# Patient Record
Sex: FEMALE | Race: WHITE | NOT HISPANIC OR LATINO | Employment: OTHER | URBAN - METROPOLITAN AREA
[De-identification: names, ages, dates, MRNs, and addresses within clinical notes are randomized per-mention and may not be internally consistent; named-entity substitution may affect disease eponyms.]

---

## 2017-04-20 ENCOUNTER — TRANSCRIBE ORDERS (OUTPATIENT)
Dept: ADMINISTRATIVE | Facility: HOSPITAL | Age: 77
End: 2017-04-20

## 2017-04-20 DIAGNOSIS — M51.36 DDD (DEGENERATIVE DISC DISEASE), LUMBAR: Primary | ICD-10-CM

## 2017-04-20 DIAGNOSIS — M54.30 SCIATICA, UNSPECIFIED LATERALITY: ICD-10-CM

## 2017-04-27 ENCOUNTER — HOSPITAL ENCOUNTER (OUTPATIENT)
Dept: GENERAL RADIOLOGY | Facility: HOSPITAL | Age: 77
Discharge: HOME | End: 2017-04-27

## 2017-04-27 ENCOUNTER — HOSPITAL ENCOUNTER (OUTPATIENT)
Dept: MRI IMAGING | Facility: HOSPITAL | Age: 77
Discharge: HOME | End: 2017-04-27
Attending: ORTHOPAEDIC SURGERY | Admitting: ORTHOPAEDIC SURGERY

## 2017-04-27 DIAGNOSIS — M51.36 DDD (DEGENERATIVE DISC DISEASE), LUMBAR: ICD-10-CM

## 2017-04-27 DIAGNOSIS — M54.30 SCIATICA, UNSPECIFIED LATERALITY: ICD-10-CM

## 2017-04-27 DIAGNOSIS — N64.4 BREAST PAIN, LEFT: ICD-10-CM

## 2017-04-27 PROCEDURE — 72148 MRI LUMBAR SPINE W/O DYE: CPT

## 2017-04-27 PROCEDURE — 72040 X-RAY EXAM NECK SPINE 2-3 VW: CPT

## 2017-08-11 ENCOUNTER — TRANSCRIBE ORDERS (OUTPATIENT)
Dept: ADMINISTRATIVE | Facility: HOSPITAL | Age: 77
End: 2017-08-11

## 2017-08-11 DIAGNOSIS — M54.2 NECK PAIN: Primary | ICD-10-CM

## 2017-08-15 ENCOUNTER — HOSPITAL ENCOUNTER (OUTPATIENT)
Dept: MRI IMAGING | Facility: HOSPITAL | Age: 77
Discharge: HOME | End: 2017-08-15
Attending: ORTHOPAEDIC SURGERY | Admitting: ORTHOPAEDIC SURGERY

## 2017-08-15 DIAGNOSIS — M54.2 NECK PAIN: ICD-10-CM

## 2017-08-15 PROCEDURE — 72141 MRI NECK SPINE W/O DYE: CPT

## 2017-11-28 ENCOUNTER — OFFICE VISIT (OUTPATIENT)
Dept: NEUROSURGERY | Facility: CLINIC | Age: 77
End: 2017-11-28

## 2017-11-28 VITALS
WEIGHT: 195 LBS | BODY MASS INDEX: 31.34 KG/M2 | DIASTOLIC BLOOD PRESSURE: 77 MMHG | HEIGHT: 66 IN | SYSTOLIC BLOOD PRESSURE: 144 MMHG | HEART RATE: 96 BPM

## 2017-11-28 DIAGNOSIS — M54.2 NECK PAIN: Primary | ICD-10-CM

## 2017-11-28 DIAGNOSIS — M48.061 SPINAL STENOSIS OF LUMBAR REGION WITHOUT NEUROGENIC CLAUDICATION: ICD-10-CM

## 2017-11-28 PROCEDURE — 99203 OFFICE O/P NEW LOW 30 MIN: CPT | Performed by: NEUROLOGICAL SURGERY

## 2017-11-28 RX ORDER — AMITRIPTYLINE HYDROCHLORIDE 25 MG/1
TABLET, FILM COATED ORAL
Refills: 1 | COMMUNITY
Start: 2017-10-17 | End: 2020-11-04 | Stop reason: ALTCHOICE

## 2017-11-28 NOTE — PROGRESS NOTES
Subjective   Patient ID: Latanya Olsen is a 77 y.o. female is here today as a self referral for back and neck pain that radiates into right arm greater than left. She has had Chiropractic care.     History of Present Illness     This patient has been having pain in her back and her neck for the better part of the year.  She says she had been taking care of her sick  passed away last year.  Because of that she was doing a lot of pushing and pulling.  She says that her back began to hurt her more and more today she began to work in the yard which made it even worse.  The pain is located all the way across her lower back.  At one point it was radiating down her legs but that has since gotten better.  Currently it is located primarily across her lower back.  She has no difficulty with bowel or bladder control or other associated symptoms.    Recently she has begun having pain in her neck as well.  It is located all the way across her neck.  There is no radiation into her arms.    The following portions of the patient's history were reviewed and updated as appropriate: allergies, current medications, past family history, past medical history, past social history, past surgical history and problem list.    Review of Systems   HENT: Positive for congestion.    Respiratory: Positive for shortness of breath. Negative for chest tightness.    Cardiovascular: Negative for chest pain.   Gastrointestinal: Positive for constipation.   Musculoskeletal: Positive for back pain, neck pain and neck stiffness.        Right arm greater than left   Allergic/Immunologic: Positive for environmental allergies.   Neurological: Negative for numbness.   Psychiatric/Behavioral: Positive for sleep disturbance. The patient is nervous/anxious.    All other systems reviewed and are negative.      Objective   Physical Exam   Constitutional: She is oriented to person, place, and time. She appears well-developed and well-nourished.   HENT:    Head: Normocephalic and atraumatic.   Eyes: Conjunctivae and EOM are normal. Pupils are equal, round, and reactive to light.   Fundoscopic exam:       The right eye shows no papilledema. The right eye shows venous pulsations.        The left eye shows no papilledema. The left eye shows venous pulsations.   Neck: Carotid bruit is not present.   Neurological: She is oriented to person, place, and time. She has a normal Finger-Nose-Finger Test and a normal Heel to Shin Test. Gait normal.   Reflex Scores:       Tricep reflexes are 2+ on the right side and 2+ on the left side.       Bicep reflexes are 2+ on the right side and 2+ on the left side.       Brachioradialis reflexes are 2+ on the right side and 2+ on the left side.       Patellar reflexes are 2+ on the right side and 2+ on the left side.       Achilles reflexes are 2+ on the right side and 2+ on the left side.  Psychiatric: Her speech is normal.     Neurologic Exam     Mental Status   Oriented to person, place, and time.   Registration of memory: Good recent and remote memory.   Attention: normal. Concentration: normal.   Speech: speech is normal   Level of consciousness: alert  Knowledge: consistent with education.     Cranial Nerves     CN II   Visual fields full to confrontation.   Visual acuity: normal    CN III, IV, VI   Pupils are equal, round, and reactive to light.  Extraocular motions are normal.     CN V   Facial sensation intact.   Right corneal reflex: normal  Left corneal reflex: normal    CN VII   Facial expression full, symmetric.   Right facial weakness: none  Left facial weakness: none    CN VIII   Hearing: intact    CN IX, X   Palate: symmetric    CN XI   Right sternocleidomastoid strength: normal  Left sternocleidomastoid strength: normal    CN XII   Tongue: not atrophic  Tongue deviation: none    Motor Exam   Muscle bulk: normal  Right arm tone: normal  Left arm tone: normal  Right leg tone: normal  Left leg tone: normal    Strength    Strength 5/5 except as noted.     Sensory Exam   Light touch normal.     Gait, Coordination, and Reflexes     Gait  Gait: normal    Coordination   Finger to nose coordination: normal  Heel to shin coordination: normal    Reflexes   Right brachioradialis: 2+  Left brachioradialis: 2+  Right biceps: 2+  Left biceps: 2+  Right triceps: 2+  Left triceps: 2+  Right patellar: 2+  Left patellar: 2+  Right achilles: 2+  Left achilles: 2+  Right : 2+  Left : 2+      Assessment/Plan   Independent Review of Radiographic Studies:      Reviewed an MRI of her cervical spine done on August 15 myself.  This shows fairly severe stenosis at C3 4 and C5 6.  I also reviewed an MRI of her lumbar spine done in April of this year.  This shows some stenosis at L3 4 and L4 5 but nothing severe.  L2 3 is widely patent.    Medical Decision Making:      Told the patient about the imaging.  I told her that from my point of view I don't see anything here that is dangerous.  She has been treated with chiropractic but no other treatment so far.  I really think we should try some epidural blocks and some physical therapy as a next step just to see if that will help calm this down.  I told her to call me after the blocks and if she is no better we will get her back in the office to discuss a myelogram.    Latanya was seen today for back pain and neck pain.    Diagnoses and all orders for this visit:    Neck pain  -     Epidural Block  -     Ambulatory Referral to Physical Therapy    Spinal stenosis of lumbar region without neurogenic claudication  -     Ambulatory Referral to Physical Therapy    Return for Recheck and call after treatment or consultation.

## 2017-12-06 ENCOUNTER — HOSPITAL ENCOUNTER (OUTPATIENT)
Dept: PHYSICAL THERAPY | Facility: HOSPITAL | Age: 77
Setting detail: THERAPIES SERIES
Discharge: HOME | End: 2017-12-06

## 2017-12-06 DIAGNOSIS — M54.2 NECK PAIN: Primary | ICD-10-CM

## 2017-12-06 DIAGNOSIS — M48.061 LUMBAR STENOSIS WITHOUT NEUROGENIC CLAUDICATION: ICD-10-CM

## 2017-12-06 DIAGNOSIS — M48.02 CERVICAL STENOSIS OF SPINE: ICD-10-CM

## 2017-12-06 PROCEDURE — 97162 PT EVAL MOD COMPLEX 30 MIN: CPT

## 2017-12-06 PROCEDURE — 97110 THERAPEUTIC EXERCISES: CPT

## 2017-12-06 PROCEDURE — G8984 CARRY CURRENT STATUS: HCPCS

## 2017-12-06 PROCEDURE — G8985 CARRY GOAL STATUS: HCPCS

## 2017-12-06 NOTE — THERAPY EVALUATION
Outpatient Physical Therapy Ortho Initial Evaluation  ANNAMARIE Grimes     Patient Name: Latanya Olsen  : 1940  MRN: [id number]  Today's Date: 2017      Visit Date: 2017    Patient Active Problem List   Diagnosis   • Carcinoid tumor of lung   • Diverticulosis of intestine   • Obstructive sleep apnea syndrome   • Weight loss   • Vitamin D deficiency   • Overweight (BMI 25.0-29.9)   • Spinal stenosis of lumbar region without neurogenic claudication   • Osteoarthritis of knee   • Obesity (BMI 30-39.9)   • Neutropenia   • Chronic lower back pain   • Knee pain   • Insomnia   • Hypothyroidism   • Hypokalemia   • Hypertension   • Hyperlipidemia   • Generalized osteoarthritis   • Gastroparesis   • Foot pain   • Chronic obstructive pulmonary disease   • Chronic constipation   • Anemia   • Weight gain   • Pain of left breast   • Elevated serum alkaline phosphatase level   • Neck pain        Past Medical History:   Diagnosis Date   • Arthritis    • COPD (chronic obstructive pulmonary disease)    • Diverticulosis    • Hyperlipidemia    • Hypertension    • Hypothyroidism    • Knee swelling    • Lung cancer     history   • Restless leg syndrome    • Sleep apnea with use of continuous positive airway pressure (CPAP)         Past Surgical History:   Procedure Laterality Date   • BUNIONECTOMY Bilateral    • CATARACT EXTRACTION     • CHOLECYSTECTOMY     • COLONOSCOPY     • ENDOSCOPY N/A 2016    Procedure: ESOPHAGOGASTRODUODENOSCOPY  with biopsies;  Surgeon: Von Hernández MD;  Location: [facility];  Service:    • LUNG LOBECTOMY Left     lower lobe   • REPLACEMENT TOTAL KNEE Left    • THYROID BIOPSY         Visit Dx:     ICD-10-CM ICD-9-CM   1. Neck pain M54.2 723.1   2. Lumbar stenosis without neurogenic claudication M48.061 724.02   3. Cervical stenosis of spine M48.02 723.0             Patient History       17 1100          History    Chief Complaint Difficulty with daily activities;Joint  stiffness;Muscle tenderness;Pain  -CC      Type of Pain Back pain;Neck pain  -      Date Current Problem(s) Began --   spring 2017  -      Brief Description of Current Complaint Flared sx of back pain and onset of neck pain due to yard work in the sping 2017. Pt went to see Dr Campbell initially and pt states he recommended surgery. Pt wanted another opinion and saw Dr Hopkins who referred pt to PT and referrring pt for series of epidurals  -CC      Previous treatment for THIS PROBLEM Chiropractor;Medication  -      Onset Date- PT 12-16-17  -CC      Patient/Caregiver Goals Relieve pain;Improve mobility;Return to prior level of function  -      Hand Dominance right-handed  -      Occupation/sports/leisure activities Pt is gqguxmb-[employer]bng[employer]-mc's son lives with her.-likes to read -watch movies-garden  -      Patient seeing anyone else for problem(s)? --   Dr Hopkins  -      How has patient tried to help current problem? Heat pad/cold pack-pt's son helps with laundry and grocery shopping-pt wears back support prescribed by Dr Campbell when cleaning home-trial of cervical pillows  -      What clinical tests have you had for this problem? MRI  -      Results of Clinical Tests Multi level degenerative changes cervical and lumbar-Cervical stenosis severe C3-C4 & C5-C6---stenosis mild -moderate in lumbar spine L3-L4 & L4/L5  -CC      Pain     Pain Location --   L cervical and across L-S area  -CC      Pain at Present --   cervical 4/10 /lumbar 3/10  -CC      Pain at Best --   cervical1/10-lumbar 0/10  -CC      Pain at Worst --   cervical and lumbar 5/10  -CC      Pain Frequency --   cervical constant- lumbar can be intermittent  -CC      Pain Description Aching;Sore;Tender;Tightness  -      What Performance Factors Make the Current Problem(s) WORSE? cervical lifting gallon ode-xnolbsfjx-gymzohw -vaccuming -walking up steps for back sx  -CC      What Performance Factors Make the Current Problem(s) BETTER? Avoiding  factors previously mentioned  -CC      Tolerance Time- Standing 30 min  -CC      Tolerance Time- Sitting 30 min  -CC      Tolerance Time- Walking 45 min  -CC      Is your sleep disturbed? --   Pt has trouble falling asleep ~ 2hrs to get comfortable  -CC      What position do you sleep in? Supine;Left sidelying  -CC      Fall Risk Assessment    Any falls in the past year: No  -CC      Daily Activities    Primary Language English  -CC      Are you able to read Yes  -CC      Are you able to write Yes  -CC      How does patient learn best? Listening  -CC      Teaching needs identified Home Exercise Program;Management of Condition  -CC      Patient is concerned about/has problems with Climbing Stairs;Grasping objects lifting;Performing home management (household chores, shopping, care of dependents);Performing job responsibilities/community activities (work, school,;Sitting;Standing;Walking  -CC      Does patient have problems with the following? Anxiety  -CC      Barriers to learning None  -CC      Pt Participated in POC and Goals Yes  -CC      Safety    Are you being hurt, hit, or frightened by anyone at home or in your life? No  -CC      Are you being neglected by a caregiver No  -CC        User Key  (r) = Recorded By, (t) = Taken By, (c) = Cosigned By    Initials Name Provider Type    CC Davina Aviles, PT Physical Therapist                PT Ortho       17 1100    Subjective Comments    Subjective Comments Pt relates that she is a -spouse  last year. 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 Pt has had a 20 year hx of LBP but flared up and onset of radicular sx into LE spring 2017 when doing yardwork along with onset of neck pain sx. Pt relates the radicular sx have subsided mostly. Pt having more issues with neck pain sx localized on the L. Pt had tried chiropractic care 3 mos ago but did not help.  -CC    Precautions and Contraindications    Precautions/Limitations --   limited by pain   -CC    Posture/Observations    Forward Head Moderate;Increased  -CC    Cervical Lordosis Moderate;Increased  -CC    Thoracic Kyphosis Moderate;Increased  -CC    Rounded Shoulders Moderate;Increased  -CC    Scapular Elevation Right:;Moderate  -CC    Scoliosis Mild;Moderate  -CC    Lumbar lordosis Mild;Moderate;Decreased  -CC    Iliac crests Right:;Moderate;Elevated  -CC    Posture/Observations Comments Pt ambulates into dept with FF trunk and uneven wt shifting   -CC    Cervical/Shoulder ROM Screen    Cervical flexion --   22 degrees-c/o sx  -CC    Cervical extension --   30 degrees  -CC    Cervical lateral flexion --   SBR 15 degrees/ SBL 20 degrees  -CC    Cervical rotation --   RR 50 degrees/ RL 20 degrees with c/o sx  -CC    Lumbar ROM Screen- Lower Quarter Clearing    Lumbar Flexion --   50 % c/o sx  -CC    Lumbar Extension --   50% -c/o sx  -CC    Lumbar Lateral Flexion --   SBR/SBL c/o sx each  -CC    Lumbar Rotation --   RR/RL 25%  -CC    Cervical/Thoracic Special Tests    Cervical Compression (Forarminal Compression vs. Facet Pain) Left:;Positive  -CC    Cervical Distraction (Foraminal Compression vs. Facet Pain) Left:;Positive  -CC    Lumbosacral Palpation    SI Left:;Tender  -CC    Lumbosacral Segment Left:;Tender  -CC    Spinous Process Tender  -CC    Piriformis Left:;Tender  -CC    Erector Spinae (Paraspinals) Left:;Tender  -CC    Lumbosacral Palpation? Yes  -CC    Lumbar/SI Special Tests    Standing Flexion Test (SI Dysfunction) Left:;Positive  -CC    Slump Test (Neural Tension) Bilateral:;Negative  -CC    SLR (Neural Tension) Right:;Positive  -CC    IWONA (hip vs. SI Dysfunction) Bilateral:;Positive   Bilateral c//o hip sx  -CC    MMT (Manual Muscle Testing)    General MMT Assessment no strength deficits identified  -CC    Upper Extremity Flexibility    Suboccipitals Left:;Mildly limited;Moderately limited  -CC    Scalenes Left:;Moderately limited  -CC    SCM Left:;Moderately limited  -CC    Upper  Trapezius Left:;Moderately limited  -CC    Levator Scapula Left:;Moderately limited  -CC    Pect Minor Bilateral:;Mildly limited;Moderately limited  -CC    Lower Extremity Flexibility    Hamstrings Bilateral:;WNL  -CC    ITB Bilateral:;Moderately limited  -CC    Stairs Assessment/Treatment    Number of Stairs Pt lives 1 story home with basement for laundry- 3 steps into home with handrail  -CC      User Key  (r) = Recorded By, (t) = Taken By, (c) = Cosigned By    Initials Name Provider Type    JESSICA Aviles, PT Physical Therapist                            Therapy Education       12/06/17 1632          Therapy Education    Education Details Issued handout for HEP   -CC      Given HEP  -CC      Program New  -CC      How Provided Verbal;Demonstration;Written  -CC      Provided to Patient  -CC      Level of Understanding Demonstrated  -CC        User Key  (r) = Recorded By, (t) = Taken By, (c) = Cosigned By    Initials Name Provider Type    JESSICA Aviles, PT Physical Therapist                PT OP Goals       12/06/17 1100       PT Short Term Goals    STG Date to Achieve 01/05/18  -CC     STG 1 Pt compliant performing HEP for cervical and lumbar spine  -CC     STG 2 Pt reports improved comfort going to sleep by 50%  -CC     STG 3 Pt will improve cervical ROM FF and SBR by 5-10 degrees  -CC     STG 4 On palpation pt will show decrease soft tissue restriction in L cervical area and report decrease tenderness by 25-50%.  -CC     STG 5 Pt reports she is able to lift a gallon jug into refrigerator with decrease sx L neck and shoulder 25-50%  -CC       User Key  (r) = Recorded By, (t) = Taken By, (c) = Cosigned By    Initials Name Provider Type    JESSICA Aviles PT Physical Therapist                PT Assessment/Plan       12/06/17 1632       PT Assessment    Functional Limitations Impaired locomotion;Limitation in home management;Limitations in functional capacity and  performance;Performance in leisure activities  -CC     Impairments Impaired muscle length;Posture;Joint integrity;Joint mobility;Range of motion;Impaired flexibility;Pain;Gait  -CC     Assessment Comments Pt referred to PT with c/c neck pain as well as flared sx in chronic lumbar pain over past 6 mos due to yard work in the spring 2017. Pt c/o greater pain and concern of sx in L cervical area which did not bother her much before. Crom is reduced with increased sx SBR and FF. No strength deficits identified in UE or LE's. Pt presents increase soft tissue dysfunction in L cervical muscles and sx are also more involved  in L lumbar region. Pt has been  Dx with degenerative changes in cervical and lumbar spine but stenosis greater in cervical spine so will initially focus care in cervical area to reduce sx and improve pt's tolerance to ADL                                                                                                                                                                                                                                                                                                       -CC     Please refer to paper survey for additional self-reported information Yes   Neck Index -score 38  /Back Index score 26  -CC     Rehab Potential Fair  -CC     Patient/caregiver participated in establishment of treatment plan and goals Yes  -CC     Patient would benefit from skilled therapy intervention Yes  -CC     PT Plan    PT Frequency 2x/week;3x/week  -CC     Predicted Duration of Therapy Intervention (days/wks) 4 weeks  -CC     Planned CPT's? PT EVAL MOD COMPLELITY: 86055;PT ULTRASOUND EA 15 MIN: 95253;PT TRACTION CERVICAL: 49767;PT HOT OR COLD PACK TREAT MCARE;PT ELECTRICAL STIM UNATTEND: ;PT THER PROC EA 15 MIN: 10547  -CC     Physical Therapy Interventions (Optional Details) lumbar stabilization;manual therapy techniques;modalities;stretching;taping;home exercise  program;patient/family education  -CC     PT Plan Comments Follow POC options  -CC       User Key  (r) = Recorded By, (t) = Taken By, (c) = Cosigned By    Initials Name Provider Type    JESSICA Aviles PT Physical Therapist                  Exercises       17 1100          Subjective Comments    Subjective Comments Pt relates that she is a -spouse  last year. 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 Pt has had a 20 year hx of LBP but flared up and onset of radicular sx into LE spring 2017 when doing yardwork along with onset of neck pain sx. Pt relates the radicular sx have subsided mostly. Pt having more issues with neck pain sx localized on the L. Pt had tried chiropractic care 3 mos ago but did not help.  -CC      Exercise 1    Exercise Name 1 Shoulder rolls/scapular squeezes  -CC      Cueing 1 Demo  -CC      Reps 1 5  -CC      Time (Seconds) 1 5 sec for scap squeezes  -CC      Exercise 2    Exercise Name 2 L UT and Levator stretch   -CC      Cueing 2 Demo  -CC      Reps 2 5  -CC      Time (Seconds) 2  5 sec each  -CC      Exercise 3    Exercise Name 3 B SKC with sheet  -CC      Cueing 3 Verbal  -CC      Reps 3 5  -CC      Time (Seconds) 3 10 sec  -CC      Exercise 4    Exercise Name 4 Hamstring stretch with sheet  -CC      Cueing 4 Verbal;Tactile  -CC      Reps 4 4  -CC      Time (Seconds) 4 15 sec each  -CC        User Key  (r) = Recorded By, (t) = Taken By, (c) = Cosigned By    Initials Name Provider Type    JESSICA Aviles PT Physical Therapist                              Time Calculation:   Start Time: 1100  Stop Time: 1210  Time Calculation (min): 70 min     Therapy Charges for Today     Code Description Service Date Service Provider Modifiers Qty    34990760613 HC PT EVAL MOD COMPLEXITY 3 2017 Davina Aviles, PT GP 1    45883279538 HC PT THER PROC EA 15 MIN 2017 Davina Aviles PT GP 1    59929778853 HC PT CARRY MOV HAND OBJ CURRENT  12/6/2017 Davina Aviles, PT GP, CJ 1    10531896401 HC PT CARRY MOV HAND OBJ PROJECTED 12/6/2017 Davina Aviles, PT GP, CI 1          PT G-Codes  Functional Limitation: Carrying, moving and handling objects  Carrying, Moving and Handling Objects Current Status (): At least 20 percent but less than 40 percent impaired, limited or restricted  Carrying, Moving and Handling Objects Goal Status (): At least 1 percent but less than 20 percent impaired, limited or restricted         Davina Aviles, PT  12/6/2017

## 2017-12-11 ENCOUNTER — HOSPITAL ENCOUNTER (OUTPATIENT)
Dept: PHYSICAL THERAPY | Facility: HOSPITAL | Age: 77
Setting detail: THERAPIES SERIES
Discharge: HOME | End: 2017-12-11

## 2017-12-11 PROCEDURE — 97110 THERAPEUTIC EXERCISES: CPT

## 2017-12-11 PROCEDURE — G0283 ELEC STIM OTHER THAN WOUND: HCPCS

## 2017-12-11 PROCEDURE — 97035 APP MDLTY 1+ULTRASOUND EA 15: CPT

## 2017-12-11 NOTE — THERAPY TREATMENT NOTE
Outpatient Physical Therapy Ortho Treatment Note  ANNAMARIE Grimes     Patient Name: Latanya Olsen  : 1940  MRN: [id number]  Today's Date: 2017      Visit Date: 2017    Visit Dx:  No diagnosis found.    Patient Active Problem List   Diagnosis   • Carcinoid tumor of lung   • Diverticulosis of intestine   • Obstructive sleep apnea syndrome   • Weight loss   • Vitamin D deficiency   • Overweight (BMI 25.0-29.9)   • Spinal stenosis of lumbar region without neurogenic claudication   • Osteoarthritis of knee   • Obesity (BMI 30-39.9)   • Neutropenia   • Chronic lower back pain   • Knee pain   • Insomnia   • Hypothyroidism   • Hypokalemia   • Hypertension   • Hyperlipidemia   • Generalized osteoarthritis   • Gastroparesis   • Foot pain   • Chronic obstructive pulmonary disease   • Chronic constipation   • Anemia   • Weight gain   • Pain of left breast   • Elevated serum alkaline phosphatase level   • Neck pain        Past Medical History:   Diagnosis Date   • Arthritis    • COPD (chronic obstructive pulmonary disease)    • Diverticulosis    • Hyperlipidemia    • Hypertension    • Hypothyroidism    • Knee swelling    • Lung cancer     history   • Restless leg syndrome    • Sleep apnea with use of continuous positive airway pressure (CPAP)         Past Surgical History:   Procedure Laterality Date   • BUNIONECTOMY Bilateral    • CATARACT EXTRACTION     • CHOLECYSTECTOMY     • COLONOSCOPY     • ENDOSCOPY N/A 2016    Procedure: ESOPHAGOGASTRODUODENOSCOPY  with biopsies;  Surgeon: Von Hernández MD;  Location: [facility];  Service:    • LUNG LOBECTOMY Left     lower lobe   • REPLACEMENT TOTAL KNEE Left    • THYROID BIOPSY                               PT Assessment/Plan       17 1543       PT Assessment    Assessment Comments Pt tolerated Rx well and liked IFC to L cervical area and reported decrease sx compared to beginning of Rx. Palpated decrease soft tissue restriction following  STM in L cervical area.   -CC     PT Plan    PT Plan Comments Continue per plan and advance TE as pt tolerates  -CC       User Key  (r) = Recorded By, (t) = Taken By, (c) = Cosigned By    Initials Name Provider Type    JESSICA Aviles PT Physical Therapist                Modalities       12/11/17 1500          Subjective Comments    Subjective Comments Pt relates that she has been doing HEP some and feeling about the same in neck and back  -CC      Subjective Pain    Able to rate subjective pain? yes  -CC      Pre-Treatment Pain Level 4   cervical  -CC      Moist Heat    Location cervical and lumbar supine with knee roll and HOB elevated  -CC      Rx Minutes 15 mins  -CC      MH S/P Rx Yes  -CC      Ultrasound [id number]    Location L paracervicals and UT  -CC      Rx Minutes 8 min  -CC      Frequency 1.0 MHz  -CC      Intensity - Wts/cm 1.5  -CC      ELECTRICAL STIMULATION    Attended/Unattended Unattended  -CC      Stimulation Type IFC  -CC      Location/Electrode Placement/Other To L paracervicals and UT with MH supine with knee roll and HOB elevated  -CC      Rx Minutes 15 mins  -CC        User Key  (r) = Recorded By, (t) = Taken By, (c) = Cosigned By    Initials Name Provider Type    JESSICA Aviles, PT Physical Therapist                Exercises       12/11/17 1500          Subjective Comments    Subjective Comments Pt relates that she has been doing HEP some and feeling about the same in neck and back  -CC      Subjective Pain    Able to rate subjective pain? yes  -CC      Pre-Treatment Pain Level 4   cervical  -CC      Post-Treatment Pain Level 3  -CC      Exercise 1    Exercise Name 1 Shoulder rolls/scapular squeezes  -CC      Cueing 1 Demo  -CC      Reps 1 10  -CC      Time (Seconds) 1 5 sec for scap squeezes  -CC      Exercise 2    Exercise Name 2 L UT and Levator stretch   -CC      Cueing 2 Demo  -CC      Reps 2 5  -CC      Time (Seconds) 2  5 sec each  -CC      Exercise 3    Exercise  Name 3 B SKC with sheet  -CC      Cueing 3 Verbal  -CC      Reps 3 5  -CC      Time (Seconds) 3 10 sec  -CC      Exercise 4    Exercise Name 4 Hamstring stretch with sheet  -CC      Cueing 4 Verbal;Tactile  -CC      Reps 4 4  -CC      Time (Seconds) 4 15 sec each  -CC      Exercise 5    Exercise Name 5 Bilateral LTR  -CC      Cueing 5 Tactile  -CC      Reps 5 5  -CC      Time (Seconds) 5 10 sec ea  -CC        User Key  (r) = Recorded By, (t) = Taken By, (c) = Cosigned By    Initials Name Provider Type    JESSICA Aviles PT Physical Therapist                        Manual Rx (last 36 hours)      Manual Treatments       12/11/17 1500          Manual Rx 1    Manual Rx 1 Location L paracervicals and UT  -CC      Manual Rx 1 Type STM  -CC      Manual Rx 1 Grade as tolerated mild-mod  -CC      Manual Rx 1 Duration 7 min  -CC        User Key  (r) = Recorded By, (t) = Taken By, (c) = Cosigned By    Initials Name Provider Type    JESSICA Aviles PT Physical Therapist                    Therapy Education       12/11/17 1542          Therapy Education    Education Details Continue HEP - add LTR to back stretches per session  -CC      Given HEP  -CC        User Key  (r) = Recorded By, (t) = Taken By, (c) = Cosigned By    Initials Name Provider Type    JESSICA Aviles PT Physical Therapist                Time Calculation:   Start Time: 1430  Stop Time: 1545  Time Calculation (min): 75 min    Therapy Charges for Today     Code Description Service Date Service Provider Modifiers Qty    52386273654 HC PT ELECTRICAL STIM UNATTENDED 12/11/2017 Davina Aviles PT  1    46459385520 HC PT HOT OR COLD PACK TREAT MCARE 12/11/2017 Davina Aviles, PT GP 2    67089392011 HC PT THER PROC EA 15 MIN 12/11/2017 Davina Aviles, PT GP 1    53310837943 HC PT ULTRASOUND EA 15 MIN 12/11/2017 Davina Aviles, PT GP 1                    Davina Aviles,  PT  12/11/2017

## 2017-12-13 ENCOUNTER — HOSPITAL ENCOUNTER (OUTPATIENT)
Dept: PHYSICAL THERAPY | Facility: HOSPITAL | Age: 77
Setting detail: THERAPIES SERIES
Discharge: HOME | End: 2017-12-13

## 2017-12-13 PROCEDURE — 97110 THERAPEUTIC EXERCISES: CPT

## 2017-12-13 PROCEDURE — 97035 APP MDLTY 1+ULTRASOUND EA 15: CPT

## 2017-12-13 PROCEDURE — G0283 ELEC STIM OTHER THAN WOUND: HCPCS

## 2017-12-13 NOTE — THERAPY TREATMENT NOTE
Outpatient Physical Therapy Ortho Treatment Note  ANNAMARIE Grimes     Patient Name: Latanya Olsen  : 1940  MRN: [id number]  Today's Date: 2017      Visit Date: 2017    Visit Dx:  No diagnosis found.    Patient Active Problem List   Diagnosis   • Carcinoid tumor of lung   • Diverticulosis of intestine   • Obstructive sleep apnea syndrome   • Weight loss   • Vitamin D deficiency   • Overweight (BMI 25.0-29.9)   • Spinal stenosis of lumbar region without neurogenic claudication   • Osteoarthritis of knee   • Obesity (BMI 30-39.9)   • Neutropenia   • Chronic lower back pain   • Knee pain   • Insomnia   • Hypothyroidism   • Hypokalemia   • Hypertension   • Hyperlipidemia   • Generalized osteoarthritis   • Gastroparesis   • Foot pain   • Chronic obstructive pulmonary disease   • Chronic constipation   • Anemia   • Weight gain   • Pain of left breast   • Elevated serum alkaline phosphatase level   • Neck pain        Past Medical History:   Diagnosis Date   • Arthritis    • COPD (chronic obstructive pulmonary disease)    • Diverticulosis    • Hyperlipidemia    • Hypertension    • Hypothyroidism    • Knee swelling    • Lung cancer     history   • Restless leg syndrome    • Sleep apnea with use of continuous positive airway pressure (CPAP)         Past Surgical History:   Procedure Laterality Date   • BUNIONECTOMY Bilateral    • CATARACT EXTRACTION     • CHOLECYSTECTOMY     • COLONOSCOPY     • ENDOSCOPY N/A 2016    Procedure: ESOPHAGOGASTRODUODENOSCOPY  with biopsies;  Surgeon: Von Hernández MD;  Location: [facility];  Service:    • LUNG LOBECTOMY Left     lower lobe   • REPLACEMENT TOTAL KNEE Left    • THYROID BIOPSY                               PT Assessment/Plan       17 1530       PT Assessment    Assessment Comments Palpated decrease soft tissue restriction to L cervical area during STM. Pt tolerating stretches better and ease of mobility  noted with transitional motions   -CC     PT Plan    PT Plan Comments Continue PT -progress TE as tolerates  -CC       User Key  (r) = Recorded By, (t) = Taken By, (c) = Cosigned By    Initials Name Provider Type    JESSICA Aviles, PT Physical Therapist                Modalities       12/13/17 1300          Subjective Comments    Subjective Comments Pt reported had some soreness in neck after last session that lasted until went to bed but feeling  a little better now -not as tight   -CC      Subjective Pain    Able to rate subjective pain? yes  -CC      Pre-Treatment Pain Level 4  -CC      Subjective Pain Comment Pt also relates stretches for bakalso helping  -CC      Moist Heat    Location cervical and lumbar supine with knee roll and HOB elevated  -CC      Rx Minutes 15 mins  -CC      MH S/P Rx Yes  -CC      Ultrasound [id number]    Location L paracervicals and UT  -CC      Rx Minutes 8 min  -CC      Frequency 1.0 MHz  -CC      Intensity - Wts/cm 1.5  -CC      ELECTRICAL STIMULATION    Attended/Unattended Unattended  -CC      Stimulation Type IFC  -CC      Location/Electrode Placement/Other To L paracervicals and UT with MH supine with knee roll and HOB elevated  -CC      Rx Minutes 15 mins  -CC        User Key  (r) = Recorded By, (t) = Taken By, (c) = Cosigned By    Initials Name Provider Type    JESSICA Aviles, PT Physical Therapist                Exercises       12/13/17 1300          Subjective Comments    Subjective Comments Pt reported had some soreness in neck after last session that lasted until went to bed but feeling  a little better now -not as tight   -CC      Subjective Pain    Able to rate subjective pain? yes  -CC      Pre-Treatment Pain Level 4  -CC      Subjective Pain Comment Pt also relates stretches for bakalso helping  -CC      Exercise 1    Exercise Name 1 Shoulder rolls/scapular squeezes  -CC      Cueing 1 Demo  -CC      Reps 1 10  -CC      Time (Seconds) 1 5 sec for scap squeezes  -CC      Exercise 2     Exercise Name 2 L UT and Levator stretch   -CC      Cueing 2 Demo  -CC      Reps 2 5  -CC      Time (Seconds) 2  5 sec each  -CC      Exercise 3    Exercise Name 3 B SKC with sheet  -CC      Cueing 3 Verbal  -CC      Reps 3 5  -CC      Time (Seconds) 3 10 sec  -CC      Exercise 4    Exercise Name 4 Hamstring stretch with sheet  -CC      Cueing 4 Verbal;Tactile  -CC      Reps 4 4  -CC      Time (Seconds) 4 15 sec each  -CC      Exercise 5    Exercise Name 5 Bilateral LTR  -CC      Cueing 5 Tactile  -CC      Reps 5 5  -CC      Time (Seconds) 5 10 sec ea  -CC      Exercise 6    Exercise Name 6 Corner stretch  -CC      Cueing 6 Demo  -CC      Reps 6 5  -CC      Time (Seconds) 6 10 sec  -CC        User Key  (r) = Recorded By, (t) = Taken By, (c) = Cosigned By    Initials Name Provider Type    CC Davina Aviles, PT Physical Therapist                             Therapy Education  Education Details: Continue HEP - advised pt to keep reps of stretches close to number as instructed to avoid over doing and flaring sx              Time Calculation:   Start Time: 1305  Stop Time: 1415  Time Calculation (min): 70 min    Therapy Charges for Today     Code Description Service Date Service Provider Modifiers Qty    05715787945 HC PT ELECTRICAL STIM UNATTENDED 12/13/2017 Davina Aviles, PT  1    12658961655 HC PT HOT OR COLD PACK TREAT MCARE 12/13/2017 Davina Aviles, PT GP 1    99324449636 HC PT ULTRASOUND EA 15 MIN 12/13/2017 Davina Aviles, PT GP 1    46034811201 HC PT THER PROC EA 15 MIN 12/13/2017 Davina Aviles, PT GP 2                    Davina Aviles, PT  12/13/2017

## 2017-12-21 ENCOUNTER — HOSPITAL ENCOUNTER (OUTPATIENT)
Dept: PAIN MEDICINE | Facility: HOSPITAL | Age: 77
End: 2017-12-21
Attending: NEUROLOGICAL SURGERY

## 2018-01-03 ENCOUNTER — HOSPITAL ENCOUNTER (OUTPATIENT)
Dept: GENERAL RADIOLOGY | Facility: HOSPITAL | Age: 78
Discharge: HOME | End: 2018-01-03

## 2018-01-03 ENCOUNTER — ANESTHESIA EVENT (OUTPATIENT)
Dept: PAIN MEDICINE | Facility: HOSPITAL | Age: 78
End: 2018-01-03

## 2018-01-03 ENCOUNTER — HOSPITAL ENCOUNTER (OUTPATIENT)
Dept: PAIN MEDICINE | Facility: HOSPITAL | Age: 78
Discharge: HOME | End: 2018-01-03
Attending: NEUROLOGICAL SURGERY | Admitting: NEUROLOGICAL SURGERY

## 2018-01-03 ENCOUNTER — ANESTHESIA (OUTPATIENT)
Dept: PAIN MEDICINE | Facility: HOSPITAL | Age: 78
End: 2018-01-03

## 2018-01-03 VITALS
HEIGHT: 66 IN | SYSTOLIC BLOOD PRESSURE: 124 MMHG | OXYGEN SATURATION: 94 % | DIASTOLIC BLOOD PRESSURE: 60 MMHG | HEART RATE: 63 BPM | TEMPERATURE: 97.9 F | RESPIRATION RATE: 16 BRPM

## 2018-01-03 DIAGNOSIS — R52 PAIN: ICD-10-CM

## 2018-01-03 PROCEDURE — 0 IOPAMIDOL 41 % SOLUTION: Performed by: ANESTHESIOLOGY

## 2018-01-03 PROCEDURE — 25010000002 MIDAZOLAM PER 1 MG: Performed by: ANESTHESIOLOGY

## 2018-01-03 PROCEDURE — C1755 CATHETER, INTRASPINAL: HCPCS

## 2018-01-03 PROCEDURE — 77003 FLUOROGUIDE FOR SPINE INJECT: CPT

## 2018-01-03 PROCEDURE — 25010000002 METHYLPREDNISOLONE PER 80 MG: Performed by: ANESTHESIOLOGY

## 2018-01-03 RX ORDER — CLONIDINE HYDROCHLORIDE 0.1 MG/1
0.1 TABLET ORAL 2 TIMES DAILY
COMMUNITY
End: 2019-03-21 | Stop reason: HOSPADM

## 2018-01-03 RX ORDER — LIDOCAINE HYDROCHLORIDE 10 MG/ML
1 INJECTION, SOLUTION INFILTRATION; PERINEURAL ONCE AS NEEDED
Status: DISCONTINUED | OUTPATIENT
Start: 2018-01-03 | End: 2018-01-04 | Stop reason: HOSPADM

## 2018-01-03 RX ORDER — LIDOCAINE HYDROCHLORIDE 10 MG/ML
0.5 INJECTION, SOLUTION INFILTRATION; PERINEURAL ONCE AS NEEDED
Status: CANCELLED | OUTPATIENT
Start: 2018-01-28

## 2018-01-03 RX ORDER — SODIUM CHLORIDE 0.9 % (FLUSH) 0.9 %
1-10 SYRINGE (ML) INJECTION AS NEEDED
Status: DISCONTINUED | OUTPATIENT
Start: 2018-01-03 | End: 2018-01-04 | Stop reason: HOSPADM

## 2018-01-03 RX ORDER — METHYLPREDNISOLONE ACETATE 80 MG/ML
80 INJECTION, SUSPENSION INTRA-ARTICULAR; INTRALESIONAL; INTRAMUSCULAR; SOFT TISSUE ONCE
Status: COMPLETED | OUTPATIENT
Start: 2018-01-03 | End: 2018-01-03

## 2018-01-03 RX ORDER — CITALOPRAM 10 MG/1
10 TABLET ORAL DAILY
COMMUNITY
End: 2020-11-04 | Stop reason: ALTCHOICE

## 2018-01-03 RX ORDER — FENTANYL CITRATE 50 UG/ML
50 INJECTION, SOLUTION INTRAMUSCULAR; INTRAVENOUS AS NEEDED
Status: DISCONTINUED | OUTPATIENT
Start: 2018-01-03 | End: 2018-01-04 | Stop reason: HOSPADM

## 2018-01-03 RX ORDER — MIDAZOLAM HYDROCHLORIDE 1 MG/ML
1 INJECTION INTRAMUSCULAR; INTRAVENOUS AS NEEDED
Status: DISCONTINUED | OUTPATIENT
Start: 2018-01-03 | End: 2018-01-04 | Stop reason: HOSPADM

## 2018-01-03 RX ADMIN — IOPAMIDOL 10 ML: 408 INJECTION, SOLUTION INTRATHECAL at 10:22

## 2018-01-03 RX ADMIN — Medication 1 MG: at 10:18

## 2018-01-03 RX ADMIN — METHYLPREDNISOLONE ACETATE 80 MG: 80 INJECTION, SUSPENSION INTRA-ARTICULAR; INTRALESIONAL; INTRAMUSCULAR; SOFT TISSUE at 10:23

## 2018-01-03 NOTE — H&P
[facility]    History and Physical    Patient Name: Latanya Olsen  :  1940  MRN:  [id number]  Date of Admission: 1/3/2018    Subjective     Patient is a 77 y.o. female presents with chief complaint of chronic, moderate, severe low back, buttock and leg: bilateral pain.  Onset of symptoms was gradual starting about 1 year ago.  She thinks that the problems in her back and her neck started when she was caring for her  who passed away about a year ago. Symptoms are associated/aggravated by activity, exercise or walking for more than a few minutes. Symptoms improve with relaxation and lying down     She notably has lumbar and cervical changes on her MRI of both notable for stenosis.  She has chosen to treat the back today since that seems to be the one that causes radiating pain down her legs.  For now the neck pain does not seem to radiate    The following portions of the patients history were reviewed and updated as appropriate: current medications, allergies, past medical history, past surgical history, past family history, past social history and problem list                Objective     Past Medical History:   Past Medical History:   Diagnosis Date   • Arthritis    • COPD (chronic obstructive pulmonary disease)    • Diverticulosis    • GERD (gastroesophageal reflux disease)    • Hyperlipidemia    • Hypertension    • Hypothyroidism    • Knee swelling    • Lung cancer     history   • Restless leg syndrome    • Sleep apnea with use of continuous positive airway pressure (CPAP)      Past Surgical History:   Past Surgical History:   Procedure Laterality Date   • BUNIONECTOMY Bilateral    • CATARACT EXTRACTION     • CHOLECYSTECTOMY     • COLONOSCOPY     • ENDOSCOPY N/A 2016    Procedure: ESOPHAGOGASTRODUODENOSCOPY  with biopsies;  Surgeon: Von Hernández MD;  Location: [facility];  Service:    • LUNG LOBECTOMY Left     lower lobe   • REPLACEMENT TOTAL KNEE Left    • THYROID BIOPSY    "    Family History:   Family History   Problem Relation Age of Onset   • Heart attack Mother    • Coronary artery disease Mother    • Hypertension Mother    • Heart attack Sister      Social History:   Social History   Substance Use Topics   • Smoking status: Never Smoker   • Smokeless tobacco: Never Used   • Alcohol use No       Vital Signs Range for the last 24 hours  Temperature: Temp:  [36.6 °C (97.9 °F)] 36.6 °C (97.9 °F)   Temp Source: Temp src: Oral   BP: BP: (133)/(72) 133/72   Pulse: Heart Rate:  [65] 65   Respirations: Resp:  [16] 16   SPO2: SpO2:  [97 %] 97 %   O2 Amount (l/min):     O2 Devices O2 Device: room air   Weight:       Flowsheet Rows         First Filed Value    Admission Height  167.6 cm (66\") Documented at 01/03/2018 1003    Admission Weight            --------------------------------------------------------------------------------    Current Outpatient Prescriptions   Medication Sig Dispense Refill   • amitriptyline (ELAVIL) 25 MG tablet TK 1 T PO D UTD  1   • budesonide (PULMICORT) 0.5 MG/2ML nebulizer solution USE 1 VIAL IN NEBULIZER 2 TIMES DAILY. Generic: PULMICORT 60 each 10   • CARTIA  MG 24 hr capsule TK 1 C PO QD FOR BP  5   • citalopram (CeleXA) 10 MG tablet Take 10 mg by mouth Daily.     • CloNIDine (CATAPRES) 0.1 MG tablet Take 0.1 mg by mouth 2 (Two) Times a Day.     • dicyclomine (BENTYL) 10 MG capsule Take 10 mg by mouth daily.     • esomeprazole (NexIUM) 40 MG capsule Take 40 mg by mouth every morning before breakfast.     • gabapentin (NEURONTIN) 100 MG capsule TAKE 1 CAPSULE BY MOUTH EVERY 8 HOURS 270 capsule 3   • ipratropium-albuterol (DUO-NEB) 0.5-2.5 mg/mL nebulizer USE 1 VIAL IN NEBULIZER 2 TIMES DAILY. Generic: DUONEB 60 mL 10   • levocetirizine (XYZAL) 5 MG tablet TAKE 1 TABLET BY MOUTH EVERY DAY 90 tablet 0   • levothyroxine (SYNTHROID, LEVOTHROID) 88 MCG tablet Take by mouth.     • meclizine (ANTIVERT) 25 MG tablet TK 1 T PO TID PRN  5   • potassium chloride " (K-DUR,KLOR-CON) 20 MEQ CR tablet TAKE 1 TABLET BY MOUTH TWICE DAILY 180 tablet 1   • traMADol (ULTRAM) 50 MG tablet Take 50 mg by mouth Every 6 (Six) Hours As Needed for Moderate Pain .     • valsartan-hydrochlorothiazide (DIOVAN-HCT) 320-25 MG per tablet Take 1 tablet by mouth daily. 90 tablet 1   • zolpidem (AMBIEN) 10 MG tablet TAKE 1 TABLET BY MOUTH EVERY NIGHT AT BEDTIME AS NEEDED 90 tablet 0   • lubiprostone (AMITIZA) 8 MCG capsule Take 8 mcg by mouth daily with breakfast.     • magnesium hydroxide (MILK OF MAGNESIA) 400 MG/5ML suspension Take  by mouth daily.       Current Facility-Administered Medications   Medication Dose Route Frequency Provider Last Rate Last Dose   • fentaNYL citrate (PF) (SUBLIMAZE) injection 50 mcg  50 mcg Intravenous PRN Anil York MD       • iopamidol (ISOVUE-M 200) injection 41%  12 mL Epidural Once PRN Anil York MD       • lidocaine (XYLOCAINE) 1 % injection 1 mL  1 mL Intradermal Once PRN Anil York MD       • methylPREDNISolone acetate (DEPO-medrol) injection 80 mg  80 mg Intra-articular Once Anil York MD       • midazolam (VERSED) injection 1 mg  1 mg Intravenous PRN Anil York MD       • sodium chloride 0.9 % flush 1-10 mL  1-10 mL Intravenous PRN Anil York MD       • sodium chloride 0.9 % flush 1-10 mL  1-10 mL Intravenous PRN Anil York MD           --------------------------------------------------------------------------------  Assessment/Plan      Anesthesia Evaluation     Patient summary reviewed and Nursing notes reviewed   no history of anesthetic complications:         Airway   Mallampati: II  TM distance: >3 FB  Neck ROM: limited  possible difficult intubation  Dental - normal exam     Pulmonary - normal exam   (+) a smoker Former, COPD, shortness of breath, sleep apnea, decreased breath sounds,   Cardiovascular - normal exam  Exercise tolerance: good (4-7 METS)    Rhythm: regular  Rate:  normal    (+) hypertension, hyperlipidemia  (-) past MI, angina, CHF, orthopnea, PND, MCKEON, PVD      Neuro/Psych  (+) dizziness/light headedness, numbness, psychiatric history Depression,  abnormal gait,   normal reflexes and symmetric  (-) seizures, neuromuscular disease, TIA, CVA, weakness, normal sensory deficit, normal coordination, strength not normal in all 4 extremities (global weaknes)  GI/Hepatic/Renal/Endo    (+) obesity,  GERD, hypothyroidism,   (-) liver disease, diabetes    Musculoskeletal (-) normal exam    (+) back pain, neck pain, radiculopathy Left lower extremity and Right lower extremity      PE comment: Diminished ROM  Abdominal  - normal exam   Substance History - negative use  (-) alcohol use, drug use     OB/GYN negative ob/gyn ROS         Other   (+) arthritis   history of cancer (Carcinoid tumor of lung) active                                       Diagnosis and Plan    Treatment Plan  ASA 3      Procedures: Lumbar Epidural Steroid Injection(LESI), With fluoroscopy,       Anesthetic plan and risks discussed with patient.          Diagnosis     * Lumbar spinal stenosis [M48.061]     * Lumbar neuritis [M54.16]     PLAN:  1.  Lumbar epidural steroid injections, up to 3, spaced 1-2 weeks apart.  If pain control is acceptable after 1 or 2 injections, it was discussed with the patient that they may return for the subsequent injections if and when their pain returns.  The risks were discussed with the patient including failure of relief, worsening pain, Headache (post dural puncture headache), bleeding (epidural hematoma) and infection (epidural abscess or skin infection).  2.  Physical therapy exercises at home as prescribed by physical therapy or from the pain clinic handout (given to the patient).  Continuation of these exercises every day, or multiple times per week, even when the patient has good pain relief, was stressed to the patient as a preventative measure to decrease the frequency and  severity of future pain episodes.  3.  Continue pain medicines as already prescribed.  If patient not currently taking any, it is recommended to begin Acetaminophen 1000 mg po q 8 hours.  If other medicines containing Acetaminophen are currently prescribed, maintain daily dose at 3000 mg.    4.  If they can tolerate NSAIDS, it is recommended to take Ibuprofen 600 mg po q 6 hours for 7 days during pain exacerbations.  Alternatively, they may substitute an NSAID of their choice (e.g. Aleve).  This may be taken at the same time as Acetaminophen.  5.  Heat and ice to the affected area as tolerated for pain control.  It was discussed that heating pads can cause burns.  6.  Daily low impact exercise such as walking or water exercise was recommended to maintain overall health and aid in weight control.   7.  Follow up as needed for subsequent injections.  8.  Patient was counseled to abstain from tobacco products.

## 2018-01-03 NOTE — ANESTHESIA PROCEDURE NOTES
PAIN Epidural block    Patient location during procedure: pain clinic  Start Time: 1/3/2018 10:19 AM  Stop Time: 1/3/2018 10:24 AM  Indication:procedure for pain  Performed By  Anesthesiologist: HARSH CARRASCO  Preanesthetic Checklist  Completed: patient identified, surgical consent, pre-op evaluation, timeout performed, IV checked, risks and benefits discussed and monitors and equipment checked  Additional Notes  Diagnosis:  Post-Op Diagnosis Codes:     * Lumbar spinal stenosis (M48.061)     * Lumbar neuritis (M54.16)      Prep:  Pt Position:prone  Sterile Tech:gloves, mask and sterile barrier  Prep:chlorhexidine gluconate and isopropyl alcohol  Monitoring:blood pressure monitoring, continuous pulse oximetry and EKG  Procedure:  Sedation: yes   Approach:left paramedian  Guidance: fluoroscopy  Location:lumbar  Level:3-4  Needle Type:Tuohy  Needle Gauge:20  Aspiration:negative  Test Dose:negative  Medications:  Depomedrol:80 mg  Preservative Free Saline:3mL  Isovue:3mL    Post Assessment:  Dressing:occlusive dressing applied  Pt Tolerance:patient tolerated the procedure well with no apparent complications  Complications:no

## 2018-01-17 ENCOUNTER — TRANSCRIBE ORDERS (OUTPATIENT)
Dept: PAIN MEDICINE | Facility: HOSPITAL | Age: 78
End: 2018-01-17

## 2018-01-17 DIAGNOSIS — M54.2 NECK PAIN: Primary | ICD-10-CM

## 2018-01-31 ENCOUNTER — HOSPITAL ENCOUNTER (OUTPATIENT)
Dept: GENERAL RADIOLOGY | Facility: HOSPITAL | Age: 78
Discharge: HOME | End: 2018-01-31

## 2018-01-31 ENCOUNTER — ANESTHESIA (OUTPATIENT)
Dept: PAIN MEDICINE | Facility: HOSPITAL | Age: 78
End: 2018-01-31

## 2018-01-31 ENCOUNTER — HOSPITAL ENCOUNTER (OUTPATIENT)
Dept: PAIN MEDICINE | Facility: HOSPITAL | Age: 78
Discharge: HOME | End: 2018-01-31
Admitting: NEUROLOGICAL SURGERY

## 2018-01-31 ENCOUNTER — ANESTHESIA EVENT (OUTPATIENT)
Dept: PAIN MEDICINE | Facility: HOSPITAL | Age: 78
End: 2018-01-31

## 2018-01-31 VITALS
SYSTOLIC BLOOD PRESSURE: 149 MMHG | OXYGEN SATURATION: 98 % | DIASTOLIC BLOOD PRESSURE: 62 MMHG | RESPIRATION RATE: 16 BRPM | TEMPERATURE: 97.9 F | HEART RATE: 59 BPM

## 2018-01-31 DIAGNOSIS — M54.2 NECK PAIN: ICD-10-CM

## 2018-01-31 DIAGNOSIS — R52 PAIN: ICD-10-CM

## 2018-01-31 PROCEDURE — 25010000002 MIDAZOLAM PER 1 MG: Performed by: ANESTHESIOLOGY

## 2018-01-31 PROCEDURE — 25010000002 METHYLPREDNISOLONE PER 80 MG: Performed by: ANESTHESIOLOGY

## 2018-01-31 PROCEDURE — C1755 CATHETER, INTRASPINAL: HCPCS

## 2018-01-31 PROCEDURE — 0 IOPAMIDOL 41 % SOLUTION: Performed by: ANESTHESIOLOGY

## 2018-01-31 PROCEDURE — 77003 FLUOROGUIDE FOR SPINE INJECT: CPT

## 2018-01-31 RX ORDER — MIDAZOLAM HYDROCHLORIDE 1 MG/ML
1 INJECTION INTRAMUSCULAR; INTRAVENOUS
Status: DISCONTINUED | OUTPATIENT
Start: 2018-01-31 | End: 2018-02-01 | Stop reason: HOSPADM

## 2018-01-31 RX ORDER — SODIUM CHLORIDE 0.9 % (FLUSH) 0.9 %
1-10 SYRINGE (ML) INJECTION AS NEEDED
Status: DISCONTINUED | OUTPATIENT
Start: 2018-01-31 | End: 2018-02-01 | Stop reason: HOSPADM

## 2018-01-31 RX ORDER — METHYLPREDNISOLONE ACETATE 80 MG/ML
80 INJECTION, SUSPENSION INTRA-ARTICULAR; INTRALESIONAL; INTRAMUSCULAR; SOFT TISSUE ONCE
Status: COMPLETED | OUTPATIENT
Start: 2018-01-31 | End: 2018-01-31

## 2018-01-31 RX ORDER — LIDOCAINE HYDROCHLORIDE 10 MG/ML
1 INJECTION, SOLUTION INFILTRATION; PERINEURAL ONCE
Status: DISCONTINUED | OUTPATIENT
Start: 2018-01-31 | End: 2018-02-01 | Stop reason: HOSPADM

## 2018-01-31 RX ORDER — FENTANYL CITRATE 50 UG/ML
50 INJECTION, SOLUTION INTRAMUSCULAR; INTRAVENOUS
Status: DISCONTINUED | OUTPATIENT
Start: 2018-01-31 | End: 2018-02-01 | Stop reason: HOSPADM

## 2018-01-31 RX ADMIN — METHYLPREDNISOLONE ACETATE 80 MG: 80 INJECTION, SUSPENSION INTRA-ARTICULAR; INTRALESIONAL; INTRAMUSCULAR; SOFT TISSUE at 10:59

## 2018-01-31 RX ADMIN — IOPAMIDOL 10 ML: 408 INJECTION, SOLUTION INTRATHECAL at 10:59

## 2018-01-31 RX ADMIN — MIDAZOLAM 1 MG: 1 INJECTION INTRAMUSCULAR; INTRAVENOUS at 10:56

## 2018-01-31 NOTE — ANESTHESIA PROCEDURE NOTES
PAIN Epidural block    Patient location during procedure: pain clinic  Start Time: 1/31/2018 11:51 AM  Stop Time: 1/31/2018 11:06 AM  Indication:procedure for pain  Performed By  Anesthesiologist: DEONDRE MARINO  Preanesthetic Checklist  Completed: patient identified, site marked, surgical consent, pre-op evaluation, timeout performed, risks and benefits discussed and monitors and equipment checked  Additional Notes  Depomedrol - 80mg    Needle position confirmed by fluoroscopy and epidurogram using 2cc of [id number].    Diagnosis  Post-Op Diagnosis Codes:     * Lumbar spinal stenosis (M48.061)     * Lumbar radiculopathy (M54.16)    Prep:  Pt Position:prone  Sterile Tech:cap, gloves, mask and sterile barrier  Prep:chlorhexidine gluconate and isopropyl alcohol  Monitoring:blood pressure monitoring, continuous pulse oximetry and EKG  Procedure:  Sedation: no   Approach:left paramedian  Guidance: fluoroscopy  Location:lumbar  Level:3-4  Needle Type:Tuohy  Needle Gauge:20  Aspiration:negative  Medications:  Depomedrol:80 mg  Preservative Free Saline:2mL  Isovue:2mL    Post Assessment:  Post-procedure: bandaide.  Pt Tolerance:patient tolerated the procedure well with no apparent complications  Complications:no

## 2018-04-17 ENCOUNTER — DOCUMENTATION (OUTPATIENT)
Dept: PHYSICAL THERAPY | Facility: HOSPITAL | Age: 78
End: 2018-04-17

## 2018-04-17 DIAGNOSIS — M54.2 NECK PAIN: Primary | ICD-10-CM

## 2018-04-17 NOTE — THERAPY DISCHARGE NOTE
Outpatient Physical Therapy Ortho Initial Evaluation/Discharge       Patient Name: Latanya Olsen  : 1940  MRN: [id number]  Today's Date: 2018      Visit Date: 2018    Patient Active Problem List   Diagnosis   • Carcinoid tumor of lung   • Diverticulosis of intestine   • Obstructive sleep apnea syndrome   • Weight loss   • Vitamin D deficiency   • Overweight (BMI 25.0-29.9)   • Spinal stenosis of lumbar region without neurogenic claudication   • Osteoarthritis of knee   • Obesity (BMI 30-39.9)   • Neutropenia   • Chronic lower back pain   • Knee pain   • Insomnia   • Hypothyroidism   • Hypokalemia   • Hypertension   • Hyperlipidemia   • Generalized osteoarthritis   • Gastroparesis   • Foot pain   • Chronic obstructive pulmonary disease   • Chronic constipation   • Anemia   • Weight gain   • Pain of left breast   • Elevated serum alkaline phosphatase level   • Neck pain        Past Medical History:   Diagnosis Date   • Arthritis    • COPD (chronic obstructive pulmonary disease)    • Diverticulosis    • GERD (gastroesophageal reflux disease)    • Hyperlipidemia    • Hypertension    • Hypothyroidism    • Knee swelling    • Lung cancer     history   • Restless leg syndrome    • Sleep apnea with use of continuous positive airway pressure (CPAP)         Past Surgical History:   Procedure Laterality Date   • BUNIONECTOMY Bilateral    • CATARACT EXTRACTION     • CHOLECYSTECTOMY     • COLONOSCOPY     • ENDOSCOPY N/A 2016    Procedure: ESOPHAGOGASTRODUODENOSCOPY  with biopsies;  Surgeon: Von Hernández MD;  Location: [facility];  Service:    • LUNG LOBECTOMY Left     lower lobe   • REPLACEMENT TOTAL KNEE Left    • THYROID BIOPSY           Visit Dx:     ICD-10-CM ICD-9-CM   1. Neck pain M54.2 723.1                                                                    Time Calculation:                  OP PT Discharge Summary  Date of Discharge: 18  Reason for Discharge:  Patient/Caregiver request (Pt attended 3 PT sessions and did not keep additonal scheduled appointments)  Outcomes Achieved: Did not achieve goals- Other (Pt did not continue with PT)  Discharge Destination: Home with home program  Discharge Instructions/Additional Comments: Pt instructed each session for ongoing HEP. Pt reported only 1 level decrease in pain sx in cervical area 4/10 but did report less tightness in muscles        Davina Aviles, PT  4/17/2018

## 2019-03-08 ENCOUNTER — APPOINTMENT (OUTPATIENT)
Dept: GENERAL RADIOLOGY | Facility: HOSPITAL | Age: 79
End: 2019-03-08

## 2019-03-08 ENCOUNTER — HOSPITAL ENCOUNTER (EMERGENCY)
Facility: HOSPITAL | Age: 79
Discharge: HOME | End: 2019-03-08
Attending: EMERGENCY MEDICINE | Admitting: EMERGENCY MEDICINE

## 2019-03-08 VITALS
DIASTOLIC BLOOD PRESSURE: 65 MMHG | SYSTOLIC BLOOD PRESSURE: 159 MMHG | TEMPERATURE: 99.1 F | WEIGHT: 232 LBS | RESPIRATION RATE: 18 BRPM | HEIGHT: 66 IN | BODY MASS INDEX: 37.28 KG/M2 | HEART RATE: 83 BPM | OXYGEN SATURATION: 98 %

## 2019-03-08 DIAGNOSIS — R60.0 PEDAL EDEMA: Primary | ICD-10-CM

## 2019-03-08 LAB
ALBUMIN SERPL-MCNC: 4 G/DL (ref 3.5–5.2)
ALBUMIN/GLOB SERPL: 1.5 G/DL
ALP SERPL-CCNC: 95 U/L (ref 39–117)
ALT SERPL W P-5'-P-CCNC: 16 U/L (ref 1–33)
ANION GAP SERPL CALCULATED.3IONS-SCNC: 12.1 MMOL/L
AST SERPL-CCNC: 17 U/L (ref 1–32)
BASOPHILS # BLD AUTO: 0.02 10*3/MM3 (ref 0–0.2)
BASOPHILS NFR BLD AUTO: 0.3 % (ref 0–1.5)
BILIRUB SERPL-MCNC: 0.2 MG/DL (ref 0.1–1.2)
BUN BLD-MCNC: 18 MG/DL (ref 8–23)
BUN/CREAT SERPL: 17.3 (ref 7–25)
CALCIUM SPEC-SCNC: 9.5 MG/DL (ref 8.6–10.5)
CHLORIDE SERPL-SCNC: 92 MMOL/L (ref 98–107)
CO2 SERPL-SCNC: 26.9 MMOL/L (ref 22–29)
CREAT BLD-MCNC: 1.04 MG/DL (ref 0.57–1)
DEPRECATED RDW RBC AUTO: 46.5 FL (ref 37–54)
EOSINOPHIL # BLD AUTO: 0.21 10*3/MM3 (ref 0–0.4)
EOSINOPHIL NFR BLD AUTO: 3.4 % (ref 0.3–6.2)
ERYTHROCYTE [DISTWIDTH] IN BLOOD BY AUTOMATED COUNT: 15.5 % (ref 12.3–15.4)
GFR SERPL CREATININE-BSD FRML MDRD: 51 ML/MIN/1.73
GLOBULIN UR ELPH-MCNC: 2.7 GM/DL
GLUCOSE BLD-MCNC: 100 MG/DL (ref 65–99)
HCT VFR BLD AUTO: 32.7 % (ref 34–46.6)
HGB BLD-MCNC: 10.2 G/DL (ref 12–15.9)
IMM GRANULOCYTES # BLD AUTO: 0.04 10*3/MM3 (ref 0–0.05)
IMM GRANULOCYTES NFR BLD AUTO: 0.6 % (ref 0–0.5)
LYMPHOCYTES # BLD AUTO: 1.67 10*3/MM3 (ref 0.7–3.1)
LYMPHOCYTES NFR BLD AUTO: 26.7 % (ref 19.6–45.3)
MCH RBC QN AUTO: 26 PG (ref 26.6–33)
MCHC RBC AUTO-ENTMCNC: 31.2 G/DL (ref 31.5–35.7)
MCV RBC AUTO: 83.4 FL (ref 79–97)
MONOCYTES # BLD AUTO: 0.71 10*3/MM3 (ref 0.1–0.9)
MONOCYTES NFR BLD AUTO: 11.4 % (ref 5–12)
NEUTROPHILS # BLD AUTO: 3.6 10*3/MM3 (ref 1.4–7)
NEUTROPHILS NFR BLD AUTO: 57.6 % (ref 42.7–76)
NRBC BLD AUTO-RTO: 0 /100 WBC (ref 0–0)
NT-PROBNP SERPL-MCNC: 257.1 PG/ML (ref 0–1800)
PLATELET # BLD AUTO: 231 10*3/MM3 (ref 140–450)
PMV BLD AUTO: 9.6 FL (ref 6–12)
POTASSIUM BLD-SCNC: 3.9 MMOL/L (ref 3.5–5.2)
PROT SERPL-MCNC: 6.7 G/DL (ref 6–8.5)
RBC # BLD AUTO: 3.92 10*6/MM3 (ref 3.77–5.28)
SODIUM BLD-SCNC: 131 MMOL/L (ref 136–145)
TROPONIN T SERPL-MCNC: <0.01 NG/ML (ref 0–0.03)
WBC NRBC COR # BLD: 6.25 10*3/MM3 (ref 3.4–10.8)

## 2019-03-08 PROCEDURE — 80053 COMPREHEN METABOLIC PANEL: CPT | Performed by: PHYSICIAN ASSISTANT

## 2019-03-08 PROCEDURE — 84484 ASSAY OF TROPONIN QUANT: CPT | Performed by: PHYSICIAN ASSISTANT

## 2019-03-08 PROCEDURE — 93010 ELECTROCARDIOGRAM REPORT: CPT | Performed by: INTERNAL MEDICINE

## 2019-03-08 PROCEDURE — 71045 X-RAY EXAM CHEST 1 VIEW: CPT

## 2019-03-08 PROCEDURE — 99283 EMERGENCY DEPT VISIT LOW MDM: CPT

## 2019-03-08 PROCEDURE — 85025 COMPLETE CBC W/AUTO DIFF WBC: CPT | Performed by: PHYSICIAN ASSISTANT

## 2019-03-08 PROCEDURE — 83880 ASSAY OF NATRIURETIC PEPTIDE: CPT | Performed by: PHYSICIAN ASSISTANT

## 2019-03-08 PROCEDURE — 93005 ELECTROCARDIOGRAM TRACING: CPT | Performed by: PHYSICIAN ASSISTANT

## 2019-03-08 RX ORDER — SODIUM CHLORIDE 0.9 % (FLUSH) 0.9 %
10 SYRINGE (ML) INJECTION AS NEEDED
Status: DISCONTINUED | OUTPATIENT
Start: 2019-03-08 | End: 2019-03-08 | Stop reason: HOSPADM

## 2019-03-08 NOTE — ED NOTES
Patient states when PA in room, she has been seen by Cardiology, Vascular Surgery, and has seen her APRN several times for this issue. Currently taking lasix unable to give name of medication.      Kirsten Jacobo, RN  03/08/19 0476

## 2019-03-08 NOTE — ED PROVIDER NOTES
EMERGENCY DEPARTMENT ENCOUNTER    Room Number:  28/28  Date seen:  3/8/2019  Time seen: 6:41 PM  PCP: Roxann Winkler APRN  Historian: Sam      HPI:  Chief complaint: Leg swelling  Context: Latanya Olsen is a 78 y.o. female who presents to the ED c/o BLE swelling for the last 2 months that is worse today. Pt also c/o SOA for the last month that is worse with exertion, weight gain, and foul smelling urine. She denies CP. Pt reports she has been seen by numerous providers for her sx including vascular surgery for US BLE and cardiology who performed an echo and by her report states that they do not believe her sx are of a cardiac etiology. 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 She states she has been trialed on compression stockings, but does not tolerate wearing them. She denies a hx of renal problems and states she is on lasix BID which she states is not improving her swelling.     MEDICAL RECORD REVIEW     Pt had a PET scan on 2/16/19 that was negative.     ALLERGIES  Codeine and Morphine    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   • Diverticulosis of intestine 02/19/2016   • Obstructive sleep apnea syndrome 02/19/2016   • Weight loss 02/19/2016   • Vitamin D deficiency 02/19/2016   • Overweight (BMI 25.0-29.9) 02/19/2016   • Spinal stenosis of lumbar region without neurogenic claudication 02/19/2016   • Osteoarthritis of knee 02/19/2016   • Obesity (BMI 30-39.9) 02/19/2016   • Neutropenia (CMS/HCC) 02/19/2016   • Chronic lower back pain 02/19/2016   • Knee pain 02/19/2016   • Insomnia 02/19/2016   • Hypothyroidism 02/19/2016   • Hypokalemia 02/19/2016   • Hypertension 02/19/2016   • Hyperlipidemia 02/19/2016   • Generalized osteoarthritis 02/19/2016   • Gastroparesis 02/19/2016   • Foot pain 02/19/2016   • Chronic obstructive pulmonary disease (CMS/HCC) 02/19/2016   • Chronic constipation 02/19/2016   • Anemia 02/19/2016    • Weight gain 02/19/2016   • Pain of left breast 02/22/2016   • Elevated serum alkaline phosphatase level 02/22/2016   • Neck pain 11/28/2017     Resolved Ambulatory Problems     Diagnosis Date Noted   • Finger injury 02/19/2016   • Upper respiratory tract infection 02/19/2016   • Contusion of upper arm 02/19/2016   • Tingling of skin 02/19/2016   • Rash 02/19/2016   • Candidiasis of mouth 02/19/2016   • Spasm 02/19/2016   • Low back pain 02/19/2016   • Heartburn 02/19/2016   • Diarrhea 02/19/2016   • Bloating symptom 02/19/2016   • Gastroesophageal reflux disease with esophagitis 02/19/2016   • Drug-induced photosensitivity 02/19/2016   • Cramp of limb 02/19/2016   • Contact dermatitis 02/19/2016   • Chronic kidney disease, stage II (mild) 02/19/2016   • Ankle joint pain 02/19/2016   • Atopic rhinitis 02/19/2016   • Allergic reaction to drug 02/19/2016   • Acute sinusitis 02/19/2016   • Generalized abdominal pain 02/19/2016     Past Medical History:   Diagnosis Date   • Arthritis    • COPD (chronic obstructive pulmonary disease) (CMS/HCC)    • Diverticulosis    • GERD (gastroesophageal reflux disease)    • Hyperlipidemia    • Hypertension    • Hypothyroidism    • Knee swelling    • Lung cancer (CMS/HCC)    • Restless leg syndrome    • Sleep apnea with use of continuous positive airway pressure (CPAP)        PAST SURGICAL HISTORY  Past Surgical History:   Procedure Laterality Date   • BUNIONECTOMY Bilateral    • CATARACT EXTRACTION     • CHOLECYSTECTOMY     • COLONOSCOPY     • ENDOSCOPY N/A 6/24/2016    Procedure: ESOPHAGOGASTRODUODENOSCOPY  with biopsies;  Surgeon: Von Hernández MD;  Location: [facility];  Service:    • LUNG LOBECTOMY Left     lower lobe   • REPLACEMENT TOTAL KNEE Left    • THYROID BIOPSY         FAMILY HISTORY  Family History   Problem Relation Age of Onset   • Heart attack Mother    • Coronary artery disease Mother    • Hypertension Mother    • Heart attack Sister        SOCIAL  HISTORY  Social History     Socioeconomic History   • Marital status:      Spouse name: Not on file   • Number of children: Not on file   • Years of education: Not on file   • Highest education level: Not on file   Social Needs   • Financial resource strain: Not on file   • Food insecurity - worry: Not on file   • Food insecurity - inability: Not on file   • Transportation needs - medical: Not on file   • Transportation needs - non-medical: Not on file   Occupational History   • Not on file   Tobacco Use   • Smoking status: Never Smoker   • Smokeless tobacco: Never Used   Substance and Sexual Activity   • Alcohol use: No   • Drug use: No   • Sexual activity: Defer   Other Topics Concern   • Not on file   Social History Narrative   • Not on file           REVIEW OF SYSTEMS  Review of Systems   Constitutional: Positive for unexpected weight change. Negative for fever.   HENT: Negative for sore throat.    Eyes: Negative.    Respiratory: Positive for shortness of breath. Negative for cough.    Cardiovascular: Positive for leg swelling. Negative for chest pain.   Gastrointestinal: Negative for abdominal pain, diarrhea and vomiting.   Genitourinary: Negative for dysuria.   Musculoskeletal: Negative for neck pain.   Skin: Negative for rash.   Neurological: Negative for weakness, numbness and headaches.   Psychiatric/Behavioral: Negative.    All other systems reviewed and are negative.          PHYSICAL EXAM  ED Triage Vitals   Temp Heart Rate Resp BP SpO2   03/08/19 1741 03/08/19 1741 03/08/19 1741 03/08/19 1835 03/08/19 1741   99.1 °F (37.3 °C) 92 18 (!) 184/92 98 %      Temp src Heart Rate Source Patient Position BP Location FiO2 (%)   03/08/19 1741 03/08/19 1835 03/08/19 1835 03/08/19 1835 --   Tympanic Monitor Lying Right arm      Physical Exam   Constitutional: She is oriented to person, place, and time. No distress.   HENT:   Head: Normocephalic and atraumatic.   Eyes: EOM are normal. Pupils are equal, round,  and reactive to light.   Neck: Normal range of motion. Neck supple.   Cardiovascular: Normal rate, regular rhythm and normal heart sounds.   Pulmonary/Chest: Effort normal and breath sounds normal. No respiratory distress.   Abdominal: Soft. There is no tenderness. There is no rebound and no guarding.   Musculoskeletal: Normal range of motion. She exhibits edema (3+ pedal, BLE).   Neurological: She is alert and oriented to person, place, and time. She has normal sensation and normal strength.   Skin: Skin is warm and dry. No rash noted.   Psychiatric: Mood and affect normal.   Nursing note and vitals reviewed.        LAB RESULTS  Recent Results (from the past 24 hour(s))   Comprehensive Metabolic Panel    Collection Time: 03/08/19  6:58 PM   Result Value Ref Range    Glucose 100 (H) 65 - 99 mg/dL    BUN 18 8 - 23 mg/dL    Creatinine 1.04 (H) 0.57 - 1.00 mg/dL    Sodium 131 (L) 136 - 145 mmol/L    Potassium 3.9 3.5 - 5.2 mmol/L    Chloride 92 (L) 98 - 107 mmol/L    CO2 26.9 22.0 - 29.0 mmol/L    Calcium 9.5 8.6 - 10.5 mg/dL    Total Protein 6.7 6.0 - 8.5 g/dL    Albumin 4.00 3.50 - 5.20 g/dL    ALT (SGPT) 16 1 - 33 U/L    AST (SGOT) 17 1 - 32 U/L    Alkaline Phosphatase 95 39 - 117 U/L    Total Bilirubin 0.2 0.1 - 1.2 mg/dL    eGFR Non African Amer 51 (L) >60 mL/min/1.73    Globulin 2.7 gm/dL    A/G Ratio 1.5 g/dL    BUN/Creatinine Ratio 17.3 7.0 - 25.0    Anion Gap 12.1 mmol/L   BNP    Collection Time: 03/08/19  6:58 PM   Result Value Ref Range    proBNP 257.1 0.0-1,800.0 pg/mL   Troponin    Collection Time: 03/08/19  6:58 PM   Result Value Ref Range    Troponin T <0.010 0.000 - 0.030 ng/mL   CBC Auto Differential    Collection Time: 03/08/19  6:58 PM   Result Value Ref Range    WBC 6.25 3.40 - 10.80 10*3/mm3    RBC 3.92 3.77 - 5.28 10*6/mm3    Hemoglobin 10.2 (L) 12.0 - 15.9 g/dL    Hematocrit 32.7 (L) 34.0 - 46.6 %    MCV 83.4 79.0 - 97.0 fL    MCH 26.0 (L) 26.6 - 33.0 pg    MCHC 31.2 (L) 31.5 - 35.7 g/dL    RDW  15.5 (H) 12.3 - 15.4 %    RDW-SD 46.5 37.0 - 54.0 fl    MPV 9.6 6.0 - 12.0 fL    Platelets 231 140 - 450 10*3/mm3    Neutrophil % 57.6 42.7 - 76.0 %    Lymphocyte % 26.7 19.6 - 45.3 %    Monocyte % 11.4 5.0 - 12.0 %    Eosinophil % 3.4 0.3 - 6.2 %    Basophil % 0.3 0.0 - 1.5 %    Immature Grans % 0.6 (H) 0.0 - 0.5 %    Neutrophils, Absolute 3.60 1.40 - 7.00 10*3/mm3    Lymphocytes, Absolute 1.67 0.70 - 3.10 10*3/mm3    Monocytes, Absolute 0.71 0.10 - 0.90 10*3/mm3    Eosinophils, Absolute 0.21 0.00 - 0.40 10*3/mm3    Basophils, Absolute 0.02 0.00 - 0.20 10*3/mm3    Immature Grans, Absolute 0.04 0.00 - 0.05 10*3/mm3    nRBC 0.0 0.0 - 0.0 /100 WBC       I ordered the above labs and reviewed the results        RADIOLOGY  XR Chest 1 View   Preliminary Result   1. Borderline cardiomegaly.   2. Minimal atelectasis, scar or developing pneumonia in the right lung   base.              I ordered the above noted radiological studies and reviewed the images on the PACS system.     MEDICATIONS GIVEN IN ER  Medications   sodium chloride 0.9 % flush 10 mL (not administered)       PROCEDURES  Procedures    EKG          EKG time: 1900  Rhythm/Rate: nsr, 78  P waves and NE: normal  QRS, axis: normal   ST and T waves: no ST abnormalities     Interpreted Contemporaneously by me, independently viewed  unchanged compared to prior 2/24/15      COURSE & MEDICAL DECISION MAKING  Pertinent Labs and Imaging studies that were ordered and reviewed are noted above.  Results were reviewed/discussed with the patient and they were also made aware of online access.  Pt also made aware that some labs, such as cultures, will not be resulted during ER visit and follow up with PMD is necessary.     No evidence of CHF, suspect venous insufficiency     PROGRESS AND CONSULTS    Progress Notes:         1934 - Reviewed pt's history and workup with Dr. Muller (ER physician).  After a bedside evaluation, Dr. Muller agrees with the plan of care.    2040 -  "Rechecked pt. Pt is resting comfortably in NAD. Informed pt of the result of her lab work which is unremarkable. Provided pt's with strategies to help alleviate her swelling including elevation of her legs, compression stockings, and continuing her lasix. Instructed pt to keep her appointment with nephrology as previously instructed. Pt understands and agrees with plan. All questions answered.     Disposition vitals:  /68   Pulse 84   Temp 99.1 °F (37.3 °C) (Tympanic)   Resp 18   Ht 167.6 cm (66\")   Wt 105 kg (232 lb)   SpO2 98%   BMI 37.45 kg/m²         DIAGNOSIS  Final diagnoses:   Pedal edema         DISPOSITION  DISCHARGE    Patient discharged in stable condition.    Reviewed implications of results, diagnosis, meds, responsibility to follow up, warning signs and symptoms of possible worsening, potential complications and reasons to return to ER.    Patient/Family voiced understanding of above instructions.    Discussed plan for discharge, as there is no emergent indication for admission. Patient referred to primary care provider for BP management due to today's BP. Pt/family is agreeable and understands need for follow up and repeat testing.  Pt is aware that discharge does not mean that nothing is wrong but it indicates no emergency is present that requires admission and they must continue care with follow-up as given below or physician of their choice.     FOLLOW-UP  PetersonRoxann sharpe, APRN  [address] [facility] [address] [phone]      as directed         Medication List      No changes were made to your prescriptions during this visit.           Documentation assistance provided by soren Billings for Neal Martinez PA-C.  Information recorded by the soren was done at my direction and has been verified and validated by me.       Nicholas Billings  03/08/19 2101       Neal Martinez PA  03/08/19 2232    "

## 2019-03-09 NOTE — ED PROVIDER NOTES
MD ATTESTATION NOTE    The MARK ANTHONY and I have discussed this patient's history, physical exam, and treatment plan.  I have reviewed the documentation and personally had a face to face interaction with the patient. I affirm the documentation and agree with the treatment and plan.  The attached note describes my personal findings.    Pt with increased leg swelling over the past 2 months. Pt also reports some increased SOA. She has been evaluated several times for the swelling by vascular, cardiology and PCP. Pt was seen at PCP office today and dx with [facility].    On exam, there is moderate bilateral leg and ankle swelling. No signs of cellulitis.    Will check labs, EKG, and CXR.    Documentation assistance provided by soren Fernández for Dr. Muller.  Information recorded by the scribe was done at my direction and has been verified and validated by me.       Truong Fernández  03/08/19 1945       Neal Muller MD  03/08/19 4749

## 2019-03-18 ENCOUNTER — HOSPITAL ENCOUNTER (INPATIENT)
Facility: HOSPITAL | Age: 79
LOS: 3 days | Discharge: HOME | End: 2019-03-21
Attending: HOSPITALIST | Admitting: HOSPITALIST

## 2019-03-18 ENCOUNTER — APPOINTMENT (OUTPATIENT)
Dept: GENERAL RADIOLOGY | Facility: HOSPITAL | Age: 79
End: 2019-03-18

## 2019-03-18 DIAGNOSIS — Z74.09 IMPAIRED FUNCTIONAL MOBILITY AND ACTIVITY TOLERANCE: Primary | ICD-10-CM

## 2019-03-18 PROCEDURE — 94640 AIRWAY INHALATION TREATMENT: CPT

## 2019-03-18 PROCEDURE — 81001 URINALYSIS AUTO W/SCOPE: CPT | Performed by: HOSPITALIST

## 2019-03-18 PROCEDURE — 71045 X-RAY EXAM CHEST 1 VIEW: CPT

## 2019-03-18 PROCEDURE — 93010 ELECTROCARDIOGRAM REPORT: CPT | Performed by: INTERNAL MEDICINE

## 2019-03-18 PROCEDURE — 25010000002 FUROSEMIDE PER 20 MG: Performed by: HOSPITALIST

## 2019-03-18 PROCEDURE — 93005 ELECTROCARDIOGRAM TRACING: CPT | Performed by: HOSPITALIST

## 2019-03-18 RX ORDER — PRAMIPEXOLE DIHYDROCHLORIDE 1.5 MG/1
1.5 TABLET ORAL NIGHTLY
Status: DISCONTINUED | OUTPATIENT
Start: 2019-03-18 | End: 2019-03-18

## 2019-03-18 RX ORDER — BACLOFEN 10 MG/1
10 TABLET ORAL 3 TIMES DAILY
COMMUNITY
End: 2019-06-07 | Stop reason: HOSPADM

## 2019-03-18 RX ORDER — BACLOFEN 10 MG/1
10 TABLET ORAL EVERY 8 HOURS PRN
Status: DISCONTINUED | OUTPATIENT
Start: 2019-03-18 | End: 2019-03-21

## 2019-03-18 RX ORDER — IPRATROPIUM BROMIDE AND ALBUTEROL SULFATE 2.5; .5 MG/3ML; MG/3ML
1.5 SOLUTION RESPIRATORY (INHALATION)
Status: DISCONTINUED | OUTPATIENT
Start: 2019-03-18 | End: 2019-03-19

## 2019-03-18 RX ORDER — LUBIPROSTONE 8 UG/1
8 CAPSULE ORAL
Status: DISCONTINUED | OUTPATIENT
Start: 2019-03-19 | End: 2019-03-19

## 2019-03-18 RX ORDER — CETIRIZINE HYDROCHLORIDE 10 MG/1
5 TABLET ORAL DAILY
Status: DISCONTINUED | OUTPATIENT
Start: 2019-03-18 | End: 2019-03-18

## 2019-03-18 RX ORDER — LANOLIN ALCOHOL/MO/W.PET/CERES
800 CREAM (GRAM) TOPICAL DAILY
COMMUNITY
End: 2019-06-07 | Stop reason: HOSPADM

## 2019-03-18 RX ORDER — ZOLPIDEM TARTRATE 5 MG/1
5 TABLET ORAL NIGHTLY PRN
Status: DISCONTINUED | OUTPATIENT
Start: 2019-03-18 | End: 2019-03-21

## 2019-03-18 RX ORDER — CITALOPRAM 10 MG/1
10 TABLET ORAL DAILY
Status: DISCONTINUED | OUTPATIENT
Start: 2019-03-18 | End: 2019-03-18

## 2019-03-18 RX ORDER — MULTIVITAMIN/IRON/FOLIC ACID 18MG-0.4MG
2 TABLET ORAL 3 TIMES DAILY PRN
COMMUNITY
End: 2019-03-21 | Stop reason: HOSPADM

## 2019-03-18 RX ORDER — BUDESONIDE 0.5 MG/2ML
0.5 INHALANT ORAL
Status: DISCONTINUED | OUTPATIENT
Start: 2019-03-18 | End: 2019-03-21 | Stop reason: HOSPADM

## 2019-03-18 RX ORDER — PRAMIPEXOLE DIHYDROCHLORIDE 1.5 MG/1
1.5 TABLET ORAL
COMMUNITY
End: 2023-01-05 | Stop reason: SDUPTHER

## 2019-03-18 RX ORDER — CITALOPRAM 10 MG/1
10 TABLET ORAL NIGHTLY
Status: DISCONTINUED | OUTPATIENT
Start: 2019-03-18 | End: 2019-03-21 | Stop reason: HOSPADM

## 2019-03-18 RX ORDER — POTASSIUM CHLORIDE 750 MG/1
20 CAPSULE, EXTENDED RELEASE ORAL 2 TIMES DAILY WITH MEALS
Status: DISCONTINUED | OUTPATIENT
Start: 2019-03-18 | End: 2019-03-20

## 2019-03-18 RX ORDER — AMITRIPTYLINE HYDROCHLORIDE 25 MG/1
25 TABLET, FILM COATED ORAL NIGHTLY
Status: DISCONTINUED | OUTPATIENT
Start: 2019-03-18 | End: 2019-03-21 | Stop reason: HOSPADM

## 2019-03-18 RX ORDER — PRAMIPEXOLE DIHYDROCHLORIDE 1.5 MG/1
3 TABLET ORAL NIGHTLY
Status: DISCONTINUED | OUTPATIENT
Start: 2019-03-19 | End: 2019-03-18

## 2019-03-18 RX ORDER — LANOLIN ALCOHOL/MO/W.PET/CERES
800 CREAM (GRAM) TOPICAL DAILY
Status: DISCONTINUED | OUTPATIENT
Start: 2019-03-18 | End: 2019-03-21 | Stop reason: HOSPADM

## 2019-03-18 RX ORDER — UBIDECARENONE 100 MG
100 CAPSULE ORAL DAILY
COMMUNITY
End: 2019-03-21 | Stop reason: HOSPADM

## 2019-03-18 RX ORDER — DILTIAZEM HYDROCHLORIDE 120 MG/1
120 CAPSULE, COATED, EXTENDED RELEASE ORAL
Status: DISCONTINUED | OUTPATIENT
Start: 2019-03-18 | End: 2019-03-21 | Stop reason: HOSPADM

## 2019-03-18 RX ORDER — LEVOTHYROXINE SODIUM 88 UG/1
88 TABLET ORAL DAILY
Status: DISCONTINUED | OUTPATIENT
Start: 2019-03-18 | End: 2019-03-18

## 2019-03-18 RX ORDER — LEVOTHYROXINE SODIUM 88 UG/1
88 TABLET ORAL
Status: DISCONTINUED | OUTPATIENT
Start: 2019-03-19 | End: 2019-03-21 | Stop reason: HOSPADM

## 2019-03-18 RX ORDER — PANTOPRAZOLE SODIUM 40 MG/1
40 TABLET, DELAYED RELEASE ORAL EVERY MORNING
Status: DISCONTINUED | OUTPATIENT
Start: 2019-03-19 | End: 2019-03-21 | Stop reason: HOSPADM

## 2019-03-18 RX ORDER — PRAMIPEXOLE DIHYDROCHLORIDE 1.5 MG/1
1.5 TABLET ORAL NIGHTLY
Status: DISCONTINUED | OUTPATIENT
Start: 2019-03-19 | End: 2019-03-21 | Stop reason: HOSPADM

## 2019-03-18 RX ORDER — FUROSEMIDE 10 MG/ML
40 INJECTION INTRAMUSCULAR; INTRAVENOUS EVERY 12 HOURS
Status: COMPLETED | OUTPATIENT
Start: 2019-03-18 | End: 2019-03-20

## 2019-03-18 RX ORDER — MELATONIN
1000 DAILY
Status: DISCONTINUED | OUTPATIENT
Start: 2019-03-18 | End: 2019-03-21 | Stop reason: HOSPADM

## 2019-03-18 RX ORDER — GABAPENTIN 100 MG/1
100 CAPSULE ORAL EVERY 8 HOURS SCHEDULED
Status: DISCONTINUED | OUTPATIENT
Start: 2019-03-18 | End: 2019-03-21 | Stop reason: HOSPADM

## 2019-03-18 RX ORDER — DICYCLOMINE HYDROCHLORIDE 10 MG/1
10 CAPSULE ORAL DAILY
Status: DISCONTINUED | OUTPATIENT
Start: 2019-03-18 | End: 2019-03-18

## 2019-03-18 RX ADMIN — IPRATROPIUM BROMIDE AND ALBUTEROL SULFATE 1.5 ML: 2.5; .5 SOLUTION RESPIRATORY (INHALATION) at 22:39

## 2019-03-18 RX ADMIN — POTASSIUM CHLORIDE 20 MEQ: 750 CAPSULE, EXTENDED RELEASE ORAL at 21:17

## 2019-03-18 RX ADMIN — GABAPENTIN 100 MG: 100 CAPSULE ORAL at 21:16

## 2019-03-18 RX ADMIN — FUROSEMIDE 40 MG: 10 INJECTION, SOLUTION INTRAMUSCULAR; INTRAVENOUS at 21:26

## 2019-03-18 RX ADMIN — CITALOPRAM 10 MG: 10 TABLET, FILM COATED ORAL at 21:17

## 2019-03-18 RX ADMIN — AMITRIPTYLINE HYDROCHLORIDE 25 MG: 25 TABLET, FILM COATED ORAL at 21:17

## 2019-03-18 RX ADMIN — BUDESONIDE 0.5 MG: 0.5 INHALANT RESPIRATORY (INHALATION) at 22:40

## 2019-03-18 RX ADMIN — PRAMIPEXOLE DIHYDROCHLORIDE 1.5 MG: 1.5 TABLET ORAL at 21:17

## 2019-03-18 NOTE — H&P
"History and physical    Primary CARE physician  Dr. Velazquez    Chief complaint  Weight gain  Leg swelling  Shortness of breath    History of present illness  78-year-old white female with history of hypertension hyperlipidemia hypothyroidism COPD morbidly obese directly admitted to our service from nephrology office where she presented with several month history of leg swelling weight gain and shortness of breath.  Patient found to be fluid overloaded admit for further management.  Patient denies any chest pain fever chills abdominal pain nausea vomiting diarrhea.    Past medical history  Hypertension  Hyperlipidemia  Hypothyroidism  Restless leg syndrome  Neuropathy  Depression  Gastroesophageal for disease    Past surgical history  Not known    Social history  No tobacco alcohol drug abuse    Family history  Not contributory    Medications  Allergic to doxycycline codeine and morphine  Home medications reviewed    Review of system  As above    Physical examination  Blood pressure 146/71, pulse 79, temperature 97.1 °F (36.2 °C), temperature source Oral, resp. rate 18, height 167.6 cm (66\"), weight 99.8 kg (220 lb), SpO2 99 %.    HEENT unremarkable  Neck supple  Lungs decreased breath sounds with crackles at the bases  Heart S1-S2 no murmur gallop rub  Abdomen soft nontender bowel sounds positive  Extremities 4+ edema  Neuro moves all 4 extremities with normal strength  Psych normal mood and behavior    Labs  Lab Results (last 24 hours)     ** No results found for the last 24 hours. **        Lab Results (last 24 hours)     ** No results found for the last 24 hours. **          Current Facility-Administered Medications:   •  amitriptyline (ELAVIL) tablet 25 mg, 25 mg, Oral, Nightly, Arjun Sainz MD  •  budesonide (PULMICORT) nebulizer solution 0.5 mg, 0.5 mg, Nebulization, Daily - RT, Arjun Sainz MD  •  cholecalciferol (VITAMIN D3) tablet 1,000 Units, 1,000 Units, Oral, Daily, Arjun Sainz MD  •  citalopram " (CeleXA) tablet 10 mg, 10 mg, Oral, Daily, Shelly Sainz MD  •  diltiaZEM CD (CARDIZEM CD) 24 hr capsule 120 mg, 120 mg, Oral, Q24H, Shelly Sainz MD  •  folic acid (FOLVITE) tablet 800 mcg, 800 mcg, Oral, Daily, Shelly Sainz MD  •  gabapentin (NEURONTIN) capsule 100 mg, 100 mg, Oral, Q8H, Shelly Sainz MD  •  ipratropium-albuterol (DUO-NEB) nebulizer solution 1.5 mL, 1.5 mL, Nebulization, Q6H While Awake - RT, Shelly Sainz MD  •  levothyroxine (SYNTHROID, LEVOTHROID) tablet 88 mcg, 88 mcg, Oral, Daily, Shelly Sainz MD  •  [START ON 3/19/2019] lubiprostone (AMITIZA) capsule 8 mcg, 8 mcg, Oral, Daily With Breakfast, Shelly Sainz MD  •  [START ON 3/19/2019] pantoprazole (PROTONIX) EC tablet 40 mg, 40 mg, Oral, QAM, Shelly Sainz MD  •  potassium chloride (MICRO-K) CR capsule 20 mEq, 20 mEq, Oral, BID With Meals, Shelly Sainz MD  •  pramipexole (MIRAPEX) tablet 1.5 mg, 1.5 mg, Oral, Nightly, Shelly Sainz MD  •  zolpidem (AMBIEN) tablet 5 mg, 5 mg, Oral, Nightly PRN, Shelly Sainz MD     ASSESSMENT  Fluid overload  Hypertension  Hyperlipidemia  Hypothyroidism  COPD  Neuropathy  Restless leg syndrome  Gastroesophageal reflux disease    PLAN  Admit  IV diuresis with strict I's and O's and daily weight  Check 2D echo and bilateral lower extremity Doppler study  Continue home medications  Nephrology to follow patient  Stress ulcer DVT prophylaxis  Supportive care  Patient is full code  Follow closely further recommendation according to hospital course    SHELLY SAINZ MD

## 2019-03-18 NOTE — PLAN OF CARE
Problem: Fall Risk (Adult)  Goal: Identify Related Risk Factors and Signs and Symptoms  Outcome: Outcome(s) achieved Date Met: 03/18/19    Goal: Absence of Fall  Outcome: Ongoing (interventions implemented as appropriate)      Problem: Skin Injury Risk (Adult)  Goal: Identify Related Risk Factors and Signs and Symptoms  Outcome: Outcome(s) achieved Date Met: 03/18/19    Goal: Skin Health and Integrity  Outcome: Ongoing (interventions implemented as appropriate)      Problem: Patient Care Overview  Goal: Plan of Care Review  Outcome: Ongoing (interventions implemented as appropriate)   03/18/19 1710   Coping/Psychosocial   Plan of Care Reviewed With patient   Plan of Care Review   Progress no change   OTHER   Outcome Summary Pt is direct admit. Goes by Alfred. Hx of Left lower lung removal due to tumor. Admitted for swelling legs and needs treatment to remove fluid overload. Up with assist. Awaiting admission orders. Will continue to monitor.      Goal: Individualization and Mutuality  Outcome: Ongoing (interventions implemented as appropriate)    Goal: Discharge Needs Assessment  Outcome: Ongoing (interventions implemented as appropriate)    Goal: Interprofessional Rounds/Family Conf  Outcome: Ongoing (interventions implemented as appropriate)

## 2019-03-19 ENCOUNTER — APPOINTMENT (OUTPATIENT)
Dept: CARDIOLOGY | Facility: HOSPITAL | Age: 79
End: 2019-03-19

## 2019-03-19 PROBLEM — E87.70 VOLUME OVERLOAD: Status: ACTIVE | Noted: 2019-03-19

## 2019-03-19 LAB
ALBUMIN SERPL-MCNC: 4 G/DL (ref 3.5–5.2)
ALBUMIN/GLOB SERPL: 1.7 G/DL
ALP SERPL-CCNC: 86 U/L (ref 39–117)
ALT SERPL W P-5'-P-CCNC: 14 U/L (ref 1–33)
ANION GAP SERPL CALCULATED.3IONS-SCNC: 12.4 MMOL/L
AORTIC DIMENSIONLESS INDEX: 0.7 (DI)
AST SERPL-CCNC: 17 U/L (ref 1–32)
BACTERIA UR QL AUTO: ABNORMAL /HPF
BACTERIA UR QL AUTO: NORMAL /HPF
BASOPHILS # BLD AUTO: 0.02 10*3/MM3 (ref 0–0.2)
BASOPHILS NFR BLD AUTO: 0.4 % (ref 0–1.5)
BH CV ECHO MEAS - ACS: 2 CM
BH CV ECHO MEAS - AO MAX PG: 10 MMHG
BH CV ECHO MEAS - AO MEAN PG (FULL): 2 MMHG
BH CV ECHO MEAS - AO MEAN PG: 5 MMHG
BH CV ECHO MEAS - AO ROOT AREA (BSA CORRECTED): 1.5
BH CV ECHO MEAS - AO ROOT AREA: 8 CM^2
BH CV ECHO MEAS - AO ROOT DIAM: 3.2 CM
BH CV ECHO MEAS - AO V2 MAX: 161 CM/SEC
BH CV ECHO MEAS - AO V2 MEAN: 96.9 CM/SEC
BH CV ECHO MEAS - AO V2 VTI: 36.7 CM
BH CV ECHO MEAS - AVA(I,A): 1.6 CM^2
BH CV ECHO MEAS - AVA(I,D): 1.6 CM^2
BH CV ECHO MEAS - BSA(HAYCOCK): 2.2 M^2
BH CV ECHO MEAS - BSA: 2.1 M^2
BH CV ECHO MEAS - BZI_BMI: 35.5 KILOGRAMS/M^2
BH CV ECHO MEAS - BZI_METRIC_HEIGHT: 167.6 CM
BH CV ECHO MEAS - BZI_METRIC_WEIGHT: 99.8 KG
BH CV ECHO MEAS - EDV(CUBED): 97.3 ML
BH CV ECHO MEAS - EDV(MOD-SP2): 100 ML
BH CV ECHO MEAS - EDV(MOD-SP4): 107 ML
BH CV ECHO MEAS - EDV(TEICH): 97.3 ML
BH CV ECHO MEAS - EF(CUBED): 63.1 %
BH CV ECHO MEAS - EF(MOD-BP): 63 %
BH CV ECHO MEAS - EF(MOD-SP2): 62 %
BH CV ECHO MEAS - EF(MOD-SP4): 62.6 %
BH CV ECHO MEAS - EF(TEICH): 54.7 %
BH CV ECHO MEAS - ESV(CUBED): 35.9 ML
BH CV ECHO MEAS - ESV(MOD-SP2): 38 ML
BH CV ECHO MEAS - ESV(MOD-SP4): 40 ML
BH CV ECHO MEAS - ESV(TEICH): 44.1 ML
BH CV ECHO MEAS - FS: 28.3 %
BH CV ECHO MEAS - IVS/LVPW: 1
BH CV ECHO MEAS - IVSD: 1 CM
BH CV ECHO MEAS - LA DIMENSION: 3.4 CM
BH CV ECHO MEAS - LA/AO: 1.1
BH CV ECHO MEAS - LAT PEAK E' VEL: 13 CM/SEC
BH CV ECHO MEAS - LV DIASTOLIC VOL/BSA (35-75): 51.4 ML/M^2
BH CV ECHO MEAS - LV MASS(C)D: 158.8 GRAMS
BH CV ECHO MEAS - LV MASS(C)DI: 76.2 GRAMS/M^2
BH CV ECHO MEAS - LV MEAN PG: 3 MMHG
BH CV ECHO MEAS - LV SYSTOLIC VOL/BSA (12-30): 19.2 ML/M^2
BH CV ECHO MEAS - LV V1 MAX: 120 CM/SEC
BH CV ECHO MEAS - LV V1 MEAN: 77.4 CM/SEC
BH CV ECHO MEAS - LV V1 VTI: 26.3 CM
BH CV ECHO MEAS - LVIDD: 4.6 CM
BH CV ECHO MEAS - LVIDS: 3.3 CM
BH CV ECHO MEAS - LVLD AP2: 8.2 CM
BH CV ECHO MEAS - LVLD AP4: 8.6 CM
BH CV ECHO MEAS - LVLS AP2: 6.7 CM
BH CV ECHO MEAS - LVLS AP4: 6.7 CM
BH CV ECHO MEAS - LVOT AREA (M): 2.3 CM^2
BH CV ECHO MEAS - LVOT AREA: 2.3 CM^2
BH CV ECHO MEAS - LVOT DIAM: 1.7 CM
BH CV ECHO MEAS - LVPWD: 1 CM
BH CV ECHO MEAS - MED PEAK E' VEL: 10 CM/SEC
BH CV ECHO MEAS - MR MAX PG: 93.7 MMHG
BH CV ECHO MEAS - MR MAX VEL: 484 CM/SEC
BH CV ECHO MEAS - MV A DUR: 0.16 SEC
BH CV ECHO MEAS - MV A MAX VEL: 120 CM/SEC
BH CV ECHO MEAS - MV DEC SLOPE: 621 CM/SEC^2
BH CV ECHO MEAS - MV DEC TIME: 0.19 SEC
BH CV ECHO MEAS - MV E MAX VEL: 92.3 CM/SEC
BH CV ECHO MEAS - MV E/A: 0.77
BH CV ECHO MEAS - MV MEAN PG: 3 MMHG
BH CV ECHO MEAS - MV P1/2T MAX VEL: 97.9 CM/SEC
BH CV ECHO MEAS - MV P1/2T: 46.2 MSEC
BH CV ECHO MEAS - MV V2 MEAN: 75.2 CM/SEC
BH CV ECHO MEAS - MV V2 VTI: 26.9 CM
BH CV ECHO MEAS - MVA P1/2T LCG: 2.2 CM^2
BH CV ECHO MEAS - MVA(P1/2T): 4.8 CM^2
BH CV ECHO MEAS - MVA(VTI): 2.2 CM^2
BH CV ECHO MEAS - PA ACC SLOPE: 1480 CM/SEC^2
BH CV ECHO MEAS - PA ACC TIME: 0.09 SEC
BH CV ECHO MEAS - PA MAX PG (FULL): 4.7 MMHG
BH CV ECHO MEAS - PA MAX PG: 6.8 MMHG
BH CV ECHO MEAS - PA PR(ACCEL): 40.8 MMHG
BH CV ECHO MEAS - PA V2 MAX: 130 CM/SEC
BH CV ECHO MEAS - PULM A REVS DUR: 0.19 SEC
BH CV ECHO MEAS - PULM A REVS VEL: 40.5 CM/SEC
BH CV ECHO MEAS - PULM DIAS VEL: 50.3 CM/SEC
BH CV ECHO MEAS - PULM S/D: 1.7
BH CV ECHO MEAS - PULM SYS VEL: 85.4 CM/SEC
BH CV ECHO MEAS - PVA(V,A): 2.1 CM^2
BH CV ECHO MEAS - PVA(V,D): 2.1 CM^2
BH CV ECHO MEAS - QP/QS: 0.94
BH CV ECHO MEAS - RAP SYSTOLE: 3 MMHG
BH CV ECHO MEAS - RV MAX PG: 2 MMHG
BH CV ECHO MEAS - RV MEAN PG: 1 MMHG
BH CV ECHO MEAS - RV V1 MAX: 71.4 CM/SEC
BH CV ECHO MEAS - RV V1 MEAN: 45.8 CM/SEC
BH CV ECHO MEAS - RV V1 VTI: 14.7 CM
BH CV ECHO MEAS - RVOT AREA: 3.8 CM^2
BH CV ECHO MEAS - RVOT DIAM: 2.2 CM
BH CV ECHO MEAS - RVSP: 37.1 MMHG
BH CV ECHO MEAS - SI(AO): 141.7 ML/M^2
BH CV ECHO MEAS - SI(CUBED): 29.5 ML/M^2
BH CV ECHO MEAS - SI(LVOT): 28.7 ML/M^2
BH CV ECHO MEAS - SI(MOD-SP2): 29.8 ML/M^2
BH CV ECHO MEAS - SI(MOD-SP4): 32.2 ML/M^2
BH CV ECHO MEAS - SI(TEICH): 25.5 ML/M^2
BH CV ECHO MEAS - SV(AO): 295.2 ML
BH CV ECHO MEAS - SV(CUBED): 61.4 ML
BH CV ECHO MEAS - SV(LVOT): 59.7 ML
BH CV ECHO MEAS - SV(MOD-SP2): 62 ML
BH CV ECHO MEAS - SV(MOD-SP4): 67 ML
BH CV ECHO MEAS - SV(RVOT): 55.9 ML
BH CV ECHO MEAS - SV(TEICH): 53.2 ML
BH CV ECHO MEAS - TAPSE (>1.6): 2.8 CM2
BH CV ECHO MEAS - TR MAX VEL: 292 CM/SEC
BH CV ECHO MEASUREMENTS AVERAGE E/E' RATIO: 8.03
BH CV LOW VAS LEFT LESSER SAPH VESSEL: 1
BH CV LOW VAS LEFT SAPHENOFEMORAL JUNCTION SPONT: 1
BH CV LOW VAS RIGHT LESSER SAPH VESSEL: 1
BH CV LOWER VASCULAR LEFT COMMON FEMORAL AUGMENT: NORMAL
BH CV LOWER VASCULAR LEFT COMMON FEMORAL COMPETENT: NORMAL
BH CV LOWER VASCULAR LEFT COMMON FEMORAL COMPRESS: NORMAL
BH CV LOWER VASCULAR LEFT COMMON FEMORAL PHASIC: NORMAL
BH CV LOWER VASCULAR LEFT COMMON FEMORAL SPONT: NORMAL
BH CV LOWER VASCULAR LEFT DISTAL FEMORAL COMPRESS: NORMAL
BH CV LOWER VASCULAR LEFT GASTRONEMIUS COMPRESS: NORMAL
BH CV LOWER VASCULAR LEFT GREATER SAPH AK COMPRESS: NORMAL
BH CV LOWER VASCULAR LEFT GREATER SAPH BK COMPRESS: NORMAL
BH CV LOWER VASCULAR LEFT LESSER SAPH COMPRESS: NORMAL
BH CV LOWER VASCULAR LEFT LESSER SAPH THROMBUS: NORMAL
BH CV LOWER VASCULAR LEFT MID FEMORAL AUGMENT: NORMAL
BH CV LOWER VASCULAR LEFT MID FEMORAL COMPETENT: NORMAL
BH CV LOWER VASCULAR LEFT MID FEMORAL COMPRESS: NORMAL
BH CV LOWER VASCULAR LEFT MID FEMORAL PHASIC: NORMAL
BH CV LOWER VASCULAR LEFT MID FEMORAL SPONT: NORMAL
BH CV LOWER VASCULAR LEFT PERONEAL COMPRESS: NORMAL
BH CV LOWER VASCULAR LEFT POPLITEAL AUGMENT: NORMAL
BH CV LOWER VASCULAR LEFT POPLITEAL COMPETENT: NORMAL
BH CV LOWER VASCULAR LEFT POPLITEAL COMPRESS: NORMAL
BH CV LOWER VASCULAR LEFT POPLITEAL PHASIC: NORMAL
BH CV LOWER VASCULAR LEFT POPLITEAL SPONT: NORMAL
BH CV LOWER VASCULAR LEFT POSTERIOR TIBIAL COMPRESS: NORMAL
BH CV LOWER VASCULAR LEFT PROXIMAL FEMORAL COMPRESS: NORMAL
BH CV LOWER VASCULAR LEFT SAPHENOFEMORAL JUNCTION AUGMENT: NORMAL
BH CV LOWER VASCULAR LEFT SAPHENOFEMORAL JUNCTION COMPETENT: NORMAL
BH CV LOWER VASCULAR LEFT SAPHENOFEMORAL JUNCTION COMPRESS: NORMAL
BH CV LOWER VASCULAR LEFT SAPHENOFEMORAL JUNCTION PHASIC: NORMAL
BH CV LOWER VASCULAR LEFT SAPHENOFEMORAL JUNCTION SPONT: NORMAL
BH CV LOWER VASCULAR RIGHT COMMON FEMORAL AUGMENT: NORMAL
BH CV LOWER VASCULAR RIGHT COMMON FEMORAL COMPETENT: NORMAL
BH CV LOWER VASCULAR RIGHT COMMON FEMORAL COMPRESS: NORMAL
BH CV LOWER VASCULAR RIGHT COMMON FEMORAL PHASIC: NORMAL
BH CV LOWER VASCULAR RIGHT COMMON FEMORAL SPONT: NORMAL
BH CV LOWER VASCULAR RIGHT DISTAL FEMORAL COMPRESS: NORMAL
BH CV LOWER VASCULAR RIGHT GASTRONEMIUS COMPRESS: NORMAL
BH CV LOWER VASCULAR RIGHT GREATER SAPH AK COMPRESS: NORMAL
BH CV LOWER VASCULAR RIGHT GREATER SAPH BK COMPRESS: NORMAL
BH CV LOWER VASCULAR RIGHT LESSER SAPH COMPRESS: NORMAL
BH CV LOWER VASCULAR RIGHT LESSER SAPH THROMBUS: NORMAL
BH CV LOWER VASCULAR RIGHT MID FEMORAL AUGMENT: NORMAL
BH CV LOWER VASCULAR RIGHT MID FEMORAL COMPETENT: NORMAL
BH CV LOWER VASCULAR RIGHT MID FEMORAL COMPRESS: NORMAL
BH CV LOWER VASCULAR RIGHT MID FEMORAL PHASIC: NORMAL
BH CV LOWER VASCULAR RIGHT MID FEMORAL SPONT: NORMAL
BH CV LOWER VASCULAR RIGHT PERONEAL COMPRESS: NORMAL
BH CV LOWER VASCULAR RIGHT POPLITEAL AUGMENT: NORMAL
BH CV LOWER VASCULAR RIGHT POPLITEAL COMPETENT: NORMAL
BH CV LOWER VASCULAR RIGHT POPLITEAL COMPRESS: NORMAL
BH CV LOWER VASCULAR RIGHT POPLITEAL PHASIC: NORMAL
BH CV LOWER VASCULAR RIGHT POPLITEAL SPONT: NORMAL
BH CV LOWER VASCULAR RIGHT POSTERIOR TIBIAL COMPRESS: NORMAL
BH CV LOWER VASCULAR RIGHT PROXIMAL FEMORAL COMPRESS: NORMAL
BH CV LOWER VASCULAR RIGHT SAPHENOFEMORAL JUNCTION AUGMENT: NORMAL
BH CV LOWER VASCULAR RIGHT SAPHENOFEMORAL JUNCTION COMPETENT: NORMAL
BH CV LOWER VASCULAR RIGHT SAPHENOFEMORAL JUNCTION COMPRESS: NORMAL
BH CV LOWER VASCULAR RIGHT SAPHENOFEMORAL JUNCTION PHASIC: NORMAL
BH CV LOWER VASCULAR RIGHT SAPHENOFEMORAL JUNCTION SPONT: NORMAL
BH CV VAS BP RIGHT ARM: NORMAL MMHG
BH CV XLRA - RV BASE: 3 CM
BH CV XLRA - TDI S': 14 CM/SEC
BILIRUB SERPL-MCNC: 0.4 MG/DL (ref 0.2–1.2)
BILIRUB UR QL STRIP: NEGATIVE
BILIRUB UR QL STRIP: NEGATIVE
BUN BLD-MCNC: 24 MG/DL (ref 8–23)
BUN/CREAT SERPL: 19.4 (ref 7–25)
CALCIUM SPEC-SCNC: 9.8 MG/DL (ref 8.6–10.5)
CHLORIDE SERPL-SCNC: 98 MMOL/L (ref 98–107)
CHLORIDE UR-SCNC: 104 MMOL/L
CHOLEST SERPL-MCNC: 167 MG/DL (ref 0–200)
CK SERPL-CCNC: 113 U/L (ref 20–180)
CLARITY UR: CLEAR
CLARITY UR: CLEAR
CO2 SERPL-SCNC: 28.6 MMOL/L (ref 22–29)
COLOR UR: YELLOW
COLOR UR: YELLOW
CREAT BLD-MCNC: 1.24 MG/DL (ref 0.57–1)
CREAT UR-MCNC: 63.3 MG/DL
DEPRECATED RDW RBC AUTO: 48.6 FL (ref 37–54)
EOSINOPHIL # BLD AUTO: 0.17 10*3/MM3 (ref 0–0.4)
EOSINOPHIL NFR BLD AUTO: 3.1 % (ref 0.3–6.2)
EOSINOPHIL SPEC QL MICRO: 11 % EOS/100 CELLS (ref 0–0)
ERYTHROCYTE [DISTWIDTH] IN BLOOD BY AUTOMATED COUNT: 15.9 % (ref 12.3–15.4)
GFR SERPL CREATININE-BSD FRML MDRD: 42 ML/MIN/1.73
GLOBULIN UR ELPH-MCNC: 2.4 GM/DL
GLUCOSE BLD-MCNC: 100 MG/DL (ref 65–99)
GLUCOSE UR STRIP-MCNC: NEGATIVE MG/DL
GLUCOSE UR STRIP-MCNC: NEGATIVE MG/DL
HBA1C MFR BLD: 5.9 % (ref 4.8–5.6)
HCT VFR BLD AUTO: 32.3 % (ref 34–46.6)
HDLC SERPL-MCNC: 81 MG/DL (ref 40–60)
HGB BLD-MCNC: 9.9 G/DL (ref 12–15.9)
HGB UR QL STRIP.AUTO: NEGATIVE
HGB UR QL STRIP.AUTO: NEGATIVE
HYALINE CASTS UR QL AUTO: ABNORMAL /LPF
HYALINE CASTS UR QL AUTO: NORMAL /LPF
IMM GRANULOCYTES # BLD AUTO: 0.02 10*3/MM3 (ref 0–0.05)
IMM GRANULOCYTES NFR BLD AUTO: 0.4 % (ref 0–0.5)
KETONES UR QL STRIP: NEGATIVE
KETONES UR QL STRIP: NEGATIVE
LDLC SERPL CALC-MCNC: 74 MG/DL (ref 0–100)
LDLC/HDLC SERPL: 0.92 {RATIO}
LEFT ATRIUM VOLUME INDEX: 33 ML/M2
LEFT ATRIUM VOLUME: 61 CM3
LEUKOCYTE ESTERASE UR QL STRIP.AUTO: ABNORMAL
LEUKOCYTE ESTERASE UR QL STRIP.AUTO: ABNORMAL
LYMPHOCYTES # BLD AUTO: 1.65 10*3/MM3 (ref 0.7–3.1)
LYMPHOCYTES NFR BLD AUTO: 29.9 % (ref 19.6–45.3)
MAXIMAL PREDICTED HEART RATE: 141 BPM
MCH RBC QN AUTO: 25.8 PG (ref 26.6–33)
MCHC RBC AUTO-ENTMCNC: 30.7 G/DL (ref 31.5–35.7)
MCV RBC AUTO: 84.3 FL (ref 79–97)
MONOCYTES # BLD AUTO: 0.6 10*3/MM3 (ref 0.1–0.9)
MONOCYTES NFR BLD AUTO: 10.9 % (ref 5–12)
NEUTROPHILS # BLD AUTO: 3.06 10*3/MM3 (ref 1.4–7)
NEUTROPHILS NFR BLD AUTO: 55.3 % (ref 42.7–76)
NITRITE UR QL STRIP: NEGATIVE
NITRITE UR QL STRIP: NEGATIVE
NRBC BLD AUTO-RTO: 0 /100 WBC (ref 0–0)
NT-PROBNP SERPL-MCNC: 154.2 PG/ML (ref 5–1800)
PH UR STRIP.AUTO: 7.5 [PH] (ref 5–8)
PH UR STRIP.AUTO: 7.5 [PH] (ref 5–8)
PLATELET # BLD AUTO: 233 10*3/MM3 (ref 140–450)
PMV BLD AUTO: 9.8 FL (ref 6–12)
POTASSIUM BLD-SCNC: 4.3 MMOL/L (ref 3.5–5.2)
PROT SERPL-MCNC: 6.4 G/DL (ref 6–8.5)
PROT UR QL STRIP: NEGATIVE
PROT UR QL STRIP: NEGATIVE
PROT UR-MCNC: 5 MG/DL
RBC # BLD AUTO: 3.83 10*6/MM3 (ref 3.77–5.28)
RBC # UR: ABNORMAL /HPF
RBC # UR: NORMAL /HPF
REF LAB TEST METHOD: ABNORMAL
REF LAB TEST METHOD: NORMAL
SODIUM BLD-SCNC: 139 MMOL/L (ref 136–145)
SODIUM UR-SCNC: 103 MMOL/L
SP GR UR STRIP: 1.01 (ref 1–1.03)
SP GR UR STRIP: 1.01 (ref 1–1.03)
SQUAMOUS #/AREA URNS HPF: ABNORMAL /HPF
SQUAMOUS #/AREA URNS HPF: NORMAL /HPF
STRESS TARGET HR: 120 BPM
TRIGL SERPL-MCNC: 59 MG/DL (ref 0–150)
TSH SERPL DL<=0.05 MIU/L-ACNC: 1.67 MIU/ML (ref 0.27–4.2)
UROBILINOGEN UR QL STRIP: ABNORMAL
UROBILINOGEN UR QL STRIP: ABNORMAL
UUN 24H UR-MCNC: 335 MG/DL
VLDLC SERPL-MCNC: 11.8 MG/DL (ref 5–40)
WBC NRBC COR # BLD: 5.52 10*3/MM3 (ref 3.4–10.8)
WBC UR QL AUTO: ABNORMAL /HPF
WBC UR QL AUTO: NORMAL /HPF

## 2019-03-19 PROCEDURE — 85025 COMPLETE CBC W/AUTO DIFF WBC: CPT | Performed by: HOSPITALIST

## 2019-03-19 PROCEDURE — 80053 COMPREHEN METABOLIC PANEL: CPT | Performed by: HOSPITALIST

## 2019-03-19 PROCEDURE — 84300 ASSAY OF URINE SODIUM: CPT | Performed by: INTERNAL MEDICINE

## 2019-03-19 PROCEDURE — 87205 SMEAR GRAM STAIN: CPT | Performed by: INTERNAL MEDICINE

## 2019-03-19 PROCEDURE — 82570 ASSAY OF URINE CREATININE: CPT | Performed by: INTERNAL MEDICINE

## 2019-03-19 PROCEDURE — 84540 ASSAY OF URINE/UREA-N: CPT | Performed by: INTERNAL MEDICINE

## 2019-03-19 PROCEDURE — 84443 ASSAY THYROID STIM HORMONE: CPT | Performed by: HOSPITALIST

## 2019-03-19 PROCEDURE — 84156 ASSAY OF PROTEIN URINE: CPT | Performed by: INTERNAL MEDICINE

## 2019-03-19 PROCEDURE — 93306 TTE W/DOPPLER COMPLETE: CPT

## 2019-03-19 PROCEDURE — 81001 URINALYSIS AUTO W/SCOPE: CPT | Performed by: INTERNAL MEDICINE

## 2019-03-19 PROCEDURE — 83036 HEMOGLOBIN GLYCOSYLATED A1C: CPT | Performed by: HOSPITALIST

## 2019-03-19 PROCEDURE — 82436 ASSAY OF URINE CHLORIDE: CPT | Performed by: INTERNAL MEDICINE

## 2019-03-19 PROCEDURE — 80061 LIPID PANEL: CPT | Performed by: HOSPITALIST

## 2019-03-19 PROCEDURE — 93306 TTE W/DOPPLER COMPLETE: CPT | Performed by: INTERNAL MEDICINE

## 2019-03-19 PROCEDURE — 83880 ASSAY OF NATRIURETIC PEPTIDE: CPT | Performed by: HOSPITALIST

## 2019-03-19 PROCEDURE — 94799 UNLISTED PULMONARY SVC/PX: CPT

## 2019-03-19 PROCEDURE — 82550 ASSAY OF CK (CPK): CPT | Performed by: INTERNAL MEDICINE

## 2019-03-19 PROCEDURE — 25010000002 FUROSEMIDE PER 20 MG: Performed by: HOSPITALIST

## 2019-03-19 PROCEDURE — 93970 EXTREMITY STUDY: CPT

## 2019-03-19 RX ORDER — BUMETANIDE 0.25 MG/ML
2 INJECTION INTRAMUSCULAR; INTRAVENOUS EVERY 12 HOURS
Status: DISCONTINUED | OUTPATIENT
Start: 2019-03-19 | End: 2019-03-19

## 2019-03-19 RX ORDER — METOLAZONE 5 MG/1
5 TABLET ORAL ONCE
Status: COMPLETED | OUTPATIENT
Start: 2019-03-19 | End: 2019-03-19

## 2019-03-19 RX ORDER — IPRATROPIUM BROMIDE AND ALBUTEROL SULFATE 2.5; .5 MG/3ML; MG/3ML
1.5 SOLUTION RESPIRATORY (INHALATION) EVERY 4 HOURS PRN
Status: DISCONTINUED | OUTPATIENT
Start: 2019-03-19 | End: 2019-03-21

## 2019-03-19 RX ADMIN — FUROSEMIDE 40 MG: 10 INJECTION, SOLUTION INTRAMUSCULAR; INTRAVENOUS at 08:21

## 2019-03-19 RX ADMIN — IPRATROPIUM BROMIDE AND ALBUTEROL SULFATE 1.5 ML: 2.5; .5 SOLUTION RESPIRATORY (INHALATION) at 13:43

## 2019-03-19 RX ADMIN — POTASSIUM CHLORIDE 20 MEQ: 750 CAPSULE, EXTENDED RELEASE ORAL at 17:03

## 2019-03-19 RX ADMIN — LUBIPROSTONE 8 MCG: 8 CAPSULE, GELATIN COATED ORAL at 08:21

## 2019-03-19 RX ADMIN — DILTIAZEM HYDROCHLORIDE 120 MG: 120 CAPSULE, COATED, EXTENDED RELEASE ORAL at 08:21

## 2019-03-19 RX ADMIN — METOLAZONE 5 MG: 5 TABLET ORAL at 17:03

## 2019-03-19 RX ADMIN — VITAMIN D, TAB 1000IU (100/BT) 1000 UNITS: 25 TAB at 08:21

## 2019-03-19 RX ADMIN — MAGNESIUM HYDROXIDE 10 ML: 2400 SUSPENSION ORAL at 21:53

## 2019-03-19 RX ADMIN — AMITRIPTYLINE HYDROCHLORIDE 25 MG: 25 TABLET, FILM COATED ORAL at 20:13

## 2019-03-19 RX ADMIN — POTASSIUM CHLORIDE 20 MEQ: 750 CAPSULE, EXTENDED RELEASE ORAL at 08:21

## 2019-03-19 RX ADMIN — IPRATROPIUM BROMIDE AND ALBUTEROL SULFATE 1.5 ML: 2.5; .5 SOLUTION RESPIRATORY (INHALATION) at 07:20

## 2019-03-19 RX ADMIN — GABAPENTIN 100 MG: 100 CAPSULE ORAL at 16:12

## 2019-03-19 RX ADMIN — FUROSEMIDE 40 MG: 10 INJECTION, SOLUTION INTRAMUSCULAR; INTRAVENOUS at 20:13

## 2019-03-19 RX ADMIN — GABAPENTIN 100 MG: 100 CAPSULE ORAL at 06:21

## 2019-03-19 RX ADMIN — LEVOTHYROXINE SODIUM 88 MCG: 88 TABLET ORAL at 06:21

## 2019-03-19 RX ADMIN — BUDESONIDE 0.5 MG: 0.5 INHALANT RESPIRATORY (INHALATION) at 07:20

## 2019-03-19 RX ADMIN — PANTOPRAZOLE SODIUM 40 MG: 40 TABLET, DELAYED RELEASE ORAL at 06:21

## 2019-03-19 RX ADMIN — PRAMIPEXOLE DIHYDROCHLORIDE 1.5 MG: 1.5 TABLET ORAL at 20:13

## 2019-03-19 RX ADMIN — GABAPENTIN 100 MG: 100 CAPSULE ORAL at 21:49

## 2019-03-19 RX ADMIN — BACLOFEN 10 MG: 10 TABLET ORAL at 04:00

## 2019-03-19 RX ADMIN — Medication 800 MCG: at 08:21

## 2019-03-19 RX ADMIN — CITALOPRAM 10 MG: 10 TABLET, FILM COATED ORAL at 20:13

## 2019-03-19 NOTE — PLAN OF CARE
Problem: Fall Risk (Adult)  Goal: Absence of Fall  Outcome: Ongoing (interventions implemented as appropriate)      Problem: Skin Injury Risk (Adult)  Goal: Skin Health and Integrity  Outcome: Ongoing (interventions implemented as appropriate)      Problem: Patient Care Overview  Goal: Plan of Care Review  Outcome: Ongoing (interventions implemented as appropriate)   03/19/19 1706   Coping/Psychosocial   Plan of Care Reviewed With patient   Plan of Care Review   Progress improving   OTHER   Outcome Summary VSS. BLE edema seems to be improving. Knees down to feet. Pt continues to cough. No c/o pain or nausea. LE duplex done with no new findings and echo today. Will continue to monitor.      Goal: Individualization and Mutuality  Outcome: Ongoing (interventions implemented as appropriate)    Goal: Discharge Needs Assessment  Outcome: Ongoing (interventions implemented as appropriate)    Goal: Interprofessional Rounds/Family Conf  Outcome: Ongoing (interventions implemented as appropriate)

## 2019-03-19 NOTE — PROGRESS NOTES
"Daily progress note    Chief complaint  Doing little better  No new complaints    History of present illness  78-year-old white female with history of hypertension hyperlipidemia hypothyroidism COPD morbidly obese directly admitted to our service from nephrology office where she presented with several month history of leg swelling weight gain and shortness of breath.  Patient found to be fluid overloaded admit for further management.  Patient denies any chest pain fever chills abdominal pain nausea vomiting diarrhea.    Review of system  As above    Physical examination  Blood pressure 135/61, pulse 78, temperature 97.2 °F (36.2 °C), temperature source Oral, resp. rate 16, height 167.6 cm (66\"), weight 99.8 kg (220 lb), SpO2 93 %.    HEENT unremarkable  Neck supple  Lungs decreased breath sounds with crackles at the bases  Heart S1-S2 no murmur gallop rub  Abdomen soft nontender bowel sounds positive  Extremities 4+ edema  Neuro moves all 4 extremities with normal strength  Psych normal mood and behavior    Labs  Lab Results (last 24 hours)     Procedure Component Value Units Date/Time    Eosinophil Smear - Urine, Urine, Clean Catch [[id number]]  (Abnormal) Collected:  03/19/19 0941    Specimen:  Urine, Clean Catch Updated:  03/19/19 1100     Eosinophil Smear 11 % EOS/100 Cells     Chloride, Urine, Random - [[id number]] Collected:  03/19/19 0941    Specimen:  Urine Updated:  03/19/19 1030     Chloride, Urine 104 mmol/L     Narrative:       Reference intervals for random urine have not been established.  Clinical usage is dependent upon physician's interpretation in combination with other laboratory tests.     Creatinine, Urine, Random - [[id number]] Collected:  03/19/19 0941    Specimen:  Urine Updated:  03/19/19 1030     Creatinine, Urine 63.3 mg/dL     Narrative:       Reference intervals for random urine have not been established.  Clinical usage is dependent upon physician's interpretation in combination with other " laboratory tests.     Urea Nitrogen, Urine - [[id number]] Collected:  03/19/19 0941    Specimen:  Urine Updated:  03/19/19 1030     Urea Nitrogen, Urine 335 mg/dL     Narrative:       Reference intervals for random urine have not been established.  Clinical usage is dependent upon physician's interpretation in combination with other laboratory tests.     Sodium, Urine, Random - [[id number]] Collected:  03/19/19 0941    Specimen:  Urine Updated:  03/19/19 1030     Sodium, Urine 103 mmol/L     Narrative:       Reference intervals for random urine have not been established.  Clinical usage is dependent upon physician's interpretation in combination with other laboratory tests.     Protein, Urine, Random - [[id number]] Collected:  03/19/19 0941    Specimen:  Urine Updated:  03/19/19 1030     Total Protein, Urine 5.0 mg/dL     Narrative:       Reference intervals for random urine have not been established.  Clinical usage is dependent upon physician's interpretation in combination with other laboratory tests.     Urinalysis, Microscopic Only - Urine, Clean Catch [[id number]]  (Abnormal) Collected:  03/19/19 0941    Specimen:  Urine, Clean Catch Updated:  03/19/19 1017     RBC, UA 0-2 /HPF      WBC, UA 3-5 /HPF      Bacteria, UA None Seen /HPF      Squamous Epithelial Cells, UA 0-2 /HPF      Hyaline Casts, UA 0-2 /LPF      Methodology Automated Microscopy    Urinalysis With Microscopic If Indicated (No Culture) - [[id number]]  (Abnormal) Collected:  03/19/19 0941    Specimen:  Urine Updated:  03/19/19 1017     Color, UA Yellow     Appearance, UA Clear     pH, UA 7.5     Specific Gravity, UA 1.013     Glucose, UA Negative     Ketones, UA Negative     Bilirubin, UA Negative     Blood, UA Negative     Protein, UA Negative     Leuk Esterase, UA Small (1+)     Nitrite, UA Negative     Urobilinogen, UA 0.2 E.U./dL    CK [[id number]]  (Normal) Collected:  03/19/19 0701    Specimen:  Blood Updated:  03/19/19 0853     Creatine Kinase  113 U/L     BNP [[id number]]  (Normal) Collected:  03/19/19 0701    Specimen:  Blood Updated:  03/19/19 0802     proBNP 154.2 pg/mL     Narrative:       Among patients with dyspnea, NT-proBNP is highly sensitive for the detection of acute congestive heart failure. In addition NT-proBNP of <300 pg/ml effectively rules out acute congestive heart failure with 99% negative predictive value.    TSH [[id number]]  (Normal) Collected:  03/19/19 0701    Specimen:  Blood Updated:  03/19/19 0802     TSH 1.670 mIU/mL     Hemoglobin A1c [[id number]]  (Abnormal) Collected:  03/19/19 0701    Specimen:  Blood Updated:  03/19/19 0759     Hemoglobin A1C 5.90 %     Narrative:       Hemoglobin A1C Ranges:    Increased Risk for Diabetes  5.7% to 6.4%  Diabetes                     >= 6.5%  Diabetic Goal                < 7.0%    Comprehensive Metabolic Panel [[id number]]  (Abnormal) Collected:  03/19/19 0701    Specimen:  Blood Updated:  03/19/19 0751     Glucose 100 mg/dL      BUN 24 mg/dL      Creatinine 1.24 mg/dL      Sodium 139 mmol/L      Potassium 4.3 mmol/L      Chloride 98 mmol/L      CO2 28.6 mmol/L      Calcium 9.8 mg/dL      Total Protein 6.4 g/dL      Albumin 4.00 g/dL      ALT (SGPT) 14 U/L      AST (SGOT) 17 U/L      Alkaline Phosphatase 86 U/L      Total Bilirubin 0.4 mg/dL      eGFR Non African Amer 42 mL/min/1.73      Globulin 2.4 gm/dL      A/G Ratio 1.7 g/dL      BUN/Creatinine Ratio 19.4     Anion Gap 12.4 mmol/L     Narrative:       GFR Normal >60  Chronic Kidney Disease <60  Kidney Failure <15    Lipid Panel [[id number]]  (Abnormal) Collected:  03/19/19 0701    Specimen:  Blood Updated:  03/19/19 0751     Total Cholesterol 167 mg/dL      Triglycerides 59 mg/dL      HDL Cholesterol 81 mg/dL      LDL Cholesterol  74 mg/dL      VLDL Cholesterol 11.8 mg/dL      LDL/HDL Ratio 0.92    Narrative:       Cholesterol Reference Ranges  (U.S. Department of Health and Human Services ATP III Classifications)    Desirable          <200  mg/dL  Borderline High    200-239 mg/dL  High Risk          >240 mg/dL      Triglyceride Reference Ranges  (U.S. Department of Health and Human Services ATP III Classifications)    Normal           <150 mg/dL  Borderline High  150-199 mg/dL  High             200-499 mg/dL  Very High        >500 mg/dL    HDL Reference Ranges  (U.S. Department of Health and Human Services ATP III Classifcations)    Low     <40 mg/dl (major risk factor for CHD)  High    >60 mg/dl ('negative' risk factor for CHD)        LDL Reference Ranges  (U.S. Department of Health and Human Services ATP III Classifcations)    Optimal          <100 mg/dL  Near Optimal     100-129 mg/dL  Borderline High  130-159 mg/dL  High             160-189 mg/dL  Very High        >189 mg/dL    CBC & Differential [[id number]] Collected:  03/19/19 0701    Specimen:  Blood Updated:  03/19/19 0736    Narrative:       The following orders were created for panel order CBC & Differential.  Procedure                               Abnormality         Status                     ---------                               -----------         ------                     CBC Auto Differential[[id number]]        Abnormal            Final result                 Please view results for these tests on the individual orders.    CBC Auto Differential [[id number]]  (Abnormal) Collected:  03/19/19 0701    Specimen:  Blood Updated:  03/19/19 0736     WBC 5.52 10*3/mm3      RBC 3.83 10*6/mm3      Hemoglobin 9.9 g/dL      Hematocrit 32.3 %      MCV 84.3 fL      MCH 25.8 pg      MCHC 30.7 g/dL      RDW 15.9 %      RDW-SD 48.6 fl      MPV 9.8 fL      Platelets 233 10*3/mm3      Neutrophil % 55.3 %      Lymphocyte % 29.9 %      Monocyte % 10.9 %      Eosinophil % 3.1 %      Basophil % 0.4 %      Immature Grans % 0.4 %      Neutrophils, Absolute 3.06 10*3/mm3      Lymphocytes, Absolute 1.65 10*3/mm3      Monocytes, Absolute 0.60 10*3/mm3      Eosinophils, Absolute 0.17 10*3/mm3      Basophils,  Absolute 0.02 10*3/mm3      Immature Grans, Absolute 0.02 10*3/mm3      nRBC 0.0 /100 WBC     Urinalysis With Culture If Indicated - Urine, Clean Catch [[id number]]  (Abnormal) Collected:  03/18/19 2347    Specimen:  Urine, Clean Catch Updated:  03/19/19 0033     Color, UA Yellow     Appearance, UA Clear     pH, UA 7.5     Specific Gravity, UA 1.011     Glucose, UA Negative     Ketones, UA Negative     Bilirubin, UA Negative     Blood, UA Negative     Protein, UA Negative     Leuk Esterase, UA Trace     Nitrite, UA Negative     Urobilinogen, UA 0.2 E.U./dL    Urinalysis, Microscopic Only - Urine, Clean Catch [[id number]] Collected:  03/18/19 2347    Specimen:  Urine, Clean Catch Updated:  03/19/19 0033     RBC, UA 0-2 /HPF      WBC, UA 0-2 /HPF      Bacteria, UA None Seen /HPF      Squamous Epithelial Cells, UA 0-2 /HPF      Hyaline Casts, UA 0-2 /LPF      Methodology Automated Microscopy        Lab Results (last 24 hours)     Procedure Component Value Units Date/Time    Eosinophil Smear - Urine, Urine, Clean Catch [[id number]]  (Abnormal) Collected:  03/19/19 0941    Specimen:  Urine, Clean Catch Updated:  03/19/19 1100     Eosinophil Smear 11 % EOS/100 Cells     Chloride, Urine, Random - [[id number]] Collected:  03/19/19 0941    Specimen:  Urine Updated:  03/19/19 1030     Chloride, Urine 104 mmol/L     Narrative:       Reference intervals for random urine have not been established.  Clinical usage is dependent upon physician's interpretation in combination with other laboratory tests.     Creatinine, Urine, Random - [[id number]] Collected:  03/19/19 0941    Specimen:  Urine Updated:  03/19/19 1030     Creatinine, Urine 63.3 mg/dL     Narrative:       Reference intervals for random urine have not been established.  Clinical usage is dependent upon physician's interpretation in combination with other laboratory tests.     Urea Nitrogen, Urine - [[id number]] Collected:  03/19/19 0941    Specimen:  Urine Updated:  03/19/19  1030     Urea Nitrogen, Urine 335 mg/dL     Narrative:       Reference intervals for random urine have not been established.  Clinical usage is dependent upon physician's interpretation in combination with other laboratory tests.     Sodium, Urine, Random - [[id number]] Collected:  03/19/19 0941    Specimen:  Urine Updated:  03/19/19 1030     Sodium, Urine 103 mmol/L     Narrative:       Reference intervals for random urine have not been established.  Clinical usage is dependent upon physician's interpretation in combination with other laboratory tests.     Protein, Urine, Random - [[id number]] Collected:  03/19/19 0941    Specimen:  Urine Updated:  03/19/19 1030     Total Protein, Urine 5.0 mg/dL     Narrative:       Reference intervals for random urine have not been established.  Clinical usage is dependent upon physician's interpretation in combination with other laboratory tests.     Urinalysis, Microscopic Only - Urine, Clean Catch [[id number]]  (Abnormal) Collected:  03/19/19 0941    Specimen:  Urine, Clean Catch Updated:  03/19/19 1017     RBC, UA 0-2 /HPF      WBC, UA 3-5 /HPF      Bacteria, UA None Seen /HPF      Squamous Epithelial Cells, UA 0-2 /HPF      Hyaline Casts, UA 0-2 /LPF      Methodology Automated Microscopy    Urinalysis With Microscopic If Indicated (No Culture) - [[id number]]  (Abnormal) Collected:  03/19/19 0941    Specimen:  Urine Updated:  03/19/19 1017     Color, UA Yellow     Appearance, UA Clear     pH, UA 7.5     Specific Gravity, UA 1.013     Glucose, UA Negative     Ketones, UA Negative     Bilirubin, UA Negative     Blood, UA Negative     Protein, UA Negative     Leuk Esterase, UA Small (1+)     Nitrite, UA Negative     Urobilinogen, UA 0.2 E.U./dL    CK [[id number]]  (Normal) Collected:  03/19/19 0701    Specimen:  Blood Updated:  03/19/19 0853     Creatine Kinase 113 U/L     BNP [[id number]]  (Normal) Collected:  03/19/19 0701    Specimen:  Blood Updated:  03/19/19 0802     proBNP 154.2  pg/mL     Narrative:       Among patients with dyspnea, NT-proBNP is highly sensitive for the detection of acute congestive heart failure. In addition NT-proBNP of <300 pg/ml effectively rules out acute congestive heart failure with 99% negative predictive value.    TSH [[id number]]  (Normal) Collected:  03/19/19 0701    Specimen:  Blood Updated:  03/19/19 0802     TSH 1.670 mIU/mL     Hemoglobin A1c [[id number]]  (Abnormal) Collected:  03/19/19 0701    Specimen:  Blood Updated:  03/19/19 0759     Hemoglobin A1C 5.90 %     Narrative:       Hemoglobin A1C Ranges:    Increased Risk for Diabetes  5.7% to 6.4%  Diabetes                     >= 6.5%  Diabetic Goal                < 7.0%    Comprehensive Metabolic Panel [[id number]]  (Abnormal) Collected:  03/19/19 0701    Specimen:  Blood Updated:  03/19/19 0751     Glucose 100 mg/dL      BUN 24 mg/dL      Creatinine 1.24 mg/dL      Sodium 139 mmol/L      Potassium 4.3 mmol/L      Chloride 98 mmol/L      CO2 28.6 mmol/L      Calcium 9.8 mg/dL      Total Protein 6.4 g/dL      Albumin 4.00 g/dL      ALT (SGPT) 14 U/L      AST (SGOT) 17 U/L      Alkaline Phosphatase 86 U/L      Total Bilirubin 0.4 mg/dL      eGFR Non African Amer 42 mL/min/1.73      Globulin 2.4 gm/dL      A/G Ratio 1.7 g/dL      BUN/Creatinine Ratio 19.4     Anion Gap 12.4 mmol/L     Narrative:       GFR Normal >60  Chronic Kidney Disease <60  Kidney Failure <15    Lipid Panel [[id number]]  (Abnormal) Collected:  03/19/19 0701    Specimen:  Blood Updated:  03/19/19 0751     Total Cholesterol 167 mg/dL      Triglycerides 59 mg/dL      HDL Cholesterol 81 mg/dL      LDL Cholesterol  74 mg/dL      VLDL Cholesterol 11.8 mg/dL      LDL/HDL Ratio 0.92    Narrative:       Cholesterol Reference Ranges  (U.S. Department of Health and Human Services ATP III Classifications)    Desirable          <200 mg/dL  Borderline High    200-239 mg/dL  High Risk          >240 mg/dL      Triglyceride Reference Ranges  (U.S. Department  of Health and Human Services ATP III Classifications)    Normal           <150 mg/dL  Borderline High  150-199 mg/dL  High             200-499 mg/dL  Very High        >500 mg/dL    HDL Reference Ranges  (U.S. Department of Health and Human Services ATP III Classifcations)    Low     <40 mg/dl (major risk factor for CHD)  High    >60 mg/dl ('negative' risk factor for CHD)        LDL Reference Ranges  (U.S. Department of Health and Human Services ATP III Classifcations)    Optimal          <100 mg/dL  Near Optimal     100-129 mg/dL  Borderline High  130-159 mg/dL  High             160-189 mg/dL  Very High        >189 mg/dL    CBC & Differential [[id number]] Collected:  03/19/19 0701    Specimen:  Blood Updated:  03/19/19 0736    Narrative:       The following orders were created for panel order CBC & Differential.  Procedure                               Abnormality         Status                     ---------                               -----------         ------                     CBC Auto Differential[[id number]]        Abnormal            Final result                 Please view results for these tests on the individual orders.    CBC Auto Differential [[id number]]  (Abnormal) Collected:  03/19/19 0701    Specimen:  Blood Updated:  03/19/19 0736     WBC 5.52 10*3/mm3      RBC 3.83 10*6/mm3      Hemoglobin 9.9 g/dL      Hematocrit 32.3 %      MCV 84.3 fL      MCH 25.8 pg      MCHC 30.7 g/dL      RDW 15.9 %      RDW-SD 48.6 fl      MPV 9.8 fL      Platelets 233 10*3/mm3      Neutrophil % 55.3 %      Lymphocyte % 29.9 %      Monocyte % 10.9 %      Eosinophil % 3.1 %      Basophil % 0.4 %      Immature Grans % 0.4 %      Neutrophils, Absolute 3.06 10*3/mm3      Lymphocytes, Absolute 1.65 10*3/mm3      Monocytes, Absolute 0.60 10*3/mm3      Eosinophils, Absolute 0.17 10*3/mm3      Basophils, Absolute 0.02 10*3/mm3      Immature Grans, Absolute 0.02 10*3/mm3      nRBC 0.0 /100 WBC     Urinalysis With Culture If  Indicated - Urine, Clean Catch [[id number]]  (Abnormal) Collected:  03/18/19 2347    Specimen:  Urine, Clean Catch Updated:  03/19/19 0033     Color, UA Yellow     Appearance, UA Clear     pH, UA 7.5     Specific Gravity, UA 1.011     Glucose, UA Negative     Ketones, UA Negative     Bilirubin, UA Negative     Blood, UA Negative     Protein, UA Negative     Leuk Esterase, UA Trace     Nitrite, UA Negative     Urobilinogen, UA 0.2 E.U./dL    Urinalysis, Microscopic Only - Urine, Clean Catch [[id number]] Collected:  03/18/19 2347    Specimen:  Urine, Clean Catch Updated:  03/19/19 0033     RBC, UA 0-2 /HPF      WBC, UA 0-2 /HPF      Bacteria, UA None Seen /HPF      Squamous Epithelial Cells, UA 0-2 /HPF      Hyaline Casts, UA 0-2 /LPF      Methodology Automated Microscopy          Current Facility-Administered Medications:   •  amitriptyline (ELAVIL) tablet 25 mg, 25 mg, Oral, Nightly, Arjun Sainz MD, 25 mg at 03/18/19 2117  •  baclofen (LIORESAL) tablet 10 mg, 10 mg, Oral, Q8H PRN, Arjun Sainz MD, 10 mg at 03/19/19 0400  •  budesonide (PULMICORT) nebulizer solution 0.5 mg, 0.5 mg, Nebulization, Daily - RT, Arjun Sainz MD, 0.5 mg at 03/19/19 0720  •  cholecalciferol (VITAMIN D3) tablet 1,000 Units, 1,000 Units, Oral, Daily, Arjun Sainz MD, 1,000 Units at 03/19/19 0821  •  citalopram (CeleXA) tablet 10 mg, 10 mg, Oral, Nightly, Arjun Sainz MD, 10 mg at 03/18/19 2117  •  diltiaZEM CD (CARDIZEM CD) 24 hr capsule 120 mg, 120 mg, Oral, Q24H, Arjun Sainz MD, 120 mg at 03/19/19 0821  •  folic acid (FOLVITE) tablet 800 mcg, 800 mcg, Oral, Daily, Arjun Sainz MD, 800 mcg at 03/19/19 0821  •  furosemide (LASIX) injection 40 mg, 40 mg, Intravenous, Q12H, Arjun Sainz MD, 40 mg at 03/19/19 0821  •  gabapentin (NEURONTIN) capsule 100 mg, 100 mg, Oral, Q8H, Arjun Sainz MD, 100 mg at 03/19/19 0621  •  ipratropium-albuterol (DUO-NEB) nebulizer solution 1.5 mL, 1.5 mL, Nebulization, Q6H While Awake - RT, Arjun Sainz,  MD, 1.5 mL at 03/19/19 1343  •  levothyroxine (SYNTHROID, LEVOTHROID) tablet 88 mcg, 88 mcg, Oral, Once per day on Mon Tue Wed Thu Fri Sat, Shelly Sainz MD, 88 mcg at 03/19/19 0621  •  linaclotide (LINZESS) capsule 290 mcg, 290 mcg, Oral, QAM AC, Shelly Sainz MD, 290 mcg at 03/19/19 1324  •  metOLazone (ZAROXOLYN) tablet 5 mg, 5 mg, Oral, Once, Truong Hernandez MD  •  pantoprazole (PROTONIX) EC tablet 40 mg, 40 mg, Oral, QAM, Shelly Sainz MD, 40 mg at 03/19/19 0621  •  potassium chloride (MICRO-K) CR capsule 20 mEq, 20 mEq, Oral, BID With Meals, Shelly Sainz MD, 20 mEq at 03/19/19 0821  •  pramipexole (MIRAPEX) tablet 1.5 mg, 1.5 mg, Oral, Nightly, Shelly Sainz MD  •  zolpidem (AMBIEN) tablet 5 mg, 5 mg, Oral, Nightly PRN, Shelly Sainz MD     ASSESSMENT  Fluid overload  Chronic kidney disease stage II  Hypertension  Hyperlipidemia  Hypothyroidism  COPD  Neuropathy  Restless leg syndrome  Gastroesophageal reflux disease    PLAN  CPM  IV diuresis   2D echo and bilateral lower extremity Doppler study unremarkable  Continue home medications  Nephrology to follow patient  Stress ulcer DVT prophylaxis  Supportive care  PT/OT  Follow closely further recommendation according to hospital course    SHELLY SAINZ MD

## 2019-03-19 NOTE — PLAN OF CARE
Problem: Fall Risk (Adult)  Goal: Absence of Fall  Outcome: Ongoing (interventions implemented as appropriate)      Problem: Skin Injury Risk (Adult)  Goal: Skin Health and Integrity  Outcome: Ongoing (interventions implemented as appropriate)      Problem: Patient Care Overview  Goal: Plan of Care Review  Outcome: Ongoing (interventions implemented as appropriate)   03/19/19 8305   Coping/Psychosocial   Plan of Care Reviewed With patient   Plan of Care Review   Progress improving   OTHER   Outcome Summary VSS, BLE edema and redness from knees down, IV lasix started, productive cough, with thin clear sputum, complaining of muscle cramps, takes mirapex and baclofen also giving, to have duplex of LE's and ECHO today, will continue to monitor.

## 2019-03-19 NOTE — CONSULTS
[facility]                                                                                             Nephrology Initial Consult Note    Patient Identification:  Name: Latanya Olsen MRN: [id number]  Age: 78 y.o. : 1940  Sex: female  Date:3/19/2019    Requesting Physician: As per consult order.  Verbal consult request per Dr. Sainz, patient was also seen by my partner Dr. Blackman in the office yesterday    Reason for Consultation: Edema/chronic kidney disease  Information from:patient/ family/ chart      History of Present Illness: This is a 78 y.o. year old female with no known prior history of chronic kidney disease, previous baseline creatinine has been 0.98 in 2016, she was referred to see my partner Dr. Blackman in the office this past week, was noted to have refractory severe lower extremity edema associated with some shortness of breath.  Recent vascular and cardiology workup has been unremarkable to determine the source of her lower extremity edema and it was felt that her chronic kidney disease may be contributing.  Her creatinine was 1.04 on  and it was 1.24 this morning.  Repeat echocardiogram is pending, duplex bilateral lower extremities showed chronic superficial thrombophlebitis but no DVT was noted.  Initial urine studies were negative for any significant proteinuria, protein creatinine ratio confirms minimal proteinuria, urinalysis otherwise fairly bland.  She feels better today, decreasing shortness of breath.  She denies any fevers chills cough or congestion.  Edema is improved with diuretics, worse with exertion, quality symptoms described as cramping in her lower extremities with no radiation, moderate severity, gradual onset.      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]   • Diverticulosis of intestine [K57.90]   • Obstructive sleep apnea syndrome [G47.33]   • Weight loss [R63.4]   • Vitamin D deficiency [E55.9]   • Overweight (BMI 25.0-29.9) [E66.3]   • Spinal stenosis of lumbar region without neurogenic claudication [M48.061]   • Osteoarthritis of knee [M17.10]   • Obesity (BMI 30-39.9) [E66.9]   • Neutropenia (CMS/HCC) [D70.9]   • Chronic lower back pain [M54.5, G89.29]   • Knee pain [M25.569]   • Insomnia [G47.00]   • Hypothyroidism [E03.9]   • Hypokalemia [E87.6]   • Hypertension [I10]   • Hyperlipidemia [E78.5]   • Generalized osteoarthritis [M15.9]   • Gastroparesis [K31.84]   • Foot pain [M79.673]   • Chronic obstructive pulmonary disease (CMS/HCC) [J44.9]   • Chronic constipation [K59.09]   • Anemia [D64.9]   • Weight gain [R63.5]       Past Medical History:  Past Medical History:   Diagnosis Date   • Arthritis    • COPD (chronic obstructive pulmonary disease) (CMS/HCC)    • Diverticulosis    • GERD (gastroesophageal reflux disease)    • Hyperlipidemia    • Hypertension    • Hypothyroidism    • Knee swelling    • Lung cancer (CMS/HCC)     history   • Restless leg syndrome    • Sleep apnea with use of continuous positive airway pressure (CPAP)        Past Surgical History:  Past Surgical History:   Procedure Laterality Date   • BUNIONECTOMY Bilateral    • CATARACT EXTRACTION     • CHOLECYSTECTOMY     • COLONOSCOPY     • ENDOSCOPY N/A 6/24/2016    Procedure: ESOPHAGOGASTRODUODENOSCOPY  with biopsies;  Surgeon: Von Hernández MD;  Location: [facility];  Service:    • LUNG LOBECTOMY Left     lower lobe   • REPLACEMENT TOTAL KNEE Left    • THYROID BIOPSY          Home Meds:   Medications Prior to Admission   Medication Sig Dispense Refill Last Dose   • amitriptyline (ELAVIL) 25 MG tablet TK 1 T PO D UTD  1  Past Week at Unknown time   • baclofen (LIORESAL) 10 MG tablet Take 10 mg by mouth 3 (Three) Times a Day. Take 1 -2 tab by mouth at night as needed for muscle cramping   3/17/2019 at 2100   • CARTIA  MG 24 hr capsule TK 1 C PO QD FOR BP  5 3/18/2019 at 0900   • Cholecalciferol (VITAMIN D3) 5000 units capsule capsule Take 5,000 Units by mouth Daily.   3/18/2019 at 0900   • CloNIDine (CATAPRES) 0.1 MG tablet Take 0.1 mg by mouth 2 (Two) Times a Day.   3/18/2019 at 0900   • coenzyme Q10 100 MG capsule Take 100 mg by mouth Daily.   3/18/2019 at 0900   • dicyclomine (BENTYL) 10 MG capsule Take 10 mg by mouth daily.   3/17/2019 at 0900   • esomeprazole (NexIUM) 40 MG capsule Take 40 mg by mouth every morning before breakfast.   3/18/2019 at 0900   • fluticasone-salmeterol (ADVAIR) 250-50 MCG/DOSE DISKUS Inhale 1 puff 2 (Two) Times a Day.   Patient Taking Differently at Unknown time   • folic acid (FOLVITE) 400 MCG tablet Take 800 mcg by mouth Daily.   3/18/2019 at 0900   • Glucosamine-Chondroitin 3305-7809 MG/30ML liquid Take 2 tablets by mouth 3 (Three) Times a Day As Needed (takes as needed for arthritis pain).   3/18/2019 at 0900   • ipratropium-albuterol (DUO-NEB) 0.5-2.5 mg/mL nebulizer USE 1 VIAL IN NEBULIZER 2 TIMES DAILY. Generic: DUONEB 60 mL 10 3/17/2019 at Unknown time   • levothyroxine (SYNTHROID, LEVOTHROID) 88 MCG tablet Take 88 mcg by mouth Daily. Takes daily except Sundays (takes 6 days a week)   3/18/2019 at 0900   • linaclotide (LINZESS) 290 MCG capsule capsule Take 290 mcg by mouth Every Morning Before Breakfast.   3/18/2019 at 0900   • meclizine (ANTIVERT) 25 MG tablet TK 1 T PO TID PRN  5 3/18/2019 at Unknown time   • potassium chloride (K-DUR,KLOR-CON) 20 MEQ CR tablet TAKE 1 TABLET BY MOUTH TWICE DAILY 180 tablet 1 3/18/2019 at 0900   • pramipexole (MIRAPEX) 1.5 MG tablet Take 1.5 mg by mouth every night at bedtime.   3/17/2019 at 2100   • traMADol (ULTRAM) 50 MG tablet Take 50 mg by mouth  Every 6 (Six) Hours As Needed for Moderate Pain .   Taking   • valsartan-hydrochlorothiazide (DIOVAN-HCT) 320-25 MG per tablet Take 1 tablet by mouth daily. 90 tablet 1 3/18/2019 at 0900   • budesonide (PULMICORT) 0.5 MG/2ML nebulizer solution USE 1 VIAL IN NEBULIZER 2 TIMES DAILY. Generic: PULMICORT 60 each 10 Taking   • citalopram (CeleXA) 10 MG tablet Take 10 mg by mouth Daily.   Taking   • gabapentin (NEURONTIN) 100 MG capsule TAKE 1 CAPSULE BY MOUTH EVERY 8 HOURS 270 capsule 3 Taking   • levocetirizine (XYZAL) 5 MG tablet TAKE 1 TABLET BY MOUTH EVERY DAY 90 tablet 0 Taking   • lubiprostone (AMITIZA) 8 MCG capsule Take 8 mcg by mouth daily with breakfast.   Taking   • magnesium hydroxide (MILK OF MAGNESIA) 400 MG/5ML suspension Take  by mouth daily.   Taking   • zolpidem (AMBIEN) 10 MG tablet TAKE 1 TABLET BY MOUTH EVERY NIGHT AT BEDTIME AS NEEDED 90 tablet 0 Taking       Current Meds:   Current Facility-Administered Medications   Medication Dose Route Frequency Provider Last Rate Last Dose   • amitriptyline (ELAVIL) tablet 25 mg  25 mg Oral Nightly Arjun Sainz MD   25 mg at 03/18/19 2117   • baclofen (LIORESAL) tablet 10 mg  10 mg Oral Q8H PRN Arjun Sainz MD   10 mg at 03/19/19 0400   • budesonide (PULMICORT) nebulizer solution 0.5 mg  0.5 mg Nebulization Daily - RT Arjun Sainz MD   0.5 mg at 03/19/19 0720   • cholecalciferol (VITAMIN D3) tablet 1,000 Units  1,000 Units Oral Daily Arjun Sainz MD   1,000 Units at 03/19/19 0821   • citalopram (CeleXA) tablet 10 mg  10 mg Oral Nightly Arjun Sainz MD   10 mg at 03/18/19 2117   • diltiaZEM CD (CARDIZEM CD) 24 hr capsule 120 mg  120 mg Oral Q24H Arjun Sainz MD   120 mg at 03/19/19 0821   • folic acid (FOLVITE) tablet 800 mcg  800 mcg Oral Daily Arjun Sainz MD   800 mcg at 03/19/19 0821   • furosemide (LASIX) injection 40 mg  40 mg Intravenous Q12H Arjun Sainz MD   40 mg at 03/19/19 0821   • gabapentin (NEURONTIN) capsule 100 mg  100 mg Oral Q8H Emeli  MD Arjun   100 mg at 03/19/19 0621   • ipratropium-albuterol (DUO-NEB) nebulizer solution 1.5 mL  1.5 mL Nebulization Q6H While Awake - RT Arjun Sainz MD   1.5 mL at 03/19/19 1343   • levothyroxine (SYNTHROID, LEVOTHROID) tablet 88 mcg  88 mcg Oral Once per day on Mon Tue Wed Thu Fri Sat Arjun Sainz MD   88 mcg at 03/19/19 0621   • linaclotide (LINZESS) capsule 290 mcg  290 mcg Oral QAM AC Arjun Sainz MD   290 mcg at 03/19/19 1324   • pantoprazole (PROTONIX) EC tablet 40 mg  40 mg Oral QAM Arjun Sainz MD   40 mg at 03/19/19 0621   • potassium chloride (MICRO-K) CR capsule 20 mEq  20 mEq Oral BID With Meals Arjun Sainz MD   20 mEq at 03/19/19 0821   • pramipexole (MIRAPEX) tablet 1.5 mg  1.5 mg Oral Nightly Arjun Sainz MD       • zolpidem (AMBIEN) tablet 5 mg  5 mg Oral Nightly PRN Arjun Sainz MD           Allergies:  Allergies   Allergen Reactions   • Doxycycline Anaphylaxis     Facial swelling, shortness of air, dizzines   • Codeine Other (See Comments)     Pt does not remember reaction   • Morphine Other (See Comments)     shaky       Social History:   Social History     Socioeconomic History   • Marital status:      Spouse name: Not on file   • Number of children: Not on file   • Years of education: Not on file   • Highest education level: Not on file   Tobacco Use   • Smoking status: Never Smoker   • Smokeless tobacco: Never Used   Substance and Sexual Activity   • Alcohol use: No   • Drug use: No   • Sexual activity: Defer        Family History:  Family History   Problem Relation Age of Onset   • Heart attack Mother    • Coronary artery disease Mother    • Hypertension Mother    • Heart attack Sister         Review of Systems: as per HPI, in addition:    General:      Complains of mild weakness / fatigue,                       No fevers / chills                       no weight loss  HEENT:       no dysphagia / odynophagia  Neck:           normal range of motion, no swelling  Respiratory:  "no cough / congestion                      No shortness of air                       No wheezing  CV:              No chest pain                       No palpitations  Abdomen/GI: no nausea / vomiting                      No diarrhea / constipation                      No abdominal pain  :             no dysuria / urinary frequency                       No urgency, normal output  Endocrine:   no polyuria / polydipsia,                      No heat or cold intolerance  Skin:           no rashes or skin breakdown   Vascular: Complains of edema                     No claudication  Psych:        no depression/ anxiety  Neuro:        no focal weakness, no seizures  Musculoskeletal: no joint pain or deformities      Physical Exam:  Vitals:   Temp (24hrs), Av.3 °F (36.3 °C), Min:97.2 °F (36.2 °C), Max:97.4 °F (36.3 °C)    /61 (BP Location: Right arm, Patient Position: Sitting)   Pulse 78   Temp 97.2 °F (36.2 °C) (Oral)   Resp 16   Ht 167.6 cm (66\")   Wt 99.8 kg (220 lb)   SpO2 93%   BMI 35.51 kg/m²   Intake/Output:     Intake/Output Summary (Last 24 hours) at 3/19/2019 1510  Last data filed at 3/19/2019 1450  Gross per 24 hour   Intake 600 ml   Output 2550 ml   Net -1950 ml        Wt Readings from Last 1 Encounters:   19 1138 99.8 kg (220 lb)   19 1436 99.8 kg (220 lb)       Exam:    General Appearance:  Awake, alert, oriented x3, no acute distress  Well-appearing   Head and Face:  Normocephalic, atraumatic, mucus membranes moist, oropharynx clear   Eyes:  No icterus, pupils equal round and reactive to light, extraocular movements intact    ENMT: Moist mucosa, tongue symmetric    Neck: Supple  no jugular venous distention  no thyromegaly   Pulmonary:  Respiratory effort: Normal  Auscultation of lungs: Clear bilaterally  No wheezes  No rhonchi  Good air movement, good expansion   Chest wall:  No tenderness or deformity   Cardiovascular:  Auscultation of the heart: Normal rhythm, no murmurs  2+ " bilateral edema of extremities    Abdomen:  Abdomen: soft, non-tender, normal bowel sounds all four quadrants, no masses   Liver and spleen: no hepatosplenomegaly   Musculoskeletal: Digits and nails: normal  Normal range of motion  No joint swelling or gross deformities    Skin: Skin inspection: color normal, no visible rashes or lesions  Skin palpation: texture, turgor normal, no palpable lesions   Lymphatic:  no cervical lymphadenopathy    Psychiatric: Judgement and insight: normal  Orientation to person place and time: normal  Mood and affect: normal       DATA:  Radiology and Labs:  The following labs independently reviewed by me  Old records independently reviewed showing previously normal kidney function  The following radiologic studies independently viewed by me, findings chest x-ray shows no acute disease, lower extremity ultrasound as described above  Interval notes, chart personally reviewed by me.   New problems include edema  Discussed with patient, Dr. Sainz, Dr. Blackman  Platelet count normal studies    Risk/ complexity of medical care/ medical decision making  Complexity          Labs:   Recent Results (from the past 24 hour(s))   Urinalysis With Culture If Indicated - Urine, Clean Catch    Collection Time: 03/18/19 11:47 PM   Result Value Ref Range    Color, UA Yellow Yellow, Straw    Appearance, UA Clear Clear    pH, UA 7.5 5.0 - 8.0    Specific Gravity, UA 1.011 1.005 - 1.030    Glucose, UA Negative Negative    Ketones, UA Negative Negative    Bilirubin, UA Negative Negative    Blood, UA Negative Negative    Protein, UA Negative Negative    Leuk Esterase, UA Trace (A) Negative    Nitrite, UA Negative Negative    Urobilinogen, UA 0.2 E.U./dL 0.2 - 1.0 E.U./dL   Urinalysis, Microscopic Only - Urine, Clean Catch    Collection Time: 03/18/19 11:47 PM   Result Value Ref Range    RBC, UA 0-2 None Seen, 0-2 /HPF    WBC, UA 0-2 None Seen, 0-2 /HPF    Bacteria, UA None Seen None Seen /HPF    Squamous  Epithelial Cells, UA 0-2 None Seen, 0-2 /HPF    Hyaline Casts, UA 0-2 None Seen /LPF    Methodology Automated Microscopy    Comprehensive Metabolic Panel    Collection Time: 03/19/19  7:01 AM   Result Value Ref Range    Glucose 100 (H) 65 - 99 mg/dL    BUN 24 (H) 8 - 23 mg/dL    Creatinine 1.24 (H) 0.57 - 1.00 mg/dL    Sodium 139 136 - 145 mmol/L    Potassium 4.3 3.5 - 5.2 mmol/L    Chloride 98 98 - 107 mmol/L    CO2 28.6 22.0 - 29.0 mmol/L    Calcium 9.8 8.6 - 10.5 mg/dL    Total Protein 6.4 6.0 - 8.5 g/dL    Albumin 4.00 3.50 - 5.20 g/dL    ALT (SGPT) 14 1 - 33 U/L    AST (SGOT) 17 1 - 32 U/L    Alkaline Phosphatase 86 39 - 117 U/L    Total Bilirubin 0.4 0.2 - 1.2 mg/dL    eGFR Non African Amer 42 (L) >60 mL/min/1.73    Globulin 2.4 gm/dL    A/G Ratio 1.7 g/dL    BUN/Creatinine Ratio 19.4 7.0 - 25.0    Anion Gap 12.4 mmol/L   BNP    Collection Time: 03/19/19  7:01 AM   Result Value Ref Range    proBNP 154.2 5.0-1,800.0 pg/mL   TSH    Collection Time: 03/19/19  7:01 AM   Result Value Ref Range    TSH 1.670 0.270 - 4.200 mIU/mL   Lipid Panel    Collection Time: 03/19/19  7:01 AM   Result Value Ref Range    Total Cholesterol 167 0 - 200 mg/dL    Triglycerides 59 0 - 150 mg/dL    HDL Cholesterol 81 (H) 40 - 60 mg/dL    LDL Cholesterol  74 0 - 100 mg/dL    VLDL Cholesterol 11.8 5 - 40 mg/dL    LDL/HDL Ratio 0.92    Hemoglobin A1c    Collection Time: 03/19/19  7:01 AM   Result Value Ref Range    Hemoglobin A1C 5.90 (H) 4.80 - 5.60 %   CBC Auto Differential    Collection Time: 03/19/19  7:01 AM   Result Value Ref Range    WBC 5.52 3.40 - 10.80 10*3/mm3    RBC 3.83 3.77 - 5.28 10*6/mm3    Hemoglobin 9.9 (L) 12.0 - 15.9 g/dL    Hematocrit 32.3 (L) 34.0 - 46.6 %    MCV 84.3 79.0 - 97.0 fL    MCH 25.8 (L) 26.6 - 33.0 pg    MCHC 30.7 (L) 31.5 - 35.7 g/dL    RDW 15.9 (H) 12.3 - 15.4 %    RDW-SD 48.6 37.0 - 54.0 fl    MPV 9.8 6.0 - 12.0 fL    Platelets 233 140 - 450 10*3/mm3    Neutrophil % 55.3 42.7 - 76.0 %    Lymphocyte %  29.9 19.6 - 45.3 %    Monocyte % 10.9 5.0 - 12.0 %    Eosinophil % 3.1 0.3 - 6.2 %    Basophil % 0.4 0.0 - 1.5 %    Immature Grans % 0.4 0.0 - 0.5 %    Neutrophils, Absolute 3.06 1.40 - 7.00 10*3/mm3    Lymphocytes, Absolute 1.65 0.70 - 3.10 10*3/mm3    Monocytes, Absolute 0.60 0.10 - 0.90 10*3/mm3    Eosinophils, Absolute 0.17 0.00 - 0.40 10*3/mm3    Basophils, Absolute 0.02 0.00 - 0.20 10*3/mm3    Immature Grans, Absolute 0.02 0.00 - 0.05 10*3/mm3    nRBC 0.0 0.0 - 0.0 /100 WBC   CK    Collection Time: 03/19/19  7:01 AM   Result Value Ref Range    Creatine Kinase 113 20 - 180 U/L   Duplex Venous Lower Extremity - Bilateral CAR    Collection Time: 03/19/19  9:36 AM   Result Value Ref Range    Right Lesser Saph Vessel 1     Left Saphenofemoral Junction Spont 1     Left Lesser Saph Vessel 1     Right Common Femoral Spont Y     Right Common Femoral Phasic Y     Right Common Femoral Augment Y     Right Common Femoral Competent Y     Right Common Femoral Compress C     Right Saphenofemoral Junction Spont Y     Right Saphenofemoral Junction Phasic Y     Right Saphenofemoral Junction Augment Y     Right Saphenofemoral Junction Competent Y     Right Saphenofemoral Junction Compress C     Right Proximal Femoral Compress C     Right Mid Femoral Spont Y     Right Mid Femoral Phasic Y     Right Mid Femoral Augment Y     Right Mid Femoral Competent Y     Right Mid Femoral Compress C     Right Distal Femoral Compress C     Right Popliteal Spont Y     Right Popliteal Phasic Y     Right Popliteal Augment Y     Right Popliteal Competent Y     Right Popliteal Compress C     Right Posterior Tibial Compress C     Right Peroneal Compress C     Right GastronemiusSoleal Compress C     Right Greater Saph AK Compress C     Right Greater Saph BK Compress C     Right Lesser Saph Compress P     Right Lesser Saph Thrombus C     Left Common Femoral Spont Y     Left Common Femoral Phasic Y     Left Common Femoral Augment Y     Left Common  Femoral Competent Y     Left Common Femoral Compress C     Left Saphenofemoral Junction Spont Y     Left Saphenofemoral Junction Phasic Y     Left Saphenofemoral Junction Augment Y     Left Saphenofemoral Junction Competent N     Left Saphenofemoral Junction Compress C     Left Proximal Femoral Compress C     Left Mid Femoral Spont Y     Left Mid Femoral Phasic Y     Left Mid Femoral Augment Y     Left Mid Femoral Competent Y     Left Mid Femoral Compress C     Left Distal Femoral Compress C     Left Popliteal Spont Y     Left Popliteal Phasic Y     Left Popliteal Augment Y     Left Popliteal Competent Y     Left Popliteal Compress C     Left Posterior Tibial Compress C     Left Peroneal Compress C     Left GastronemiusSoleal Compress C     Left Greater Saph AK Compress C     Left Greater Saph BK Compress C     Left Lesser Saph Compress P     Left Lesser Saph Thrombus C    Chloride, Urine, Random -    Collection Time: 03/19/19  9:41 AM   Result Value Ref Range    Chloride, Urine 104 mmol/L   Creatinine, Urine, Random -    Collection Time: 03/19/19  9:41 AM   Result Value Ref Range    Creatinine, Urine 63.3 mg/dL   Eosinophil Smear - Urine, Urine, Clean Catch    Collection Time: 03/19/19  9:41 AM   Result Value Ref Range    Eosinophil Smear 11 (H) 0 - 0 % EOS/100 Cells   Urinalysis With Microscopic If Indicated (No Culture) -    Collection Time: 03/19/19  9:41 AM   Result Value Ref Range    Color, UA Yellow Yellow, Straw    Appearance, UA Clear Clear    pH, UA 7.5 5.0 - 8.0    Specific Gravity, UA 1.013 1.005 - 1.030    Glucose, UA Negative Negative    Ketones, UA Negative Negative    Bilirubin, UA Negative Negative    Blood, UA Negative Negative    Protein, UA Negative Negative    Leuk Esterase, UA Small (1+) (A) Negative    Nitrite, UA Negative Negative    Urobilinogen, UA 0.2 E.U./dL 0.2 - 1.0 E.U./dL   Urea Nitrogen, Urine -    Collection Time: 03/19/19  9:41 AM   Result Value Ref Range    Urea Nitrogen, Urine  335 mg/dL   Sodium, Urine, Random -    Collection Time: 03/19/19  9:41 AM   Result Value Ref Range    Sodium, Urine 103 mmol/L   Protein, Urine, Random -    Collection Time: 03/19/19  9:41 AM   Result Value Ref Range    Total Protein, Urine 5.0 mg/dL   Urinalysis, Microscopic Only - Urine, Clean Catch    Collection Time: 03/19/19  9:41 AM   Result Value Ref Range    RBC, UA 0-2 None Seen, 0-2 /HPF    WBC, UA 3-5 (A) None Seen, 0-2 /HPF    Bacteria, UA None Seen None Seen /HPF    Squamous Epithelial Cells, UA 0-2 None Seen, 0-2 /HPF    Hyaline Casts, UA 0-2 None Seen /LPF    Methodology Automated Microscopy    Adult Transthoracic Echo Complete W/ Cont if Necessary Per Protocol    Collection Time: 03/19/19 12:29 PM   Result Value Ref Range    BSA 2.1 m^2    IVSd 1.0 cm    LVIDd 4.6 cm    LVIDs 3.3 cm    LVPWd 1.0 cm    IVS/LVPW 1.0     FS 28.3 %    EDV(Teich) 97.3 ml    ESV(Teich) 44.1 ml    EF(Teich) 54.7 %    EDV(cubed) 97.3 ml    ESV(cubed) 35.9 ml    EF(cubed) 63.1 %    LV mass(C)d 158.8 grams    LV mass(C)dI 76.2 grams/m^2    SV(Teich) 53.2 ml    SI(Teich) 25.5 ml/m^2    SV(cubed) 61.4 ml    SI(cubed) 29.5 ml/m^2    Ao root diam 3.2 cm    Ao root area 8.0 cm^2    ACS 2.0 cm    LA dimension 3.4 cm    LA/Ao 1.1     LVOT diam 1.7 cm    LVOT area 2.3 cm^2    LVOT area(traced) 2.3 cm^2    RVOT diam 2.2 cm    RVOT area 3.8 cm^2    LVLd ap4 8.6 cm    EDV(MOD-sp4) 107.0 ml    LVLs ap4 6.7 cm    ESV(MOD-sp4) 40.0 ml    EF(MOD-sp4) 62.6 %    LVLd ap2 8.2 cm    EDV(MOD-sp2) 100.0 ml    LVLs ap2 6.7 cm    ESV(MOD-sp2) 38.0 ml    EF(MOD-sp2) 62.0 %    SV(MOD-sp4) 67.0 ml    SI(MOD-sp4) 32.2 ml/m^2    SV(MOD-sp2) 62.0 ml    SI(MOD-sp2) 29.8 ml/m^2    Ao root area (BSA corrected) 1.5     LV Crow Vol (BSA corrected) 51.4 ml/m^2    LV Sys Vol (BSA corrected) 19.2 ml/m^2    MV A dur 0.16 sec    MV E max kurt 92.3 cm/sec    MV A max kurt 120.0 cm/sec    MV E/A 0.77     MV V2 mean 75.2 cm/sec    MV mean PG 3.0 mmHg    MV V2 VTI 26.9  cm    MVA(VTI) 2.2 cm^2    MV P1/2t max dale 97.9 cm/sec    MV P1/2t 46.2 msec    MVA(P1/2t) 4.8 cm^2    MV dec slope 621.0 cm/sec^2    MV dec time 0.19 sec    Ao V2 mean 96.9 cm/sec    Ao mean PG 5.0 mmHg    Ao mean PG (full) 2.0 mmHg    Ao V2 VTI 36.7 cm    DIOR(I,A) 1.6 cm^2    DIOR(I,D) 1.6 cm^2    LV V1 mean PG 3.0 mmHg    LV V1 mean 77.4 cm/sec    LV V1 VTI 26.3 cm    MR max dale 484.0 cm/sec    MR max PG 93.7 mmHg    SV(Ao) 295.2 ml    SI(Ao) 141.7 ml/m^2    SV(LVOT) 59.7 ml    SV(RVOT) 55.9 ml    SI(LVOT) 28.7 ml/m^2    PA V2 max 130.0 cm/sec    PA max PG 6.8 mmHg    PA max PG (full) 4.7 mmHg     CV ECHO SHELLIE - PVA(V,A) 2.1 cm^2     CV ECHO SHELLIE - PVA(V,D) 2.1 cm^2    PA acc slope 1,480 cm/sec^2    PA acc time 0.09 sec    RV V1 max PG 2.0 mmHg    RV V1 mean PG 1.0 mmHg    RV V1 max 71.4 cm/sec    RV V1 mean 45.8 cm/sec    RV V1 VTI 14.7 cm    TR max dale 292.0 cm/sec    RVSP(TR) 37.1 mmHg    RAP systole 3.0 mmHg    PA pr(Accel) 40.8 mmHg    Pulm Sys Dale 85.4 cm/sec    Pulm Crow [place] 50.3 cm/sec    Pulm S/D 1.7     Qp/Qs 0.94     Pulm A Revs Dur 0.19 sec    Pulm A Revs Dale 40.5 cm/sec    MVA P1/2T LCG 2.2 cm^2     CV ECHO SHELLIE - BZI_BMI 35.5 kilograms/m^2     CV ECHO SHELLIE - BSA([facility]) 2.2 m^2     CV ECHO SHELLIE - BZI_METRIC_WEIGHT 99.8 kg     CV ECHO SHELLIE - BZI_METRIC_HEIGHT 167.6 cm    Target HR (85%) 120 bpm    Max. Pred. HR (100%) 141 bpm    BH CV VAS BP RIGHT /66 mmHg    TDI S' 14.00 cm/sec    RV Base 3.00 cm    LA volume 61.0 cm3    Dimensionless Index 0.7 (DI)    LA Volume Index 33.0 mL/m2    Avg E/e' ratio 8.03     Ao pk dale 161.0 cm/sec    EF(MOD-bp) 63.0 %    Lat Peak E' Dale 13.0 cm/sec    LV V1 max 120.0 cm/sec    Med Peak E' Dale 10.00 cm/sec    Ao max PG 10.0 mmHg    TAPSE (>1.6) 2.80 cm2       Radiology:  Imaging Results (last 24 hours)     Procedure Component Value Units Date/Time    XR Chest 1 View [[id number]] Collected:  03/18/19 2003     Updated:  03/18/19 2008    Narrative:        PORTAL CHEST 3/18/2019 AT 7:16 PM     CLINICAL HISTORY: Dyspnea.     Compared to the previous chest x-ray dated 3/8/2019.     The lungs are somewhat poorly inflated but appear free of infiltrates.  There are no pleural effusions. The heart is normal in size. Multiple  surgical clips are noted in the mediastinum.     IMPRESSIONS: No evidence of acute disease within the chest.     This report was finalized on 3/18/2019 8:04 PM by Dr. Tima Lowery M.D.                  ASSESSMENT:   Chronic kidney disease, stage II, creatinine fairly stable, near recent baseline  Worsening edema, no significant proteinuria, TSH normal.  Await echocardiogram, Doppler studies negative for DVT  Hypertension  Hypothyroidism, TSH at goal  Hyperlipidemia  COPD  Restless leg syndrome with neuropathy      PLAN:   Continue IV diuretics, monitor daily weights and strict I's and O's  TSH within normal limits  Lower extremity Doppler no DVT, await echocardiogram  Resume home antihypertensive regimen  Continue to monitor renal function closely with diuretics  Will also give an additional dose of metolazone x1 dose  No significant proteinuria that is contributing to her edema, albumin is normal    Continue to monitor electrolytes and volume closely, avoid IV contrast and nephrotoxic medications     I appreciate the opportunity to participate in this patient's care.  Please call with any questions or concerns.       Truong Hernandez M.D  [facility]  Office phone number: [phone]  Answering service phone number: [phone]        3/19/2019        Dictation via Dragon dictation software

## 2019-03-19 NOTE — PROGRESS NOTES
Bilateral lower venous doppler complete and preliminary is negative for dvt and positive for old superficial thrombo phlebitis anderson Chaudhari

## 2019-03-20 LAB
ALBUMIN SERPL-MCNC: 4.1 G/DL (ref 3.5–5.2)
ANION GAP SERPL CALCULATED.3IONS-SCNC: 13.9 MMOL/L
BUN BLD-MCNC: 25 MG/DL (ref 8–23)
BUN/CREAT SERPL: 18.7 (ref 7–25)
CALCIUM SPEC-SCNC: 9.4 MG/DL (ref 8.6–10.5)
CHLORIDE SERPL-SCNC: 96 MMOL/L (ref 98–107)
CO2 SERPL-SCNC: 30.1 MMOL/L (ref 22–29)
CREAT BLD-MCNC: 1.34 MG/DL (ref 0.57–1)
GFR SERPL CREATININE-BSD FRML MDRD: 38 ML/MIN/1.73
GLUCOSE BLD-MCNC: 125 MG/DL (ref 65–99)
PHOSPHATE SERPL-MCNC: 3.8 MG/DL (ref 2.5–4.5)
POTASSIUM BLD-SCNC: 3.5 MMOL/L (ref 3.5–5.2)
SODIUM BLD-SCNC: 140 MMOL/L (ref 136–145)
URATE SERPL-MCNC: 8.9 MG/DL (ref 2.4–5.7)

## 2019-03-20 PROCEDURE — 97535 SELF CARE MNGMENT TRAINING: CPT

## 2019-03-20 PROCEDURE — 94799 UNLISTED PULMONARY SVC/PX: CPT

## 2019-03-20 PROCEDURE — 97110 THERAPEUTIC EXERCISES: CPT

## 2019-03-20 PROCEDURE — 84550 ASSAY OF BLOOD/URIC ACID: CPT | Performed by: INTERNAL MEDICINE

## 2019-03-20 PROCEDURE — 97165 OT EVAL LOW COMPLEX 30 MIN: CPT

## 2019-03-20 PROCEDURE — 25010000002 FUROSEMIDE PER 20 MG: Performed by: HOSPITALIST

## 2019-03-20 PROCEDURE — 80069 RENAL FUNCTION PANEL: CPT | Performed by: INTERNAL MEDICINE

## 2019-03-20 PROCEDURE — 25010000002 FUROSEMIDE PER 20 MG: Performed by: INTERNAL MEDICINE

## 2019-03-20 PROCEDURE — 97162 PT EVAL MOD COMPLEX 30 MIN: CPT

## 2019-03-20 RX ORDER — POTASSIUM CHLORIDE 750 MG/1
40 CAPSULE, EXTENDED RELEASE ORAL 2 TIMES DAILY WITH MEALS
Status: DISCONTINUED | OUTPATIENT
Start: 2019-03-20 | End: 2019-03-20

## 2019-03-20 RX ORDER — POTASSIUM CHLORIDE 750 MG/1
40 CAPSULE, EXTENDED RELEASE ORAL
Status: DISCONTINUED | OUTPATIENT
Start: 2019-03-20 | End: 2019-03-21

## 2019-03-20 RX ORDER — METOLAZONE 5 MG/1
5 TABLET ORAL ONCE
Status: COMPLETED | OUTPATIENT
Start: 2019-03-20 | End: 2019-03-20

## 2019-03-20 RX ORDER — FUROSEMIDE 40 MG/1
40 TABLET ORAL
Status: DISCONTINUED | OUTPATIENT
Start: 2019-03-21 | End: 2019-03-21 | Stop reason: HOSPADM

## 2019-03-20 RX ADMIN — MAGNESIUM HYDROXIDE 10 ML: 2400 SUSPENSION ORAL at 20:13

## 2019-03-20 RX ADMIN — DILTIAZEM HYDROCHLORIDE 120 MG: 120 CAPSULE, COATED, EXTENDED RELEASE ORAL at 08:56

## 2019-03-20 RX ADMIN — CITALOPRAM 10 MG: 10 TABLET, FILM COATED ORAL at 20:07

## 2019-03-20 RX ADMIN — POTASSIUM CHLORIDE 40 MEQ: 750 CAPSULE, EXTENDED RELEASE ORAL at 09:00

## 2019-03-20 RX ADMIN — PANTOPRAZOLE SODIUM 40 MG: 40 TABLET, DELAYED RELEASE ORAL at 06:49

## 2019-03-20 RX ADMIN — IPRATROPIUM BROMIDE AND ALBUTEROL SULFATE 1.5 ML: 2.5; .5 SOLUTION RESPIRATORY (INHALATION) at 09:36

## 2019-03-20 RX ADMIN — LEVOTHYROXINE SODIUM 88 MCG: 88 TABLET ORAL at 05:46

## 2019-03-20 RX ADMIN — FUROSEMIDE 40 MG: 10 INJECTION, SOLUTION INTRAMUSCULAR; INTRAVENOUS at 08:56

## 2019-03-20 RX ADMIN — Medication 800 MCG: at 08:56

## 2019-03-20 RX ADMIN — PRAMIPEXOLE DIHYDROCHLORIDE 1.5 MG: 1.5 TABLET ORAL at 20:07

## 2019-03-20 RX ADMIN — GABAPENTIN 100 MG: 100 CAPSULE ORAL at 14:30

## 2019-03-20 RX ADMIN — AMITRIPTYLINE HYDROCHLORIDE 25 MG: 25 TABLET, FILM COATED ORAL at 20:07

## 2019-03-20 RX ADMIN — GABAPENTIN 100 MG: 100 CAPSULE ORAL at 06:49

## 2019-03-20 RX ADMIN — GABAPENTIN 100 MG: 100 CAPSULE ORAL at 20:07

## 2019-03-20 RX ADMIN — BACLOFEN 10 MG: 10 TABLET ORAL at 20:13

## 2019-03-20 RX ADMIN — POTASSIUM CHLORIDE 40 MEQ: 750 CAPSULE, EXTENDED RELEASE ORAL at 18:21

## 2019-03-20 RX ADMIN — FUROSEMIDE 40 MG: 10 INJECTION, SOLUTION INTRAMUSCULAR; INTRAVENOUS at 20:06

## 2019-03-20 RX ADMIN — METOLAZONE 5 MG: 5 TABLET ORAL at 18:21

## 2019-03-20 RX ADMIN — BUDESONIDE 0.5 MG: 0.5 INHALANT RESPIRATORY (INHALATION) at 09:36

## 2019-03-20 RX ADMIN — VITAMIN D, TAB 1000IU (100/BT) 1000 UNITS: 25 TAB at 08:56

## 2019-03-20 NOTE — PROGRESS NOTES
"Daily progress note    Chief complaint  Doing better  No new complaints    History of present illness  78-year-old white female with history of hypertension hyperlipidemia hypothyroidism COPD morbidly obese directly admitted to our service from nephrology office where she presented with several month history of leg swelling weight gain and shortness of breath.  Patient found to be fluid overloaded admit for further management.  Patient denies any chest pain fever chills abdominal pain nausea vomiting diarrhea.    Review of system  As above    Physical examination  Blood pressure 131/69, pulse 87, temperature 97 °F (36.1 °C), temperature source Oral, resp. rate 16, height 167.6 cm (66\"), weight 101 kg (221 lb 12.5 oz), SpO2 92 %.    HEENT unremarkable  Neck supple  Lungs decreased breath sounds with crackles at the bases  Heart S1-S2 no murmur gallop rub  Abdomen soft nontender bowel sounds positive  Extremities 4+ edema  Neuro moves all 4 extremities with normal strength  Psych normal mood and behavior    Labs  Lab Results (last 24 hours)     Procedure Component Value Units Date/Time    Renal Function Panel [[id number]]  (Abnormal) Collected:  03/20/19 0439    Specimen:  Blood Updated:  03/20/19 0556     Glucose 125 mg/dL      BUN 25 mg/dL      Creatinine 1.34 mg/dL      Sodium 140 mmol/L      Potassium 3.5 mmol/L      Chloride 96 mmol/L      CO2 30.1 mmol/L      Calcium 9.4 mg/dL      Albumin 4.10 g/dL      Phosphorus 3.8 mg/dL      Anion Gap 13.9 mmol/L      BUN/Creatinine Ratio 18.7     eGFR Non African Amer 38 mL/min/1.73     Narrative:       GFR Normal >60  Chronic Kidney Disease <60  Kidney Failure <15    Uric Acid [[id number]]  (Abnormal) Collected:  03/20/19 0439    Specimen:  Blood Updated:  03/20/19 0556     Uric Acid 8.9 mg/dL         Lab Results (last 24 hours)     Procedure Component Value Units Date/Time    Renal Function Panel [[id number]]  (Abnormal) Collected:  03/20/19 0439    Specimen:  Blood " Updated:  03/20/19 0556     Glucose 125 mg/dL      BUN 25 mg/dL      Creatinine 1.34 mg/dL      Sodium 140 mmol/L      Potassium 3.5 mmol/L      Chloride 96 mmol/L      CO2 30.1 mmol/L      Calcium 9.4 mg/dL      Albumin 4.10 g/dL      Phosphorus 3.8 mg/dL      Anion Gap 13.9 mmol/L      BUN/Creatinine Ratio 18.7     eGFR Non African Amer 38 mL/min/1.73     Narrative:       GFR Normal >60  Chronic Kidney Disease <60  Kidney Failure <15    Uric Acid [[id number]]  (Abnormal) Collected:  03/20/19 0439    Specimen:  Blood Updated:  03/20/19 0556     Uric Acid 8.9 mg/dL           Current Facility-Administered Medications:   •  amitriptyline (ELAVIL) tablet 25 mg, 25 mg, Oral, Nightly, Arjun Sainz MD, 25 mg at 03/19/19 2013  •  baclofen (LIORESAL) tablet 10 mg, 10 mg, Oral, Q8H PRN, Arjun Sainz MD, 10 mg at 03/19/19 0400  •  budesonide (PULMICORT) nebulizer solution 0.5 mg, 0.5 mg, Nebulization, Daily - RT, Arjun Sainz MD, 0.5 mg at 03/20/19 0936  •  cholecalciferol (VITAMIN D3) tablet 1,000 Units, 1,000 Units, Oral, Daily, Arjun Sainz MD, 1,000 Units at 03/20/19 0856  •  citalopram (CeleXA) tablet 10 mg, 10 mg, Oral, Nightly, Arjun Sainz MD, 10 mg at 03/19/19 2013  •  diltiaZEM CD (CARDIZEM CD) 24 hr capsule 120 mg, 120 mg, Oral, Q24H, Arjun Sainz MD, 120 mg at 03/20/19 0856  •  folic acid (FOLVITE) tablet 800 mcg, 800 mcg, Oral, Daily, Arjun Sainz MD, 800 mcg at 03/20/19 0856  •  furosemide (LASIX) injection 40 mg, 40 mg, Intravenous, Q12H, Arjun Sainz MD, 40 mg at 03/20/19 0856  •  gabapentin (NEURONTIN) capsule 100 mg, 100 mg, Oral, Q8H, Arjun Sainz MD, 100 mg at 03/20/19 1430  •  ipratropium-albuterol (DUO-NEB) nebulizer solution 1.5 mL, 1.5 mL, Nebulization, Q4H PRN, Arjun Sainz MD, 1.5 mL at 03/20/19 0936  •  levothyroxine (SYNTHROID, LEVOTHROID) tablet 88 mcg, 88 mcg, Oral, Once per day on Mon Tue Wed Thu Fri Sat, Arjun Sainz MD, 88 mcg at 03/20/19 0546  •  linaclotide (LINZESS) capsule 290  mcg, 290 mcg, Oral, QAM AC, Shelly Sainz MD, 290 mcg at 03/20/19 0900  •  magnesium hydroxide (MILK OF MAGNESIA) suspension 2400 mg/10mL 10 mL, 10 mL, Oral, Nightly PRN, Shelly Sainz MD, 10 mL at 03/19/19 2153  •  pantoprazole (PROTONIX) EC tablet 40 mg, 40 mg, Oral, RAMSEY, Shelly Sainz MD, 40 mg at 03/20/19 0649  •  potassium chloride (MICRO-K) CR capsule 40 mEq, 40 mEq, Oral, BID With Meals, Truong Hernandez MD, 40 mEq at 03/20/19 0900  •  pramipexole (MIRAPEX) tablet 1.5 mg, 1.5 mg, Oral, Nightly, Shelly Sainz MD, 1.5 mg at 03/19/19 2013  •  zolpidem (AMBIEN) tablet 5 mg, 5 mg, Oral, Nightly PRN, Shelly Sainz MD     ASSESSMENT  Fluid overload  Chronic kidney disease stage II  Hypertension  Hyperlipidemia  Hypothyroidism  COPD  Neuropathy  Restless leg syndrome  Gastroesophageal reflux disease    PLAN  CPM  IV diuresis   Continue home medications  Nephrology to follow patient  Stress ulcer DVT prophylaxis  Supportive care  PT/OT  Follow closely further recommendation according to hospital course    SHELLY SAINZ MD

## 2019-03-20 NOTE — THERAPY EVALUATION
Acute Care - Physical Therapy Initial Evaluation  [facility]     Patient Name: Latanya Olsen  : 1940  MRN: [id number]  Today's Date: 3/20/2019   Onset of Illness/Injury or Date of Surgery: (P) 19  Date of Referral to PT: (P) 19  Referring Physician: (VIKTORIA) Dr. Sainz      Admit Date: 3/18/2019    Visit Dx:     ICD-10-CM ICD-9-CM   1. Impaired functional mobility and activity tolerance Z74.09 V49.89     Patient Active Problem List   Diagnosis   • Carcinoid tumor of lung   • Diverticulosis of intestine   • Obstructive sleep apnea syndrome   • Weight loss   • Vitamin D deficiency   • Overweight (BMI 25.0-29.9)   • Spinal stenosis of lumbar region without neurogenic claudication   • Osteoarthritis of knee   • Obesity (BMI 30-39.9)   • Neutropenia (CMS/HCC)   • Chronic lower back pain   • Knee pain   • Insomnia   • Hypothyroidism   • Hypokalemia   • Hypertension   • Hyperlipidemia   • Generalized osteoarthritis   • Gastroparesis   • Foot pain   • Chronic obstructive pulmonary disease (CMS/HCC)   • Chronic constipation   • Anemia   • Weight gain   • Pain of left breast   • Elevated serum alkaline phosphatase level   • Neck pain   • Volume overload     Past Medical History:   Diagnosis Date   • Arthritis    • COPD (chronic obstructive pulmonary disease) (CMS/HCC)    • Diverticulosis    • GERD (gastroesophageal reflux disease)    • Hyperlipidemia    • Hypertension    • Hypothyroidism    • Knee swelling    • Lung cancer (CMS/HCC)     history   • Restless leg syndrome    • Sleep apnea with use of continuous positive airway pressure (CPAP)      Past Surgical History:   Procedure Laterality Date   • BUNIONECTOMY Bilateral    • CATARACT EXTRACTION     • CHOLECYSTECTOMY     • COLONOSCOPY     • ENDOSCOPY N/A 2016    Procedure: ESOPHAGOGASTRODUODENOSCOPY  with biopsies;  Surgeon: Von Hernández MD;  Location: [facility];  Service:    • LUNG LOBECTOMY Left     lower lobe   • REPLACEMENT TOTAL  KNEE Left    • THYROID BIOPSY          PT ASSESSMENT (last 12 hours)      Physical Therapy Evaluation     Row Name 03/20/19 1346          PT Evaluation Time/Intention    Subjective Information  complains of;pain  (Pended)   -EM     Document Type  evaluation  (Pended)   -EM     Mode of Treatment  individual therapy;physical therapy  (Pended)   -EM     Patient Effort  good  (Pended)   -EM     Row Name 03/20/19 1346          General Information    Patient Profile Reviewed?  yes  (Pended)   -EM     Onset of Illness/Injury or Date of Surgery  03/18/19  (Pended)   -EM     Referring Physician  Dr. Sainz  (Pended)   -EM     Patient Observations  alert;cooperative;agree to therapy  (Pended)   -EM     Patient/Family Observations  Pt was sitting in chair with no acute distress  (Pended)   -EM     Prior Level of Function  independent:  (Pended)  with a front wheeled walker or cane  -EM     Equipment Currently Used at Home  cane, straight;walker, standard  (Pended)   -EM     Pertinent History of Current Functional Problem  Pt admitted due to volume overload in BLE's. Pt with a history of left lower lung removal due to a tumor, hypertension, hyperlipidemia, hypothyroidism, and COPD  (Pended)   -EM     Existing Precautions/Restrictions  fall  (Pended)   -EM     Row Name 03/20/19 1346          Relationship/Environment    Lives With  child(jose roberto), adult  (Pended)   -EM     Name(s) of Who Lives With Patient  son  (Pended)   -EM     Row Name 03/20/19 1346          Cognitive Assessment/Intervention- PT/OT    Orientation Status (Cognition)  oriented x 4  (Pended)   -EM     Follows Commands (Cognition)  WNL;over 90% accuracy  (Pended)   -EM     Personal Safety Interventions  gait belt;fall prevention program maintained  (Pended)   -EM     Row Name 03/20/19 1346          Bed Mobility Assessment/Treatment    Comment (Bed Mobility)  Pt in chair  (Pended)   -EM     Row Name 03/20/19 1346          Transfer Assessment/Treatment    Transfer  Assessment/Treatment  sit-stand transfer;stand-sit transfer;toilet transfer  (Pended)   -EM     Sit-Stand [place] (Transfers)  contact guard  (Pended)   -EM     Stand-Sit [place] (Transfers)  contact guard  (Pended)   -EM     [place] Level (Toilet Transfer)  contact guard  (Pended)   -EM     Assistive Device (Toilet Transfer)  walker, front-wheeled  (Pended)   -EM     Row Name 03/20/19 1346          Sit-Stand Transfer    Assistive Device (Sit-Stand Transfers)  walker, front-wheeled  (Pended)   -EM     Row Name 03/20/19 1346          Stand-Sit Transfer    Assistive Device (Stand-Sit Transfers)  walker, front-wheeled  (Pended)   -EM     Row Name 03/20/19 1346          Toilet Transfer    Type (Toilet Transfer)  sit-stand;stand-sit  (Pended)   -     Row Name 03/20/19 1346          Gait/Stairs Assessment/Training    Gait/Stairs Assessment/Training  gait/ambulation independence;gait/ambulation assistive device  (Pended)   -EM     [place] Level (Gait)  stand by assist  (Pended)   -EM     Assistive Device (Gait)  walker, front-wheeled  (Pended)   -EM     Distance in Feet (Gait)  80ft  (Pended)   -EM     Pattern (Gait)  step-to  (Pended)   -EM     Deviations/Abnormal Patterns (Gait)  base of support, wide;gait speed decreased;stride length decreased;robbie decreased  (Pended)   -EM     Bilateral Gait Deviations  forward flexed posture;heel strike decreased  (Pended)   -EM     Row Name 03/20/19 1346          General ROM    GENERAL ROM COMMENTS  BLE's WFL  (Pended)   -EM     Row Name 03/20/19 1346          MMT (Manual Muscle Testing)    General MMT Comments  BLE's grossly 3+/5  (Pended)   -EM     Row Name 03/20/19 1346          Pain Assessment    Additional Documentation  Pain Scale: Numbers Pre/Post-Treatment (Group)  (Pended)   -     Row Name 03/20/19 1346          Pain Scale: Numbers Pre/Post-Treatment    Pain Scale: Numbers, Pretreatment  2/10  (Pended)  left-sided headache  -EM     Pain Scale:  Numbers, Post-Treatment  2/10  (Pended)   -EM     Pain Intervention(s)  Ambulation/increased activity;Repositioned  (Pended)   -EM     Row Name 03/20/19 1346          Plan of Care Review    Plan of Care Reviewed With  patient  (Pended)   -EM     Row Name 03/20/19 1346          Physical Therapy Clinical Impression    Date of Referral to PT  03/19/19  (Pended)   -EM     Patient/Family Goals Statement (PT Clinical Impression)  To return home  (Pended)   -EM     Criteria for Skilled Interventions Met (PT Clinical Impression)  yes;treatment indicated  (Pended)   -EM     Rehab Potential (PT Clinical Summary)  good, to achieve stated therapy goals  (Pended)   -EM     Row Name 03/20/19 1346          Physical Therapy Goals    Bed Mobility Goal Selection (PT)  bed mobility, PT goal 1  (Pended)   -EM     Transfer Goal Selection (PT)  transfer, PT goal 1  (Pended)   -EM     Gait Training Goal Selection (PT)  gait training, PT goal 1  (Pended)   -EM     Row Name 03/20/19 1345          Bed Mobility Goal 1 (PT)    Activity/Assistive Device (Bed Mobility Goal 1, PT)  rolling to left;rolling to right;sit to supine;supine to sit  (Pended)   -EM     [place] Level/Cues Needed (Bed Mobility Goal 1, PT)  independent  (Pended)   -EM     Time Frame (Bed Mobility Goal 1, PT)  1 week  (Pended)   -EM     Row Name 03/20/19 1346          Transfer Goal 1 (PT)    Activity/Assistive Device (Transfer Goal 1, PT)  sit-to-stand/stand-to-sit  (Pended)   -EM     [place] Level/Cues Needed (Transfer Goal 1, PT)  conditional independence  (Pended)   -EM     Time Frame (Transfer Goal 1, PT)  1 week  (Pended)   -EM     Row Name 03/20/19 1346          Gait Training Goal 1 (PT)    Activity/Assistive Device (Gait Training Goal 1, PT)  gait (walking locomotion);assistive device use  (Pended)   -EM     [place] Level (Gait Training Goal 1, PT)  conditional independence  (Pended)   -EM     Distance (Gait Goal 1, PT)  120ft  (Pended)   -EM     Time  Frame (Gait Training Goal 1, PT)  1 week  (Pended)   -EM     Row Name 03/20/19 1346          Positioning and Restraints    Pre-Treatment Position  sitting in chair/recliner  (Pended)  Pt not on a chair alarm  -EM     Post Treatment Position  chair  (Pended)   -EM     In Chair  sitting;call light within reach;encouraged to call for assist  (Pended)   -EM       User Key  (r) = Recorded By, (t) = Taken By, (c) = Cosigned By    Initials Name Provider Type    EM Mirna Shaver, PT Student PT Student        Physical Therapy Education     Title: PT OT SLP Therapies (In Progress)     Topic: Physical Therapy (In Progress)     Point: Mobility training (Done)     Learning Progress Summary           Patient KHAI Chavarria DU by EM at 3/20/2019  1:55 PM                   Point: Home exercise program (Done)     Learning Progress Summary           Patient KHAI Chavarria, JOSE by EM at 3/20/2019  1:55 PM                   Point: Precautions (Done)     Learning Progress Summary           Patient KHAI Chavarria, DU by EM at 3/20/2019  1:55 PM                               User Key     Initials Effective Dates Name Provider Type Discipline    EM 03/04/19 -  Mirna Shaver, PT Student PT Student PT              PT Recommendation and Plan  Anticipated Discharge Disposition (PT): (P) home with assist  Planned Therapy Interventions (PT Eval): (P) balance training, gait training, bed mobility training, home exercise program, patient/family education, ROM (range of motion), strengthening  Therapy Frequency (PT Clinical Impression): (P) daily  Outcome Summary/Treatment Plan (PT)  Anticipated Discharge Disposition (PT): (P) home with assist  Plan of Care Reviewed With: (P) patient  Outcome Summary: (P) Pt admitted due to volume overload in bilateral lower extremities. Pt demonstrated decreased functional mobility and endurance. Pt ambulated with stand by assist and a front-wheeled walker. She demonstrated decreased gait speed and decreased heel strike. She  reported that she did not ambulate further due to fatigue and lack of endurance. It is anticipated that she will be discharged home with assist. This Pt will likely benefit from PT to maximize her independence with functional mobility and activity tolerance.   Outcome Measures     Row Name 03/20/19 4491             How much help from another person do you currently need...    Turning from your back to your side while in flat bed without using bedrails?  3  (Pended)   -EM      Moving from lying on back to sitting on the side of a flat bed without bedrails?  3  (Pended)   -EM      Moving to and from a bed to a chair (including a wheelchair)?  3  (Pended)   -EM      Standing up from a chair using your arms (e.g., wheelchair, bedside chair)?  3  (Pended)   -EM      Climbing 3-5 steps with a railing?  2  (Pended)   -EM      To walk in hospital room?  3  (Pended)   -EM      AM-PAC 6 Clicks Score  17  (Pended)   -EM         Functional Assessment    Outcome Measure Options  AM-PAC 6 Clicks Basic Mobility (PT)  (Pended)   -EM        User Key  (r) = Recorded By, (t) = Taken By, (c) = Cosigned By    Initials Name Provider Type    EM Mirna Shaver, PT Student PT Student         Time Calculation:   PT Charges     Row Name 03/20/19 9765             Time Calculation    Start Time  1321  (Pended)   -EM      Stop Time  1340  (Pended)   -EM      Time Calculation (min)  19 min  (Pended)   -EM      PT Received On  03/20/19  (Pended)   -EM      PT - Next Appointment  03/21/19  (Pended)   -EM      PT Goal Re-Cert Due Date  03/27/19  (Pended)   -EM        User Key  (r) = Recorded By, (t) = Taken By, (c) = Cosigned By    Initials Name Provider Type    EM Mirna Shaver, PT Student PT Student        Therapy Charges for Today     Code Description Service Date Service Provider Modifiers Qty    14076929607 HC PT EVAL MOD COMPLEXITY 2 3/20/2019 Mirna Shaver, PT Student GP 1    51034286002 HC PT THER PROC EA 15 MIN 3/20/2019 Mirna Shaver  PT Student GP 1          PT G-Codes  Outcome Measure Options: (P) AM-PAC 6 Clicks Basic Mobility (PT)  AM-PAC 6 Clicks Score: (P) 17      Mirna Shaver, PT Student  3/20/2019

## 2019-03-20 NOTE — PROGRESS NOTES
Discharge Planning Assessment  [facility]     Patient Name: Latanya Olsen  MRN: [id number]  Today's Date: 3/20/2019    Admit Date: 3/18/2019    Discharge Needs Assessment     Row Name 03/20/19 1418       Living Environment    Lives With  child(jose roberto), adult    Name(s) of Who Lives With Patient  disabled adult son     Current Living Arrangements  home/apartment/condo    Primary Care Provided by  self    Provides Primary Care For  no one, unable/limited ability to care for self    Family Caregiver if Needed  child(jose roberto), adult    Family Caregiver Names  disabled adult son     Quality of Family Relationships  helpful    Able to Return to Prior Arrangements  yes       Resource/Environmental Concerns    Resource/Environmental Concerns  none    Transportation Concerns  car, none       Transition Planning    Patient/Family Anticipates Transition to  home with family    Patient/Family Anticipated Services at Transition  home health care    Transportation Anticipated  family or friend will provide       Discharge Needs Assessment    Concerns to be Addressed  discharge planning    Equipment Currently Used at Home  bipap/cpap;walker, rolling;rollator;shower chair;nebulizer    Anticipated Changes Related to Illness  none    Equipment Needed After Discharge  none    Outpatient/Agency/Support Group Needs  homecare agency    Discharge Facility/Level of Care Needs  home with home health    Offered/Gave Vendor List  yes    Current Discharge Risk  physical impairment        Discharge Plan     Row Name 03/20/19 1416       Plan    Plan  Follow for nephrology recommendations and P.T. and O.T. recommendations and will assist as needed.    Plan Comments  Met with the patient at bedside; explained role of CCP, verified facesheet, checked IMM and discussed discharge planning needs.  The patient resides at home with her 60 year old disabled son who can assist her at home.  He does not drive but he assists her with the laundry and things of  that nature.  The patient has a cpap, walker, cane, shower chair, raided toilet seats, rollator, nebulizer and grab bars by her toilet and by her shower.  The patient has a ramp to enter the home and has a basement but she does not have to use it as her son does the laundry for her.  The patient's PCP is Roxann Winkler, pharmacy is [facility] in [place] and the patient denies any trouble remembering to take her medication or with affording her medication.  The patient thinks that she has used [facility] in the past but is uncertain and states that she has been to [facility] for skilled care.  The patient was provided with a HH/SNF list and encouraged to bring a copy of her living will.  The patient states that although she still drives herself to her appointments she has friends who will drive her home upon d/c.  CCP will follow for nephrology recommendations and P.T. and O.T. recommendations and will assist as needed.  GRACIE Martinez        Destination      No service coordination in this encounter.      Durable Medical Equipment      No service coordination in this encounter.      Dialysis/Infusion      No service coordination in this encounter.      Home Medical Care      No service coordination in this encounter.      Community Resources      No service coordination in this encounter.          Demographic Summary     Row Name 03/20/19 1417       General Information    Admission Type  inpatient    Arrived From  home    Referral Source  physician;nursing    Reason for Consult  discharge planning    Preferred Language  English     Used During This Interaction  no        Functional Status     Row Name 03/20/19 1418       Functional Status    Usual Activity Tolerance  moderate    Current Activity Tolerance  fair       Functional Status, IADL    Medications  assistive equipment    Meal Preparation  assistive equipment    Housekeeping  assistive equipment    Laundry  assistive equipment    Shopping   assistive equipment       Mental Status    General Appearance WDL  WDL       Mental Status Summary    Recent Changes in Mental Status/Cognitive Functioning  no changes        Psychosocial    No documentation.       Abuse/Neglect    No documentation.       Legal    No documentation.       Substance Abuse    No documentation.       Patient Forms     Row Name 03/20/19 1417       Patient Forms    Provider Choice List  Delivered    Delivered to  Patient    Method of delivery  In person            GRACIE Bang

## 2019-03-20 NOTE — PLAN OF CARE
Problem: Patient Care Overview  Goal: Plan of Care Review   03/20/19 8876   Coping/Psychosocial   Plan of Care Reviewed With patient   OTHER   Outcome Summary Pt admitted due to volume overload in bilateral lower extremities. Pt demonstrated decreased functional mobility and endurance. Pt ambulated with stand by assist and a front-wheeled walker. She demonstrated decreased gait speed and decreased heel strike. She reported that she did not ambulate further due to fatigue and lack of endurance. She complained of left hand cramping and reported that this was new. It is anticipated that she will be discharged home with assist. This Pt will likely benefit from PT to maximize her independence with functional mobility and activity tolerance.

## 2019-03-20 NOTE — THERAPY EVALUATION
Acute Care - Occupational Therapy Initial Evaluation  [facility]     Patient Name: Latanya Olsen  : 1940  MRN: [id number]  Today's Date: 3/20/2019  Onset of Illness/Injury or Date of Surgery: 19     Referring Physician: Dr. Sainz    Admit Date: 3/18/2019       ICD-10-CM ICD-9-CM   1. Impaired functional mobility and activity tolerance Z74.09 V49.89     Patient Active Problem List   Diagnosis   • Carcinoid tumor of lung   • Diverticulosis of intestine   • Obstructive sleep apnea syndrome   • Weight loss   • Vitamin D deficiency   • Overweight (BMI 25.0-29.9)   • Spinal stenosis of lumbar region without neurogenic claudication   • Osteoarthritis of knee   • Obesity (BMI 30-39.9)   • Neutropenia (CMS/HCC)   • Chronic lower back pain   • Knee pain   • Insomnia   • Hypothyroidism   • Hypokalemia   • Hypertension   • Hyperlipidemia   • Generalized osteoarthritis   • Gastroparesis   • Foot pain   • Chronic obstructive pulmonary disease (CMS/HCC)   • Chronic constipation   • Anemia   • Weight gain   • Pain of left breast   • Elevated serum alkaline phosphatase level   • Neck pain   • Volume overload     Past Medical History:   Diagnosis Date   • Arthritis    • COPD (chronic obstructive pulmonary disease) (CMS/HCC)    • Diverticulosis    • GERD (gastroesophageal reflux disease)    • Hyperlipidemia    • Hypertension    • Hypothyroidism    • Knee swelling    • Lung cancer (CMS/HCC)     history   • Restless leg syndrome    • Sleep apnea with use of continuous positive airway pressure (CPAP)      Past Surgical History:   Procedure Laterality Date   • BUNIONECTOMY Bilateral    • CATARACT EXTRACTION     • CHOLECYSTECTOMY     • COLONOSCOPY     • ENDOSCOPY N/A 2016    Procedure: ESOPHAGOGASTRODUODENOSCOPY  with biopsies;  Surgeon: Von Hernández MD;  Location: [facility];  Service:    • LUNG LOBECTOMY Left     lower lobe   • REPLACEMENT TOTAL KNEE Left    • THYROID BIOPSY            OT ASSESSMENT  FLOWSHEET (last 72 hours)      Occupational Therapy Evaluation     Row Name 03/20/19 1448                   OT Evaluation Time/Intention    Subjective Information  complains of;fatigue  -SG        Document Type  evaluation  -SG        Mode of Treatment  individual therapy;occupational therapy  -SG        Patient Effort  good  -SG           General Information    Patient Profile Reviewed?  yes  -SG        Patient Observations  alert;cooperative;agree to therapy  -SG        General Observations of Patient  sitting in chair  -SG        Prior Level of Function  independent:;ADL's  -SG        Existing Precautions/Restrictions  fall  -SG           Cognitive Assessment/Intervention- PT/OT    Orientation Status (Cognition)  oriented x 4  -SG        Follows Commands (Cognition)  WFL  -SG           Sit-Stand Transfer    Sit-Stand [place] (Transfers)  contact guard  -SG           Stand-Sit Transfer    Stand-Sit [place] (Transfers)  contact guard  -SG           ADL Assessment/Intervention    BADL Assessment/Intervention  upper body dressing;grooming;lower body dressing  -SG           Upper Body Dressing Assessment/Training    Upper Body Dressing [place] Level  upper body dressing skills;doff;don;supervision  -SG        Upper Body Dressing Position  supported sitting  -SG           Lower Body Dressing Assessment/Training    Lower Body Dressing [place] Level  lower body dressing skills;maximum assist (25% patient effort)  -SG        Lower Body Dressing Position  supported sitting  -SG        Comment (Lower Body Dressing)  edema LE limits and UE tremors  -SG           Grooming Assessment/Training    [place] Level (Grooming)  grooming skills;supervision  -SG        Grooming Position  supported sitting  -SG           BADL Safety/Performance    Impairments, BADL Safety/Performance  motor control  -        Skilled BADL Treatment/Intervention  BADL process/adaptation training  -           General ROM     GENERAL ROM COMMENTS  BUE's WFL AROM, tremors BUE's  -SG           Motor Assessment/Interventions    Additional Documentation  Gross Motor Coordination (Group);Fine Motor Testing & Training (Group)  -SG           Gross Motor Coordination    Gross Motor Impairments  -- tremors BUE's  -SG           Sensory Assessment/Intervention    Sensory General Assessment  no sensation deficits identified BUE's  -SG           Positioning and Restraints    Pre-Treatment Position  sitting in chair/recliner  -SG        Post Treatment Position  chair  -SG        In Chair  sitting;call light within reach;encouraged to call for assist  -SG           Pain Scale: Numbers Pre/Post-Treatment    Pain Scale: Numbers, Pretreatment  0/10 - no pain  -SG        Pain Scale: Numbers, Post-Treatment  0/10 - no pain  -SG           Clinical Impression (OT)    OT Diagnosis  need for assist with personal care  -SG        Criteria for Skilled Therapeutic Interventions Met (OT Eval)  yes;treatment indicated  -SG        Therapy Frequency (OT Eval)  5 times/wk  -SG        Care Plan Review (OT)  evaluation/treatment results reviewed  -SG           Planned OT Interventions    Planned Therapy Interventions (OT Eval)  activity tolerance training;BADL retraining;transfer/mobility retraining;neuromuscular control/coordination retraining  -SG           OT Goals    Transfer Goal Selection (OT)  transfer, OT goal 1  -SG        Dressing Goal Selection (OT)  dressing, OT goal 1  -SG        Toileting Goal Selection (OT)  toileting, OT goal 1  -SG           Transfer Goal 1 (OT)    Activity/Assistive Device (Transfer Goal 1, OT)  toilet  -SG        [place] Level/Cues Needed (Transfer Goal 1, OT)  supervision required  -SG        Time Frame (Transfer Goal 1, OT)  1 week  -SG        Progress/Outcome (Transfer Goal 1, OT)  goal ongoing  -SG           Dressing Goal 1 (OT)    Activity/Assistive Device (Dressing Goal 1, OT)  dressing skills, all  -SG         [place]/Cues Needed (Dressing Goal 1, OT)  supervision required  -SG        Time Frame (Dressing Goal 1, OT)  1 week  -SG        Progress/Outcome (Dressing Goal 1, OT)  goal ongoing  -SG           Toileting Goal 1 (OT)    Activity/Device (Toileting Goal 1, OT)  toileting skills, all  -SG        [place] Level/Cues Needed (Toileting Goal 1, OT)  supervision required  -SG        Time Frame (Toileting Goal 1, OT)  1 week  -SG        Progress/Outcome (Toileting Goal 1, OT)  goal ongoing  -SG          User Key  (r) = Recorded By, (t) = Taken By, (c) = Cosigned By    Initials Name Effective Dates     Tiffanie Peck OTR 06/08/18 -          Occupational Therapy Education     Title: PT OT SLP Therapies (In Progress)     Topic: Occupational Therapy (In Progress)     Point: ADL training (Done)     Description: Instruct learner(s) on proper safety adaptation and remediation techniques during self care or transfers.   Instruct in proper use of assistive devices.    Learning Progress Summary           Patient Acceptance, E,TB,D, VU,NR by  at 3/20/2019  2:56 PM    Comment:  role of OT and POC. safety for adls                               User Key     Initials Effective Dates Name Provider Type Discipline     06/08/18 -  Tiffanie Peck OTHANS Occupational Therapist OT                  OT Recommendation and Plan  Planned Therapy Interventions (OT Eval): activity tolerance training, BADL retraining, transfer/mobility retraining, neuromuscular control/coordination retraining  Therapy Frequency (OT Eval): 5 times/wk  Plan of Care Review  Plan of Care Reviewed With: patient  Plan of Care Reviewed With: patient  Outcome Summary: Pt presents with decreased coordination UE's  and decreased independence for LE adls and functional transfers. Will continue to follow for OT for increasing safety and independence     Outcome Measures     Row Name 03/20/19 0646 03/20/19 2294          How much help from another person do you  currently need...    Turning from your back to your side while in flat bed without using bedrails?  --  3  -PC (r) EM (t) PC (c)     Moving from lying on back to sitting on the side of a flat bed without bedrails?  --  3  -PC (r) EM (t) PC (c)     Moving to and from a bed to a chair (including a wheelchair)?  --  3  -PC (r) EM (t) PC (c)     Standing up from a chair using your arms (e.g., wheelchair, bedside chair)?  --  3  -PC (r) EM (t) PC (c)     Climbing 3-5 steps with a railing?  --  2  -PC (r) EM (t) PC (c)     To walk in hospital room?  --  3  -PC (r) EM (t) PC (c)     AM-PAC 6 Clicks Score  --  17  -MT (r) EM (t)        How much help from another is currently needed...    Putting on and taking off regular lower body clothing?  2  -SG  --     Bathing (including washing, rinsing, and drying)  2  -SG  --     Toileting (which includes using toilet bed pan or urinal)  3  -SG  --     Putting on and taking off regular upper body clothing  3  -SG  --     Taking care of personal grooming (such as brushing teeth)  3  -SG  --     Eating meals  3  -SG  --     Score  16  -SG  --        Functional Assessment    Outcome Measure Options  AM-PAC 6 Clicks Daily Activity (OT)  -  AM-[facility] 6 Clicks Basic Mobility (PT)  -PC (r) EM (t) PC (c)       User Key  (r) = Recorded By, (t) = Taken By, (c) = Cosigned By    Initials Name Provider Type    SG Tiffanie Peck, OTR Occupational Therapist    PC Deann Bourne, PT Physical Therapist    MT Vera Alvarado, RN Registered Nurse    EM Mirna Shaver, PT Student PT Student          Time Calculation:   Time Calculation- OT     Row Name 03/20/19 1458             Time Calculation- OT    OT Start Time  1012  -      OT Stop Time  1025  -      OT Time Calculation (min)  13 min  -      Total Timed Code Minutes- OT  8 minute(s)  -      OT Received On  03/20/19  -      OT Goal Re-Cert Due Date  03/27/19  -        User Key  (r) = Recorded By, (t) = Taken By, (c) = Cosigned By     Initials Name Provider Type     Tiffanie Peck OTR Occupational Therapist        Therapy Charges for Today     Code Description Service Date Service Provider Modifiers Qty    95815547660 HC OT EVAL LOW COMPLEXITY 2 3/20/2019 Tiffanie Peck OTR GO 1    [id number]  OT SELF CARE/MGMT/TRAIN EA 15 MIN 3/20/2019 Tiffanie Peck OTR GO 1               RENATO Aguila  3/20/2019

## 2019-03-20 NOTE — PROGRESS NOTES
LOS: 2 days   Patient Care Team:  Roxann Winkler APRN as PCP - General  Roxann Winkler APRN as PCP - Family Medicine  Roxann Winkler APRN as PCP - Claims Attributed    Chief Complaint/ Reason for encounter: Anasarca  No chief complaint on file.        Subjective     Medical history reviewed:  History of Present Illness    Subjective  She feels better today, no shortness of breath  Blood pressure well controlled  Decreased edema, no dysuria  No chest pain, good appetite    History taken from: Patient and chart    Objective     Vital Signs  Temp:  [96.9 °F (36.1 °C)-97.7 °F (36.5 °C)] 97 °F (36.1 °C)  Heart Rate:  [78-87] 87  Resp:  [16-20] 16  BP: (121-139)/(62-69) 131/69       Wt Readings from Last 1 Encounters:   03/20/19 0635 101 kg (221 lb 12.5 oz)   03/19/19 1138 99.8 kg (220 lb)   03/18/19 1436 99.8 kg (220 lb)       Objective    Objective:  General Appearance:  Comfortable, well-appearing, in no acute distress and not in pain.  Output: Producing urine.    HEENT: Normal HEENT exam.    Lungs:  Normal effort and normal respiratory rate.  Breath sounds clear to auscultation.  She is not in respiratory distress.  No rales, decreased breath sounds or rhonchi.    Heart: Normal rate.  Regular rhythm.  S1 normal.  No murmur.   Abdomen: Abdomen is soft.  Bowel sounds are normal.   There is no abdominal tenderness.  There is no epigastric area or suprapubic area tenderness.  There is no rebound tenderness.     Extremities: Normal range of motion.  1+ bilateral lower extremity edema  Pulses: Distal pulses are intact.    Neurological: Patient is alert and oriented to person, place and time.    Pupils:  Pupils are equal, round, and reactive to light.    Skin:  Warm and dry.  No rash or cyanosis.           Results Review:    Intake/Output:     Intake/Output Summary (Last 24 hours) at 3/20/2019 1656  Last data filed at 3/20/2019 1534  Gross per 24 hour   Intake 1200 ml   Output 3500 ml   Net -2300 ml          DATA:  Radiology and Labs:  The following labs independently reviewed by me. Additional labs ordered for tomorrow a.m.  Interval notes, chart personally reviewed by me.   Old records independently reviewed showing previously normal kidney function, baseline creatinine now around 1.2  The following radiologic studies independently viewed by me, findings 2D echocardiogram only some mild diastolic dysfunction, thyroid studies and vascular studies lower extremity negative  New problems include hypokalemia from diuretics  Discussed with patient      Risk/ complexity of medical care/ medical decision making moderate complexity      Labs:   Recent Results (from the past 24 hour(s))   Uric Acid    Collection Time: 03/20/19  4:39 AM   Result Value Ref Range    Uric Acid 8.9 (H) 2.4 - 5.7 mg/dL   Renal Function Panel    Collection Time: 03/20/19  4:39 AM   Result Value Ref Range    Glucose 125 (H) 65 - 99 mg/dL    BUN 25 (H) 8 - 23 mg/dL    Creatinine 1.34 (H) 0.57 - 1.00 mg/dL    Sodium 140 136 - 145 mmol/L    Potassium 3.5 3.5 - 5.2 mmol/L    Chloride 96 (L) 98 - 107 mmol/L    CO2 30.1 (H) 22.0 - 29.0 mmol/L    Calcium 9.4 8.6 - 10.5 mg/dL    Albumin 4.10 3.50 - 5.20 g/dL    Phosphorus 3.8 2.5 - 4.5 mg/dL    Anion Gap 13.9 mmol/L    BUN/Creatinine Ratio 18.7 7.0 - 25.0    eGFR Non African Amer 38 (L) >60 mL/min/1.73       Radiology:  Imaging Results (last 24 hours)     ** No results found for the last 24 hours. **             Medications have been reviewed:  Current Facility-Administered Medications   Medication Dose Route Frequency Provider Last Rate Last Dose   • amitriptyline (ELAVIL) tablet 25 mg  25 mg Oral Nightly Arjun Sainz MD   25 mg at 03/19/19 2013   • baclofen (LIORESAL) tablet 10 mg  10 mg Oral Q8H PRN Arjun Sainz MD   10 mg at 03/19/19 0400   • budesonide (PULMICORT) nebulizer solution 0.5 mg  0.5 mg Nebulization Daily - RT Arjun Sainz MD   0.5 mg at 03/20/19 0936   • cholecalciferol (VITAMIN D3)  tablet 1,000 Units  1,000 Units Oral Daily Arjun Sainz MD   1,000 Units at 03/20/19 0856   • citalopram (CeleXA) tablet 10 mg  10 mg Oral Nightly Arjun Sainz MD   10 mg at 03/19/19 2013   • diltiaZEM CD (CARDIZEM CD) 24 hr capsule 120 mg  120 mg Oral Q24H Arjun Sainz MD   120 mg at 03/20/19 0856   • folic acid (FOLVITE) tablet 800 mcg  800 mcg Oral Daily Arjun Sainz MD   800 mcg at 03/20/19 0856   • furosemide (LASIX) injection 40 mg  40 mg Intravenous Q12H Truong Hernandez MD   40 mg at 03/20/19 0856   • [START ON 3/21/2019] furosemide (LASIX) tablet 40 mg  40 mg Oral BID Truong Hernandez MD       • gabapentin (NEURONTIN) capsule 100 mg  100 mg Oral Q8H Arjun Sainz MD   100 mg at 03/20/19 1430   • ipratropium-albuterol (DUO-NEB) nebulizer solution 1.5 mL  1.5 mL Nebulization Q4H PRN Arjun Sainz MD   1.5 mL at 03/20/19 0936   • levothyroxine (SYNTHROID, LEVOTHROID) tablet 88 mcg  88 mcg Oral Once per day on Mon Tue Wed Thu Fri Sat Arjun Sainz MD   88 mcg at 03/20/19 0546   • linaclotide (LINZESS) capsule 290 mcg  290 mcg Oral QAM AC Arjun Sainz MD   290 mcg at 03/20/19 0900   • magnesium hydroxide (MILK OF MAGNESIA) suspension 2400 mg/10mL 10 mL  10 mL Oral Nightly PRN Arjun Sainz MD   10 mL at 03/19/19 2153   • metOLazone (ZAROXOLYN) tablet 5 mg  5 mg Oral Once Truong Hernandez MD       • pantoprazole (PROTONIX) EC tablet 40 mg  40 mg Oral QAM Arjun Sainz MD   40 mg at 03/20/19 0649   • potassium chloride (MICRO-K) CR capsule 40 mEq  40 mEq Oral TID With Meals Arjun Sainz MD       • pramipexole (MIRAPEX) tablet 1.5 mg  1.5 mg Oral Nightly Arjun Sainz MD   1.5 mg at 03/19/19 2013   • zolpidem (AMBIEN) tablet 5 mg  5 mg Oral Nightly PRN Arjun Sainz MD           ASSESSMENT:  Chronic kidney disease, stage II, creatinine fairly stable, near recent baseline  Worsening edema, no significant proteinuria, TSH normal.  Await echocardiogram, Doppler studies negative for  DVT  Hypertension  New hypokalemia, replace orally  New, mild CHF, diastolic dysfunction with acute exacerbation  Hypothyroidism, TSH at goal  Hyperlipidemia  COPD  Restless leg syndrome with neuropathy        PLAN:   Her renal function is stable today  Volume status improved, change to oral diuretics  monitor daily weights and strict I's and O's  Echocardiogram results noted  Resume home antihypertensive regimen  Will also give an additional dose of metolazone x1 dose  Replace potassium orally  No significant proteinuria that is contributing to her edema, albumin is normal     Transition to oral Lasix tomorrow  Should be stable for discharge tomorrow assuming no new issues    Truong Hernandez MD   [facility]   Office phone number: [phone]  Answering service phone number: [phone]    03/20/19  4:56 PM      Dictation performed using Dragon dictation software

## 2019-03-20 NOTE — PLAN OF CARE
Problem: Patient Care Overview  Goal: Plan of Care Review  Outcome: Ongoing (interventions implemented as appropriate)   03/20/19 5310   Coping/Psychosocial   Plan of Care Reviewed With patient   OTHER   Outcome Summary Pt presents with decreased coordination UE's and decreased independence for LE adls and functional transfers. Will continue to follow for OT for increasing safety and independence

## 2019-03-20 NOTE — PLAN OF CARE
Problem: Fall Risk (Adult)  Goal: Absence of Fall  Outcome: Ongoing (interventions implemented as appropriate)      Problem: Skin Injury Risk (Adult)  Goal: Skin Health and Integrity  Outcome: Ongoing (interventions implemented as appropriate)      Problem: Patient Care Overview  Goal: Plan of Care Review  Outcome: Ongoing (interventions implemented as appropriate)   03/20/19 1405   Coping/Psychosocial   Plan of Care Reviewed With patient   Plan of Care Review   Progress improving   OTHER   Outcome Summary VSS, pt denies pain, up in chair, worked c/ PT, responding well to IV Lasix, reports improvement in edema already, remains on Lasix 40mg IV Q12, CTM       Problem: Fluid Volume Excess (Adult)  Goal: Identify Related Risk Factors and Signs and Symptoms  Outcome: Ongoing (interventions implemented as appropriate)

## 2019-03-21 ENCOUNTER — NURSE TRIAGE (OUTPATIENT)
Dept: CALL CENTER | Facility: HOSPITAL | Age: 79
End: 2019-03-21

## 2019-03-21 VITALS
SYSTOLIC BLOOD PRESSURE: 110 MMHG | DIASTOLIC BLOOD PRESSURE: 61 MMHG | TEMPERATURE: 97.1 F | HEART RATE: 76 BPM | OXYGEN SATURATION: 95 % | WEIGHT: 217.81 LBS | HEIGHT: 66 IN | BODY MASS INDEX: 35.01 KG/M2 | RESPIRATION RATE: 16 BRPM

## 2019-03-21 LAB
ANION GAP SERPL CALCULATED.3IONS-SCNC: 12.7 MMOL/L
BUN BLD-MCNC: 33 MG/DL (ref 8–23)
BUN/CREAT SERPL: 27.5 (ref 7–25)
CALCIUM SPEC-SCNC: 9.3 MG/DL (ref 8.6–10.5)
CHLORIDE SERPL-SCNC: 96 MMOL/L (ref 98–107)
CO2 SERPL-SCNC: 29.3 MMOL/L (ref 22–29)
CREAT BLD-MCNC: 1.2 MG/DL (ref 0.57–1)
GFR SERPL CREATININE-BSD FRML MDRD: 43 ML/MIN/1.73
GLUCOSE BLD-MCNC: 107 MG/DL (ref 65–99)
POTASSIUM BLD-SCNC: 3.6 MMOL/L (ref 3.5–5.2)
SODIUM BLD-SCNC: 138 MMOL/L (ref 136–145)

## 2019-03-21 PROCEDURE — 94799 UNLISTED PULMONARY SVC/PX: CPT

## 2019-03-21 PROCEDURE — 80048 BASIC METABOLIC PNL TOTAL CA: CPT | Performed by: HOSPITALIST

## 2019-03-21 RX ORDER — POTASSIUM CHLORIDE 750 MG/1
40 CAPSULE, EXTENDED RELEASE ORAL
Qty: 360 CAPSULE | Refills: 0 | Status: SHIPPED | OUTPATIENT
Start: 2019-03-21 | End: 2019-04-20

## 2019-03-21 RX ORDER — FUROSEMIDE 40 MG/1
40 TABLET ORAL 2 TIMES DAILY
Qty: 60 TABLET | Refills: 0 | Status: SHIPPED | OUTPATIENT
Start: 2019-03-21 | End: 2019-04-20

## 2019-03-21 RX ORDER — POTASSIUM CHLORIDE 750 MG/1
40 CAPSULE, EXTENDED RELEASE ORAL 2 TIMES DAILY WITH MEALS
Status: DISCONTINUED | OUTPATIENT
Start: 2019-03-22 | End: 2019-03-21 | Stop reason: HOSPADM

## 2019-03-21 RX ADMIN — IPRATROPIUM BROMIDE AND ALBUTEROL SULFATE 1.5 ML: 2.5; .5 SOLUTION RESPIRATORY (INHALATION) at 08:08

## 2019-03-21 RX ADMIN — Medication 800 MCG: at 09:20

## 2019-03-21 RX ADMIN — PANTOPRAZOLE SODIUM 40 MG: 40 TABLET, DELAYED RELEASE ORAL at 06:25

## 2019-03-21 RX ADMIN — POTASSIUM CHLORIDE 40 MEQ: 750 CAPSULE, EXTENDED RELEASE ORAL at 09:21

## 2019-03-21 RX ADMIN — VITAMIN D, TAB 1000IU (100/BT) 1000 UNITS: 25 TAB at 09:21

## 2019-03-21 RX ADMIN — BUDESONIDE 0.5 MG: 0.5 INHALANT RESPIRATORY (INHALATION) at 08:08

## 2019-03-21 RX ADMIN — POTASSIUM CHLORIDE 40 MEQ: 750 CAPSULE, EXTENDED RELEASE ORAL at 13:17

## 2019-03-21 RX ADMIN — LEVOTHYROXINE SODIUM 88 MCG: 88 TABLET ORAL at 06:25

## 2019-03-21 RX ADMIN — FUROSEMIDE 40 MG: 40 TABLET ORAL at 09:20

## 2019-03-21 RX ADMIN — GABAPENTIN 100 MG: 100 CAPSULE ORAL at 06:25

## 2019-03-21 RX ADMIN — DILTIAZEM HYDROCHLORIDE 120 MG: 120 CAPSULE, COATED, EXTENDED RELEASE ORAL at 09:21

## 2019-03-21 NOTE — TELEPHONE ENCOUNTER
"Patient has confusion concerning her medication,  Went over her AVS with her in detail    Reason for Disposition  • Caller has medication question only, adult not sick, and triager answers question    Additional Information  • Negative: Drug overdose and nurse unable to answer question  • Negative: Caller requesting information not related to medicine  • Negative: Caller requesting a prescription for Strep throat and has a positive culture result  • Negative: Rash while taking a medication or within 3 days of stopping it  • Negative: Immunization reaction suspected  • Negative: [1] Asthma and [2] having symptoms of asthma (cough, wheezing, etc)  • Negative: MORE THAN A DOUBLE DOSE of a prescription or over-the-counter (OTC) drug  • Negative: [1] DOUBLE DOSE (an extra dose or lesser amount) of over-the-counter (OTC) drug AND [2] any symptoms (e.g., dizziness, nausea, pain, sleepiness)  • Negative: [1] DOUBLE DOSE (an extra dose or lesser amount) of prescription drug AND [2] any symptoms (e.g., dizziness, nausea, pain, sleepiness)  • Negative: Took another person's prescription drug  • Negative: [1] DOUBLE DOSE (an extra dose or lesser amount) of prescription drug AND [2] NO symptoms (Exception: a double dose of antibiotics)  • Negative: Diabetes drug error or overdose (e.g., insulin or extra dose)  • Negative: [1] Request for URGENT new prescription or refill of \"essential\" medication (i.e., likelihood of harm to patient if not taken) AND [2] triager unable to fill per unit policy  • Negative: [1] Prescription not at pharmacy AND [2] was prescribed today by PCP  • Negative: Pharmacy calling with prescription questions and triager unable to answer question  • Negative: Caller has URGENT medication question about med that PCP prescribed and triager unable to answer question  • Negative: Caller has NON-URGENT medication question about med that PCP prescribed and triager unable to answer question  • Negative: Caller " "requesting a NON-URGENT new prescription or refill and triager unable to refill per unit policy  • Negative: Caller has medication question about med not prescribed by PCP and triager unable to answer question (e.g., compatibility with other med, storage)  • Negative: [1] DOUBLE DOSE (an extra dose or lesser amount) of over-the-counter (OTC) drug AND [2] NO symptoms  • Negative: [1] DOUBLE DOSE (an extra dose or lesser amount) of antibiotic drug AND [2] NO symptoms    Answer Assessment - Initial Assessment Questions  1. SYMPTOMS: \"Do you have any symptoms?\"      Patient has been dc from the hospital, she is confused about her medication  2. SEVERITY: If symptoms are present, ask \"Are they mild, moderate or severe?\"      severe    Protocols used: MEDICATION QUESTION CALL-ADULT-      "

## 2019-03-21 NOTE — PROGRESS NOTES
"Daily progress note    Chief complaint  Doing better  No new complaints  Wants to go home    History of present illness  78-year-old white female with history of hypertension hyperlipidemia hypothyroidism COPD morbidly obese directly admitted to our service from nephrology office where she presented with several month history of leg swelling weight gain and shortness of breath.  Patient found to be fluid overloaded admit for further management.  Patient denies any chest pain fever chills abdominal pain nausea vomiting diarrhea.    Review of system  As above    Physical examination  Blood pressure 110/61, pulse 76, temperature 97.1 °F (36.2 °C), temperature source Oral, resp. rate 16, height 167.6 cm (66\"), weight 98.8 kg (217 lb 13 oz), SpO2 95 %.    HEENT unremarkable  Neck supple  Lungs decreased breath sounds with crackles at the bases  Heart S1-S2 no murmur gallop rub  Abdomen soft nontender bowel sounds positive  Extremities 4+ edema  Neuro moves all 4 extremities with normal strength  Psych normal mood and behavior    Labs  Lab Results (last 24 hours)     Procedure Component Value Units Date/Time    Basic Metabolic Panel [[id number]]  (Abnormal) Collected:  03/21/19 0552    Specimen:  Blood Updated:  03/21/19 0643     Glucose 107 mg/dL      BUN 33 mg/dL      Creatinine 1.20 mg/dL      Sodium 138 mmol/L      Potassium 3.6 mmol/L      Chloride 96 mmol/L      CO2 29.3 mmol/L      Calcium 9.3 mg/dL      eGFR Non African Amer 43 mL/min/1.73      BUN/Creatinine Ratio 27.5     Anion Gap 12.7 mmol/L     Narrative:       GFR Normal >60  Chronic Kidney Disease <60  Kidney Failure <15        Lab Results (last 24 hours)     Procedure Component Value Units Date/Time    Basic Metabolic Panel [[id number]]  (Abnormal) Collected:  03/21/19 0552    Specimen:  Blood Updated:  03/21/19 0643     Glucose 107 mg/dL      BUN 33 mg/dL      Creatinine 1.20 mg/dL      Sodium 138 mmol/L      Potassium 3.6 mmol/L      Chloride 96 mmol/L     "  CO2 29.3 mmol/L      Calcium 9.3 mg/dL      eGFR Non African Amer 43 mL/min/1.73      BUN/Creatinine Ratio 27.5     Anion Gap 12.7 mmol/L     Narrative:       GFR Normal >60  Chronic Kidney Disease <60  Kidney Failure <15          Current Facility-Administered Medications:   •  amitriptyline (ELAVIL) tablet 25 mg, 25 mg, Oral, Nightly, Arjun Sainz MD, 25 mg at 03/20/19 2007  •  baclofen (LIORESAL) tablet 10 mg, 10 mg, Oral, Q8H PRN, Arjun Sainz MD, 10 mg at 03/20/19 2013  •  budesonide (PULMICORT) nebulizer solution 0.5 mg, 0.5 mg, Nebulization, Daily - RT, Arjun Sainz MD, 0.5 mg at 03/21/19 0808  •  cholecalciferol (VITAMIN D3) tablet 1,000 Units, 1,000 Units, Oral, Daily, Arjun Sainz MD, 1,000 Units at 03/21/19 0921  •  citalopram (CeleXA) tablet 10 mg, 10 mg, Oral, Nightly, Arjun Sainz MD, 10 mg at 03/20/19 2007  •  diltiaZEM CD (CARDIZEM CD) 24 hr capsule 120 mg, 120 mg, Oral, Q24H, Arjun Sainz MD, 120 mg at 03/21/19 0921  •  folic acid (FOLVITE) tablet 800 mcg, 800 mcg, Oral, Daily, Arjun Sainz MD, 800 mcg at 03/21/19 0920  •  furosemide (LASIX) tablet 40 mg, 40 mg, Oral, BID, Truong Hernandez MD, 40 mg at 03/21/19 0920  •  gabapentin (NEURONTIN) capsule 100 mg, 100 mg, Oral, Q8H, Arjun Sainz MD, 100 mg at 03/21/19 0625  •  ipratropium-albuterol (DUO-NEB) nebulizer solution 1.5 mL, 1.5 mL, Nebulization, Q4H PRN, Arjun Sainz MD, 1.5 mL at 03/21/19 0808  •  levothyroxine (SYNTHROID, LEVOTHROID) tablet 88 mcg, 88 mcg, Oral, Once per day on Mon Tue Wed Thu Fri Sat, Arjun Sainz MD, 88 mcg at 03/21/19 0625  •  linaclotide (LINZESS) capsule 290 mcg, 290 mcg, Oral, RAMSEY OSORIO, Arjun Sainz MD, 290 mcg at 03/21/19 0626  •  magnesium hydroxide (MILK OF MAGNESIA) suspension 2400 mg/10mL 10 mL, 10 mL, Oral, Nightly PRN, Arjun Sainz MD, 10 mL at 03/20/19 2013  •  pantoprazole (PROTONIX) EC tablet 40 mg, 40 mg, Oral, Emeli MUSTAFA Aftab, MD, 40 mg at 03/21/19 0625  •  potassium chloride (MICRO-K) CR  capsule 40 mEq, 40 mEq, Oral, TID With Meals, Shelly Sainz MD, 40 mEq at 03/21/19 0921  •  pramipexole (MIRAPEX) tablet 1.5 mg, 1.5 mg, Oral, Nightly, Shelly Sainz MD, 1.5 mg at 03/20/19 2007  •  zolpidem (AMBIEN) tablet 5 mg, 5 mg, Oral, Nightly PRN, Shelly Sainz MD     ASSESSMENT  Fluid overload  Chronic kidney disease stage II  Hypertension  Hyperlipidemia  Hypothyroidism  COPD  Neuropathy  Restless leg syndrome  Gastroesophageal reflux disease    PLAN  Discharge home  Discharge summary dictated    SHELLY SAINZ MD

## 2019-03-21 NOTE — PROGRESS NOTES
LOS: 3 days   Patient Care Team:  Roxann Winkler APRN as PCP - General  Roxann Winkler APRN as PCP - Family Medicine  Roxann Winkler APRN as PCP - Claims Attributed    Chief Complaint/ Reason for encounter: Anasarca  No chief complaint on file.        Subjective     Medical history reviewed:  History of Present Illness    Subjective  She feels better today, no shortness of breath  Blood pressure well controlled  Decreased edema, no dysuria  No chest pain, good appetite    History taken from: Patient and chart    Objective     Vital Signs  Temp:  [97 °F (36.1 °C)-97.4 °F (36.3 °C)] 97.1 °F (36.2 °C)  Heart Rate:  [74-87] 76  Resp:  [16-18] 16  BP: (110-131)/(57-69) 110/61       Wt Readings from Last 1 Encounters:   03/21/19 0324 98.8 kg (217 lb 13 oz)   03/20/19 0635 101 kg (221 lb 12.5 oz)   03/19/19 1138 99.8 kg (220 lb)   03/18/19 1436 99.8 kg (220 lb)       Objective    Objective:  General Appearance:  Comfortable, well-appearing, in no acute distress and not in pain.  Output: Producing urine.    HEENT: Normal HEENT exam.    Lungs:  Normal effort and normal respiratory rate.  Breath sounds clear to auscultation.  She is not in respiratory distress.  No rales, decreased breath sounds or rhonchi.    Heart: Normal rate.  Regular rhythm.  S1 normal.  No murmur.   Abdomen: Abdomen is soft.  Bowel sounds are normal.   There is no abdominal tenderness.  There is no epigastric area or suprapubic area tenderness.  There is no rebound tenderness.     Extremities: Normal range of motion.  Trace bilateral lower extremity edema  Pulses: Distal pulses are intact.    Neurological: Patient is alert and oriented to person, place and time.    Pupils:  Pupils are equal, round, and reactive to light.    Skin:  Warm and dry.  No rash or cyanosis.           Results Review:    Intake/Output:     Intake/Output Summary (Last 24 hours) at 3/21/2019 1452  Last data filed at 3/21/2019 0925  Gross per 24 hour   Intake 1050 ml    Output 2100 ml   Net -1050 ml         DATA:  Radiology and Labs:  The following labs independently reviewed by me. Additional labs ordered for tomorrow a.m.  Interval notes, chart personally reviewed by me.   Old records independently reviewed showing previously normal kidney function, baseline creatinine now around 1.2      Labs:   Recent Results (from the past 24 hour(s))   Basic Metabolic Panel    Collection Time: 03/21/19  5:52 AM   Result Value Ref Range    Glucose 107 (H) 65 - 99 mg/dL    BUN 33 (H) 8 - 23 mg/dL    Creatinine 1.20 (H) 0.57 - 1.00 mg/dL    Sodium 138 136 - 145 mmol/L    Potassium 3.6 3.5 - 5.2 mmol/L    Chloride 96 (L) 98 - 107 mmol/L    CO2 29.3 (H) 22.0 - 29.0 mmol/L    Calcium 9.3 8.6 - 10.5 mg/dL    eGFR Non African Amer 43 (L) >60 mL/min/1.73    BUN/Creatinine Ratio 27.5 (H) 7.0 - 25.0    Anion Gap 12.7 mmol/L       Radiology:  Imaging Results (last 24 hours)     ** No results found for the last 24 hours. **             Medications have been reviewed:  Current Facility-Administered Medications   Medication Dose Route Frequency Provider Last Rate Last Dose   • amitriptyline (ELAVIL) tablet 25 mg  25 mg Oral Nightly Arjun Sainz MD   25 mg at 03/20/19 2007   • budesonide (PULMICORT) nebulizer solution 0.5 mg  0.5 mg Nebulization Daily - RT Arjun Sainz MD   0.5 mg at 03/21/19 0808   • cholecalciferol (VITAMIN D3) tablet 1,000 Units  1,000 Units Oral Daily Arjun Sainz MD   1,000 Units at 03/21/19 0921   • citalopram (CeleXA) tablet 10 mg  10 mg Oral Nightly Arjun Sainz MD   10 mg at 03/20/19 2007   • diltiaZEM CD (CARDIZEM CD) 24 hr capsule 120 mg  120 mg Oral Q24H Arjun Sainz MD   120 mg at 03/21/19 0921   • folic acid (FOLVITE) tablet 800 mcg  800 mcg Oral Daily Arjun Sainz MD   800 mcg at 03/21/19 0920   • furosemide (LASIX) tablet 40 mg  40 mg Oral BID Truong Hernandez MD   40 mg at 03/21/19 0920   • gabapentin (NEURONTIN) capsule 100 mg  100 mg Oral Q8H Arjun Sainz MD    100 mg at 03/21/19 0625   • levothyroxine (SYNTHROID, LEVOTHROID) tablet 88 mcg  88 mcg Oral Once per day on Mon Tue Wed Thu Fri Sat Arjun Sainz MD   88 mcg at 03/21/19 0625   • linaclotide (LINZESS) capsule 290 mcg  290 mcg Oral QAM AC Arjun Sainz MD   290 mcg at 03/21/19 0626   • pantoprazole (PROTONIX) EC tablet 40 mg  40 mg Oral QAM Arjun Sainz MD   40 mg at 03/21/19 0625   • [START ON 3/22/2019] potassium chloride (MICRO-K) CR capsule 40 mEq  40 mEq Oral BID With Meals Truong Hernandez MD       • pramipexole (MIRAPEX) tablet 1.5 mg  1.5 mg Oral Nightly Arjun Sainz MD   1.5 mg at 03/20/19 2007     Current Outpatient Medications   Medication Sig Dispense Refill   • amitriptyline (ELAVIL) 25 MG tablet TK 1 T PO D UTD  1   • baclofen (LIORESAL) 10 MG tablet Take 10 mg by mouth 3 (Three) Times a Day. Take 1 -2 tab by mouth at night as needed for muscle cramping     • CARTIA  MG 24 hr capsule TK 1 C PO QD FOR BP  5   • Cholecalciferol (VITAMIN D3) 5000 units capsule capsule Take 5,000 Units by mouth Daily.     • esomeprazole (NexIUM) 40 MG capsule Take 40 mg by mouth every morning before breakfast.     • folic acid (FOLVITE) 400 MCG tablet Take 800 mcg by mouth Daily.     • ipratropium-albuterol (DUO-NEB) 0.5-2.5 mg/mL nebulizer USE 1 VIAL IN NEBULIZER 2 TIMES DAILY. Generic: DUONEB 60 mL 10   • levothyroxine (SYNTHROID, LEVOTHROID) 88 MCG tablet Take 88 mcg by mouth Daily. Takes daily except Sundays (takes 6 days a week)     • linaclotide (LINZESS) 290 MCG capsule capsule Take 290 mcg by mouth Every Morning Before Breakfast.     • pramipexole (MIRAPEX) 1.5 MG tablet Take 1.5 mg by mouth every night at bedtime.     • budesonide (PULMICORT) 0.5 MG/2ML nebulizer solution USE 1 VIAL IN NEBULIZER 2 TIMES DAILY. Generic: PULMICORT 60 each 10   • citalopram (CeleXA) 10 MG tablet Take 10 mg by mouth Daily.     • furosemide (LASIX) 40 MG tablet Take 1 tablet by mouth 2 (Two) Times a Day for 30 days. 60  tablet 0   • gabapentin (NEURONTIN) 100 MG capsule TAKE 1 CAPSULE BY MOUTH EVERY 8 HOURS 270 capsule 3   • potassium chloride (MICRO-K) 10 MEQ CR capsule Take 4 capsules by mouth 3 (Three) Times a Day With Meals for 30 days. 360 capsule 0   • zolpidem (AMBIEN) 10 MG tablet TAKE 1 TABLET BY MOUTH EVERY NIGHT AT BEDTIME AS NEEDED 90 tablet 0       ASSESSMENT:  Chronic kidney disease, stage II, creatinine fairly stable, near recent baseline, 1.2 today  Improved edema, negative work-up  Hypertension, well controlled  Hypokalemia, better with oral replacement  Mild diastolic CHF, euvolemic on Lasix  Hypothyroidism, TSH at goal  Hyperlipidemia  COPD  Restless leg syndrome with neuropathy        PLAN:   Her renal function is stable today, near baseline  Volume status improved, continue current oral diuretics, decrease potassium to twice daily  monitor daily weights and strict I's and O's once at home  Echocardiogram results noted  Resumed home antihypertensive regimen  No significant proteinuria that is contributing to her edema, albumin is normal  Stable for discharge today from a renal standpoint, I discussed the plan with her usual nephrologist, Dr. Blackman, recommend follow-up with her in 1 month with repeat labs  Stable for discharge today from a renal standpoint        Truong Hernandez MD   [facility]   Office phone number: [phone]  Answering service phone number: [phone]    03/21/19  2:52 PM      Dictation performed using Dragon dictation software

## 2019-03-21 NOTE — DISCHARGE SUMMARY
Discharge summary    Date of admission 3/18/2019  Date of discharge 3/21/2019    Final diagnosis  Fluid overload  Chronic kidney disease stage II  Hypertension  Hyperlipidemia  Hypothyroidism  COPD  Neuropathy  Restless leg syndrome  Gastroesophageal reflux disease    Discharge medications    Current Facility-Administered Medications:   •  amitriptyline (ELAVIL) tablet 25 mg, 25 mg, Oral, Nightly, Arjun Sainz MD, 25 mg at 03/20/19 2007  •  budesonide (PULMICORT) nebulizer solution 0.5 mg, 0.5 mg, Nebulization, Daily - RT, Arjun Sainz MD, 0.5 mg at 03/21/19 0808  •  cholecalciferol (VITAMIN D3) tablet 1,000 Units, 1,000 Units, Oral, Daily, Arjun Sainz MD, 1,000 Units at 03/21/19 0921  •  citalopram (CeleXA) tablet 10 mg, 10 mg, Oral, Nightly, Arjun Sainz MD, 10 mg at 03/20/19 2007  •  diltiaZEM CD (CARDIZEM CD) 24 hr capsule 120 mg, 120 mg, Oral, Q24H, Arjun Sainz MD, 120 mg at 03/21/19 0921  •  folic acid (FOLVITE) tablet 800 mcg, 800 mcg, Oral, Daily, Arjun Sainz MD, 800 mcg at 03/21/19 0920  •  furosemide (LASIX) tablet 40 mg, 40 mg, Oral, BID, Truong Hernandez MD, 40 mg at 03/21/19 0920  •  gabapentin (NEURONTIN) capsule 100 mg, 100 mg, Oral, Q8H, Arjun Sainz MD, 100 mg at 03/21/19 0625  •  levothyroxine (SYNTHROID, LEVOTHROID) tablet 88 mcg, 88 mcg, Oral, Once per day on Mon Tue Wed Thu Fri Sat, Arjun Sainz MD, 88 mcg at 03/21/19 0625  •  linaclotide (LINZESS) capsule 290 mcg, 290 mcg, Oral, QAM AC, Arjun Sainz MD, 290 mcg at 03/21/19 0626  •  pantoprazole (PROTONIX) EC tablet 40 mg, 40 mg, Oral, QAM, Arjun Sainz MD, 40 mg at 03/21/19 0625  •  potassium chloride (MICRO-K) CR capsule 40 mEq, 40 mEq, Oral, TID With Meals, Arjun Sainz MD, 40 mEq at 03/21/19 0921  •  pramipexole (MIRAPEX) tablet 1.5 mg, 1.5 mg, Oral, Nightly, Arjun Sainz MD, 1.5 mg at 03/20/19 2007     Consult obtain  Nephrology    Procedures  None    Hospital course  78-year-old white female with history of hypertension  hyperlipidemia hypothyroidism COPD and chronic kidney disease admitted to nephrology office with shortness of breath leg swelling and weight gain.  Patient evaluated by nephrology in the office found to be fluid overloaded admit for management.  Patient admitted treated with IV diuretics and followed by nephrology.  Patient has 2D echo and bilateral lower extremity Doppler study which showed no evidence of DVT or heart failure.  Patient kidney function remained at baseline but she responded to IV diuretics were changed to by mouth today.  Patient has also low potassium which is replaced.  Patient much better and will be discharged home on oral Lasix and potassium supplements with close follow-up with nephrology.    Discharge diet regular    Activity as tolerated    Medication as above    Follow-up with primary doctor in 1 week and follow with nephrology per the instruction and take medication as directed    SHELLY DAVILA MD

## 2019-03-21 NOTE — PLAN OF CARE
Problem: Fall Risk (Adult)  Goal: Absence of Fall  Outcome: Ongoing (interventions implemented as appropriate)      Problem: Patient Care Overview  Goal: Plan of Care Review  Outcome: Ongoing (interventions implemented as appropriate)   03/21/19 0409   Coping/Psychosocial   Plan of Care Reviewed With patient   Plan of Care Review   Progress improving   OTHER   Outcome Summary pt is alert and oriented, room air, up with assist with walker, no falls, voiding well, possible D/C today, continue to monitor     Goal: Individualization and Mutuality  Outcome: Ongoing (interventions implemented as appropriate)    Goal: Discharge Needs Assessment  Outcome: Ongoing (interventions implemented as appropriate)      Problem: Fluid Volume Excess (Adult)  Goal: Identify Related Risk Factors and Signs and Symptoms  Outcome: Outcome(s) achieved Date Met: 03/21/19    Goal: Optimal Fluid Balance  Outcome: Ongoing (interventions implemented as appropriate)

## 2019-03-22 ENCOUNTER — READMISSION MANAGEMENT (OUTPATIENT)
Dept: CALL CENTER | Facility: HOSPITAL | Age: 79
End: 2019-03-22

## 2019-03-22 NOTE — OUTREACH NOTE
Prep Survey      Responses   Facility patient discharged from?  [facility]   Is patient eligible?  Yes   Discharge diagnosis  Fluid overload   Does the patient have one of the following disease processes/diagnoses(primary or secondary)?  COPD/Pneumonia   Does the patient have Home health ordered?  No   What is the Home health agency?   ?   Medication alerts for this patient  Lasix    Prep survey completed?  Yes          Katie Golden RN

## 2019-03-23 ENCOUNTER — READMISSION MANAGEMENT (OUTPATIENT)
Dept: CALL CENTER | Facility: HOSPITAL | Age: 79
End: 2019-03-23

## 2019-03-23 ENCOUNTER — NURSE TRIAGE (OUTPATIENT)
Dept: CALL CENTER | Facility: HOSPITAL | Age: 79
End: 2019-03-23

## 2019-03-23 VITALS — BODY MASS INDEX: 34.54 KG/M2 | WEIGHT: 214 LBS

## 2019-03-23 NOTE — OUTREACH NOTE
COPD/PN Week 1 Survey      Responses   Facility patient discharged from?  [facility]   Does the patient have one of the following disease processes/diagnoses(primary or secondary)?  COPD/Pneumonia   Is there a successful TCM telephone encounter documented?  No   Was the primary reason for admission:  COPD exacerbation   Week 1 attempt successful?  Yes   Call start time  1348   Call end time  1350   Discharge diagnosis  Fluid overload   Is patient permission given to speak with other caregiver?  No   Meds reviewed with patient/caregiver?  Yes   Is the patient having any side effects they believe may be caused by any medication additions or changes?  No   Does the patient have all medications ordered at discharge?  Yes   Is the patient taking all medications as directed (includes completed medication regime)?  Yes   Does the patient have a primary care provider?   Yes   Does the patient have an appointment with their PCP or pulmonologist within 7 days of discharge?  Yes   Has the patient kept scheduled appointments due by today?  N/A   Psychosocial issues?  No   Did the patient receive a copy of their discharge instructions?  Yes   Nursing interventions  Reviewed instructions with patient   What is the patient's perception of their health status since discharge?  Same   Nursing Interventions  Nurse provided patient education   Are the patient's immunizations up to date?   Yes   Nursing interventions  Educated on importance of maintaining up to date immunizations as advised by provider   Is the patient/caregiver able to teach back the hierarchy of who to call/visit for symptoms/problems? PCP, Specialist, Home health nurse, Urgent Care, ED, 911  Yes   Is the patient able to teach back COPD zones?  Yes   Nursing interventions  Education provided on various zones   Patient reports what zone on this call?  Green Zone   Green Zone  Reports doing well, Breathing without shortness of breath, Usual activity and exercise level,  Usual amount of phlegm/mucus without difficulty coughing up, Sleeping well, Appetite is good   Green Zone interventions:  Take daily medications, Continue regular exercise/diet plan, Avoid indoor/outdoor triggers   Week 1 call completed?  Yes          Racheal Pitt RN

## 2019-03-23 NOTE — TELEPHONE ENCOUNTER
"Caller  was discharged from [facility] on Thursday.  has been slightly lightheaded since waking this morning.  is able to walk with rollator without difficulty. Denies any other s/s.  has lost 3# since yesterday- taking Lasix. Instructed to have family member take her to urgent care immediately for evaluation. Voiced understanding.    Reason for Disposition  • [1] MODERATE dizziness (e.g., interferes with normal activities) AND [2] has NOT been evaluated by physician for this  (Exception: dizziness caused by heat exposure, sudden standing, or poor fluid intake)    Additional Information  • Negative: Severe difficulty breathing (e.g., struggling for each breath, speaks in single words)  • Negative: [1] Difficulty breathing or swallowing AND [2] started suddenly after medicine, an allergic food or bee sting  • Negative: Shock suspected (e.g., cold/pale/clammy skin, too weak to stand, low BP, rapid pulse)  • Negative: Difficult to awaken or acting confused (e.g., disoriented, slurred speech)  • Negative: [1] Weakness (i.e., paralysis, loss of muscle strength) of the face, arm or leg on one side of the body AND [2] sudden onset AND [3] present now  • Negative: [1] Numbness (i.e., loss of sensation) of the face, arm or leg on one side of the body AND [2] sudden onset AND [3] present now  • Negative: [1] Loss of speech or garbled speech AND [2] sudden onset AND [3] present now  • Negative: Overdose (accidental or intentional) of medications  • Negative: [1] Fainted > 15 minutes ago AND [2] still feels too weak or dizzy to stand  • Negative: Heart beating < 50 beats per minute OR > 140 beats per minute  • Negative: Sounds like a life-threatening emergency to the triager  • Negative: Chest pain  • Negative: Rectal bleeding, bloody stool, or tarry-black stool  • Negative: [1] Vomiting AND [2] contains red blood or black (\"coffee ground\") material  • Negative: Vomiting is main symptom  • Negative: " "Diarrhea is main symptom  • Negative: Headache is main symptom  • Negative: Patient states that he/she is having an anxiety/panic attack  • Negative: Dizziness from low blood sugar (i.e., < 60 mg/dl or 3.5 mmol/l)  • Negative: Dizziness is described as a spinning sensation (i.e., vertigo)  • Negative: Heat exhaustion suspected  • Negative: Difficulty breathing  • Negative: SEVERE dizziness (e.g., unable to stand, requires support to walk, feels like passing out now)  • Negative: Extra heart beats OR irregular heart beating  (i.e., \"palpitations\")  • Negative: [1] Drinking very little AND [2] dehydration suspected (e.g., no urine > 12 hours, very dry mouth, very lightheaded)  • Negative: Patient sounds very sick or weak to the triager  • Negative: [1] Dizziness caused by heat exposure, sudden standing, or poor fluid intake AND [2] no improvement after 2 hours of rest and fluids  • Negative: [1] Fever > 103 F (39.4 C) AND [2] not able to get the fever down using Fever Care Advice  • Negative: [1] Fever > 101 F (38.3 C) AND [2] age > 60  • Negative: [1] Fever > 100.0 F (37.8 C) AND [2] bedridden (e.g., nursing home patient, CVA, chronic illness, recovering from surgery)  • Negative: [1] Fever > 100.0 F (37.8 C) AND [2] diabetes mellitus or weak immune system (e.g., HIV positive, cancer chemo, splenectomy, chronic steroids)    Answer Assessment - Initial Assessment Questions  1. DESCRIPTION: \"Describe your dizziness.\"      States feels lightheaded-unbalanced.  2. LIGHTHEADED: \"Do you feel lightheaded?\" (e.g., somewhat faint, woozy, weak upon standing)      Yes-weak when stands up.  3. VERTIGO: \"Do you feel like either you or the room is spinning or tilting?\" (i.e. vertigo)      no  4. SEVERITY: \"How bad is it?\"  \"Do you feel like you are going to faint?\" \"Can you stand and walk?\"    - MILD - walking normally    - MODERATE - interferes with normal activities (e.g., work, school)     - SEVERE - unable to stand, requires " "support to walk, feels like passing out now.       Using walker.  5. ONSET:  \"When did the dizziness begin?\"      This morning when woke up.  6. AGGRAVATING FACTORS: \"Does anything make it worse?\" (e.g., standing, change in head position)      Standing makes it worse.  7. HEART RATE: \"Can you tell me your heart rate?\" \"How many beats in 15 seconds?\"  (Note: not all patients can do this)        Unable to tell-states does not feel palpitations.  8. CAUSE: \"What do you think is causing the dizziness?\"       Thinks maybe potassium is low-has lost 3 pounds of fluid since yesterday.  9. RECURRENT SYMPTOM: \"Have you had dizziness before?\" If so, ask: \"When was the last time?\" \"What happened that time?\"      no  10. OTHER SYMPTOMS: \"Do you have any other symptoms?\" (e.g., fever, chest pain, vomiting, diarrhea, bleeding)        No other s/s.  11. PREGNANCY: \"Is there any chance you are pregnant?\" \"When was your last menstrual period?\"        no    Protocols used: DIZZINESS - LIGHTHEADEDNESS-ADULT-AH      "

## 2019-03-30 ENCOUNTER — READMISSION MANAGEMENT (OUTPATIENT)
Dept: CALL CENTER | Facility: HOSPITAL | Age: 79
End: 2019-03-30

## 2019-03-30 NOTE — OUTREACH NOTE
COPD/PN Week 2 Survey      Responses   Facility patient discharged from?  [place]   Does the patient have one of the following disease processes/diagnoses(primary or secondary)?  COPD/Pneumonia   Was the primary reason for admission:  COPD exacerbation   Week 2 attempt successful?  No   Unsuccessful attempts  Attempt 1          Mary Elias RN

## 2019-04-02 ENCOUNTER — READMISSION MANAGEMENT (OUTPATIENT)
Dept: CALL CENTER | Facility: HOSPITAL | Age: 79
End: 2019-04-02

## 2019-04-02 NOTE — OUTREACH NOTE
COPD/PN Week 2 Survey      Responses   Facility patient discharged from?  [place]   Does the patient have one of the following disease processes/diagnoses(primary or secondary)?  COPD/Pneumonia   Was the primary reason for admission:  COPD exacerbation   Week 2 attempt successful?  No   Unsuccessful attempts  Attempt 2          Tima Cooper RN

## 2019-04-04 ENCOUNTER — READMISSION MANAGEMENT (OUTPATIENT)
Dept: CALL CENTER | Facility: HOSPITAL | Age: 79
End: 2019-04-04

## 2019-04-04 NOTE — OUTREACH NOTE
COPD/PN Week 3 Survey      Responses   Facility patient discharged from?  [place]   Does the patient have one of the following disease processes/diagnoses(primary or secondary)?  COPD/Pneumonia   Was the primary reason for admission:  COPD exacerbation   Week 3 attempt successful?  No   Unsuccessful attempts  Attempt 1          Shireen Lowry, RN

## 2019-04-05 ENCOUNTER — READMISSION MANAGEMENT (OUTPATIENT)
Dept: CALL CENTER | Facility: HOSPITAL | Age: 79
End: 2019-04-05

## 2019-04-05 NOTE — OUTREACH NOTE
COPD/PN Week 3 Survey      Responses   Facility patient discharged from?  [place]   Does the patient have one of the following disease processes/diagnoses(primary or secondary)?  COPD/Pneumonia   Week 3 attempt successful?  Yes   Call start time  1019   Call end time  1027   Meds reviewed with patient/caregiver?  Yes   Is the patient taking all medications as directed (includes completed medication regime)?  Yes   Medication comments  still having swelling taking lasix   Has the patient kept scheduled appointments due by today?  Yes   Comments  talks to  if has weight gain over 3 lbs   What is the patient's perception of their health status since discharge?  Improving   Is the patient able to teach back COPD zones?  Yes   Patient reports what zone on this call?  Yellow Zone   Yellow Zone  Increased shortness of air, Unable to complete daily activities, Increased or thicker phlegm/mucus, Using quick relief inhaler/nebulizer more often, Medication is not helping relieve symptoms, Increased swelling of ankles, Fever or feeling like have a chest cold, Poor sleep and symptoms work patient up, Poor Appetite   Yellow interventions  Continue to use daily medications, Use quick relief inhaler as ordered, Use other meds such as steroids or antibiotics as ordered, Get plenty of rest, Call provider immediatly if symptoms do not improve   Week 3 call completed?  Yes   Wrap up additional comments  feeling sluggish, not really sick, but is having swelling in legs and feet, weighs daily if has over 3 lbs calls her  takes lasix. is in yellow zone.           Christina Sarmiento, RN

## 2019-04-07 ENCOUNTER — NURSE TRIAGE (OUTPATIENT)
Dept: CALL CENTER | Facility: HOSPITAL | Age: 79
End: 2019-04-07

## 2019-04-07 NOTE — TELEPHONE ENCOUNTER
"fluid on legs  and feet, stays sleepy all the time, symptoms x1 week, dc from the hospital for 2 weeks. Has not gained over 2#, no redness noted except at night.    Reason for Disposition  • [1] MODERATE leg swelling (e.g., swelling extends up to knees) AND [2] new onset or worsening    Additional Information  • Negative: Severe difficulty breathing (e.g., struggling for each breath, speaks in single words)  • Negative: Looks like a broken bone or dislocated joint (e.g., crooked or deformed)  • Negative: Sounds like a life-threatening emergency to the triager  • Negative: Chest pain  • Negative: Followed a leg injury  • Negative: [1] Small area of swelling AND [2] followed an insect bite to the area  • Negative: Swelling of one ankle joint  • Negative: Swelling of knee is main symptom  • Negative: Pregnant  • Negative: Postpartum (< 1 month since delivery)  • Negative: Difficulty breathing at rest  • Negative: Entire foot is cool or blue in comparison to other side  • Negative: [1] Can't walk or can barely walk AND [2] new onset  • Negative: [1] Difficulty breathing with exertion (e.g., walking) AND [2] new onset or worsening  • Negative: [1] Red area or streak AND [2] fever  • Negative: [1] Swelling is painful to touch AND [2] fever  • Negative: [1] Cast on leg or ankle AND [2] now increased pain  • Negative: Patient sounds very sick or weak to the triager  • Negative: SEVERE leg swelling (e.g., swelling extends above knee, entire leg is swollen, weeping fluid)  • Negative: [1] Red area or streak [2] large (> 2 in. or 5 cm)  • Negative: [1] Thigh or calf pain AND [2] only 1 side AND [3] present > 1 hour  • Negative: [1] Thigh, calf, or ankle swelling AND [2] only 1 side  • Negative: [1] Thigh, calf, or ankle swelling AND [2] bilateral AND [3] 1 side is more swollen    Answer Assessment - Initial Assessment Questions  1. ONSET: \"When did the swelling start?\" (e.g., minutes, hours, days)      One week  2. LOCATION: " "\"What part of the leg is swollen?\"  \"Are both legs swollen or just one leg?\"      legs  3. SEVERITY: \"How bad is the swelling?\" (e.g., localized; mild, moderate, severe)   - Localized - small area of swelling localized to one leg   - MILD pedal edema - swelling limited to foot and ankle, pitting edema < 1/4 inch (6 mm) deep, rest and elevation eliminate most or all swelling   - MODERATE edema - swelling of lower leg to knee, pitting edema > 1/4 inch (6 mm) deep, rest and elevation only partially reduce swelling   - SEVERE edema - swelling extends above knee, facial or hand swelling present       moderate  4. REDNESS: \"Does the swelling look red or infected?\"   red at night  5. PAIN: \"Is the swelling painful to touch?\" If so, ask: \"How painful is it?\"   (Scale 1-10; mild, moderate or severe)        6. FEVER: \"Do you have a fever?\" If so, ask: \"What is it, how was it measured, and when did it start?\"       no  7. CAUSE: \"What do you think is causing the leg swelling?\"      unsure  8. MEDICAL HISTORY: \"Do you have a history of heart failure, kidney disease, liver failure, or cancer?\"      Heart failure  9. RECURRENT SYMPTOM: \"Have you had leg swelling before?\" If so, ask: \"When was the last time?\" \"What happened that time?\"      yes  10. OTHER SYMPTOMS: \"Do you have any other symptoms?\" (e.g., chest pain, difficulty breathing)        Shortness of air  11. PREGNANCY: \"Is there any chance you are pregnant?\" \"When was your last menstrual period?\"        no    Protocols used: LEG SWELLING AND EDEMA-ADULT-AH      "

## 2019-04-09 ENCOUNTER — NURSE TRIAGE (OUTPATIENT)
Dept: CALL CENTER | Facility: HOSPITAL | Age: 79
End: 2019-04-09

## 2019-04-09 NOTE — TELEPHONE ENCOUNTER
Caller was discharged for [facility] 3/21 with volume overload.  She has been weighing daily and is up 2 lbs from her discharge weight.  Her feet and legs are still edematous.  She was up on them most of the day yesterday and she has to take her sister to the doctor today.  Her sister is also wanting to go to [employer] today.  Instructed caller to keep her feet and legs elevated until her has to leave to get her sister and to only take her to her appointment.  She needs to keep her feet and legs elevated when she returns home.  If her edema does not improve with elevation contact her PCP tomorrow.    Reason for Disposition  • [1] MILD swelling of both ankles (i.e., pedal edema) AND [2] is a chronic symptom (recurrent or ongoing AND present > 4 weeks)    Additional Information  • Negative: Severe difficulty breathing (e.g., struggling for each breath, speaks in single words)  • Negative: Looks like a broken bone or dislocated joint (e.g., crooked or deformed)  • Negative: Sounds like a life-threatening emergency to the triager  • Negative: Chest pain  • Negative: Followed a leg injury  • Negative: [1] Small area of swelling AND [2] followed an insect bite to the area  • Negative: Swelling of one ankle joint  • Negative: Swelling of knee is main symptom  • Negative: Pregnant  • Negative: Postpartum (< 1 month since delivery)  • Negative: Difficulty breathing at rest  • Negative: Entire foot is cool or blue in comparison to other side  • Negative: [1] Can't walk or can barely walk AND [2] new onset  • Negative: [1] Difficulty breathing with exertion (e.g., walking) AND [2] new onset or worsening  • Negative: [1] Red area or streak AND [2] fever  • Negative: [1] Swelling is painful to touch AND [2] fever  • Negative: [1] Cast on leg or ankle AND [2] now increased pain  • Negative: Patient sounds very sick or weak to the triager  • Negative: SEVERE leg swelling (e.g., swelling extends above knee, entire leg is swollen,  "weeping fluid)  • Negative: [1] Red area or streak [2] large (> 2 in. or 5 cm)  • Negative: [1] Thigh or calf pain AND [2] only 1 side AND [3] present > 1 hour  • Negative: [1] Thigh, calf, or ankle swelling AND [2] only 1 side  • Negative: [1] Thigh, calf, or ankle swelling AND [2] bilateral AND [3] 1 side is more swollen  • Negative: [1] MODERATE leg swelling (e.g., swelling extends up to knees) AND [2] new onset or worsening  • Negative: Swelling of face, arm or hands  (Exception: slight puffiness of fingers occurring during hot weather)  • Negative: Looks like a boil, infected sore, deep ulcer or other infected rash (spreading redness, pus)  • Negative: [1] MILD swelling of both ankles (i.e., pedal edema) AND [2] new onset or worsening  • Negative: [1] MILD swelling of both ankles (i.e., pedal edema) AND [2] varicose veins  • Negative: [1] MILD swelling of both ankles (i.e., pedal edema) AND [2] worsened by hot weather  • Negative: [1] Small area of LOCALIZED swelling AND [2] itchy    Answer Assessment - Initial Assessment Questions  1. ONSET: \"When did the swelling start?\" (e.g., minutes, hours, days)      Several weeks  2. LOCATION: \"What part of the leg is swollen?\"  \"Are both legs swollen or just one leg?\"      both  3. SEVERITY: \"How bad is the swelling?\" (e.g., localized; mild, moderate, severe)   - Localized - small area of swelling localized to one leg   - MILD pedal edema - swelling limited to foot and ankle, pitting edema < 1/4 inch (6 mm) deep, rest and elevation eliminate most or all swelling   - MODERATE edema - swelling of lower leg to knee, pitting edema > 1/4 inch (6 mm) deep, rest and elevation only partially reduce swelling   - SEVERE edema - swelling extends above knee, facial or hand swelling present       moderate  4. REDNESS: \"Does the swelling look red or infected?\"      no  5. PAIN: \"Is the swelling painful to touch?\" If so, ask: \"How painful is it?\"   (Scale 1-10; mild, moderate or " "severe)      no  6. FEVER: \"Do you have a fever?\" If so, ask: \"What is it, how was it measured, and when did it start?\"       no  7. CAUSE: \"What do you think is causing the leg swelling?\"      Not sure  8. MEDICAL HISTORY: \"Do you have a history of heart failure, kidney disease, liver failure, or cancer?\"      yes  9. RECURRENT SYMPTOM: \"Have you had leg swelling before?\" If so, ask: \"When was the last time?\" \"What happened that time?\"      Yes just got out of the hospital with it  10. OTHER SYMPTOMS: \"Do you have any other symptoms?\" (e.g., chest pain, difficulty breathing)        no  11. PREGNANCY: \"Is there any chance you are pregnant?\" \"When was your last menstrual period?\"        no    Protocols used: LEG SWELLING AND EDEMA-ADULT-AH      "

## 2019-04-13 ENCOUNTER — READMISSION MANAGEMENT (OUTPATIENT)
Dept: CALL CENTER | Facility: HOSPITAL | Age: 79
End: 2019-04-13

## 2019-04-13 NOTE — OUTREACH NOTE
COPD/PN Week 4 Survey      Responses   Facility patient discharged from?  [place]   Does the patient have one of the following disease processes/diagnoses(primary or secondary)?  COPD/Pneumonia   Was the primary reason for admission:  COPD exacerbation   Week 4 attempt successful?  No          Tiffanie Reyes RN

## 2019-05-31 ENCOUNTER — APPOINTMENT (OUTPATIENT)
Dept: GENERAL RADIOLOGY | Facility: HOSPITAL | Age: 79
End: 2019-05-31

## 2019-05-31 ENCOUNTER — HOSPITAL ENCOUNTER (INPATIENT)
Facility: HOSPITAL | Age: 79
LOS: 7 days | Discharge: NURSING, REHAB OR LONG-TERM CARE | End: 2019-06-07
Attending: EMERGENCY MEDICINE | Admitting: HOSPITALIST

## 2019-05-31 DIAGNOSIS — Z74.09 IMPAIRED FUNCTIONAL MOBILITY, BALANCE, GAIT, AND ENDURANCE: ICD-10-CM

## 2019-05-31 DIAGNOSIS — S72.001A CLOSED FRACTURE OF NECK OF RIGHT FEMUR, INITIAL ENCOUNTER (HCC): Primary | ICD-10-CM

## 2019-05-31 LAB
ALBUMIN SERPL-MCNC: 3.8 G/DL (ref 3.5–5.2)
ALBUMIN/GLOB SERPL: 1.5 G/DL
ALP SERPL-CCNC: 98 U/L (ref 39–117)
ALT SERPL W P-5'-P-CCNC: 18 U/L (ref 1–33)
ANION GAP SERPL CALCULATED.3IONS-SCNC: 10.3 MMOL/L
APTT PPP: 26.2 SECONDS (ref 22.7–35.4)
AST SERPL-CCNC: 10 U/L (ref 1–32)
BASOPHILS # BLD AUTO: 0.03 10*3/MM3 (ref 0–0.2)
BASOPHILS NFR BLD AUTO: 0.2 % (ref 0–1.5)
BILIRUB SERPL-MCNC: 0.3 MG/DL (ref 0.2–1.2)
BUN BLD-MCNC: 29 MG/DL (ref 8–23)
BUN/CREAT SERPL: 27.1 (ref 7–25)
CALCIUM SPEC-SCNC: 9 MG/DL (ref 8.6–10.5)
CHLORIDE SERPL-SCNC: 91 MMOL/L (ref 98–107)
CO2 SERPL-SCNC: 32.7 MMOL/L (ref 22–29)
CREAT BLD-MCNC: 1.07 MG/DL (ref 0.57–1)
DEPRECATED RDW RBC AUTO: 48.2 FL (ref 37–54)
EOSINOPHIL # BLD AUTO: 0 10*3/MM3 (ref 0–0.4)
EOSINOPHIL NFR BLD AUTO: 0 % (ref 0.3–6.2)
ERYTHROCYTE [DISTWIDTH] IN BLOOD BY AUTOMATED COUNT: 16.4 % (ref 12.3–15.4)
GFR SERPL CREATININE-BSD FRML MDRD: 49 ML/MIN/1.73
GLOBULIN UR ELPH-MCNC: 2.6 GM/DL
GLUCOSE BLD-MCNC: 118 MG/DL (ref 65–99)
HCT VFR BLD AUTO: 37 % (ref 34–46.6)
HGB BLD-MCNC: 11.6 G/DL (ref 12–15.9)
IMM GRANULOCYTES # BLD AUTO: 0.31 10*3/MM3 (ref 0–0.05)
IMM GRANULOCYTES NFR BLD AUTO: 2.2 % (ref 0–0.5)
INR PPP: 0.94 (ref 0.9–1.1)
LYMPHOCYTES # BLD AUTO: 1.55 10*3/MM3 (ref 0.7–3.1)
LYMPHOCYTES NFR BLD AUTO: 10.9 % (ref 19.6–45.3)
MCH RBC QN AUTO: 25.5 PG (ref 26.6–33)
MCHC RBC AUTO-ENTMCNC: 31.4 G/DL (ref 31.5–35.7)
MCV RBC AUTO: 81.3 FL (ref 79–97)
MONOCYTES # BLD AUTO: 1.05 10*3/MM3 (ref 0.1–0.9)
MONOCYTES NFR BLD AUTO: 7.4 % (ref 5–12)
NEUTROPHILS # BLD AUTO: 11.23 10*3/MM3 (ref 1.7–7)
NEUTROPHILS NFR BLD AUTO: 79.3 % (ref 42.7–76)
NRBC BLD AUTO-RTO: 0 /100 WBC (ref 0–0.2)
PLATELET # BLD AUTO: 245 10*3/MM3 (ref 140–450)
PMV BLD AUTO: 10.5 FL (ref 6–12)
POTASSIUM BLD-SCNC: 3.3 MMOL/L (ref 3.5–5.2)
PROT SERPL-MCNC: 6.4 G/DL (ref 6–8.5)
PROTHROMBIN TIME: 12.3 SECONDS (ref 11.7–14.2)
RBC # BLD AUTO: 4.55 10*6/MM3 (ref 3.77–5.28)
SODIUM BLD-SCNC: 134 MMOL/L (ref 136–145)
WBC NRBC COR # BLD: 14.17 10*3/MM3 (ref 3.4–10.8)

## 2019-05-31 PROCEDURE — 94640 AIRWAY INHALATION TREATMENT: CPT

## 2019-05-31 PROCEDURE — 99285 EMERGENCY DEPT VISIT HI MDM: CPT

## 2019-05-31 PROCEDURE — 85025 COMPLETE CBC W/AUTO DIFF WBC: CPT | Performed by: EMERGENCY MEDICINE

## 2019-05-31 PROCEDURE — 73502 X-RAY EXAM HIP UNI 2-3 VIEWS: CPT

## 2019-05-31 PROCEDURE — 85730 THROMBOPLASTIN TIME PARTIAL: CPT | Performed by: EMERGENCY MEDICINE

## 2019-05-31 PROCEDURE — 80053 COMPREHEN METABOLIC PANEL: CPT | Performed by: EMERGENCY MEDICINE

## 2019-05-31 PROCEDURE — 25010000002 HYDROMORPHONE PER 4 MG: Performed by: HOSPITALIST

## 2019-05-31 PROCEDURE — 71045 X-RAY EXAM CHEST 1 VIEW: CPT

## 2019-05-31 PROCEDURE — 93010 ELECTROCARDIOGRAM REPORT: CPT | Performed by: INTERNAL MEDICINE

## 2019-05-31 PROCEDURE — 85610 PROTHROMBIN TIME: CPT | Performed by: EMERGENCY MEDICINE

## 2019-05-31 PROCEDURE — 93005 ELECTROCARDIOGRAM TRACING: CPT | Performed by: HOSPITALIST

## 2019-05-31 PROCEDURE — 94799 UNLISTED PULMONARY SVC/PX: CPT

## 2019-05-31 PROCEDURE — 25010000002 HYDROMORPHONE 1 MG/ML SOLUTION: Performed by: EMERGENCY MEDICINE

## 2019-05-31 RX ORDER — FUROSEMIDE 20 MG/1
20 TABLET ORAL DAILY
Status: DISCONTINUED | OUTPATIENT
Start: 2019-05-31 | End: 2019-06-01

## 2019-05-31 RX ORDER — HYDROCODONE BITARTRATE AND ACETAMINOPHEN 5; 325 MG/1; MG/1
1 TABLET ORAL EVERY 6 HOURS PRN
COMMUNITY
End: 2019-06-07 | Stop reason: HOSPADM

## 2019-05-31 RX ORDER — PRAMIPEXOLE DIHYDROCHLORIDE 1.5 MG/1
1.5 TABLET ORAL NIGHTLY
Status: DISCONTINUED | OUTPATIENT
Start: 2019-05-31 | End: 2019-06-07 | Stop reason: HOSPADM

## 2019-05-31 RX ORDER — MELATONIN
1000 DAILY
Status: DISCONTINUED | OUTPATIENT
Start: 2019-05-31 | End: 2019-06-07 | Stop reason: HOSPADM

## 2019-05-31 RX ORDER — ZOLPIDEM TARTRATE 5 MG/1
5 TABLET ORAL NIGHTLY PRN
Status: DISCONTINUED | OUTPATIENT
Start: 2019-05-31 | End: 2019-06-02

## 2019-05-31 RX ORDER — METOLAZONE 5 MG/1
5 TABLET ORAL 3 TIMES WEEKLY
Status: DISCONTINUED | OUTPATIENT
Start: 2019-06-03 | End: 2019-06-01

## 2019-05-31 RX ORDER — LACTULOSE 10 G/15ML
20 SOLUTION ORAL 2 TIMES DAILY
COMMUNITY
End: 2019-06-07 | Stop reason: HOSPADM

## 2019-05-31 RX ORDER — LEVOTHYROXINE SODIUM 88 UG/1
88 TABLET ORAL
Status: DISCONTINUED | OUTPATIENT
Start: 2019-06-01 | End: 2019-06-07 | Stop reason: HOSPADM

## 2019-05-31 RX ORDER — ASPIRIN 81 MG/1
81 TABLET, CHEWABLE ORAL DAILY
Status: DISCONTINUED | OUTPATIENT
Start: 2019-05-31 | End: 2019-06-07 | Stop reason: HOSPADM

## 2019-05-31 RX ORDER — DILTIAZEM HYDROCHLORIDE 120 MG/1
120 TABLET, FILM COATED ORAL 4 TIMES DAILY
COMMUNITY
End: 2020-11-04 | Stop reason: ALTCHOICE

## 2019-05-31 RX ORDER — IPRATROPIUM BROMIDE AND ALBUTEROL SULFATE 2.5; .5 MG/3ML; MG/3ML
1.5 SOLUTION RESPIRATORY (INHALATION)
Status: DISCONTINUED | OUTPATIENT
Start: 2019-05-31 | End: 2019-06-07 | Stop reason: HOSPADM

## 2019-05-31 RX ORDER — HYDROMORPHONE HYDROCHLORIDE 1 MG/ML
1 INJECTION, SOLUTION INTRAMUSCULAR; INTRAVENOUS; SUBCUTANEOUS EVERY 4 HOURS PRN
Status: DISCONTINUED | OUTPATIENT
Start: 2019-05-31 | End: 2019-05-31

## 2019-05-31 RX ORDER — METOLAZONE 5 MG/1
5 TABLET ORAL
COMMUNITY
End: 2019-06-07 | Stop reason: HOSPADM

## 2019-05-31 RX ORDER — ONDANSETRON 4 MG/1
4 TABLET, ORALLY DISINTEGRATING ORAL EVERY 8 HOURS PRN
Status: DISCONTINUED | OUTPATIENT
Start: 2019-05-31 | End: 2019-06-07 | Stop reason: HOSPADM

## 2019-05-31 RX ORDER — PREDNISONE 10 MG/1
10 TABLET ORAL DAILY
COMMUNITY
End: 2019-06-07 | Stop reason: HOSPADM

## 2019-05-31 RX ORDER — BUDESONIDE 0.5 MG/2ML
0.5 INHALANT ORAL
Status: DISCONTINUED | OUTPATIENT
Start: 2019-05-31 | End: 2019-06-07 | Stop reason: HOSPADM

## 2019-05-31 RX ORDER — HYDROMORPHONE HYDROCHLORIDE 1 MG/ML
1 INJECTION, SOLUTION INTRAMUSCULAR; INTRAVENOUS; SUBCUTANEOUS EVERY 4 HOURS PRN
Status: DISCONTINUED | OUTPATIENT
Start: 2019-05-31 | End: 2019-06-01

## 2019-05-31 RX ORDER — ONDANSETRON 4 MG/1
4 TABLET, ORALLY DISINTEGRATING ORAL ONCE
Status: COMPLETED | OUTPATIENT
Start: 2019-05-31 | End: 2019-05-31

## 2019-05-31 RX ORDER — CITALOPRAM 10 MG/1
10 TABLET ORAL DAILY
Status: DISCONTINUED | OUTPATIENT
Start: 2019-05-31 | End: 2019-06-07 | Stop reason: HOSPADM

## 2019-05-31 RX ORDER — FUROSEMIDE 20 MG/1
20 TABLET ORAL 2 TIMES DAILY
COMMUNITY
End: 2019-06-07 | Stop reason: HOSPADM

## 2019-05-31 RX ORDER — CALCIUM CARBONATE/VITAMIN D3 600 MG-10
15 TABLET ORAL DAILY
COMMUNITY
End: 2019-06-07 | Stop reason: HOSPADM

## 2019-05-31 RX ORDER — GABAPENTIN 100 MG/1
100 CAPSULE ORAL EVERY 8 HOURS
Status: DISCONTINUED | OUTPATIENT
Start: 2019-05-31 | End: 2019-06-07 | Stop reason: HOSPADM

## 2019-05-31 RX ORDER — PANTOPRAZOLE SODIUM 40 MG/1
40 TABLET, DELAYED RELEASE ORAL EVERY MORNING
Status: DISCONTINUED | OUTPATIENT
Start: 2019-06-01 | End: 2019-06-07 | Stop reason: HOSPADM

## 2019-05-31 RX ORDER — DILTIAZEM HYDROCHLORIDE 120 MG/1
120 CAPSULE, COATED, EXTENDED RELEASE ORAL
Status: DISCONTINUED | OUTPATIENT
Start: 2019-05-31 | End: 2019-06-07 | Stop reason: HOSPADM

## 2019-05-31 RX ORDER — AMITRIPTYLINE HYDROCHLORIDE 25 MG/1
25 TABLET, FILM COATED ORAL NIGHTLY
Status: DISCONTINUED | OUTPATIENT
Start: 2019-05-31 | End: 2019-06-07 | Stop reason: HOSPADM

## 2019-05-31 RX ORDER — POTASSIUM CHLORIDE 750 MG/1
20 TABLET, FILM COATED, EXTENDED RELEASE ORAL DAILY
COMMUNITY
End: 2019-06-07 | Stop reason: HOSPADM

## 2019-05-31 RX ORDER — CYCLOBENZAPRINE HCL 10 MG
10 TABLET ORAL 3 TIMES DAILY PRN
COMMUNITY
End: 2019-06-07 | Stop reason: HOSPADM

## 2019-05-31 RX ORDER — TRAMADOL HYDROCHLORIDE 50 MG/1
50 TABLET ORAL 3 TIMES DAILY
COMMUNITY
End: 2019-06-07 | Stop reason: HOSPADM

## 2019-05-31 RX ADMIN — HYDROMORPHONE HYDROCHLORIDE 1 MG: 1 INJECTION, SOLUTION INTRAMUSCULAR; INTRAVENOUS; SUBCUTANEOUS at 19:08

## 2019-05-31 RX ADMIN — PRAMIPEXOLE DIHYDROCHLORIDE 1.5 MG: 1.5 TABLET ORAL at 20:40

## 2019-05-31 RX ADMIN — HYDROMORPHONE HYDROCHLORIDE 1 MG: 1 INJECTION, SOLUTION INTRAMUSCULAR; INTRAVENOUS; SUBCUTANEOUS at 23:01

## 2019-05-31 RX ADMIN — DILTIAZEM HYDROCHLORIDE 120 MG: 120 CAPSULE, COATED, EXTENDED RELEASE ORAL at 20:40

## 2019-05-31 RX ADMIN — BUDESONIDE 0.5 MG: 0.5 INHALANT RESPIRATORY (INHALATION) at 22:33

## 2019-05-31 RX ADMIN — AMITRIPTYLINE HYDROCHLORIDE 25 MG: 25 TABLET, FILM COATED ORAL at 20:40

## 2019-05-31 RX ADMIN — CITALOPRAM 10 MG: 10 TABLET, FILM COATED ORAL at 20:40

## 2019-05-31 RX ADMIN — IPRATROPIUM BROMIDE AND ALBUTEROL SULFATE 1.5 ML: 2.5; .5 SOLUTION RESPIRATORY (INHALATION) at 22:33

## 2019-05-31 RX ADMIN — ONDANSETRON 4 MG: 4 TABLET, ORALLY DISINTEGRATING ORAL at 13:52

## 2019-05-31 RX ADMIN — HYDROMORPHONE HYDROCHLORIDE 1 MG: 1 INJECTION, SOLUTION INTRAMUSCULAR; INTRAVENOUS; SUBCUTANEOUS at 13:52

## 2019-05-31 RX ADMIN — FUROSEMIDE 20 MG: 20 TABLET ORAL at 20:40

## 2019-05-31 RX ADMIN — VITAMIN D, TAB 1000IU (100/BT) 1000 UNITS: 25 TAB at 20:40

## 2019-05-31 RX ADMIN — GABAPENTIN 100 MG: 100 CAPSULE ORAL at 20:40

## 2019-05-31 RX ADMIN — ASPIRIN 81 MG: 81 TABLET, CHEWABLE ORAL at 20:40

## 2019-06-01 ENCOUNTER — ANESTHESIA (OUTPATIENT)
Dept: PERIOP | Facility: HOSPITAL | Age: 79
End: 2019-06-01

## 2019-06-01 ENCOUNTER — ANESTHESIA EVENT (OUTPATIENT)
Dept: PERIOP | Facility: HOSPITAL | Age: 79
End: 2019-06-01

## 2019-06-01 ENCOUNTER — APPOINTMENT (OUTPATIENT)
Dept: GENERAL RADIOLOGY | Facility: HOSPITAL | Age: 79
End: 2019-06-01

## 2019-06-01 PROBLEM — S72.001A CLOSED FRACTURE OF NECK OF RIGHT FEMUR (HCC): Status: RESOLVED | Noted: 2019-05-31 | Resolved: 2019-06-01

## 2019-06-01 PROBLEM — Z96.641 STATUS POST TOTAL HIP REPLACEMENT, RIGHT: Status: ACTIVE | Noted: 2019-06-01

## 2019-06-01 LAB
ALBUMIN SERPL-MCNC: 3.8 G/DL (ref 3.5–5.2)
ALBUMIN/GLOB SERPL: 1.7 G/DL
ALP SERPL-CCNC: 94 U/L (ref 39–117)
ALT SERPL W P-5'-P-CCNC: 14 U/L (ref 1–33)
ANION GAP SERPL CALCULATED.3IONS-SCNC: 14.5 MMOL/L
ANION GAP SERPL CALCULATED.3IONS-SCNC: 9.6 MMOL/L
AST SERPL-CCNC: 10 U/L (ref 1–32)
BASOPHILS # BLD AUTO: 0.02 10*3/MM3 (ref 0–0.2)
BASOPHILS NFR BLD AUTO: 0.1 % (ref 0–1.5)
BILIRUB SERPL-MCNC: 0.3 MG/DL (ref 0.2–1.2)
BUN BLD-MCNC: 28 MG/DL (ref 8–23)
BUN BLD-MCNC: 31 MG/DL (ref 8–23)
BUN/CREAT SERPL: 30.1 (ref 7–25)
BUN/CREAT SERPL: 33.3 (ref 7–25)
CALCIUM SPEC-SCNC: 8.4 MG/DL (ref 8.6–10.5)
CALCIUM SPEC-SCNC: 9 MG/DL (ref 8.6–10.5)
CHLORIDE SERPL-SCNC: 89 MMOL/L (ref 98–107)
CHLORIDE SERPL-SCNC: 90 MMOL/L (ref 98–107)
CHOLEST SERPL-MCNC: 198 MG/DL (ref 0–200)
CO2 SERPL-SCNC: 29.5 MMOL/L (ref 22–29)
CO2 SERPL-SCNC: 33.4 MMOL/L (ref 22–29)
CREAT BLD-MCNC: 0.93 MG/DL (ref 0.57–1)
CREAT BLD-MCNC: 0.93 MG/DL (ref 0.57–1)
DEPRECATED RDW RBC AUTO: 50.4 FL (ref 37–54)
EOSINOPHIL # BLD AUTO: 0.07 10*3/MM3 (ref 0–0.4)
EOSINOPHIL NFR BLD AUTO: 0.5 % (ref 0.3–6.2)
ERYTHROCYTE [DISTWIDTH] IN BLOOD BY AUTOMATED COUNT: 16.6 % (ref 12.3–15.4)
GFR SERPL CREATININE-BSD FRML MDRD: 58 ML/MIN/1.73
GFR SERPL CREATININE-BSD FRML MDRD: 58 ML/MIN/1.73
GLOBULIN UR ELPH-MCNC: 2.3 GM/DL
GLUCOSE BLD-MCNC: 100 MG/DL (ref 65–99)
GLUCOSE BLD-MCNC: 110 MG/DL (ref 65–99)
HBA1C MFR BLD: 5.9 % (ref 4.8–5.6)
HCT VFR BLD AUTO: 35.4 % (ref 34–46.6)
HDLC SERPL-MCNC: 119 MG/DL (ref 40–60)
HGB BLD-MCNC: 10.9 G/DL (ref 12–15.9)
IMM GRANULOCYTES # BLD AUTO: 0.3 10*3/MM3 (ref 0–0.05)
IMM GRANULOCYTES NFR BLD AUTO: 2.2 % (ref 0–0.5)
LDLC SERPL CALC-MCNC: 68 MG/DL (ref 0–100)
LDLC/HDLC SERPL: 0.57 {RATIO}
LYMPHOCYTES # BLD AUTO: 1.74 10*3/MM3 (ref 0.7–3.1)
LYMPHOCYTES NFR BLD AUTO: 13 % (ref 19.6–45.3)
MCH RBC QN AUTO: 25.8 PG (ref 26.6–33)
MCHC RBC AUTO-ENTMCNC: 30.8 G/DL (ref 31.5–35.7)
MCV RBC AUTO: 83.9 FL (ref 79–97)
MONOCYTES # BLD AUTO: 1.49 10*3/MM3 (ref 0.1–0.9)
MONOCYTES NFR BLD AUTO: 11.1 % (ref 5–12)
NEUTROPHILS # BLD AUTO: 9.75 10*3/MM3 (ref 1.7–7)
NEUTROPHILS NFR BLD AUTO: 73.1 % (ref 42.7–76)
NRBC BLD AUTO-RTO: 0 /100 WBC (ref 0–0.2)
NT-PROBNP SERPL-MCNC: 97.1 PG/ML (ref 5–1800)
PLATELET # BLD AUTO: 221 10*3/MM3 (ref 140–450)
PMV BLD AUTO: 10.5 FL (ref 6–12)
POTASSIUM BLD-SCNC: 2.9 MMOL/L (ref 3.5–5.2)
POTASSIUM BLD-SCNC: 3.1 MMOL/L (ref 3.5–5.2)
PROT SERPL-MCNC: 6.1 G/DL (ref 6–8.5)
RBC # BLD AUTO: 4.22 10*6/MM3 (ref 3.77–5.28)
SODIUM BLD-SCNC: 132 MMOL/L (ref 136–145)
SODIUM BLD-SCNC: 134 MMOL/L (ref 136–145)
TRIGL SERPL-MCNC: 55 MG/DL (ref 0–150)
TSH SERPL DL<=0.05 MIU/L-ACNC: 2.03 MIU/ML (ref 0.27–4.2)
VLDLC SERPL-MCNC: 11 MG/DL (ref 5–40)
WBC NRBC COR # BLD: 13.37 10*3/MM3 (ref 3.4–10.8)

## 2019-06-01 PROCEDURE — 84443 ASSAY THYROID STIM HORMONE: CPT | Performed by: HOSPITALIST

## 2019-06-01 PROCEDURE — C1776 JOINT DEVICE (IMPLANTABLE): HCPCS | Performed by: ORTHOPAEDIC SURGERY

## 2019-06-01 PROCEDURE — C1713 ANCHOR/SCREW BN/BN,TIS/BN: HCPCS | Performed by: ORTHOPAEDIC SURGERY

## 2019-06-01 PROCEDURE — 25010000003 CEFAZOLIN IN DEXTROSE 2-4 GM/100ML-% SOLUTION: Performed by: ORTHOPAEDIC SURGERY

## 2019-06-01 PROCEDURE — 73502 X-RAY EXAM HIP UNI 2-3 VIEWS: CPT

## 2019-06-01 PROCEDURE — 25010000002 PROPOFOL 10 MG/ML EMULSION: Performed by: NURSE ANESTHETIST, CERTIFIED REGISTERED

## 2019-06-01 PROCEDURE — 88311 DECALCIFY TISSUE: CPT | Performed by: ORTHOPAEDIC SURGERY

## 2019-06-01 PROCEDURE — 80061 LIPID PANEL: CPT | Performed by: HOSPITALIST

## 2019-06-01 PROCEDURE — 25810000003 SODIUM CHLORIDE 0.9 % WITH KCL 20 MEQ 20-0.9 MEQ/L-% SOLUTION: Performed by: HOSPITALIST

## 2019-06-01 PROCEDURE — 25010000002 ROPIVACAINE PER 1 MG: Performed by: ORTHOPAEDIC SURGERY

## 2019-06-01 PROCEDURE — 25010000003 POTASSIUM CHLORIDE 10 MEQ/100ML SOLUTION: Performed by: ANESTHESIOLOGY

## 2019-06-01 PROCEDURE — 25010000002 HYDROMORPHONE PER 4 MG: Performed by: HOSPITALIST

## 2019-06-01 PROCEDURE — 94799 UNLISTED PULMONARY SVC/PX: CPT

## 2019-06-01 PROCEDURE — 80053 COMPREHEN METABOLIC PANEL: CPT | Performed by: HOSPITALIST

## 2019-06-01 PROCEDURE — 83880 ASSAY OF NATRIURETIC PEPTIDE: CPT | Performed by: HOSPITALIST

## 2019-06-01 PROCEDURE — 25010000002 CLONIDINE PER 1 MG: Performed by: ORTHOPAEDIC SURGERY

## 2019-06-01 PROCEDURE — 99221 1ST HOSP IP/OBS SF/LOW 40: CPT | Performed by: INTERNAL MEDICINE

## 2019-06-01 PROCEDURE — 25010000002 FENTANYL CITRATE (PF) 100 MCG/2ML SOLUTION: Performed by: ANESTHESIOLOGY

## 2019-06-01 PROCEDURE — 25010000002 FENTANYL CITRATE (PF) 100 MCG/2ML SOLUTION: Performed by: NURSE ANESTHETIST, CERTIFIED REGISTERED

## 2019-06-01 PROCEDURE — 25010000002 ONDANSETRON PER 1 MG: Performed by: NURSE ANESTHETIST, CERTIFIED REGISTERED

## 2019-06-01 PROCEDURE — 25010000002 NEOSTIGMINE PER 0.5 MG: Performed by: NURSE ANESTHETIST, CERTIFIED REGISTERED

## 2019-06-01 PROCEDURE — 85025 COMPLETE CBC W/AUTO DIFF WBC: CPT | Performed by: HOSPITALIST

## 2019-06-01 PROCEDURE — 83036 HEMOGLOBIN GLYCOSYLATED A1C: CPT | Performed by: HOSPITALIST

## 2019-06-01 PROCEDURE — 25010000002 DEXAMETHASONE PER 1 MG: Performed by: NURSE ANESTHETIST, CERTIFIED REGISTERED

## 2019-06-01 PROCEDURE — 0SR90J9 REPLACEMENT OF RIGHT HIP JOINT WITH SYNTHETIC SUBSTITUTE, CEMENTED, OPEN APPROACH: ICD-10-PCS | Performed by: ORTHOPAEDIC SURGERY

## 2019-06-01 PROCEDURE — 88304 TISSUE EXAM BY PATHOLOGIST: CPT | Performed by: ORTHOPAEDIC SURGERY

## 2019-06-01 PROCEDURE — 25010000002 KETOROLAC TROMETHAMINE PER 15 MG: Performed by: ORTHOPAEDIC SURGERY

## 2019-06-01 DEVICE — IMPLANTABLE DEVICE: Type: IMPLANTABLE DEVICE | Site: HIP | Status: FUNCTIONAL

## 2019-06-01 DEVICE — SCRW HEX LP TRIDENT2 6.5X30MM: Type: IMPLANTABLE DEVICE | Site: ACETABULUM | Status: FUNCTIONAL

## 2019-06-01 DEVICE — SHLL TRIDENT2 TRITANIUM C/HL 52MM: Type: IMPLANTABLE DEVICE | Site: ACETABULUM | Status: FUNCTIONAL

## 2019-06-01 DEVICE — INSRT HIP RESTOR ADM X3 28/48MM SZ42E: Type: IMPLANTABLE DEVICE | Site: HIP | Status: FUNCTIONAL

## 2019-06-01 DEVICE — SUT FW #2 W/TPR NDL 1/2 CIR 38IN 97CM 26.5MM BLU: Type: IMPLANTABLE DEVICE | Site: HIP | Status: FUNCTIONAL

## 2019-06-01 DEVICE — HD FEM/HIP BIOLOX/DELTA V40 CERAM 28MM: Type: IMPLANTABLE DEVICE | Site: HIP | Status: FUNCTIONAL

## 2019-06-01 DEVICE — SCRW HEX LP TRIDENT2 6.5X25MM: Type: IMPLANTABLE DEVICE | Site: ACETABULUM | Status: FUNCTIONAL

## 2019-06-01 DEVICE — FULL DOSE BONE CEMENT, 10 PACK CATALOG NUMBER IS 6191-1-010
Type: IMPLANTABLE DEVICE | Site: HIP | Status: FUNCTIONAL
Brand: SIMPLEX

## 2019-06-01 DEVICE — LINER MDM CMTLS COCR 42MM: Type: IMPLANTABLE DEVICE | Site: ACETABULUM | Status: FUNCTIONAL

## 2019-06-01 DEVICE — TOTL HIP HI DEMAND STRYKER: Type: IMPLANTABLE DEVICE | Site: ACETABULUM | Status: FUNCTIONAL

## 2019-06-01 RX ORDER — ACETAMINOPHEN 500 MG
1000 TABLET ORAL ONCE
Status: COMPLETED | OUTPATIENT
Start: 2019-06-01 | End: 2019-06-01

## 2019-06-01 RX ORDER — BISACODYL 10 MG
10 SUPPOSITORY, RECTAL RECTAL DAILY PRN
Status: DISCONTINUED | OUTPATIENT
Start: 2019-06-01 | End: 2019-06-07 | Stop reason: HOSPADM

## 2019-06-01 RX ORDER — LIDOCAINE HYDROCHLORIDE 10 MG/ML
0.5 INJECTION, SOLUTION EPIDURAL; INFILTRATION; INTRACAUDAL; PERINEURAL ONCE AS NEEDED
Status: DISCONTINUED | OUTPATIENT
Start: 2019-06-01 | End: 2019-06-01 | Stop reason: HOSPADM

## 2019-06-01 RX ORDER — EPHEDRINE SULFATE 50 MG/ML
INJECTION, SOLUTION INTRAVENOUS AS NEEDED
Status: DISCONTINUED | OUTPATIENT
Start: 2019-06-01 | End: 2019-06-01 | Stop reason: SURG

## 2019-06-01 RX ORDER — ONDANSETRON 2 MG/ML
INJECTION INTRAMUSCULAR; INTRAVENOUS AS NEEDED
Status: DISCONTINUED | OUTPATIENT
Start: 2019-06-01 | End: 2019-06-01 | Stop reason: SURG

## 2019-06-01 RX ORDER — ACETAMINOPHEN 325 MG/1
325 TABLET ORAL EVERY 4 HOURS PRN
Status: DISCONTINUED | OUTPATIENT
Start: 2019-06-01 | End: 2019-06-07 | Stop reason: HOSPADM

## 2019-06-01 RX ORDER — FENTANYL CITRATE 50 UG/ML
50 INJECTION, SOLUTION INTRAMUSCULAR; INTRAVENOUS
Status: DISCONTINUED | OUTPATIENT
Start: 2019-06-01 | End: 2019-06-01 | Stop reason: HOSPADM

## 2019-06-01 RX ORDER — HYDROMORPHONE HYDROCHLORIDE 1 MG/ML
0.5 INJECTION, SOLUTION INTRAMUSCULAR; INTRAVENOUS; SUBCUTANEOUS EVERY 4 HOURS PRN
Status: DISCONTINUED | OUTPATIENT
Start: 2019-06-01 | End: 2019-06-02

## 2019-06-01 RX ORDER — PROMETHAZINE HYDROCHLORIDE 25 MG/1
25 SUPPOSITORY RECTAL ONCE AS NEEDED
Status: DISCONTINUED | OUTPATIENT
Start: 2019-06-01 | End: 2019-06-01 | Stop reason: HOSPADM

## 2019-06-01 RX ORDER — TRANEXAMIC ACID 100 MG/ML
INJECTION, SOLUTION INTRAVENOUS AS NEEDED
Status: DISCONTINUED | OUTPATIENT
Start: 2019-06-01 | End: 2019-06-01 | Stop reason: SURG

## 2019-06-01 RX ORDER — TRAMADOL HYDROCHLORIDE 50 MG/1
50 TABLET ORAL EVERY 6 HOURS PRN
Status: DISCONTINUED | OUTPATIENT
Start: 2019-06-01 | End: 2019-06-02

## 2019-06-01 RX ORDER — GABAPENTIN 300 MG/1
300 CAPSULE ORAL ONCE
Status: COMPLETED | OUTPATIENT
Start: 2019-06-01 | End: 2019-06-01

## 2019-06-01 RX ORDER — DIPHENHYDRAMINE HCL 25 MG
25 CAPSULE ORAL
Status: DISCONTINUED | OUTPATIENT
Start: 2019-06-01 | End: 2019-06-01 | Stop reason: HOSPADM

## 2019-06-01 RX ORDER — FLUMAZENIL 0.1 MG/ML
0.2 INJECTION INTRAVENOUS AS NEEDED
Status: DISCONTINUED | OUTPATIENT
Start: 2019-06-01 | End: 2019-06-01 | Stop reason: HOSPADM

## 2019-06-01 RX ORDER — HYDROCODONE BITARTRATE AND ACETAMINOPHEN 5; 325 MG/1; MG/1
1 TABLET ORAL EVERY 8 HOURS PRN
Status: DISCONTINUED | OUTPATIENT
Start: 2019-06-01 | End: 2019-06-07 | Stop reason: HOSPADM

## 2019-06-01 RX ORDER — HYDROMORPHONE HYDROCHLORIDE 1 MG/ML
0.5 INJECTION, SOLUTION INTRAMUSCULAR; INTRAVENOUS; SUBCUTANEOUS
Status: DISCONTINUED | OUTPATIENT
Start: 2019-06-01 | End: 2019-06-01 | Stop reason: HOSPADM

## 2019-06-01 RX ORDER — POTASSIUM CHLORIDE 7.45 MG/ML
10 INJECTION INTRAVENOUS ONCE
Status: COMPLETED | OUTPATIENT
Start: 2019-06-01 | End: 2019-06-01

## 2019-06-01 RX ORDER — LIDOCAINE HYDROCHLORIDE 40 MG/ML
SOLUTION TOPICAL
Status: COMPLETED
Start: 2019-06-01 | End: 2019-06-01

## 2019-06-01 RX ORDER — DOCUSATE SODIUM 100 MG/1
100 CAPSULE, LIQUID FILLED ORAL 2 TIMES DAILY PRN
Status: DISCONTINUED | OUTPATIENT
Start: 2019-06-01 | End: 2019-06-07 | Stop reason: HOSPADM

## 2019-06-01 RX ORDER — FENTANYL CITRATE 50 UG/ML
INJECTION, SOLUTION INTRAMUSCULAR; INTRAVENOUS AS NEEDED
Status: DISCONTINUED | OUTPATIENT
Start: 2019-06-01 | End: 2019-06-01 | Stop reason: SURG

## 2019-06-01 RX ORDER — SODIUM CHLORIDE 0.9 % (FLUSH) 0.9 %
1-10 SYRINGE (ML) INJECTION AS NEEDED
Status: DISCONTINUED | OUTPATIENT
Start: 2019-06-01 | End: 2019-06-01 | Stop reason: HOSPADM

## 2019-06-01 RX ORDER — PROMETHAZINE HYDROCHLORIDE 25 MG/ML
12.5 INJECTION, SOLUTION INTRAMUSCULAR; INTRAVENOUS ONCE AS NEEDED
Status: DISCONTINUED | OUTPATIENT
Start: 2019-06-01 | End: 2019-06-01 | Stop reason: HOSPADM

## 2019-06-01 RX ORDER — SODIUM CHLORIDE, SODIUM LACTATE, POTASSIUM CHLORIDE, CALCIUM CHLORIDE 600; 310; 30; 20 MG/100ML; MG/100ML; MG/100ML; MG/100ML
9 INJECTION, SOLUTION INTRAVENOUS CONTINUOUS
Status: DISCONTINUED | OUTPATIENT
Start: 2019-06-01 | End: 2019-06-01

## 2019-06-01 RX ORDER — DIPHENHYDRAMINE HCL 12.5MG/5ML
12.5 LIQUID (ML) ORAL EVERY 6 HOURS PRN
Status: DISCONTINUED | OUTPATIENT
Start: 2019-06-01 | End: 2019-06-01 | Stop reason: HOSPADM

## 2019-06-01 RX ORDER — HYDRALAZINE HYDROCHLORIDE 20 MG/ML
5 INJECTION INTRAMUSCULAR; INTRAVENOUS
Status: DISCONTINUED | OUTPATIENT
Start: 2019-06-01 | End: 2019-06-01 | Stop reason: HOSPADM

## 2019-06-01 RX ORDER — ROCURONIUM BROMIDE 10 MG/ML
INJECTION, SOLUTION INTRAVENOUS AS NEEDED
Status: DISCONTINUED | OUTPATIENT
Start: 2019-06-01 | End: 2019-06-01 | Stop reason: SURG

## 2019-06-01 RX ORDER — ACETAMINOPHEN 500 MG
1000 TABLET ORAL EVERY 8 HOURS
Status: DISCONTINUED | OUTPATIENT
Start: 2019-06-01 | End: 2019-06-01

## 2019-06-01 RX ORDER — PROMETHAZINE HYDROCHLORIDE 25 MG/1
25 TABLET ORAL ONCE AS NEEDED
Status: DISCONTINUED | OUTPATIENT
Start: 2019-06-01 | End: 2019-06-01 | Stop reason: HOSPADM

## 2019-06-01 RX ORDER — SODIUM CHLORIDE, SODIUM LACTATE, POTASSIUM CHLORIDE, CALCIUM CHLORIDE 600; 310; 30; 20 MG/100ML; MG/100ML; MG/100ML; MG/100ML
INJECTION, SOLUTION INTRAVENOUS CONTINUOUS PRN
Status: DISCONTINUED | OUTPATIENT
Start: 2019-06-01 | End: 2019-06-01 | Stop reason: SURG

## 2019-06-01 RX ORDER — CEFAZOLIN SODIUM 2 G/100ML
2 INJECTION, SOLUTION INTRAVENOUS EVERY 8 HOURS
Status: COMPLETED | OUTPATIENT
Start: 2019-06-01 | End: 2019-06-02

## 2019-06-01 RX ORDER — EPHEDRINE SULFATE 50 MG/ML
5 INJECTION, SOLUTION INTRAVENOUS ONCE AS NEEDED
Status: DISCONTINUED | OUTPATIENT
Start: 2019-06-01 | End: 2019-06-01 | Stop reason: HOSPADM

## 2019-06-01 RX ORDER — GLYCOPYRROLATE 0.2 MG/ML
INJECTION INTRAMUSCULAR; INTRAVENOUS AS NEEDED
Status: DISCONTINUED | OUTPATIENT
Start: 2019-06-01 | End: 2019-06-01 | Stop reason: SURG

## 2019-06-01 RX ORDER — PROMETHAZINE HYDROCHLORIDE 25 MG/ML
6.25 INJECTION, SOLUTION INTRAMUSCULAR; INTRAVENOUS
Status: DISCONTINUED | OUTPATIENT
Start: 2019-06-01 | End: 2019-06-01 | Stop reason: HOSPADM

## 2019-06-01 RX ORDER — CEFAZOLIN SODIUM 2 G/100ML
2 INJECTION, SOLUTION INTRAVENOUS ONCE
Status: COMPLETED | OUTPATIENT
Start: 2019-06-01 | End: 2019-06-01

## 2019-06-01 RX ORDER — HYDROMORPHONE HYDROCHLORIDE 1 MG/ML
INJECTION, SOLUTION INTRAMUSCULAR; INTRAVENOUS; SUBCUTANEOUS
Status: DISCONTINUED
Start: 2019-06-01 | End: 2019-06-01 | Stop reason: WASHOUT

## 2019-06-01 RX ORDER — LABETALOL HYDROCHLORIDE 5 MG/ML
5 INJECTION, SOLUTION INTRAVENOUS
Status: DISCONTINUED | OUTPATIENT
Start: 2019-06-01 | End: 2019-06-01 | Stop reason: HOSPADM

## 2019-06-01 RX ORDER — ACETAMINOPHEN 325 MG/1
650 TABLET ORAL ONCE AS NEEDED
Status: DISCONTINUED | OUTPATIENT
Start: 2019-06-01 | End: 2019-06-01 | Stop reason: HOSPADM

## 2019-06-01 RX ORDER — SODIUM CHLORIDE AND POTASSIUM CHLORIDE 150; 900 MG/100ML; MG/100ML
50 INJECTION, SOLUTION INTRAVENOUS CONTINUOUS
Status: DISCONTINUED | OUTPATIENT
Start: 2019-06-01 | End: 2019-06-06

## 2019-06-01 RX ORDER — BISACODYL 5 MG/1
10 TABLET, DELAYED RELEASE ORAL DAILY PRN
Status: DISCONTINUED | OUTPATIENT
Start: 2019-06-01 | End: 2019-06-02

## 2019-06-01 RX ORDER — UREA 10 %
1 LOTION (ML) TOPICAL NIGHTLY PRN
Status: DISCONTINUED | OUTPATIENT
Start: 2019-06-01 | End: 2019-06-07 | Stop reason: HOSPADM

## 2019-06-01 RX ORDER — SODIUM CHLORIDE 0.9 % (FLUSH) 0.9 %
1-10 SYRINGE (ML) INJECTION AS NEEDED
Status: DISCONTINUED | OUTPATIENT
Start: 2019-06-01 | End: 2019-06-07 | Stop reason: HOSPADM

## 2019-06-01 RX ORDER — ONDANSETRON 2 MG/ML
4 INJECTION INTRAMUSCULAR; INTRAVENOUS ONCE AS NEEDED
Status: DISCONTINUED | OUTPATIENT
Start: 2019-06-01 | End: 2019-06-01 | Stop reason: HOSPADM

## 2019-06-01 RX ORDER — DEXAMETHASONE SODIUM PHOSPHATE 10 MG/ML
INJECTION INTRAMUSCULAR; INTRAVENOUS AS NEEDED
Status: DISCONTINUED | OUTPATIENT
Start: 2019-06-01 | End: 2019-06-01 | Stop reason: SURG

## 2019-06-01 RX ORDER — LIDOCAINE HYDROCHLORIDE 40 MG/ML
SOLUTION TOPICAL AS NEEDED
Status: DISCONTINUED | OUTPATIENT
Start: 2019-06-01 | End: 2019-06-01 | Stop reason: SURG

## 2019-06-01 RX ORDER — PROPOFOL 10 MG/ML
VIAL (ML) INTRAVENOUS AS NEEDED
Status: DISCONTINUED | OUTPATIENT
Start: 2019-06-01 | End: 2019-06-01 | Stop reason: SURG

## 2019-06-01 RX ORDER — SODIUM CHLORIDE 0.9 % (FLUSH) 0.9 %
3 SYRINGE (ML) INJECTION EVERY 12 HOURS SCHEDULED
Status: DISCONTINUED | OUTPATIENT
Start: 2019-06-01 | End: 2019-06-07 | Stop reason: HOSPADM

## 2019-06-01 RX ORDER — SODIUM CHLORIDE 9 MG/ML
100 INJECTION, SOLUTION INTRAVENOUS CONTINUOUS
Status: DISCONTINUED | OUTPATIENT
Start: 2019-06-01 | End: 2019-06-01

## 2019-06-01 RX ORDER — FENTANYL CITRATE 50 UG/ML
INJECTION, SOLUTION INTRAMUSCULAR; INTRAVENOUS
Status: COMPLETED
Start: 2019-06-01 | End: 2019-06-01

## 2019-06-01 RX ORDER — FAMOTIDINE 10 MG/ML
20 INJECTION, SOLUTION INTRAVENOUS ONCE
Status: COMPLETED | OUTPATIENT
Start: 2019-06-01 | End: 2019-06-01

## 2019-06-01 RX ORDER — FENTANYL CITRATE 50 UG/ML
25 INJECTION, SOLUTION INTRAMUSCULAR; INTRAVENOUS ONCE
Status: COMPLETED | OUTPATIENT
Start: 2019-06-01 | End: 2019-06-01

## 2019-06-01 RX ORDER — LIDOCAINE HYDROCHLORIDE 20 MG/ML
INJECTION, SOLUTION INFILTRATION; PERINEURAL AS NEEDED
Status: DISCONTINUED | OUTPATIENT
Start: 2019-06-01 | End: 2019-06-01 | Stop reason: SURG

## 2019-06-01 RX ORDER — NALOXONE HCL 0.4 MG/ML
0.2 VIAL (ML) INJECTION AS NEEDED
Status: DISCONTINUED | OUTPATIENT
Start: 2019-06-01 | End: 2019-06-01 | Stop reason: HOSPADM

## 2019-06-01 RX ADMIN — LIDOCAINE HYDROCHLORIDE 1 EACH: 40 SOLUTION TOPICAL at 09:15

## 2019-06-01 RX ADMIN — ROCURONIUM BROMIDE 20 MG: 10 INJECTION INTRAVENOUS at 09:57

## 2019-06-01 RX ADMIN — POTASSIUM CHLORIDE AND SODIUM CHLORIDE 75 ML/HR: 900; 150 INJECTION, SOLUTION INTRAVENOUS at 17:43

## 2019-06-01 RX ADMIN — PROPOFOL 150 MG: 10 INJECTION, EMULSION INTRAVENOUS at 09:15

## 2019-06-01 RX ADMIN — AMITRIPTYLINE HYDROCHLORIDE 25 MG: 25 TABLET, FILM COATED ORAL at 21:24

## 2019-06-01 RX ADMIN — IPRATROPIUM BROMIDE AND ALBUTEROL SULFATE 1.5 ML: 2.5; .5 SOLUTION RESPIRATORY (INHALATION) at 21:55

## 2019-06-01 RX ADMIN — SODIUM CHLORIDE, POTASSIUM CHLORIDE, SODIUM LACTATE AND CALCIUM CHLORIDE: 600; 310; 30; 20 INJECTION, SOLUTION INTRAVENOUS at 06:52

## 2019-06-01 RX ADMIN — SODIUM CHLORIDE, PRESERVATIVE FREE 3 ML: 5 INJECTION INTRAVENOUS at 21:25

## 2019-06-01 RX ADMIN — PRAMIPEXOLE DIHYDROCHLORIDE 1.5 MG: 1.5 TABLET ORAL at 21:24

## 2019-06-01 RX ADMIN — ROCURONIUM BROMIDE 30 MG: 10 INJECTION INTRAVENOUS at 09:15

## 2019-06-01 RX ADMIN — ONDANSETRON 4 MG: 2 INJECTION INTRAMUSCULAR; INTRAVENOUS at 10:46

## 2019-06-01 RX ADMIN — TRANEXAMIC ACID 1000 MG: 1 INJECTION, SOLUTION INTRAVENOUS at 09:33

## 2019-06-01 RX ADMIN — HYDROMORPHONE HYDROCHLORIDE 1 MG: 1 INJECTION, SOLUTION INTRAMUSCULAR; INTRAVENOUS; SUBCUTANEOUS at 03:19

## 2019-06-01 RX ADMIN — BUDESONIDE 0.5 MG: 0.5 INHALANT RESPIRATORY (INHALATION) at 21:55

## 2019-06-01 RX ADMIN — LIDOCAINE HYDROCHLORIDE 100 MG: 20 INJECTION, SOLUTION INFILTRATION; PERINEURAL at 09:15

## 2019-06-01 RX ADMIN — SODIUM CHLORIDE, POTASSIUM CHLORIDE, SODIUM LACTATE AND CALCIUM CHLORIDE 9 ML/HR: 600; 310; 30; 20 INJECTION, SOLUTION INTRAVENOUS at 07:30

## 2019-06-01 RX ADMIN — TRAMADOL HYDROCHLORIDE 50 MG: 50 TABLET, FILM COATED ORAL at 23:44

## 2019-06-01 RX ADMIN — GABAPENTIN 100 MG: 100 CAPSULE ORAL at 21:24

## 2019-06-01 RX ADMIN — GABAPENTIN 100 MG: 100 CAPSULE ORAL at 03:19

## 2019-06-01 RX ADMIN — DEXAMETHASONE SODIUM PHOSPHATE 6 MG: 10 INJECTION INTRAMUSCULAR; INTRAVENOUS at 09:52

## 2019-06-01 RX ADMIN — GABAPENTIN 300 MG: 300 CAPSULE ORAL at 07:36

## 2019-06-01 RX ADMIN — FAMOTIDINE 20 MG: 10 INJECTION INTRAVENOUS at 07:54

## 2019-06-01 RX ADMIN — EPHEDRINE SULFATE 10 MG: 50 INJECTION INTRAMUSCULAR; INTRAVENOUS; SUBCUTANEOUS at 11:23

## 2019-06-01 RX ADMIN — FENTANYL CITRATE 100 MCG: 50 INJECTION INTRAMUSCULAR; INTRAVENOUS at 09:15

## 2019-06-01 RX ADMIN — SODIUM CHLORIDE, POTASSIUM CHLORIDE, SODIUM LACTATE AND CALCIUM CHLORIDE: 600; 310; 30; 20 INJECTION, SOLUTION INTRAVENOUS at 10:00

## 2019-06-01 RX ADMIN — GLYCOPYRROLATE 0.4 MG: 0.2 INJECTION INTRAMUSCULAR; INTRAVENOUS at 10:47

## 2019-06-01 RX ADMIN — ACETAMINOPHEN 1000 MG: 500 TABLET, FILM COATED ORAL at 07:36

## 2019-06-01 RX ADMIN — MUPIROCIN 1 APPLICATION: 20 OINTMENT TOPICAL at 08:10

## 2019-06-01 RX ADMIN — FENTANYL CITRATE 25 MCG: 50 INJECTION INTRAMUSCULAR; INTRAVENOUS at 08:47

## 2019-06-01 RX ADMIN — TRAMADOL HYDROCHLORIDE 50 MG: 50 TABLET, FILM COATED ORAL at 17:42

## 2019-06-01 RX ADMIN — CEFAZOLIN SODIUM 2 G: 2 INJECTION, SOLUTION INTRAVENOUS at 09:24

## 2019-06-01 RX ADMIN — CEFAZOLIN SODIUM 2 G: 2 INJECTION, SOLUTION INTRAVENOUS at 16:18

## 2019-06-01 RX ADMIN — NEOSTIGMINE METHYLSULFATE 2 MG: 1 INJECTION INTRAMUSCULAR; INTRAVENOUS; SUBCUTANEOUS at 10:47

## 2019-06-01 RX ADMIN — POTASSIUM CHLORIDE 10 MEQ: 7.46 INJECTION, SOLUTION INTRAVENOUS at 07:30

## 2019-06-01 NOTE — PROGRESS NOTES
Orthopedic Progress Note      Patient: Latanya Olsen  YOB: 1940     Date of Admission: 5/31/2019 12:52 PM Medical Record Number: [id number]     Attending Physician: Arjun Sainz MD    Planned Procedure:  Procedure(s):  BIPOLAR HIP CEMENTED POSTERIOR (Awaiting Surgery)    Subjective : No new orthopaedic complaints     Pain Relief: some relief with present medication.     Systemic Complaints: No Complaints  Vitals:    05/31/19 2318 06/01/19 0312 06/01/19 0629 06/01/19 0715   BP:  120/62 123/68 146/65   BP Location:  Left arm Left arm Right arm   Patient Position:  Lying Lying Lying   Pulse:  75 71 71   Resp:  16 16 20   Temp: 97.6 °F (36.4 °C) 97.1 °F (36.2 °C) 97 °F (36.1 °C) 98.1 °F (36.7 °C)   TempSrc: Oral Oral Oral Oral   SpO2:  95% 96% 96%   Weight:       Height:           Physical Exam: 78 y.o. female    General Appearance:       Alert, cooperative, in no acute distress                  Extremities:             No clinical sign of DVT        Able to do good movements of digits    Pulses:   Pulses palpable and equal bilaterally           Diagnostic Tests:    Results from last 7 days   Lab Units 06/01/19  0558 05/31/19  1537   WBC 10*3/mm3 13.37* 14.17*   HEMOGLOBIN g/dL 10.9* 11.6*   HEMATOCRIT % 35.4 37.0   PLATELETS 10*3/mm3 221 245     Results from last 7 days   Lab Units 06/01/19  0558 05/31/19  1537   SODIUM mmol/L 132* 134*   POTASSIUM mmol/L 2.9* 3.3*   CHLORIDE mmol/L 89* 91*   CO2 mmol/L 33.4* 32.7*   BUN mg/dL 31* 29*   CREATININE mg/dL 0.93 1.07*   GLUCOSE mg/dL 100* 118*   CALCIUM mg/dL 9.0 9.0     Results from last 7 days   Lab Units 05/31/19  1537   INR  0.94   APTT seconds 26.2     Lab Results   Component Value Date    URICACID 8.9 (H) 03/20/2019     No results found for: CRYSTAL  Microbiology Results (last 10 days)     ** No results found for the last 240 hours. **        No radiology results for the last 7 days          Current Medications:  Scheduled Meds:  [MAR Hold]  amitriptyline 25 mg Oral Nightly   [MAR Hold] aspirin 81 mg Oral Daily   [MAR Hold] budesonide 0.5 mg Nebulization BID - RT   ceFAZolin 2 g Intravenous Once   [MAR Hold] cholecalciferol 1,000 Units Oral Daily   [MAR Hold] citalopram 10 mg Oral Daily   diltiaZEM  mg Oral Q24H   fentaNYL citrate (PF)      [MAR Hold] furosemide 20 mg Oral Daily   gabapentin 100 mg Oral Q8H   HYDROmorphone      [MAR Hold] ipratropium-albuterol 1.5 mL Nebulization Q6H While Awake - RT   [MAR Hold] levothyroxine 88 mcg Oral Once per day on Mon Tue Wed Thu Fri Sat   lidocaine      [MAR Hold] metOLazone 5 mg Oral Once per day on Mon Wed Fri   [MAR Hold] pantoprazole 40 mg Oral QAM   [MAR Hold] pramipexole 1.5 mg Oral Nightly     Continuous Infusions:  lactated ringers 9 mL/hr Last Rate: 9 mL/hr (06/01/19 0730)     PRN Meds:.[MAR Hold] HYDROmorphone  •  lidocaine PF 1%  •  [MAR Hold] ondansetron ODT  •  sodium chloride  •  [MAR Hold] zolpidem    Assessment:   Procedure(s):  BIPOLAR HIP CEMENTED POSTERIOR (Awaiting Surgery)    Patient Active Problem List   Diagnosis   • Carcinoid tumor of lung   • Diverticulosis of intestine   • Obstructive sleep apnea syndrome   • Weight loss   • Vitamin D deficiency   • Overweight (BMI 25.0-29.9)   • Spinal stenosis of lumbar region without neurogenic claudication   • Osteoarthritis of knee   • Obesity (BMI 30-39.9)   • Neutropenia (CMS/HCC)   • Chronic lower back pain   • Knee pain   • Insomnia   • Hypothyroidism   • Hypokalemia   • Hypertension   • Hyperlipidemia   • Generalized osteoarthritis   • Gastroparesis   • Foot pain   • Chronic obstructive pulmonary disease (CMS/HCC)   • Chronic constipation   • Anemia   • Weight gain   • Pain of left breast   • Elevated serum alkaline phosphatase level   • Neck pain   • Volume overload   • Closed fracture of neck of right femur (CMS/HCC)       PLAN:   Cardiology has seen the patient today. She is cleared for surgery.  Plan for OR today.  Pt NPO  Obtain  informed consent  Dr. Crenshaw will bedside to answer any questions or concerns and to luke the surgical site prior to the procedure.  To OR on call    DEVORA Albrecht    Date: 6/1/2019    Time: 8:41 AM

## 2019-06-01 NOTE — ANESTHESIA POSTPROCEDURE EVALUATION
"Patient: Latanya Olsen    Procedure Summary     Date:  06/01/19 Room / Location:  [facility] OR [address] [place] [address] MAIN OR    Anesthesia Start:  0911 Anesthesia Stop:  1149    Procedure:  BIPOLAR HIP CEMENTED POSTERIOR (Right Hip) Diagnosis:       Closed fracture of neck of right femur, initial encounter (CMS/[facility])      (Closed fracture of neck of right femur, initial encounter (CMS/[facility]) [S72.001A])    Surgeon:  Ashley Crenshaw MD Provider:  Delfin Coello MD    Anesthesia Type:  general ASA Status:  3          Anesthesia Type: general  Last vitals  BP   100/69 (06/01/19 1240)   Temp   36.8 °C (98.3 °F) (06/01/19 1145)   Pulse   64 (06/01/19 1240)   Resp   16 (06/01/19 1240)     SpO2   96 % (06/01/19 1240)     Post Anesthesia Care and Evaluation    Level of consciousness: awake  Pain management: adequate  Airway patency: patent  Anesthetic complications: No anesthetic complications    Cardiovascular status: acceptable  Respiratory status: acceptable  Hydration status: acceptable    Comments: /69   Pulse 64   Temp 36.8 °C (98.3 °F) (Oral)   Resp 16   Ht 167.6 cm (66\")   Wt 93 kg (205 lb)   SpO2 96%   BMI 33.09 kg/m²         "

## 2019-06-01 NOTE — PERIOPERATIVE NURSING NOTE
Dr Posey and Dr Crenshaw at pt's bedside and discussed that pt has cardiac clearance and ok to surgery.

## 2019-06-01 NOTE — PLAN OF CARE
Problem: Skin Injury Risk (Adult)  Goal: Skin Health and Integrity  Outcome: Ongoing (interventions implemented as appropriate)      Problem: Fall Risk (Adult)  Goal: Absence of Fall  Outcome: Ongoing (interventions implemented as appropriate)      Problem: Fractured Hip (Adult)  Goal: Signs and Symptoms of Listed Potential Problems Will be Absent, Minimized or Managed (Fractured Hip)  Outcome: Ongoing (interventions implemented as appropriate)      Problem: Patient Care Overview  Goal: Plan of Care Review  Outcome: Ongoing (interventions implemented as appropriate)      Problem: Patient Care Overview  Goal: Plan of Care Review  Outcome: Ongoing (interventions implemented as appropriate)   06/01/19 1611   Coping/Psychosocial   Plan of Care Reviewed With patient   Plan of Care Review   Progress improving   OTHER   Outcome Summary S/P R bipolar hip. VSS. Pt denies pain. Sleeping between care since arrival to unit at 1315. Still needs to get up and ambulate and sit in chair once more awake. DTV 1945. Dressing c/d/i. Discussed bp med and monitoring r/t HTN, needs reinforcement r/t drowsiness.

## 2019-06-01 NOTE — ANESTHESIA PROCEDURE NOTES
Airway  Urgency: elective    Date/Time: 6/1/2019 9:20 AM  Airway not difficult    General Information and Staff    Patient location during procedure: OR  Anesthesiologist: eDlfin Coello MD  CRNA: Truong Campbell CRNA    Indications and Patient Condition  Indications for airway management: airway protection    Preoxygenated: yes  Mask difficulty assessment: 1 - vent by mask    Final Airway Details  Final airway type: endotracheal airway      Successful airway: ETT  Cuffed: yes   Successful intubation technique: direct laryngoscopy  Endotracheal tube insertion site: oral  Blade: Clemente  Blade size: 2  ETT size (mm): 7.0  Cormack-Lehane Classification: grade I - full view of glottis  Placement verified by: chest auscultation and capnometry   Measured from: lips  ETT to lips (cm): 22  Number of attempts at approach: 1    Additional Comments  Pre 02 100%, SIVI, DL x1, atraumatic intubation, BLBS, Positive ETC02.

## 2019-06-01 NOTE — ANESTHESIA PREPROCEDURE EVALUATION
Anesthesia Evaluation     NPO Solid Status: > 8 hours             Airway   Mallampati: II  Dental      Pulmonary - normal exam   (+) lung cancer, COPD, sleep apnea on CPAP,   (-) not a smoker    ROS comment: Positive SIERRA screen/Positive SIERRA diagnosis and 2 or more mitigating factors used (recovery in non-supine position, pre and post CPAP and multimodal analgesia)    Cardiovascular - normal exam    (+) hypertension, dysrhythmias Paroxysmal Atrial Fib,       Neuro/Psych  (+) psychiatric history,     GI/Hepatic/Renal/Endo    (+) obesity,   renal disease CRI, hypothyroidism,     Musculoskeletal     (+) back pain,   Abdominal    Substance History      OB/GYN          Other                        Anesthesia Plan    ASA 3     general     Anesthetic plan, all risks, benefits, and alternatives have been provided, discussed and informed consent has been obtained with: patient.

## 2019-06-01 NOTE — PLAN OF CARE
Problem: Skin Injury Risk (Adult)  Goal: Skin Health and Integrity  Outcome: Ongoing (interventions implemented as appropriate)      Problem: Fall Risk (Adult)  Goal: Absence of Fall  Outcome: Ongoing (interventions implemented as appropriate)      Problem: Fractured Hip (Adult)  Goal: Signs and Symptoms of Listed Potential Problems Will be Absent, Minimized or Managed (Fractured Hip)  Outcome: Ongoing (interventions implemented as appropriate)      Problem: Patient Care Overview  Goal: Plan of Care Review  Outcome: Ongoing (interventions implemented as appropriate)   06/01/19 0226   Coping/Psychosocial   Plan of Care Reviewed With patient   Plan of Care Review   Progress no change   OTHER   Outcome Summary client admit 5/31 with Rt femur fx. VSS, pain managed with prn dilaudid. bedrest maintained at this time, pure wick device in place. surgery scheduled for 6/1 at 0700. discussed BP monitoring r/t hx HTN.      Goal: Individualization and Mutuality  Outcome: Ongoing (interventions implemented as appropriate)    Goal: Discharge Needs Assessment  Outcome: Ongoing (interventions implemented as appropriate)

## 2019-06-01 NOTE — PROGRESS NOTES
"Daily progress note    Chief complaint  Doing same  No new complaints  Status post right total hip arthroplasty    History of present illness  78-year-old white female who is well-known to our service with history of hypertension hyperlipidemia hypothyroidism COPD neuropathy restless leg syndrome and gastroesophageal reflux disease who was recently discharged from the hospital after treatment of fluid overload chronic kidney disease continues to have right hip pain which radiates down into her anterior leg up to knee.  Patient denies any fall trauma injury.  Patient evaluated in the ER found to have right femur neck fracture admitted for management.  At the time of interview she is hurting wants to eat but denies any chest pain shortness of breath abdominal pain nausea vomiting diarrhea.     REVIEW OF SYSTEMS  Constitutional: Negative for fever.   Respiratory: Negative for shortness of breath.    Cardiovascular: Negative for chest pain.   Neurological: Negative for weakness and numbness      PHYSICAL EXAM  Blood pressure 111/58, pulse 66, temperature 98.5 °F (36.9 °C), temperature source Oral, resp. rate 18, height 167.6 cm (66\"), weight 93 kg (205 lb), SpO2 97 %.    Constitutional: She is oriented to person, place, and time. No distress.   Head: Normocephalic and atraumatic.   Eyes: EOM are normal. Pupils are equal, round, and reactive to light.   Neck: Normal range of motion. Neck supple.   Cardiovascular: Normal rate, regular rhythm and normal heart sounds. Exam reveals no gallop and no friction rub.   Pulmonary/Chest: Effort normal and breath sounds normal. No respiratory distress. She has no wheezes. She has no rales.   Abdominal: Soft. There is no tenderness. There is no rebound and no guarding.   Musculoskeletal: Normal range of motion. She exhibits no edema. Pt's back is non-tender. There is posterior R hip tenderness and pain with ROM of the R leg.    Neurological: She is alert and oriented to person, " place, and time. Pt has normal strength and sensation   Skin: Skin is warm and dry. No rash noted.   Psychiatric: Mood and affect normal.     LABS  Lab Results (last 24 hours)     Procedure Component Value Units Date/Time    Basic Metabolic Panel [[id number]] Collected:  06/01/19 1407    Specimen:  Blood Updated:  06/01/19 1451    TSH [[id number]]  (Normal) Collected:  06/01/19 0558    Specimen:  Blood Updated:  06/01/19 0653     TSH 2.030 mIU/mL     BNP [[id number]]  (Normal) Collected:  06/01/19 0558    Specimen:  Blood Updated:  06/01/19 0653     proBNP 97.1 pg/mL     Narrative:       Among patients with dyspnea, NT-proBNP is highly sensitive for the detection of acute congestive heart failure. In addition NT-proBNP of <300 pg/ml effectively rules out acute congestive heart failure with 99% negative predictive value.    Comprehensive Metabolic Panel [[id number]]  (Abnormal) Collected:  06/01/19 0558    Specimen:  Blood Updated:  06/01/19 0646     Glucose 100 mg/dL      BUN 31 mg/dL      Creatinine 0.93 mg/dL      Sodium 132 mmol/L      Potassium 2.9 mmol/L      Chloride 89 mmol/L      CO2 33.4 mmol/L      Calcium 9.0 mg/dL      Total Protein 6.1 g/dL      Albumin 3.80 g/dL      ALT (SGPT) 14 U/L      AST (SGOT) 10 U/L      Alkaline Phosphatase 94 U/L      Total Bilirubin 0.3 mg/dL      eGFR Non African Amer 58 mL/min/1.73      Globulin 2.3 gm/dL      A/G Ratio 1.7 g/dL      BUN/Creatinine Ratio 33.3     Anion Gap 9.6 mmol/L     Narrative:       GFR Normal >60  Chronic Kidney Disease <60  Kidney Failure <15    Lipid Panel [[id number]]  (Abnormal) Collected:  06/01/19 0558    Specimen:  Blood Updated:  06/01/19 0646     Total Cholesterol 198 mg/dL      Triglycerides 55 mg/dL      HDL Cholesterol 119 mg/dL      LDL Cholesterol  68 mg/dL      VLDL Cholesterol 11 mg/dL      LDL/HDL Ratio 0.57    Narrative:       Cholesterol Reference Ranges  (U.S. Department of Health and Human Services ATP III  Classifications)    Desirable          <200 mg/dL  Borderline High    200-239 mg/dL  High Risk          >240 mg/dL      Triglyceride Reference Ranges  (U.S. Department of Health and Human Services ATP III Classifications)    Normal           <150 mg/dL  Borderline High  150-199 mg/dL  High             200-499 mg/dL  Very High        >500 mg/dL    HDL Reference Ranges  (U.S. Department of Health and Human Services ATP III Classifcations)    Low     <40 mg/dl (major risk factor for CHD)  High    >60 mg/dl ('negative' risk factor for CHD)        LDL Reference Ranges  (U.S. Department of Health and Human Services ATP III Classifcations)    Optimal          <100 mg/dL  Near Optimal     100-129 mg/dL  Borderline High  130-159 mg/dL  High             160-189 mg/dL  Very High        >189 mg/dL    Hemoglobin A1c [[id number]]  (Abnormal) Collected:  06/01/19 0558    Specimen:  Blood Updated:  06/01/19 0635     Hemoglobin A1C 5.90 %     Narrative:       Hemoglobin A1C Ranges:    Increased Risk for Diabetes  5.7% to 6.4%  Diabetes                     >= 6.5%  Diabetic Goal                < 7.0%    CBC & Differential [[id number]] Collected:  06/01/19 0558    Specimen:  Blood Updated:  06/01/19 0635    Narrative:       The following orders were created for panel order CBC & Differential.  Procedure                               Abnormality         Status                     ---------                               -----------         ------                     CBC Auto Differential[[id number]]        Abnormal            Final result                 Please view results for these tests on the individual orders.    CBC Auto Differential [[id number]]  (Abnormal) Collected:  06/01/19 0558    Specimen:  Blood Updated:  06/01/19 0635     WBC 13.37 10*3/mm3      RBC 4.22 10*6/mm3      Hemoglobin 10.9 g/dL      Hematocrit 35.4 %      MCV 83.9 fL      MCH 25.8 pg      MCHC 30.8 g/dL      RDW 16.6 %      RDW-SD 50.4 fl      MPV 10.5 fL       Platelets 221 10*3/mm3      Neutrophil % 73.1 %      Lymphocyte % 13.0 %      Monocyte % 11.1 %      Eosinophil % 0.5 %      Basophil % 0.1 %      Immature Grans % 2.2 %      Neutrophils, Absolute 9.75 10*3/mm3      Lymphocytes, Absolute 1.74 10*3/mm3      Monocytes, Absolute 1.49 10*3/mm3      Eosinophils, Absolute 0.07 10*3/mm3      Basophils, Absolute 0.02 10*3/mm3      Immature Grans, Absolute 0.30 10*3/mm3      nRBC 0.0 /100 WBC     aPTT [[id number]]  (Normal) Collected:  05/31/19 1537    Specimen:  Blood Updated:  05/31/19 1609     PTT 26.2 seconds     Protime-INR [[id number]]  (Normal) Collected:  05/31/19 1537    Specimen:  Blood Updated:  05/31/19 1609     Protime 12.3 Seconds      INR 0.94    Comprehensive Metabolic Panel [[id number]]  (Abnormal) Collected:  05/31/19 1537    Specimen:  Blood Updated:  05/31/19 1607     Glucose 118 mg/dL      BUN 29 mg/dL      Creatinine 1.07 mg/dL      Sodium 134 mmol/L      Potassium 3.3 mmol/L      Chloride 91 mmol/L      CO2 32.7 mmol/L      Calcium 9.0 mg/dL      Total Protein 6.4 g/dL      Albumin 3.80 g/dL      ALT (SGPT) 18 U/L      AST (SGOT) 10 U/L      Alkaline Phosphatase 98 U/L      Total Bilirubin 0.3 mg/dL      eGFR Non African Amer 49 mL/min/1.73      Globulin 2.6 gm/dL      A/G Ratio 1.5 g/dL      BUN/Creatinine Ratio 27.1     Anion Gap 10.3 mmol/L     Narrative:       GFR Normal >60  Chronic Kidney Disease <60  Kidney Failure <15    CBC & Differential [[id number]] Collected:  05/31/19 1537    Specimen:  Blood Updated:  05/31/19 1551    Narrative:       The following orders were created for panel order CBC & Differential.  Procedure                               Abnormality         Status                     ---------                               -----------         ------                     CBC Auto Differential[[id number]]        Abnormal            Final result                 Please view results for these tests on the individual orders.    CBC Auto  Differential [[id number]]  (Abnormal) Collected:  05/31/19 1537    Specimen:  Blood Updated:  05/31/19 1551     WBC 14.17 10*3/mm3      RBC 4.55 10*6/mm3      Hemoglobin 11.6 g/dL      Hematocrit 37.0 %      MCV 81.3 fL      MCH 25.5 pg      MCHC 31.4 g/dL      RDW 16.4 %      RDW-SD 48.2 fl      MPV 10.5 fL      Platelets 245 10*3/mm3      Neutrophil % 79.3 %      Lymphocyte % 10.9 %      Monocyte % 7.4 %      Eosinophil % 0.0 %      Basophil % 0.2 %      Immature Grans % 2.2 %      Neutrophils, Absolute 11.23 10*3/mm3      Lymphocytes, Absolute 1.55 10*3/mm3      Monocytes, Absolute 1.05 10*3/mm3      Eosinophils, Absolute 0.00 10*3/mm3      Basophils, Absolute 0.03 10*3/mm3      Immature Grans, Absolute 0.31 10*3/mm3      nRBC 0.0 /100 WBC         Imaging Results (last 24 hours)     Procedure Component Value Units Date/Time    XR Hip With or Without Pelvis 2 - 3 View Right [[id number]] Collected:  06/01/19 1324     Updated:  06/01/19 1438    Narrative:       ONE VIEW PORTABLE RIGHT HIP AND AP PELVIS     HISTORY: Right hip replacement for a fracture.      FINDINGS: The patient has had recent total hip replacement and the  alignment appears satisfactory.     This report was finalized on 6/1/2019 2:35 PM by Dr. Lance Piper M.D.       XR Chest 1 View [[id number]] Collected:  05/31/19 1947     Updated:  05/31/19 1947    Narrative:       PORTABLE CHEST     HISTORY: Preop chest x-ray. Lung cancer.     FINDINGS: A single portable view of the chest demonstrates the heart to  be within normal limits in size. Surgical clips are noted in the AP  window. There is no evidence of focal infiltrate, effusion or of  congestive failure.       XR Hip With or Without Pelvis 2 - 3 View Right [[id number]] Collected:  05/31/19 1428     Updated:  05/31/19 1526    Narrative:       TWO-VIEW RIGHT HIP AND ONE VIEW AP PELVIS     HISTORY: Fell. Pain.     FINDINGS: There is an acute fracture at the base of the femoral neck  without significant  displacement at the present time.     This report was finalized on 5/31/2019 3:23 PM by Dr. Lance Piper M.D.           Current Facility-Administered Medications:   •  acetaminophen (TYLENOL) tablet 1,000 mg, 1,000 mg, Oral, Q8H, Ashley Crenshaw MD  •  acetaminophen (TYLENOL) tablet 325 mg, 325 mg, Oral, Q4H PRN, Ashley Crenshaw MD  •  amitriptyline (ELAVIL) tablet 25 mg, 25 mg, Oral, Nightly, Arjun Sainz MD, 25 mg at 05/31/19 2040  •  aspirin chewable tablet 81 mg, 81 mg, Oral, Daily, Arjun Sainz MD, 81 mg at 05/31/19 2040  •  bisacodyl (DULCOLAX) EC tablet 10 mg, 10 mg, Oral, Daily PRN, Ashley Crenshaw MD  •  bisacodyl (DULCOLAX) suppository 10 mg, 10 mg, Rectal, Daily PRN, Ashley Crenshaw MD  •  budesonide (PULMICORT) nebulizer solution 0.5 mg, 0.5 mg, Nebulization, BID - RT, Arjun Sainz MD, 0.5 mg at 05/31/19 2233  •  ceFAZolin in dextrose (ANCEF) IVPB solution 2 g, 2 g, Intravenous, Q8H, Ashley Crenshaw MD  •  cholecalciferol (VITAMIN D3) tablet 1,000 Units, 1,000 Units, Oral, Daily, Arjun Sainz MD, 1,000 Units at 05/31/19 2040  •  citalopram (CeleXA) tablet 10 mg, 10 mg, Oral, Daily, Arjun Sainz MD, 10 mg at 05/31/19 2040  •  diltiaZEM CD (CARDIZEM CD) 24 hr capsule 120 mg, 120 mg, Oral, Q24H, Arjun Sainz MD, 120 mg at 05/31/19 2040  •  docusate sodium (COLACE) capsule 100 mg, 100 mg, Oral, BID PRN, Ashley Crenshaw MD  •  [START ON 6/2/2019] enoxaparin (LOVENOX) syringe 40 mg, 40 mg, Subcutaneous, Daily, Ashley Crenshaw MD  •  furosemide (LASIX) tablet 20 mg, 20 mg, Oral, Daily, Arjun Sainz MD, 20 mg at 05/31/19 2040  •  gabapentin (NEURONTIN) capsule 100 mg, 100 mg, Oral, Q8H, Arjun Sainz MD, 100 mg at 06/01/19 0319  •  HYDROcodone-acetaminophen (NORCO) 5-325 MG per tablet 1 tablet, 1 tablet, Oral, Q8H PRN, Ashley Crenshaw MD  •  HYDROmorphone (DILAUDID) injection 0.5 mg, 0.5 mg, Intravenous, Q4H PRN, Flower  Ashley MAXWELL MD  •  ipratropium-albuterol (DUO-NEB) nebulizer solution 1.5 mL, 1.5 mL, Nebulization, Q6H While Awake - RT, Shelly Sainz MD, 1.5 mL at 05/31/19 2233  •  levothyroxine (SYNTHROID, LEVOTHROID) tablet 88 mcg, 88 mcg, Oral, Once per day on Mon Tue Wed Thu Fri Sat, Shelly Sainz MD  •  melatonin tablet 1 mg, 1 mg, Oral, Nightly PRN, Ashley Crenshaw MD  •  [START ON 6/3/2019] metOLazone (ZAROXOLYN) tablet 5 mg, 5 mg, Oral, Once per day on Mon Wed Fri, Shelly Sainz MD  •  ondansetron ODT (ZOFRAN-ODT) disintegrating tablet 4 mg, 4 mg, Oral, Q8H PRN, Shelly Sainz MD  •  pantoprazole (PROTONIX) EC tablet 40 mg, 40 mg, Oral, QAM, Shelly Sainz MD  •  pramipexole (MIRAPEX) tablet 1.5 mg, 1.5 mg, Oral, Nightly, Shelly Sainz MD, 1.5 mg at 05/31/19 2040  •  sodium chloride 0.9 % flush 1-10 mL, 1-10 mL, Intravenous, PRN, Ashley Crenshaw MD  •  sodium chloride 0.9 % flush 3 mL, 3 mL, Intravenous, Q12H, Ashley Crenshaw MD  •  sodium chloride 0.9 % infusion, 100 mL/hr, Intravenous, Continuous, Ashley Crenshaw MD  •  traMADol (ULTRAM) tablet 50 mg, 50 mg, Oral, Q6H PRN, Ashley Crenshaw MD  •  zolpidem (AMBIEN) tablet 5 mg, 5 mg, Oral, Nightly PRN, Shelly Sainz MD    ASSESSMENT  Acute right femur neck fracture status post total hip arthroplasty  Hypertension  Hyperlipidemia  Hypothyroidism  COPD  Neuropathy  Restless leg syndrome  Chronic kidney disease stage II  Gastroesophageal reflux disease    PLAN  CPM  Postop care  Pain management  Continue home medications  Orthopedic surgery consult appreciated  Stress ulcer DVT prophylaxis  Supportive care  PT/OT  Follow closely further recommendation according to hospital course    SHELLY SAINZ MD

## 2019-06-01 NOTE — OP NOTE
OPERATIVE NOTE    Patient: Latanya Olsen  YOB: 1940  Medical Record Number: [id number]  Attending Physician: Arjun Sainz MD    Date of Service: 6/1/2019    PREOPERATIVE DIAGNOSES:  1. Closed fracture of neck of right femur, initial encounter Garden type 3.     2. Osteopenia.      POSTOPERATIVE DIAGNOSES:  1.  Closed fracture of neck of right femur, initial encounter (CMS/[facility]) [S72.001A] Garden type 3.    2. Osteopenia.      PROCEDURES PERFORMED: RIGHT TOTAL  HIP CEMENTED  utilizing a modified Faustin anterolateral  approach     with CBA PHARMAs Accolade C 127 degree neck angle femoral stem size # 4, and   a MDM  head component outer diameter 42 mm, Ceramic Femoral Head 28 mm and + 0 mm neck   Trident 2 tritanium cluster hole acetabular shell size 52 mm outer diameter  MDM liner 42 mm internal diameter (Duane.)    Implant Name Type Inv. Item Serial No.  Lot No. LRB No. Used   SUT FW #2 W/TPR NDL 1/2 CIR 38IN 97CM 26.5MM PORTIA - [id number] Implant SUT FW #2 W/TPR NDL 1/2 CIR 38IN 97CM 26.5MM PORTIA  ARTHREX [id number] Right 3   CMT BONE SIMPLEX/P FULL DOSE 10/PK - [id number] Implant CMT BONE SIMPLEX/P FULL DOSE 10/PK  DUANE SEN [id number] Right 2   SHLL TRIDENT2 TRITANIUM C/HL 52MM - [id number] Implant SHLL TRIDENT2 TRITANIUM C/HL 52MM  DUANE SEN [id number] Right 1   SCRW HEX LP TRIDENT2 6.5X25MM - [id number] Implant SCRW HEX LP TRIDENT2 6.5X25MM  DUANE SEN [id number] Right 1   LINER MDM CMTLS COCR 42MM - [id number] Implant LINER MDM CMTLS COCR 42MM  DUANE SEN [id number] Right 1   SCRW HEX LP TRIDENT2 6.5X30MM - [id number] Implant SCRW HEX LP TRIDENT2 6.5X30MM  DUANE SEN [id number] Right 1   SPACR DIST ACCOLADE C 14MM MD - [id number] Implant SPACR DIST ACCOLADE C 14MM MD  DUANE SEN [id number] Right 1   STEM FEM ACCOLADE 127D SZ4 137MM - [id number] Implant STEM FEM ACCOLADE 127D SZ4 137MM  DUANE SEN [id number] Right 1   INSRT HIP RESTOR ADM/MDM X3 28/48MM SZ42E - [id number] Implant INSRT HIP  RESTOR ADM/MDM X3 28/48MM SZ42E  [employer] [id number] Right 1   HD FEM BIOLOXDELTA V40 CERAM 28MM - [id number] Implant HD FEM BIOLOXDELTA V40 CERAM 28MM  JUSTINA yeppt [id number] Right 1   TOTAL HIP PREP KIT Implant   OLSEN AND NEPHEW [id number] Right 1          SURGEON: Ashley Crenshaw MD    ASSISTANT: PARAM Fortune  The services of a first assist were necessary for performing the procedure safely and expeditiously.  The first assist was present for the entire duration of the case and helped with positioning, retraction and closure of the incision.     ANESTHESIA: General anesthesia. General  Anesthesiologist: Delfin Coello MD  CRNA: Truong Campbell CRNA     Estimated Blood Loss: 150 mL    Specimens: * No orders in the log *    COMPLICATIONS: Nil.      DRAINS:       INDICATIONS: 78 y.o. female was brought to the [facility] Emergency Room following a complaint of  pain in the hip. The patient was evaluated in the emergency room and x-rays confirmed the presence of a displaced intracapsular femoral neck fracture. As I was on call for the emergency room, I was consulted to evaluate and manage this fracture. The patient has been admitted to the hospital internist to optimize secondary to  multiple medical comorbidities. The patient's history was reviewed and preoperative medications were reviewed specifically for anticoagulants. A risk assessment was made for any recent DVT or PE and infection risk.     The patient has a history of fall about 1 month and has been having some right hip pain.  She was evaluated with a CT scan about 2 weeks back but was unable to ambulate.  She was subsequently sent to a subacute rehabilitation center.    The patient's options and alternatives were discussed in detail with the patient. The patient was indicated for a partial/total hip replacement. The patient elected to proceed with a hip replacement. Likely risks and benefits of the procedure including,  but not limited to infection, DVT, pulmonary embolism, leg length discrepancy, recurrent dislocation, periprosthetic fractures, possibility of injury to nerves or vessels, risk for cardiac events, MI, stroke, post operative delirium,  mortality, morbidity, and immobility syndrome have been discussed in detail. Despite the risks involved, the patient and family elected to proceed. An informed consent has been obtained, and patient  has been scheduled for surgery as a semi-emergency. The patient has been seen and evaluated by the admitting service and Cardiology Service for this hip surgery. The patient has been cleared with the risk to the operating room.  Informed consent was obtained and the operative site was marked.      DESCRIPTION OF PROCEDURE: The patient has been transferred to [facility] Operating Room. Preoperative antibiotics were given in the form of Kefzol IV according to SCIP protocol prior to the incision.  After achieving adequate general anesthesia, the patient was placed in the lateral position utilizing a pegboard. All bony prominences were padded well. The operative hip was prepped and draped in the usual sterile fashion. Surgical time-out was done. Correct patient, surgical side and site were identified. Tranexamic acid was given intravenously just prior to the incision.     A skin incision was made centering over the greater trochanter for a modified Faustin anterolateral  approach. Skin and subcutaneous tissue were incised and the deep fascia was incised in line with the skin incision. The gluteus eddie muscle fibers were split in their direction. Anterior aspect of the hip joint was identified. The gluteus medius tendon was split in its anterior one third and posterior two thirds taking care to limit the dissection to less than 5 cms from the tip of the greater trochanter. The fibers were split in their direction and the anterior one third of the gluteus medius tendon was  elevated off the greater trochanter along with a sleeve of tissue from vastus lateralis as one continuous later.  An  arthrotomy was made along the anterior  aspect of the greater trochanter. The capsule was released. There was a hemarthrosis. This was evacuated. Femoral neck fracture was identified.     The femoral neck fracture was low almost like a basicervical fracture and there was no necessity to trim the neck. The femoral head was extracted with a corkscrew. The femoral head measured 45 mm in its outer diameter. The acetabular cavity was inspected and bone debris was cleared from the cavity.     The patient did state that she lives independently and was able to ambulate prior to this fall without any assistive device.  In view of this a decision was made for a total hip replacement.     The acetabular cavity was appropriately exposed.  Labrum was excised followed by excision of the pulmonary.  Acetabular reamers were utilized and adequate fit was found to be obtained with a size 52 mm outer diameter.  The acetabular shell 52 mm was seated into position maintaining the correct inclination and version.  2 cancellous screws were seated into the supra-acetabular area for additional stability.  MDM liner was seated into position.  Followed by this attention was directed to the proximal femur.     The femoral neck was elevated with the help of a neck elevating Baldwin retractor and proximal femur was entered with a box chisel, followed by canal finder, followed by serial broaching. Adequate fit was found to be obtained with a size 4 4 broach.Trial reduction was performed. Reduction was found to be satisfactory with good restoration of leg lengths.  Range of motion was checked and there was excellent range of motion with good stability throughout the range for flexion, adduction , internal rotation and external rotation. There was no sign of impingement.  The trial was dislocated and the broach was replaced with a  size 4 Accolade C femoral stem. The stem was cemented in with an intramedullary canal plug, two batches of Simplex cement , with pressurization and vacuum mixing with a cement gun. I used about 20 degrees of anteversion to minimize the risk of posterior dislocation.  Excess cement was removed and cement was allowed to set.  The MDM X 3 insert 42 mm outer diameter with a ceramic head 28 mm outer diameter with 0 mm neck length was assembled on the back table and was seated into position, checked again for stability and the hip was reduced. Reduction was found to be satisfactory. The hip joint was thoroughly irrigated with saline and soft tissue hemostasis was secured. The sponge and needle count was found to correct.    The gluteus medius tendon was repaired  with the help of FiberWire sutures .  The incision was closed in layers with Ethibond, vicryl and staples. Sterile dressings were placed and the patient was transferred to the recovery room in a stable condition.     The patient prior to closure received periarticular injection of  Naropin, clonidine, mixture. The patient tolerated the procedure well and had adequate distal pulses and good capillary refill. The patient was then transferred to the recovery room and then later to the floor without any complications.     The patient will receive postoperative antibiotics in the form of Kefzol IV q.8 h.within the first 24 hours for 2 more doses according to SCIP protocol.  DVT prophylaxis will begin in the morning.     I discussed the satisfactory performance of the procedure with the patient  and discussed with her The postoperative management.     Ashley Crenshaw M.D.*    6/1/2019    cc:  Eugenia Winkler APRN; MD Emeli Cerna Aftab, MD

## 2019-06-01 NOTE — CONSULTS
Date of Hospital Visit: [unfilled]ENC@  Encounter Provider: Jeremiah Bustos MD  Place of Service: [facility] CARDIOLOGY  Patient Name: Latanya Olsen  :1940  Referral Provider: Arjun Sainz MD    Chief complaint  I need to have surgery for my hip.    History of Present Illness  Patient was recently hospitalized with an exacerbation of her chronic kidney disease.  She was walking through her house when she stumbled simply fell the other day.  She presented to the emergency room last night with pain in her right hip and was found to have a fracture of the right femur neck.  He denies any chest pain.  She says she has some shortness of breath but it is been stable and attributes it to her COPD.  She is on her home oxygen.  She says she is active walking around her house and even climbing stairs without issues.    Past Medical History:   Diagnosis Date   • Anemia    • Arthritis    • Atrial fibrillation (CMS/HCC)    • Breast cancer (CMS/HCC)    • Constipation    • COPD (chronic obstructive pulmonary disease) (CMS/HCC)    • Diverticulosis    • GERD (gastroesophageal reflux disease)    • Hx of degenerative disc disease    • Hyperlipidemia    • Hypertension    • Hypokalemia    • Hypothyroidism    • Knee swelling    • Lung cancer (CMS/HCC)     history   • Renal disorder    • Restless leg syndrome    • Sleep apnea with use of continuous positive airway pressure (CPAP)        Past Surgical History:   Procedure Laterality Date   • BUNIONECTOMY Bilateral    • CATARACT EXTRACTION     • CHOLECYSTECTOMY     • COLONOSCOPY     • ENDOSCOPY N/A 2016    Procedure: ESOPHAGOGASTRODUODENOSCOPY  with biopsies;  Surgeon: Von Hernández MD;  Location: [facility];  Service:    • LUNG LOBECTOMY Left     lower lobe   • REPLACEMENT TOTAL KNEE Left    • THYROID BIOPSY         Medications Prior to Admission   Medication Sig Dispense Refill Last Dose   • ALBUTEROL IN Inhale.      • aluminum  hydroxide-mag carbonate (GAVISCON EXTRA RELIEF) 160-105 MG chewable tablet chewable tablet Chew 1 tablet 4 (Four) Times a Day With Meals & at Bedtime.      • baclofen (LIORESAL) 10 MG tablet Take 10 mg by mouth 3 (Three) Times a Day. Take 1 -2 tab by mouth at night as needed for muscle cramping   3/17/2019 at 2100   • BISACODYL RE Insert  into the rectum Daily As Needed (CONSTIPATION).      • Cholecalciferol (VITAMIN D3) 5000 units capsule capsule Take 5,000 Units by mouth Daily.   3/18/2019 at 0900   • cyclobenzaprine (FLEXERIL) 10 MG tablet Take 10 mg by mouth 3 (Three) Times a Day As Needed for Muscle Spasms.      • diltiaZEM (CARDIZEM) 120 MG tablet Take 120 mg by mouth 4 (Four) Times a Day.      • esomeprazole (NexIUM) 40 MG capsule Take 40 mg by mouth every morning before breakfast.   3/18/2019 at 0900   • folic acid (FOLVITE) 400 MCG tablet Take 800 mcg by mouth Daily.   3/18/2019 at 0900   • furosemide (LASIX) 20 MG tablet Take 20 mg by mouth 2 (Two) Times a Day.      • HYDROcodone-acetaminophen (NORCO) 5-325 MG per tablet Take 1 tablet by mouth Every 6 (Six) Hours As Needed.      • lactulose (CHRONULAC) 10 GM/15ML solution Take 20 g by mouth 2 (Two) Times a Day.      • levothyroxine (SYNTHROID, LEVOTHROID) 88 MCG tablet Take 88 mcg by mouth Daily. Takes daily except Sundays (takes 6 days a week)   3/18/2019 at 0900   • linaclotide (LINZESS) 290 MCG capsule capsule Take 290 mcg by mouth Every Morning Before Breakfast.   3/18/2019 at 0900   • magnesium hydroxide (MILK OF MAGNESIA) 400 MG/5ML suspension Take  by mouth Daily As Needed for Constipation.      • magnesium oxide (MAG-OX) 400 tablet tablet Take 400 mg by mouth Daily.      • metOLazone (ZAROXOLYN) 5 MG tablet Take 5 mg by mouth. THREE TIMES A WEEK      • potassium chloride (K-DUR) 10 MEQ CR tablet Take 20 mEq by mouth Daily.      • pramipexole (MIRAPEX) 1.5 MG tablet Take 1.5 mg by mouth every night at bedtime.   3/17/2019 at 2100   • predniSONE  (DELTASONE) 10 MG tablet Take 10 mg by mouth Daily.      • traMADol (ULTRAM) 50 MG tablet Take 50 mg by mouth 3 (Three) Times a Day.      • Zinc Gluconate 30 MG tablet Take 15 mg by mouth Daily.      • amitriptyline (ELAVIL) 25 MG tablet TK 1 T PO D UTD  1 Past Week at Unknown time   • budesonide (PULMICORT) 0.5 MG/2ML nebulizer solution USE 1 VIAL IN NEBULIZER 2 TIMES DAILY. Generic: PULMICORT 60 each 10 Taking   • CARTIA  MG 24 hr capsule TK 1 C PO QD FOR BP  5 3/18/2019 at 0900   • citalopram (CeleXA) 10 MG tablet Take 10 mg by mouth Daily.   Taking   • gabapentin (NEURONTIN) 100 MG capsule TAKE 1 CAPSULE BY MOUTH EVERY 8 HOURS 270 capsule 3 Taking   • ipratropium-albuterol (DUO-NEB) 0.5-2.5 mg/mL nebulizer USE 1 VIAL IN NEBULIZER 2 TIMES DAILY. Generic: DUONEB 60 mL 10 3/17/2019 at Unknown time   • zolpidem (AMBIEN) 10 MG tablet TAKE 1 TABLET BY MOUTH EVERY NIGHT AT BEDTIME AS NEEDED 90 tablet 0 Taking       Current Meds  Scheduled Meds:  [MAR Hold] amitriptyline 25 mg Oral Nightly   [MAR Hold] aspirin 81 mg Oral Daily   [MAR Hold] budesonide 0.5 mg Nebulization BID - RT   [MAR Hold] cholecalciferol 1,000 Units Oral Daily   [MAR Hold] citalopram 10 mg Oral Daily   diltiaZEM  mg Oral Q24H   fentaNYL citrate (PF)      [MAR Hold] furosemide 20 mg Oral Daily   gabapentin 100 mg Oral Q8H   HYDROmorphone      [MAR Hold] ipratropium-albuterol 1.5 mL Nebulization Q6H While Awake - RT   [MAR Hold] levothyroxine 88 mcg Oral Once per day on Mon Tue Wed Thu Fri Sat   lidocaine      [MAR Hold] metOLazone 5 mg Oral Once per day on Mon Wed Fri   [MAR Hold] pantoprazole 40 mg Oral QAM   potassium chloride 10 mEq Intravenous Once   [MAR Hold] pramipexole 1.5 mg Oral Nightly     Continuous Infusions:  lactated ringers 9 mL/hr Last Rate: 9 mL/hr (06/01/19 0730)     PRN Meds:.[MAR Hold] HYDROmorphone  •  lidocaine PF 1%  •  [MAR Hold] ondansetron ODT  •  sodium chloride  •  [MAR Hold] zolpidem    Allergies as of  05/31/2019 - Reviewed 05/31/2019   Allergen Reaction Noted   • Doxycycline Anaphylaxis 03/18/2019   • Codeine Other (See Comments) 02/22/2016   • Morphine Other (See Comments) 02/22/2016       Social History     Socioeconomic History   • Marital status:      Spouse name: Not on file   • Number of children: Not on file   • Years of education: Not on file   • Highest education level: Not on file   Tobacco Use   • Smoking status: Never Smoker   • Smokeless tobacco: Never Used   Substance and Sexual Activity   • Alcohol use: No   • Drug use: Defer   • Sexual activity: Defer       Family History   Problem Relation Age of Onset   • Heart attack Mother    • Coronary artery disease Mother    • Hypertension Mother    • Heart attack Sister          REVIEW OF SYSTEMS:   CONSTITUTIONAL: No weight loss, fever, chills, weakness or fatigue.   HEENT: Eyes: No visual loss, blurred vision, double vision or yellow sclerae. Ears, Nose, Throat: No hearing loss, sneezing, congestion, runny nose or sore throat.   SKIN: No rash or itching.     RESPIRATORY: No shortness of breath, hemoptysis, cough or sputum.   GASTROINTESTINAL: No anorexia, nausea, vomiting or diarrhea. No abdominal pain, bright red blood per rectum or melena.  GENITOURINARY: No burning on urination, hematuria or increased frequency.  NEUROLOGICAL: No headache, dizziness, syncope, paralysis, ataxia, numbness or tingling in the extremities. No change in bowel or bladder control.   MUSCULOSKELETAL: No muscle, back pain, positive for joint pain or stiffness.   HEMATOLOGIC: No anemia, bleeding or bruising.   LYMPHATICS: No enlarged nodes. No history of splenectomy.   PSYCHIATRIC: No history of depression, anxiety, hallucinations.   ENDOCRINOLOGIC: No reports of sweating, cold or heat intolerance. No polyuria or polydipsia.        Objective:   Temp:  [97 °F (36.1 °C)-98.9 °F (37.2 °C)] 98.1 °F (36.7 °C)  Heart Rate:  [66-79] 71  Resp:  [16-20] 20  BP: (120-148)/(62-88)  "146/65  Body mass index is 33.09 kg/m².  Flowsheet Rows      First Filed Value   Admission Height  167.6 cm (66\") Documented at 05/31/2019 1309   Admission Weight  93 kg (205 lb) Documented at 05/31/2019 1309        Vitals:    06/01/19 0715   BP: 146/65   Pulse: 71   Resp: 20   Temp: 98.1 °F (36.7 °C)   SpO2: 96%       General Appearance:    Alert, cooperative, in no acute distress   Head:    Normocephalic,  atraumatic   Eyes:            Lids and lashes normal, conjunctivae and sclerae normal, no icterus,   Ears:    Ears appear intact with no abnormalities noted   Throat:   No oral lesions, no thrush, oral mucosa moist   Neck:    supple, trachea midline, no thyromegaly,no JVD       Lungs:     Clear to auscultation,respirations regular, even and unlabored    Heart:    Regular rhythm and normal rate, normal S1 and S2, no murmur   Chest Wall:    No abnormalities observed   Abdomen:     no masses, no organomegaly, soft, non-tender, non-distended, no guarding   Extremities:   Moves all extremities well, mild edema, no cyanosis, no redness   Pulses:   Pulses palpable and equal bilaterally. Normal radial, carotid, femoral, dorsalis pedis and posterior tibial pulses bilaterally. Normal abdominal aorta   Skin:  Psychiatric:   No bleeding, bruising or rash    Alert and oriented x 3, normal mood and affect                 Lab Review:    Results from last 7 days   Lab Units 06/01/19  0558   SODIUM mmol/L 132*   POTASSIUM mmol/L 2.9*   CHLORIDE mmol/L 89*   CO2 mmol/L 33.4*   BUN mg/dL 31*   CREATININE mg/dL 0.93   CALCIUM mg/dL 9.0   BILIRUBIN mg/dL 0.3   ALK PHOS U/L 94   ALT (SGPT) U/L 14   AST (SGOT) U/L 10   GLUCOSE mg/dL 100*         @LABRCNTbnp@  Results from last 7 days   Lab Units 06/01/19  0558 05/31/19  1537   WBC 10*3/mm3 13.37* 14.17*   HEMOGLOBIN g/dL 10.9* 11.6*   HEMATOCRIT % 35.4 37.0   PLATELETS 10*3/mm3 221 245     Results from last 7 days   Lab Units 05/31/19  1537   INR  0.94   APTT seconds 26.2       "   @LABRCNTIP(chol,trig,hdl,ldl)    I personally viewed and interpreted the patient's EKG/Telemetry tracing  )  Patient Active Problem List   Diagnosis   • Carcinoid tumor of lung   • Diverticulosis of intestine   • Obstructive sleep apnea syndrome   • Weight loss   • Vitamin D deficiency   • Overweight (BMI 25.0-29.9)   • Spinal stenosis of lumbar region without neurogenic claudication   • Osteoarthritis of knee   • Obesity (BMI 30-39.9)   • Neutropenia (CMS/HCC)   • Chronic lower back pain   • Knee pain   • Insomnia   • Hypothyroidism   • Hypokalemia   • Hypertension   • Hyperlipidemia   • Generalized osteoarthritis   • Gastroparesis   • Foot pain   • Chronic obstructive pulmonary disease (CMS/HCC)   • Chronic constipation   • Anemia   • Weight gain   • Pain of left breast   • Elevated serum alkaline phosphatase level   • Neck pain   • Volume overload   • Closed fracture of neck of right femur (CMS/HCC)     I reviewed her echo results from March.  There were consistent with some mild diastolic dysfunction    Assessment and Plan:  1.  Right femur neck fracture  2.   Paroxysmal atrial fibrillation    The patient has an acceptable surgical risk.  She will be at risk of having paroxysms of atrial fibrillation following the surgery, this can be managed medically.  There is nothing further that can be done to modify her risk.  And no further testing is needed prior to surgery.      Jeremiah Bustos MD  06/01/19  7:49 AM.  Time spent in reviewing chart, discussion and examination:

## 2019-06-02 LAB
ANION GAP SERPL CALCULATED.3IONS-SCNC: 12.7 MMOL/L
BASOPHILS # BLD AUTO: 0.03 10*3/MM3 (ref 0–0.2)
BASOPHILS NFR BLD AUTO: 0.2 % (ref 0–1.5)
BUN BLD-MCNC: 31 MG/DL (ref 8–23)
BUN/CREAT SERPL: 28.2 (ref 7–25)
CALCIUM SPEC-SCNC: 8.3 MG/DL (ref 8.6–10.5)
CHLORIDE SERPL-SCNC: 92 MMOL/L (ref 98–107)
CO2 SERPL-SCNC: 29.3 MMOL/L (ref 22–29)
CREAT BLD-MCNC: 1.1 MG/DL (ref 0.57–1)
DEPRECATED RDW RBC AUTO: 50.2 FL (ref 37–54)
EOSINOPHIL # BLD AUTO: 0.01 10*3/MM3 (ref 0–0.4)
EOSINOPHIL NFR BLD AUTO: 0.1 % (ref 0.3–6.2)
ERYTHROCYTE [DISTWIDTH] IN BLOOD BY AUTOMATED COUNT: 16.7 % (ref 12.3–15.4)
GFR SERPL CREATININE-BSD FRML MDRD: 48 ML/MIN/1.73
GLUCOSE BLD-MCNC: 113 MG/DL (ref 65–99)
HCT VFR BLD AUTO: 29.1 % (ref 34–46.6)
HGB BLD-MCNC: 8.8 G/DL (ref 12–15.9)
IMM GRANULOCYTES # BLD AUTO: 0.2 10*3/MM3 (ref 0–0.05)
IMM GRANULOCYTES NFR BLD AUTO: 1 % (ref 0–0.5)
LYMPHOCYTES # BLD AUTO: 1.1 10*3/MM3 (ref 0.7–3.1)
LYMPHOCYTES NFR BLD AUTO: 5.5 % (ref 19.6–45.3)
MCH RBC QN AUTO: 25.5 PG (ref 26.6–33)
MCHC RBC AUTO-ENTMCNC: 30.2 G/DL (ref 31.5–35.7)
MCV RBC AUTO: 84.3 FL (ref 79–97)
MONOCYTES # BLD AUTO: 1.11 10*3/MM3 (ref 0.1–0.9)
MONOCYTES NFR BLD AUTO: 5.6 % (ref 5–12)
NEUTROPHILS # BLD AUTO: 17.51 10*3/MM3 (ref 1.7–7)
NEUTROPHILS NFR BLD AUTO: 87.6 % (ref 42.7–76)
NRBC BLD AUTO-RTO: 0 /100 WBC (ref 0–0.2)
PLATELET # BLD AUTO: 178 10*3/MM3 (ref 140–450)
PMV BLD AUTO: 10.7 FL (ref 6–12)
POTASSIUM BLD-SCNC: 3.8 MMOL/L (ref 3.5–5.2)
RBC # BLD AUTO: 3.45 10*6/MM3 (ref 3.77–5.28)
SODIUM BLD-SCNC: 134 MMOL/L (ref 136–145)
WBC NRBC COR # BLD: 19.96 10*3/MM3 (ref 3.4–10.8)

## 2019-06-02 PROCEDURE — 94799 UNLISTED PULMONARY SVC/PX: CPT

## 2019-06-02 PROCEDURE — 85025 COMPLETE CBC W/AUTO DIFF WBC: CPT | Performed by: ORTHOPAEDIC SURGERY

## 2019-06-02 PROCEDURE — 25010000002 ENOXAPARIN PER 10 MG: Performed by: ORTHOPAEDIC SURGERY

## 2019-06-02 PROCEDURE — 97110 THERAPEUTIC EXERCISES: CPT

## 2019-06-02 PROCEDURE — 25010000003 CEFAZOLIN IN DEXTROSE 2-4 GM/100ML-% SOLUTION: Performed by: ORTHOPAEDIC SURGERY

## 2019-06-02 PROCEDURE — 80048 BASIC METABOLIC PNL TOTAL CA: CPT | Performed by: ORTHOPAEDIC SURGERY

## 2019-06-02 PROCEDURE — 97161 PT EVAL LOW COMPLEX 20 MIN: CPT

## 2019-06-02 RX ORDER — ACETAMINOPHEN 500 MG
1000 TABLET ORAL EVERY 8 HOURS PRN
Status: DISCONTINUED | OUTPATIENT
Start: 2019-06-02 | End: 2019-06-02

## 2019-06-02 RX ORDER — ACETAMINOPHEN 500 MG
500 TABLET ORAL EVERY 4 HOURS PRN
Status: DISCONTINUED | OUTPATIENT
Start: 2019-06-02 | End: 2019-06-02

## 2019-06-02 RX ORDER — ACETAMINOPHEN 500 MG
1000 TABLET ORAL EVERY 8 HOURS
Status: DISCONTINUED | OUTPATIENT
Start: 2019-06-02 | End: 2019-06-02

## 2019-06-02 RX ADMIN — GABAPENTIN 100 MG: 100 CAPSULE ORAL at 06:08

## 2019-06-02 RX ADMIN — ENOXAPARIN SODIUM 40 MG: 40 INJECTION SUBCUTANEOUS at 08:28

## 2019-06-02 RX ADMIN — IPRATROPIUM BROMIDE AND ALBUTEROL SULFATE 1.5 ML: 2.5; .5 SOLUTION RESPIRATORY (INHALATION) at 12:33

## 2019-06-02 RX ADMIN — IPRATROPIUM BROMIDE AND ALBUTEROL SULFATE 1.5 ML: 2.5; .5 SOLUTION RESPIRATORY (INHALATION) at 06:55

## 2019-06-02 RX ADMIN — HYDROCODONE BITARTATE AND ACETAMINOPHEN 1 TABLET: 5; 325 TABLET ORAL at 18:21

## 2019-06-02 RX ADMIN — HYDROCODONE BITARTATE AND ACETAMINOPHEN 1 TABLET: 5; 325 TABLET ORAL at 08:30

## 2019-06-02 RX ADMIN — CEFAZOLIN SODIUM 2 G: 2 INJECTION, SOLUTION INTRAVENOUS at 00:26

## 2019-06-02 RX ADMIN — TRAMADOL HYDROCHLORIDE 50 MG: 50 TABLET, FILM COATED ORAL at 12:24

## 2019-06-02 RX ADMIN — BUDESONIDE 0.5 MG: 0.5 INHALANT RESPIRATORY (INHALATION) at 23:15

## 2019-06-02 RX ADMIN — DILTIAZEM HYDROCHLORIDE 120 MG: 120 CAPSULE, COATED, EXTENDED RELEASE ORAL at 08:28

## 2019-06-02 RX ADMIN — GABAPENTIN 100 MG: 100 CAPSULE ORAL at 21:01

## 2019-06-02 RX ADMIN — GABAPENTIN 100 MG: 100 CAPSULE ORAL at 15:22

## 2019-06-02 RX ADMIN — ASPIRIN 81 MG: 81 TABLET, CHEWABLE ORAL at 08:28

## 2019-06-02 RX ADMIN — TRAMADOL HYDROCHLORIDE 50 MG: 50 TABLET, FILM COATED ORAL at 06:08

## 2019-06-02 RX ADMIN — PRAMIPEXOLE DIHYDROCHLORIDE 1.5 MG: 1.5 TABLET ORAL at 21:01

## 2019-06-02 RX ADMIN — AMITRIPTYLINE HYDROCHLORIDE 25 MG: 25 TABLET, FILM COATED ORAL at 21:01

## 2019-06-02 RX ADMIN — PANTOPRAZOLE SODIUM 40 MG: 40 TABLET, DELAYED RELEASE ORAL at 06:08

## 2019-06-02 RX ADMIN — VITAMIN D, TAB 1000IU (100/BT) 1000 UNITS: 25 TAB at 08:28

## 2019-06-02 RX ADMIN — CITALOPRAM 10 MG: 10 TABLET, FILM COATED ORAL at 08:28

## 2019-06-02 RX ADMIN — IPRATROPIUM BROMIDE AND ALBUTEROL SULFATE 1.5 ML: 2.5; .5 SOLUTION RESPIRATORY (INHALATION) at 23:14

## 2019-06-02 RX ADMIN — BUDESONIDE 0.5 MG: 0.5 INHALANT RESPIRATORY (INHALATION) at 06:56

## 2019-06-02 NOTE — DISCHARGE INSTRUCTIONS
Discharge and Follow up Instructions:     Total Hip Replacement Discharge Instructions:    I. ACTIVITIES:  1. Exercises:  ? Complete exercise program as taught by the hospital physical therapist 2 times per day  ? Exercise program will be advanced by the physical therapist  ? During the day be up ambulating every 2 hours (while awake) for short distances  ? Complete the ankle pump exercises at least 10 times per hour (while awake)  ? Elevate legs when in bed and for at least 30 minutes during the day.Use cold packs 20-30 minutes approximately 5 times per day. This should be done before and after completing your exercises and at any time you are experiencing pain/ stiffness in your operative extremity.      2. Activities of Daily Living:  ? No tub baths, hot tubs, or swimming pools for 4 weeks  ? May shower and let water run over the incision on post-operative day #5 if no drainage. Do not scrub or rub the incision. Simply let the water run over the incision and pat dry.    II. Restrictions  ? Continue Posterior and anterior  hip precautions as taught at the hospital  ? Your surgeon will discuss with you when you will be able to drive again. Usual guidelines are you are to be off pain medications prior to driving.  ? Weight bearing is as tolerated  ? First week stay inside on even terrain. May go up and down stairs one stair at a time utilizing the hand rail once cleared by physical therapy to do so.  ? After one week, you may venture outside (if cleared to do so by physical therapist).    III. Precautions:  ? Everyone that comes near you should wash their hands  ? No elective dental, genital-urinary, or colon procedures or surgical procedures for 12 weeks after surgery unless absolutely necessary.  ?  If dental work or surgical procedure is deemed absolutely necessary, you will need to contact your surgeon as you will need to take antibiotics 1 hour prior to any dental work (including teeth cleanings).  ? Please  discuss with your surgeon prophylactic antibiotics as the length of time this intervention will be necessary for you varies with each patient’s health history and situation.  ? Avoid sick people. If you must be around someone who is ill, they should wear a mask.  ? Avoid visits to the Emergency Room or Urgent Care. If you feel you need to go to the Emergency Room, please notify your Surgeon's office.  ? Stockings are to be worn for one week after surgery and are to be placed on in the morning and removed at night. Observe your skin when stocking is removed for any problems. Monitor the stockings to ensure that any swelling is not causing the stockings to become too tight. In this case, remove stockings immediately.      IV. INCISION CARE:  ? Wash your hands prior to dressing changes  ? Change the dressing as needed to keep incision clean and dry. Utilize dry gauze and paper tape. Avoid touching the side of the gauze that goes against the incision with your hands.  ? No creams or ointments to the incision  ? May remove dressing once the incision is free of drainage  ? Do not touch or pick at the incision  ? Check incision every day and notify surgeon immediately if any of the following signs or symptoms are noted:  o Increase in redness  o Increase in swelling around the incision and of the entire extremity  o Increase in pain  o Drainage oozing from the incision  o Pulling apart of the edges of the incision  o Increase in overall body temperature (greater than 100.5 degrees)  ? You have absorbable sutures with steristrips, please do not remove the  steristrips for 14 days, you can shower on them 6 days after surgery.      V. Medications:   1. Anticoagulants: You will be discharged on an anticoagulant. This is a prophylactic medication that helps prevent blood clots during your post-operative period.  You will be on Lovenox   for 14 days.   ? While taking the anticoagulant, you should avoid taking any additional  aspirin, ibuprofen (Advil or Motrin), Aleve (Naprosyn) or other non-steroidal anti-inflammatory medications.   ? Notify surgeon immediately if any jayda bleeding is noted in the urine, stool, emesis, or from the nose or the incision. Blood in the stool will often appear as black rather than red. Blood in urine may appear as pink. Blood in emesis may appear as brown/black like coffee grounds.  ? You will need to apply pressure for longer periods of time to any cuts or abrasions to stop bleeding  ? Avoid alcohol while taking anticoagulants    2. Stool Softeners: You will be at greater risk of constipation after surgery due to being less mobile and the pain medications.   ? Take stool softeners as instructed by your surgeon while on pain medications. Over the counter Colace 100 mg 1-2 capsules twice daily.   ? If stools become too loose or too frequent, please decreases the dosage or stop the stool softener.  ? If constipation occurs despite use of stool softeners, you are to continue the stool softeners and add a laxative (Milk of Magnesia 1 ounce daily as needed).  ? Dulcolax oral tabs or suppository, or a fleets enema can also be utilized for constipation and can be obtained over the counter.   ? If above interventions are unsuccessful in inducing bowel movements, please contact your surgeon's office / family physician's office.  ? Drink plenty of fluids, and eat fruits and vegetables during your recovery time    3. Pain Medications utilized after surgery are narcotics and the law requires that the following information be given to all patients that are prescribed narcotics:  ? CLASSIFICATION: Pain medications are called Opioids and are narcotics  ? LEGALITIES: It is illegal to share narcotics with others and to drive within 24 hours of taking narcotics  ? POTENTIAL SIDE EFFECTS: Potential side effects of opioids include: nausea, vomiting, itching, dizziness, drowsiness, dry mouth, constipation, and difficulty  urinating.  ? POTENTIAL ADVERSE EFFECTS:   o Opioid tolerance can develop with use of pain medications and this simply means that it requires more and more of the medication to control pain; however, this is seen more in patients that use opioids for longer periods of time.  o Opioid dependence can develop with use of Opioids and this simply means that to stop the medication can cause withdrawal symptoms; however, this is seen with patients that use Opioids for longer periods of time.  o Opioid addiction can develop with use of Opioids and the incidence of this is very unlikely in patients who take the medications as ordered and stop the medications as instructed.  o Opioid overdose can be dangerous, but is unlikely when the medication is taken as ordered and stopped when ordered. It is important not to mix opioids with alcohol or with and type of sedative such as Benadryl as this can lead to over sedation and respiratory difficulty.  ? DOSAGE:   o Pain medications will need to be taken consistently for the first week to decrease pain and promote adequate pain relief and participation in physical therapy.  o After the initial surgical pain begins to resolve, you may begin to decrease the pain medication. By the end of 6 weeks, you should be off of pain medications.  o Refills will not be given by the office during evening hours, on weekends, or after 6 weeks post-op.  o To seek refills on pain medications during the initial 6 week post-operative period, you must call the office 48 hours in advance to request the refill. The office will then notify you when to  the prescription. DO NOT wait until you are out of the medication to request a refill.    V. FOLLOW-UP VISITS:  ? You will need to follow up in the office with your surgeon on June 24 ,2019. Please call this number [phone]  to schedule this appointment.  If you have any concerns or suspected complications prior to your follow up visit, please call  your surgeons office. Do not wait until your appointment time if you suspect complications. These will need to be addressed in the office promptly.

## 2019-06-02 NOTE — PLAN OF CARE
Problem: Fall Risk (Adult)  Goal: Absence of Fall  Outcome: Ongoing (interventions implemented as appropriate)   06/02/19 0230   Fall Risk (Adult)   Absence of Fall making progress toward outcome       Problem: Patient Care Overview  Goal: Plan of Care Review  Outcome: Ongoing (interventions implemented as appropriate)   06/02/19 0230   Coping/Psychosocial   Plan of Care Reviewed With patient   Plan of Care Review   Progress improving   OTHER   Outcome Summary HTN well controlled. pain well controlled.

## 2019-06-02 NOTE — PLAN OF CARE
Problem: Patient Care Overview  Goal: Plan of Care Review  Outcome: Ongoing (interventions implemented as appropriate)   06/02/19 9640   Coping/Psychosocial   Plan of Care Reviewed With patient   Plan of Care Review   Progress improving   OTHER   Outcome Summary Inc ambulation distance to 40' outside of room. Required 2 standing rest breaks to catch her breath. Demonstrates step-to gait pattern. Will continue to benefit from skilled acute care PT to address functional mobility deficits and decrease her risk for falls.

## 2019-06-02 NOTE — PROGRESS NOTES
"Daily progress note    Chief complaint  Doing same  No new complaints    History of present illness  78-year-old white female who is well-known to our service with history of hypertension hyperlipidemia hypothyroidism COPD neuropathy restless leg syndrome and gastroesophageal reflux disease who was recently discharged from the hospital after treatment of fluid overload chronic kidney disease continues to have right hip pain which radiates down into her anterior leg up to knee.  Patient denies any fall trauma injury.  Patient evaluated in the ER found to have right femur neck fracture admitted for management.  At the time of interview she is hurting wants to eat but denies any chest pain shortness of breath abdominal pain nausea vomiting diarrhea.     REVIEW OF SYSTEMS  Remarkable for pain at the surgical site otherwise negative    PHYSICAL EXAM  Blood pressure 111/61, pulse 74, temperature 97.6 °F (36.4 °C), temperature source Oral, resp. rate 16, height 167.6 cm (66\"), weight 93 kg (205 lb), SpO2 90 %.    Constitutional: She is oriented to person, place, and time. No distress.   Head: Normocephalic and atraumatic.   Eyes: EOM are normal. Pupils are equal, round, and reactive to light.   Neck: Normal range of motion. Neck supple.   Cardiovascular: Normal rate, regular rhythm and normal heart sounds. Exam reveals no gallop and no friction rub.   Pulmonary/Chest: Effort normal and breath sounds normal. No respiratory distress. She has no wheezes. She has no rales.   Abdominal: Soft. There is no tenderness. There is no rebound and no guarding.   Musculoskeletal: Normal range of motion. She exhibits no edema. Pt's back is non-tender. There is posterior R hip tenderness and pain with ROM of the R leg.    Neurological: She is alert and oriented to person, place, and time. Pt has normal strength and sensation   Skin: Skin is warm and dry. No rash noted.   Psychiatric: Mood and affect normal.     LABS  Lab Results (last 24 " hours)     Procedure Component Value Units Date/Time    Basic Metabolic Panel [[id number]]  (Abnormal) Collected:  06/02/19 0340    Specimen:  Blood from Arm, Left Updated:  06/02/19 0428     Glucose 113 mg/dL      BUN 31 mg/dL      Creatinine 1.10 mg/dL      Sodium 134 mmol/L      Potassium 3.8 mmol/L      Chloride 92 mmol/L      CO2 29.3 mmol/L      Calcium 8.3 mg/dL      eGFR Non African Amer 48 mL/min/1.73      BUN/Creatinine Ratio 28.2     Anion Gap 12.7 mmol/L     Narrative:       GFR Normal >60  Chronic Kidney Disease <60  Kidney Failure <15    CBC & Differential [[id number]] Collected:  06/02/19 0340    Specimen:  Blood Updated:  06/02/19 0426    Narrative:       The following orders were created for panel order CBC & Differential.  Procedure                               Abnormality         Status                     ---------                               -----------         ------                     CBC Auto Differential[[id number]]        Abnormal            Final result                 Please view results for these tests on the individual orders.    CBC Auto Differential [[id number]]  (Abnormal) Collected:  06/02/19 0340    Specimen:  Blood from Arm, Left Updated:  06/02/19 0426     WBC 19.96 10*3/mm3      RBC 3.45 10*6/mm3      Hemoglobin 8.8 g/dL      Hematocrit 29.1 %      MCV 84.3 fL      MCH 25.5 pg      MCHC 30.2 g/dL      RDW 16.7 %      RDW-SD 50.2 fl      MPV 10.7 fL      Platelets 178 10*3/mm3      Neutrophil % 87.6 %      Lymphocyte % 5.5 %      Monocyte % 5.6 %      Eosinophil % 0.1 %      Basophil % 0.2 %      Immature Grans % 1.0 %      Neutrophils, Absolute 17.51 10*3/mm3      Lymphocytes, Absolute 1.10 10*3/mm3      Monocytes, Absolute 1.11 10*3/mm3      Eosinophils, Absolute 0.01 10*3/mm3      Basophils, Absolute 0.03 10*3/mm3      Immature Grans, Absolute 0.20 10*3/mm3      nRBC 0.0 /100 WBC     Basic Metabolic Panel [[id number]]  (Abnormal) Collected:  06/01/19 1407    Specimen:  Blood  Updated:  06/01/19 1518     Glucose 110 mg/dL      BUN 28 mg/dL      Creatinine 0.93 mg/dL      Sodium 134 mmol/L      Potassium 3.1 mmol/L      Chloride 90 mmol/L      CO2 29.5 mmol/L      Calcium 8.4 mg/dL      eGFR Non African Amer 58 mL/min/1.73      BUN/Creatinine Ratio 30.1     Anion Gap 14.5 mmol/L     Narrative:       GFR Normal >60  Chronic Kidney Disease <60  Kidney Failure <15        Imaging Results (last 24 hours)     Procedure Component Value Units Date/Time    XR Chest 1 View [[id number]] Collected:  05/31/19 1947     Updated:  06/01/19 1632    Narrative:       PORTABLE CHEST     HISTORY: Preop chest x-ray. Lung cancer.     FINDINGS: A single portable view of the chest demonstrates the heart to  be within normal limits in size. Surgical clips are noted in the AP  window. There is no evidence of focal infiltrate, effusion or of  congestive failure.     This report was finalized on 6/1/2019 4:29 PM by Dr. Von Francis M.D.       XR Hip With or Without Pelvis 2 - 3 View Right [[id number]] Collected:  06/01/19 1324     Updated:  06/01/19 1438    Narrative:       ONE VIEW PORTABLE RIGHT HIP AND AP PELVIS     HISTORY: Right hip replacement for a fracture.      FINDINGS: The patient has had recent total hip replacement and the  alignment appears satisfactory.     This report was finalized on 6/1/2019 2:35 PM by Dr. Lance Piper M.D.           Current Facility-Administered Medications:   •  acetaminophen (TYLENOL) tablet 1,000 mg, 1,000 mg, Oral, Q8H, Ashley Crenshaw MD  •  acetaminophen (TYLENOL) tablet 325 mg, 325 mg, Oral, Q4H PRN, Ashley Crenshaw MD  •  amitriptyline (ELAVIL) tablet 25 mg, 25 mg, Oral, Nightly, Arjun Sainz MD, 25 mg at 06/01/19 2124  •  aspirin chewable tablet 81 mg, 81 mg, Oral, Daily, Arjun Sainz MD, 81 mg at 06/02/19 0828  •  bisacodyl (DULCOLAX) EC tablet 10 mg, 10 mg, Oral, Daily PRN, YaAshley humphries MD  •  bisacodyl (DULCOLAX) suppository 10 mg, 10  mg, Rectal, Daily PRN, Ashley Crenshaw MD  •  budesonide (PULMICORT) nebulizer solution 0.5 mg, 0.5 mg, Nebulization, BID - RT, Arjun Sainz MD, 0.5 mg at 06/02/19 0656  •  cholecalciferol (VITAMIN D3) tablet 1,000 Units, 1,000 Units, Oral, Daily, Arjun Sainz MD, 1,000 Units at 06/02/19 0828  •  citalopram (CeleXA) tablet 10 mg, 10 mg, Oral, Daily, Arjun Sainz MD, 10 mg at 06/02/19 0828  •  diltiaZEM CD (CARDIZEM CD) 24 hr capsule 120 mg, 120 mg, Oral, Q24H, Arjun Sainz MD, 120 mg at 06/02/19 0828  •  docusate sodium (COLACE) capsule 100 mg, 100 mg, Oral, BID PRN, Ashley Crenshaw MD  •  enoxaparin (LOVENOX) syringe 40 mg, 40 mg, Subcutaneous, Daily, Ashley Crenshaw MD, 40 mg at 06/02/19 0828  •  gabapentin (NEURONTIN) capsule 100 mg, 100 mg, Oral, Q8H, Arjun Sainz MD, 100 mg at 06/02/19 0608  •  HYDROcodone-acetaminophen (NORCO) 5-325 MG per tablet 1 tablet, 1 tablet, Oral, Q8H PRN, Ashley Crenshaw MD, 1 tablet at 06/02/19 0830  •  HYDROmorphone (DILAUDID) injection 0.5 mg, 0.5 mg, Intravenous, Q4H PRN, Ashley Crenshaw MD  •  ipratropium-albuterol (DUO-NEB) nebulizer solution 1.5 mL, 1.5 mL, Nebulization, Q6H While Awake - RT, Arjun Sainz MD, 1.5 mL at 06/02/19 1233  •  levothyroxine (SYNTHROID, LEVOTHROID) tablet 88 mcg, 88 mcg, Oral, Once per day on Mon Tue Wed Thu Fri Sat, Arjun Sainz MD  •  melatonin tablet 1 mg, 1 mg, Oral, Nightly PRN, Ashley Crenshaw MD  •  ondansetron ODT (ZOFRAN-ODT) disintegrating tablet 4 mg, 4 mg, Oral, Q8H PRN, Arjun Sainz MD  •  pantoprazole (PROTONIX) EC tablet 40 mg, 40 mg, Oral, QAM, Arjun Sainz MD, 40 mg at 06/02/19 0608  •  pramipexole (MIRAPEX) tablet 1.5 mg, 1.5 mg, Oral, Nightly, Arjun Sainz MD, 1.5 mg at 06/01/19 2124  •  sodium chloride 0.9 % flush 1-10 mL, 1-10 mL, Intravenous, PRN, Ashley Crenshaw MD  •  sodium chloride 0.9 % flush 3 mL, 3 mL, Intravenous, Q12H, Ashley Crenshaw MD, 3  mL at 06/01/19 2125  •  sodium chloride 0.9 % with KCl 20 mEq/L infusion, 75 mL/hr, Intravenous, Continuous, Shelly Sainz MD, Last Rate: 75 mL/hr at 06/02/19 0700, 75 mL/hr at 06/02/19 0700  •  traMADol (ULTRAM) tablet 50 mg, 50 mg, Oral, Q6H PRN, Ashley Crenshaw MD, 50 mg at 06/02/19 1224  •  zolpidem (AMBIEN) tablet 5 mg, 5 mg, Oral, Nightly PRN, Shelly Sainz MD    ASSESSMENT  Acute right femur neck fracture status post total hip arthroplasty  Hypertension  Hyperlipidemia  Hypothyroidism  COPD  Neuropathy  Restless leg syndrome  Chronic kidney disease stage II  Gastroesophageal reflux disease    PLAN  CPM  Postop care  Pain management  Continue home medications  Orthopedic surgery consult appreciated  Stress ulcer DVT prophylaxis  Supportive care  PT/OT  Discharge planning    SHELLY SAINZ MD

## 2019-06-02 NOTE — PLAN OF CARE
Problem: Patient Care Overview  Goal: Plan of Care Review   06/02/19 1420 06/02/19 1748   Coping/Psychosocial   Plan of Care Reviewed With patient --    Plan of Care Review   Progress improving --    OTHER   Outcome Summary --  POD#1 of R bipolar hip. Dressing c/d/i. Good sensation and motion to ble. Up with assistance. Voiding on her own. Up to chair most of the day. IVF running at 50mls. Decreased from 75mls. C/O pain. Tolerating oral pain medication well. VSS.

## 2019-06-02 NOTE — PLAN OF CARE
Problem: Skin Injury Risk (Adult)  Goal: Skin Health and Integrity  Outcome: Ongoing (interventions implemented as appropriate)      Problem: Fall Risk (Adult)  Goal: Absence of Fall  Outcome: Ongoing (interventions implemented as appropriate)      Problem: Fractured Hip (Adult)  Goal: Signs and Symptoms of Listed Potential Problems Will be Absent, Minimized or Managed (Fractured Hip)  Outcome: Ongoing (interventions implemented as appropriate)      Problem: Patient Care Overview  Goal: Plan of Care Review  Outcome: Ongoing (interventions implemented as appropriate)    Goal: Individualization and Mutuality  Outcome: Ongoing (interventions implemented as appropriate)    Goal: Discharge Needs Assessment  Outcome: Ongoing (interventions implemented as appropriate)    Goal: Interprofessional Rounds/Family Conf  Outcome: Ongoing (interventions implemented as appropriate)

## 2019-06-02 NOTE — THERAPY TREATMENT NOTE
Acute Care - Physical Therapy Treatment Note  [facility]     Patient Name: Latanya Olsen  : 1940  MRN: [id number]  Today's Date: 2019  Onset of Illness/Injury or Date of Surgery: 19  Date of Referral to PT: 19  Referring Physician: Emeli    Admit Date: 2019    Visit Dx:    ICD-10-CM ICD-9-CM   1. Closed fracture of neck of right femur, initial encounter (CMS/[facility]) S72.001A 820.8   2. Impaired functional mobility, balance, gait, and endurance Z74.09 V49.89     Patient Active Problem List   Diagnosis   • Carcinoid tumor of lung   • Diverticulosis of intestine   • Obstructive sleep apnea syndrome   • Weight loss   • Vitamin D deficiency   • Overweight (BMI 25.0-29.9)   • Spinal stenosis of lumbar region without neurogenic claudication   • Osteoarthritis of knee   • Obesity (BMI 30-39.9)   • Neutropenia (CMS/[facility])   • Chronic lower back pain   • Knee pain   • Insomnia   • Hypothyroidism   • Hypokalemia   • Hypertension   • Hyperlipidemia   • Generalized osteoarthritis   • Gastroparesis   • Foot pain   • Chronic obstructive pulmonary disease (CMS/[facility])   • Chronic constipation   • Anemia   • Weight gain   • Pain of left breast   • Elevated serum alkaline phosphatase level   • Neck pain   • Volume overload   • Status post total hip replacement, right       Therapy Treatment    Rehabilitation Treatment Summary     Row Name 19 1417             Treatment Time/Intention    Discipline  physical therapist  -CH      Document Type  therapy note (daily note)  -CH      Subjective Information  complains of;pain  -CH      Mode of Treatment  physical therapy  -CH      Patient/Family Observations  female pt, sitting up in chair, family in room, no acute distress noted  -      Care Plan Review  evaluation/treatment results reviewed  -CH      Total Minutes, Physical Therapy Treatment  13  -CH      Therapy Frequency (PT Clinical Impression)  2 times/day  -CH      Patient Effort  good  -CH       Existing Precautions/Restrictions  hip, anterior  -CH      Recorded by [] Miguelito Laura, PT 06/02/19 1420      Row Name 06/02/19 1417             Cognitive Assessment/Intervention- PT/OT    Orientation Status (Cognition)  oriented x 3  -CH      Follows Commands (Cognition)  WFL  -CH      Safety Deficit (Cognitive)  mild deficit;safety precautions awareness  -CH      Personal Safety Interventions  fall prevention program maintained;gait belt;nonskid shoes/slippers when out of bed  -CH      Recorded by [] Miguelito Laura, PT 06/02/19 1420      Row Name 06/02/19 1417             Bed Mobility Assessment/Treatment    Comment (Bed Mobility)  not tested; up in chair  -CH      Recorded by [] Miguelito Laura, PT 06/02/19 1420      Row Name 06/02/19 1417             Sit-Stand Transfer    Sit-Stand Tripp (Transfers)  minimum assist (75% patient effort);verbal cues  -CH      Assistive Device (Sit-Stand Transfers)  walker, front-wheeled  -CH      Recorded by [] Miguelito Laura, PT 06/02/19 1420      Row Name 06/02/19 1417             Stand-Sit Transfer    Stand-Sit Tripp (Transfers)  minimum assist (75% patient effort)  -      Assistive Device (Stand-Sit Transfers)  walker, front-wheeled  -CH      Recorded by [] Miguelito Laura, PT 06/02/19 1420      Row Name 06/02/19 1417             Gait/Stairs Assessment/Training    [place] Level (Gait)  contact guard;minimum assist (75% patient effort)  -      Assistive Device (Gait)  walker, front-wheeled  -CH      Distance in Feet (Gait)  40  -CH      Pattern (Gait)  step-to  -CH      Deviations/Abnormal Patterns (Gait)  antalgic;gait speed decreased;stride length decreased  -CH      Bilateral Gait Deviations  forward flexed posture  -CH      Recorded by [] Miguelito Laura, PT 06/02/19 1420      Row Name 06/02/19 1417             Therapeutic Exercise    Comment (Therapeutic Exercise)  HEATHER protocol; 10x each  -CH      Recorded by [CH] Miguelito Laura, PT  06/02/19 1420      Row Name 06/02/19 1417             Positioning and Restraints    Pre-Treatment Position  sitting in chair/recliner  -CH      Post Treatment Position  chair  -CH      In Chair  reclined;call light within reach;encouraged to call for assist;with family/caregiver  -CH      Recorded by [CH] Miguelito Laura, PT 06/02/19 1420      Row Name 06/02/19 1417             Pain Scale: Numbers Pre/Post-Treatment    Pain Scale: Numbers, Pretreatment  5/10  -CH      Pain Scale: Numbers, Post-Treatment  5/10  -CH      Recorded by [CH] Miguelito Laura, PT 06/02/19 1420      Row Name                Wound 06/01/19 0859 Right hip incision    Wound - Properties Group Date first assessed: 06/01/19 [RG] Time first assessed: 0859 [RG] Side: Right [RG] Location: hip [RG] Type: incision [RG] Recorded by:  [RG] Adriana Martel RN 06/01/19 0859      User Key  (r) = Recorded By, (t) = Taken By, (c) = Cosigned By    Initials Name Effective Dates Discipline    RG Adriana Martel RN 06/16/16 -  Nurse    CH Miguelito Laura, PT 04/03/18 -  PT          Wound 06/01/19 0859 Right hip incision (Active)   Dressing Appearance dry;intact;no drainage 6/2/2019 12:24 PM   Closure VERA 6/2/2019 12:24 PM   Base dressing in place, unable to visualize 6/2/2019 12:24 PM   Drainage Amount none 6/2/2019 12:24 PM       Rehab Goal Summary     Row Name 06/02/19 0947             Physical Therapy Goals    Transfer Goal Selection (PT)  transfer, PT goal 1  -CH      Gait Training Goal Selection (PT)  gait training, PT goal 1  -CH         Transfer Goal 1 (PT)    Activity/Assistive Device (Transfer Goal 1, PT)  sit-to-stand/stand-to-sit;walker, rolling  -CH      [place] Level/Cues Needed (Transfer Goal 1, PT)  contact guard assist  -CH      Time Frame (Transfer Goal 1, PT)  1 week  -CH         Gait Training Goal 1 (PT)    [place] Level (Gait Training Goal 1, PT)  contact guard assist  -CH      Distance (Gait Goal 1, PT)  400  -CH      Time  Frame (Gait Training Goal 1, PT)  1 week  -        User Key  (r) = Recorded By, (t) = Taken By, (c) = Cosigned By    Initials Name Provider Type Discipline     Miguelito Laura PT Physical Therapist PT          Physical Therapy Education     Title: PT OT SLP Therapies (Done)     Topic: Physical Therapy (Done)     Point: Mobility training (Done)     Learning Progress Summary           Patient Acceptance, E, VU by  at 6/2/2019  2:20 PM    Acceptance, E, VU by  at 6/2/2019  9:55 AM                   Point: Home exercise program (Done)     Learning Progress Summary           Patient Acceptance, E, VU by  at 6/2/2019  2:20 PM    Acceptance, E, VU by  at 6/2/2019  9:55 AM                   Point: Body mechanics (Done)     Learning Progress Summary           Patient Acceptance, E, VU by  at 6/2/2019  2:20 PM    Acceptance, E, VU by  at 6/2/2019  9:55 AM                   Point: Precautions (Done)     Learning Progress Summary           Patient Acceptance, E, VU by  at 6/2/2019  2:20 PM    Acceptance, E, VU by  at 6/2/2019  9:55 AM                               User Key     Initials Effective Dates Name Provider Type Discipline     04/03/18 -  Miguelito Laura PT Physical Therapist PT                PT Recommendation and Plan  Anticipated Discharge Disposition (PT): skilled nursing facility  Planned Therapy Interventions (PT Eval): balance training, bed mobility training, gait training, strengthening, transfer training  Therapy Frequency (PT Clinical Impression): 2 times/day  Outcome Summary/Treatment Plan (PT)  Anticipated Discharge Disposition (PT): skilled nursing facility  Plan of Care Reviewed With: patient  Progress: improving  Outcome Summary: Inc ambulation distance to 40' outside of room. Required 2 standing rest breaks to catch her breath. Demonstrates step-to gait pattern. Will continue to benefit from skilled acute care PT to address functional mobility deficits and decrease her risk for  falls.  Outcome Measures     Row Name 06/02/19 1400 06/02/19 0900          How much help from another person do you currently need...    Turning from your back to your side while in flat bed without using bedrails?  3  -CH  3  -CH     Moving from lying on back to sitting on the side of a flat bed without bedrails?  3  -CH  3  -CH     Moving to and from a bed to a chair (including a wheelchair)?  3  -CH  3  -CH     Standing up from a chair using your arms (e.g., wheelchair, bedside chair)?  3  -CH  3  -CH     Climbing 3-5 steps with a railing?  1  -CH  1  -CH     To walk in hospital room?  3  -CH  3  -CH     AM-PAC 6 Clicks Score  16  -CH  16  -CH        Functional Assessment    Outcome Measure Options  AM-PAC 6 Clicks Basic Mobility (PT)  -CH  AM-PAC 6 Clicks Basic Mobility (PT)  -CH       User Key  (r) = Recorded By, (t) = Taken By, (c) = Cosigned By    Initials Name Provider Type    CH Miguelito Laura, PT Physical Therapist         Time Calculation:   PT Charges     Row Name 06/02/19 1422 06/02/19 0958          Time Calculation    Start Time  1403  -  0915  -     Stop Time  1416  -  0930  -     Time Calculation (min)  13 min  -  15 min  -     PT Received On  06/02/19  -  06/02/19  -     PT - Next Appointment  06/03/19  -  06/02/19  -     PT Goal Re-Cert Due Date  --  06/09/19  -       User Key  (r) = Recorded By, (t) = Taken By, (c) = Cosigned By    Initials Name Provider Type     Miguelito Laura, PT Physical Therapist        Therapy Charges for Today     Code Description Service Date Service Provider Modifiers Qty    44807056784 HC PT EVAL LOW COMPLEXITY 2 6/2/2019 Miguelito Laura, PT GP 1    86511353037 HC PT THER PROC EA 15 MIN 6/2/2019 Miguelito Laura, PT GP 1    92939581259 HC PT THER PROC EA 15 MIN 6/2/2019 Miguelito Laura, PT GP 1          PT G-Codes  Outcome Measure Options: AM-PAC 6 Clicks Basic Mobility (PT)  AM-PAC 6 Clicks Score: 16    Miguelito Laura, PT  6/2/2019

## 2019-06-02 NOTE — DISCHARGE PLACEMENT REQUEST
"Latanya Troncoso (78 y.o. Female)     Date of Birth Social Security Number Address Home Phone MRN    1940  [id number] [place] [id number] [phone] [id number]    Anglican Marital Status          Church        Admission Date Admission Type Admitting Provider Attending Provider Department, Room/Bed    5/31/19 Emergency Arjun Sainz MD Ahmed, Aftab, MD [address], P783/1    Discharge Date Discharge Disposition Discharge Destination                       Attending Provider:  Arjun Sainz MD    Allergies:  Doxycycline, Codeine, Morphine    Isolation:  None   Infection:  None   Code Status:  CPR    Ht:  167.6 cm (66\")   Wt:  93 kg (205 lb)    Admission Cmt:  None   Principal Problem:  Status post total hip replacement, right [Z96.641]                 Active Insurance as of 5/31/2019     Primary Coverage     Payor Plan Insurance Group Employer/Plan Group    MEDICARE MEDICARE A & B      Payor Plan Address Payor Plan Phone Number Payor Plan Fax Number Effective Dates    [address] [phone]  9/1/2004 - None Entered    [facility] [id number]       Subscriber Name Subscriber Birth Date Member ID       LATANYA TRONCOSO 1940 [id number]           Secondary Coverage     Payor Plan Insurance Group Employer/Plan Group    AARP MED SUPP A[employer] HEALTH CARE OPTIONS      Payor Plan Address Payor Plan Phone Number Payor Plan Fax Number Effective Dates    [facility] [phone]  1/1/2006 - None Entered    [address]       [facility] [address]       Subscriber Name Subscriber Birth Date Member ID       LATANYA TRONCOSO 1940 [id number]                 Emergency Contacts      (Rel.) Home Phone Work Phone Mobile Phone    Cassie David ([employer]) (Relative) [phone] -- --              "

## 2019-06-02 NOTE — PROGRESS NOTES
Discharge Planning Assessment  [facility]     Patient Name: Latanya Olsen  MRN: [id number]  Today's Date: 6/2/2019    Admit Date: 5/31/2019    Discharge Needs Assessment     Row Name 06/02/19 1446       Living Environment    Lives With  child(jose roberto), adult    Name(s) of Who Lives With Patient  disabled adult son that lives with her    Current Living Arrangements  home/apartment/condo single story house with ramp access    Primary Care Provided by  self    Provides Primary Care For  no one, unable/limited ability to care for self    Family Caregiver if Needed  none    Quality of Family Relationships  supportive    Able to Return to Prior Arrangements  yes       Resource/Environmental Concerns    Resource/Environmental Concerns  none    Transportation Concerns  car, none       Transition Planning    Patient/Family Anticipates Transition to  inpatient rehabilitation facility    Patient/Family Anticipated Services at Transition  rehabilitation services    Transportation Anticipated  family or friend will provide       Discharge Needs Assessment    Readmission Within the Last 30 Days  no previous admission in last 30 days    Concerns to be Addressed  basic needs    Equipment Currently Used at Home  walker, rolling;bipap/cpap;shower chair;cane, quad;ramp    Anticipated Changes Related to Illness  inability to care for self    Equipment Needed After Discharge  none    Outpatient/Agency/Support Group Needs  skilled nursing facility    Discharge Facility/Level of Care Needs  nursing facility, skilled    Offered/Gave Vendor List  no        Discharge Plan     Row Name 06/02/19 1449       Plan    Plan  [facility] level     Patient/Family in Agreement with Plan  yes    Plan Comments  Spoke with patient via phone and introduced self, verified facesheet and IMM checked.  Patient states her emergency contact is her sister-in-law, Cassie David([phone]).  Patient states she lives in a single story house  with ramp access with her adult handicapped son and she is independent with all ADLs and currently has a CPAP, shower chair, quad cane, rolling walker and a ramp to enter house and denies any DME needs at discharge.  Patient states her PCP is DEVORA Hassan and she uses [facility] pharmacy in [place], [place] and denies any issues wth affording or obtaining medications.  Patient states she is currently at [facility] and plans to return there at discharge.  Spoke with Natasha at [facility] at [phone] and she states patient is from a skilled bed and they will accept at discharge.  Discussed different types of transportation for her at discharge including that she would probably have to private pay for ambulance unless she is requiring oxygen at discharge, wheelchair van cost and family or friend providing it for her and she states she will work on getting someone to take her to rehab at discharge... CCP will follow and assist as needed.... Naye Palafox RN,CCP         Destination - Selection Complete      Service Provider Request Status Selected Services Address Phone Number Fax Number    [facility] Selected Skilled Nursing [address] [address] [phone]      Durable Medical Equipment      No service coordination in this encounter.      Dialysis/Infusion      No service coordination in this encounter.      Home Medical Care      No service coordination in this encounter.      Therapy      No service coordination in this encounter.      Community Resources      No service coordination in this encounter.          Demographic Summary     Row Name 06/02/19 2203       General Information    Admission Type  inpatient    Arrived From  subacute/long term acute care [facility]    Referral Source  nursing    Reason for Consult  discharge planning    Preferred Language  English     Used During This Interaction  no       Contact  Information    Permission Granted to Share Info With      Contact Information Obtained for      Row Name 06/02/19 1443       General Information    Admission Type  inpatient        Functional Status     Row Name 06/02/19 1446       Functional Status    Usual Activity Tolerance  moderate    Current Activity Tolerance  fair       Functional Status, IADL    Medications  independent    Meal Preparation  independent    Housekeeping  independent    Laundry  independent    Shopping  independent       Mental Status    General Appearance WDL  WDL       Mental Status Summary    Recent Changes in Mental Status/Cognitive Functioning  no changes        Psychosocial    No documentation.       Abuse/Neglect    No documentation.       Legal    No documentation.       Substance Abuse    No documentation.       Patient Forms    No documentation.           Naye Palafox RN

## 2019-06-02 NOTE — THERAPY EVALUATION
Acute Care - Physical Therapy Initial Evaluation  [facility]     Patient Name: Latanya Olsen  : 1940  MRN: [id number]  Today's Date: 2019   Onset of Illness/Injury or Date of Surgery: 19  Date of Referral to PT: 19  Referring Physician: Emeli      Admit Date: 2019    Visit Dx:     ICD-10-CM ICD-9-CM   1. Closed fracture of neck of right femur, initial encounter (CMS/formerly [facility]) S72.001A 820.8   2. Impaired functional mobility, balance, gait, and endurance Z74.09 V49.89     Patient Active Problem List   Diagnosis   • Carcinoid tumor of lung   • Diverticulosis of intestine   • Obstructive sleep apnea syndrome   • Weight loss   • Vitamin D deficiency   • Overweight (BMI 25.0-29.9)   • Spinal stenosis of lumbar region without neurogenic claudication   • Osteoarthritis of knee   • Obesity (BMI 30-39.9)   • Neutropenia (CMS/HCC)   • Chronic lower back pain   • Knee pain   • Insomnia   • Hypothyroidism   • Hypokalemia   • Hypertension   • Hyperlipidemia   • Generalized osteoarthritis   • Gastroparesis   • Foot pain   • Chronic obstructive pulmonary disease (CMS/HCC)   • Chronic constipation   • Anemia   • Weight gain   • Pain of left breast   • Elevated serum alkaline phosphatase level   • Neck pain   • Volume overload   • Status post total hip replacement, right     Past Medical History:   Diagnosis Date   • Anemia    • Arthritis    • Atrial fibrillation (CMS/HCC)    • Breast cancer (CMS/HCC)    • Constipation    • COPD (chronic obstructive pulmonary disease) (CMS/formerly [facility])    • Diverticulosis    • GERD (gastroesophageal reflux disease)    • Hx of degenerative disc disease    • Hyperlipidemia    • Hypertension    • Hypokalemia    • Hypothyroidism    • Knee swelling    • Lung cancer (CMS/formerly [facility])     history   • Renal disorder    • Restless leg syndrome    • Sleep apnea with use of continuous positive airway pressure (CPAP)      Past Surgical History:   Procedure Laterality Date   • BUNIONECTOMY Bilateral     • CATARACT EXTRACTION     • CHOLECYSTECTOMY     • COLONOSCOPY     • ENDOSCOPY N/A 6/24/2016    Procedure: ESOPHAGOGASTRODUODENOSCOPY  with biopsies;  Surgeon: Von Hernández MD;  Location: [facility] OR;  Service:    • LUNG LOBECTOMY Left     lower lobe   • REPLACEMENT TOTAL KNEE Left    • THYROID BIOPSY          PT ASSESSMENT (last 12 hours)      Physical Therapy Evaluation     Row Name 06/02/19 0903          PT Evaluation Time/Intention    Subjective Information  complains of;pain  -     Document Type  evaluation  -     Mode of Treatment  physical therapy  -     Total Evaluation Minutes, Physical Therapy  15  -CH     Patient Effort  good  -     Symptoms Noted During/After Treatment  none  -     Row Name 06/02/19 0986          General Information    Patient Profile Reviewed?  yes  -     Onset of Illness/Injury or Date of Surgery  06/01/19  -     Referring Physician  [facility]  -     Patient Observations  alert;cooperative;agree to therapy  -     Patient/Family Observations  female pt, sitting up in chair, no acute distress noted   -     Prior Level of Function  independent:;min assist:;all household mobility;community mobility  -     Equipment Currently Used at Home  cane, straight;walker, standard  -     Pertinent History of Current Functional Problem  s/p R HEATHER  -     Existing Precautions/Restrictions  hip, anterior  -CH     Risks Reviewed  patient:  -     Benefits Reviewed  patient:  -     Barriers to Rehab  medically complex  -     Row Name 06/02/19 0903          Relationship/Environment    Lives With  alone  -     Row Name 06/02/19 0918          Resource/Environmental Concerns    Current Living Arrangements  extended care facility  -     Row Name 06/02/19 0991          Cognitive Assessment/Interventions    Additional Documentation  Cognitive Assessment/Intervention (Group)  -     Row Name 06/02/19 0986          Cognitive Assessment/Intervention- PT/OT    Orientation  Status (Cognition)  oriented x 3  -     Follows Commands (Cognition)  WFL  -     Safety Deficit (Cognitive)  mild deficit;safety precautions awareness  -     Personal Safety Interventions  fall prevention program maintained;gait belt;nonskid shoes/slippers when out of bed  -     Row Name 06/02/19 0947          Safety Issues, Functional Mobility    Safety Issues Affecting Function (Mobility)  safety precaution awareness  -     Impairments Affecting Function (Mobility)  balance;endurance/activity tolerance;pain;strength;shortness of breath  -     Row Name 06/02/19 0947          Mobility Assessment/Treatment    Extremity Weight-bearing Status  right lower extremity  -     Right Lower Extremity (Weight-bearing Status)  weight-bearing as tolerated (WBAT)  -     Row Name 06/02/19 0947          Bed Mobility Assessment/Treatment    Comment (Bed Mobility)  not tested; up ion chair  -     Row Name 06/02/19 0947          Transfer Assessment/Treatment    Transfer Assessment/Treatment  sit-stand transfer;stand-sit transfer  -     Sit-Stand [place] (Transfers)  minimum assist (75% patient effort);verbal cues  -     Stand-Sit [place] (Transfers)  minimum assist (75% patient effort)  -     Row Name 06/02/19 0947          Sit-Stand Transfer    Assistive Device (Sit-Stand Transfers)  walker, front-wheeled  -     Row Name 06/02/19 0947          Stand-Sit Transfer    Assistive Device (Stand-Sit Transfers)  walker, front-wheeled  -     Row Name 06/02/19 0947          Gait/Stairs Assessment/Training    [place] Level (Gait)  contact guard;minimum assist (75% patient effort)  -     Assistive Device (Gait)  walker, front-wheeled  [facility]     Distance in Feet (Gait)  30  -     Pattern (Gait)  step-to  -     Deviations/Abnormal Patterns (Gait)  antalgic;gait speed decreased;stride length decreased  -     Bilateral Gait Deviations  forward flexed posture  -     Row Name 06/02/19 0947           General ROM    GENERAL ROM COMMENTS  no BLE AROM deficits identified  -     Row Name 06/02/19 0947          MMT (Manual Muscle Testing)    General MMT Comments  BLE MMTs grossly 3/5  -     Row Name 06/02/19 0947          Motor Assessment/Intervention    Additional Documentation  Therapeutic Exercise Interventions (Group)  -     Row Name 06/02/19 0947          Therapeutic Exercise    Comment (Therapeutic Exercise)  HEATHER protocol; 5x each  -     Row Name 06/02/19 0947          Pain Assessment    Additional Documentation  Pain Scale: Numbers Pre/Post-Treatment (Group)  -     Row Name 06/02/19 0947          Pain Scale: Numbers Pre/Post-Treatment    Pain Scale: Numbers, Pretreatment  5/10  -CH     Pain Scale: Numbers, Post-Treatment  6/10  -CH     Pain Location - Side  Right  -CH     Pain Location  hip  -CH     Pain Intervention(s)  Repositioned  -     Row Name             Wound 06/01/19 0859 Right hip incision    Wound - Properties Group Date first assessed: 06/01/19  -RG Time first assessed: 0859  -RG Side: Right  -RG Location: hip  -RG Type: incision  -RG    Row Name 06/02/19 0947          Plan of Care Review    Plan of Care Reviewed With  patient  -     Row Name 06/02/19 0947          Physical Therapy Clinical Impression    Date of Referral to PT  06/02/19  -     PT Diagnosis (PT Clinical Impression)  impaired functional mobility  -     Functional Level at Time of Evaluation (PT Clinical Impression)  min Ax1  -     Patient/Family Goals Statement (PT Clinical Impression)  D/C back to [facility] beds at the [facility]  -     Criteria for Skilled Interventions Met (PT Clinical Impression)  yes;treatment indicated  -     Pathology/Pathophysiology Noted (Describe Specifically for Each System)  musculoskeletal  -     Impairments Found (describe specific impairments)  aerobic capacity/endurance;gait, locomotion, and balance;ventilation and respiration/gas exchange  -     Functional Limitations in Following Categories (Describe  Specific Limitations)  self-care;home management;community/leisure  -     Disability: Inability to Perform Actions/Activities of Required Roles (describe specific disability)  community/leisure  -     Rehab Potential (PT Clinical Summary)  good, to achieve stated therapy goals  -     Care Plan Review (PT)  evaluation/treatment results reviewed  -     Row Name 06/02/19 0947          Physical Therapy Goals    Transfer Goal Selection (PT)  transfer, PT goal 1  -     Gait Training Goal Selection (PT)  gait training, PT goal 1  -     Row Name 06/02/19 0947          Transfer Goal 1 (PT)    Activity/Assistive Device (Transfer Goal 1, PT)  sit-to-stand/stand-to-sit;walker, rolling  -     [place] Level/Cues Needed (Transfer Goal 1, PT)  contact guard assist  -CH     Time Frame (Transfer Goal 1, PT)  1 week  -     Row Name 06/02/19 0947          Gait Training Goal 1 (PT)    [place] Level (Gait Training Goal 1, PT)  contact guard assist  -CH     Distance (Gait Goal 1, PT)  400  -CH     Time Frame (Gait Training Goal 1, PT)  1 week  -     Row Name 06/02/19 0947          Positioning and Restraints    Pre-Treatment Position  sitting in chair/recliner  -CH     Post Treatment Position  chair  -CH     In Chair  reclined;call light within reach;encouraged to call for assist  -CH       User Key  (r) = Recorded By, (t) = Taken By, (c) = Cosigned By    Initials Name Provider Type    Adriana Suresh RN Registered Nurse    Miguelito Arias, PT Physical Therapist        Physical Therapy Education     Title: PT OT SLP Therapies (Done)     Topic: Physical Therapy (Done)     Point: Mobility training (Done)     Learning Progress Summary           Patient Acceptance, E, VU by  at 6/2/2019  9:55 AM                   Point: Home exercise program (Done)     Learning Progress Summary           Patient Acceptance, E, VU by  at 6/2/2019  9:55 AM                   Point: Body mechanics (Done)     Learning  Progress Summary           Patient Acceptance, E, VU by  at 6/2/2019  9:55 AM                   Point: Precautions (Done)     Learning Progress Summary           Patient Acceptance, E, VU by  at 6/2/2019  9:55 AM                               User Key     Initials Effective Dates Name Provider Type Discipline     04/03/18 -  Miguelito Laura, PT Physical Therapist PT              PT Recommendation and Plan  Anticipated Discharge Disposition (PT): skilled nursing facility  Planned Therapy Interventions (PT Eval): balance training, bed mobility training, gait training, strengthening, transfer training  Therapy Frequency (PT Clinical Impression): 2 times/day  Outcome Summary/Treatment Plan (PT)  Anticipated Discharge Disposition (PT): skilled nursing facility  Plan of Care Reviewed With: patient  Progress: improving  Outcome Summary: Pt presents to PT with pain and mobility impairments s/p R HEATHER. Today, she was able to ambulate to door and back to chair with rwx and min Ax1. Anticipate D/C to SNF due to chronic health issues and lack of home support initially. Pt will benefit from skilled acute care PT to address functional mobility deficits and reach functional goals.  Outcome Measures     Row Name 06/02/19 0900             How much help from another person do you currently need...    Turning from your back to your side while in flat bed without using bedrails?  3  -CH      Moving from lying on back to sitting on the side of a flat bed without bedrails?  3  -CH      Moving to and from a bed to a chair (including a wheelchair)?  3  -CH      Standing up from a chair using your arms (e.g., wheelchair, bedside chair)?  3  -CH      Climbing 3-5 steps with a railing?  1  -CH      To walk in hospital room?  3  -      AM-PAC 6 Clicks Score  16  -         Functional Assessment    Outcome Measure Options  AM-PAC 6 Clicks Basic Mobility (PT)  -        User Key  (r) = Recorded By, (t) = Taken By, (c) = Cosigned By     Initials Name Provider Type    CH Miguelito Laura, PT Physical Therapist         Time Calculation:   PT Charges     Row Name 06/02/19 0958             Time Calculation    Start Time  0915  -      Stop Time  0930  -      Time Calculation (min)  15 min  -      PT Received On  06/02/19  -      PT - Next Appointment  06/02/19  -      PT Goal Re-Cert Due Date  06/09/19  -        User Key  (r) = Recorded By, (t) = Taken By, (c) = Cosigned By    Initials Name Provider Type     Miguelito Laura, PT Physical Therapist        Therapy Charges for Today     Code Description Service Date Service Provider Modifiers Qty    04436149495 HC PT EVAL LOW COMPLEXITY 2 6/2/2019 Miguelito Laura, PT GP 1    19020017025 HC PT THER PROC EA 15 MIN 6/2/2019 Miguelito Laura, PT GP 1          PT G-Codes  Outcome Measure Options: AM-PAC 6 Clicks Basic Mobility (PT)  AM-PAC 6 Clicks Score: 16      Miguelito Laura PT  6/2/2019

## 2019-06-02 NOTE — PLAN OF CARE
Problem: Patient Care Overview  Goal: Plan of Care Review  Outcome: Ongoing (interventions implemented as appropriate)   06/02/19 0956   Coping/Psychosocial   Plan of Care Reviewed With patient   Plan of Care Review   Progress improving   OTHER   Outcome Summary Pt presents to PT with pain and mobility impairments s/p R HEATHER. Today, she was able to ambulate to door and back to chair with rwx and min Ax1. Anticipate D/C to SNF due to chronic health issues and lack of home support initially. Pt will benefit from skilled acute care PT to address functional mobility deficits and reach functional goals.

## 2019-06-02 NOTE — PROGRESS NOTES
Patient: Latanya Olsen  YOB: 1940     Date of Admission: 5/31/2019 12:52 PM Medical Record Number: [id number]     Attending Physician: Arjun Sainz MD    Status Post:  Procedure(s): RIGHT Total HIP CEMENTED AnterolateralPost Operative Day Number: 1    Subjective : No new orthopaedic complaints     Pain Relief: some relief with present medication.     Systemic Complaints: No Complaints  Vitals:    06/01/19 2300 06/02/19 0322 06/02/19 0655 06/02/19 0700   BP: 115/59 122/60  117/53   BP Location: Right arm Right arm  Right arm   Patient Position: Sitting Sitting  Sitting   Pulse: 66 72 86 86   Resp: 16 16 16 16   Temp: 96.9 °F (36.1 °C) 97.2 °F (36.2 °C)  97.6 °F (36.4 °C)   TempSrc: Oral Oral  Oral   SpO2: 97% 96% 100% 96%   Weight:       Height:           Physical Exam: 78 y.o. female    General Appearance:       Alert, cooperative, in no acute distress                  Extremities:    Dressing Clean, Dry and Intact         Incision healthy without signs or symptoms of infections         No clinical sign of DVT        Able to do good movements of digits    Pulses:   Pulses palpable and equal bilaterally           Diagnostic Tests:     Results from last 7 days   Lab Units 06/02/19  0340 06/01/19  0558 05/31/19  1537   WBC 10*3/mm3 19.96* 13.37* 14.17*   HEMOGLOBIN g/dL 8.8* 10.9* 11.6*   HEMATOCRIT % 29.1* 35.4 37.0   PLATELETS 10*3/mm3 178 221 245     Results from last 7 days   Lab Units 06/02/19  0340 06/01/19  1407 06/01/19  0558   SODIUM mmol/L 134* 134* 132*   POTASSIUM mmol/L 3.8 3.1* 2.9*   CHLORIDE mmol/L 92* 90* 89*   CO2 mmol/L 29.3* 29.5* 33.4*   BUN mg/dL 31* 28* 31*   CREATININE mg/dL 1.10* 0.93 0.93   GLUCOSE mg/dL 113* 110* 100*   CALCIUM mg/dL 8.3* 8.4* 9.0     Results from last 7 days   Lab Units 05/31/19  1537   INR  0.94   APTT seconds 26.2     Lab Results   Component Value Date    CRP 0.16 07/21/2016     Lab Results   Component Value Date    SEDRATE 9 07/21/2016     Lab  Results   Component Value Date    URICACID 8.9 (H) 03/20/2019     No results found for: CRYSTAL  Microbiology Results (last 10 days)     ** No results found for the last 240 hours. **        No radiology results for the last 7 days          Current Medications:  Scheduled Meds:  amitriptyline 25 mg Oral Nightly   aspirin 81 mg Oral Daily   budesonide 0.5 mg Nebulization BID - RT   cholecalciferol 1,000 Units Oral Daily   citalopram 10 mg Oral Daily   diltiaZEM  mg Oral Q24H   enoxaparin 40 mg Subcutaneous Daily   gabapentin 100 mg Oral Q8H   ipratropium-albuterol 1.5 mL Nebulization Q6H While Awake - RT   levothyroxine 88 mcg Oral Once per day on Mon Tue Wed Thu Fri Sat   pantoprazole 40 mg Oral QAM   pramipexole 1.5 mg Oral Nightly   sodium chloride 3 mL Intravenous Q12H     Continuous Infusions:  sodium chloride 0.9 % with KCl 20 mEq 75 mL/hr Last Rate: 75 mL/hr (06/02/19 0700)     PRN Meds:.•  acetaminophen  •  bisacodyl  •  bisacodyl  •  docusate sodium  •  HYDROcodone-acetaminophen  •  HYDROmorphone  •  melatonin  •  ondansetron ODT  •  sodium chloride  •  traMADol  •  zolpidem    Assessment: Status post  Procedure(s): Right Total  HIP CEMENTED Anterolateral    Patient Active Problem List   Diagnosis   • Carcinoid tumor of lung   • Diverticulosis of intestine   • Obstructive sleep apnea syndrome   • Weight loss   • Vitamin D deficiency   • Overweight (BMI 25.0-29.9)   • Spinal stenosis of lumbar region without neurogenic claudication   • Osteoarthritis of knee   • Obesity (BMI 30-39.9)   • Neutropenia (CMS/HCC)   • Chronic lower back pain   • Knee pain   • Insomnia   • Hypothyroidism   • Hypokalemia   • Hypertension   • Hyperlipidemia   • Generalized osteoarthritis   • Gastroparesis   • Foot pain   • Chronic obstructive pulmonary disease (CMS/HCC)   • Chronic constipation   • Anemia   • Weight gain   • Pain of left breast   • Elevated serum alkaline phosphatase level   • Neck pain   • Volume overload   •  Status post total hip replacement, right       PLAN:   Continues current post-op course  Anticoagulation: Lovenox started  Dressing Change in am  Mobilize with PT as tolerated per protocol - posterior dislocation precautions  Hemoglobin mild drop, will observe  Mild increase in creatinine - continue IV fluids  Serum uric acid elevated     Weight Bearing: WBAT  Discharge Plan: OK to plan for discharge in  tomorrow to SNF  from orthopadic perspective.      Ashley Crenshaw MD    Date: 6/2/2019    Time: 9:49 AM

## 2019-06-03 ENCOUNTER — APPOINTMENT (OUTPATIENT)
Dept: GENERAL RADIOLOGY | Facility: HOSPITAL | Age: 79
End: 2019-06-03

## 2019-06-03 LAB
ABO GROUP BLD: NORMAL
ANION GAP SERPL CALCULATED.3IONS-SCNC: 18.8 MMOL/L
BASOPHILS # BLD AUTO: 0.04 10*3/MM3 (ref 0–0.2)
BASOPHILS NFR BLD AUTO: 0.1 % (ref 0–1.5)
BILIRUB UR QL STRIP: NEGATIVE
BLD GP AB SCN SERPL QL: NEGATIVE
BUN BLD-MCNC: 41 MG/DL (ref 8–23)
BUN/CREAT SERPL: 21.4 (ref 7–25)
CALCIUM SPEC-SCNC: 7.9 MG/DL (ref 8.6–10.5)
CHLORIDE SERPL-SCNC: 93 MMOL/L (ref 98–107)
CLARITY UR: CLEAR
CO2 SERPL-SCNC: 22.2 MMOL/L (ref 22–29)
COLOR UR: YELLOW
CREAT BLD-MCNC: 1.92 MG/DL (ref 0.57–1)
DEPRECATED RDW RBC AUTO: 53.5 FL (ref 37–54)
EOSINOPHIL # BLD AUTO: 0.01 10*3/MM3 (ref 0–0.4)
EOSINOPHIL NFR BLD AUTO: 0 % (ref 0.3–6.2)
ERYTHROCYTE [DISTWIDTH] IN BLOOD BY AUTOMATED COUNT: 17.1 % (ref 12.3–15.4)
GFR SERPL CREATININE-BSD FRML MDRD: 25 ML/MIN/1.73
GLUCOSE BLD-MCNC: 166 MG/DL (ref 65–99)
GLUCOSE BLDC GLUCOMTR-MCNC: 216 MG/DL (ref 70–130)
GLUCOSE UR STRIP-MCNC: NEGATIVE MG/DL
HCT VFR BLD AUTO: 24.8 % (ref 34–46.6)
HGB BLD-MCNC: 7.3 G/DL (ref 12–15.9)
HGB UR QL STRIP.AUTO: NEGATIVE
IMM GRANULOCYTES # BLD AUTO: 1.23 10*3/MM3 (ref 0–0.05)
IMM GRANULOCYTES NFR BLD AUTO: 4.6 % (ref 0–0.5)
KETONES UR QL STRIP: NEGATIVE
LEUKOCYTE ESTERASE UR QL STRIP.AUTO: NEGATIVE
LYMPHOCYTES # BLD AUTO: 5.05 10*3/MM3 (ref 0.7–3.1)
LYMPHOCYTES NFR BLD AUTO: 18.8 % (ref 19.6–45.3)
MCH RBC QN AUTO: 25.6 PG (ref 26.6–33)
MCHC RBC AUTO-ENTMCNC: 29.4 G/DL (ref 31.5–35.7)
MCV RBC AUTO: 87 FL (ref 79–97)
MONOCYTES # BLD AUTO: 2.09 10*3/MM3 (ref 0.1–0.9)
MONOCYTES NFR BLD AUTO: 7.8 % (ref 5–12)
NEUTROPHILS # BLD AUTO: 18.46 10*3/MM3 (ref 1.7–7)
NEUTROPHILS NFR BLD AUTO: 68.7 % (ref 42.7–76)
NITRITE UR QL STRIP: NEGATIVE
NRBC BLD AUTO-RTO: 0.1 /100 WBC (ref 0–0.2)
PH UR STRIP.AUTO: <=5 [PH] (ref 5–8)
PLATELET # BLD AUTO: 193 10*3/MM3 (ref 140–450)
PMV BLD AUTO: 11 FL (ref 6–12)
POTASSIUM BLD-SCNC: 4.4 MMOL/L (ref 3.5–5.2)
PROT UR QL STRIP: NEGATIVE
RBC # BLD AUTO: 2.85 10*6/MM3 (ref 3.77–5.28)
RH BLD: POSITIVE
SODIUM BLD-SCNC: 134 MMOL/L (ref 136–145)
SP GR UR STRIP: 1.02 (ref 1–1.03)
T&S EXPIRATION DATE: NORMAL
UROBILINOGEN UR QL STRIP: NORMAL
WBC NRBC COR # BLD: 26.88 10*3/MM3 (ref 3.4–10.8)

## 2019-06-03 PROCEDURE — 86901 BLOOD TYPING SEROLOGIC RH(D): CPT | Performed by: HOSPITALIST

## 2019-06-03 PROCEDURE — 94799 UNLISTED PULMONARY SVC/PX: CPT

## 2019-06-03 PROCEDURE — 80048 BASIC METABOLIC PNL TOTAL CA: CPT | Performed by: ORTHOPAEDIC SURGERY

## 2019-06-03 PROCEDURE — 25810000003 SODIUM CHLORIDE 0.9 % WITH KCL 20 MEQ 20-0.9 MEQ/L-% SOLUTION: Performed by: HOSPITALIST

## 2019-06-03 PROCEDURE — 36430 TRANSFUSION BLD/BLD COMPNT: CPT

## 2019-06-03 PROCEDURE — P9016 RBC LEUKOCYTES REDUCED: HCPCS

## 2019-06-03 PROCEDURE — 86850 RBC ANTIBODY SCREEN: CPT | Performed by: HOSPITALIST

## 2019-06-03 PROCEDURE — 86923 COMPATIBILITY TEST ELECTRIC: CPT

## 2019-06-03 PROCEDURE — 81003 URINALYSIS AUTO W/O SCOPE: CPT | Performed by: HOSPITALIST

## 2019-06-03 PROCEDURE — 86900 BLOOD TYPING SEROLOGIC ABO: CPT

## 2019-06-03 PROCEDURE — 87040 BLOOD CULTURE FOR BACTERIA: CPT | Performed by: HOSPITALIST

## 2019-06-03 PROCEDURE — 25010000002 PIPERACILLIN SOD-TAZOBACTAM PER 1 G: Performed by: INTERNAL MEDICINE

## 2019-06-03 PROCEDURE — 86901 BLOOD TYPING SEROLOGIC RH(D): CPT

## 2019-06-03 PROCEDURE — 82962 GLUCOSE BLOOD TEST: CPT

## 2019-06-03 PROCEDURE — 97110 THERAPEUTIC EXERCISES: CPT

## 2019-06-03 PROCEDURE — 85025 COMPLETE CBC W/AUTO DIFF WBC: CPT | Performed by: ORTHOPAEDIC SURGERY

## 2019-06-03 PROCEDURE — 25010000002 ENOXAPARIN PER 10 MG: Performed by: ORTHOPAEDIC SURGERY

## 2019-06-03 PROCEDURE — 86900 BLOOD TYPING SEROLOGIC ABO: CPT | Performed by: HOSPITALIST

## 2019-06-03 PROCEDURE — 71045 X-RAY EXAM CHEST 1 VIEW: CPT

## 2019-06-03 RX ADMIN — GABAPENTIN 100 MG: 100 CAPSULE ORAL at 05:38

## 2019-06-03 RX ADMIN — BUDESONIDE 0.5 MG: 0.5 INHALANT RESPIRATORY (INHALATION) at 08:42

## 2019-06-03 RX ADMIN — SODIUM CHLORIDE, PRESERVATIVE FREE 3 ML: 5 INJECTION INTRAVENOUS at 21:53

## 2019-06-03 RX ADMIN — ENOXAPARIN SODIUM 40 MG: 40 INJECTION SUBCUTANEOUS at 08:20

## 2019-06-03 RX ADMIN — TAZOBACTAM SODIUM AND PIPERACILLIN SODIUM 4.5 G: 500; 4 INJECTION, SOLUTION INTRAVENOUS at 20:40

## 2019-06-03 RX ADMIN — PRAMIPEXOLE DIHYDROCHLORIDE 1.5 MG: 1.5 TABLET ORAL at 21:52

## 2019-06-03 RX ADMIN — HYDROCODONE BITARTATE AND ACETAMINOPHEN 1 TABLET: 5; 325 TABLET ORAL at 16:23

## 2019-06-03 RX ADMIN — VITAMIN D, TAB 1000IU (100/BT) 1000 UNITS: 25 TAB at 08:12

## 2019-06-03 RX ADMIN — LEVOTHYROXINE SODIUM 88 MCG: 88 TABLET ORAL at 05:37

## 2019-06-03 RX ADMIN — DOCUSATE SODIUM 100 MG: 100 CAPSULE, LIQUID FILLED ORAL at 05:37

## 2019-06-03 RX ADMIN — IPRATROPIUM BROMIDE AND ALBUTEROL SULFATE 1.5 ML: 2.5; .5 SOLUTION RESPIRATORY (INHALATION) at 22:06

## 2019-06-03 RX ADMIN — ASPIRIN 81 MG: 81 TABLET, CHEWABLE ORAL at 08:12

## 2019-06-03 RX ADMIN — CITALOPRAM 10 MG: 10 TABLET, FILM COATED ORAL at 08:12

## 2019-06-03 RX ADMIN — POTASSIUM CHLORIDE AND SODIUM CHLORIDE 75 ML/HR: 900; 150 INJECTION, SOLUTION INTRAVENOUS at 21:29

## 2019-06-03 RX ADMIN — IPRATROPIUM BROMIDE AND ALBUTEROL SULFATE 1.5 ML: 2.5; .5 SOLUTION RESPIRATORY (INHALATION) at 11:36

## 2019-06-03 RX ADMIN — AMITRIPTYLINE HYDROCHLORIDE 25 MG: 25 TABLET, FILM COATED ORAL at 21:52

## 2019-06-03 RX ADMIN — PANTOPRAZOLE SODIUM 40 MG: 40 TABLET, DELAYED RELEASE ORAL at 05:38

## 2019-06-03 RX ADMIN — IPRATROPIUM BROMIDE AND ALBUTEROL SULFATE 1.5 ML: 2.5; .5 SOLUTION RESPIRATORY (INHALATION) at 08:42

## 2019-06-03 RX ADMIN — GABAPENTIN 100 MG: 100 CAPSULE ORAL at 14:48

## 2019-06-03 RX ADMIN — GABAPENTIN 100 MG: 100 CAPSULE ORAL at 21:52

## 2019-06-03 RX ADMIN — BUDESONIDE 0.5 MG: 0.5 INHALANT RESPIRATORY (INHALATION) at 22:06

## 2019-06-03 NOTE — NURSING NOTE
Attempted to get pt up to void per bedside commode. Once up pt lost all color and was very weak. Pt was unable to void. Upon returning to the bed, pt reported pain in her bladder region. Vitals were stable, heart sounds were WNL, and blood glucose was not low. Attempted bladder scan however the machine was not working. Straight cath was performed and 750 ml or urine removed.

## 2019-06-03 NOTE — PLAN OF CARE
Problem: Skin Injury Risk (Adult)  Goal: Skin Health and Integrity  Outcome: Ongoing (interventions implemented as appropriate)      Problem: Fall Risk (Adult)  Goal: Absence of Fall  Outcome: Ongoing (interventions implemented as appropriate)      Problem: Fractured Hip (Adult)  Goal: Signs and Symptoms of Listed Potential Problems Will be Absent, Minimized or Managed (Fractured Hip)  Outcome: Ongoing (interventions implemented as appropriate)      Problem: Patient Care Overview  Goal: Plan of Care Review  Outcome: Ongoing (interventions implemented as appropriate)   06/03/19 1139 06/03/19 1811   Coping/Psychosocial   Plan of Care Reviewed With patient --    Plan of Care Review   Progress --  improving   OTHER   Outcome Summary --  A&Ox4. POD#2 of Bipolar R hip. Dressing c/d/i. Good sensation and motion to ble. Up with assistance to chair and commode today. Bladder scanned for 300ml and then voided 100mls. Encouraged to drink more fluids. IVF running at 50ml most of the day. Changed to 75ml/hr. Tolerating oral pain medication well for pain management. Recieved 2 units of PRBCs and tolerated it well. Some congestion and cough at this time. Encouraged to use IS and to deep breath and cough. On 1l nc at this time. Drops when sleeping on RA. Patient was very lethragic today, but after 2 units of PRBCs states she feels better. Up in chair at this time. VSS. Tachy at times.

## 2019-06-03 NOTE — PROGRESS NOTES
"Pharmacy to Dose Zosyn    Patient: Latanya Olsen (P783/1, [id number]  LOS: 3 days )  Relevant clinical data and objective history reviewed:  78 y.o. female 167.6 cm (66\") 93 kg (205 lb)  Body mass index is 33.09 kg/m².  she has a past medical history of Anemia, Arthritis, Atrial fibrillation (CMS/HCC), Breast cancer (CMS/HCC), Constipation, COPD (chronic obstructive pulmonary disease) (CMS/HCC), Diverticulosis, GERD (gastroesophageal reflux disease), degenerative disc disease, Hyperlipidemia, Hypertension, Hypokalemia, Hypothyroidism, Knee swelling, Lung cancer (CMS/HCC), Renal disorder, Restless leg syndrome, and Sleep apnea with use of continuous positive airway pressure (CPAP).    Day #1  Duration: 5 days  Consult for Dr Sainz  Indication: sepsis    Cultures:  6/3 Blood x2 - sent    Other Abx: none     Results from last 7 days   Lab Units 19  0558 19  0340 19  0558   WBC 10*3/mm3 26.88* 19.96* 13.37*   HEMOGLOBIN g/dL 7.3* 8.8* 10.9*   HEMATOCRIT % 24.8* 29.1* 35.4   PLATELETS 10*3/mm3 193 178 221       Temp (24hrs), Av.7 °F (36.5 °C), Min:97.4 °F (36.3 °C), Max:98 °F (36.7 °C)    Serum creatinine: 1.92 mg/dL (H) 19 0558  Estimated creatinine clearance: 27.3 mL/min (A)    Assessment/Plan:   - Zosyn 4.5 gm IV q8h EI per [facility] dosing guidelines. (sepsis)    -  CBC/SCr in AM    Holger Stallings, PharmD  19 5:46 PM        "

## 2019-06-03 NOTE — PROGRESS NOTES
"Daily progress note    Chief complaint  Doing same  Unable to empty bladder  No new complaints    History of present illness  78-year-old white female who is well-known to our service with history of hypertension hyperlipidemia hypothyroidism COPD neuropathy restless leg syndrome and gastroesophageal reflux disease who was recently discharged from the hospital after treatment of fluid overload chronic kidney disease continues to have right hip pain which radiates down into her anterior leg up to knee.  Patient denies any fall trauma injury.  Patient evaluated in the ER found to have right femur neck fracture admitted for management.  At the time of interview she is hurting wants to eat but denies any chest pain shortness of breath abdominal pain nausea vomiting diarrhea.     REVIEW OF SYSTEMS  Remarkable for pain at the surgical site     PHYSICAL EXAM  Blood pressure 141/69, pulse 93, temperature 97.8 °F (36.6 °C), temperature source Oral, resp. rate 16, height 167.6 cm (66\"), weight 93 kg (205 lb), SpO2 96 %.    Constitutional: She is oriented to person, place, and time. No distress.   Head: Normocephalic and atraumatic.   Eyes: EOM are normal. Pupils are equal, round, and reactive to light.   Neck: Normal range of motion. Neck supple.   Cardiovascular: Normal rate, regular rhythm and normal heart sounds. Exam reveals no gallop and no friction rub.   Pulmonary/Chest: Effort normal and breath sounds normal. No respiratory distress. She has no wheezes. She has no rales.   Abdominal: Soft. There is no tenderness. There is no rebound and no guarding.   Musculoskeletal: Normal range of motion. She exhibits no edema. Pt's back is non-tender. There is posterior R hip tenderness and pain with ROM of the R leg.    Neurological: She is alert and oriented to person, place, and time. Pt has normal strength and sensation   Skin: Skin is warm and dry. No rash noted.   Psychiatric: Mood and affect normal.     LABS  Lab Results " (last 24 hours)     Procedure Component Value Units Date/Time    Basic Metabolic Panel [[id number]]  (Abnormal) Collected:  06/03/19 0558    Specimen:  Blood Updated:  06/03/19 0653     Glucose 166 mg/dL      BUN 41 mg/dL      Creatinine 1.92 mg/dL      Sodium 134 mmol/L      Potassium 4.4 mmol/L      Chloride 93 mmol/L      CO2 22.2 mmol/L      Calcium 7.9 mg/dL      eGFR Non African Amer 25 mL/min/1.73      BUN/Creatinine Ratio 21.4     Anion Gap 18.8 mmol/L     Narrative:       GFR Normal >60  Chronic Kidney Disease <60  Kidney Failure <15    CBC & Differential [[id number]] Collected:  06/03/19 0558    Specimen:  Blood Updated:  06/03/19 0628    Narrative:       The following orders were created for panel order CBC & Differential.  Procedure                               Abnormality         Status                     ---------                               -----------         ------                     CBC Auto Differential[[id number]]        Abnormal            Final result                 Please view results for these tests on the individual orders.    CBC Auto Differential [[id number]]  (Abnormal) Collected:  06/03/19 0558    Specimen:  Blood Updated:  06/03/19 0628     WBC 26.88 10*3/mm3      RBC 2.85 10*6/mm3      Hemoglobin 7.3 g/dL      Hematocrit 24.8 %      MCV 87.0 fL      MCH 25.6 pg      MCHC 29.4 g/dL      RDW 17.1 %      RDW-SD 53.5 fl      MPV 11.0 fL      Platelets 193 10*3/mm3      Neutrophil % 68.7 %      Lymphocyte % 18.8 %      Monocyte % 7.8 %      Eosinophil % 0.0 %      Basophil % 0.1 %      Immature Grans % 4.6 %      Neutrophils, Absolute 18.46 10*3/mm3      Lymphocytes, Absolute 5.05 10*3/mm3      Monocytes, Absolute 2.09 10*3/mm3      Eosinophils, Absolute 0.01 10*3/mm3      Basophils, Absolute 0.04 10*3/mm3      Immature Grans, Absolute 1.23 10*3/mm3      nRBC 0.1 /100 WBC     POC Glucose Once [[id number]]  (Abnormal) Collected:  06/03/19 0559    Specimen:  Blood Updated:  06/03/19 0619      Glucose 216 mg/dL     Tissue Pathology Exam [[id number]] Collected:  06/01/19 1124    Specimen:  Bone from Hip, Right Updated:  06/03/19 0542        Imaging Results (last 24 hours)     ** No results found for the last 24 hours. **        Current Facility-Administered Medications:   •  acetaminophen (TYLENOL) tablet 325 mg, 325 mg, Oral, Q4H PRN, Ashley Crenshaw MD  •  amitriptyline (ELAVIL) tablet 25 mg, 25 mg, Oral, Nightly, Arjun Sainz MD, 25 mg at 06/02/19 2101  •  aspirin chewable tablet 81 mg, 81 mg, Oral, Daily, Arjun Sainz MD, 81 mg at 06/03/19 0812  •  bisacodyl (DULCOLAX) suppository 10 mg, 10 mg, Rectal, Daily PRN, Ashley Crenshaw MD  •  budesonide (PULMICORT) nebulizer solution 0.5 mg, 0.5 mg, Nebulization, BID - RT, Arjun Sainz MD, 0.5 mg at 06/03/19 0842  •  cholecalciferol (VITAMIN D3) tablet 1,000 Units, 1,000 Units, Oral, Daily, Arjun Sainz MD, 1,000 Units at 06/03/19 0812  •  citalopram (CeleXA) tablet 10 mg, 10 mg, Oral, Daily, Arjun Sainz MD, 10 mg at 06/03/19 0812  •  diltiaZEM CD (CARDIZEM CD) 24 hr capsule 120 mg, 120 mg, Oral, Q24H, Arjun Sainz MD, 120 mg at 06/02/19 0828  •  docusate sodium (COLACE) capsule 100 mg, 100 mg, Oral, BID PRN, Ashley Crenshaw MD, 100 mg at 06/03/19 0537  •  [START ON 6/4/2019] enoxaparin (LOVENOX) syringe 30 mg, 30 mg, Subcutaneous, Daily, Ashley Crenshaw MD  •  gabapentin (NEURONTIN) capsule 100 mg, 100 mg, Oral, Q8H, Arjun Sainz MD, 100 mg at 06/03/19 1448  •  HYDROcodone-acetaminophen (NORCO) 5-325 MG per tablet 1 tablet, 1 tablet, Oral, Q8H PRN, Ashley Crenshaw MD, 1 tablet at 06/03/19 1623  •  ipratropium-albuterol (DUO-NEB) nebulizer solution 1.5 mL, 1.5 mL, Nebulization, Q6H While Awake - RT, Arjun Sainz MD, 1.5 mL at 06/03/19 1136  •  levothyroxine (SYNTHROID, LEVOTHROID) tablet 88 mcg, 88 mcg, Oral, Once per day on Mon Tue Wed Thu Fri Sat, Arjun Sainz MD, 88 mcg at 06/03/19 0537  •  melatonin  tablet 1 mg, 1 mg, Oral, Nightly PRN, Ashley Crenshaw MD  •  ondansetron ODT (ZOFRAN-ODT) disintegrating tablet 4 mg, 4 mg, Oral, Q8H PRN, Shelly Sainz MD  •  pantoprazole (PROTONIX) EC tablet 40 mg, 40 mg, Oral, QAM, Shelly Sainz MD, 40 mg at 06/03/19 0538  •  pramipexole (MIRAPEX) tablet 1.5 mg, 1.5 mg, Oral, Nightly, Shelly Sainz MD, 1.5 mg at 06/02/19 2101  •  sodium chloride 0.9 % flush 1-10 mL, 1-10 mL, Intravenous, PRN, Ashley Crenshaw MD  •  sodium chloride 0.9 % flush 3 mL, 3 mL, Intravenous, Q12H, Ashley Crenshaw MD, 3 mL at 06/01/19 2125  •  sodium chloride 0.9 % with KCl 20 mEq/L infusion, 50 mL/hr, Intravenous, Continuous, Shelly Sainz MD, Last Rate: 50 mL/hr at 06/02/19 1405, 50 mL/hr at 06/02/19 1405    ASSESSMENT  Acute right femur neck fracture status post total hip arthroplasty  Urinary retention  Leukocytosis questionable source  Chronic anemia with drop in H&H  Hypertension  Hyperlipidemia  Hypothyroidism  COPD  Neuropathy  Restless leg syndrome  Chronic kidney disease stage II  Gastroesophageal reflux disease    PLAN  CPM  Postop care  Transfuse 2 unit packed RBC  And culture and start empiric antibiotics  Stover catheter urology consult  Pain management  Continue home medications  Orthopedic surgery consult appreciated  Stress ulcer DVT prophylaxis  Supportive care  PT/OT  Follow closely    SHELLY SAINZ MD

## 2019-06-03 NOTE — PLAN OF CARE
Problem: Patient Care Overview  Goal: Plan of Care Review   06/03/19 8000   OTHER   Outcome Summary Pt has increased pain this date and increased fatigue. 1 assist to ambulate to chair, declines to walk farther. Continued education on post op exercises. Will continue to progress as tolerated.

## 2019-06-03 NOTE — THERAPY TREATMENT NOTE
Acute Care - Physical Therapy Treatment Note  [facility]     Patient Name: Latanya Olsen  : 1940  MRN: [id number]  Today's Date: 6/3/2019  Onset of Illness/Injury or Date of Surgery: 19  Date of Referral to PT: 19  Referring Physician: Emeli    Admit Date: 2019    Visit Dx:    ICD-10-CM ICD-9-CM   1. Closed fracture of neck of right femur, initial encounter (CMS/LTAC, located within [facility]) S72.001A 820.8   2. Impaired functional mobility, balance, gait, and endurance Z74.09 V49.89     Patient Active Problem List   Diagnosis   • Carcinoid tumor of lung   • Diverticulosis of intestine   • Obstructive sleep apnea syndrome   • Weight loss   • Vitamin D deficiency   • Overweight (BMI 25.0-29.9)   • Spinal stenosis of lumbar region without neurogenic claudication   • Osteoarthritis of knee   • Obesity (BMI 30-39.9)   • Neutropenia (CMS/LTAC, located within [facility])   • Chronic lower back pain   • Knee pain   • Insomnia   • Hypothyroidism   • Hypokalemia   • Hypertension   • Hyperlipidemia   • Generalized osteoarthritis   • Gastroparesis   • Foot pain   • Chronic obstructive pulmonary disease (CMS/LTAC, located within [facility])   • Chronic constipation   • Anemia   • Weight gain   • Pain of left breast   • Elevated serum alkaline phosphatase level   • Neck pain   • Volume overload   • Status post total hip replacement, right       Therapy Treatment    Rehabilitation Treatment Summary     Row Name 19 1648             Treatment Time/Intention    Discipline  physical therapist  -MA      Document Type  therapy note (daily note)  -MA      Subjective Information  complains of;weakness;fatigue;pain  -MA      Mode of Treatment  physical therapy;individual therapy  -MA      Patient/Family Observations  supine in bed, no acute distress  -MA      Patient Effort  good  -MA      Existing Precautions/Restrictions  right;hip;hip, anterior  -MA      Recorded by [MA] Selina Huynh, PT 19 1652      Row Name 19 1648             Vital Signs    O2 Delivery Pre Treatment   supplemental O2  -MA      O2 Delivery Intra Treatment  room air  -MA      Post SpO2 (%)  95  -MA      O2 Delivery Post Treatment  supplemental O2  -MA      Recorded by [MA] Selina Huynh, PT 06/03/19 1652      Row Name 06/03/19 1648             Cognitive Assessment/Intervention- PT/OT    Orientation Status (Cognition)  oriented x 3  -MA      Follows Commands (Cognition)  WNL  -MA      Safety Deficit (Cognitive)  mild deficit;awareness of need for assistance;insight into deficits/self awareness  -MA      Personal Safety Interventions  fall prevention program maintained;gait belt;muscle strengthening facilitated;nonskid shoes/slippers when out of bed;supervised activity  -MA      Recorded by [MA] Selina Huynh, PT 06/03/19 1652      Row Name 06/03/19 1648             Bed Mobility Assessment/Treatment    Bed Mobility Assessment/Treatment  supine-sit  -MA      Supine-Sit [place] (Bed Mobility)  minimum assist (75% patient effort)  -MA      Bed Mobility, Safety Issues  decreased use of legs for bridging/pushing  -MA      Assistive Device (Bed Mobility)  bed rails;head of bed elevated  -MA      Comment (Bed Mobility)  assist with R LE  -MA      Recorded by [MA] Selina Huynh, PT 06/03/19 1652      Row Name 06/03/19 1648             Transfer Assessment/Treatment    Transfer Assessment/Treatment  sit-stand transfer;stand-sit transfer  -MA      Recorded by [MA] Selina Huynh, PT 06/03/19 1652      Row Name 06/03/19 1648             Sit-Stand Transfer    Sit-Stand [place] (Transfers)  minimum assist (75% patient effort)  -MA      Assistive Device (Sit-Stand Transfers)  walker, front-wheeled  -MA      Recorded by [MA] Selina Huynh, PT 06/03/19 1652      Row Name 06/03/19 1648             Stand-Sit Transfer    Stand-Sit [place] (Transfers)  minimum assist (75% patient effort)  -MA      Assistive Device (Stand-Sit Transfers)  walker, front-wheeled  -MA      Recorded by [MA] Selina Huynh, PT 06/03/19 1652      Row  Name 06/03/19 1648             Gait/Stairs Assessment/Training    [place] Level (Gait)  contact guard  -MA      Assistive Device (Gait)  walker, front-wheeled  -MA      Distance in Feet (Gait)  10  -MA      Pattern (Gait)  step-to  -MA      Deviations/Abnormal Patterns (Gait)  antalgic;right sided deviations;base of support, narrow;robbie decreased;stride length decreased  -MA      Bilateral Gait Deviations  forward flexed posture  -MA      Comment (Gait/Stairs)  pt declines to ambulate farther due to pain   -MA      Recorded by [MA] Selina Huynh, PT 06/03/19 1652      Row Name 06/03/19 1648             Therapeutic Exercise    Comment (Therapeutic Exercise)  R HEATHER protocol x10  -MA      Recorded by [MA] Selina Huynh, PT 06/03/19 1653      Row Name 06/03/19 1648             Positioning and Restraints    Pre-Treatment Position  in bed  -MA      Post Treatment Position  chair  -MA      In Chair  reclined;call light within reach;encouraged to call for assist;exit alarm on  -MA      Recorded by [MA] Selina Huynh, PT 06/03/19 1652      Row Name 06/03/19 1648             Pain Scale: Numbers Pre/Post-Treatment    Pain Scale: Numbers, Pretreatment  7/10  -MA      Pain Scale: Numbers, Post-Treatment  7/10  -MA      Pain Location - Side  Right  -MA      Pain Location  hip  -MA      Pain Intervention(s)  Repositioned;Ambulation/increased activity  -MA      Recorded by [MA] Selina Huynh, PT 06/03/19 1652      Row Name                Wound 06/01/19 0859 Right hip incision    Wound - Properties Group Date first assessed: 06/01/19 [RG] Time first assessed: 0859 [RG] Side: Right [RG] Location: hip [RG] Type: incision [RG] Recorded by:  [RG] Adriana Martel RN 06/01/19 0859      User Key  (r) = Recorded By, (t) = Taken By, (c) = Cosigned By    Initials Name Effective Dates Discipline    RG Adriana Martel RN 06/16/16 -  Nurse    Selina Roldan, PT 10/19/18 -  PT          Wound 06/01/19 0859 Right hip incision (Active)    Dressing Appearance dry;intact;no drainage 6/3/2019  4:23 PM   Closure VERA 6/3/2019  4:23 PM   Base dressing in place, unable to visualize 6/3/2019  4:23 PM   Drainage Amount none 6/3/2019  4:23 PM           Physical Therapy Education     Title: PT OT SLP Therapies (Done)     Topic: Physical Therapy (Done)     Point: Mobility training (Done)     Learning Progress Summary           Patient Acceptance, E, VU,NR by MA at 6/3/2019  4:52 PM    Acceptance, E, VU by  at 6/2/2019  2:20 PM    Acceptance, E, VU by  at 6/2/2019  9:55 AM                   Point: Home exercise program (Done)     Learning Progress Summary           Patient Acceptance, E, VU,NR by MA at 6/3/2019  4:52 PM    Acceptance, E, VU by  at 6/2/2019  2:20 PM    Acceptance, E, VU by  at 6/2/2019  9:55 AM                   Point: Body mechanics (Done)     Learning Progress Summary           Patient Acceptance, E, VU,NR by MA at 6/3/2019  4:52 PM    Acceptance, E, VU by  at 6/2/2019  2:20 PM    Acceptance, E, VU by  at 6/2/2019  9:55 AM                   Point: Precautions (Done)     Learning Progress Summary           Patient Acceptance, E, VU,NR by MA at 6/3/2019  4:52 PM    Acceptance, E, VU by  at 6/2/2019  2:20 PM    Acceptance, E, VU by  at 6/2/2019  9:55 AM                               User Key     Initials Effective Dates Name Provider Type Discipline    MA 10/19/18 -  Selina Huynh, PT Physical Therapist PT     04/03/18 -  Miguelito Lauar, PT Physical Therapist PT                PT Recommendation and Plan     Outcome Summary: Pt has increased pain this date and increased fatigue. 1 assist to ambulate to chair, declines to walk farther. Continued education on post op exercises. Will continue to progress as tolerated.   Outcome Measures     Row Name 06/03/19 1600 06/02/19 1400 06/02/19 0900       How much help from another person do you currently need...    Turning from your back to your side while in flat bed without using  bedrails?  3  -MA  3  -CH  3  -CH    Moving from lying on back to sitting on the side of a flat bed without bedrails?  3  -MA  3  -CH  3  -CH    Moving to and from a bed to a chair (including a wheelchair)?  3  -MA  3  -CH  3  -CH    Standing up from a chair using your arms (e.g., wheelchair, bedside chair)?  3  -MA  3  -CH  3  -CH    Climbing 3-5 steps with a railing?  1  -MA  1  -CH  1  -CH    To walk in hospital room?  3  -MA  3  -CH  3  -CH    AM-PAC 6 Clicks Score  16  -MA  16  -CH  16  -CH       Functional Assessment    Outcome Measure Options  AM-PAC 6 Clicks Basic Mobility (PT)  -MA  AM-PAC 6 Clicks Basic Mobility (PT)  -CH  AM-PAC 6 Clicks Basic Mobility (PT)  -CH      User Key  (r) = Recorded By, (t) = Taken By, (c) = Cosigned By    Initials Name Provider Type    Selina Roldan, PT Physical Therapist    Miguelito Arias PT Physical Therapist         Time Calculation:   PT Charges     Row Name 06/03/19 1653             Time Calculation    Start Time  1646  -MA      Stop Time  1658  -MA      Time Calculation (min)  12 min  -MA      PT Received On  06/03/19  -MA      PT - Next Appointment  06/04/19  -MA         Time Calculation- PT    Total Timed Code Minutes- PT  12 minute(s)  -MA        User Key  (r) = Recorded By, (t) = Taken By, (c) = Cosigned By    Initials Name Provider Type    Selina Roldan, PT Physical Therapist        Therapy Charges for Today     Code Description Service Date Service Provider Modifiers Qty    [id number] HC PT THER PROC EA 15 MIN 6/3/2019 Selina Huynh, PT GP 1          PT G-Codes  Outcome Measure Options: AM-PAC 6 Clicks Basic Mobility (PT)  AM-PAC 6 Clicks Score: 16    Selina Huynh PT  6/3/2019

## 2019-06-03 NOTE — PLAN OF CARE
Problem: Skin Injury Risk (Adult)  Goal: Skin Health and Integrity  Outcome: Ongoing (interventions implemented as appropriate)      Problem: Fall Risk (Adult)  Goal: Absence of Fall  Outcome: Ongoing (interventions implemented as appropriate)      Problem: Fractured Hip (Adult)  Goal: Signs and Symptoms of Listed Potential Problems Will be Absent, Minimized or Managed (Fractured Hip)  Outcome: Ongoing (interventions implemented as appropriate)      Problem: Patient Care Overview  Goal: Plan of Care Review  Outcome: Ongoing (interventions implemented as appropriate)    Goal: Individualization and Mutuality  Outcome: Ongoing (interventions implemented as appropriate)    Goal: Discharge Needs Assessment  Outcome: Ongoing (interventions implemented as appropriate)    Goal: Interprofessional Rounds/Family Conf  Outcome: Ongoing (interventions implemented as appropriate)      Problem: Patient Care Overview  Goal: Plan of Care Review  Outcome: Ongoing (interventions implemented as appropriate)   06/03/19 0049   Coping/Psychosocial   Plan of Care Reviewed With patient   Plan of Care Review   Progress improving   OTHER   Outcome Summary Pt POD 2 from R bipolar hip. Dressing clean dry and intact. Pt up with assistance and voiding without difficulty at this time. IVF running at 50 ml/hr. Pain controlled with PO pain medication. VSS at this time. Plan to d/c to facility when stable. Will continue to monitor.      Goal: Individualization and Mutuality  Outcome: Ongoing (interventions implemented as appropriate)    Goal: Discharge Needs Assessment  Outcome: Ongoing (interventions implemented as appropriate)    Goal: Interprofessional Rounds/Family Conf  Outcome: Ongoing (interventions implemented as appropriate)

## 2019-06-03 NOTE — PROGRESS NOTES
Patient: Latanya Olsen  YOB: 1940     Date of Admission: 5/31/2019 12:52 PM Medical Record Number: [id number]     Attending Physician: Ajrun Sainz MD    Status Post:  Procedure(s):  BIPOLAR HIP CEMENTED POSTERIORPost Operative Day Number: 2    Subjective : No new orthopaedic complaints     Pain Relief: some relief with present medication.     Systemic Complaints:   Vitals:    06/02/19 2323 06/02/19 2342 06/02/19 2345 06/03/19 0303   BP: Comment: couldnt get a reading nurse notified (!) 89/54 122/61 92/54  Comment: unable to read   BP Location: Left arm Left arm Left arm Left arm   Patient Position: Lying Lying Lying Lying   Pulse: 106   104   Resp: 16   16   Temp: 97.4 °F (36.3 °C)   97.4 °F (36.3 °C)   TempSrc: Oral   Oral   SpO2:  97% 93% 96%   Weight:       Height:           Physical Exam: 78 y.o. female    General Appearance:       Alert, cooperative, in no acute distress                  Extremities:    Dressing Clean, Dry and Intact         Incision healthy without signs or symptoms of infections         No clinical sign of DVT        Able to do good movements of digits    Pulses:   Pulses palpable and equal bilaterally           Diagnostic Tests:     Results from last 7 days   Lab Units 06/03/19  0558 06/02/19  0340 06/01/19  0558   WBC 10*3/mm3 26.88* 19.96* 13.37*   HEMOGLOBIN g/dL 7.3* 8.8* 10.9*   HEMATOCRIT % 24.8* 29.1* 35.4   PLATELETS 10*3/mm3 193 178 221     Results from last 7 days   Lab Units 06/03/19  0558 06/02/19  0340 06/01/19  1407   SODIUM mmol/L 134* 134* 134*   POTASSIUM mmol/L 4.4 3.8 3.1*   CHLORIDE mmol/L 93* 92* 90*   CO2 mmol/L 22.2 29.3* 29.5*   BUN mg/dL 41* 31* 28*   CREATININE mg/dL 1.92* 1.10* 0.93   GLUCOSE mg/dL 166* 113* 110*   CALCIUM mg/dL 7.9* 8.3* 8.4*     Results from last 7 days   Lab Units 05/31/19  1537   INR  0.94   APTT seconds 26.2     Lab Results   Component Value Date    CRP 0.16 07/21/2016     Lab Results   Component Value Date    SEDRATE  9 07/21/2016     Lab Results   Component Value Date    URICACID 8.9 (H) 03/20/2019     No results found for: CRYSTAL  Microbiology Results (last 10 days)     ** No results found for the last 240 hours. **        No radiology results for the last 7 days          Current Medications:  Scheduled Meds:  amitriptyline 25 mg Oral Nightly   aspirin 81 mg Oral Daily   budesonide 0.5 mg Nebulization BID - RT   cholecalciferol 1,000 Units Oral Daily   citalopram 10 mg Oral Daily   diltiaZEM  mg Oral Q24H   enoxaparin 40 mg Subcutaneous Daily   gabapentin 100 mg Oral Q8H   ipratropium-albuterol 1.5 mL Nebulization Q6H While Awake - RT   levothyroxine 88 mcg Oral Once per day on Mon Tue Wed Thu Fri Sat   pantoprazole 40 mg Oral QAM   pramipexole 1.5 mg Oral Nightly   sodium chloride 3 mL Intravenous Q12H     Continuous Infusions:  sodium chloride 0.9 % with KCl 20 mEq 50 mL/hr Last Rate: 50 mL/hr (06/02/19 1405)     PRN Meds:.•  acetaminophen  •  bisacodyl  •  docusate sodium  •  HYDROcodone-acetaminophen  •  melatonin  •  ondansetron ODT  •  sodium chloride    Assessment: Status post  Procedure(s):  BIPOLAR HIP CEMENTED POSTERIOR    Patient Active Problem List   Diagnosis   • Carcinoid tumor of lung   • Diverticulosis of intestine   • Obstructive sleep apnea syndrome   • Weight loss   • Vitamin D deficiency   • Overweight (BMI 25.0-29.9)   • Spinal stenosis of lumbar region without neurogenic claudication   • Osteoarthritis of knee   • Obesity (BMI 30-39.9)   • Neutropenia (CMS/HCC)   • Chronic lower back pain   • Knee pain   • Insomnia   • Hypothyroidism   • Hypokalemia   • Hypertension   • Hyperlipidemia   • Generalized osteoarthritis   • Gastroparesis   • Foot pain   • Chronic obstructive pulmonary disease (CMS/HCC)   • Chronic constipation   • Anemia   • Weight gain   • Pain of left breast   • Elevated serum alkaline phosphatase level   • Neck pain   • Volume overload   • Status post total hip replacement, right        PLAN:   Continues current post-op course  Anticoagulation: Lovenox started  Dressing Change PRN  Mobilize with PT as tolerated per protocol, posterior precautions  Hgb 7.3. 2 units PRBC ordered for transfusion this AM.  Cr. 1.92, BUN 41. Urology consulted.    Weight Bearing: WBAT  Discharge Plan: OK to plan for discharge in  tomorrow to SNF  from orthopadic perspective.      Radha Ocampo, APRN    Date: 6/3/2019    Time: 7:27 AM

## 2019-06-04 LAB
ABO + RH BLD: NORMAL
ABO + RH BLD: NORMAL
BASOPHILS # BLD AUTO: 0.02 10*3/MM3 (ref 0–0.2)
BASOPHILS NFR BLD AUTO: 0.1 % (ref 0–1.5)
BH BB BLOOD EXPIRATION DATE: NORMAL
BH BB BLOOD EXPIRATION DATE: NORMAL
BH BB BLOOD TYPE BARCODE: 7300
BH BB BLOOD TYPE BARCODE: 7300
BH BB DISPENSE STATUS: NORMAL
BH BB DISPENSE STATUS: NORMAL
BH BB PRODUCT CODE: NORMAL
BH BB PRODUCT CODE: NORMAL
BH BB UNIT NUMBER: NORMAL
BH BB UNIT NUMBER: NORMAL
CREAT BLD-MCNC: 1.7 MG/DL (ref 0.57–1)
DEPRECATED RDW RBC AUTO: 48.7 FL (ref 37–54)
EOSINOPHIL # BLD AUTO: 0.08 10*3/MM3 (ref 0–0.4)
EOSINOPHIL NFR BLD AUTO: 0.5 % (ref 0.3–6.2)
ERYTHROCYTE [DISTWIDTH] IN BLOOD BY AUTOMATED COUNT: 16 % (ref 12.3–15.4)
GFR SERPL CREATININE-BSD FRML MDRD: 29 ML/MIN/1.73
HCT VFR BLD AUTO: 23.5 % (ref 34–46.6)
HGB BLD-MCNC: 7.6 G/DL (ref 12–15.9)
IMM GRANULOCYTES # BLD AUTO: 0.68 10*3/MM3 (ref 0–0.05)
IMM GRANULOCYTES NFR BLD AUTO: 4.5 % (ref 0–0.5)
LYMPHOCYTES # BLD AUTO: 1.54 10*3/MM3 (ref 0.7–3.1)
LYMPHOCYTES NFR BLD AUTO: 10.1 % (ref 19.6–45.3)
MCH RBC QN AUTO: 27.2 PG (ref 26.6–33)
MCHC RBC AUTO-ENTMCNC: 32.3 G/DL (ref 31.5–35.7)
MCV RBC AUTO: 84.2 FL (ref 79–97)
MONOCYTES # BLD AUTO: 1.16 10*3/MM3 (ref 0.1–0.9)
MONOCYTES NFR BLD AUTO: 7.6 % (ref 5–12)
NEUTROPHILS # BLD AUTO: 11.76 10*3/MM3 (ref 1.7–7)
NEUTROPHILS NFR BLD AUTO: 77.2 % (ref 42.7–76)
NRBC BLD AUTO-RTO: 0.1 /100 WBC (ref 0–0.2)
PLATELET # BLD AUTO: 134 10*3/MM3 (ref 140–450)
PMV BLD AUTO: 10.5 FL (ref 6–12)
RBC # BLD AUTO: 2.79 10*6/MM3 (ref 3.77–5.28)
UNIT  ABO: NORMAL
UNIT  ABO: NORMAL
UNIT  RH: NORMAL
UNIT  RH: NORMAL
WBC NRBC COR # BLD: 15.24 10*3/MM3 (ref 3.4–10.8)

## 2019-06-04 PROCEDURE — 25010000002 PIPERACILLIN SOD-TAZOBACTAM PER 1 G: Performed by: HOSPITALIST

## 2019-06-04 PROCEDURE — 94799 UNLISTED PULMONARY SVC/PX: CPT

## 2019-06-04 PROCEDURE — 82565 ASSAY OF CREATININE: CPT | Performed by: HOSPITALIST

## 2019-06-04 PROCEDURE — 25010000002 PIPERACILLIN SOD-TAZOBACTAM PER 1 G: Performed by: INTERNAL MEDICINE

## 2019-06-04 PROCEDURE — 86900 BLOOD TYPING SEROLOGIC ABO: CPT

## 2019-06-04 PROCEDURE — 25010000002 ENOXAPARIN PER 10 MG: Performed by: ORTHOPAEDIC SURGERY

## 2019-06-04 PROCEDURE — 36430 TRANSFUSION BLD/BLD COMPNT: CPT

## 2019-06-04 PROCEDURE — P9016 RBC LEUKOCYTES REDUCED: HCPCS

## 2019-06-04 PROCEDURE — 85025 COMPLETE CBC W/AUTO DIFF WBC: CPT | Performed by: ORTHOPAEDIC SURGERY

## 2019-06-04 PROCEDURE — 97110 THERAPEUTIC EXERCISES: CPT

## 2019-06-04 RX ADMIN — DOCUSATE SODIUM 100 MG: 100 CAPSULE, LIQUID FILLED ORAL at 22:24

## 2019-06-04 RX ADMIN — GABAPENTIN 100 MG: 100 CAPSULE ORAL at 15:50

## 2019-06-04 RX ADMIN — LEVOTHYROXINE SODIUM 88 MCG: 88 TABLET ORAL at 05:12

## 2019-06-04 RX ADMIN — BUDESONIDE 0.5 MG: 0.5 INHALANT RESPIRATORY (INHALATION) at 19:04

## 2019-06-04 RX ADMIN — BISACODYL 10 MG: 10 SUPPOSITORY RECTAL at 22:24

## 2019-06-04 RX ADMIN — IPRATROPIUM BROMIDE AND ALBUTEROL SULFATE: 2.5; .5 SOLUTION RESPIRATORY (INHALATION) at 19:04

## 2019-06-04 RX ADMIN — SODIUM CHLORIDE, PRESERVATIVE FREE 3 ML: 5 INJECTION INTRAVENOUS at 09:14

## 2019-06-04 RX ADMIN — CITALOPRAM 10 MG: 10 TABLET, FILM COATED ORAL at 09:13

## 2019-06-04 RX ADMIN — GABAPENTIN 100 MG: 100 CAPSULE ORAL at 05:12

## 2019-06-04 RX ADMIN — AMITRIPTYLINE HYDROCHLORIDE 25 MG: 25 TABLET, FILM COATED ORAL at 21:39

## 2019-06-04 RX ADMIN — DILTIAZEM HYDROCHLORIDE 120 MG: 120 CAPSULE, COATED, EXTENDED RELEASE ORAL at 09:13

## 2019-06-04 RX ADMIN — PRAMIPEXOLE DIHYDROCHLORIDE 1.5 MG: 1.5 TABLET ORAL at 21:39

## 2019-06-04 RX ADMIN — TAZOBACTAM SODIUM AND PIPERACILLIN SODIUM 4.5 G: 500; 4 INJECTION, SOLUTION INTRAVENOUS at 10:53

## 2019-06-04 RX ADMIN — ENOXAPARIN SODIUM 30 MG: 30 INJECTION SUBCUTANEOUS at 09:13

## 2019-06-04 RX ADMIN — TAZOBACTAM SODIUM AND PIPERACILLIN SODIUM 4.5 G: 500; 4 INJECTION, SOLUTION INTRAVENOUS at 02:31

## 2019-06-04 RX ADMIN — SODIUM CHLORIDE, PRESERVATIVE FREE 3 ML: 5 INJECTION INTRAVENOUS at 21:39

## 2019-06-04 RX ADMIN — MAGNESIUM HYDROXIDE 10 ML: 2400 SUSPENSION ORAL at 10:53

## 2019-06-04 RX ADMIN — BUDESONIDE 0.5 MG: 0.5 INHALANT RESPIRATORY (INHALATION) at 08:58

## 2019-06-04 RX ADMIN — TAZOBACTAM SODIUM AND PIPERACILLIN SODIUM 3.38 G: 375; 3 INJECTION, SOLUTION INTRAVENOUS at 20:34

## 2019-06-04 RX ADMIN — GABAPENTIN 100 MG: 100 CAPSULE ORAL at 21:39

## 2019-06-04 RX ADMIN — HYDROCODONE BITARTATE AND ACETAMINOPHEN 1 TABLET: 5; 325 TABLET ORAL at 05:12

## 2019-06-04 RX ADMIN — ASPIRIN 81 MG: 81 TABLET, CHEWABLE ORAL at 09:13

## 2019-06-04 RX ADMIN — PANTOPRAZOLE SODIUM 40 MG: 40 TABLET, DELAYED RELEASE ORAL at 05:12

## 2019-06-04 RX ADMIN — VITAMIN D, TAB 1000IU (100/BT) 1000 UNITS: 25 TAB at 09:13

## 2019-06-04 RX ADMIN — IPRATROPIUM BROMIDE AND ALBUTEROL SULFATE 1.5 ML: 2.5; .5 SOLUTION RESPIRATORY (INHALATION) at 08:56

## 2019-06-04 RX ADMIN — IPRATROPIUM BROMIDE AND ALBUTEROL SULFATE 1.5 ML: 2.5; .5 SOLUTION RESPIRATORY (INHALATION) at 12:30

## 2019-06-04 NOTE — THERAPY TREATMENT NOTE
Acute Care - Physical Therapy Treatment Note  [facility]     Patient Name: Latanya Olsen  : 1940  MRN: [id number]  Today's Date: 2019  Onset of Illness/Injury or Date of Surgery: 19  Date of Referral to PT: 19  Referring Physician: Emeli    Admit Date: 2019    Visit Dx:    ICD-10-CM ICD-9-CM   1. Closed fracture of neck of right femur, initial encounter (CMS/[facility]) S72.001A 820.8   2. Impaired functional mobility, balance, gait, and endurance Z74.09 V49.89     Patient Active Problem List   Diagnosis   • Carcinoid tumor of lung   • Diverticulosis of intestine   • Obstructive sleep apnea syndrome   • Weight loss   • Vitamin D deficiency   • Overweight (BMI 25.0-29.9)   • Spinal stenosis of lumbar region without neurogenic claudication   • Osteoarthritis of knee   • Obesity (BMI 30-39.9)   • Neutropenia (CMS/[facility])   • Chronic lower back pain   • Knee pain   • Insomnia   • Hypothyroidism   • Hypokalemia   • Hypertension   • Hyperlipidemia   • Generalized osteoarthritis   • Gastroparesis   • Foot pain   • Chronic obstructive pulmonary disease (CMS/[facility])   • Chronic constipation   • Anemia   • Weight gain   • Pain of left breast   • Elevated serum alkaline phosphatase level   • Neck pain   • Volume overload   • Status post total hip replacement, right       Therapy Treatment    Rehabilitation Treatment Summary     Row Name 19 1506 19 1114          Treatment Time/Intention    Discipline  physical therapy assistant  -SM  physical therapy assistant  -     Document Type  therapy note (daily note)  -SM  therapy note (daily note)  -SM     Subjective Information  complains of;pain  -SM  complains of;weakness;pain  -SM     Mode of Treatment  physical therapy  -SM  physical therapy  -SM     Patient Effort  good  -SM  good  -SM     Existing Precautions/Restrictions  fall;right;hip, anterior  -SM  fall;right;hip, anterior  -SM     Recorded by [SM] Radha Clemente, MONAE 19 0316  [SM] Kelly Clementeah Melani, PTA 06/04/19 1123     Row Name 06/04/19 1506 06/04/19 1114          Cognitive Assessment/Intervention- PT/OT    Orientation Status (Cognition)  oriented x 3  -SM  oriented x 3  -SM     Follows Commands (Cognition)  WNL  -SM  WNL  -SM     Personal Safety Interventions  fall prevention program maintained;gait belt;nonskid shoes/slippers when out of bed  -SM  fall prevention program maintained;gait belt;nonskid shoes/slippers when out of bed  -SM     Recorded by [SM] Radha Clemente, PTA 06/04/19 1536 [SM] Radha Clemente, PTA 06/04/19 1123     Row Name 06/04/19 1506 06/04/19 1114          Bed Mobility Assessment/Treatment    Bed Mobility Assessment/Treatment  sit-supine  -  supine-sit  -SM     Supine-Sit [place] (Bed Mobility)  not tested  -  minimum assist (75% patient effort)  -2     Sit-Supine [place] (Bed Mobility)  minimum assist (75% patient effort)  -  --     Bed Mobility, Safety Issues  decreased use of legs for bridging/pushing  -  decreased use of legs for bridging/pushing  -2     Assistive Device (Bed Mobility)  --  -  bed rails;head of bed elevated  -2     Recorded by [] Radha Clemente, PTA 06/04/19 1536 [SM] Radha Clemente, [facility] 06/04/19 1123  [SM2] Radha Clemente, PTA 06/04/19 1152     Row Name 06/04/19 1506 06/04/19 1114          Transfer Assessment/Treatment    Transfer Assessment/Treatment  sit-stand transfer;stand-sit transfer  -  sit-stand transfer;stand-sit transfer  -     Recorded by [SM] Radha Clemente, PTA 06/04/19 1536 [SM] Radha Clemente, PTA 06/04/19 1123     Row Name 06/04/19 1506 06/04/19 1114          Sit-Stand Transfer    Sit-Stand [place] (Transfers)  contact guard  -  minimum assist (75% patient effort)  -     Assistive Device (Sit-Stand Transfers)  walker, front-wheeled  -  walker, front-wheeled  -2     Recorded by [SM] Radha Clemente, PTA 06/04/19 1536 [SM] Radha Clemente,  [facility] 06/04/19 1152  [SM2] Kelly Clementeah Melani, [facility] 06/04/19 1123     Row Name 06/04/19 1506 06/04/19 1114          Stand-Sit Transfer    Stand-Sit [place] (Transfers)  contact guard  -SM  contact guard  -     Assistive Device (Stand-Sit Transfers)  walker, front-wheeled  -  walker, front-wheeled  -2     Recorded by [SM] Radha Clemente, [facility] 06/04/19 1536 [SM] Radha Clemente, [facility] 06/04/19 1152  [SM2] Kelly Clementeah Melani, [facility] 06/04/19 1123     Row Name 06/04/19 1506 06/04/19 1114          Gait/Stairs Assessment/Training    [place] Level (Gait)  contact guard  -SM  contact guard  -     Assistive Device (Gait)  walker, front-wheeled  -  walker, front-wheeled  -     Distance in Feet (Gait)  22  -SM  10  -SM     Pattern (Gait)  step-to  -SM  step-to  -SM     Deviations/Abnormal Patterns (Gait)  antalgic;robbie decreased;stride length decreased  -SM  antalgic;robbie decreased;stride length decreased  -SM     Bilateral Gait Deviations  forward flexed posture  -SM  forward flexed posture  -SM     Comment (Gait/Stairs)  --  declined further despite encouragement  -SM     Recorded by [SM] Radha Clemente, [facility] 06/04/19 1536 [SM] Radha Clemente, [facility] 06/04/19 1152     Row Name 06/04/19 1506 06/04/19 1114          Therapeutic Exercise    Comment (Therapeutic Exercise)  R THR protocol x 20 reps  -SM  R THR protocol x 15 reps  -SM     Recorded by [SM] Radha Clemente, [facility] 06/04/19 1536 [SM] Radha Clemente, [facility] 06/04/19 1123     Row Name 06/04/19 1506 06/04/19 1114          Positioning and Restraints    Pre-Treatment Position  sitting in chair/recliner  -SM  in bed  -SM     Post Treatment Position  bed  -SM  chair  -SM     In Bed  supine;call light within reach;encouraged to call for assist;exit alarm on  -SM  --     In Chair  --  reclined;call light within reach;encouraged to call for assist;exit alarm on  -SM     Recorded by [SM] Radha Clemente, PTA 06/04/19 1536 [ASHLEY] Bryn,  Radha Polo, PTA 06/04/19 1123     Row Name 06/04/19 1506 06/04/19 1114          Pain Scale: Numbers Pre/Post-Treatment    Pain Scale: Numbers, Pretreatment  2/10  -SM  0/10 - no pain  -SM     Pain Scale: Numbers, Post-Treatment  4/10  -SM  6/10  -SM2     Pain Location - Side  Right  -SM  Right  -SM     Pain Location  hip  -SM  hip  -SM     Pain Intervention(s)  Repositioned;Ambulation/increased activity;Rest  -SM  Repositioned;Ambulation/increased activity;Rest  -SM     Recorded by [SM] Radha Clemente Melani, PTA 06/04/19 1536 [SM] Radha Clemente Melani, PTA 06/04/19 1123  [SM2] Radha Clemente Melani, PTA 06/04/19 1152     Row Name                Wound 06/01/19 0859 Right hip incision    Wound - Properties Group Date first assessed: 06/01/19 [RG] Time first assessed: 0859 [RG] Side: Right [RG] Location: hip [RG] Type: incision [RG] Recorded by:  [RG] Adriana Martel RN 06/01/19 0859      User Key  (r) = Recorded By, (t) = Taken By, (c) = Cosigned By    Initials Name Effective Dates Discipline    RG Adriana Martel RN 06/16/16 -  Nurse    SM Radha Clemente, PTA 03/07/18 -  PT          Wound 06/01/19 0859 Right hip incision (Active)   Dressing Appearance dry;intact;no drainage 6/4/2019  4:05 AM   Closure VERA 6/3/2019  4:23 PM   Base dressing in place, unable to visualize 6/3/2019  4:23 PM   Drainage Amount none 6/3/2019  4:23 PM           Physical Therapy Education     Title: PT OT SLP Therapies (Done)     Topic: Physical Therapy (Done)     Point: Mobility training (Done)     Learning Progress Summary           Patient Acceptance, E,TB,D, VU by ASHLEY at 6/4/2019 11:23 AM    Acceptance, E, VU,NR by MA at 6/3/2019  4:52 PM    Acceptance, E, VU by  at 6/2/2019  2:20 PM    Acceptance, E, VU by  at 6/2/2019  9:55 AM                   Point: Home exercise program (Done)     Learning Progress Summary           Patient Acceptance, E,TB,NEHEMIAS, ALEX by ASHLEY at 6/4/2019 11:23 AM    KHAI Gallardo VU,NR by MA at 6/3/2019  4:52  PM    Acceptance, E, VU by  at 6/2/2019  2:20 PM    Acceptance, E, VU by  at 6/2/2019  9:55 AM                   Point: Body mechanics (Done)     Learning Progress Summary           Patient Acceptance, E,TB,D, VU by  at 6/4/2019 11:23 AM    Acceptance, E, VU,NR by MA at 6/3/2019  4:52 PM    Acceptance, E, VU by  at 6/2/2019  2:20 PM    Acceptance, E, VU by  at 6/2/2019  9:55 AM                   Point: Precautions (Done)     Learning Progress Summary           Patient Acceptance, E,TB,D, VU by  at 6/4/2019 11:23 AM    Acceptance, E, VU,NR by MA at 6/3/2019  4:52 PM    Acceptance, E, VU by  at 6/2/2019  2:20 PM    Acceptance, E, VU by  at 6/2/2019  9:55 AM                               User Key     Initials Effective Dates Name Provider Type Discipline     03/07/18 -  Radha Clemente, PTA Physical Therapy Assistant PT    MA 10/19/18 -  Selina Huynh, PT Physical Therapist PT     04/03/18 -  Miguelito Laura, PT Physical Therapist PT                PT Recommendation and Plan  Anticipated Discharge Disposition (PT): skilled nursing facility  Outcome Summary/Treatment Plan (PT)  Anticipated Discharge Disposition (PT): skilled nursing facility  Plan of Care Reviewed With: patient  Progress: improving  Outcome Summary: Pt tolerated treatment well this date. States she has no pain when laying in bed, though once ambulating, pain increases. Required min A for bed mobility, then CGA to ambulate 10ft w/ Rw. PT will continue to address functional mobility deficits as tolerated.  Outcome Measures     Row Name 06/04/19 1100 06/03/19 1600 06/02/19 1400       How much help from another person do you currently need...    Turning from your back to your side while in flat bed without using bedrails?  3  -SM  3  -MA  3  -CH    Moving from lying on back to sitting on the side of a flat bed without bedrails?  3  -SM  3  -MA  3  -CH    Moving to and from a bed to a chair (including a wheelchair)?  3  -SM  3  -MA  3   -CH    Standing up from a chair using your arms (e.g., wheelchair, bedside chair)?  3  -SM  3  -MA  3  -CH    Climbing 3-5 steps with a railing?  1  -SM  1  -MA  1  -CH    To walk in hospital room?  3  -SM  3  -MA  3  -CH    AM-PAC 6 Clicks Score  16  -SM  16  -MA  16  -CH       Functional Assessment    Outcome Measure Options  AM-PAC 6 Clicks Basic Mobility (PT)  -SM  AM-PAC 6 Clicks Basic Mobility (PT)  -MA  AM-PAC 6 Clicks Basic Mobility (PT)  -CH    Row Name 06/02/19 0900             How much help from another person do you currently need...    Turning from your back to your side while in flat bed without using bedrails?  3  -CH      Moving from lying on back to sitting on the side of a flat bed without bedrails?  3  -CH      Moving to and from a bed to a chair (including a wheelchair)?  3  -CH      Standing up from a chair using your arms (e.g., wheelchair, bedside chair)?  3  -CH      Climbing 3-5 steps with a railing?  1  -CH      To walk in hospital room?  3  -CH      AM-PAC 6 Clicks Score  16  -CH         Functional Assessment    Outcome Measure Options  AM-PAC 6 Clicks Basic Mobility (PT)  -CH        User Key  (r) = Recorded By, (t) = Taken By, (c) = Cosigned By    Initials Name Provider Type    Radha Mooney, MONAE Physical Therapy Assistant    Selina Roldan, PT Physical Therapist     Miguelito Laura, PT Physical Therapist         Time Calculation:   PT Charges     Row Name 06/04/19 1536 06/04/19 1155          Time Calculation    Start Time  1506  -  1114  -     Stop Time  1530  -  1138  -SM     Time Calculation (min)  24 min  -SM  24 min  -     PT Received On  06/04/19  -  06/04/19  -     PT - Next Appointment  06/05/19  -  06/04/19  -       User Key  (r) = Recorded By, (t) = Taken By, (c) = Cosigned By    Initials Name Provider Type    Radha Mooney, PTA Physical Therapy Assistant        Therapy Charges for Today     Code Description Service Date Service Provider  Modifiers Qty    74681073439 HC PT THER PROC EA 15 MIN 6/4/2019 Radha Clemente, PTA GP 2    99418001661 HC PT THER PROC EA 15 MIN 6/4/2019 Radha Clemente, PTA GP 2          PT G-Codes  Outcome Measure Options: AM-PAC 6 Clicks Basic Mobility (PT)  AM-PAC 6 Clicks Score: 16    Radha Clemente, MONAE  6/4/2019

## 2019-06-04 NOTE — PROGRESS NOTES
"Daily progress note    Chief complaint  Doing better  No new complaints    History of present illness  78-year-old white female who is well-known to our service with history of hypertension hyperlipidemia hypothyroidism COPD neuropathy restless leg syndrome and gastroesophageal reflux disease who was recently discharged from the hospital after treatment of fluid overload chronic kidney disease continues to have right hip pain which radiates down into her anterior leg up to knee.  Patient denies any fall trauma injury.  Patient evaluated in the ER found to have right femur neck fracture admitted for management.  At the time of interview she is hurting wants to eat but denies any chest pain shortness of breath abdominal pain nausea vomiting diarrhea.     REVIEW OF SYSTEMS  Remarkable for pain at the surgical site     PHYSICAL EXAM  Blood pressure 115/55, pulse 77, temperature 97.6 °F (36.4 °C), temperature source Oral, resp. rate 18, height 167.6 cm (66\"), weight 93 kg (205 lb), SpO2 97 %.    Constitutional: She is oriented to person, place, and time. No distress.   Head: Normocephalic and atraumatic.   Eyes: EOM are normal. Pupils are equal, round, and reactive to light.   Neck: Normal range of motion. Neck supple.   Cardiovascular: Normal rate, regular rhythm and normal heart sounds. Exam reveals no gallop and no friction rub.   Pulmonary/Chest: Effort normal and breath sounds normal. No respiratory distress. She has no wheezes. She has no rales.   Abdominal: Soft. There is no tenderness. There is no rebound and no guarding.   Musculoskeletal: Normal range of motion. She exhibits no edema. Pt's back is non-tender. There is posterior R hip tenderness and pain with ROM of the R leg.    Neurological: She is alert and oriented to person, place, and time. Pt has normal strength and sensation   Skin: Skin is warm and dry. No rash noted.   Psychiatric: Mood and affect normal.     LABS  Lab Results (last 24 hours)     " Procedure Component Value Units Date/Time    CBC & Differential [[id number]] Collected:  06/04/19 0426    Specimen:  Blood Updated:  06/04/19 0523    Narrative:       The following orders were created for panel order CBC & Differential.  Procedure                               Abnormality         Status                     ---------                               -----------         ------                     CBC Auto Differential[[id number]]        Abnormal            Final result                 Please view results for these tests on the individual orders.    CBC Auto Differential [[id number]]  (Abnormal) Collected:  06/04/19 0426    Specimen:  Blood Updated:  06/04/19 0523     WBC 15.24 10*3/mm3      RBC 2.79 10*6/mm3      Hemoglobin 7.6 g/dL      Hematocrit 23.5 %      MCV 84.2 fL      MCH 27.2 pg      MCHC 32.3 g/dL      RDW 16.0 %      RDW-SD 48.7 fl      MPV 10.5 fL      Platelets 134 10*3/mm3      Neutrophil % 77.2 %      Lymphocyte % 10.1 %      Monocyte % 7.6 %      Eosinophil % 0.5 %      Basophil % 0.1 %      Immature Grans % 4.5 %      Neutrophils, Absolute 11.76 10*3/mm3      Lymphocytes, Absolute 1.54 10*3/mm3      Monocytes, Absolute 1.16 10*3/mm3      Eosinophils, Absolute 0.08 10*3/mm3      Basophils, Absolute 0.02 10*3/mm3      Immature Grans, Absolute 0.68 10*3/mm3      nRBC 0.1 /100 WBC     Creatinine, Serum [[id number]]  (Abnormal) Collected:  06/04/19 0426    Specimen:  Blood Updated:  06/04/19 0521     Creatinine 1.70 mg/dL      eGFR Non African Amer 29 mL/min/1.73     Narrative:       GFR Normal >60  Chronic Kidney Disease <60  Kidney Failure <15    Urinalysis without microscopic (no culture) - Urine, Catheter [[id number]]  (Normal) Collected:  06/03/19 2039    Specimen:  Urine, Catheter Updated:  06/03/19 2053     Color, UA Yellow     Appearance, UA Clear     pH, UA <=5.0     Specific Gravity, UA 1.018     Glucose, UA Negative     Ketones, UA Negative     Bilirubin, UA Negative     Blood, UA  Negative     Protein, UA Negative     Leuk Esterase, UA Negative     Nitrite, UA Negative     Urobilinogen, UA 0.2 E.U./dL    Blood Culture - Blood, Arm, Left [[id number]] Collected:  06/03/19 1843    Specimen:  Blood from Arm, Left Updated:  06/03/19 1902    Blood Culture - Blood, Arm, Left [[id number]] Collected:  06/03/19 1814    Specimen:  Blood from Arm, Left Updated:  06/03/19 1837        Imaging Results (last 24 hours)     Procedure Component Value Units Date/Time    XR Chest 1 View [[id number]] Collected:  06/03/19 1851     Updated:  06/03/19 1855    Narrative:       PORTABLE CHEST 06/03/2019 AT 1817 HOURS     CLINICAL HISTORY: Dyspnea.     Compared to the previous chest x-ray dated 05/31/2019.     The lungs are poorly inflated but appear free of focal infiltrates.  There are no pleural effusions. The heart is within normal limits in  size. Multiple surgical clips are present in the left hilar region.     IMPRESSIONS: Poor lung expansion. No acute process is identified.     This report was finalized on 6/3/2019 6:52 PM by Dr. Tima Lowery M.D.           Current Facility-Administered Medications:   •  acetaminophen (TYLENOL) tablet 325 mg, 325 mg, Oral, Q4H PRN, Ashley rCenshaw MD  •  amitriptyline (ELAVIL) tablet 25 mg, 25 mg, Oral, Nightly, Arjun Sainz MD, 25 mg at 06/03/19 2152  •  aspirin chewable tablet 81 mg, 81 mg, Oral, Daily, Arjun Sainz MD, 81 mg at 06/04/19 0913  •  bisacodyl (DULCOLAX) suppository 10 mg, 10 mg, Rectal, Daily PRN, Ashley Crenshaw MD  •  budesonide (PULMICORT) nebulizer solution 0.5 mg, 0.5 mg, Nebulization, BID - RT, Arjun Sainz MD, 0.5 mg at 06/04/19 0858  •  cholecalciferol (VITAMIN D3) tablet 1,000 Units, 1,000 Units, Oral, Daily, Arjun Sainz MD, 1,000 Units at 06/04/19 0913  •  citalopram (CeleXA) tablet 10 mg, 10 mg, Oral, Daily, Arjun Sainz MD, 10 mg at 06/04/19 0913  •  diltiaZEM CD (CARDIZEM CD) 24 hr capsule 120 mg, 120 mg, Oral, Q24H, Emeli  MD Arjun, 120 mg at 06/04/19 0913  •  docusate sodium (COLACE) capsule 100 mg, 100 mg, Oral, BID PRN, Ashley Crenshaw MD, 100 mg at 06/03/19 0537  •  enoxaparin (LOVENOX) syringe 30 mg, 30 mg, Subcutaneous, Daily, Ashley Crenshaw MD, 30 mg at 06/04/19 0913  •  gabapentin (NEURONTIN) capsule 100 mg, 100 mg, Oral, Q8H, Arjun Sainz MD, 100 mg at 06/04/19 0512  •  HYDROcodone-acetaminophen (NORCO) 5-325 MG per tablet 1 tablet, 1 tablet, Oral, Q8H PRN, Ashley Crenshaw MD, 1 tablet at 06/04/19 0512  •  ipratropium-albuterol (DUO-NEB) nebulizer solution 1.5 mL, 1.5 mL, Nebulization, Q6H While Awake - RT, Arjun Sainz MD, 1.5 mL at 06/04/19 0856  •  levothyroxine (SYNTHROID, LEVOTHROID) tablet 88 mcg, 88 mcg, Oral, Once per day on Mon Tue Wed Thu Fri Sat, Arjun Sainz MD, 88 mcg at 06/04/19 0512  •  magnesium hydroxide (MILK OF MAGNESIA) suspension 2400 mg/10mL 10 mL, 10 mL, Oral, Daily PRN, Ashley Crenshaw MD, 10 mL at 06/04/19 1053  •  melatonin tablet 1 mg, 1 mg, Oral, Nightly PRN, Ashley Crenshaw MD  •  ondansetron ODT (ZOFRAN-ODT) disintegrating tablet 4 mg, 4 mg, Oral, Q8H PRN, Arjun Sainz MD  •  pantoprazole (PROTONIX) EC tablet 40 mg, 40 mg, Oral, QAM, Arjun Sainz MD, 40 mg at 06/04/19 0512  •  Pharmacy to Dose Zosyn, , Does not apply, Continuous PRN, Arjun Sainz MD  •  piperacillin-tazobactam (ZOSYN) 4.5 g in iso-osmotic dextrose 100 mL IVPB (premix), 4.5 g, Intravenous, Q8H, Rogerio Mckeon MD, Last Rate: 0 mL/hr at 06/04/19 0654, 4.5 g at 06/04/19 1053  •  pramipexole (MIRAPEX) tablet 1.5 mg, 1.5 mg, Oral, Nightly, Arjun Sainz MD, 1.5 mg at 06/03/19 2152  •  sodium chloride 0.9 % flush 1-10 mL, 1-10 mL, Intravenous, PRN, Ashley Crenshaw MD  •  sodium chloride 0.9 % flush 3 mL, 3 mL, Intravenous, Q12H, Ashley Crenshaw MD, 3 mL at 06/04/19 0914  •  sodium chloride 0.9 % with KCl 20 mEq/L infusion, 75 mL/hr, Intravenous, Continuous,  Shelly Sainz MD, Last Rate: 75 mL/hr at 06/04/19 0654, 75 mL/hr at 06/04/19 0654    ASSESSMENT  Acute right femur neck fracture status post total hip arthroplasty  Urinary retention  Leukocytosis questionable source  Chronic anemia with drop in H&H  Hypertension  Hyperlipidemia  Hypothyroidism  COPD  Neuropathy  Restless leg syndrome  Chronic kidney disease stage II  Gastroesophageal reflux disease    PLAN  CPM  Postop care  Transfuse 1 more unit packed RBC  Empiric antibiotics  Stover catheter   Urology consult appreciated  Pain management  Continue home medications  Stress ulcer DVT prophylaxis  Supportive care  PT/OT  Follow closely    SHELLY SAINZ MD

## 2019-06-04 NOTE — CONSULTS
FIRST UROLOGY CONSULT      Patient Identification:  NAME:  Latanya Olsen  Age:  78 y.o.   Sex:  female   :  1940   MRN:  [id number]       Chief complaint: retention    History of present illness: 78 year old female  Urinary retention  POD 2 from right hip surgery  Currently on IC      Past medical history:  Past Medical History:   Diagnosis Date   • Anemia    • Arthritis    • Atrial fibrillation (CMS/HCC)    • Breast cancer (CMS/HCC)    • Constipation    • COPD (chronic obstructive pulmonary disease) (CMS/HCC)    • Diverticulosis    • GERD (gastroesophageal reflux disease)    • Hx of degenerative disc disease    • Hyperlipidemia    • Hypertension    • Hypokalemia    • Hypothyroidism    • Knee swelling    • Lung cancer (CMS/HCC)     history   • Renal disorder    • Restless leg syndrome    • Sleep apnea with use of continuous positive airway pressure (CPAP)        Past surgical history:  Past Surgical History:   Procedure Laterality Date   • BUNIONECTOMY Bilateral    • CATARACT EXTRACTION     • CHOLECYSTECTOMY     • COLONOSCOPY     • ENDOSCOPY N/A 2016    Procedure: ESOPHAGOGASTRODUODENOSCOPY  with biopsies;  Surgeon: Von Hernández MD;  Location: [facility];  Service:    • LUNG LOBECTOMY Left     lower lobe   • REPLACEMENT TOTAL KNEE Left    • THYROID BIOPSY     • TOTAL HIP ARTHROPLASTY Right 2019    Procedure: BIPOLAR HIP CEMENTED POSTERIOR;  Surgeon: Ashley Crenshaw MD;  Location: [facility];  Service: Orthopedics       Allergies:  Doxycycline; Codeine; and Morphine    Home medications:  Medications Prior to Admission   Medication Sig Dispense Refill Last Dose   • ALBUTEROL IN Inhale.      • aluminum hydroxide-mag carbonate (GAVISCON EXTRA RELIEF) 160-105 MG chewable tablet chewable tablet Chew 1 tablet 4 (Four) Times a Day With Meals & at Bedtime.      • baclofen (LIORESAL) 10 MG tablet Take 10 mg by mouth 3 (Three) Times a Day. Take 1 -2 tab by mouth at night as  needed for muscle cramping   3/17/2019 at 2100   • BISACODYL RE Insert  into the rectum Daily As Needed (CONSTIPATION).      • Cholecalciferol (VITAMIN D3) 5000 units capsule capsule Take 5,000 Units by mouth Daily.   3/18/2019 at 0900   • cyclobenzaprine (FLEXERIL) 10 MG tablet Take 10 mg by mouth 3 (Three) Times a Day As Needed for Muscle Spasms.      • diltiaZEM (CARDIZEM) 120 MG tablet Take 120 mg by mouth 4 (Four) Times a Day.      • esomeprazole (NexIUM) 40 MG capsule Take 40 mg by mouth every morning before breakfast.   3/18/2019 at 0900   • folic acid (FOLVITE) 400 MCG tablet Take 800 mcg by mouth Daily.   3/18/2019 at 0900   • furosemide (LASIX) 20 MG tablet Take 20 mg by mouth 2 (Two) Times a Day.      • HYDROcodone-acetaminophen (NORCO) 5-325 MG per tablet Take 1 tablet by mouth Every 6 (Six) Hours As Needed.      • lactulose (CHRONULAC) 10 GM/15ML solution Take 20 g by mouth 2 (Two) Times a Day.      • levothyroxine (SYNTHROID, LEVOTHROID) 88 MCG tablet Take 88 mcg by mouth Daily. Takes daily except Sundays (takes 6 days a week)   3/18/2019 at 0900   • linaclotide (LINZESS) 290 MCG capsule capsule Take 290 mcg by mouth Every Morning Before Breakfast.   3/18/2019 at 0900   • magnesium hydroxide (MILK OF MAGNESIA) 400 MG/5ML suspension Take  by mouth Daily As Needed for Constipation.      • magnesium oxide (MAG-OX) 400 tablet tablet Take 400 mg by mouth Daily.      • metOLazone (ZAROXOLYN) 5 MG tablet Take 5 mg by mouth. THREE TIMES A WEEK      • potassium chloride (K-DUR) 10 MEQ CR tablet Take 20 mEq by mouth Daily.      • pramipexole (MIRAPEX) 1.5 MG tablet Take 1.5 mg by mouth every night at bedtime.   3/17/2019 at 2100   • predniSONE (DELTASONE) 10 MG tablet Take 10 mg by mouth Daily.      • traMADol (ULTRAM) 50 MG tablet Take 50 mg by mouth 3 (Three) Times a Day.      • Zinc Gluconate 30 MG tablet Take 15 mg by mouth Daily.      • amitriptyline (ELAVIL) 25 MG tablet TK 1 T PO D UTD  1 Past Week at  Unknown time   • budesonide (PULMICORT) 0.5 MG/2ML nebulizer solution USE 1 VIAL IN NEBULIZER 2 TIMES DAILY. Generic: PULMICORT 60 each 10 Taking   • CARTIA  MG 24 hr capsule TK 1 C PO QD FOR BP  5 3/18/2019 at 0900   • citalopram (CeleXA) 10 MG tablet Take 10 mg by mouth Daily.   Taking   • gabapentin (NEURONTIN) 100 MG capsule TAKE 1 CAPSULE BY MOUTH EVERY 8 HOURS 270 capsule 3 Taking   • ipratropium-albuterol (DUO-NEB) 0.5-2.5 mg/mL nebulizer USE 1 VIAL IN NEBULIZER 2 TIMES DAILY. Generic: DUONEB 60 mL 10 3/17/2019 at Unknown time   • zolpidem (AMBIEN) 10 MG tablet TAKE 1 TABLET BY MOUTH EVERY NIGHT AT BEDTIME AS NEEDED 90 tablet 0 Taking       Hospital medications:    amitriptyline 25 mg Oral Nightly   aspirin 81 mg Oral Daily   budesonide 0.5 mg Nebulization BID - RT   cholecalciferol 1,000 Units Oral Daily   citalopram 10 mg Oral Daily   diltiaZEM  mg Oral Q24H   [START ON 6/4/2019] enoxaparin 30 mg Subcutaneous Daily   gabapentin 100 mg Oral Q8H   ipratropium-albuterol 1.5 mL Nebulization Q6H While Awake - RT   levothyroxine 88 mcg Oral Once per day on Mon Tue Wed Thu Fri Sat   pantoprazole 40 mg Oral QAM   piperacillin-tazobactam 4.5 g Intravenous Once   [START ON 6/4/2019] piperacillin-tazobactam 4.5 g Intravenous Q8H   pramipexole 1.5 mg Oral Nightly   sodium chloride 3 mL Intravenous Q12H       Pharmacy to Dose Zosyn     sodium chloride 0.9 % with KCl 20 mEq 75 mL/hr Last Rate: 75 mL/hr (06/03/19 6007)     •  acetaminophen  •  bisacodyl  •  docusate sodium  •  HYDROcodone-acetaminophen  •  melatonin  •  ondansetron ODT  •  Pharmacy to Dose Zosyn  •  sodium chloride    Family history:  Family History   Problem Relation Age of Onset   • Heart attack Mother    • Coronary artery disease Mother    • Hypertension Mother    • Heart attack Sister        Social history:  Social History     Tobacco Use   • Smoking status: Never Smoker   • Smokeless tobacco: Never Used   Substance Use Topics   • Alcohol  use: No   • Drug use: Defer       Review of systems:    gen no fever  Skin neg  Musculoskeletal right hip fracture, now POD from repair  Endocrine hypothyroidism  Heart HTN  Pulm COPD   retention  GI GERD  Psych neg  Neuro neg        Objective:  TMax 24 hours:   Temp (24hrs), Av.6 °F (36.4 °C), Min:97.1 °F (36.2 °C), Max:98 °F (36.7 °C)      Vitals Ranges:   Temp:  [97.1 °F (36.2 °C)-98 °F (36.7 °C)] 97.1 °F (36.2 °C)  Heart Rate:  [] 91  Resp:  [16-18] 16  BP: ()/(54-69) 116/54    Intake/Output Last 3 shifts:  I/O last 3 completed shifts:  In: 2450 [P.O.:1140; I.V.:660; Blood:650]  Out: 850 [Urine:850]     Physical Exam:       General Appearance:    Alert, cooperative, in no acute distress   Head:    Normocephalic, without obvious abnormality, atraumatic   Eyes:          PERRL, conjunctivae and corneas clear   Ears:    Normal external inspection   Throat:   No oral lesions, oral mucosa moist   Neck:   Supple, no LAD, trachea midline   Back:     No CVA tenderness   Lungs:     Respirations unlabored, symmetric excursion    Heart:    RRR, intact peripheral pulses   Abdomen:     Soft, NDNT, no masses, no guarding   :    No pelvic examination performed   Extremities:   No edema, no deformity   Skin:   No bleeding, bruising or rashes   Neuro/Psych:   Orientation intact, mood/affect pleasant, no focal findings       Results review:   I reviewed the patient's new clinical results.    Data review:  Lab Results (last 24 hours)     Procedure Component Value Units Date/Time    Blood Culture - Blood, Arm, Left [[id number]] Collected:  19 1843    Specimen:  Blood from Arm, Left Updated:  19 1902    Blood Culture - Blood, Arm, Left [[id number]] Collected:  19 1814    Specimen:  Blood from Arm, Left Updated:  19 1837    Basic Metabolic Panel [[id number]]  (Abnormal) Collected:  19 0558    Specimen:  Blood Updated:  19 0653     Glucose 166 mg/dL      BUN 41 mg/dL      Creatinine  1.92 mg/dL      Sodium 134 mmol/L      Potassium 4.4 mmol/L      Chloride 93 mmol/L      CO2 22.2 mmol/L      Calcium 7.9 mg/dL      eGFR Non African Amer 25 mL/min/1.73      BUN/Creatinine Ratio 21.4     Anion Gap 18.8 mmol/L     Narrative:       GFR Normal >60  Chronic Kidney Disease <60  Kidney Failure <15    CBC & Differential [[id number]] Collected:  06/03/19 0558    Specimen:  Blood Updated:  06/03/19 0628    Narrative:       The following orders were created for panel order CBC & Differential.  Procedure                               Abnormality         Status                     ---------                               -----------         ------                     CBC Auto Differential[[id number]]        Abnormal            Final result                 Please view results for these tests on the individual orders.    CBC Auto Differential [[id number]]  (Abnormal) Collected:  06/03/19 0558    Specimen:  Blood Updated:  06/03/19 0628     WBC 26.88 10*3/mm3      RBC 2.85 10*6/mm3      Hemoglobin 7.3 g/dL      Hematocrit 24.8 %      MCV 87.0 fL      MCH 25.6 pg      MCHC 29.4 g/dL      RDW 17.1 %      RDW-SD 53.5 fl      MPV 11.0 fL      Platelets 193 10*3/mm3      Neutrophil % 68.7 %      Lymphocyte % 18.8 %      Monocyte % 7.8 %      Eosinophil % 0.0 %      Basophil % 0.1 %      Immature Grans % 4.6 %      Neutrophils, Absolute 18.46 10*3/mm3      Lymphocytes, Absolute 5.05 10*3/mm3      Monocytes, Absolute 2.09 10*3/mm3      Eosinophils, Absolute 0.01 10*3/mm3      Basophils, Absolute 0.04 10*3/mm3      Immature Grans, Absolute 1.23 10*3/mm3      nRBC 0.1 /100 WBC     POC Glucose Once [[id number]]  (Abnormal) Collected:  06/03/19 0559    Specimen:  Blood Updated:  06/03/19 0619     Glucose 216 mg/dL     Tissue Pathology Exam [[id number]] Collected:  06/01/19 1124    Specimen:  Bone from Hip, Right Updated:  06/03/19 0542           Imaging:  Imaging Results (last 24 hours)     Procedure Component Value Units  Date/Time    XR Chest 1 View [[id number]] Collected:  06/03/19 1851     Updated:  06/03/19 1855    Narrative:       PORTABLE CHEST 06/03/2019 AT 1817 HOURS     CLINICAL HISTORY: Dyspnea.     Compared to the previous chest x-ray dated 05/31/2019.     The lungs are poorly inflated but appear free of focal infiltrates.  There are no pleural effusions. The heart is within normal limits in  size. Multiple surgical clips are present in the left hilar region.     IMPRESSIONS: Poor lung expansion. No acute process is identified.     This report was finalized on 6/3/2019 6:52 PM by Dr. Tima Lowery M.D.                Assessment:       Status post total hip replacement, right  urinary retention    Plan:   Currently on IC  With WBC elevated and creat up will place pryor for now    Niko Reid Jr., MD  06/03/19  8:09 PM

## 2019-06-04 NOTE — PLAN OF CARE
Problem: Skin Injury Risk (Adult)  Goal: Skin Health and Integrity  Outcome: Ongoing (interventions implemented as appropriate)   06/04/19 1720   Skin Injury Risk (Adult)   Skin Health and Integrity making progress toward outcome       Problem: Fall Risk (Adult)  Goal: Absence of Fall  Outcome: Ongoing (interventions implemented as appropriate)   06/04/19 1720   Fall Risk (Adult)   Absence of Fall achieves outcome       Problem: Fractured Hip (Adult)  Goal: Signs and Symptoms of Listed Potential Problems Will be Absent, Minimized or Managed (Fractured Hip)  Outcome: Ongoing (interventions implemented as appropriate)   06/04/19 1720   Goal/Outcome Evaluation   Problems Assessed (Fractured Hip) all   Problems Present (Fractured Hip) pain;voiding dysfunction;functional deficit/self-care deficit;situational response;constipation       Problem: Patient Care Overview  Goal: Plan of Care Review  Outcome: Ongoing (interventions implemented as appropriate)      Problem: Patient Care Overview  Goal: Plan of Care Review  Outcome: Ongoing (interventions implemented as appropriate)   06/04/19 1720   Coping/Psychosocial   Plan of Care Reviewed With patient   Plan of Care Review   Progress improving   OTHER   Outcome Summary Patient transferring and amubulating short distances with x1-2 assist. Vitals are stable and pryor catheter in place d/t urinary retention as ordered per urology   06/04/19 1720   Coping/Psychosocial   Plan of Care Reviewed With patient   Plan of Care Review   Progress improving   OTHER   Outcome Summary Patient transferring and amubulating short distances with x1-2 assist. Vitals are stable and voiding function is intact. Pain is managed with po meds. Patient receiving 1 unit PRBCs for hgb of 7.6. Educated on blood product administration. Patient anticpates d/c to snf when medically stable. Will continue to monitor.    . Pain is managed with po meds. Patient receiving 1 unit PRBCs for hgb of 7.6. Educated on  blood product administration. Patient anticpates d/c to snf when medically stable. Will continue to monitor.

## 2019-06-04 NOTE — PLAN OF CARE
Problem: Patient Care Overview  Goal: Plan of Care Review  Outcome: Ongoing (interventions implemented as appropriate)   06/04/19 9876   Coping/Psychosocial   Plan of Care Reviewed With patient   Plan of Care Review   Progress improving   OTHER   Outcome Summary Pt tolerated treatment well this date. States she has no pain when laying in bed, though once ambulating, pain increases. Required min A for bed mobility, then CGA to ambulate 10ft w/ Rw. PT will continue to address functional mobility deficits as tolerated.

## 2019-06-04 NOTE — PROGRESS NOTES
Patient: Latanya Olsen  YOB: 1940     Date of Admission: 5/31/2019 12:52 PM Medical Record Number: [id number]     Attending Physician: Arjun Sainz MD    Status Post:  RIGHT total  HIP CEMENTED ANTEROLATERAL Post Operative Day Number: 3    Subjective : No new orthopaedic complaints     Pain Relief: some relief with present medication.     Systemic Complaints: No Complaints  Vitals:    06/04/19 0306 06/04/19 0655 06/04/19 0858 06/04/19 0907   BP: 116/57 112/46     BP Location: Left arm Left arm     Patient Position: Lying Lying     Pulse: 82 80 78 79   Resp: 16 16 16 18   Temp: 97.8 °F (36.6 °C) 97.1 °F (36.2 °C)     TempSrc: Oral Oral     SpO2: 92% 97% 100% 100%   Weight:       Height:           Physical Exam: 78 y.o. female    General Appearance:       Alert, cooperative, in no acute distress                  Extremities:    Dressing Clean, Dry and Intact         Incision healthy without signs or symptoms of infections         No clinical sign of DVT        Able to do good movements of digits    Pulses:   Pulses palpable and equal bilaterally           Diagnostic Tests:     Results from last 7 days   Lab Units 06/04/19  0426 06/03/19  0558 06/02/19  0340   WBC 10*3/mm3 15.24* 26.88* 19.96*   HEMOGLOBIN g/dL 7.6* 7.3* 8.8*   HEMATOCRIT % 23.5* 24.8* 29.1*   PLATELETS 10*3/mm3 134* 193 178     Results from last 7 days   Lab Units 06/04/19  0426 06/03/19  0558 06/02/19  0340 06/01/19  1407   SODIUM mmol/L  --  134* 134* 134*   POTASSIUM mmol/L  --  4.4 3.8 3.1*   CHLORIDE mmol/L  --  93* 92* 90*   CO2 mmol/L  --  22.2 29.3* 29.5*   BUN mg/dL  --  41* 31* 28*   CREATININE mg/dL 1.70* 1.92* 1.10* 0.93   GLUCOSE mg/dL  --  166* 113* 110*   CALCIUM mg/dL  --  7.9* 8.3* 8.4*     Results from last 7 days   Lab Units 05/31/19  1537   INR  0.94   APTT seconds 26.2     Lab Results   Component Value Date    CRP 0.16 07/21/2016     Lab Results   Component Value Date    SEDRATE 9 07/21/2016     Lab Results    Component Value Date    URICACID 8.9 (H) 03/20/2019     No results found for: CRYSTAL  Microbiology Results (last 10 days)     ** No results found for the last 240 hours. **        No radiology results for the last 7 days          Current Medications:  Scheduled Meds:  amitriptyline 25 mg Oral Nightly   aspirin 81 mg Oral Daily   budesonide 0.5 mg Nebulization BID - RT   cholecalciferol 1,000 Units Oral Daily   citalopram 10 mg Oral Daily   diltiaZEM  mg Oral Q24H   enoxaparin 30 mg Subcutaneous Daily   gabapentin 100 mg Oral Q8H   ipratropium-albuterol 1.5 mL Nebulization Q6H While Awake - RT   levothyroxine 88 mcg Oral Once per day on Mon Tue Wed Thu Fri Sat   pantoprazole 40 mg Oral QAM   piperacillin-tazobactam 4.5 g Intravenous Q8H   pramipexole 1.5 mg Oral Nightly   sodium chloride 3 mL Intravenous Q12H     Continuous Infusions:  Pharmacy to Dose Zosyn     sodium chloride 0.9 % with KCl 20 mEq 75 mL/hr Last Rate: 75 mL/hr (06/04/19 0654)     PRN Meds:.•  acetaminophen  •  bisacodyl  •  docusate sodium  •  HYDROcodone-acetaminophen  •  magnesium hydroxide  •  melatonin  •  ondansetron ODT  •  Pharmacy to Dose Zosyn  •  sodium chloride    Assessment: Status post  Procedure(s): right TOTAL  HIP CEMENTED     Patient Active Problem List   Diagnosis   • Carcinoid tumor of lung   • Diverticulosis of intestine   • Obstructive sleep apnea syndrome   • Weight loss   • Vitamin D deficiency   • Overweight (BMI 25.0-29.9)   • Spinal stenosis of lumbar region without neurogenic claudication   • Osteoarthritis of knee   • Obesity (BMI 30-39.9)   • Neutropenia (CMS/HCC)   • Chronic lower back pain   • Knee pain   • Insomnia   • Hypothyroidism   • Hypokalemia   • Hypertension   • Hyperlipidemia   • Generalized osteoarthritis   • Gastroparesis   • Foot pain   • Chronic obstructive pulmonary disease (CMS/HCC)   • Chronic constipation   • Anemia   • Weight gain   • Pain of left breast   • Elevated serum alkaline  phosphatase level   • Neck pain   • Volume overload   • Status post total hip replacement, right       PLAN:   Continues current post-op course  Anticoagulation: Lovenox started  Mobilize with PT as tolerated per protocol  Hemoglobin still low, hip no hematoma    Weight Bearing: WBAT  Discharge Plan: OK to plan for discharge from orthopadic perspective.      Ashley Crenshaw MD    Date: 6/4/2019    Time: 10:29 AM

## 2019-06-04 NOTE — THERAPY TREATMENT NOTE
Acute Care - Physical Therapy Treatment Note  [facility]     Patient Name: Latanya Olsen  : 1940  MRN: [id number]  Today's Date: 2019  Onset of Illness/Injury or Date of Surgery: 19  Date of Referral to PT: 19  Referring Physician: Eemli    Admit Date: 2019    Visit Dx:    ICD-10-CM ICD-9-CM   1. Closed fracture of neck of right femur, initial encounter (CMS/[facility]) S72.001A 820.8   2. Impaired functional mobility, balance, gait, and endurance Z74.09 V49.89     Patient Active Problem List   Diagnosis   • Carcinoid tumor of lung   • Diverticulosis of intestine   • Obstructive sleep apnea syndrome   • Weight loss   • Vitamin D deficiency   • Overweight (BMI 25.0-29.9)   • Spinal stenosis of lumbar region without neurogenic claudication   • Osteoarthritis of knee   • Obesity (BMI 30-39.9)   • Neutropenia (CMS/[facility])   • Chronic lower back pain   • Knee pain   • Insomnia   • Hypothyroidism   • Hypokalemia   • Hypertension   • Hyperlipidemia   • Generalized osteoarthritis   • Gastroparesis   • Foot pain   • Chronic obstructive pulmonary disease (CMS/[facility])   • Chronic constipation   • Anemia   • Weight gain   • Pain of left breast   • Elevated serum alkaline phosphatase level   • Neck pain   • Volume overload   • Status post total hip replacement, right       Therapy Treatment    Rehabilitation Treatment Summary     Row Name 19 1114             Treatment Time/Intention    Discipline  physical therapy assistant  -SM      Document Type  therapy note (daily note)  -SM      Subjective Information  complains of;weakness;pain  -SM      Mode of Treatment  physical therapy  -SM      Patient Effort  good  -SM      Existing Precautions/Restrictions  fall;right;hip, anterior  -SM      Recorded by [SM] Radha Clemente, MONAE 19 1123      Row Name 19 1114             Cognitive Assessment/Intervention- PT/OT    Orientation Status (Cognition)  oriented x 3  -SM      Follows Commands  (Cognition)  WNL  -SM      Personal Safety Interventions  fall prevention program maintained;gait belt;nonskid shoes/slippers when out of bed  -SM      Recorded by [SM] Radha Clemente, [facility] 06/04/19 1123      Row Name 06/04/19 1114             Bed Mobility Assessment/Treatment    Bed Mobility Assessment/Treatment  supine-sit  -SM      Supine-Sit Brown (Bed Mobility)  minimum assist (75% patient effort)  -SM2      Bed Mobility, Safety Issues  decreased use of legs for bridging/pushing  -SM2      Assistive Device (Bed Mobility)  bed rails;head of bed elevated  -SM2      Recorded by [SM] Radha Clemente, PTA 06/04/19 1123  [SM2] Radha Clemente, [facility] 06/04/19 1152      Row Name 06/04/19 1114             Transfer Assessment/Treatment    Transfer Assessment/Treatment  sit-stand transfer;stand-sit transfer  -SM      Recorded by [SM] Radha Clemente, [facility] 06/04/19 1123      Row Name 06/04/19 1114             Sit-Stand Transfer    Sit-Stand Brown (Transfers)  minimum assist (75% patient effort)  -      Assistive Device (Sit-Stand Transfers)  walker, front-wheeled  -SM2      Recorded by [SM] Radha Clemente, PTA 06/04/19 1152  [SM2] Radha Clemente, [facility] 06/04/19 1123      Row Name 06/04/19 1114             Stand-Sit Transfer    Stand-Sit Brown (Transfers)  contact guard  -      Assistive Device (Stand-Sit Transfers)  walker, front-wheeled  -SM2      Recorded by [SM] Radha Clemente, [facility] 06/04/19 1152  [SM2] Radha Clemente, [facility] 06/04/19 1123      Row Name 06/04/19 1114             Gait/Stairs Assessment/Training    Brown Level (Gait)  contact guard  -      Assistive Device (Gait)  walker, front-wheeled  -      Distance in Feet (Gait)  10  -SM      Pattern (Gait)  step-to  -SM      Deviations/Abnormal Patterns (Gait)  antalgic;robbie decreased;stride length decreased  -SM      Bilateral Gait Deviations  forward flexed posture  -SM      Comment (Gait/Stairs)   declined further despite encouragement  -SM      Recorded by [SM] Radha Clemente Melani, PTA 06/04/19 1152      Row Name 06/04/19 1114             Therapeutic Exercise    Comment (Therapeutic Exercise)  R THR protocol x 15 reps  -SM      Recorded by [] Radha Clemente Melani, PTA 06/04/19 1123      Row Name 06/04/19 1114             Positioning and Restraints    Pre-Treatment Position  in bed  -SM      Post Treatment Position  chair  -SM      In Chair  reclined;call light within reach;encouraged to call for assist;exit alarm on  -SM      Recorded by [SM] Radha Clemente Melani, PTA 06/04/19 1123      Row Name 06/04/19 1114             Pain Scale: Numbers Pre/Post-Treatment    Pain Scale: Numbers, Pretreatment  0/10 - no pain  -SM      Pain Scale: Numbers, Post-Treatment  6/10  -SM2      Pain Location - Side  Right  -SM      Pain Location  hip  -SM      Pain Intervention(s)  Repositioned;Ambulation/increased activity;Rest  -SM      Recorded by [SM] Radha Clemente, [facility] 06/04/19 1123  [SM2] Radha Clemente, [facility] 06/04/19 1152      Row Name                Wound 06/01/19 0859 Right hip incision    Wound - Properties Group Date first assessed: 06/01/19 [RG] Time first assessed: 0859 [RG] Side: Right [RG] Location: hip [RG] Type: incision [RG] Recorded by:  [RG] Adriana Martel RN 06/01/19 0859      User Key  (r) = Recorded By, (t) = Taken By, (c) = Cosigned By    Initials Name Effective Dates Discipline    RG Adriana Martel RN 06/16/16 -  Nurse     Radha Clemente Melani, [facility] 03/07/18 -  PT          Wound 06/01/19 0859 Right hip incision (Active)   Dressing Appearance dry;intact;no drainage 6/4/2019  4:05 AM   Closure VERA 6/3/2019  4:23 PM   Base dressing in place, unable to visualize 6/3/2019  4:23 PM   Drainage Amount none 6/3/2019  4:23 PM           Physical Therapy Education     Title: PT OT SLP Therapies (Done)     Topic: Physical Therapy (Done)     Point: Mobility training (Done)     Learning Progress  Summary           Patient Acceptance, E,TB,D, VU by  at 6/4/2019 11:23 AM    Acceptance, E, VU,NR by MA at 6/3/2019  4:52 PM    Acceptance, E, VU by  at 6/2/2019  2:20 PM    Acceptance, E, VU by  at 6/2/2019  9:55 AM                   Point: Home exercise program (Done)     Learning Progress Summary           Patient Acceptance, E,TB,D, VU by  at 6/4/2019 11:23 AM    Acceptance, E, VU,NR by MA at 6/3/2019  4:52 PM    Acceptance, E, VU by  at 6/2/2019  2:20 PM    Acceptance, E, VU by  at 6/2/2019  9:55 AM                   Point: Body mechanics (Done)     Learning Progress Summary           Patient Acceptance, E,TB,D, VU by  at 6/4/2019 11:23 AM    Acceptance, E, VU,NR by MA at 6/3/2019  4:52 PM    Acceptance, E, VU by  at 6/2/2019  2:20 PM    Acceptance, E, VU by  at 6/2/2019  9:55 AM                   Point: Precautions (Done)     Learning Progress Summary           Patient Acceptance, E,TB,D, VU by  at 6/4/2019 11:23 AM    Acceptance, E, VU,NR by MA at 6/3/2019  4:52 PM    Acceptance, E, VU by  at 6/2/2019  2:20 PM    Acceptance, E, VU by  at 6/2/2019  9:55 AM                               User Key     Initials Effective Dates Name Provider Type Discipline     03/07/18 -  Radha Clemente, PTA Physical Therapy Assistant PT    MA 10/19/18 -  Selina Huynh, PT Physical Therapist PT     04/03/18 -  Miguelito Laura, PT Physical Therapist PT                PT Recommendation and Plan  Anticipated Discharge Disposition (PT): skilled nursing facility  Outcome Summary/Treatment Plan (PT)  Anticipated Discharge Disposition (PT): skilled nursing facility  Plan of Care Reviewed With: patient  Progress: improving  Outcome Summary: Pt tolerated treatment well this date. States she has no pain when laying in bed, though once ambulating, pain increases. Required min A for bed mobility, then CGA to ambulate 10ft w/ Rw. PT will continue to address functional mobility deficits as tolerated.  Outcome  Measures     Row Name 06/04/19 1100 06/03/19 1600 06/02/19 1400       How much help from another person do you currently need...    Turning from your back to your side while in flat bed without using bedrails?  3  -SM  3  -MA  3  -CH    Moving from lying on back to sitting on the side of a flat bed without bedrails?  3  -SM  3  -MA  3  -CH    Moving to and from a bed to a chair (including a wheelchair)?  3  -SM  3  -MA  3  -CH    Standing up from a chair using your arms (e.g., wheelchair, bedside chair)?  3  -SM  3  -MA  3  -CH    Climbing 3-5 steps with a railing?  1  -SM  1  -MA  1  -CH    To walk in hospital room?  3  -SM  3  -MA  3  -CH    AM-PAC 6 Clicks Score  16  -SM  16  -MA  16  -CH       Functional Assessment    Outcome Measure Options  AM-PAC 6 Clicks Basic Mobility (PT)  -SM  AM-PAC 6 Clicks Basic Mobility (PT)  -MA  AM-PAC 6 Clicks Basic Mobility (PT)  -CH    Row Name 06/02/19 0900             How much help from another person do you currently need...    Turning from your back to your side while in flat bed without using bedrails?  3  -CH      Moving from lying on back to sitting on the side of a flat bed without bedrails?  3  -CH      Moving to and from a bed to a chair (including a wheelchair)?  3  -CH      Standing up from a chair using your arms (e.g., wheelchair, bedside chair)?  3  -CH      Climbing 3-5 steps with a railing?  1  -CH      To walk in hospital room?  3  -CH      AM-PAC 6 Clicks Score  16  -CH         Functional Assessment    Outcome Measure Options  AM-PAC 6 Clicks Basic Mobility (PT)  -CH        User Key  (r) = Recorded By, (t) = Taken By, (c) = Cosigned By    Initials Name Provider Type    Radha Mooney, PTA Physical Therapy Assistant    Selina Roldan, PT Physical Therapist    CH Miguelito Laura, PT Physical Therapist         Time Calculation:   PT Charges     Row Name 06/04/19 1155             Time Calculation    Start Time  1114  -SM      Stop Time  1138  -SM      Time  Calculation (min)  24 min  -      PT Received On  06/04/19  -ASHLEY      PT - Next Appointment  06/04/19  -        User Key  (r) = Recorded By, (t) = Taken By, (c) = Cosigned By    Initials Name Provider Type    Radha Mooney PTA Physical Therapy Assistant        Therapy Charges for Today     Code Description Service Date Service Provider Modifiers Qty    75990185621 HC PT THER PROC EA 15 MIN 6/4/2019 Radha Clemente PTA GP 2          PT G-Codes  Outcome Measure Options: AM-PAC 6 Clicks Basic Mobility (PT)  AM-PAC 6 Clicks Score: 16    Radha Clemente PTA  6/4/2019

## 2019-06-04 NOTE — PROGRESS NOTES
Pharmacy consult to dose Zosyn    Latanya Olsen is a 78 y.o. female 93 kg (205 lb).  Pharmacy consulted to dose Zosyn (Day 2 of 5) for Sepsis per Dr. Sainz's request.    Current ordered antibiotics DOT: 5 days, stop date 6/8/19    Estimated Creatinine Clearance: 30.8 mL/min (A) (by C-G formula based on SCr of 1.7 mg/dL (H)).  Creatinine   Date Value Ref Range Status   06/04/2019 1.70 (H) 0.57 - 1.00 mg/dL Final   06/03/2019 1.92 (H) 0.57 - 1.00 mg/dL Final   06/02/2019 1.10 (H) 0.57 - 1.00 mg/dL Final   06/01/2019 0.93 0.57 - 1.00 mg/dL Final     WBC   Date Value Ref Range Status   06/04/2019 15.24 (H) 3.40 - 10.80 10*3/mm3 Final   06/03/2019 26.88 (H) 3.40 - 10.80 10*3/mm3 Final   06/02/2019 19.96 (H) 3.40 - 10.80 10*3/mm3 Final     Temp Readings from Last 2 Encounters:   06/04/19 97.1 °F (36.2 °C) (Oral)   03/21/19 97.1 °F (36.2 °C) (Oral)     Anti-Infectives (From admission, onward)    Ordered     Dose/Rate Route Frequency Start Stop    06/03/19 1742  piperacillin-tazobactam (ZOSYN) 4.5 g in iso-osmotic dextrose 100 mL IVPB (premix)     Ordering Provider:  Rogerio Mckeon MD    4.5 g  over 4 Hours Intravenous Every 8 Hours 06/04/19 0230 06/09/19 0229    06/03/19 1742  piperacillin-tazobactam (ZOSYN) 4.5 g in iso-osmotic dextrose 100 mL IVPB (premix)     Ordering Provider:  Rogerio Mckeon MD    4.5 g  over 30 Minutes Intravenous Once 06/03/19 1830 06/03/19 2153    06/03/19 1721  Pharmacy to Dose Zosyn     Ordering Provider:  Arjun Sainz MD     Does not apply Continuous PRN 06/03/19 1720 06/08/19 1719    06/01/19 1324  ceFAZolin in dextrose (ANCEF) IVPB solution 2 g     Ajrun Saniz MD reviewed the order on 06/01/19 1503.   Ordering Provider:  Ashley Crenshaw MD    2 g  over 30 Minutes Intravenous Every 8 Hours 06/01/19 1700 06/02/19 0130    06/01/19 0750  ceFAZolin in dextrose (ANCEF) IVPB solution 2 g     Ordering Provider:  Ashley Crenshaw MD    2 g  over 30 Minutes Intravenous Once  06/01/19 0752 06/01/19 0924          Baseline cultures:  6/03 BCx X 2: In process    PLAN:  1. Patient has received 3 doses of high dose zosyn 4.5 gm so far. Given patient's clinical improvement (vitals wnl, afebrile, WBC trending down, BMI 33, CrCl 30 - creatinine trending down), will adjust zosyn dose to 3.375 gm IV Q8H EI over 4 hours.     2. Pharmacy will discontinue the Pharmacy to Dose Zosyn consult at this time. Renal function will continue to be monitored and dosing adjustments will be made by pharmacy based on renal function if necessary      Makenzie Lou PharmD, BCPS  06/04/19 9:02 AM

## 2019-06-04 NOTE — PLAN OF CARE
Problem: Fall Risk (Adult)  Goal: Absence of Fall  Outcome: Ongoing (interventions implemented as appropriate)   06/04/19 0336   Fall Risk (Adult)   Absence of Fall making progress toward outcome       Problem: Patient Care Overview  Goal: Plan of Care Review  Outcome: Ongoing (interventions implemented as appropriate)   06/04/19 0336   Coping/Psychosocial   Plan of Care Reviewed With patient   Plan of Care Review   Progress improving   OTHER   Outcome Summary HTN well controlled. pain well controlled.

## 2019-06-05 LAB
ABO + RH BLD: NORMAL
ANION GAP SERPL CALCULATED.3IONS-SCNC: 9.8 MMOL/L
BASOPHILS # BLD AUTO: 0.02 10*3/MM3 (ref 0–0.2)
BASOPHILS NFR BLD AUTO: 0.2 % (ref 0–1.5)
BH BB BLOOD EXPIRATION DATE: NORMAL
BH BB BLOOD TYPE BARCODE: 7300
BH BB DISPENSE STATUS: NORMAL
BH BB PRODUCT CODE: NORMAL
BH BB UNIT NUMBER: NORMAL
BUN BLD-MCNC: 35 MG/DL (ref 8–23)
BUN/CREAT SERPL: 28.9 (ref 7–25)
CALCIUM SPEC-SCNC: 7.7 MG/DL (ref 8.6–10.5)
CHLORIDE SERPL-SCNC: 96 MMOL/L (ref 98–107)
CO2 SERPL-SCNC: 25.2 MMOL/L (ref 22–29)
CREAT BLD-MCNC: 1.21 MG/DL (ref 0.57–1)
CYTO UR: NORMAL
DEPRECATED RDW RBC AUTO: 48.7 FL (ref 37–54)
EOSINOPHIL # BLD AUTO: 0.16 10*3/MM3 (ref 0–0.4)
EOSINOPHIL NFR BLD AUTO: 1.3 % (ref 0.3–6.2)
ERYTHROCYTE [DISTWIDTH] IN BLOOD BY AUTOMATED COUNT: 15.9 % (ref 12.3–15.4)
GFR SERPL CREATININE-BSD FRML MDRD: 43 ML/MIN/1.73
GLUCOSE BLD-MCNC: 103 MG/DL (ref 65–99)
HCT VFR BLD AUTO: 23.5 % (ref 34–46.6)
HGB BLD-MCNC: 7.6 G/DL (ref 12–15.9)
LAB AP CASE REPORT: NORMAL
LYMPHOCYTES # BLD AUTO: 1.44 10*3/MM3 (ref 0.7–3.1)
LYMPHOCYTES NFR BLD AUTO: 11.6 % (ref 19.6–45.3)
MCH RBC QN AUTO: 27 PG (ref 26.6–33)
MCHC RBC AUTO-ENTMCNC: 32.3 G/DL (ref 31.5–35.7)
MCV RBC AUTO: 83.6 FL (ref 79–97)
MONOCYTES # BLD AUTO: 0.94 10*3/MM3 (ref 0.1–0.9)
MONOCYTES NFR BLD AUTO: 7.6 % (ref 5–12)
NEUTROPHILS # BLD AUTO: 9.24 10*3/MM3 (ref 1.7–7)
NEUTROPHILS NFR BLD AUTO: 74.1 % (ref 42.7–76)
NT-PROBNP SERPL-MCNC: 340.7 PG/ML (ref 5–1800)
PATH REPORT.FINAL DX SPEC: NORMAL
PATH REPORT.GROSS SPEC: NORMAL
PLATELET # BLD AUTO: 125 10*3/MM3 (ref 140–450)
PMV BLD AUTO: 10.4 FL (ref 6–12)
POTASSIUM BLD-SCNC: 3.7 MMOL/L (ref 3.5–5.2)
RBC # BLD AUTO: 2.81 10*6/MM3 (ref 3.77–5.28)
SODIUM BLD-SCNC: 131 MMOL/L (ref 136–145)
UNIT  ABO: NORMAL
UNIT  RH: NORMAL
WBC NRBC COR # BLD: 12.45 10*3/MM3 (ref 3.4–10.8)

## 2019-06-05 PROCEDURE — 86900 BLOOD TYPING SEROLOGIC ABO: CPT

## 2019-06-05 PROCEDURE — 94799 UNLISTED PULMONARY SVC/PX: CPT

## 2019-06-05 PROCEDURE — 36430 TRANSFUSION BLD/BLD COMPNT: CPT

## 2019-06-05 PROCEDURE — 85025 COMPLETE CBC W/AUTO DIFF WBC: CPT | Performed by: HOSPITALIST

## 2019-06-05 PROCEDURE — 97110 THERAPEUTIC EXERCISES: CPT

## 2019-06-05 PROCEDURE — P9016 RBC LEUKOCYTES REDUCED: HCPCS

## 2019-06-05 PROCEDURE — 83880 ASSAY OF NATRIURETIC PEPTIDE: CPT | Performed by: HOSPITALIST

## 2019-06-05 PROCEDURE — 80048 BASIC METABOLIC PNL TOTAL CA: CPT | Performed by: HOSPITALIST

## 2019-06-05 PROCEDURE — 25010000002 PIPERACILLIN SOD-TAZOBACTAM PER 1 G: Performed by: HOSPITALIST

## 2019-06-05 PROCEDURE — 25810000003 SODIUM CHLORIDE 0.9 % WITH KCL 20 MEQ 20-0.9 MEQ/L-% SOLUTION: Performed by: HOSPITALIST

## 2019-06-05 PROCEDURE — 25010000002 ENOXAPARIN PER 10 MG: Performed by: ORTHOPAEDIC SURGERY

## 2019-06-05 RX ADMIN — HYDROCODONE BITARTATE AND ACETAMINOPHEN 1 TABLET: 5; 325 TABLET ORAL at 15:26

## 2019-06-05 RX ADMIN — TAZOBACTAM SODIUM AND PIPERACILLIN SODIUM 3.38 G: 375; 3 INJECTION, SOLUTION INTRAVENOUS at 03:57

## 2019-06-05 RX ADMIN — PANTOPRAZOLE SODIUM 40 MG: 40 TABLET, DELAYED RELEASE ORAL at 06:33

## 2019-06-05 RX ADMIN — DILTIAZEM HYDROCHLORIDE 120 MG: 120 CAPSULE, COATED, EXTENDED RELEASE ORAL at 08:50

## 2019-06-05 RX ADMIN — HYDROCODONE BITARTATE AND ACETAMINOPHEN 1 TABLET: 5; 325 TABLET ORAL at 23:06

## 2019-06-05 RX ADMIN — PRAMIPEXOLE DIHYDROCHLORIDE 1.5 MG: 1.5 TABLET ORAL at 21:29

## 2019-06-05 RX ADMIN — LEVOTHYROXINE SODIUM 88 MCG: 88 TABLET ORAL at 06:33

## 2019-06-05 RX ADMIN — TAZOBACTAM SODIUM AND PIPERACILLIN SODIUM 3.38 G: 375; 3 INJECTION, SOLUTION INTRAVENOUS at 12:41

## 2019-06-05 RX ADMIN — VITAMIN D, TAB 1000IU (100/BT) 1000 UNITS: 25 TAB at 08:50

## 2019-06-05 RX ADMIN — CITALOPRAM 10 MG: 10 TABLET, FILM COATED ORAL at 08:50

## 2019-06-05 RX ADMIN — BUDESONIDE 0.5 MG: 0.5 INHALANT RESPIRATORY (INHALATION) at 08:30

## 2019-06-05 RX ADMIN — ENOXAPARIN SODIUM 30 MG: 30 INJECTION SUBCUTANEOUS at 08:51

## 2019-06-05 RX ADMIN — GABAPENTIN 100 MG: 100 CAPSULE ORAL at 15:53

## 2019-06-05 RX ADMIN — IPRATROPIUM BROMIDE AND ALBUTEROL SULFATE 1.5 ML: 2.5; .5 SOLUTION RESPIRATORY (INHALATION) at 08:28

## 2019-06-05 RX ADMIN — POTASSIUM CHLORIDE AND SODIUM CHLORIDE 50 ML/HR: 900; 150 INJECTION, SOLUTION INTRAVENOUS at 00:36

## 2019-06-05 RX ADMIN — SODIUM CHLORIDE, PRESERVATIVE FREE 3 ML: 5 INJECTION INTRAVENOUS at 21:30

## 2019-06-05 RX ADMIN — AMITRIPTYLINE HYDROCHLORIDE 25 MG: 25 TABLET, FILM COATED ORAL at 21:29

## 2019-06-05 RX ADMIN — GABAPENTIN 100 MG: 100 CAPSULE ORAL at 06:33

## 2019-06-05 RX ADMIN — TAZOBACTAM SODIUM AND PIPERACILLIN SODIUM 3.38 G: 375; 3 INJECTION, SOLUTION INTRAVENOUS at 22:00

## 2019-06-05 RX ADMIN — IPRATROPIUM BROMIDE AND ALBUTEROL SULFATE: 2.5; .5 SOLUTION RESPIRATORY (INHALATION) at 20:43

## 2019-06-05 RX ADMIN — ASPIRIN 81 MG: 81 TABLET, CHEWABLE ORAL at 08:50

## 2019-06-05 RX ADMIN — BUDESONIDE 0.5 MG: 0.5 INHALANT RESPIRATORY (INHALATION) at 20:44

## 2019-06-05 RX ADMIN — SODIUM CHLORIDE, PRESERVATIVE FREE 3 ML: 5 INJECTION INTRAVENOUS at 08:54

## 2019-06-05 RX ADMIN — GABAPENTIN 100 MG: 100 CAPSULE ORAL at 21:34

## 2019-06-05 RX ADMIN — IPRATROPIUM BROMIDE AND ALBUTEROL SULFATE 1.5 ML: 2.5; .5 SOLUTION RESPIRATORY (INHALATION) at 14:05

## 2019-06-05 NOTE — PLAN OF CARE
Problem: Fall Risk (Adult)  Goal: Absence of Fall  Outcome: Ongoing (interventions implemented as appropriate)   06/05/19 0219   Fall Risk (Adult)   Absence of Fall making progress toward outcome       Problem: Patient Care Overview  Goal: Plan of Care Review  Outcome: Ongoing (interventions implemented as appropriate)   06/05/19 0219   Coping/Psychosocial   Plan of Care Reviewed With patient   Plan of Care Review   Progress improving   OTHER   Outcome Summary HTN well controlled on PO meds. pain well controlled.

## 2019-06-05 NOTE — PLAN OF CARE
Problem: Patient Care Overview  Goal: Plan of Care Review  Outcome: Ongoing (interventions implemented as appropriate)   06/05/19 4522   Coping/Psychosocial   Plan of Care Reviewed With patient   OTHER   Outcome Summary Pt walked into the cook, tolerated with mild complaints of pain.

## 2019-06-05 NOTE — THERAPY TREATMENT NOTE
Acute Care - Physical Therapy Treatment Note  [facility]     Patient Name: Latanya Olsen  : 1940  MRN: [id number]  Today's Date: 2019  Onset of Illness/Injury or Date of Surgery: 19  Date of Referral to PT: 19  Referring Physician: Emeli    Admit Date: 2019    Visit Dx:    ICD-10-CM ICD-9-CM   1. Closed fracture of neck of right femur, initial encounter (CMS/[facility]) S72.001A 820.8   2. Impaired functional mobility, balance, gait, and endurance Z74.09 V49.89     Patient Active Problem List   Diagnosis   • Carcinoid tumor of lung   • Diverticulosis of intestine   • Obstructive sleep apnea syndrome   • Weight loss   • Vitamin D deficiency   • Overweight (BMI 25.0-29.9)   • Spinal stenosis of lumbar region without neurogenic claudication   • Osteoarthritis of knee   • Obesity (BMI 30-39.9)   • Neutropenia (CMS/[facility])   • Chronic lower back pain   • Knee pain   • Insomnia   • Hypothyroidism   • Hypokalemia   • Hypertension   • Hyperlipidemia   • Generalized osteoarthritis   • Gastroparesis   • Foot pain   • Chronic obstructive pulmonary disease (CMS/[facility])   • Chronic constipation   • Anemia   • Weight gain   • Pain of left breast   • Elevated serum alkaline phosphatase level   • Neck pain   • Volume overload   • Status post total hip replacement, right       Therapy Treatment    Rehabilitation Treatment Summary     Row Name 19 1301             Treatment Time/Intention    Discipline  physical therapist  -CS      Document Type  therapy note (daily note)  -CS      Subjective Information  complains of;pain  -CS      Mode of Treatment  physical therapy  -CS      Patient/Family Observations  pt up in chair no acute distress  -CS      Care Plan Review  evaluation/treatment results reviewed  -CS      Therapy Frequency (PT Clinical Impression)  2 times/day  -CS      Patient Effort  good  -CS      Existing Precautions/Restrictions  fall;right;hip, anterior  -CS      Recorded by [CS] Delfino Muhammad  ZE, PT 06/05/19 1303      Row Name 06/05/19 1301             Vital Signs    O2 Delivery Pre Treatment  room air  -CS      O2 Delivery Intra Treatment  room air  -CS      O2 Delivery Post Treatment  room air  -CS      Recorded by [CS] Delfino Muhammad, PT 06/05/19 1303      Row Name 06/05/19 1301             Cognitive Assessment/Intervention- PT/OT    Orientation Status (Cognition)  oriented x 3  -CS      Follows Commands (Cognition)  WNL  -CS      Safety Deficit (Cognitive)  mild deficit;insight into deficits/self awareness  -CS      Personal Safety Interventions  fall prevention program maintained;gait belt;nonskid shoes/slippers when out of bed;supervised activity  -CS      Recorded by [CS] Delfino Muhammad, PT 06/05/19 1303      Row Name 06/05/19 1301             Mobility Assessment/Intervention    Extremity Weight-bearing Status  right lower extremity  -CS      Right Lower Extremity (Weight-bearing Status)  weight-bearing as tolerated (WBAT)  -CS      Recorded by [CS] Delfino Muhammad, PT 06/05/19 1303      Row Name 06/05/19 1301             Transfer Assessment/Treatment    Transfer Assessment/Treatment  sit-stand transfer;stand-sit transfer  -CS      Recorded by [CS] Delfino Muhammad, PT 06/05/19 1303      Row Name 06/05/19 1301             Sit-Stand Transfer    Sit-Stand [place] (Transfers)  contact guard  -CS      Assistive Device (Sit-Stand Transfers)  walker, front-wheeled  -CS      Recorded by [CS] Delfino Muhammad, PT 06/05/19 1303      Row Name 06/05/19 1301             Stand-Sit Transfer    Stand-Sit [place] (Transfers)  contact guard  -CS      Assistive Device (Stand-Sit Transfers)  walker, front-wheeled  -CS      Recorded by [CS] Delfino Muhammad, PT 06/05/19 1303      Row Name 06/05/19 1301             Gait/Stairs Assessment/Training    [place] Level (Gait)  contact guard  -CS      Assistive Device (Gait)  walker, front-wheeled  -CS      Distance in Feet (Gait)  25  -CS      Pattern  (Gait)  step-to  -CS      Deviations/Abnormal Patterns (Gait)  antalgic;robbie decreased;stride length decreased  -CS      Bilateral Gait Deviations  forward flexed posture  -CS      Recorded by [CS] Delfino Muhammad, PT 06/05/19 1303      Row Name 06/05/19 1301             Positioning and Restraints    Pre-Treatment Position  sitting in chair/recliner  -CS      Post Treatment Position  chair  -CS      In Chair  reclined;call light within reach;encouraged to call for assist  -CS      Recorded by [CS] Delfino Muhammad, PT 06/05/19 1303      Row Name 06/05/19 1301             Pain Scale: Numbers Pre/Post-Treatment    Pain Scale: Numbers, Pretreatment  2/10  -CS      Pain Scale: Numbers, Post-Treatment  4/10  -CS      Pain Location - Side  Right  -CS      Pain Location  hip  -CS      Pain Intervention(s)  Repositioned;Ambulation/increased activity  -CS      Recorded by [CS] Delfino Muhammad, PT 06/05/19 1303      Row Name                Wound 06/01/19 0859 Right hip incision    Wound - Properties Group Date first assessed: 06/01/19 [RG] Time first assessed: 0859 [RG] Side: Right [RG] Location: hip [RG] Type: incision [RG] Recorded by:  [RG] Adriana Martel RN 06/01/19 0859    Row Name 06/05/19 1301             Coping    Observed Emotional State  calm;cooperative  -CS      Verbalized Emotional State  acceptance  -CS      Recorded by [CS] Delfino Muhammad, PT 06/05/19 1303      Row Name 06/05/19 1301             Plan of Care Review    Plan of Care Reviewed With  patient  -CS      Recorded by [CS] Delfino Muhammad, PT 06/05/19 1303      Row Name 06/05/19 1301             Outcome Summary/Treatment Plan (PT)    Anticipated Discharge Disposition (PT)  skilled nursing facility  -CS      Recorded by [CS] Delfino Muhammad, PT 06/05/19 1303        User Key  (r) = Recorded By, (t) = Taken By, (c) = Cosigned By    Initials Name Effective Dates Discipline    RG Adriana Martel RN 06/16/16 -  Nurse    Delfino Calzada,  PT 05/14/18 -  PT          Wound 06/01/19 0859 Right hip incision (Active)   Dressing Appearance dry;intact;no drainage 6/5/2019  4:02 AM   Closure VERA 6/4/2019  4:10 PM   Drainage Amount none 6/4/2019  4:10 PM           Physical Therapy Education     Title: PT OT SLP Therapies (Done)     Topic: Physical Therapy (Done)     Point: Mobility training (Done)     Learning Progress Summary           Patient Acceptance, E,TB, VU by ABBY at 6/5/2019  1:04 PM    Acceptance, E,TB,D, VU by ASHLEY at 6/4/2019 11:23 AM    Acceptance, E, VU,NR by MA at 6/3/2019  4:52 PM    Acceptance, E, VU by  at 6/2/2019  2:20 PM    Acceptance, E, VU by  at 6/2/2019  9:55 AM                   Point: Home exercise program (Done)     Learning Progress Summary           Patient Acceptance, E,TB, VU by ABBY at 6/5/2019  1:04 PM    Acceptance, E,TB,D, VU by ASHLEY at 6/4/2019 11:23 AM    Acceptance, E, VU,NR by MA at 6/3/2019  4:52 PM    Acceptance, E, VU by  at 6/2/2019  2:20 PM    Acceptance, E, VU by  at 6/2/2019  9:55 AM                   Point: Body mechanics (Done)     Learning Progress Summary           Patient Acceptance, E,TB, VU by ABBY at 6/5/2019  1:04 PM    Acceptance, E,TB,D, VU by  at 6/4/2019 11:23 AM    Acceptance, E, VU,NR by MA at 6/3/2019  4:52 PM    Acceptance, E, VU by  at 6/2/2019  2:20 PM    Acceptance, E, VU by  at 6/2/2019  9:55 AM                   Point: Precautions (Done)     Learning Progress Summary           Patient Acceptance, E,TB, VU by ABBY at 6/5/2019  1:04 PM    Acceptance, E,TB,D, VU by ASHLEY at 6/4/2019 11:23 AM    Acceptance, E, VU,NR by MA at 6/3/2019  4:52 PM    Acceptance, E, VU by  at 6/2/2019  2:20 PM    Acceptance, E, VU by  at 6/2/2019  9:55 AM                               User Key     Initials Effective Dates Name Provider Type Discipline     03/07/18 -  Radha Clemente, PTA Physical Therapy Assistant PT    MA 10/19/18 -  Selina Huynh, PT Physical Therapist PT    CH 04/03/18 -  Miguelito Laura,  PT Physical Therapist PT     05/14/18 -  Delfino Muhammad, PT Physical Therapist PT                PT Recommendation and Plan  Anticipated Discharge Disposition (PT): skilled nursing facility  Therapy Frequency (PT Clinical Impression): 2 times/day  Outcome Summary/Treatment Plan (PT)  Anticipated Discharge Disposition (PT): skilled nursing facility  Plan of Care Reviewed With: patient  Outcome Summary: Pt walked into the cook, tolerated with mild complaints of pain.   Outcome Measures     Row Name 06/05/19 1300 06/04/19 1100 06/03/19 1600       How much help from another person do you currently need...    Turning from your back to your side while in flat bed without using bedrails?  3  -CS  3  -SM  3  -MA    Moving from lying on back to sitting on the side of a flat bed without bedrails?  3  -CS  3  -SM  3  -MA    Moving to and from a bed to a chair (including a wheelchair)?  3  -CS  3  -SM  3  -MA    Standing up from a chair using your arms (e.g., wheelchair, bedside chair)?  3  -CS  3  -SM  3  -MA    Climbing 3-5 steps with a railing?  1  -CS  1  -SM  1  -MA    To walk in hospital room?  3  -CS  3  -SM  3  -MA    AM-PAC 6 Clicks Score  16  -CS  16  -SM  16  -MA       Functional Assessment    Outcome Measure Options  AM-PAC 6 Clicks Basic Mobility (PT)  -CS  AM-PAC 6 Clicks Basic Mobility (PT)  -SM  AM-PAC 6 Clicks Basic Mobility (PT)  -MA    Row Name 06/02/19 1400             How much help from another person do you currently need...    Turning from your back to your side while in flat bed without using bedrails?  3  -CH      Moving from lying on back to sitting on the side of a flat bed without bedrails?  3  -CH      Moving to and from a bed to a chair (including a wheelchair)?  3  -CH      Standing up from a chair using your arms (e.g., wheelchair, bedside chair)?  3  -CH      Climbing 3-5 steps with a railing?  1  -CH      To walk in hospital room?  3  -CH      AM-PAC 6 Clicks Score  16  -CH          Functional Assessment    Outcome Measure Options  AM-PAC 6 Clicks Basic Mobility (PT)  -CH        User Key  (r) = Recorded By, (t) = Taken By, (c) = Cosigned By    Initials Name Provider Type    Radha Mooney, PTA Physical Therapy Assistant    Selina Roldan, PT Physical Therapist    CH Miguelito Laura, PT Physical Therapist    CS Delfino Muhammad, PT Physical Therapist         Time Calculation:   PT Charges     Row Name 06/05/19 1304             Time Calculation    Start Time  1111  -CS      Stop Time  1124  -CS      Time Calculation (min)  13 min  -CS      PT Received On  06/05/19  -CS      PT - Next Appointment  06/06/19  -        User Key  (r) = Recorded By, (t) = Taken By, (c) = Cosigned By    Initials Name Provider Type    eDlfino Calzada, PT Physical Therapist        Therapy Charges for Today     Code Description Service Date Service Provider Modifiers Qty    5193986 HC PT THER PROC EA 15 MIN 6/5/2019 Delfino Muhammad, PT GP 1          PT G-Codes  Outcome Measure Options: AM-PAC 6 Clicks Basic Mobility (PT)  AM-PAC 6 Clicks Score: 16    Delfino Muhammad PT  6/5/2019

## 2019-06-05 NOTE — THERAPY TREATMENT NOTE
Acute Care - Physical Therapy Treatment Note  [facility]     Patient Name: Latanya Olsen  : 1940  MRN: [id number]  Today's Date: 2019  Onset of Illness/Injury or Date of Surgery: 19  Date of Referral to PT: 19  Referring Physician: Emeli    Admit Date: 2019    Visit Dx:    ICD-10-CM ICD-9-CM   1. Closed fracture of neck of right femur, initial encounter (CMS/[facility]) S72.001A 820.8   2. Impaired functional mobility, balance, gait, and endurance Z74.09 V49.89     Patient Active Problem List   Diagnosis   • Carcinoid tumor of lung   • Diverticulosis of intestine   • Obstructive sleep apnea syndrome   • Weight loss   • Vitamin D deficiency   • Overweight (BMI 25.0-29.9)   • Spinal stenosis of lumbar region without neurogenic claudication   • Osteoarthritis of knee   • Obesity (BMI 30-39.9)   • Neutropenia (CMS/[facility])   • Chronic lower back pain   • Knee pain   • Insomnia   • Hypothyroidism   • Hypokalemia   • Hypertension   • Hyperlipidemia   • Generalized osteoarthritis   • Gastroparesis   • Foot pain   • Chronic obstructive pulmonary disease (CMS/[facility])   • Chronic constipation   • Anemia   • Weight gain   • Pain of left breast   • Elevated serum alkaline phosphatase level   • Neck pain   • Volume overload   • Status post total hip replacement, right       Therapy Treatment    Rehabilitation Treatment Summary     Row Name 19 1632 19 1301          Treatment Time/Intention    Discipline  physical therapy assistant  -  physical therapist  -CS     Document Type  therapy note (daily note)  -  therapy note (daily note)  -CS     Subjective Information  complains of;pain  -  complains of;pain  -CS     Mode of Treatment  physical therapy  -  physical therapy  -CS     Patient/Family Observations  --  pt up in chair no acute distress  -     Care Plan Review  --  evaluation/treatment results reviewed  -CS     Therapy Frequency (PT Clinical Impression)  --  2 times/day  -CS      Patient Effort  good  -SM  good  -CS     Existing Precautions/Restrictions  fall;right;hip, anterior  -SM  fall;right;hip, anterior  -CS     Recorded by [SM] Radha Clemente, PTA 06/05/19 1700 [CS] Delfino Muhammad, PT 06/05/19 1303     Row Name 06/05/19 1301             Vital Signs    O2 Delivery Pre Treatment  room air  -CS      O2 Delivery Intra Treatment  room air  -CS      O2 Delivery Post Treatment  room air  -CS      Recorded by [CS] Delfino Muhammad, PT 06/05/19 1303      Row Name 06/05/19 1632 06/05/19 1301          Cognitive Assessment/Intervention- PT/OT    Orientation Status (Cognition)  oriented x 3  -SM  oriented x 3  -CS     Follows Commands (Cognition)  WNL  -SM  WNL  -CS     Safety Deficit (Cognitive)  --  mild deficit;insight into deficits/self awareness  -CS     Personal Safety Interventions  fall prevention program maintained;gait belt;nonskid shoes/slippers when out of bed  -SM  fall prevention program maintained;gait belt;nonskid shoes/slippers when out of bed;supervised activity  -CS     Recorded by [SM] Radha Clemente, PTA 06/05/19 1700 [CS] Delfino Muhammad, PT 06/05/19 1303     Row Name 06/05/19 1301             Mobility Assessment/Intervention    Extremity Weight-bearing Status  right lower extremity  -CS      Right Lower Extremity (Weight-bearing Status)  weight-bearing as tolerated (WBAT)  -CS      Recorded by [CS] Delfino Muhammad, PT 06/05/19 1303      Row Name 06/05/19 1632             Bed Mobility Assessment/Treatment    Bed Mobility Assessment/Treatment  supine-sit  -      Supine-Sit [place] (Bed Mobility)  minimum assist (75% patient effort)  -      Bed Mobility, Safety Issues  decreased use of legs for bridging/pushing  -      Assistive Device (Bed Mobility)  bed rails;head of bed elevated  -SM      Recorded by [SM] Radha Clemente, PTA 06/05/19 1700      Row Name 06/05/19 1632 06/05/19 1301          Transfer Assessment/Treatment    Transfer  Assessment/Treatment  sit-stand transfer;stand-sit transfer  -  sit-stand transfer;stand-sit transfer  -CS     Recorded by [SM] Radha Clemente, PTA 06/05/19 1700 [CS] Delfino Muhammad, PT 06/05/19 1303     Row Name 06/05/19 1632 06/05/19 1301          Sit-Stand Transfer    Sit-Stand [place] (Transfers)  contact guard  -  contact guard  -CS     Assistive Device (Sit-Stand Transfers)  walker, front-wheeled  -SM  walker, front-wheeled  -CS     Recorded by [SM] Radha Clemente, PTA 06/05/19 1700 [CS] Delfino Muhammad, PT 06/05/19 1303     Row Name 06/05/19 1632 06/05/19 1301          Stand-Sit Transfer    Stand-Sit [place] (Transfers)  contact guard  -  contact guard  -CS     Assistive Device (Stand-Sit Transfers)  walker, front-wheeled  -SM  walker, front-wheeled  -CS     Recorded by [SM] Radha Clemente, PTA 06/05/19 1700 [CS] Delfino Muhammad, PT 06/05/19 1303     Row Name 06/05/19 1632 06/05/19 1301          Gait/Stairs Assessment/Training    [place] Level (Gait)  contact guard  -  contact guard  -CS     Assistive Device (Gait)  walker, front-wheeled  -SM  walker, front-wheeled  -CS     Distance in Feet (Gait)  40  -SM  25  -CS     Pattern (Gait)  step-to  -SM  step-to  -CS     Deviations/Abnormal Patterns (Gait)  antalgic;robbie decreased;stride length decreased  -  antalgic;robbie decreased;stride length decreased  -CS     Bilateral Gait Deviations  forward flexed posture  -  forward flexed posture  -CS     Recorded by [SM] Radha Clemente, PTA 06/05/19 1700 [CS] Delfino Muhammad, PT 06/05/19 1303     Row Name 06/05/19 1632             Therapeutic Exercise    Comment (Therapeutic Exercise)  R THR protocol x 20 reps  -SM      Recorded by [SM] Radha Clemente, PTA 06/05/19 1700      Row Name 06/05/19 1632 06/05/19 1301          Positioning and Restraints    Pre-Treatment Position  in bed  -SM  sitting in chair/recliner  -CS     Post Treatment Position  chair  -SM   chair  -CS     In Chair  reclined;call light within reach;encouraged to call for assist;exit alarm on  -SM  reclined;call light within reach;encouraged to call for assist  -CS     Recorded by [SM] Radha Clemente, PTA 06/05/19 1700 [CS] Delfino Muhammad, PT 06/05/19 1303     Row Name 06/05/19 1632 06/05/19 1301          Pain Scale: Numbers Pre/Post-Treatment    Pain Scale: Numbers, Pretreatment  2/10  -SM  2/10  -CS     Pain Scale: Numbers, Post-Treatment  2/10  -SM  4/10  -CS     Pain Location - Side  Right  -SM  Right  -CS     Pain Location  hip  -SM  hip  -CS     Pain Intervention(s)  Repositioned;Ambulation/increased activity;Rest  -SM  Repositioned;Ambulation/increased activity  -CS     Recorded by [SM] Radha Clemente, PTA 06/05/19 1700 [CS] Delfino Muhammad, PT 06/05/19 1303     Row Name                Wound 06/01/19 0859 Right hip incision    Wound - Properties Group Date first assessed: 06/01/19 [RG] Time first assessed: 0859 [RG] Side: Right [RG] Location: hip [RG] Type: incision [RG] Recorded by:  [RG] Adriana Martel RN 06/01/19 0859    Row Name 06/05/19 1301             Coping    Observed Emotional State  calm;cooperative  -CS      Verbalized Emotional State  acceptance  -CS      Recorded by [CS] Delfino Muhammad, PT 06/05/19 1303      Row Name 06/05/19 1301             Plan of Care Review    Plan of Care Reviewed With  patient  -CS      Recorded by [CS] Delfino Muhammad, PT 06/05/19 1303      Row Name 06/05/19 1301             Outcome Summary/Treatment Plan (PT)    Anticipated Discharge Disposition (PT)  skilled nursing facility  -CS      Recorded by [CS] Delfino Muhammad, PT 06/05/19 1303        User Key  (r) = Recorded By, (t) = Taken By, (c) = Cosigned By    Initials Name Effective Dates Discipline    RG Adriana Martel RN 06/16/16 -  Nurse     Radha Clemente, PTA 03/07/18 -  PT    Delfino Calzada, PT 05/14/18 -  PT          Wound 06/01/19 0859 Right hip incision (Active)    Dressing Appearance dry;intact;no drainage 6/5/2019  4:02 AM           Physical Therapy Education     Title: PT OT SLP Therapies (Done)     Topic: Physical Therapy (Done)     Point: Mobility training (Done)     Learning Progress Summary           Patient Acceptance, E,TB, VU by CS at 6/5/2019  1:04 PM    Acceptance, E,TB,D, VU by SM at 6/4/2019 11:23 AM    Acceptance, E, VU,NR by MA at 6/3/2019  4:52 PM    Acceptance, E, VU by  at 6/2/2019  2:20 PM    Acceptance, E, VU by  at 6/2/2019  9:55 AM                   Point: Home exercise program (Done)     Learning Progress Summary           Patient Acceptance, E,TB, VU by CS at 6/5/2019  1:04 PM    Acceptance, E,TB,D, VU by ASHLEY at 6/4/2019 11:23 AM    Acceptance, E, VU,NR by MA at 6/3/2019  4:52 PM    Acceptance, E, VU by  at 6/2/2019  2:20 PM    Acceptance, E, VU by  at 6/2/2019  9:55 AM                   Point: Body mechanics (Done)     Learning Progress Summary           Patient Acceptance, E,TB, VU by CS at 6/5/2019  1:04 PM    Acceptance, E,TB,D, VU by ASHLEY at 6/4/2019 11:23 AM    Acceptance, E, VU,NR by MA at 6/3/2019  4:52 PM    Acceptance, E, VU by  at 6/2/2019  2:20 PM    Acceptance, E, VU by  at 6/2/2019  9:55 AM                   Point: Precautions (Done)     Learning Progress Summary           Patient Acceptance, E,TB, VU by  at 6/5/2019  1:04 PM    Acceptance, E,TB,D, VU by  at 6/4/2019 11:23 AM    Acceptance, E, VU,NR by MA at 6/3/2019  4:52 PM    Acceptance, E, VU by  at 6/2/2019  2:20 PM    Acceptance, E, VU by  at 6/2/2019  9:55 AM                               User Key     Initials Effective Dates Name Provider Type Discipline     03/07/18 -  Radha Clemente, PTA Physical Therapy Assistant PT    MA 10/19/18 -  Selina Huynh, PT Physical Therapist PT     04/03/18 -  Miguelito Laura, PT Physical Therapist PT    CS 05/14/18 -  Delfino Muhammad, PT Physical Therapist PT                PT Recommendation and Plan  Anticipated  Discharge Disposition (PT): skilled nursing facility  Outcome Summary/Treatment Plan (PT)  Anticipated Discharge Disposition (PT): skilled nursing facility  Plan of Care Reviewed With: patient  Progress: improving  Outcome Summary: Pt tolerated treatment well this date. States she has no pain when laying in bed, though once ambulating, pain increases. Required min A for bed mobility, then CGA to ambulate 10ft w/ Rw. PT will continue to address functional mobility deficits as tolerated.  Outcome Measures     Row Name 06/05/19 1300 06/04/19 1100 06/03/19 1600       How much help from another person do you currently need...    Turning from your back to your side while in flat bed without using bedrails?  3  -CS  3  -SM  3  -MA    Moving from lying on back to sitting on the side of a flat bed without bedrails?  3  -CS  3  -SM  3  -MA    Moving to and from a bed to a chair (including a wheelchair)?  3  -CS  3  -SM  3  -MA    Standing up from a chair using your arms (e.g., wheelchair, bedside chair)?  3  -CS  3  -SM  3  -MA    Climbing 3-5 steps with a railing?  1  -CS  1  -SM  1  -MA    To walk in hospital room?  3  -CS  3  -SM  3  -MA    AM-PAC 6 Clicks Score  16  -CS  16  -SM  16  -MA       Functional Assessment    Outcome Measure Options  AM-PAC 6 Clicks Basic Mobility (PT)  -CS  AM-PAC 6 Clicks Basic Mobility (PT)  -SM  AM-PAC 6 Clicks Basic Mobility (PT)  -MA      User Key  (r) = Recorded By, (t) = Taken By, (c) = Cosigned By    Initials Name Provider Type    Radha Mooney, PTA Physical Therapy Assistant    Selina Roldan, PT Physical Therapist    Delfino Calzada, PT Physical Therapist         Time Calculation:   PT Charges     Row Name 06/05/19 1700 06/05/19 1304          Time Calculation    Start Time  1632  -  1111  -CS     Stop Time  1656  -SM  1124  -CS     Time Calculation (min)  24 min  -  13 min  -     PT Received On  06/05/19  -  06/05/19  -     PT - Next Appointment  06/06/19  -   06/06/19  -ABBY       User Key  (r) = Recorded By, (t) = Taken By, (c) = Cosigned By    Initials Name Provider Type    Radha Mooney, MONAE Physical Therapy Assistant    Delfino Calzada, PT Physical Therapist        Therapy Charges for Today     Code Description Service Date Service Provider Modifiers Qty    78036373799 HC PT THER PROC EA 15 MIN 6/4/2019 Radha Clemente, PTA GP 2    74025547354 HC PT THER PROC EA 15 MIN 6/4/2019 Radha Clemente, PTA GP 2    55532273608 HC PT THER PROC EA 15 MIN 6/5/2019 Radha Clemente, PTA GP 2          PT G-Codes  Outcome Measure Options: AM-PAC 6 Clicks Basic Mobility (PT)  AM-PAC 6 Clicks Score: 16    Radha Clemente PTA  6/5/2019

## 2019-06-05 NOTE — PROGRESS NOTES
"Daily progress note    Chief complaint  Doing better  No new complaints    History of present illness  78-year-old white female who is well-known to our service with history of hypertension hyperlipidemia hypothyroidism COPD neuropathy restless leg syndrome and gastroesophageal reflux disease who was recently discharged from the hospital after treatment of fluid overload chronic kidney disease continues to have right hip pain which radiates down into her anterior leg up to knee.  Patient denies any fall trauma injury.  Patient evaluated in the ER found to have right femur neck fracture admitted for management.  At the time of interview she is hurting wants to eat but denies any chest pain shortness of breath abdominal pain nausea vomiting diarrhea.     REVIEW OF SYSTEMS  Unremarkable    PHYSICAL EXAM  Blood pressure 128/56, pulse 80, temperature 98.8 °F (37.1 °C), temperature source Oral, resp. rate 18, height 167.6 cm (66\"), weight 93 kg (205 lb), SpO2 91 %.    Constitutional: She is oriented to person, place, and time. No distress.   Head: Normocephalic and atraumatic.   Eyes: EOM are normal. Pupils are equal, round, and reactive to light.   Neck: Normal range of motion. Neck supple.   Cardiovascular: Normal rate, regular rhythm and normal heart sounds. Exam reveals no gallop and no friction rub.   Pulmonary/Chest: Effort normal and breath sounds normal. No respiratory distress. She has no wheezes. She has no rales.   Abdominal: Soft. There is no tenderness. There is no rebound and no guarding.   Musculoskeletal: Normal range of motion. She exhibits no edema. Pt's back is non-tender. There is posterior R hip tenderness and pain with ROM of the R leg.    Neurological: She is alert and oriented to person, place, and time. Pt has normal strength and sensation   Skin: Skin is warm and dry. No rash noted.   Psychiatric: Mood and affect normal.     LABS  Lab Results (last 24 hours)     Procedure Component Value Units " "Date/Time    Tissue Pathology Exam [[id number]] Collected:  06/01/19 1124    Specimen:  Bone from Hip, Right Updated:  06/05/19 1225     Case Report --     Surgical Pathology Report                         Case: [id number]                                  Authorizing Provider:  Ashley Crenshaw   Collected:           06/01/2019 11:24 AM                                 MD                                                                           Ordering Location:     [facility]  Received:            06/03/2019 05:42 AM                                 MAIN OR                                                                      Pathologist:           Elgin Randolph MD                                                         Specimen:    Hip, Right, RT FEMORAL HEAD                                                                 Final Diagnosis --     1.  Right Femoral Head:    A.  Focally thin articular cartilage, viable and focally ischemic cortical bone and trabecular bone with irregularly        thin bony trabecula noted.    B.  Scattered hematopoietic elements with relatively normal trilineage hematopoiesis.    C.  No acute osteomyelitis nor malignancy identified.     jat/brb       Gross Description --     1. Received in formalin labeled \"right femoral head\" is a 4.1 x 4.1 x 4.0 cm femoral head with a 3.5 x 3.0 x 2.6 (length) cm neck with a jagged resection.  The articular surface is smooth tan white and focally eburnated.  The specimen is overall grossly unremarkable externally.  Sectioning reveals tan yellow trabecular bone which is focally softened in a 2.0 x 1.5 x 1.4 cm focus within the neck which extends to the resection margin.  There are no obvious lesions, however, and the remaining cut surfaces are tan yellow and trabeculated.  The articular surface ranges from 0.2 cm to 0.3 cm in thickness.  Representative sections are submitted following decalcification as follows:  1A-1B - neck " perpendicular to the resection margin (margin inked black)  1C - articular surface  1D - area of softened bone within the neck scooped out and submitted in a filter bag    DIONICIO/CHRISTOPHER/ADAM/dulce        Microscopic Description --     Microscopic performed, incorporated in diagnosis.       Basic Metabolic Panel [[id number]]  (Abnormal) Collected:  06/05/19 0417    Specimen:  Blood Updated:  06/05/19 0504     Glucose 103 mg/dL      BUN 35 mg/dL      Creatinine 1.21 mg/dL      Sodium 131 mmol/L      Potassium 3.7 mmol/L      Chloride 96 mmol/L      CO2 25.2 mmol/L      Calcium 7.7 mg/dL      eGFR Non African Amer 43 mL/min/1.73      BUN/Creatinine Ratio 28.9     Anion Gap 9.8 mmol/L     Narrative:       GFR Normal >60  Chronic Kidney Disease <60  Kidney Failure <15    BNP [[id number]]  (Normal) Collected:  06/05/19 0417    Specimen:  Blood Updated:  06/05/19 0504     proBNP 340.7 pg/mL     Narrative:       Among patients with dyspnea, NT-proBNP is highly sensitive for the detection of acute congestive heart failure. In addition NT-proBNP of <300 pg/ml effectively rules out acute congestive heart failure with 99% negative predictive value.    CBC & Differential [[id number]] Collected:  06/05/19 0417    Specimen:  Blood Updated:  06/05/19 0449    Narrative:       The following orders were created for panel order CBC & Differential.  Procedure                               Abnormality         Status                     ---------                               -----------         ------                     CBC Auto Differential[[id number]]        Abnormal            Final result                 Please view results for these tests on the individual orders.    CBC Auto Differential [[id number]]  (Abnormal) Collected:  06/05/19 0417    Specimen:  Blood Updated:  06/05/19 0449     WBC 12.45 10*3/mm3      RBC 2.81 10*6/mm3      Hemoglobin 7.6 g/dL      Hematocrit 23.5 %      MCV 83.6 fL      MCH 27.0 pg      MCHC 32.3 g/dL      RDW 15.9 %       RDW-SD 48.7 fl      MPV 10.4 fL      Platelets 125 10*3/mm3      Neutrophil % 74.1 %      Lymphocyte % 11.6 %      Monocyte % 7.6 %      Eosinophil % 1.3 %      Basophil % 0.2 %      Neutrophils, Absolute 9.24 10*3/mm3      Lymphocytes, Absolute 1.44 10*3/mm3      Monocytes, Absolute 0.94 10*3/mm3      Eosinophils, Absolute 0.16 10*3/mm3      Basophils, Absolute 0.02 10*3/mm3     Blood Culture - Blood, Arm, Left [[id number]] Collected:  06/03/19 1843    Specimen:  Blood from Arm, Left Updated:  06/04/19 1915     Blood Culture No growth at 24 hours    Blood Culture - Blood, Arm, Left [[id number]] Collected:  06/03/19 1814    Specimen:  Blood from Arm, Left Updated:  06/04/19 1845     Blood Culture No growth at 24 hours        Imaging Results (last 24 hours)     ** No results found for the last 24 hours. **        Current Facility-Administered Medications:   •  acetaminophen (TYLENOL) tablet 325 mg, 325 mg, Oral, Q4H PRN, Ashley Crenshaw MD  •  amitriptyline (ELAVIL) tablet 25 mg, 25 mg, Oral, Nightly, Arjun Sainz MD, 25 mg at 06/04/19 2139  •  aspirin chewable tablet 81 mg, 81 mg, Oral, Daily, Arjun Sainz MD, 81 mg at 06/05/19 0850  •  bisacodyl (DULCOLAX) suppository 10 mg, 10 mg, Rectal, Daily PRN, Ashley Crenshaw MD, 10 mg at 06/04/19 2224  •  budesonide (PULMICORT) nebulizer solution 0.5 mg, 0.5 mg, Nebulization, BID - RT, Arjun Sainz MD, 0.5 mg at 06/05/19 0830  •  cholecalciferol (VITAMIN D3) tablet 1,000 Units, 1,000 Units, Oral, Daily, Arjun Sainz MD, 1,000 Units at 06/05/19 0850  •  citalopram (CeleXA) tablet 10 mg, 10 mg, Oral, Daily, Arjun Sainz MD, 10 mg at 06/05/19 0850  •  diltiaZEM CD (CARDIZEM CD) 24 hr capsule 120 mg, 120 mg, Oral, Q24H, Arjun Sainz MD, 120 mg at 06/05/19 0850  •  docusate sodium (COLACE) capsule 100 mg, 100 mg, Oral, BID PRN, Ashley Crenshaw MD, 100 mg at 06/04/19 2224  •  [START ON 6/6/2019] enoxaparin (LOVENOX) syringe 40 mg, 40 mg,  Subcutaneous, Daily, Ashley Crenshaw MD  •  gabapentin (NEURONTIN) capsule 100 mg, 100 mg, Oral, Q8H, Arjun Sainz MD, 100 mg at 06/05/19 0633  •  HYDROcodone-acetaminophen (NORCO) 5-325 MG per tablet 1 tablet, 1 tablet, Oral, Q8H PRN, Ashley Crenshaw MD, 1 tablet at 06/05/19 1526  •  ipratropium-albuterol (DUO-NEB) nebulizer solution 1.5 mL, 1.5 mL, Nebulization, Q6H While Awake - RT, Arjun Sainz MD, 1.5 mL at 06/05/19 1405  •  levothyroxine (SYNTHROID, LEVOTHROID) tablet 88 mcg, 88 mcg, Oral, Once per day on Mon Tue Wed Thu Fri Sat, Arjun Sainz MD, 88 mcg at 06/05/19 0633  •  magnesium hydroxide (MILK OF MAGNESIA) suspension 2400 mg/10mL 10 mL, 10 mL, Oral, Daily PRN, Ashley Crenshaw MD, 10 mL at 06/04/19 1053  •  melatonin tablet 1 mg, 1 mg, Oral, Nightly PRN, Ashley Crenshaw MD  •  ondansetron ODT (ZOFRAN-ODT) disintegrating tablet 4 mg, 4 mg, Oral, Q8H PRN, Arjun Sainz MD  •  pantoprazole (PROTONIX) EC tablet 40 mg, 40 mg, Oral, QAM, Arjun Sainz MD, 40 mg at 06/05/19 0633  •  piperacillin-tazobactam (ZOSYN) 3.375 g in iso-osmotic dextrose 50 ml (premix), 3.375 g, Intravenous, Q8H, Arjun Sainz MD, 3.375 g at 06/05/19 1241  •  pramipexole (MIRAPEX) tablet 1.5 mg, 1.5 mg, Oral, Nightly, Arjun Sainz MD, 1.5 mg at 06/04/19 2134  •  sodium chloride 0.9 % flush 1-10 mL, 1-10 mL, Intravenous, PRN, Ashley Crenshaw MD  •  sodium chloride 0.9 % flush 3 mL, 3 mL, Intravenous, Q12H, Ashley Crenshaw MD, 3 mL at 06/05/19 0854  •  sodium chloride 0.9 % with KCl 20 mEq/L infusion, 50 mL/hr, Intravenous, Continuous, Arjun Sainz MD, Last Rate: 50 mL/hr at 06/05/19 0522, 50 mL/hr at 06/05/19 0522    ASSESSMENT  Acute right femur neck fracture status post total hip arthroplasty  Urinary retention  Leukocytosis questionable source  Chronic anemia with drop in H&H  Hypertension  Hyperlipidemia  Hypothyroidism  COPD  Neuropathy  Restless leg syndrome  Chronic kidney  disease stage II  Gastroesophageal reflux disease    PLAN  CPM  Postop care  Transfuse 1 more unit packed RBC  Empiric antibiotics  Stover catheter   Urology consult appreciated  Pain management  Continue home medications  Stress ulcer DVT prophylaxis  Supportive care  PT/OT  Follow closely    SHELLY DAVILA MD

## 2019-06-06 LAB
ABO + RH BLD: NORMAL
ANION GAP SERPL CALCULATED.3IONS-SCNC: 10.2 MMOL/L
ANISOCYTOSIS BLD QL: ABNORMAL
BH BB BLOOD EXPIRATION DATE: NORMAL
BH BB BLOOD TYPE BARCODE: 7300
BH BB DISPENSE STATUS: NORMAL
BH BB PRODUCT CODE: NORMAL
BH BB UNIT NUMBER: NORMAL
BUN BLD-MCNC: 20 MG/DL (ref 8–23)
BUN/CREAT SERPL: 23.8 (ref 7–25)
CALCIUM SPEC-SCNC: 7.7 MG/DL (ref 8.6–10.5)
CHLORIDE SERPL-SCNC: 100 MMOL/L (ref 98–107)
CO2 SERPL-SCNC: 25.8 MMOL/L (ref 22–29)
CREAT BLD-MCNC: 0.84 MG/DL (ref 0.57–1)
DEPRECATED RDW RBC AUTO: 50.6 FL (ref 37–54)
EOSINOPHIL # BLD MANUAL: 0.09 10*3/MM3 (ref 0–0.4)
EOSINOPHIL NFR BLD MANUAL: 1 % (ref 0.3–6.2)
ERYTHROCYTE [DISTWIDTH] IN BLOOD BY AUTOMATED COUNT: 16.3 % (ref 12.3–15.4)
FERRITIN SERPL-MCNC: 79.9 NG/ML (ref 13–150)
GFR SERPL CREATININE-BSD FRML MDRD: 65 ML/MIN/1.73
GLUCOSE BLD-MCNC: 97 MG/DL (ref 65–99)
HCT VFR BLD AUTO: 25 % (ref 34–46.6)
HGB BLD-MCNC: 7.8 G/DL (ref 12–15.9)
HGB RETIC QN AUTO: 28.1 PG (ref 29.8–36.1)
IMM RETICS NFR: 38.9 % (ref 3–15.8)
IRON 24H UR-MRATE: 43 MCG/DL (ref 37–145)
IRON SATN MFR SERPL: 18 % (ref 20–50)
LYMPHOCYTES # BLD MANUAL: 0.51 10*3/MM3 (ref 0.7–3.1)
LYMPHOCYTES NFR BLD MANUAL: 1 % (ref 5–12)
LYMPHOCYTES NFR BLD MANUAL: 6 % (ref 19.6–45.3)
MCH RBC QN AUTO: 26.9 PG (ref 26.6–33)
MCHC RBC AUTO-ENTMCNC: 31.2 G/DL (ref 31.5–35.7)
MCV RBC AUTO: 86.2 FL (ref 79–97)
METAMYELOCYTES NFR BLD MANUAL: 3 % (ref 0–0)
MICROCYTES BLD QL: ABNORMAL
MONOCYTES # BLD AUTO: 0.09 10*3/MM3 (ref 0.1–0.9)
MYELOCYTES NFR BLD MANUAL: 1 % (ref 0–0)
NEUTROPHILS # BLD AUTO: 7.49 10*3/MM3 (ref 1.7–7)
NEUTROPHILS NFR BLD MANUAL: 87 % (ref 42.7–76)
NEUTS BAND NFR BLD MANUAL: 1 % (ref 0–5)
NRBC SPEC MANUAL: 1 /100 WBC (ref 0–0.2)
PLAT MORPH BLD: NORMAL
PLATELET # BLD AUTO: 135 10*3/MM3 (ref 140–450)
PMV BLD AUTO: 9.9 FL (ref 6–12)
POLYCHROMASIA BLD QL SMEAR: ABNORMAL
POTASSIUM BLD-SCNC: 3.8 MMOL/L (ref 3.5–5.2)
RBC # BLD AUTO: 2.9 10*6/MM3 (ref 3.77–5.28)
RETICS/RBC NFR AUTO: 2.67 % (ref 0.7–1.9)
SODIUM BLD-SCNC: 136 MMOL/L (ref 136–145)
TIBC SERPL-MCNC: 232 MCG/DL (ref 298–536)
TRANSFERRIN SERPL-MCNC: 156 MG/DL (ref 200–360)
UNIT  ABO: NORMAL
UNIT  RH: NORMAL
WBC MORPH BLD: NORMAL
WBC NRBC COR # BLD: 8.51 10*3/MM3 (ref 3.4–10.8)

## 2019-06-06 PROCEDURE — 99222 1ST HOSP IP/OBS MODERATE 55: CPT | Performed by: INTERNAL MEDICINE

## 2019-06-06 PROCEDURE — 85046 RETICYTE/HGB CONCENTRATE: CPT | Performed by: INTERNAL MEDICINE

## 2019-06-06 PROCEDURE — 85007 BL SMEAR W/DIFF WBC COUNT: CPT | Performed by: HOSPITALIST

## 2019-06-06 PROCEDURE — 83540 ASSAY OF IRON: CPT | Performed by: INTERNAL MEDICINE

## 2019-06-06 PROCEDURE — 94799 UNLISTED PULMONARY SVC/PX: CPT

## 2019-06-06 PROCEDURE — 97110 THERAPEUTIC EXERCISES: CPT

## 2019-06-06 PROCEDURE — 80048 BASIC METABOLIC PNL TOTAL CA: CPT | Performed by: HOSPITALIST

## 2019-06-06 PROCEDURE — 84466 ASSAY OF TRANSFERRIN: CPT | Performed by: INTERNAL MEDICINE

## 2019-06-06 PROCEDURE — 85025 COMPLETE CBC W/AUTO DIFF WBC: CPT | Performed by: HOSPITALIST

## 2019-06-06 PROCEDURE — 25010000002 ENOXAPARIN PER 10 MG: Performed by: ORTHOPAEDIC SURGERY

## 2019-06-06 PROCEDURE — 25810000003 SODIUM CHLORIDE 0.9 % WITH KCL 20 MEQ 20-0.9 MEQ/L-% SOLUTION: Performed by: HOSPITALIST

## 2019-06-06 PROCEDURE — 82728 ASSAY OF FERRITIN: CPT | Performed by: INTERNAL MEDICINE

## 2019-06-06 PROCEDURE — 25010000002 PIPERACILLIN SOD-TAZOBACTAM PER 1 G: Performed by: HOSPITALIST

## 2019-06-06 RX ORDER — AMOXICILLIN AND CLAVULANATE POTASSIUM 500; 125 MG/1; MG/1
1 TABLET, FILM COATED ORAL EVERY 12 HOURS SCHEDULED
Status: DISCONTINUED | OUTPATIENT
Start: 2019-06-06 | End: 2019-06-07 | Stop reason: HOSPADM

## 2019-06-06 RX ADMIN — POTASSIUM CHLORIDE AND SODIUM CHLORIDE 50 ML/HR: 900; 150 INJECTION, SOLUTION INTRAVENOUS at 12:58

## 2019-06-06 RX ADMIN — HYDROCODONE BITARTATE AND ACETAMINOPHEN 1 TABLET: 5; 325 TABLET ORAL at 17:08

## 2019-06-06 RX ADMIN — ASPIRIN 81 MG: 81 TABLET, CHEWABLE ORAL at 08:48

## 2019-06-06 RX ADMIN — GABAPENTIN 100 MG: 100 CAPSULE ORAL at 20:59

## 2019-06-06 RX ADMIN — VITAMIN D, TAB 1000IU (100/BT) 1000 UNITS: 25 TAB at 08:48

## 2019-06-06 RX ADMIN — GABAPENTIN 100 MG: 100 CAPSULE ORAL at 05:37

## 2019-06-06 RX ADMIN — HYDROCODONE BITARTATE AND ACETAMINOPHEN 1 TABLET: 5; 325 TABLET ORAL at 08:48

## 2019-06-06 RX ADMIN — LEVOTHYROXINE SODIUM 88 MCG: 88 TABLET ORAL at 05:36

## 2019-06-06 RX ADMIN — DILTIAZEM HYDROCHLORIDE 120 MG: 120 CAPSULE, COATED, EXTENDED RELEASE ORAL at 08:48

## 2019-06-06 RX ADMIN — PANTOPRAZOLE SODIUM 40 MG: 40 TABLET, DELAYED RELEASE ORAL at 05:37

## 2019-06-06 RX ADMIN — AMITRIPTYLINE HYDROCHLORIDE 25 MG: 25 TABLET, FILM COATED ORAL at 21:00

## 2019-06-06 RX ADMIN — AMOXICILLIN AND CLAVULANATE POTASSIUM 500 MG: 500; 125 TABLET, FILM COATED ORAL at 20:59

## 2019-06-06 RX ADMIN — PRAMIPEXOLE DIHYDROCHLORIDE 1.5 MG: 1.5 TABLET ORAL at 20:59

## 2019-06-06 RX ADMIN — IPRATROPIUM BROMIDE AND ALBUTEROL SULFATE: 2.5; .5 SOLUTION RESPIRATORY (INHALATION) at 08:34

## 2019-06-06 RX ADMIN — ENOXAPARIN SODIUM 40 MG: 40 INJECTION SUBCUTANEOUS at 08:48

## 2019-06-06 RX ADMIN — SODIUM CHLORIDE, PRESERVATIVE FREE 3 ML: 5 INJECTION INTRAVENOUS at 21:00

## 2019-06-06 RX ADMIN — IPRATROPIUM BROMIDE AND ALBUTEROL SULFATE 1.5 ML: 2.5; .5 SOLUTION RESPIRATORY (INHALATION) at 22:32

## 2019-06-06 RX ADMIN — TAZOBACTAM SODIUM AND PIPERACILLIN SODIUM 3.38 G: 375; 3 INJECTION, SOLUTION INTRAVENOUS at 03:30

## 2019-06-06 RX ADMIN — BUDESONIDE 0.5 MG: 0.5 INHALANT RESPIRATORY (INHALATION) at 22:32

## 2019-06-06 RX ADMIN — CITALOPRAM 10 MG: 10 TABLET, FILM COATED ORAL at 08:48

## 2019-06-06 RX ADMIN — AMOXICILLIN AND CLAVULANATE POTASSIUM 500 MG: 500; 125 TABLET, FILM COATED ORAL at 17:08

## 2019-06-06 RX ADMIN — BUDESONIDE 0.5 MG: 0.5 INHALANT RESPIRATORY (INHALATION) at 08:34

## 2019-06-06 RX ADMIN — TAZOBACTAM SODIUM AND PIPERACILLIN SODIUM 3.38 G: 375; 3 INJECTION, SOLUTION INTRAVENOUS at 12:58

## 2019-06-06 RX ADMIN — IPRATROPIUM BROMIDE AND ALBUTEROL SULFATE 3 ML: 2.5; .5 SOLUTION RESPIRATORY (INHALATION) at 15:02

## 2019-06-06 NOTE — PLAN OF CARE
Problem: Skin Injury Risk (Adult)  Goal: Skin Health and Integrity  Outcome: Ongoing (interventions implemented as appropriate)   06/04/19 1720   Skin Injury Risk (Adult)   Skin Health and Integrity making progress toward outcome       Problem: Fall Risk (Adult)  Goal: Absence of Fall   06/06/19 0219   Fall Risk (Adult)   Absence of Fall making progress toward outcome       Problem: Patient Care Overview  Goal: Plan of Care Review   06/05/19 2025   Coping/Psychosocial   Plan of Care Reviewed With patient   Plan of Care Review   Progress improving

## 2019-06-06 NOTE — THERAPY TREATMENT NOTE
Acute Care - Physical Therapy Treatment Note  [facility]     Patient Name: Latanya Olsen  : 1940  MRN: [id number]  Today's Date: 2019  Onset of Illness/Injury or Date of Surgery: 19  Date of Referral to PT: 19  Referring Physician: Emeli    Admit Date: 2019    Visit Dx:    ICD-10-CM ICD-9-CM   1. Closed fracture of neck of right femur, initial encounter (CMS/[facility]) S72.001A 820.8   2. Impaired functional mobility, balance, gait, and endurance Z74.09 V49.89     Patient Active Problem List   Diagnosis   • Carcinoid tumor of lung   • Diverticulosis of intestine   • Obstructive sleep apnea syndrome   • Weight loss   • Vitamin D deficiency   • Overweight (BMI 25.0-29.9)   • Spinal stenosis of lumbar region without neurogenic claudication   • Osteoarthritis of knee   • Obesity (BMI 30-39.9)   • Neutropenia (CMS/[facility])   • Chronic lower back pain   • Knee pain   • Insomnia   • Hypothyroidism   • Hypokalemia   • Hypertension   • Hyperlipidemia   • Generalized osteoarthritis   • Gastroparesis   • Foot pain   • Chronic obstructive pulmonary disease (CMS/[facility])   • Chronic constipation   • Anemia   • Weight gain   • Pain of left breast   • Elevated serum alkaline phosphatase level   • Neck pain   • Volume overload   • Status post total hip replacement, right       Therapy Treatment    Rehabilitation Treatment Summary     Row Name 19 1609 19 0922          Treatment Time/Intention    Discipline  physical therapist  -CS  physical therapist  -CS     Document Type  therapy note (daily note)  -CS  therapy note (daily note)  -CS     Subjective Information  complains of;weakness;fatigue;pain  -CS  complains of;weakness;fatigue;pain  -CS     Mode of Treatment  physical therapy  -CS  physical therapy  -CS     Patient/Family Observations  pt supine in bed, no acute distress  -CS  Pt supine in bed, no acute distress  -CS     Care Plan Review  --  patient/other agree to care plan  -CS     Therapy  Frequency (PT Clinical Impression)  2 times/day  -CS  2 times/day  -CS     Patient Effort  good  -CS  good  -CS     Existing Precautions/Restrictions  fall;right;hip, anterior  -CS  fall;right;hip, anterior  -CS     Recorded by [CS] Delfino Muhammad, PT 06/06/19 1611 [CS] Delfino Muhammad, PT 06/06/19 0925     Row Name 06/06/19 1609 06/06/19 0922          Cognitive Assessment/Intervention- PT/OT    Orientation Status (Cognition)  oriented x 3  -CS  oriented x 3  -CS     Follows Commands (Cognition)  WNL  -CS  WNL  -CS     Safety Deficit (Cognitive)  mild deficit;insight into deficits/self awareness  -CS  mild deficit;insight into deficits/self awareness  -CS     Personal Safety Interventions  fall prevention program maintained;gait belt;nonskid shoes/slippers when out of bed;supervised activity  -CS  fall prevention program maintained;gait belt;nonskid shoes/slippers when out of bed;supervised activity  -CS     Recorded by [CS] Delfino Muhammad, PT 06/06/19 1611 [CS] Delfino Muhammad, PT 06/06/19 0925     Row Name 06/06/19 1609 06/06/19 0922          Mobility Assessment/Intervention    Extremity Weight-bearing Status  right lower extremity  -CS  right lower extremity  -CS     Right Lower Extremity (Weight-bearing Status)  weight-bearing as tolerated (WBAT)  -CS  weight-bearing as tolerated (WBAT)  -CS     Recorded by [CS] Delfino Muhammad, PT 06/06/19 1611 [CS] Delfino Muhammad, PT 06/06/19 0925     Row Name 06/06/19 1609 06/06/19 0922          Bed Mobility Assessment/Treatment    Bed Mobility Assessment/Treatment  supine-sit  -CS  supine-sit  -CS     Supine-Sit [place] (Bed Mobility)  minimum assist (75% patient effort)  -CS  minimum assist (75% patient effort)  -CS     Sit-Supine [place] (Bed Mobility)  minimum assist (75% patient effort)  -CS  --     Bed Mobility, Safety Issues  --  decreased use of legs for bridging/pushing  -CS     Assistive Device (Bed Mobility)  bed rails;head of bed elevated  -CS   bed rails;head of bed elevated  -CS     Recorded by [CS] Delfino Muhammad, PT 06/06/19 1611 [CS] Delfino Muhammad, PT 06/06/19 0925     Row Name 06/06/19 1609 06/06/19 0922          Transfer Assessment/Treatment    Transfer Assessment/Treatment  sit-stand transfer;stand-sit transfer  -CS  sit-stand transfer;stand-sit transfer  -CS     Recorded by [CS] Delfino Muhammad, PT 06/06/19 1611 [CS] Delfino Muhammad, PT 06/06/19 0925     Row Name 06/06/19 1609 06/06/19 0922          Sit-Stand Transfer    Sit-Stand [place] (Transfers)  contact guard  -CS  contact guard  -CS     Assistive Device (Sit-Stand Transfers)  walker, front-wheeled  -CS  walker, front-wheeled  -CS     Recorded by [CS] Delfino Muhammad, PT 06/06/19 1611 [CS] Delfino Muhammad, PT 06/06/19 0925     Row Name 06/06/19 1609 06/06/19 0922          Stand-Sit Transfer    Stand-Sit [place] (Transfers)  contact guard  -CS  contact guard  -CS     Assistive Device (Stand-Sit Transfers)  walker, front-wheeled  -CS  walker, front-wheeled  -CS     Recorded by [CS] Delfino Muhammad, PT 06/06/19 1611 [CS] Delfino Muhammad, PT 06/06/19 0925     Row Name 06/06/19 1609 06/06/19 0922          Gait/Stairs Assessment/Training    [place] Level (Gait)  contact guard  -CS  contact guard  -CS     Assistive Device (Gait)  walker, front-wheeled  -CS  walker, front-wheeled  -CS     Distance in Feet (Gait)  10  -CS  50  -CS     Pattern (Gait)  step-to  -CS  step-to  -CS     Deviations/Abnormal Patterns (Gait)  antalgic;robbie decreased;stride length decreased  -CS  antalgic;robbie decreased;stride length decreased  -CS     Bilateral Gait Deviations  forward flexed posture  -CS  forward flexed posture  -CS     Recorded by [CS] Delfino Muhammad, PT 06/06/19 1611 [CS] Delfino Muhammad, PT 06/06/19 0925     Row Name 06/06/19 1609 06/06/19 0922          Therapeutic Exercise    Comment (Therapeutic Exercise)  R HEATHER protocol x10  -CS  R HEATHER protocol x10  -CS     Recorded by  [CS] Delfino Muhammad, PT 06/06/19 1611 [CS] Delfino Muhammad, PT 06/06/19 0925     Row Name 06/06/19 1609 06/06/19 0922          Positioning and Restraints    Pre-Treatment Position  in bed  -CS  in bed  -CS     Post Treatment Position  chair  -CS  chair  -CS     In Chair  reclined;call light within reach;encouraged to call for assist;exit alarm on  -CS  reclined;call light within reach;encouraged to call for assist  -CS     Recorded by [CS] Delfino Muhammad, PT 06/06/19 1611 [CS] Delfino Muhammad, PT 06/06/19 0925     Row Name 06/06/19 1609 06/06/19 0922          Pain Scale: Numbers Pre/Post-Treatment    Pain Scale: Numbers, Pretreatment  2/10  -CS  2/10  -CS     Pain Scale: Numbers, Post-Treatment  2/10  -CS  2/10  -CS     Pain Location - Side  Right  -CS  Right  -CS     Pain Location  hip  -CS  hip  -CS     Pain Intervention(s)  Repositioned;Ambulation/increased activity  -CS  Repositioned;Ambulation/increased activity  -CS     Recorded by [CS] Delfino Muhammad, PT 06/06/19 1611 [CS] Delfino Muhammad, PT 06/06/19 0925     Row Name                Wound 06/01/19 0859 Right hip incision    Wound - Properties Group Date first assessed: 06/01/19 [RG] Time first assessed: 0859 [RG] Side: Right [RG] Location: hip [RG] Type: incision [RG] Recorded by:  [RG] Adriana Martel RN 06/01/19 0859    Row Name 06/06/19 1609 06/06/19 0922          Coping    Observed Emotional State  calm;cooperative;pleasant  -CS  calm;cooperative;pleasant  -CS     Verbalized Emotional State  acceptance  -CS  acceptance  -CS     Recorded by [CS] Delfino Muhammad, PT 06/06/19 1611 [CS] Delfino Muhammad, PT 06/06/19 0925     Row Name 06/06/19 1609 06/06/19 0922          Plan of Care Review    Plan of Care Reviewed With  patient  -CS  patient  -CS     Recorded by [CS] Delfino Muhammad, PT 06/06/19 1611 [CS] Delfino Muhammad, PT 06/06/19 0925     Row Name 06/06/19 1609 06/06/19 0922          Outcome Summary/Treatment Plan (PT)    Anticipated  Discharge Disposition (PT)  skilled nursing facility  -  skilled nursing facility  -CS     Recorded by [CS] Delfino Muhammad, PT 06/06/19 1611 [CS] Delfino Muhammad, PT 06/06/19 0936       User Key  (r) = Recorded By, (t) = Taken By, (c) = Cosigned By    Initials Name Effective Dates Discipline    Adriana Suresh RN 06/16/16 -  Nurse    CS Delfino Muhammad, PT 05/14/18 -  PT          Wound 06/01/19 0815 Right hip incision (Active)   Dressing Appearance dry;intact;no drainage 6/6/2019 12:58 PM   Closure VERA 6/6/2019 12:58 PM   Base dressing in place, unable to visualize 6/6/2019 12:58 PM   Drainage Amount none 6/6/2019 12:58 PM           Physical Therapy Education     Title: PT OT SLP Therapies (Done)     Topic: Physical Therapy (Done)     Point: Mobility training (Done)     Learning Progress Summary           Patient Acceptance, E,TB, VU by ABBY at 6/6/2019  9:25 AM    Acceptance, E,TB, VU by ABBY at 6/5/2019  1:04 PM    Acceptance, E,TB,D, VU by SM at 6/4/2019 11:23 AM    Acceptance, E, VU,NR by MA at 6/3/2019  4:52 PM    Acceptance, E, VU by  at 6/2/2019  2:20 PM    Acceptance, E, VU by  at 6/2/2019  9:55 AM                   Point: Home exercise program (Done)     Learning Progress Summary           Patient Acceptance, E,TB, VU by ABBY at 6/6/2019  9:25 AM    Acceptance, E,TB, VU by ABBY at 6/5/2019  1:04 PM    Acceptance, E,TB,D, VU by  at 6/4/2019 11:23 AM    Acceptance, E, VU,NR by MA at 6/3/2019  4:52 PM    Acceptance, E, VU by  at 6/2/2019  2:20 PM    Acceptance, E, VU by  at 6/2/2019  9:55 AM                   Point: Body mechanics (Done)     Learning Progress Summary           Patient Acceptance, E,TB, VU by ABBY at 6/6/2019  9:25 AM    Acceptance, E,TB, VU by ABBY at 6/5/2019  1:04 PM    Acceptance, E,TB,D, VU by SM at 6/4/2019 11:23 AM    Acceptance, E, VU,NR by MA at 6/3/2019  4:52 PM    Acceptance, E, VU by CH at 6/2/2019  2:20 PM    Acceptance, E, VU by  at 6/2/2019  9:55 AM                    Point: Precautions (Done)     Learning Progress Summary           Patient Acceptance, E,TB, VU by  at 6/6/2019  9:25 AM    Acceptance, E,TB, VU by  at 6/5/2019  1:04 PM    Acceptance, E,TB,D, VU by  at 6/4/2019 11:23 AM    Acceptance, E, VU,NR by MA at 6/3/2019  4:52 PM    Acceptance, E, VU by  at 6/2/2019  2:20 PM    Acceptance, E, VU by  at 6/2/2019  9:55 AM                               User Key     Initials Effective Dates Name Provider Type Discipline     03/07/18 -  Radha Clemente, PTA Physical Therapy Assistant PT    MA 10/19/18 -  Selina Huynh, PT Physical Therapist PT     04/03/18 -  Miguelito Laura, PT Physical Therapist PT     05/14/18 -  Delfino Muhammad, PT Physical Therapist PT                PT Recommendation and Plan  Anticipated Discharge Disposition (PT): skilled nursing facility  Therapy Frequency (PT Clinical Impression): 2 times/day  Outcome Summary/Treatment Plan (PT)  Anticipated Discharge Disposition (PT): skilled nursing facility  Plan of Care Reviewed With: patient  Outcome Summary: Pt walked out into cook, then chair and performed exercises. Mild complaints of pain.  Anticipates d/c to SNF when able.   Outcome Measures     Row Name 06/06/19 0900 06/05/19 1300 06/04/19 1100       How much help from another person do you currently need...    Turning from your back to your side while in flat bed without using bedrails?  3  -CS  3  -CS  3  -SM    Moving from lying on back to sitting on the side of a flat bed without bedrails?  3  -CS  3  -CS  3  -SM    Moving to and from a bed to a chair (including a wheelchair)?  3  -CS  3  -CS  3  -SM    Standing up from a chair using your arms (e.g., wheelchair, bedside chair)?  3  -CS  3  -CS  3  -SM    Climbing 3-5 steps with a railing?  1  -CS  1  -CS  1  -SM    To walk in hospital room?  3  -CS  3  -CS  3  -SM    AM-PAC 6 Clicks Score  16  -CS  16  -CS  16  -SM       Functional Assessment    Outcome Measure Options  AM-PAC 6 Clicks  Basic Mobility (PT)  -CS  AM-PAC 6 Clicks Basic Mobility (PT)  -CS  AM-PAC 6 Clicks Basic Mobility (PT)  -SM      User Key  (r) = Recorded By, (t) = Taken By, (c) = Cosigned By    Initials Name Provider Type    Radha Mooney, PTA Physical Therapy Assistant    Delfino Calzada, PT Physical Therapist         Time Calculation:   PT Charges     Row Name 06/06/19 1615 06/06/19 0927          Time Calculation    Start Time  1603  -CS  0903  -CS     Stop Time  1615  -CS  0919  -CS     Time Calculation (min)  12 min  -CS  16 min  -CS     PT Received On  06/06/19  -CS  06/06/19  -ABBY     PT - Next Appointment  06/07/19  -  06/06/19  -CS       User Key  (r) = Recorded By, (t) = Taken By, (c) = Cosigned By    Initials Name Provider Type    Delfino Calzada, PT Physical Therapist        Therapy Charges for Today     Code Description Service Date Service Provider Modifiers Qty    14958331564 HC PT THER PROC EA 15 MIN 6/5/2019 Delfino Muhammad, PT GP 1    78625695666 HC PT THER PROC EA 15 MIN 6/6/2019 Delfino Muhammad, PT GP 1    40251730685 HC PT THER PROC EA 15 MIN 6/6/2019 Delfino Muhammad, PT GP 1          PT G-Codes  Outcome Measure Options: AM-PAC 6 Clicks Basic Mobility (PT)  AM-PAC 6 Clicks Score: 16    Delfino Muhammad PT  6/6/2019

## 2019-06-06 NOTE — PLAN OF CARE
Problem: Patient Care Overview  Goal: Plan of Care Review   06/06/19 1505   OTHER   Outcome Summary pt on room air, continue nebulizer treatments

## 2019-06-06 NOTE — PLAN OF CARE
Problem: Skin Injury Risk (Adult)  Goal: Skin Health and Integrity  Outcome: Ongoing (interventions implemented as appropriate)   06/06/19 1831   Skin Injury Risk (Adult)   Skin Health and Integrity achieves outcome       Problem: Fall Risk (Adult)  Goal: Absence of Fall  Outcome: Ongoing (interventions implemented as appropriate)   06/06/19 1831   Fall Risk (Adult)   Absence of Fall achieves outcome       Problem: Fractured Hip (Adult)  Goal: Signs and Symptoms of Listed Potential Problems Will be Absent, Minimized or Managed (Fractured Hip)  Outcome: Ongoing (interventions implemented as appropriate)   06/06/19 1831   Goal/Outcome Evaluation   Problems Assessed (Fractured Hip) all   Problems Present (Fractured Hip) pain;functional deficit/self-care deficit;situational response

## 2019-06-06 NOTE — THERAPY TREATMENT NOTE
Acute Care - Physical Therapy Treatment Note  [facility]     Patient Name: Latanya Olsen  : 1940  MRN: [id number]  Today's Date: 2019  Onset of Illness/Injury or Date of Surgery: 19  Date of Referral to PT: 19  Referring Physician: Emeli    Admit Date: 2019    Visit Dx:    ICD-10-CM ICD-9-CM   1. Closed fracture of neck of right femur, initial encounter (CMS/[facility]) S72.001A 820.8   2. Impaired functional mobility, balance, gait, and endurance Z74.09 V49.89     Patient Active Problem List   Diagnosis   • Carcinoid tumor of lung   • Diverticulosis of intestine   • Obstructive sleep apnea syndrome   • Weight loss   • Vitamin D deficiency   • Overweight (BMI 25.0-29.9)   • Spinal stenosis of lumbar region without neurogenic claudication   • Osteoarthritis of knee   • Obesity (BMI 30-39.9)   • Neutropenia (CMS/[facility])   • Chronic lower back pain   • Knee pain   • Insomnia   • Hypothyroidism   • Hypokalemia   • Hypertension   • Hyperlipidemia   • Generalized osteoarthritis   • Gastroparesis   • Foot pain   • Chronic obstructive pulmonary disease (CMS/[facility])   • Chronic constipation   • Anemia   • Weight gain   • Pain of left breast   • Elevated serum alkaline phosphatase level   • Neck pain   • Volume overload   • Status post total hip replacement, right       Therapy Treatment    Rehabilitation Treatment Summary     Row Name 19 0922             Treatment Time/Intention    Discipline  physical therapist  -CS      Document Type  therapy note (daily note)  -CS      Subjective Information  complains of;weakness;fatigue;pain  -CS      Mode of Treatment  physical therapy  -CS      Patient/Family Observations  Pt supine in bed, no acute distress  -CS      Care Plan Review  patient/other agree to care plan  -CS      Therapy Frequency (PT Clinical Impression)  2 times/day  -CS      Patient Effort  good  -CS      Existing Precautions/Restrictions  fall;right;hip, anterior  -CS      Recorded by [CS]  Delfino Muhammad, PT 06/06/19 0925      Row Name 06/06/19 0922             Cognitive Assessment/Intervention- PT/OT    Orientation Status (Cognition)  oriented x 3  -CS      Follows Commands (Cognition)  WNL  -CS      Safety Deficit (Cognitive)  mild deficit;insight into deficits/self awareness  -CS      Personal Safety Interventions  fall prevention program maintained;gait belt;nonskid shoes/slippers when out of bed;supervised activity  -CS      Recorded by [CS] Delfino Muhammad, PT 06/06/19 0925      Row Name 06/06/19 0922             Mobility Assessment/Intervention    Extremity Weight-bearing Status  right lower extremity  -CS      Right Lower Extremity (Weight-bearing Status)  weight-bearing as tolerated (WBAT)  -CS      Recorded by [CS] Delfino Muhammad, PT 06/06/19 0925      Row Name 06/06/19 0922             Bed Mobility Assessment/Treatment    Bed Mobility Assessment/Treatment  supine-sit  -CS      Supine-Sit [place] (Bed Mobility)  minimum assist (75% patient effort)  -CS      Bed Mobility, Safety Issues  decreased use of legs for bridging/pushing  -CS      Assistive Device (Bed Mobility)  bed rails;head of bed elevated  -CS      Recorded by [CS] Delfino Muhammad, PT 06/06/19 0925      Row Name 06/06/19 0922             Transfer Assessment/Treatment    Transfer Assessment/Treatment  sit-stand transfer;stand-sit transfer  -CS      Recorded by [CS] Delfino Muhammad, PT 06/06/19 0925      Row Name 06/06/19 0922             Sit-Stand Transfer    Sit-Stand [place] (Transfers)  contact guard  -CS      Assistive Device (Sit-Stand Transfers)  walker, front-wheeled  -CS      Recorded by [CS] Delfino Muhammad, PT 06/06/19 0925      Row Name 06/06/19 0922             Stand-Sit Transfer    Stand-Sit [place] (Transfers)  contact guard  -CS      Assistive Device (Stand-Sit Transfers)  walker, front-wheeled  -CS      Recorded by [CS] Delfino Muhammad, PT 06/06/19 0925      Row Name 06/06/19 0922              Gait/Stairs Assessment/Training    [place] Level (Gait)  contact guard  -CS      Assistive Device (Gait)  walker, front-wheeled  -CS      Distance in Feet (Gait)  50  -CS      Pattern (Gait)  step-to  -CS      Deviations/Abnormal Patterns (Gait)  antalgic;robbie decreased;stride length decreased  -CS      Bilateral Gait Deviations  forward flexed posture  -CS      Recorded by [CS] Delfino Muhammad, PT 06/06/19 0925      Row Name 06/06/19 0922             Therapeutic Exercise    Comment (Therapeutic Exercise)  R HEATHER protocol x10  -CS      Recorded by [CS] Delfino Muhammad, PT 06/06/19 0925      Row Name 06/06/19 0922             Positioning and Restraints    Pre-Treatment Position  in bed  -CS      Post Treatment Position  chair  -CS      In Chair  reclined;call light within reach;encouraged to call for assist  -CS      Recorded by [CS] Delfino Muhammad, PT 06/06/19 0925      Row Name 06/06/19 0922             Pain Scale: Numbers Pre/Post-Treatment    Pain Scale: Numbers, Pretreatment  2/10  -CS      Pain Scale: Numbers, Post-Treatment  2/10  -CS      Pain Location - Side  Right  -CS      Pain Location  hip  -CS      Pain Intervention(s)  Repositioned;Ambulation/increased activity  -CS      Recorded by [CS] Dlefino Muhammad, PT 06/06/19 0925      Row Name                Wound 06/01/19 0859 Right hip incision    Wound - Properties Group Date first assessed: 06/01/19 [RG] Time first assessed: 0859 [RG] Side: Right [RG] Location: hip [RG] Type: incision [RG] Recorded by:  [RG] Adriana Martel RN 06/01/19 0859    Row Name 06/06/19 0922             Coping    Observed Emotional State  calm;cooperative;pleasant  -CS      Verbalized Emotional State  acceptance  -CS      Recorded by [CS] Delfino Muhammad, PT 06/06/19 0925      Row Name 06/06/19 0922             Plan of Care Review    Plan of Care Reviewed With  patient  -CS      Recorded by [CS] Delfino Muhammad, PT 06/06/19 0925      Row Name 06/06/19 0922              Outcome Summary/Treatment Plan (PT)    Anticipated Discharge Disposition (PT)  skilled nursing facility  -CS      Recorded by [CS] Delfino Muhammad, PT 06/06/19 0998        User Key  (r) = Recorded By, (t) = Taken By, (c) = Cosigned By    Initials Name Effective Dates Discipline    RG Adriana Martel RN 06/16/16 -  Nurse    CS Delfino Muhammad, PT 05/14/18 -  PT          Wound 06/01/19 0861 Right hip incision (Active)   Dressing Appearance dry;intact;no drainage 6/6/2019  4:24 AM   Closure VERA 6/6/2019  4:24 AM   Drainage Amount none 6/6/2019  4:24 AM           Physical Therapy Education     Title: PT OT SLP Therapies (Done)     Topic: Physical Therapy (Done)     Point: Mobility training (Done)     Learning Progress Summary           Patient Acceptance, E,TB, VU by CS at 6/6/2019  9:25 AM    Acceptance, E,TB, VU by CS at 6/5/2019  1:04 PM    Acceptance, E,TB,D, VU by  at 6/4/2019 11:23 AM    Acceptance, E, VU,NR by MA at 6/3/2019  4:52 PM    Acceptance, E, VU by  at 6/2/2019  2:20 PM    Acceptance, E, VU by  at 6/2/2019  9:55 AM                   Point: Home exercise program (Done)     Learning Progress Summary           Patient Acceptance, E,TB, VU by CS at 6/6/2019  9:25 AM    Acceptance, E,TB, VU by CS at 6/5/2019  1:04 PM    Acceptance, E,TB,D, VU by  at 6/4/2019 11:23 AM    Acceptance, E, VU,NR by MA at 6/3/2019  4:52 PM    Acceptance, E, VU by  at 6/2/2019  2:20 PM    Acceptance, E, VU by  at 6/2/2019  9:55 AM                   Point: Body mechanics (Done)     Learning Progress Summary           Patient Acceptance, E,TB, VU by CS at 6/6/2019  9:25 AM    Acceptance, E,TB, VU by CS at 6/5/2019  1:04 PM    Acceptance, E,TB,D, VU by  at 6/4/2019 11:23 AM    Acceptance, E, VU,NR by MA at 6/3/2019  4:52 PM    Acceptance, E, VU by CH at 6/2/2019  2:20 PM    Acceptance, E, VU by CH at 6/2/2019  9:55 AM                   Point: Precautions (Done)     Learning Progress Summary           Patient  Acceptance, E,TB, VU by  at 6/6/2019  9:25 AM    Acceptance, E,TB, VU by  at 6/5/2019  1:04 PM    Acceptance, E,TB,D, VU by  at 6/4/2019 11:23 AM    Acceptance, E, VU,NR by MA at 6/3/2019  4:52 PM    Acceptance, E, VU by  at 6/2/2019  2:20 PM    Acceptance, E, VU by  at 6/2/2019  9:55 AM                               User Key     Initials Effective Dates Name Provider Type Discipline     03/07/18 -  Radha Clemente, PTA Physical Therapy Assistant PT    MA 10/19/18 -  Selina Huynh, PT Physical Therapist PT     04/03/18 -  Miguelito Laura, PT Physical Therapist PT     05/14/18 -  Delfino Muhammad, PT Physical Therapist PT                PT Recommendation and Plan  Anticipated Discharge Disposition (PT): skilled nursing facility  Therapy Frequency (PT Clinical Impression): 2 times/day  Outcome Summary/Treatment Plan (PT)  Anticipated Discharge Disposition (PT): skilled nursing facility  Plan of Care Reviewed With: patient  Outcome Summary: Pt walked out into cook, then chair and performed exercises. Mild complaints of pain.  Anticipates d/c to SNF when able.   Outcome Measures     Row Name 06/06/19 0900 06/05/19 1300 06/04/19 1100       How much help from another person do you currently need...    Turning from your back to your side while in flat bed without using bedrails?  3  -CS  3  -CS  3  -SM    Moving from lying on back to sitting on the side of a flat bed without bedrails?  3  -CS  3  -CS  3  -SM    Moving to and from a bed to a chair (including a wheelchair)?  3  -CS  3  -CS  3  -SM    Standing up from a chair using your arms (e.g., wheelchair, bedside chair)?  3  -CS  3  -CS  3  -SM    Climbing 3-5 steps with a railing?  1  -CS  1  -CS  1  -SM    To walk in hospital room?  3  -CS  3  -CS  3  -SM    AM-PAC 6 Clicks Score  16  -CS  16  -CS  16  -SM       Functional Assessment    Outcome Measure Options  AM-PAC 6 Clicks Basic Mobility (PT)  -CS  AM-PAC 6 Clicks Basic Mobility (PT)  -CS  AM-PAC  6 Clicks Basic Mobility (PT)  -    Row Name 06/03/19 1600             How much help from another person do you currently need...    Turning from your back to your side while in flat bed without using bedrails?  3  -MA      Moving from lying on back to sitting on the side of a flat bed without bedrails?  3  -MA      Moving to and from a bed to a chair (including a wheelchair)?  3  -MA      Standing up from a chair using your arms (e.g., wheelchair, bedside chair)?  3  -MA      Climbing 3-5 steps with a railing?  1  -MA      To walk in hospital room?  3  -MA      AM-PAC 6 Clicks Score  16  -MA         Functional Assessment    Outcome Measure Options  AM-PAC 6 Clicks Basic Mobility (PT)  -MA        User Key  (r) = Recorded By, (t) = Taken By, (c) = Cosigned By    Initials Name Provider Type     Radha Clemente, PTA Physical Therapy Assistant    Selina Roldan, PT Physical Therapist    CS Delfino Muhammad, PT Physical Therapist         Time Calculation:   PT Charges     Row Name 06/06/19 0927             Time Calculation    Start Time  0903  -CS      Stop Time  0919  -CS      Time Calculation (min)  16 min  -CS      PT Received On  06/06/19  -CS      PT - Next Appointment  06/06/19  -CS        User Key  (r) = Recorded By, (t) = Taken By, (c) = Cosigned By    Initials Name Provider Type    Delfino Calzada, PT Physical Therapist        Therapy Charges for Today     Code Description Service Date Service Provider Modifiers Qty    12202248849 HC PT THER PROC EA 15 MIN 6/5/2019 Delfino Muhammad, PT GP 1    85485167942 HC PT THER PROC EA 15 MIN 6/6/2019 Delfino Muhammad, PT GP 1          PT G-Codes  Outcome Measure Options: AM-PAC 6 Clicks Basic Mobility (PT)  AM-PAC 6 Clicks Score: 16    Delfino Muhammad PT  6/6/2019

## 2019-06-06 NOTE — PROGRESS NOTES
"Daily progress note    Chief complaint  Doing better  No new complaints    History of present illness  78-year-old white female who is well-known to our service with history of hypertension hyperlipidemia hypothyroidism COPD neuropathy restless leg syndrome and gastroesophageal reflux disease who was recently discharged from the hospital after treatment of fluid overload chronic kidney disease continues to have right hip pain which radiates down into her anterior leg up to knee.  Patient denies any fall trauma injury.  Patient evaluated in the ER found to have right femur neck fracture admitted for management.  At the time of interview she is hurting wants to eat but denies any chest pain shortness of breath abdominal pain nausea vomiting diarrhea.     REVIEW OF SYSTEMS  Unremarkable    PHYSICAL EXAM  Blood pressure 120/55, pulse 75, temperature 97 °F (36.1 °C), temperature source Oral, resp. rate 16, height 167.6 cm (66\"), weight 93 kg (205 lb), SpO2 97 %.    Constitutional: She is oriented to person, place, and time. No distress.   Head: Normocephalic and atraumatic.   Eyes: EOM are normal. Pupils are equal, round, and reactive to light.   Neck: Normal range of motion. Neck supple.   Cardiovascular: Normal rate, regular rhythm and normal heart sounds. Exam reveals no gallop and no friction rub.   Pulmonary/Chest: Effort normal and breath sounds normal. No respiratory distress. She has no wheezes. She has no rales.   Abdominal: Soft. There is no tenderness. There is no rebound and no guarding.   Musculoskeletal: Normal range of motion. She exhibits no edema. Pt's back is non-tender. There is posterior R hip tenderness and pain with ROM of the R leg.    Neurological: She is alert and oriented to person, place, and time. Pt has normal strength and sensation   Skin: Skin is warm and dry. No rash noted.   Psychiatric: Mood and affect normal.     LABS  Lab Results (last 24 hours)     Procedure Component Value Units " Date/Time    CBC & Differential [[id number]] Collected:  06/06/19 0536    Specimen:  Blood Updated:  06/06/19 0708    Narrative:       The following orders were created for panel order CBC & Differential.  Procedure                               Abnormality         Status                     ---------                               -----------         ------                     CBC Auto Differential[[id number]]        Abnormal            Final result                 Please view results for these tests on the individual orders.    CBC Auto Differential [[id number]]  (Abnormal) Collected:  06/06/19 0536    Specimen:  Blood from Arm, Right Updated:  06/06/19 0708     WBC 8.51 10*3/mm3      RBC 2.90 10*6/mm3      Hemoglobin 7.8 g/dL      Hematocrit 25.0 %      MCV 86.2 fL      MCH 26.9 pg      MCHC 31.2 g/dL      RDW 16.3 %      RDW-SD 50.6 fl      MPV 9.9 fL      Platelets 135 10*3/mm3     Manual Differential [[id number]]  (Abnormal) Collected:  06/06/19 0536    Specimen:  Blood from Arm, Right Updated:  06/06/19 0708     Neutrophil % 87.0 %      Lymphocyte % 6.0 %      Monocyte % 1.0 %      Eosinophil % 1.0 %      Bands %  1.0 %      Metamyelocyte % 3.0 %      Myelocyte % 1.0 %      Neutrophils Absolute 7.49 10*3/mm3      Lymphocytes Absolute 0.51 10*3/mm3      Monocytes Absolute 0.09 10*3/mm3      Eosinophils Absolute 0.09 10*3/mm3      nRBC 1.0 /100 WBC      Anisocytosis Mod/2+     Microcytes Mod/2+     Polychromasia Mod/2+     WBC Morphology Normal     Platelet Morphology Normal    Basic Metabolic Panel [[id number]]  (Abnormal) Collected:  06/06/19 0536    Specimen:  Blood from Arm, Right Updated:  06/06/19 0627     Glucose 97 mg/dL      BUN 20 mg/dL      Creatinine 0.84 mg/dL      Sodium 136 mmol/L      Potassium 3.8 mmol/L      Chloride 100 mmol/L      CO2 25.8 mmol/L      Calcium 7.7 mg/dL      eGFR Non African Amer 65 mL/min/1.73      BUN/Creatinine Ratio 23.8     Anion Gap 10.2 mmol/L     Narrative:       GFR Normal  >60  Chronic Kidney Disease <60  Kidney Failure <15    Blood Culture - Blood, Arm, Left [[id number]] Collected:  06/03/19 1843    Specimen:  Blood from Arm, Left Updated:  06/05/19 1915     Blood Culture No growth at 2 days    Blood Culture - Blood, Arm, Left [[id number]] Collected:  06/03/19 1814    Specimen:  Blood from Arm, Left Updated:  06/05/19 1845     Blood Culture No growth at 2 days        Imaging Results (last 24 hours)     ** No results found for the last 24 hours. **        Current Facility-Administered Medications:   •  acetaminophen (TYLENOL) tablet 325 mg, 325 mg, Oral, Q4H PRN, Ashley Crenshaw MD  •  amitriptyline (ELAVIL) tablet 25 mg, 25 mg, Oral, Nightly, Arjun Sainz MD, 25 mg at 06/05/19 2129  •  aspirin chewable tablet 81 mg, 81 mg, Oral, Daily, Arjun Sainz MD, 81 mg at 06/06/19 0848  •  bisacodyl (DULCOLAX) suppository 10 mg, 10 mg, Rectal, Daily PRN, Ashley Crenshaw MD, 10 mg at 06/04/19 2224  •  budesonide (PULMICORT) nebulizer solution 0.5 mg, 0.5 mg, Nebulization, BID - RT, Arjun Sainz MD, 0.5 mg at 06/06/19 0834  •  cholecalciferol (VITAMIN D3) tablet 1,000 Units, 1,000 Units, Oral, Daily, Arjun Sainz MD, 1,000 Units at 06/06/19 0848  •  citalopram (CeleXA) tablet 10 mg, 10 mg, Oral, Daily, Arjun Sainz MD, 10 mg at 06/06/19 0848  •  diltiaZEM CD (CARDIZEM CD) 24 hr capsule 120 mg, 120 mg, Oral, Q24H, Arjun Sainz MD, 120 mg at 06/06/19 0848  •  docusate sodium (COLACE) capsule 100 mg, 100 mg, Oral, BID PRN, Ashley Crenshaw MD, 100 mg at 06/04/19 2224  •  enoxaparin (LOVENOX) syringe 40 mg, 40 mg, Subcutaneous, Daily, Ashley Crenshaw MD, 40 mg at 06/06/19 0848  •  gabapentin (NEURONTIN) capsule 100 mg, 100 mg, Oral, Q8H, Arjun Sainz MD, 100 mg at 06/06/19 0537  •  HYDROcodone-acetaminophen (NORCO) 5-325 MG per tablet 1 tablet, 1 tablet, Oral, Q8H PRN, Ashley Crenshaw MD, 1 tablet at 06/06/19 0848  •  ipratropium-albuterol (DUO-NEB)  nebulizer solution 1.5 mL, 1.5 mL, Nebulization, Q6H While Awake - RT, Shelly Sainz MD  •  levothyroxine (SYNTHROID, LEVOTHROID) tablet 88 mcg, 88 mcg, Oral, Once per day on Mon Tue Wed Thu Fri Sat, Shelly Sainz MD, 88 mcg at 06/06/19 0536  •  magnesium hydroxide (MILK OF MAGNESIA) suspension 2400 mg/10mL 10 mL, 10 mL, Oral, Daily PRN, Ashley Crenshaw MD, 10 mL at 06/04/19 1053  •  melatonin tablet 1 mg, 1 mg, Oral, Nightly PRN, Ashley Crenshaw MD  •  ondansetron ODT (ZOFRAN-ODT) disintegrating tablet 4 mg, 4 mg, Oral, Q8H PRN, Shelly Sainz MD  •  pantoprazole (PROTONIX) EC tablet 40 mg, 40 mg, Oral, QAM, Shelly Sainz MD, 40 mg at 06/06/19 0537  •  piperacillin-tazobactam (ZOSYN) 3.375 g in iso-osmotic dextrose 50 ml (premix), 3.375 g, Intravenous, Q8H, Shelly Sainz MD, 3.375 g at 06/06/19 1258  •  pramipexole (MIRAPEX) tablet 1.5 mg, 1.5 mg, Oral, Nightly, Shelly Sainz MD, 1.5 mg at 06/05/19 2129  •  sodium chloride 0.9 % flush 1-10 mL, 1-10 mL, Intravenous, PRN, Ashley Crenshaw MD  •  sodium chloride 0.9 % flush 3 mL, 3 mL, Intravenous, Q12H, Ashley Crenshaw MD, 3 mL at 06/05/19 2130  •  sodium chloride 0.9 % with KCl 20 mEq/L infusion, 50 mL/hr, Intravenous, Continuous, Shelly Sainz MD, Last Rate: 50 mL/hr at 06/06/19 1258, 50 mL/hr at 06/06/19 1258    ASSESSMENT  Acute right femur neck fracture status post total hip arthroplasty  Urinary retention  Leukocytosis questionable source  Chronic anemia with drop in H&H  Hypertension  Hyperlipidemia  Hypothyroidism  COPD  Neuropathy  Restless leg syndrome  Chronic kidney disease stage II  Gastroesophageal reflux disease    PLAN  CPM  Postop care  Transfuse PRN  Hematology consult  Change antibiotics to p.o.  Stover catheter   Urology consult appreciated  Pain management  Continue home medications  Stress ulcer DVT prophylaxis  Supportive care  PT/OT  Discharge planning    SHELLY SAINZ MD

## 2019-06-06 NOTE — PLAN OF CARE
Problem: Fall Risk (Adult)  Goal: Absence of Fall  Outcome: Ongoing (interventions implemented as appropriate)   06/05/19 2025   Fall Risk (Adult)   Absence of Fall achieves outcome       Problem: Fractured Hip (Adult)  Goal: Signs and Symptoms of Listed Potential Problems Will be Absent, Minimized or Managed (Fractured Hip)  Outcome: Ongoing (interventions implemented as appropriate)   06/05/19 2025   Goal/Outcome Evaluation   Problems Assessed (Fractured Hip) all   Problems Present (Fractured Hip) pain;functional deficit/self-care deficit       Problem: Patient Care Overview  Goal: Plan of Care Review  Outcome: Ongoing (interventions implemented as appropriate)   06/05/19 2025   Coping/Psychosocial   Plan of Care Reviewed With patient   Plan of Care Review   Progress improving   OTHER   Outcome Summary Patient is ambualting with x1 assist. Vitals are stable and f/c intact for urinary retention. Pain is managed with po meds. Zosyn continued and 1 unit of PRBC ordered. Large BM this am. Will transfer to snf when medically stable.

## 2019-06-06 NOTE — PLAN OF CARE
Problem: Patient Care Overview  Goal: Plan of Care Review  Outcome: Ongoing (interventions implemented as appropriate)   06/06/19 0327   Coping/Psychosocial   Plan of Care Reviewed With patient   OTHER   Outcome Summary Pt walked out into cook, then chair and performed exercises. Mild complaints of pain. Anticipates d/c to SNF when able.

## 2019-06-06 NOTE — CONSULTS
[facility] GROUP INITIAL INPATIENT CONSULTATION NOTE    REASON FOR CONSULTATION: Anemia    HISTORY OF PRESENT ILLNESS:  Latanya Olsen is a 78 y.o. female who we are asked to see today in consultation for anemia.    She was admitted on 5/31/2019.  She has a history of hypertension, hyperlipidemia, hypothyroidism, COPD, and neuropathy with restless leg syndrome and gastroesophageal reflux disease.  She was recently discharged after admission for volume overload.    She presented and was found to have a right femoral neck fracture.  On 6/1/2019 Dr. Crenshaw performed right total hip replacement.    She has had some issues with urinary retention.    Her hemoglobin was 11.6 on admission dropping to 8.8 postoperatively and her hemoglobin today is 7.8.  Her white blood cell count was elevated on admission and normalized today.  Platelets were elevated and now have dropped postoperatively to 135,000.    No other evaluation for the anemia has been performed.  Her creatinine became elevated to 1.92 postoperatively but has improved to 0.84.    Pain is OK at this point.  She has some bruising at the operative site.  Ice is helping with her pain.  She anticipates discharge tomorrow.  No dyspnea.  No other bleeding.          Past Medical History:   Diagnosis Date   • Anemia    • Arthritis    • Atrial fibrillation (CMS/HCC)    • Breast cancer (CMS/HCC)    • Constipation    • COPD (chronic obstructive pulmonary disease) (CMS/HCC)    • Diverticulosis    • GERD (gastroesophageal reflux disease)    • Hx of degenerative disc disease    • Hyperlipidemia    • Hypertension    • Hypokalemia    • Hypothyroidism    • Knee swelling    • Lung cancer (CMS/HCC)     history   • Renal disorder    • Restless leg syndrome    • Sleep apnea with use of continuous positive airway pressure (CPAP)        Past Surgical History:   Procedure Laterality Date   • BUNIONECTOMY Bilateral    • CATARACT EXTRACTION     • CHOLECYSTECTOMY     • COLONOSCOPY      • ENDOSCOPY N/A 6/24/2016    Procedure: ESOPHAGOGASTRODUODENOSCOPY  with biopsies;  Surgeon: Von Hernández MD;  Location: [facility] OR;  Service:    • LUNG LOBECTOMY Left     lower lobe   • REPLACEMENT TOTAL KNEE Left    • THYROID BIOPSY     • TOTAL HIP ARTHROPLASTY Right 6/1/2019    Procedure: BIPOLAR HIP CEMENTED POSTERIOR;  Surgeon: Ashley Crenshaw MD;  Location: [facility] OR;  Service: Orthopedics       SOCIAL HISTORY:   reports that she has never smoked. She has never used smokeless tobacco. Drug use questions deferred to the physician. She reports that she does not drink alcohol.    FAMILY HISTORY:  family history includes Coronary artery disease in her mother; Heart attack in her mother and sister; Hypertension in her mother.    ALLERGIES:  Allergies   Allergen Reactions   • Doxycycline Anaphylaxis     Facial swelling, shortness of air, dizzines   • Codeine Other (See Comments)     Pt does not remember reaction   • Morphine Other (See Comments)     shaky       MEDICATIONS:  As listed in the electronic medical record.    Review of Systems   Constitutional: Positive for fatigue. Negative for appetite change, chills, fever and unexpected weight change.   HENT: Negative for congestion, dental problem, facial swelling, hearing loss, mouth sores, nosebleeds, rhinorrhea, sore throat, tinnitus, trouble swallowing and voice change.    Eyes: Negative.    Respiratory: Negative for cough, chest tightness, shortness of breath, wheezing and stridor.    Cardiovascular: Negative for chest pain, palpitations and leg swelling.   Gastrointestinal: Negative for abdominal distention, abdominal pain, blood in stool, constipation, diarrhea, nausea and vomiting.   Endocrine: Negative.    Genitourinary: Negative for difficulty urinating, dysuria, flank pain, frequency, hematuria, menstrual problem, pelvic pain, vaginal bleeding and vaginal discharge.   Musculoskeletal: Positive for arthralgias. Negative for  back pain, joint swelling, myalgias, neck pain and neck stiffness.   Skin: Negative for color change, rash and wound.   Allergic/Immunologic: Negative for environmental allergies.   Neurological: Negative for dizziness, seizures, syncope, weakness, numbness and headaches.   Hematological: Negative for adenopathy. Does not bruise/bleed easily.   Psychiatric/Behavioral: Negative for agitation, confusion and sleep disturbance. The patient is not nervous/anxious.    All other systems reviewed and are negative.      Vitals:    06/06/19 0835 06/06/19 1100 06/06/19 1502 06/06/19 1533   BP:  120/55  140/65   BP Location:  Left arm  Left arm   Patient Position:  Sitting  Lying   Pulse: 72 75 74 84   Resp: 16 16 16 16   Temp:  97 °F (36.1 °C)  97.3 °F (36.3 °C)   TempSrc:  Oral  Oral   SpO2:  97%  96%   Weight:       Height:           Physical Exam   Constitutional: She is oriented to person, place, and time. She appears well-developed and well-nourished.   HENT:   Head: Normocephalic and atraumatic.   Nose: Nose normal.   Eyes: Conjunctivae and EOM are normal. Pupils are equal, round, and reactive to light.   Neck: Neck supple.   Cardiovascular: Normal rate, regular rhythm, S1 normal, S2 normal and normal heart sounds. Exam reveals no gallop and no friction rub.   No murmur heard.  Pulmonary/Chest: Effort normal and breath sounds normal. No stridor. No respiratory distress. She has no wheezes. She has no rhonchi. She has no rales. She exhibits no tenderness.   Bibasilar crackles   Abdominal: Soft. Bowel sounds are normal. She exhibits no distension and no mass. There is no tenderness. There is no rebound and no guarding.   Musculoskeletal: She exhibits edema (1= RLE, trace LLE).   Lymphadenopathy:     She has no cervical adenopathy.     She has no axillary adenopathy.        Right: No inguinal and no supraclavicular adenopathy present.        Left: No inguinal and no supraclavicular adenopathy present.   Neurological: She is  alert and oriented to person, place, and time. No cranial nerve deficit or sensory deficit.   Skin: Skin is warm and dry. No rash noted. No erythema.   Hematoma at the right hip operative site, dressing C/D/I   Psychiatric: She has a normal mood and affect. Her behavior is normal. Judgment and thought content normal.   Vitals reviewed.      DIAGNOSTIC DATA:  WBC   Date Value Ref Range Status   06/06/2019 8.51 3.40 - 10.80 10*3/mm3 Final     RBC   Date Value Ref Range Status   06/06/2019 2.90 (L) 3.77 - 5.28 10*6/mm3 Final     Hemoglobin   Date Value Ref Range Status   06/06/2019 7.8 (L) 12.0 - 15.9 g/dL Final     Hematocrit   Date Value Ref Range Status   06/06/2019 25.0 (L) 34.0 - 46.6 % Final     MCV   Date Value Ref Range Status   06/06/2019 86.2 79.0 - 97.0 fL Final     MCH   Date Value Ref Range Status   06/06/2019 26.9 26.6 - 33.0 pg Final     MCHC   Date Value Ref Range Status   06/06/2019 31.2 (L) 31.5 - 35.7 g/dL Final     RDW   Date Value Ref Range Status   06/06/2019 16.3 (H) 12.3 - 15.4 % Final     RDW-SD   Date Value Ref Range Status   06/06/2019 50.6 37.0 - 54.0 fl Final     MPV   Date Value Ref Range Status   06/06/2019 9.9 6.0 - 12.0 fL Final     Platelets   Date Value Ref Range Status   06/06/2019 135 (L) 140 - 450 10*3/mm3 Final     Neutrophil %   Date Value Ref Range Status   06/05/2019 74.1 42.7 - 76.0 % Final     Lymphocyte %   Date Value Ref Range Status   06/05/2019 11.6 (L) 19.6 - 45.3 % Final     Monocyte %   Date Value Ref Range Status   06/05/2019 7.6 5.0 - 12.0 % Final     Eosinophil %   Date Value Ref Range Status   06/05/2019 1.3 0.3 - 6.2 % Final     Basophil %   Date Value Ref Range Status   06/05/2019 0.2 0.0 - 1.5 % Final     Immature Grans %   Date Value Ref Range Status   06/04/2019 4.5 (H) 0.0 - 0.5 % Final     Neutrophils Absolute   Date Value Ref Range Status   06/06/2019 7.49 (H) 1.70 - 7.00 10*3/mm3 Final     Neutrophils, Absolute   Date Value Ref Range Status   06/05/2019  9.24 (H) 1.70 - 7.00 10*3/mm3 Final     Lymphocytes, Absolute   Date Value Ref Range Status   06/05/2019 1.44 0.70 - 3.10 10*3/mm3 Final     Monocytes, Absolute   Date Value Ref Range Status   06/05/2019 0.94 (H) 0.10 - 0.90 10*3/mm3 Final     Eosinophils Absolute   Date Value Ref Range Status   06/06/2019 0.09 0.00 - 0.40 10*3/mm3 Final     Eosinophils, Absolute   Date Value Ref Range Status   06/05/2019 0.16 0.00 - 0.40 10*3/mm3 Final     Basophils, Absolute   Date Value Ref Range Status   06/05/2019 0.02 0.00 - 0.20 10*3/mm3 Final     Immature Grans, Absolute   Date Value Ref Range Status   06/04/2019 0.68 (H) 0.00 - 0.05 10*3/mm3 Final     nRBC   Date Value Ref Range Status   06/06/2019 1.0 (H) 0.0 - 0.2 /100 WBC Final   06/04/2019 0.1 0.0 - 0.2 /100 WBC Final       Glucose   Date Value Ref Range Status   06/06/2019 97 65 - 99 mg/dL Final     Sodium   Date Value Ref Range Status   06/06/2019 136 136 - 145 mmol/L Final     Potassium   Date Value Ref Range Status   06/06/2019 3.8 3.5 - 5.2 mmol/L Final     CO2   Date Value Ref Range Status   06/06/2019 25.8 22.0 - 29.0 mmol/L Final     Chloride   Date Value Ref Range Status   06/06/2019 100 98 - 107 mmol/L Final     Anion Gap   Date Value Ref Range Status   06/06/2019 10.2 mmol/L Final     Creatinine   Date Value Ref Range Status   06/06/2019 0.84 0.57 - 1.00 mg/dL Final     BUN   Date Value Ref Range Status   06/06/2019 20 8 - 23 mg/dL Final     BUN/Creatinine Ratio   Date Value Ref Range Status   06/06/2019 23.8 7.0 - 25.0 Final     Calcium   Date Value Ref Range Status   06/06/2019 7.7 (L) 8.6 - 10.5 mg/dL Final     eGFR Non  Amer   Date Value Ref Range Status   06/06/2019 65 >60 mL/min/1.73 Final         IMAGING:  None reviewed    Assessment/Plan   ASSESSMENT:  This is a 78 y.o. female with:    1. Right hip fracture  · Status post total right hip arthroplasty on 6/1/2019 by Dr. Crenshaw  2. Normocytic anemia  · Hemoglobin on admission 11.6.  Baseline  appears to be around 10.  · Clearly worse postoperatively, has right hip hematoma  · Laboratory anemia evaluation and transfuse as necessary  · Acute kidney injury on chronic kidney disease has contributed  3. Leukocytosis with neutrophilia  · Resolved  4. Mild postoperative thrombocytopenia  5. Acute kidney injury with normalization of her creatinine    RECOMMENDATIONS/PLAN:   1.  Labs including reticulocyte count, ferritin, iron profile, vitamin B12 level  2.  CBC in the morning  3.  Transfuse packed red blood cells if necessary  4.  Lovenox for DVT prophylaxis    Truong Estrada MD

## 2019-06-07 VITALS
TEMPERATURE: 98.9 F | DIASTOLIC BLOOD PRESSURE: 69 MMHG | HEART RATE: 84 BPM | HEIGHT: 66 IN | RESPIRATION RATE: 18 BRPM | SYSTOLIC BLOOD PRESSURE: 137 MMHG | OXYGEN SATURATION: 96 % | WEIGHT: 205 LBS | BODY MASS INDEX: 32.95 KG/M2

## 2019-06-07 LAB
DEPRECATED RDW RBC AUTO: 50.8 FL (ref 37–54)
EOSINOPHIL # BLD MANUAL: 0.33 10*3/MM3 (ref 0–0.4)
EOSINOPHIL NFR BLD MANUAL: 4 % (ref 0.3–6.2)
ERYTHROCYTE [DISTWIDTH] IN BLOOD BY AUTOMATED COUNT: 16.7 % (ref 12.3–15.4)
HCT VFR BLD AUTO: 26 % (ref 34–46.6)
HGB BLD-MCNC: 8.1 G/DL (ref 12–15.9)
LYMPHOCYTES # BLD MANUAL: 1.33 10*3/MM3 (ref 0.7–3.1)
LYMPHOCYTES NFR BLD MANUAL: 16 % (ref 19.6–45.3)
LYMPHOCYTES NFR BLD MANUAL: 4 % (ref 5–12)
MCH RBC QN AUTO: 26.8 PG (ref 26.6–33)
MCHC RBC AUTO-ENTMCNC: 31.2 G/DL (ref 31.5–35.7)
MCV RBC AUTO: 86.1 FL (ref 79–97)
MONOCYTES # BLD AUTO: 0.33 10*3/MM3 (ref 0.1–0.9)
NEUTROPHILS # BLD AUTO: 6.34 10*3/MM3 (ref 1.7–7)
NEUTROPHILS NFR BLD MANUAL: 76 % (ref 42.7–76)
NRBC SPEC MANUAL: 1 /100 WBC (ref 0–0.2)
PLAT MORPH BLD: NORMAL
PLATELET # BLD AUTO: 158 10*3/MM3 (ref 140–450)
PMV BLD AUTO: 9.7 FL (ref 6–12)
RBC # BLD AUTO: 3.02 10*6/MM3 (ref 3.77–5.28)
RBC MORPH BLD: NORMAL
VIT B12 BLD-MCNC: 205 PG/ML (ref 211–946)
WBC MORPH BLD: NORMAL
WBC NRBC COR # BLD: 8.34 10*3/MM3 (ref 3.4–10.8)

## 2019-06-07 PROCEDURE — 82607 VITAMIN B-12: CPT | Performed by: INTERNAL MEDICINE

## 2019-06-07 PROCEDURE — 85007 BL SMEAR W/DIFF WBC COUNT: CPT | Performed by: HOSPITALIST

## 2019-06-07 PROCEDURE — 25010000002 ENOXAPARIN PER 10 MG: Performed by: ORTHOPAEDIC SURGERY

## 2019-06-07 PROCEDURE — 94799 UNLISTED PULMONARY SVC/PX: CPT

## 2019-06-07 PROCEDURE — 99233 SBSQ HOSP IP/OBS HIGH 50: CPT | Performed by: INTERNAL MEDICINE

## 2019-06-07 PROCEDURE — 85025 COMPLETE CBC W/AUTO DIFF WBC: CPT | Performed by: HOSPITALIST

## 2019-06-07 PROCEDURE — 25010000002 CYANOCOBALAMIN PER 1000 MCG: Performed by: INTERNAL MEDICINE

## 2019-06-07 RX ORDER — AMOXICILLIN AND CLAVULANATE POTASSIUM 500; 125 MG/1; MG/1
1 TABLET, FILM COATED ORAL EVERY 12 HOURS SCHEDULED
Qty: 3 TABLET | Refills: 0 | Status: SHIPPED | OUTPATIENT
Start: 2019-06-07 | End: 2019-06-09

## 2019-06-07 RX ORDER — CYANOCOBALAMIN 1000 UG/ML
1000 INJECTION, SOLUTION INTRAMUSCULAR; SUBCUTANEOUS ONCE
Status: COMPLETED | OUTPATIENT
Start: 2019-06-07 | End: 2019-06-07

## 2019-06-07 RX ORDER — CHOLECALCIFEROL (VITAMIN D3) 125 MCG
500 CAPSULE ORAL DAILY
Status: DISCONTINUED | OUTPATIENT
Start: 2019-06-07 | End: 2019-06-07 | Stop reason: HOSPADM

## 2019-06-07 RX ORDER — ASPIRIN 81 MG/1
81 TABLET, CHEWABLE ORAL DAILY
Qty: 30 TABLET | Refills: 0 | Status: SHIPPED | OUTPATIENT
Start: 2019-06-08 | End: 2019-07-08

## 2019-06-07 RX ORDER — PSEUDOEPHEDRINE HCL 30 MG
100 TABLET ORAL 2 TIMES DAILY PRN
Qty: 60 EACH | Refills: 0 | Status: SHIPPED | OUTPATIENT
Start: 2019-06-07 | End: 2019-07-07

## 2019-06-07 RX ORDER — GABAPENTIN 100 MG/1
100 CAPSULE ORAL EVERY 8 HOURS
Qty: 9 CAPSULE | Refills: 0 | Status: SHIPPED | OUTPATIENT
Start: 2019-06-07 | End: 2022-06-15 | Stop reason: SDUPTHER

## 2019-06-07 RX ORDER — HYDROCODONE BITARTRATE AND ACETAMINOPHEN 5; 325 MG/1; MG/1
1 TABLET ORAL EVERY 8 HOURS PRN
Qty: 10 TABLET | Refills: 0 | Status: SHIPPED | OUTPATIENT
Start: 2019-06-07 | End: 2019-06-10

## 2019-06-07 RX ADMIN — CYANOCOBALAMIN 1000 MCG: 1000 INJECTION, SOLUTION INTRAMUSCULAR; SUBCUTANEOUS at 12:30

## 2019-06-07 RX ADMIN — GABAPENTIN 100 MG: 100 CAPSULE ORAL at 06:11

## 2019-06-07 RX ADMIN — VITAMIN D, TAB 1000IU (100/BT) 1000 UNITS: 25 TAB at 08:59

## 2019-06-07 RX ADMIN — DILTIAZEM HYDROCHLORIDE 120 MG: 120 CAPSULE, COATED, EXTENDED RELEASE ORAL at 08:59

## 2019-06-07 RX ADMIN — GABAPENTIN 100 MG: 100 CAPSULE ORAL at 14:35

## 2019-06-07 RX ADMIN — HYDROCODONE BITARTATE AND ACETAMINOPHEN 1 TABLET: 5; 325 TABLET ORAL at 14:35

## 2019-06-07 RX ADMIN — MAGNESIUM HYDROXIDE 10 ML: 2400 SUSPENSION ORAL at 12:30

## 2019-06-07 RX ADMIN — PANTOPRAZOLE SODIUM 40 MG: 40 TABLET, DELAYED RELEASE ORAL at 06:11

## 2019-06-07 RX ADMIN — AMOXICILLIN AND CLAVULANATE POTASSIUM 500 MG: 500; 125 TABLET, FILM COATED ORAL at 08:59

## 2019-06-07 RX ADMIN — IPRATROPIUM BROMIDE AND ALBUTEROL SULFATE 1.5 ML: 2.5; .5 SOLUTION RESPIRATORY (INHALATION) at 13:14

## 2019-06-07 RX ADMIN — ENOXAPARIN SODIUM 40 MG: 40 INJECTION SUBCUTANEOUS at 08:59

## 2019-06-07 RX ADMIN — IPRATROPIUM BROMIDE AND ALBUTEROL SULFATE 1.5 ML: 2.5; .5 SOLUTION RESPIRATORY (INHALATION) at 08:47

## 2019-06-07 RX ADMIN — BUDESONIDE 0.5 MG: 0.5 INHALANT RESPIRATORY (INHALATION) at 08:47

## 2019-06-07 RX ADMIN — CITALOPRAM 10 MG: 10 TABLET, FILM COATED ORAL at 08:59

## 2019-06-07 RX ADMIN — ASPIRIN 81 MG: 81 TABLET, CHEWABLE ORAL at 08:59

## 2019-06-07 RX ADMIN — HYDROCODONE BITARTATE AND ACETAMINOPHEN 1 TABLET: 5; 325 TABLET ORAL at 01:34

## 2019-06-07 RX ADMIN — LEVOTHYROXINE SODIUM 88 MCG: 88 TABLET ORAL at 06:11

## 2019-06-07 RX ADMIN — Medication 500 MCG: at 12:30

## 2019-06-07 NOTE — PROGRESS NOTES
"Daily progress note    Chief complaint  Doing better  No new complaints    History of present illness  78-year-old white female who is well-known to our service with history of hypertension hyperlipidemia hypothyroidism COPD neuropathy restless leg syndrome and gastroesophageal reflux disease who was recently discharged from the hospital after treatment of fluid overload chronic kidney disease continues to have right hip pain which radiates down into her anterior leg up to knee.  Patient denies any fall trauma injury.  Patient evaluated in the ER found to have right femur neck fracture admitted for management.  At the time of interview she is hurting wants to eat but denies any chest pain shortness of breath abdominal pain nausea vomiting diarrhea.     REVIEW OF SYSTEMS  Unremarkable    PHYSICAL EXAM  Blood pressure 137/69, pulse 85, temperature 98.9 °F (37.2 °C), temperature source Oral, resp. rate 20, height 167.6 cm (66\"), weight 93 kg (205 lb), SpO2 95 %.    Constitutional: She is oriented to person, place, and time. No distress.   Head: Normocephalic and atraumatic.   Eyes: EOM are normal. Pupils are equal, round, and reactive to light.   Neck: Normal range of motion. Neck supple.   Cardiovascular: Normal rate, regular rhythm and normal heart sounds. Exam reveals no gallop and no friction rub.   Pulmonary/Chest: Effort normal and breath sounds normal. No respiratory distress. She has no wheezes. She has no rales.   Abdominal: Soft. There is no tenderness. There is no rebound and no guarding.   Musculoskeletal: Normal range of motion. She exhibits no edema. Pt's back is non-tender. There is posterior R hip tenderness and pain with ROM of the R leg.    Neurological: She is alert and oriented to person, place, and time. Pt has normal strength and sensation   Skin: Skin is warm and dry. No rash noted.   Psychiatric: Mood and affect normal.     LABS  Lab Results (last 24 hours)     Procedure Component Value Units " Date/Time    CBC & Differential [[id number]] Collected:  06/07/19 0415    Specimen:  Blood Updated:  06/07/19 0601    Narrative:       The following orders were created for panel order CBC & Differential.  Procedure                               Abnormality         Status                     ---------                               -----------         ------                     CBC Auto Differential[[id number]]        Abnormal            Final result                 Please view results for these tests on the individual orders.    CBC Auto Differential [[id number]]  (Abnormal) Collected:  06/07/19 0415    Specimen:  Blood Updated:  06/07/19 0601     WBC 8.34 10*3/mm3      RBC 3.02 10*6/mm3      Hemoglobin 8.1 g/dL      Hematocrit 26.0 %      MCV 86.1 fL      MCH 26.8 pg      MCHC 31.2 g/dL      RDW 16.7 %      RDW-SD 50.8 fl      MPV 9.7 fL      Platelets 158 10*3/mm3     Manual Differential [[id number]]  (Abnormal) Collected:  06/07/19 0415    Specimen:  Blood Updated:  06/07/19 0601     Neutrophil % 76.0 %      Lymphocyte % 16.0 %      Monocyte % 4.0 %      Eosinophil % 4.0 %      Neutrophils Absolute 6.34 10*3/mm3      Lymphocytes Absolute 1.33 10*3/mm3      Monocytes Absolute 0.33 10*3/mm3      Eosinophils Absolute 0.33 10*3/mm3      nRBC 1.0 /100 WBC      RBC Morphology Normal     WBC Morphology Normal     Platelet Morphology Normal    Vitamin B12 [[id number]]  (Abnormal) Collected:  06/07/19 0415    Specimen:  Blood Updated:  06/07/19 0542     Vitamin B-12 205 pg/mL     Retic With IRF & RET-He [[id number]]  (Abnormal) Collected:  06/06/19 1853    Specimen:  Blood Updated:  06/06/19 1927     Immature Reticulocyte Fraction 38.9 %      Reticulocyte % 2.67 %      Reticulocyte Hgb 28.1 pg     Blood Culture - Blood, Arm, Left [[id number]] Collected:  06/03/19 1843    Specimen:  Blood from Arm, Left Updated:  06/06/19 1915     Blood Culture No growth at 3 days    Ferritin [[id number]]  (Normal) Collected:  06/06/19 0536     Specimen:  Blood from Arm, Right Updated:  06/06/19 1902     Ferritin 79.90 ng/mL     Iron Profile [[id number]]  (Abnormal) Collected:  06/06/19 0536    Specimen:  Blood from Arm, Right Updated:  06/06/19 1852     Iron 43 mcg/dL      Iron Saturation 18 %      Transferrin 156 mg/dL      TIBC 232 mcg/dL     Blood Culture - Blood, Arm, Left [[id number]] Collected:  06/03/19 1814    Specimen:  Blood from Arm, Left Updated:  06/06/19 1846     Blood Culture No growth at 3 days        Imaging Results (last 24 hours)     ** No results found for the last 24 hours. **        Current Facility-Administered Medications:   •  acetaminophen (TYLENOL) tablet 325 mg, 325 mg, Oral, Q4H PRN, Ashley Crenshaw MD  •  amitriptyline (ELAVIL) tablet 25 mg, 25 mg, Oral, Nightly, Arjun Sainz MD, 25 mg at 06/06/19 2100  •  amoxicillin-clavulanate (AUGMENTIN) 500-125 MG per tablet 500 mg, 1 tablet, Oral, Q12H, Arjun Sainz MD, 500 mg at 06/07/19 0859  •  aspirin chewable tablet 81 mg, 81 mg, Oral, Daily, Arjun Sainz MD, 81 mg at 06/07/19 0859  •  bisacodyl (DULCOLAX) suppository 10 mg, 10 mg, Rectal, Daily PRN, Ashley Crenshaw MD, 10 mg at 06/04/19 2224  •  budesonide (PULMICORT) nebulizer solution 0.5 mg, 0.5 mg, Nebulization, BID - RT, Arjun Sainz MD, 0.5 mg at 06/07/19 0847  •  cholecalciferol (VITAMIN D3) tablet 1,000 Units, 1,000 Units, Oral, Daily, Arjun Sainz MD, 1,000 Units at 06/07/19 0859  •  citalopram (CeleXA) tablet 10 mg, 10 mg, Oral, Daily, Arjun Sainz MD, 10 mg at 06/07/19 0859  •  cyanocobalamin injection 1,000 mcg, 1,000 mcg, Intramuscular, Once, Troung Estrada MD  •  diltiaZEM CD (CARDIZEM CD) 24 hr capsule 120 mg, 120 mg, Oral, Q24H, Arjun Sainz MD, 120 mg at 06/07/19 0859  •  docusate sodium (COLACE) capsule 100 mg, 100 mg, Oral, BID PRN, Ashley Crenshaw MD, 100 mg at 06/04/19 2224  •  enoxaparin (LOVENOX) syringe 40 mg, 40 mg, Subcutaneous, Daily, Ashley Crenshaw MD, 40 mg at  06/07/19 0859  •  gabapentin (NEURONTIN) capsule 100 mg, 100 mg, Oral, Q8H, Shelly Sainz MD, 100 mg at 06/07/19 0611  •  HYDROcodone-acetaminophen (NORCO) 5-325 MG per tablet 1 tablet, 1 tablet, Oral, Q8H PRN, Ashley Crenshaw MD, 1 tablet at 06/07/19 0134  •  ipratropium-albuterol (DUO-NEB) nebulizer solution 1.5 mL, 1.5 mL, Nebulization, Q6H While Awake - RT, Shelly Sainz MD, 1.5 mL at 06/07/19 0847  •  levothyroxine (SYNTHROID, LEVOTHROID) tablet 88 mcg, 88 mcg, Oral, Once per day on Mon Tue Wed Thu Fri Sat, Shelly Sainz MD, 88 mcg at 06/07/19 0611  •  magnesium hydroxide (MILK OF MAGNESIA) suspension 2400 mg/10mL 10 mL, 10 mL, Oral, Daily PRN, Ashley Crenshaw MD, 10 mL at 06/04/19 1053  •  melatonin tablet 1 mg, 1 mg, Oral, Nightly PRN, Ashley Crenshaw MD  •  ondansetron ODT (ZOFRAN-ODT) disintegrating tablet 4 mg, 4 mg, Oral, Q8H PRN, Shelly Sainz MD  •  pantoprazole (PROTONIX) EC tablet 40 mg, 40 mg, Oral, QAM, Shelly Sainz MD, 40 mg at 06/07/19 0611  •  pramipexole (MIRAPEX) tablet 1.5 mg, 1.5 mg, Oral, Nightly, Shelly Sainz MD, 1.5 mg at 06/06/19 2059  •  sodium chloride 0.9 % flush 1-10 mL, 1-10 mL, Intravenous, PRN, Ashley Crenshaw MD  •  sodium chloride 0.9 % flush 3 mL, 3 mL, Intravenous, Q12H, Ashley Crenshaw MD, 3 mL at 06/06/19 2100  •  vitamin B-12 (CYANOCOBALAMIN) tablet 500 mcg, 500 mcg, Oral, Daily, Truong Estrada MD    ASSESSMENT  Acute right femur neck fracture status post total hip arthroplasty  Urinary retention  Chronic anemia   Vitamin B12 deficiency  Right hip hematoma  Hypertension  Hyperlipidemia  Hypothyroidism  COPD  Neuropathy  Restless leg syndrome  Chronic kidney disease stage II  Gastroesophageal reflux disease    PLAN  Discharge to subacute rehab  Discharge summary dictated    SHELLY SAINZ MD

## 2019-06-07 NOTE — DISCHARGE SUMMARY
Discharge summary    Date of admission 5/31/2019  Date of discharge 6/7/2019    Final diagnosis    Acute right femur neck fracture status post total hip arthroplasty  Urinary retention  Chronic anemia   Vitamin B12 deficiency  Right hip hematoma  Hypertension  Hyperlipidemia  Hypothyroidism  COPD  Neuropathy  Restless leg syndrome  Gastroesophageal reflux disease    Discharge medications    Current Facility-Administered Medications:   •  acetaminophen (TYLENOL) tablet 325 mg, 325 mg, Oral, Q4H PRN, Ashley Crenshaw MD  •  amitriptyline (ELAVIL) tablet 25 mg, 25 mg, Oral, Nightly, Arjun Sainz MD, 25 mg at 06/06/19 2100  •  amoxicillin-clavulanate (AUGMENTIN) 500-125 MG per tablet 500 mg, 1 tablet, Oral, Q12H, Arjun Sainz MD, 500 mg at 06/07/19 0859  •  aspirin chewable tablet 81 mg, 81 mg, Oral, Daily, Arjun Sainz MD, 81 mg at 06/07/19 0859  •  bisacodyl (DULCOLAX) suppository 10 mg, 10 mg, Rectal, Daily PRN, Ashley Crenshaw MD, 10 mg at 06/04/19 2224  •  budesonide (PULMICORT) nebulizer solution 0.5 mg, 0.5 mg, Nebulization, BID - RT, Arjun Sainz MD, 0.5 mg at 06/07/19 0847  •  cholecalciferol (VITAMIN D3) tablet 1,000 Units, 1,000 Units, Oral, Daily, Arjun Sainz MD, 1,000 Units at 06/07/19 0859  •  citalopram (CeleXA) tablet 10 mg, 10 mg, Oral, Daily, Arjun Sainz MD, 10 mg at 06/07/19 0859  •  diltiaZEM CD (CARDIZEM CD) 24 hr capsule 120 mg, 120 mg, Oral, Q24H, Arjun Sainz MD, 120 mg at 06/07/19 0859  •  docusate sodium (COLACE) capsule 100 mg, 100 mg, Oral, BID PRN, Ashley Crenshaw MD, 100 mg at 06/04/19 2224  •  enoxaparin (LOVENOX) syringe 40 mg, 40 mg, Subcutaneous, Daily, Ashley Crenshaw MD, 40 mg at 06/07/19 0859  •  gabapentin (NEURONTIN) capsule 100 mg, 100 mg, Oral, Q8H, Arjun Sainz MD, 100 mg at 06/07/19 0611  •  HYDROcodone-acetaminophen (NORCO) 5-325 MG per tablet 1 tablet, 1 tablet, Oral, Q8H PRN, Ashley Crenshaw MD, 1 tablet at 06/07/19  0134  •  ipratropium-albuterol (DUO-NEB) nebulizer solution 1.5 mL, 1.5 mL, Nebulization, Q6H While Awake - RT, Arjun Sainz MD, 1.5 mL at 06/07/19 0847  •  levothyroxine (SYNTHROID, LEVOTHROID) tablet 88 mcg, 88 mcg, Oral, Once per day on Mon Tue Wed Thu Fri Sat, Arjun Sainz MD, 88 mcg at 06/07/19 0611  •  magnesium hydroxide (MILK OF MAGNESIA) suspension 2400 mg/10mL 10 mL, 10 mL, Oral, Daily PRN, Ashley Crenshaw MD, 10 mL at 06/07/19 1230  •  melatonin tablet 1 mg, 1 mg, Oral, Nightly PRN, Ashley Crenshaw MD  •  ondansetron ODT (ZOFRAN-ODT) disintegrating tablet 4 mg, 4 mg, Oral, Q8H PRN, Arjun Sainz MD  •  pantoprazole (PROTONIX) EC tablet 40 mg, 40 mg, Oral, QAM, Arjun Saizn MD, 40 mg at 06/07/19 0611  •  pramipexole (MIRAPEX) tablet 1.5 mg, 1.5 mg, Oral, Nightly, Arjun Sainz MD, 1.5 mg at 06/06/19 2059  •  sodium chloride 0.9 % flush 1-10 mL, 1-10 mL, Intravenous, PRN, Ashley Crenshaw MD  •  sodium chloride 0.9 % flush 3 mL, 3 mL, Intravenous, Q12H, Ashley Crenshaw MD, 3 mL at 06/06/19 2100  •  vitamin B-12 (CYANOCOBALAMIN) tablet 500 mcg, 500 mcg, Oral, Daily, Truong Estrada MD, 500 mcg at 06/07/19 1230     Consults obtained  Orthopedic surgery  Urology  Cardiology  Hematology oncology    Procedures  Status post right total hip arthroplasty    Hospital course  78-year-old white female who is well-known to our service with history of multiple medical problems including COPD hypothyroidism hypertension hyperlipidemia restless leg syndrome admitted to emergency room with right hip pain.  Patient evaluated found to have right femur neck fracture admitted for management.  Patient admitted after medical and cardiac clearance she underwent right total hip arthroplasty.  Postoperatively patient hemoglobin drops and her white count jumped up.  Patient received blood transfusion but her hemoglobin keeps dropping hematology consult obtained.  Patient work-up revealed right hip  hematoma and vitamin B12 deficiency.  Patient treated with empiric antibiotics and her white count is normal globin stable 8.1.  Patient is feeling much better and she remained on Lovenox and cleared from orthopedic surgery and hematology to be discharged to subacute rehab.  Patient cultures remains negative and no clear source of infection but she is treated empirically for total of 7 days.    Discharge diet regular    Activity per PT OT    Medication as above    Further care per accepting physician at subacute rehab    SHELLY DAVILA MD

## 2019-06-07 NOTE — SIGNIFICANT NOTE
06/07/19 1302   PT Discharge Summary   Reason for Discharge Discharge from facility   Discharge Destination SNF

## 2019-06-07 NOTE — PROGRESS NOTES
Case Management Discharge Note    Final Note: d/c to [facility] w/ family private auto. Natasha Mckenzie notified.     Destination - Selection Complete      Service Provider Request Status Selected Services Address Phone Number Fax Number    [facility] [address] [facility] [address] [phone]      Durable Medical Equipment      No service has been selected for the patient.      Dialysis/Infusion      No service has been selected for the patient.      Home Medical Care      No service has been selected for the patient.      Therapy      No service has been selected for the patient.      Community Resources      No service has been selected for the patient.             Final Discharge Disposition Code: 03 - skilled nursing facility (SNF)

## 2019-06-07 NOTE — PLAN OF CARE
Problem: Skin Injury Risk (Adult)  Goal: Skin Health and Integrity  Outcome: Outcome(s) achieved Date Met: 06/07/19 06/07/19 1558   Skin Injury Risk (Adult)   Skin Health and Integrity achieves outcome       Problem: Fall Risk (Adult)  Goal: Absence of Fall  Outcome: Outcome(s) achieved Date Met: 06/07/19 06/07/19 1558   Fall Risk (Adult)   Absence of Fall achieves outcome       Problem: Fractured Hip (Adult)  Goal: Signs and Symptoms of Listed Potential Problems Will be Absent, Minimized or Managed (Fractured Hip)  Outcome: Outcome(s) achieved Date Met: 06/07/19 06/07/19 1558   Goal/Outcome Evaluation   Problems Assessed (Fractured Hip) all   Problems Present (Fractured Hip) pain;functional deficit/self-care deficit       Problem: Patient Care Overview  Goal: Plan of Care Review  Outcome: Outcome(s) achieved Date Met: 06/07/19 06/07/19 1558   Coping/Psychosocial   Plan of Care Reviewed With patient   Plan of Care Review   Progress improving   OTHER   Outcome Summary Patient is ambulating using walker and x1 assist. Vitals are stable and voiding function is intact after removal of pryor this am. Pain is managed with po meds. Labs stable. Patient prepared and ready for d/c to [facility].    Coping/Psychosocial   Patient Agreement with Plan of Care agrees       Problem: Patient Care Overview  Goal: Plan of Care Review  Outcome: Outcome(s) achieved Date Met: 06/07/19 06/07/19 1558   Coping/Psychosocial   Plan of Care Reviewed With patient   Plan of Care Review   Progress improving   OTHER   Outcome Summary Patient is ambulating using walker and x1 assist. Vitals are stable and voiding function is intact after removal of pryor this am. Pain is managed with po meds. Labs stable. Patient prepared and ready for d/c to [facility].

## 2019-06-07 NOTE — PLAN OF CARE
Problem: Skin Injury Risk (Adult)  Goal: Skin Health and Integrity  Outcome: Ongoing (interventions implemented as appropriate)      Problem: Fall Risk (Adult)  Goal: Absence of Fall  Outcome: Ongoing (interventions implemented as appropriate)      Problem: Fractured Hip (Adult)  Goal: Signs and Symptoms of Listed Potential Problems Will be Absent, Minimized or Managed (Fractured Hip)  Outcome: Ongoing (interventions implemented as appropriate)      Problem: Patient Care Overview  Goal: Plan of Care Review  Outcome: Ongoing (interventions implemented as appropriate)   06/07/19 0049   Coping/Psychosocial   Plan of Care Reviewed With patient   Plan of Care Review   Progress improving   OTHER   Outcome Summary POD#5. VSS. PO PAIN MEDICATION HELPING WITH PAIN. STONE TO BEDSIDE. POSSIBLE D/C TO REHAB TODAY. EDUCATION PROVIDED ON OXYGEN THEREPY DUE TO HX OF COPD.     Goal: Individualization and Mutuality  Outcome: Ongoing (interventions implemented as appropriate)    Goal: Discharge Needs Assessment  Outcome: Ongoing (interventions implemented as appropriate)

## 2019-06-07 NOTE — PROGRESS NOTES
[facility] INPATIENT PROGRESS NOTE    Length of Stay:  7 days    CHIEF COMPLAINT/REASON FOR VISIT:  Anemia    SUBJECTIVE:  Doing well.  Anticipating d/c.  Pain adequately controlled.     ROS:  Positive for - R hip pain, bruising at op site  Negative for - fever, bleeding, n/v    OBJECTIVE:  Vitals:    06/06/19 1925 06/06/19 2232 06/06/19 2312 06/07/19 0306   BP: 141/67  149/70 132/61   BP Location: Left arm  Left arm Left arm   Patient Position: Lying  Lying Lying   Pulse: 78 81 83 74   Resp: 18 16 16 16   Temp: 99.6 °F (37.6 °C)  97.9 °F (36.6 °C) 98.6 °F (37 °C)   TempSrc: Oral  Oral Oral   SpO2: 92% 92% 91% 91%   Weight:       Height:             PHYSICAL EXAMINATION:  General:  NAD, alert/oriented  Chest/Lungs:  CTAB  Heart:  RRR  Abdomen/GI:  Soft, NT, ND, NABS  Extremities:  WWP, 1+ RLE edema  Skin:  Hematoma at the op site R hip    DIAGNOSTIC DATA:  Results Review:     I reviewed the patient's new clinical results.    Results from last 7 days   Lab Units 06/07/19  0415   WBC 10*3/mm3 8.34   HEMOGLOBIN g/dL 8.1*   HEMATOCRIT % 26.0*   PLATELETS 10*3/mm3 158     Lab Results   Component Value Date    NEUTROABS 6.34 06/07/2019     Results from last 7 days   Lab Units 06/06/19  0536   SODIUM mmol/L 136   POTASSIUM mmol/L 3.8   CHLORIDE mmol/L 100   CO2 mmol/L 25.8   BUN mg/dL 20   CREATININE mg/dL 0.84   GLUCOSE mg/dL 97   CALCIUM mg/dL 7.7*     Results from last 7 days   Lab Units 05/31/19  1537   INR  0.94   APTT seconds 26.2               IMAGING:    None reviewed    Assessment/Plan   ASSESSMENT/PLAN:  This is a 78 y.o. female with:     1. Right hip fracture  · Status post total right hip arthroplasty on 6/1/2019 by Dr. Crenshaw  2. Normocytic anemia  · Hemoglobin on admission 11.6.  Baseline appears to be around 10.  · Clearly worse postoperatively, has right hip hematoma  · Hgb stable today 8.1  · Iron 43, ferritin 79, not iron deficient  · Vitamin B12 low at 205  3. Leukocytosis with  neutrophilia  · Resolved  4. Mild postoperative thrombocytopenia  5. Acute kidney injury with normalization of her creatinine     RECOMMENDATIONS/PLAN:   1.  Vitamin B12 injection and start daily oral vitamin B12  2.  Lovenox for DVT prophylaxis  3.  No pRBCs or IV iron necessary  4.  OK for d/c from hematology standpoint  5.  Discussed f/u with me vs primary care.  She prefers primary care.  Anemia and vitamin B12 levels should be followed.  Can return to my office if necessary.      Will sign off.               Truong Estrada MD

## 2019-06-08 LAB
BACTERIA SPEC AEROBE CULT: NORMAL
BACTERIA SPEC AEROBE CULT: NORMAL

## 2019-10-28 ENCOUNTER — OFFICE VISIT (OUTPATIENT)
Dept: GASTROENTEROLOGY | Facility: CLINIC | Age: 79
End: 2019-10-28

## 2019-10-28 VITALS
WEIGHT: 214.4 LBS | BODY MASS INDEX: 34.46 KG/M2 | DIASTOLIC BLOOD PRESSURE: 72 MMHG | HEIGHT: 66 IN | SYSTOLIC BLOOD PRESSURE: 126 MMHG

## 2019-10-28 DIAGNOSIS — K22.2 ESOPHAGEAL STRICTURE: ICD-10-CM

## 2019-10-28 DIAGNOSIS — K59.09 CHRONIC CONSTIPATION: ICD-10-CM

## 2019-10-28 DIAGNOSIS — D64.89 ANEMIA DUE TO OTHER CAUSE, NOT CLASSIFIED: Primary | ICD-10-CM

## 2019-10-28 PROCEDURE — 99213 OFFICE O/P EST LOW 20 MIN: CPT | Performed by: INTERNAL MEDICINE

## 2019-10-28 RX ORDER — BACLOFEN 10 MG/1
TABLET ORAL
Refills: 5 | COMMUNITY
Start: 2019-10-16 | End: 2020-11-04 | Stop reason: ALTCHOICE

## 2019-10-28 RX ORDER — POTASSIUM CHLORIDE 1500 MG/1
TABLET, EXTENDED RELEASE ORAL
COMMUNITY
Start: 2019-10-26 | End: 2021-03-12 | Stop reason: HOSPADM

## 2019-10-28 RX ORDER — BUDESONIDE AND FORMOTEROL FUMARATE DIHYDRATE 160; 4.5 UG/1; UG/1
AEROSOL RESPIRATORY (INHALATION)
COMMUNITY
Start: 2019-09-12

## 2019-10-28 RX ORDER — FUROSEMIDE 40 MG/1
TABLET ORAL
Refills: 0 | COMMUNITY
Start: 2019-10-14 | End: 2023-03-15 | Stop reason: SDUPTHER

## 2019-10-28 NOTE — PROGRESS NOTES
PATIENT INFORMATION  Latanya Olsen       - 1940    CHIEF COMPLAINT  Chief Complaint   Patient presents with   • Constipation       HISTORY OF PRESENT ILLNESS  Had BERLIN  But is left on Diuretic no HD.  Fell and broke her Hip during PT, so has a new hip     Was sent a recall for EGD but her last EGD was negative for Kwon's so no reason to recall but ot sure the status of her colonosocpy.    Her memory of her colonoscopy has always been normal w/o fHX                REVIEW OF SYSTEMS  Review of Systems   Constitutional: Positive for activity change, fatigue and unexpected weight change.   HENT: Positive for dental problem, postnasal drip and sinus pressure.    Respiratory: Positive for apnea and shortness of breath.    Cardiovascular: Positive for leg swelling.   Gastrointestinal: Positive for constipation.   Musculoskeletal: Positive for arthralgias.   Psychiatric/Behavioral: The patient is nervous/anxious.    All other systems reviewed and are negative.        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]   • Diverticulosis of intestine [K57.90]   • Obstructive sleep apnea syndrome [G47.33]   • Weight loss [R63.4]   • Vitamin D deficiency [E55.9]   • Overweight (BMI 25.0-29.9) [E66.3]   • Spinal stenosis of lumbar region without neurogenic claudication [M48.061]   • Osteoarthritis of knee [M17.10]   • Obesity (BMI 30-39.9) [E66.9]   • Neutropenia (CMS/HCC) [D70.9]   • Chronic lower back pain [M54.5, G89.29]   • Knee pain [M25.569]   • Insomnia [G47.00]   • Hypothyroidism [E03.9]   • Hypokalemia [E87.6]   • Hypertension [I10]   • Hyperlipidemia [E78.5]   • Generalized osteoarthritis [M15.9]   • Gastroparesis [K31.84]   • Foot pain [M79.673]   • Chronic obstructive  pulmonary disease (CMS/HCC) [J44.9]   • Chronic constipation [K59.09]   • Anemia [D64.9]   • Weight gain [R63.5]         PAST MEDICAL HISTORY  Past Medical History:   Diagnosis Date   • Anemia    • Arthritis    • Atrial fibrillation (CMS/HCC)    • Constipation    • COPD (chronic obstructive pulmonary disease) (CMS/HCC)    • Diverticulosis    • GERD (gastroesophageal reflux disease)    • Hx of degenerative disc disease    • Hyperlipidemia    • Hypertension    • Hypokalemia    • Hypothyroidism    • Knee swelling    • Lung cancer (CMS/HCC)     history   • Renal disorder    • Restless leg syndrome    • Sleep apnea with use of continuous positive airway pressure (CPAP)          SURGICAL HISTORY  Past Surgical History:   Procedure Laterality Date   • BUNIONECTOMY Bilateral    • CATARACT EXTRACTION     • CHOLECYSTECTOMY     • COLONOSCOPY     • ENDOSCOPY N/A 6/24/2016    Procedure: ESOPHAGOGASTRODUODENOSCOPY  with biopsies;  Surgeon: Von Hernández MD;  Location: [facility];  Service:    • LUNG LOBECTOMY Left     lower lobe   • REPLACEMENT TOTAL KNEE Left    • THYROID BIOPSY     • TOTAL HIP ARTHROPLASTY Right 6/1/2019    Procedure: BIPOLAR HIP CEMENTED POSTERIOR;  Surgeon: Ashley Crenshaw MD;  Location: [facility];  Service: Orthopedics         FAMILY HISTORY  Family History   Problem Relation Age of Onset   • Heart attack Mother    • Coronary artery disease Mother    • Hypertension Mother    • Heart attack Sister    • Colon cancer Neg Hx    • Colon polyps Neg Hx    • Esophageal cancer Neg Hx          SOCIAL HISTORY  Social History     Occupational History   • Not on file   Tobacco Use   • Smoking status: Never Smoker   • Smokeless tobacco: Never Used   Substance and Sexual Activity   • Alcohol use: No   • Drug use: Defer   • Sexual activity: Defer       Debilities/Disabilities Identified: None    Emotional Behavior: Appropriate    CURRENT MEDICATIONS    Current Outpatient Medications:   •   "amitriptyline (ELAVIL) 25 MG tablet, TK 1 T PO D UTD, Disp: , Rfl: 1  •  baclofen (LIORESAL) 10 MG tablet, TAKE ONE OR TWO TABS AT BEDTIME FOR MUSCLE CRAMPING, Disp: , Rfl: 5  •  BISACODYL RE, Insert  into the rectum Daily As Needed (CONSTIPATION)., Disp: , Rfl:   •  Cholecalciferol (VITAMIN D3) 5000 units capsule capsule, Take 5,000 Units by mouth Daily., Disp: , Rfl:   •  citalopram (CeleXA) 10 MG tablet, Take 10 mg by mouth Daily., Disp: , Rfl:   •  diltiaZEM (CARDIZEM) 120 MG tablet, Take 120 mg by mouth 4 (Four) Times a Day., Disp: , Rfl:   •  esomeprazole (NexIUM) 40 MG capsule, Take 40 mg by mouth every morning before breakfast., Disp: , Rfl:   •  furosemide (LASIX) 40 MG tablet, TAKE ONE TABLET BY MOUTH EVERY MORNING FOR FLUID, Disp: , Rfl: 0  •  ipratropium-albuterol (DUO-NEB) 0.5-2.5 mg/mL nebulizer, USE 1 VIAL IN NEBULIZER 2 TIMES DAILY. Generic: DUONEB, Disp: 60 mL, Rfl: 10  •  KLOR-CON 20 MEQ CR tablet, , Disp: , Rfl:   •  levothyroxine (SYNTHROID, LEVOTHROID) 88 MCG tablet, Take 88 mcg by mouth Daily. Takes daily except Sundays (takes 6 days a week), Disp: , Rfl:   •  pramipexole (MIRAPEX) 1.5 MG tablet, Take 1.5 mg by mouth every night at bedtime., Disp: , Rfl:   •  SYMBICORT 160-4.5 MCG/ACT inhaler, , Disp: , Rfl:     ALLERGIES  Doxycycline; Codeine; and Morphine    VITALS  Vitals:    10/28/19 1319   BP: 126/72   Weight: 97.3 kg (214 lb 6.4 oz)   Height: 167.6 cm (65.98\")       LAST RESULTS   Admission on 05/31/2019, Discharged on 06/07/2019   No results displayed because visit has over 200 results.        No results found.    PHYSICAL EXAM  Physical Exam   Constitutional: She is oriented to person, place, and time. She appears well-developed and well-nourished.   HENT:   Head: Normocephalic and atraumatic.   Eyes: Conjunctivae and EOM are normal. Pupils are equal, round, and reactive to light. No scleral icterus.   Neck: Normal range of motion. Neck supple. No thyromegaly present.   Cardiovascular: " Normal rate, regular rhythm, normal heart sounds and intact distal pulses. Exam reveals no gallop.   No murmur heard.  Pulmonary/Chest: Effort normal and breath sounds normal. She has no wheezes. She has no rales.   Abdominal: Soft. Bowel sounds are normal. She exhibits no shifting dullness, no distension, no fluid wave, no abdominal bruit, no ascites and no mass. There is no hepatosplenomegaly. There is no tenderness. There is no guarding and negative Pittman's sign. Hernia confirmed negative in the ventral area.   Musculoskeletal: Normal range of motion. She exhibits no edema.   BLE edema/lymphaedema   Lymphadenopathy:     She has no cervical adenopathy.   Neurological: She is alert and oriented to person, place, and time.   Skin: Skin is warm and dry. No rash noted. She is not diaphoretic. No erythema.   Psychiatric: She has a normal mood and affect. Her behavior is normal.       ASSESSMENT  Diagnoses and all orders for this visit:    Anemia due to other cause, not classified    Esophageal stricture    Chronic constipation    Other orders  -     KLOR-CON 20 MEQ CR tablet  -     furosemide (LASIX) 40 MG tablet; TAKE ONE TABLET BY MOUTH EVERY MORNING FOR FLUID  -     SYMBICORT 160-4.5 MCG/ACT inhaler  -     baclofen (LIORESAL) 10 MG tablet; TAKE ONE OR TWO TABS AT BEDTIME FOR MUSCLE CRAMPING          PLAN  Will review her old chart and see if her recall was legitimate    No dysphagia and last EGD was negative for Kwon's so not sure benefit form endoscopy and her Anemia is not purely FLOR would not w/u occult GI blood loss at this time    Return in about 6 months (around 4/28/2020).

## 2020-11-04 ENCOUNTER — OFFICE VISIT (OUTPATIENT)
Dept: ORTHOPEDIC SURGERY | Facility: CLINIC | Age: 80
End: 2020-11-04

## 2020-11-04 VITALS
DIASTOLIC BLOOD PRESSURE: 66 MMHG | HEART RATE: 89 BPM | HEIGHT: 66 IN | SYSTOLIC BLOOD PRESSURE: 133 MMHG | BODY MASS INDEX: 36.96 KG/M2 | WEIGHT: 230 LBS

## 2020-11-04 DIAGNOSIS — R52 PAIN: Primary | ICD-10-CM

## 2020-11-04 DIAGNOSIS — M17.11 PRIMARY OSTEOARTHRITIS OF RIGHT KNEE: ICD-10-CM

## 2020-11-04 DIAGNOSIS — Z96.652 STATUS POST TOTAL LEFT KNEE REPLACEMENT: ICD-10-CM

## 2020-11-04 PROCEDURE — 99203 OFFICE O/P NEW LOW 30 MIN: CPT | Performed by: ORTHOPAEDIC SURGERY

## 2020-11-04 PROCEDURE — 73562 X-RAY EXAM OF KNEE 3: CPT | Performed by: ORTHOPAEDIC SURGERY

## 2020-11-04 PROCEDURE — 20610 DRAIN/INJ JOINT/BURSA W/O US: CPT | Performed by: ORTHOPAEDIC SURGERY

## 2020-11-04 RX ORDER — TRIAMCINOLONE ACETONIDE 40 MG/ML
40 INJECTION, SUSPENSION INTRA-ARTICULAR; INTRAMUSCULAR
Status: COMPLETED | OUTPATIENT
Start: 2020-11-04 | End: 2020-11-04

## 2020-11-04 RX ORDER — LACTULOSE 10 G/15ML
SOLUTION ORAL
COMMUNITY
Start: 2020-09-23 | End: 2022-01-13 | Stop reason: SDUPTHER

## 2020-11-04 RX ORDER — TRAMADOL HYDROCHLORIDE 50 MG/1
TABLET ORAL
COMMUNITY
Start: 2020-10-27 | End: 2021-03-12 | Stop reason: HOSPADM

## 2020-11-04 RX ORDER — DILTIAZEM HYDROCHLORIDE 120 MG/1
CAPSULE, EXTENDED RELEASE ORAL
COMMUNITY
Start: 2020-09-24

## 2020-11-04 RX ORDER — VALSARTAN AND HYDROCHLOROTHIAZIDE 320; 25 MG/1; MG/1
1 TABLET, FILM COATED ORAL DAILY
COMMUNITY
Start: 2020-09-22 | End: 2021-03-12 | Stop reason: HOSPADM

## 2020-11-04 RX ORDER — METOLAZONE 5 MG/1
TABLET ORAL
COMMUNITY
Start: 2020-08-28 | End: 2021-03-12 | Stop reason: HOSPADM

## 2020-11-04 RX ORDER — LIDOCAINE HYDROCHLORIDE 10 MG/ML
4 INJECTION, SOLUTION EPIDURAL; INFILTRATION; INTRACAUDAL; PERINEURAL
Status: COMPLETED | OUTPATIENT
Start: 2020-11-04 | End: 2020-11-04

## 2020-11-04 RX ADMIN — TRIAMCINOLONE ACETONIDE 40 MG: 40 INJECTION, SUSPENSION INTRA-ARTICULAR; INTRAMUSCULAR at 14:31

## 2020-11-04 RX ADMIN — LIDOCAINE HYDROCHLORIDE 4 ML: 10 INJECTION, SOLUTION EPIDURAL; INFILTRATION; INTRACAUDAL; PERINEURAL at 14:31

## 2020-11-04 NOTE — PROGRESS NOTES
Subjective: Bilateral knee pain right worse than left     Patient ID: Latanya Olsen is a 80 y.o. female.    Chief Complaint:    History of Present Illness 80-year-old female known to me although has not been seen for almost 4 years presents with a complaint of bilateral knee pain.  She had a left total knee by me in 2016 and did well until about a year ago the patient fell and broke her hip and had a hip replacement at [facility].  Subsequent then she developed renal failure and edema of both lower extremities began developing pain of both legs primarily in the right knee but also some in the left leg and knee.  Presents today for evaluation.  Again she is in renal failure and unable to take oral anti-inflammatory agents       Social History     Occupational History   • Not on file   Tobacco Use   • Smoking status: Never Smoker   • Smokeless tobacco: Never Used   Substance and Sexual Activity   • Alcohol use: No   • Drug use: Defer   • Sexual activity: Defer      Review of Systems   Constitutional: Negative for chills, diaphoresis, fever and unexpected weight change.   HENT: Negative for hearing loss, nosebleeds, sore throat and tinnitus.    Eyes: Negative for pain and visual disturbance.   Respiratory: Positive for cough and shortness of breath. Negative for wheezing.    Cardiovascular: Negative for chest pain and palpitations.   Gastrointestinal: Negative for abdominal pain, diarrhea, nausea and vomiting.   Endocrine: Negative for cold intolerance, heat intolerance and polydipsia.   Genitourinary: Negative for difficulty urinating, dysuria and hematuria.   Musculoskeletal: Positive for arthralgias, joint swelling and myalgias.   Skin: Negative for rash and wound.   Allergic/Immunologic: Negative for environmental allergies.   Neurological: Positive for dizziness. Negative for syncope and numbness.   Hematological: Does not bruise/bleed easily.   Psychiatric/Behavioral: Positive for sleep disturbance. Negative  for dysphoric mood. The patient is not nervous/anxious.          Past Medical History:   Diagnosis Date   • Anemia    • Arthritis    • Atrial fibrillation (CMS/HCC)    • Constipation    • COPD (chronic obstructive pulmonary disease) (CMS/HCC)    • Diverticulosis    • GERD (gastroesophageal reflux disease)    • Hx of degenerative disc disease    • Hyperlipidemia    • Hypertension    • Hypokalemia    • Hypothyroidism    • Knee swelling    • Lung cancer (CMS/HCC)     history   • Renal disorder    • Restless leg syndrome    • Sleep apnea with use of continuous positive airway pressure (CPAP)      Past Surgical History:   Procedure Laterality Date   • BUNIONECTOMY Bilateral    • CATARACT EXTRACTION     • CHOLECYSTECTOMY     • COLONOSCOPY     • ENDOSCOPY N/A 6/24/2016    Procedure: ESOPHAGOGASTRODUODENOSCOPY  with biopsies;  Surgeon: Von Hernández MD;  Location: [facility];  Service:    • LUNG LOBECTOMY Left     lower lobe   • REPLACEMENT TOTAL KNEE Left    • THYROID BIOPSY     • TOTAL HIP ARTHROPLASTY Right 6/1/2019    Procedure: BIPOLAR HIP CEMENTED POSTERIOR;  Surgeon: Ashley Crenshaw MD;  Location: [facility];  Service: Orthopedics     Family History   Problem Relation Age of Onset   • Heart attack Mother    • Coronary artery disease Mother    • Hypertension Mother    • Heart attack Sister    • Colon cancer Neg Hx    • Colon polyps Neg Hx    • Esophageal cancer Neg Hx          Objective:  Vitals:    11/04/20 1400   BP: 133/66   Pulse: 89         11/04/20  1400   Weight: 104 kg (230 lb)     Body mass index is 37.12 kg/m².        Ortho Exam   AP lateral sunrise view of both knees show excellent position of the left total knee implant.  The right knee shows end-stage arthritis of bone-on-bone medially anatomic femoral joint.  No acute changes noted in either knee and no prior x-rays available for comparison.  She is alert and oriented x3.  Has normocephalic and sclerae clear.  Patient has  significant edema of both lower extremities from the knee down into the feet.  The right knee has no swelling or effusion but there is pain and crepitus with range of motion which is 0 to 120 degrees but there is no instability at 0 90 degrees nor any varus or valgus instability at 1030 degrees.  Quad and hamstring function may 4 and half out of 5.  Her calf is nontender.  She does have good distal pulses and there is no sensory loss.  The left knee shows 0 120 degrees of motion without any instability throughout the arc of motion.  Quad and hamstring function made 4 out of 5.  Again she has significant edema of the lower leg but there is no calf tenderness.  There is no motor or sensory deficit good capillary refill.  Unable to take anti-inflammatories.  She now ambulates with a walker because of the leg pain.    Assessment:        1. Pain    2. Primary osteoarthritis of right knee    3. Status post total left knee replacement           Plan: Over 30 minutes was spent reviewing the x-rays and history and physical exam and the medical records from the outlying facility where hip was done.  Patient has end-stage degenerative arthritis of that right knee which I think is the source of the leg pain of both legs.  I think she is putting more pressure in the left leg because of the right knee pain.  X-ray of the left knee shows excellent position of the implant so after reviewing treatment options we will proceed with a cortisone injection into the right knee with lidocaine Kenalog at a ratio 4:1.  Postinjection instructions given to the patient.  Return in a month if still symptomatic.  Answered all questions      Large Joint Arthrocentesis: R knee  Date/Time: 11/4/2020 2:31 PM  Consent given by: patient  Site marked: site marked  Timeout: Immediately prior to procedure a time out was called to verify the correct patient, procedure, equipment, support staff and site/side marked as required   Supporting  Documentation  Indications: pain   Procedure Details  Location: knee - R knee  Preparation: Patient was prepped and draped in the usual sterile fashion  Needle size: 22 G  Approach: superior  Medications administered: 4 mL lidocaine PF 1% 1 %; 40 mg triamcinolone acetonide 40 MG/ML  Patient tolerance: patient tolerated the procedure well with no immediate complications             Work Status:    DESMOND query complete.    Orders:  Orders Placed This Encounter   Procedures   • Large Joint Arthrocentesis: R knee   • XR Knee 3 View Bilateral       Medications:  No orders of the defined types were placed in this encounter.      Followup:  Return in about 4 weeks (around 12/2/2020).          Dictated utilizing Dragon dictation    done/right ring finger

## 2021-02-26 ENCOUNTER — APPOINTMENT (OUTPATIENT)
Dept: GENERAL RADIOLOGY | Facility: HOSPITAL | Age: 81
End: 2021-02-26

## 2021-02-26 ENCOUNTER — HOSPITAL ENCOUNTER (EMERGENCY)
Facility: HOSPITAL | Age: 81
Discharge: HOME | End: 2021-02-26
Attending: EMERGENCY MEDICINE | Admitting: EMERGENCY MEDICINE

## 2021-02-26 VITALS
HEIGHT: 66 IN | TEMPERATURE: 98.1 F | DIASTOLIC BLOOD PRESSURE: 69 MMHG | BODY MASS INDEX: 36.16 KG/M2 | RESPIRATION RATE: 18 BRPM | HEART RATE: 77 BPM | OXYGEN SATURATION: 94 % | SYSTOLIC BLOOD PRESSURE: 136 MMHG | WEIGHT: 225 LBS

## 2021-02-26 DIAGNOSIS — M54.32 LEFT SIDED SCIATICA: Primary | ICD-10-CM

## 2021-02-26 PROCEDURE — 99283 EMERGENCY DEPT VISIT LOW MDM: CPT

## 2021-02-26 PROCEDURE — 72110 X-RAY EXAM L-2 SPINE 4/>VWS: CPT

## 2021-02-26 PROCEDURE — 63710000001 PREDNISONE PER 1 MG: Performed by: NURSE PRACTITIONER

## 2021-02-26 PROCEDURE — 73502 X-RAY EXAM HIP UNI 2-3 VIEWS: CPT

## 2021-02-26 RX ORDER — HYDROCODONE BITARTRATE AND ACETAMINOPHEN 7.5; 325 MG/1; MG/1
1 TABLET ORAL ONCE
Status: COMPLETED | OUTPATIENT
Start: 2021-02-26 | End: 2021-02-26

## 2021-02-26 RX ORDER — METHYLPREDNISOLONE 4 MG/1
TABLET ORAL
Qty: 21 EACH | Refills: 0 | Status: SHIPPED | OUTPATIENT
Start: 2021-02-26 | End: 2021-03-12 | Stop reason: HOSPADM

## 2021-02-26 RX ORDER — HYDROCODONE BITARTRATE AND ACETAMINOPHEN 5; 325 MG/1; MG/1
1 TABLET ORAL EVERY 6 HOURS PRN
Qty: 12 TABLET | Refills: 0 | Status: SHIPPED | OUTPATIENT
Start: 2021-02-26 | End: 2021-03-04 | Stop reason: SDUPTHER

## 2021-02-26 RX ORDER — METHYLPREDNISOLONE 4 MG/1
TABLET ORAL
Qty: 21 EACH | Refills: 0 | Status: SHIPPED | OUTPATIENT
Start: 2021-02-26 | End: 2021-02-26 | Stop reason: SDUPTHER

## 2021-02-26 RX ORDER — HYDROCODONE BITARTRATE AND ACETAMINOPHEN 5; 325 MG/1; MG/1
1 TABLET ORAL EVERY 6 HOURS PRN
Qty: 12 TABLET | Refills: 0 | Status: SHIPPED | OUTPATIENT
Start: 2021-02-26 | End: 2021-02-26 | Stop reason: SDUPTHER

## 2021-02-26 RX ORDER — CYCLOBENZAPRINE HCL 10 MG
10 TABLET ORAL ONCE
Status: COMPLETED | OUTPATIENT
Start: 2021-02-26 | End: 2021-02-26

## 2021-02-26 RX ORDER — PREDNISONE 20 MG/1
60 TABLET ORAL ONCE
Status: COMPLETED | OUTPATIENT
Start: 2021-02-26 | End: 2021-02-26

## 2021-02-26 RX ORDER — CYCLOBENZAPRINE HCL 10 MG
10 TABLET ORAL 2 TIMES DAILY PRN
Qty: 14 TABLET | Refills: 0 | Status: SHIPPED | OUTPATIENT
Start: 2021-02-26

## 2021-02-26 RX ORDER — CYCLOBENZAPRINE HCL 10 MG
10 TABLET ORAL 2 TIMES DAILY PRN
Qty: 14 TABLET | Refills: 0 | Status: SHIPPED | OUTPATIENT
Start: 2021-02-26 | End: 2021-02-26 | Stop reason: SDUPTHER

## 2021-02-26 RX ADMIN — CYCLOBENZAPRINE 10 MG: 10 TABLET, FILM COATED ORAL at 14:36

## 2021-02-26 RX ADMIN — PREDNISONE 60 MG: 20 TABLET ORAL at 14:43

## 2021-02-26 RX ADMIN — HYDROCODONE BITARTRATE AND ACETAMINOPHEN 1 TABLET: 7.5; 325 TABLET ORAL at 14:36

## 2021-03-04 ENCOUNTER — HOSPITAL ENCOUNTER (EMERGENCY)
Facility: HOSPITAL | Age: 81
Discharge: HOME | End: 2021-03-04
Attending: EMERGENCY MEDICINE | Admitting: EMERGENCY MEDICINE

## 2021-03-04 ENCOUNTER — APPOINTMENT (OUTPATIENT)
Dept: CT IMAGING | Facility: HOSPITAL | Age: 81
End: 2021-03-04

## 2021-03-04 VITALS
BODY MASS INDEX: 36.16 KG/M2 | RESPIRATION RATE: 18 BRPM | HEIGHT: 66 IN | OXYGEN SATURATION: 98 % | HEART RATE: 82 BPM | TEMPERATURE: 96.2 F | SYSTOLIC BLOOD PRESSURE: 156 MMHG | DIASTOLIC BLOOD PRESSURE: 70 MMHG | WEIGHT: 225 LBS

## 2021-03-04 DIAGNOSIS — M54.42 ACUTE MIDLINE LOW BACK PAIN WITH LEFT-SIDED SCIATICA: Primary | ICD-10-CM

## 2021-03-04 DIAGNOSIS — S32.009A CLOSED FRACTURE OF TRANSVERSE PROCESS OF LUMBAR VERTEBRA, INITIAL ENCOUNTER (HCC): ICD-10-CM

## 2021-03-04 DIAGNOSIS — M54.32 LEFT SIDED SCIATICA: ICD-10-CM

## 2021-03-04 PROCEDURE — 72131 CT LUMBAR SPINE W/O DYE: CPT

## 2021-03-04 PROCEDURE — 99283 EMERGENCY DEPT VISIT LOW MDM: CPT

## 2021-03-04 RX ORDER — OXYCODONE HYDROCHLORIDE AND ACETAMINOPHEN 5; 325 MG/1; MG/1
1 TABLET ORAL ONCE
Status: COMPLETED | OUTPATIENT
Start: 2021-03-04 | End: 2021-03-04

## 2021-03-04 RX ORDER — HYDROCODONE BITARTRATE AND ACETAMINOPHEN 5; 325 MG/1; MG/1
1 TABLET ORAL EVERY 6 HOURS PRN
Qty: 15 TABLET | Refills: 0 | Status: SHIPPED | OUTPATIENT
Start: 2021-03-04 | End: 2021-04-03 | Stop reason: HOSPADM

## 2021-03-04 RX ADMIN — OXYCODONE HYDROCHLORIDE AND ACETAMINOPHEN 1 TABLET: 5; 325 TABLET ORAL at 13:35

## 2021-03-04 NOTE — ED PROVIDER NOTES
MD ATTESTATION NOTE    The MARK ANTHONY and I have discussed this patient's history, physical exam, and treatment plan.  I have reviewed the documentation and personally had a face to face interaction with the patient. I affirm the documentation and agree with the treatment and plan.  The attached note describes my personal findings.      History  80-year-old female presents with 1 month history of lower back pain. She does have a reported history of spinal stenosis upon review of her old medical records. Patient was seen in our ED in [facility] but has not seen primary care provider or back specialist about this problem.    Physical Exam  Vital Signs reviewed  Alert, Well Appearing in NAD  Extremities-mild bilateral lower extremity swelling  Neuro-strength and sensation grossly intact bilateral lower extremities    Disposition  I discussed evaluation of this patient with DAVID salazar. I reviewed the CT report as per Dr. King Francis. There are numerous degenerative changes with foraminal stenosis. There is a L5 transverse process fracture and sacral alar fractures. At this point will go ahead and give a little bit more pain medicine to help out until patient can get in with primary care provider on Monday. We will also refer patient on to neurosurgery for further evaluation as outpatient.    Final diagnoses:   Acute midline low back pain with left-sided sciatica   Closed fracture of transverse process of lumbar vertebra, initial encounter (CMS/[facility])       DISCHARGE    Patient discharged in stable condition.    Reviewed implications of results, diagnosis, meds, responsibility to follow up, warning signs and symptoms of possible worsening, potential complications and reasons to return to ER, including increased pain, numbness/weakness or as needed.    Patient/Family voiced understanding of above instructions.    Discussed plan for discharge, as there is no emergent indication for admission. Patient referred to primary  care provider for BP management due to today's BP. Pt/family is agreeable and understands need for follow up and repeat testing.  Pt is aware that discharge does not mean that nothing is wrong but it indicates no emergency is present that requires admission and they must continue care with follow-up as given below or physician of their choice.     FOLLOW-UP  No follow-up provider specified.       Where to Get Your Medications      These medications were sent to [facility] - [place], [place] - [facility] [address] - [phone]  - [phone] [address] [place] [address], [facility] [address]    Phone: [phone]   · HYDROcodone-acetaminophen 5-325 MG per tablet        Medication List      No changes were made to your prescriptions during this visit.            Tima Nevarez MD  03/04/21 2094

## 2021-03-04 NOTE — ED NOTES
Pt verbalized understanding to f/u with her pcp and neurosurgery and to return to the ER if symptoms are worse.      Ashly Summers RN  03/04/21 6510

## 2021-03-04 NOTE — ED PROVIDER NOTES
EMERGENCY DEPARTMENT ENCOUNTER    Room Number:  27/27  Date seen:  3/4/2021  Time seen: 13:28 EST  PCP: Provider, No Known  Historian: patient      HPI:  Chief Complaint: left back pain radiating to left leg    A complete HPI/ROS/PMH/PSH/SH/FH are unobtainable due to: none    Context: Latanya Olsen is a 80 y.o. female who presents to the ED for evaluation of 1 month history of left lower back pain radiating to the left leg just past the knee.  It radiates along the side and then onto the anterior proximal shin.  This pain is not constant but it is frequent and worse positionally, with any weightbearing standing or movement.  She started walking with a walker when it began.  She lives alone.  She states she came to this ER a few weeks ago and had x-rays was prescribed medications.  A few days ago she also went to the [facility] for this problem and was prescribed additional medication, states no additional imaging performed.  She states she is made an appointment with a back doctor but could not get in until April 20. She has not seen her PCP about this.    She denies any falls or trauma, bowel incontinence or urinary retention.  She has chronic urinary incontinence, perhaps slightly worse due to her difficulty getting to the restroom in time though she has urinary urge and sensation.        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   • Diverticulosis of intestine 02/19/2016   • Obstructive sleep apnea syndrome 02/19/2016   • Weight loss 02/19/2016   • Vitamin D deficiency 02/19/2016   • Overweight (BMI 25.0-29.9) 02/19/2016   • Spinal stenosis of lumbar region without neurogenic claudication 02/19/2016   • Osteoarthritis of knee 02/19/2016   • Obesity (BMI 30-39.9) 02/19/2016   • Neutropenia (CMS/HCC) 02/19/2016   • Chronic lower back pain 02/19/2016   • Knee pain 02/19/2016   • Insomnia 02/19/2016   • Hypothyroidism 02/19/2016   •  Hypokalemia 02/19/2016   • Hypertension 02/19/2016   • Hyperlipidemia 02/19/2016   • Generalized osteoarthritis 02/19/2016   • Gastroparesis 02/19/2016   • Foot pain 02/19/2016   • Chronic obstructive pulmonary disease (CMS/HCC) 02/19/2016   • Chronic constipation 02/19/2016   • Anemia 02/19/2016   • Weight gain 02/19/2016   • Pain of left breast 02/22/2016   • Elevated serum alkaline phosphatase level 02/22/2016   • Neck pain 11/28/2017   • Volume overload 03/19/2019   • Status post total hip replacement, 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     Resolved Ambulatory Problems     Diagnosis Date Noted   • Finger injury 02/19/2016   • Upper respiratory tract infection 02/19/2016   • Contusion of upper arm 02/19/2016   • Tingling of skin 02/19/2016   • Rash 02/19/2016   • Candidiasis of mouth 02/19/2016   • Spasm 02/19/2016   • Low back pain 02/19/2016   • Heartburn 02/19/2016   • Diarrhea 02/19/2016   • Bloating symptom 02/19/2016   • Gastroesophageal reflux disease with esophagitis 02/19/2016   • Drug-induced photosensitivity 02/19/2016   • Cramp of limb 02/19/2016   • Contact dermatitis 02/19/2016   • Chronic kidney disease, stage II (mild) 02/19/2016   • Ankle joint pain 02/19/2016   • Atopic rhinitis 02/19/2016   • Allergic reaction to drug 02/19/2016   • Acute sinusitis 02/19/2016   • Generalized abdominal pain 02/19/2016   • Closed fracture of neck of right femur (CMS/[facility]) 05/31/2019     Past Medical History:   Diagnosis Date   • Arthritis    • Atrial fibrillation (CMS/[facility])    • Constipation    • COPD (chronic obstructive pulmonary disease) (CMS/[facility])    • Diverticulosis    • GERD (gastroesophageal reflux disease)    • Hx of degenerative disc disease    • Knee swelling    • Lung cancer (CMS/[facility])    • Renal disorder    • Restless leg syndrome    • Sleep apnea with use of continuous positive airway pressure (CPAP)          PAST SURGICAL HISTORY  Past Surgical History:   Procedure Laterality Date   • BUNIONECTOMY Bilateral    • CATARACT  EXTRACTION     • CHOLECYSTECTOMY     • COLONOSCOPY     • ENDOSCOPY N/A 6/24/2016    Procedure: ESOPHAGOGASTRODUODENOSCOPY  with biopsies;  Surgeon: Von Hernández MD;  Location: [facility] OR;  Service:    • LUNG LOBECTOMY Left     lower lobe   • REPLACEMENT TOTAL KNEE Left    • THYROID BIOPSY     • TOTAL HIP ARTHROPLASTY Right 6/1/2019    Procedure: BIPOLAR HIP CEMENTED POSTERIOR;  Surgeon: Ashley Crenshaw MD;  Location: [facility] MAIN OR;  Service: Orthopedics         FAMILY HISTORY  Family History   Problem Relation Age of Onset   • Heart attack Mother    • Coronary artery disease Mother    • Hypertension Mother    • Heart attack Sister    • Colon cancer Neg Hx    • Colon polyps Neg Hx    • Esophageal cancer Neg Hx          SOCIAL HISTORY  Social History     Socioeconomic History   • Marital status:      Spouse name: Not on file   • Number of children: Not on file   • Years of education: Not on file   • Highest education level: Not on file   Tobacco Use   • Smoking status: Never Smoker   • Smokeless tobacco: Never Used   Substance and Sexual Activity   • Alcohol use: No   • Drug use: Defer   • Sexual activity: Defer         ALLERGIES  Doxycycline, Codeine, and Morphine        REVIEW OF SYSTEMS  Review of Systems     All systems reviewed and negative except for those discussed in HPI.       PHYSICAL EXAM  ED Triage Vitals   Temp Heart Rate Resp BP SpO2   03/04/21 1222 03/04/21 1222 03/04/21 1222 03/04/21 1249 03/04/21 1222   96.2 °F (35.7 °C) 83 18 160/80 94 %      Temp src Heart Rate Source Patient Position BP Location FiO2 (%)   03/04/21 1222 03/04/21 1251 03/04/21 1251 03/04/21 1251 --   Tympanic Monitor Lying Right arm          GENERAL: not distressed, appears comfortable reclined on the stretcher  HENT: atraumatic  EYES: no scleral icterus  CV: regular rhythm, regular rate  RESPIRATORY: normal effort, ctab  ABDOMEN: soft, nontender  MUSCULOSKELETAL: no deformity. No midline C, T, or  L-spine tenderness. There is mild tenderness to the left lumbosacral paraspinal area, more tender over the left buttocks. Sensation is intact to light touch throughout the bilateral lower extremities. Muscle strength is 5/5 and symmetrical with plantarflexion and EHL. Patellar reflexes are 2+ and equal bilaterally. DP and PT pulses are 2+ bilaterally. Straight leg raise bilaterally causes back pain, on the right also causes some radiation to the left groin  NEURO: alert, moves all extremities, follows commands  SKIN: warm, dry    Vital signs and nursing notes reviewed.          RADIOLOGY  CT Lumbar Spine Without Contrast   Final Result   1.  Multifocal degenerative disease involving the lumbar spine as   described in detail above including multilevel neural foraminal   compromise, loss of disc height, vacuum disc effect and posterior   element degenerative disease. Canal stenosis is most prominent at L4-L5   where it is estimated to be moderate. See above. No obvious disc   herniation is identified. Further evaluation could be performed with MRI   examination of the lumbar spine.   2.  Bilateral sacral (alar fractures are appreciated more extensive with   greater sclerosis on the right. There is subtle stranding anterior to   the left sacral ala. A transverse fracture involving the medial aspect   of the left L5 transverse process is appreciated with approximately 2 mm   of displacement. There is no evidence of associated callus bridging the   transverse process fracture or adjacent periosteal reaction. This likely   represents an acute to subacute fracture. Further evaluation could be   performed with a bone scan.   3.  There is partial visualization of the bladder but the bladder is at   least moderately distended. Clinical correlation is recommended. Further   evaluation could be performed with MRI examination of the spine as   indicated.       The above information was called to and discussed with Mehreen  Endy.               Radiation dose reduction techniques were utilized, including automated   exposure control and exposure modulation based on body size.       This report was finalized on 3/4/2021 4:02 PM by Dr. Von Francis M.D.              I ordered the above noted radiological studies. Reviewed by me and discussed with radiologist.  See dictation for official radiology interpretation.    PROCEDURES  Procedures        MEDICATIONS GIVEN IN ER  Medications   oxyCODONE-acetaminophen (PERCOCET) 5-325 MG per tablet 1 tablet (1 tablet Oral Given 3/4/21 1335)             PROGRESS AND CONSULTS    DDX includes but not limited to sprain, strain, fracture, stenosis, radiculopathy, epidural abscess, cauda equina    ED Course as of Mar 04 1816   Thu Mar 04, 2021   1330 Chart reviewed.  ER visit on 2/26/2021 with left hip been leg pain.  Left hip and lumbar spine x-rays performed and showed no fracture, positive for degenerative changes with mild retrolisthesis of L2 on L3, multilevel endplates burring and facet arthropathy and a space narrowing.  She was prescribed Flexeril hydrocodone and Medrol Dosepak and discharged.    [KA]   1512 I discussed the patient with Dr. Francis.  CT lumbar spine shows multiple fractures of the bilateral sacral ala, greater on the right with increased sclerosis on the right.  Transverse fracture of L5 of medial left transverse process of L5, suspected acute to subacute.  Multilevel degenerative disc disease without obvious disc herniation.  There is some canal stenosis at L4-L5 and neuroforaminal narrowing at L5-S1.    [KA]      ED Course User Index  [KA] Mehreen Schumacher PA     No fever to suggest infectious process. Pain chronic for 1 month, CT shows degenerative changes + acute to subacute transverse process fracture, unusual in the setting of no trauma. No weakness, ambulatory, no signs of cauda equina. I have counseled her on the importance of PCP follow up for medications, facilitation of  treatment and neurosurgery referral. Additionally encouraged her to call daily for cancellations to get a sooner appointment. She does have a hx of spinal stenosis with neurogenic claudication, I assume these symptoms are related. She is agreeable with this plan and stable for discharge.      Reviewed pt's history and workup with Dr. Nevarez.  After a bedside evaluation; they agree with the plan of care      Patient was placed in face mask in first look. Patient was wearing facemask each time I entered the room and throughout our encounter. I wore protective equipment throughout this patient encounter including a face mask, eye shield and gloves. Hand hygiene was performed before donning protective equipment and after removal when leaving the room.        DIAGNOSIS  Final diagnoses:   Acute midline low back pain with left-sided sciatica   Closed fracture of transverse process of lumbar vertebra, initial encounter (CMS/[facility])               Latest Documented Vital Signs:  As of 18:16 EST  BP- 156/70 HR- 82 Temp- 96.2 °F (35.7 °C) (Tympanic) O2 sat- 98%       Mehreen Schumacher PA  03/04/21 7745

## 2021-03-06 ENCOUNTER — HOSPITAL ENCOUNTER (INPATIENT)
Facility: HOSPITAL | Age: 81
LOS: 5 days | Discharge: NURSING, REHAB OR LONG-TERM CARE | End: 2021-03-12
Attending: EMERGENCY MEDICINE | Admitting: INTERNAL MEDICINE

## 2021-03-06 ENCOUNTER — APPOINTMENT (OUTPATIENT)
Dept: GENERAL RADIOLOGY | Facility: HOSPITAL | Age: 81
End: 2021-03-06

## 2021-03-06 DIAGNOSIS — S32.10XA CLOSED FRACTURE OF SACRUM, UNSPECIFIED PORTION OF SACRUM, INITIAL ENCOUNTER (HCC): ICD-10-CM

## 2021-03-06 DIAGNOSIS — M48.061 SPINAL STENOSIS OF LUMBAR REGION WITHOUT NEUROGENIC CLAUDICATION: ICD-10-CM

## 2021-03-06 DIAGNOSIS — S32.009A CLOSED FRACTURE OF TRANSVERSE PROCESS OF LUMBAR VERTEBRA, INITIAL ENCOUNTER (HCC): ICD-10-CM

## 2021-03-06 DIAGNOSIS — N18.9 ACUTE ON CHRONIC RENAL INSUFFICIENCY: ICD-10-CM

## 2021-03-06 DIAGNOSIS — E86.0 DEHYDRATION: ICD-10-CM

## 2021-03-06 DIAGNOSIS — R53.1 GENERALIZED WEAKNESS: Primary | ICD-10-CM

## 2021-03-06 DIAGNOSIS — N28.9 ACUTE ON CHRONIC RENAL INSUFFICIENCY: ICD-10-CM

## 2021-03-06 LAB
ALBUMIN SERPL-MCNC: 4 G/DL (ref 3.5–5.2)
ALBUMIN/GLOB SERPL: 1.5 G/DL
ALP SERPL-CCNC: 214 U/L (ref 39–117)
ALT SERPL W P-5'-P-CCNC: 17 U/L (ref 1–33)
ANION GAP SERPL CALCULATED.3IONS-SCNC: 9.3 MMOL/L (ref 5–15)
AST SERPL-CCNC: 10 U/L (ref 1–32)
B PARAPERT DNA SPEC QL NAA+PROBE: NOT DETECTED
B PERT DNA SPEC QL NAA+PROBE: NOT DETECTED
BACTERIA UR QL AUTO: NORMAL /HPF
BASOPHILS # BLD AUTO: 0.04 10*3/MM3 (ref 0–0.2)
BASOPHILS NFR BLD AUTO: 0.3 % (ref 0–1.5)
BILIRUB SERPL-MCNC: 0.3 MG/DL (ref 0–1.2)
BILIRUB UR QL STRIP: NEGATIVE
BILIRUB UR QL STRIP: NEGATIVE
BUN SERPL-MCNC: 58 MG/DL (ref 8–23)
BUN/CREAT SERPL: 31.7 (ref 7–25)
C PNEUM DNA NPH QL NAA+NON-PROBE: NOT DETECTED
CALCIUM SPEC-SCNC: 9 MG/DL (ref 8.6–10.5)
CHLORIDE SERPL-SCNC: 99 MMOL/L (ref 98–107)
CK SERPL-CCNC: 26 U/L (ref 20–180)
CLARITY UR: CLEAR
CLARITY UR: CLEAR
CO2 SERPL-SCNC: 26.7 MMOL/L (ref 22–29)
COLOR UR: YELLOW
COLOR UR: YELLOW
CREAT SERPL-MCNC: 1.83 MG/DL (ref 0.57–1)
DEPRECATED RDW RBC AUTO: 46.4 FL (ref 37–54)
EOSINOPHIL # BLD AUTO: 0.2 10*3/MM3 (ref 0–0.4)
EOSINOPHIL NFR BLD AUTO: 1.7 % (ref 0.3–6.2)
ERYTHROCYTE [DISTWIDTH] IN BLOOD BY AUTOMATED COUNT: 14.4 % (ref 12.3–15.4)
FLUAV SUBTYP SPEC NAA+PROBE: NOT DETECTED
FLUBV RNA ISLT QL NAA+PROBE: NOT DETECTED
GFR SERPL CREATININE-BSD FRML MDRD: 26 ML/MIN/1.73
GLOBULIN UR ELPH-MCNC: 2.6 GM/DL
GLUCOSE SERPL-MCNC: 106 MG/DL (ref 65–99)
GLUCOSE UR STRIP-MCNC: NEGATIVE MG/DL
GLUCOSE UR STRIP-MCNC: NEGATIVE MG/DL
HADV DNA SPEC NAA+PROBE: NOT DETECTED
HCOV 229E RNA SPEC QL NAA+PROBE: NOT DETECTED
HCOV HKU1 RNA SPEC QL NAA+PROBE: NOT DETECTED
HCOV NL63 RNA SPEC QL NAA+PROBE: NOT DETECTED
HCOV OC43 RNA SPEC QL NAA+PROBE: NOT DETECTED
HCT VFR BLD AUTO: 34.6 % (ref 34–46.6)
HGB BLD-MCNC: 11.4 G/DL (ref 12–15.9)
HGB UR QL STRIP.AUTO: NEGATIVE
HGB UR QL STRIP.AUTO: NEGATIVE
HMPV RNA NPH QL NAA+NON-PROBE: NOT DETECTED
HPIV1 RNA SPEC QL NAA+PROBE: NOT DETECTED
HPIV2 RNA SPEC QL NAA+PROBE: NOT DETECTED
HPIV3 RNA NPH QL NAA+PROBE: NOT DETECTED
HPIV4 P GENE NPH QL NAA+PROBE: NOT DETECTED
HYALINE CASTS UR QL AUTO: NORMAL /LPF
IMM GRANULOCYTES # BLD AUTO: 0.48 10*3/MM3 (ref 0–0.05)
IMM GRANULOCYTES NFR BLD AUTO: 4.1 % (ref 0–0.5)
KETONES UR QL STRIP: NEGATIVE
KETONES UR QL STRIP: NEGATIVE
LEUKOCYTE ESTERASE UR QL STRIP.AUTO: ABNORMAL
LEUKOCYTE ESTERASE UR QL STRIP.AUTO: NEGATIVE
LYMPHOCYTES # BLD AUTO: 1.39 10*3/MM3 (ref 0.7–3.1)
LYMPHOCYTES NFR BLD AUTO: 11.9 % (ref 19.6–45.3)
M PNEUMO IGG SER IA-ACNC: NOT DETECTED
MCH RBC QN AUTO: 29.5 PG (ref 26.6–33)
MCHC RBC AUTO-ENTMCNC: 32.9 G/DL (ref 31.5–35.7)
MCV RBC AUTO: 89.4 FL (ref 79–97)
MONOCYTES # BLD AUTO: 0.91 10*3/MM3 (ref 0.1–0.9)
MONOCYTES NFR BLD AUTO: 7.8 % (ref 5–12)
NEUTROPHILS NFR BLD AUTO: 74.2 % (ref 42.7–76)
NEUTROPHILS NFR BLD AUTO: 8.68 10*3/MM3 (ref 1.7–7)
NITRITE UR QL STRIP: NEGATIVE
NITRITE UR QL STRIP: NEGATIVE
NRBC BLD AUTO-RTO: 0.1 /100 WBC (ref 0–0.2)
PH UR STRIP.AUTO: 5.5 [PH] (ref 5–8)
PH UR STRIP.AUTO: 6 [PH] (ref 5–8)
PLATELET # BLD AUTO: 188 10*3/MM3 (ref 140–450)
PMV BLD AUTO: 9.6 FL (ref 6–12)
POTASSIUM SERPL-SCNC: 4.2 MMOL/L (ref 3.5–5.2)
PROT SERPL-MCNC: 6.6 G/DL (ref 6–8.5)
PROT UR QL STRIP: NEGATIVE
PROT UR QL STRIP: NEGATIVE
QT INTERVAL: 379 MS
RBC # BLD AUTO: 3.87 10*6/MM3 (ref 3.77–5.28)
RBC # UR: NORMAL /HPF
REF LAB TEST METHOD: NORMAL
RHINOVIRUS RNA SPEC NAA+PROBE: NOT DETECTED
RSV RNA NPH QL NAA+NON-PROBE: NOT DETECTED
SARS-COV-2 RNA NPH QL NAA+NON-PROBE: NOT DETECTED
SODIUM SERPL-SCNC: 135 MMOL/L (ref 136–145)
SP GR UR STRIP: 1.01 (ref 1–1.03)
SP GR UR STRIP: 1.01 (ref 1–1.03)
SQUAMOUS #/AREA URNS HPF: NORMAL /HPF
TROPONIN T SERPL-MCNC: <0.01 NG/ML (ref 0–0.03)
UROBILINOGEN UR QL STRIP: ABNORMAL
UROBILINOGEN UR QL STRIP: NORMAL
WBC # BLD AUTO: 11.7 10*3/MM3 (ref 3.4–10.8)
WBC UR QL AUTO: NORMAL /HPF

## 2021-03-06 PROCEDURE — G0378 HOSPITAL OBSERVATION PER HR: HCPCS

## 2021-03-06 PROCEDURE — 80053 COMPREHEN METABOLIC PANEL: CPT | Performed by: PHYSICIAN ASSISTANT

## 2021-03-06 PROCEDURE — 81003 URINALYSIS AUTO W/O SCOPE: CPT | Performed by: PHYSICIAN ASSISTANT

## 2021-03-06 PROCEDURE — 51798 US URINE CAPACITY MEASURE: CPT

## 2021-03-06 PROCEDURE — 85025 COMPLETE CBC W/AUTO DIFF WBC: CPT | Performed by: PHYSICIAN ASSISTANT

## 2021-03-06 PROCEDURE — 93005 ELECTROCARDIOGRAM TRACING: CPT | Performed by: PHYSICIAN ASSISTANT

## 2021-03-06 PROCEDURE — 84484 ASSAY OF TROPONIN QUANT: CPT | Performed by: PHYSICIAN ASSISTANT

## 2021-03-06 PROCEDURE — 71045 X-RAY EXAM CHEST 1 VIEW: CPT

## 2021-03-06 PROCEDURE — 82550 ASSAY OF CK (CPK): CPT | Performed by: PHYSICIAN ASSISTANT

## 2021-03-06 PROCEDURE — 0202U NFCT DS 22 TRGT SARS-COV-2: CPT | Performed by: PHYSICIAN ASSISTANT

## 2021-03-06 PROCEDURE — P9612 CATHETERIZE FOR URINE SPEC: HCPCS

## 2021-03-06 PROCEDURE — 81001 URINALYSIS AUTO W/SCOPE: CPT | Performed by: PHYSICIAN ASSISTANT

## 2021-03-06 PROCEDURE — 99285 EMERGENCY DEPT VISIT HI MDM: CPT

## 2021-03-06 PROCEDURE — 63710000001 ONDANSETRON ODT 4 MG TABLET DISPERSIBLE: Performed by: EMERGENCY MEDICINE

## 2021-03-06 PROCEDURE — 93010 ELECTROCARDIOGRAM REPORT: CPT | Performed by: INTERNAL MEDICINE

## 2021-03-06 RX ORDER — ACETAMINOPHEN 650 MG/1
650 SUPPOSITORY RECTAL EVERY 4 HOURS PRN
Status: DISCONTINUED | OUTPATIENT
Start: 2021-03-06 | End: 2021-03-12 | Stop reason: HOSPADM

## 2021-03-06 RX ORDER — FUROSEMIDE 40 MG/1
40 TABLET ORAL DAILY
Status: DISCONTINUED | OUTPATIENT
Start: 2021-03-06 | End: 2021-03-07

## 2021-03-06 RX ORDER — PANTOPRAZOLE SODIUM 40 MG/1
40 TABLET, DELAYED RELEASE ORAL EVERY MORNING
Status: DISCONTINUED | OUTPATIENT
Start: 2021-03-07 | End: 2021-03-12 | Stop reason: HOSPADM

## 2021-03-06 RX ORDER — ACETAMINOPHEN 325 MG/1
650 TABLET ORAL EVERY 4 HOURS PRN
Status: DISCONTINUED | OUTPATIENT
Start: 2021-03-06 | End: 2021-03-12 | Stop reason: HOSPADM

## 2021-03-06 RX ORDER — LEVOTHYROXINE SODIUM 88 UG/1
88 TABLET ORAL
Status: DISCONTINUED | OUTPATIENT
Start: 2021-03-07 | End: 2021-03-08

## 2021-03-06 RX ORDER — DILTIAZEM HYDROCHLORIDE 120 MG/1
120 CAPSULE, COATED, EXTENDED RELEASE ORAL
Status: DISCONTINUED | OUTPATIENT
Start: 2021-03-06 | End: 2021-03-12 | Stop reason: HOSPADM

## 2021-03-06 RX ORDER — IPRATROPIUM BROMIDE AND ALBUTEROL SULFATE 2.5; .5 MG/3ML; MG/3ML
3 SOLUTION RESPIRATORY (INHALATION) EVERY 4 HOURS PRN
Status: DISCONTINUED | OUTPATIENT
Start: 2021-03-06 | End: 2021-03-12 | Stop reason: HOSPADM

## 2021-03-06 RX ORDER — HYDROCODONE BITARTRATE AND ACETAMINOPHEN 5; 325 MG/1; MG/1
1 TABLET ORAL EVERY 6 HOURS PRN
Status: DISCONTINUED | OUTPATIENT
Start: 2021-03-06 | End: 2021-03-06 | Stop reason: SDUPTHER

## 2021-03-06 RX ORDER — POTASSIUM CHLORIDE 750 MG/1
20 TABLET, FILM COATED, EXTENDED RELEASE ORAL DAILY
Status: DISCONTINUED | OUTPATIENT
Start: 2021-03-06 | End: 2021-03-07

## 2021-03-06 RX ORDER — PRAMIPEXOLE DIHYDROCHLORIDE 1.5 MG/1
1.5 TABLET ORAL NIGHTLY
Status: DISCONTINUED | OUTPATIENT
Start: 2021-03-06 | End: 2021-03-12 | Stop reason: HOSPADM

## 2021-03-06 RX ORDER — CYCLOBENZAPRINE HCL 10 MG
10 TABLET ORAL 2 TIMES DAILY PRN
Status: DISCONTINUED | OUTPATIENT
Start: 2021-03-06 | End: 2021-03-12 | Stop reason: HOSPADM

## 2021-03-06 RX ORDER — SODIUM CHLORIDE 9 MG/ML
75 INJECTION, SOLUTION INTRAVENOUS CONTINUOUS
Status: DISCONTINUED | OUTPATIENT
Start: 2021-03-06 | End: 2021-03-08

## 2021-03-06 RX ORDER — ONDANSETRON 4 MG/1
4 TABLET, ORALLY DISINTEGRATING ORAL ONCE
Status: COMPLETED | OUTPATIENT
Start: 2021-03-06 | End: 2021-03-06

## 2021-03-06 RX ORDER — ACETAMINOPHEN 160 MG/5ML
650 SOLUTION ORAL EVERY 4 HOURS PRN
Status: DISCONTINUED | OUTPATIENT
Start: 2021-03-06 | End: 2021-03-12 | Stop reason: HOSPADM

## 2021-03-06 RX ORDER — ONDANSETRON 2 MG/ML
4 INJECTION INTRAMUSCULAR; INTRAVENOUS EVERY 6 HOURS PRN
Status: DISCONTINUED | OUTPATIENT
Start: 2021-03-06 | End: 2021-03-12 | Stop reason: HOSPADM

## 2021-03-06 RX ORDER — SODIUM CHLORIDE 0.9 % (FLUSH) 0.9 %
10 SYRINGE (ML) INJECTION AS NEEDED
Status: DISCONTINUED | OUTPATIENT
Start: 2021-03-06 | End: 2021-03-12 | Stop reason: HOSPADM

## 2021-03-06 RX ORDER — BUDESONIDE AND FORMOTEROL FUMARATE DIHYDRATE 160; 4.5 UG/1; UG/1
2 AEROSOL RESPIRATORY (INHALATION)
Status: DISCONTINUED | OUTPATIENT
Start: 2021-03-06 | End: 2021-03-12 | Stop reason: HOSPADM

## 2021-03-06 RX ORDER — HYDROCODONE BITARTRATE AND ACETAMINOPHEN 5; 325 MG/1; MG/1
1 TABLET ORAL EVERY 6 HOURS PRN
Status: DISCONTINUED | OUTPATIENT
Start: 2021-03-06 | End: 2021-03-12 | Stop reason: HOSPADM

## 2021-03-06 RX ORDER — LACTULOSE 10 G/15ML
20 SOLUTION ORAL 2 TIMES DAILY PRN
Status: DISCONTINUED | OUTPATIENT
Start: 2021-03-06 | End: 2021-03-10

## 2021-03-06 RX ADMIN — SODIUM CHLORIDE 125 ML/HR: 9 INJECTION, SOLUTION INTRAVENOUS at 20:38

## 2021-03-06 RX ADMIN — SODIUM CHLORIDE 125 ML/HR: 9 INJECTION, SOLUTION INTRAVENOUS at 15:27

## 2021-03-06 RX ADMIN — FUROSEMIDE 40 MG: 40 TABLET ORAL at 20:32

## 2021-03-06 RX ADMIN — SODIUM CHLORIDE 500 ML: 9 INJECTION, SOLUTION INTRAVENOUS at 15:26

## 2021-03-06 RX ADMIN — POTASSIUM CHLORIDE 20 MEQ: 750 TABLET, EXTENDED RELEASE ORAL at 20:32

## 2021-03-06 RX ADMIN — CYCLOBENZAPRINE 10 MG: 10 TABLET, FILM COATED ORAL at 20:32

## 2021-03-06 RX ADMIN — HYDROCODONE BITARTRATE AND ACETAMINOPHEN 1 TABLET: 5; 325 TABLET ORAL at 20:35

## 2021-03-06 RX ADMIN — ONDANSETRON 4 MG: 4 TABLET, ORALLY DISINTEGRATING ORAL at 12:06

## 2021-03-06 RX ADMIN — HYDROCODONE BITARTRATE AND ACETAMINOPHEN 1 TABLET: 5; 325 TABLET ORAL at 12:13

## 2021-03-06 RX ADMIN — PRAMIPEXOLE DIHYDROCHLORIDE 1.5 MG: 1.5 TABLET ORAL at 20:35

## 2021-03-06 RX ADMIN — DILTIAZEM HYDROCHLORIDE 120 MG: 120 CAPSULE, COATED, EXTENDED RELEASE ORAL at 20:34

## 2021-03-06 RX ADMIN — HYDROCHLOROTHIAZIDE: 25 TABLET ORAL at 20:32

## 2021-03-06 NOTE — ED PROVIDER NOTES
EMERGENCY DEPARTMENT ENCOUNTER    Room Number:  P584/1  Date of encounter:  3/7/2021  PCP: Mary Perkins MD  Historian: Patient and patient's daughter      HPI:  Chief Complaint: Generalized weakness and lower back pain  A complete HPI/ROS/PMH/PSH/SH/FH are unobtainable due to: Nothing    Context: Latanya Olsen is a 80 y.o. female who presents to the ED c/o generalized weakness lower back pain.  Patient states over the last week she has had significant lower back pain that has kept her from being able to perform her ADLs.  Patient describes this pain as an 8 out of 10 on the pain scale, sharp, worse with movement, and states it radiates down the left leg.  Per her daughter over the past 2 to 3 days she has been confined to the couch or her bed and slowly getting weaker.  The patient denies associated headache, near syncope, chest pain, palpitations, dysuria, hematuria, abdominal pain, fever, chills, nausea, vomiting, diaphoresis, cough, and shortness of breath.  She does endorse aching all over.      Patient was seen in this emergency department on 3/4/2021.  CT of her lumbar spine showed a closed fracture of the transverse process.  She was treated conservatively and discharged home.  She was admitted to this hospital on 5/31/2019 for a right femoral neck fracture.  Other past medical history includes A. fib, COPD, hypertension, hyperlipidemia, hypokalemia, SIERRA, renal disease, breast and lung cancer that are allegedly in remission.      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   • Diverticulosis of intestine 02/19/2016   • Obstructive sleep apnea syndrome 02/19/2016   • Weight loss 02/19/2016   • Vitamin D deficiency 02/19/2016   • Overweight (BMI 25.0-29.9) 02/19/2016   • Spinal stenosis of lumbar region without neurogenic claudication 02/19/2016   • Osteoarthritis of knee 02/19/2016   • Obesity (BMI 30-39.9) 02/19/2016   • Neutropenia  (CMS/[facility]) 02/19/2016   • Chronic lower back pain 02/19/2016   • Knee pain 02/19/2016   • Insomnia 02/19/2016   • Hypothyroidism 02/19/2016   • Hypokalemia 02/19/2016   • Hypertension 02/19/2016   • Hyperlipidemia 02/19/2016   • Generalized osteoarthritis 02/19/2016   • Gastroparesis 02/19/2016   • Foot pain 02/19/2016   • Chronic obstructive pulmonary disease (CMS/[facility]) 02/19/2016   • Chronic constipation 02/19/2016   • Anemia 02/19/2016   • Weight gain 02/19/2016   • Pain of left breast 02/22/2016   • Elevated serum alkaline phosphatase level 02/22/2016   • Neck pain 11/28/2017   • Volume overload 03/19/2019   • Status post total hip replacement, 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     Resolved Ambulatory Problems     Diagnosis Date Noted   • Finger injury 02/19/2016   • Upper respiratory tract infection 02/19/2016   • Contusion of upper arm 02/19/2016   • Tingling of skin 02/19/2016   • Rash 02/19/2016   • Candidiasis of mouth 02/19/2016   • Spasm 02/19/2016   • Low back pain 02/19/2016   • Heartburn 02/19/2016   • Diarrhea 02/19/2016   • Bloating symptom 02/19/2016   • Gastroesophageal reflux disease with esophagitis 02/19/2016   • Drug-induced photosensitivity 02/19/2016   • Cramp of limb 02/19/2016   • Contact dermatitis 02/19/2016   • Chronic kidney disease, stage II (mild) 02/19/2016   • Ankle joint pain 02/19/2016   • Atopic rhinitis 02/19/2016   • Allergic reaction to drug 02/19/2016   • Acute sinusitis 02/19/2016   • Generalized abdominal pain 02/19/2016   • Closed fracture of neck of right femur (CMS/[facility]) 05/31/2019     Past Medical History:   Diagnosis Date   • Arthritis    • Atrial fibrillation (CMS/[facility])    • Constipation    • COPD (chronic obstructive pulmonary disease) (CMS/[facility])    • Diverticulosis    • GERD (gastroesophageal reflux disease)    • Hx of degenerative disc disease    • Knee swelling    • Lung cancer (CMS/[facility])    • Renal disorder    • Restless leg syndrome    • Sleep apnea with use of continuous positive  airway pressure (CPAP)          PAST SURGICAL HISTORY  Past Surgical History:   Procedure Laterality Date   • BUNIONECTOMY Bilateral    • CATARACT EXTRACTION     • CHOLECYSTECTOMY     • COLONOSCOPY     • ENDOSCOPY N/A 6/24/2016    Procedure: ESOPHAGOGASTRODUODENOSCOPY  with biopsies;  Surgeon: Von Hernández MD;  Location: [facility] OR;  Service:    • LUNG LOBECTOMY Left     lower lobe   • REPLACEMENT TOTAL KNEE Left    • THYROID BIOPSY     • TOTAL HIP ARTHROPLASTY Right 6/1/2019    Procedure: BIPOLAR HIP CEMENTED POSTERIOR;  Surgeon: Ashley Crenshaw MD;  Location: [facility] OR;  Service: Orthopedics         FAMILY HISTORY  Family History   Problem Relation Age of Onset   • Heart attack Mother    • Coronary artery disease Mother    • Hypertension Mother    • Heart attack Sister    • Colon cancer Neg Hx    • Colon polyps Neg Hx    • Esophageal cancer Neg Hx          SOCIAL HISTORY  Social History     Socioeconomic History   • Marital status:      Spouse name: Not on file   • Number of children: Not on file   • Years of education: Not on file   • Highest education level: Not on file   Tobacco Use   • Smoking status: Never Smoker   • Smokeless tobacco: Never Used   Substance and Sexual Activity   • Alcohol use: No   • Drug use: Defer   • Sexual activity: Defer         ALLERGIES  Doxycycline        REVIEW OF SYSTEMS  Review of Systems     All systems reviewed and negative except for those discussed in HPI.       PHYSICAL EXAM    I have reviewed the triage vital signs and nursing notes.    ED Triage Vitals [03/06/21 1050]   Temp Heart Rate Resp BP SpO2   98.6 °F (37 °C) 76 16 155/79 95 %      Temp src Heart Rate Source Patient Position BP Location FiO2 (%)   -- -- -- -- --       Physical Exam  GENERAL: Well-nourished, nontoxic, appears uncomfortable  HENT: nares patent face symmetric  EYES: no scleral icterus  CV: regular rhythm, regular rate, no JVD, no significant pedal edema  RESPIRATORY:  normal effort, lungs CTA B  ABDOMEN: soft, nontender  MUSCULOSKELETAL: TTP lower lumbar spine, increased pain with slight extension and flexion of the trunk, strength remains 5 out of 5 bilateral lower extremities.  NEURO: alert and oriented x4, moves all extremities, follows commands  SKIN: warm, dry        LAB RESULTS  Recent Results (from the past 24 hour(s))   Comprehensive Metabolic Panel    Collection Time: 03/06/21 11:35 AM    Specimen: Blood   Result Value Ref Range    Glucose 106 (H) 65 - 99 mg/dL    BUN 58 (H) 8 - 23 mg/dL    Creatinine 1.83 (H) 0.57 - 1.00 mg/dL    Sodium 135 (L) 136 - 145 mmol/L    Potassium 4.2 3.5 - 5.2 mmol/L    Chloride 99 98 - 107 mmol/L    CO2 26.7 22.0 - 29.0 mmol/L    Calcium 9.0 8.6 - 10.5 mg/dL    Total Protein 6.6 6.0 - 8.5 g/dL    Albumin 4.00 3.50 - 5.20 g/dL    ALT (SGPT) 17 1 - 33 U/L    AST (SGOT) 10 1 - 32 U/L    Alkaline Phosphatase 214 (H) 39 - 117 U/L    Total Bilirubin 0.3 0.0 - 1.2 mg/dL    eGFR Non African Amer 26 (L) >60 mL/min/1.73    Globulin 2.6 gm/dL    A/G Ratio 1.5 g/dL    BUN/Creatinine Ratio 31.7 (H) 7.0 - 25.0    Anion Gap 9.3 5.0 - 15.0 mmol/L   Troponin    Collection Time: 03/06/21 11:35 AM    Specimen: Blood   Result Value Ref Range    Troponin T <0.010 0.000 - 0.030 ng/mL   CBC Auto Differential    Collection Time: 03/06/21 11:35 AM    Specimen: Blood   Result Value Ref Range    WBC 11.70 (H) 3.40 - 10.80 10*3/mm3    RBC 3.87 3.77 - 5.28 10*6/mm3    Hemoglobin 11.4 (L) 12.0 - 15.9 g/dL    Hematocrit 34.6 34.0 - 46.6 %    MCV 89.4 79.0 - 97.0 fL    MCH 29.5 26.6 - 33.0 pg    MCHC 32.9 31.5 - 35.7 g/dL    RDW 14.4 12.3 - 15.4 %    RDW-SD 46.4 37.0 - 54.0 fl    MPV 9.6 6.0 - 12.0 fL    Platelets 188 140 - 450 10*3/mm3    Neutrophil % 74.2 42.7 - 76.0 %    Lymphocyte % 11.9 (L) 19.6 - 45.3 %    Monocyte % 7.8 5.0 - 12.0 %    Eosinophil % 1.7 0.3 - 6.2 %    Basophil % 0.3 0.0 - 1.5 %    Immature Grans % 4.1 (H) 0.0 - 0.5 %    Neutrophils, Absolute 8.68 (H)  1.70 - 7.00 10*3/mm3    Lymphocytes, Absolute 1.39 0.70 - 3.10 10*3/mm3    Monocytes, Absolute 0.91 (H) 0.10 - 0.90 10*3/mm3    Eosinophils, Absolute 0.20 0.00 - 0.40 10*3/mm3    Basophils, Absolute 0.04 0.00 - 0.20 10*3/mm3    Immature Grans, Absolute 0.48 (H) 0.00 - 0.05 10*3/mm3    nRBC 0.1 0.0 - 0.2 /100 WBC   ECG 12 Lead    Collection Time: 03/06/21 11:41 AM   Result Value Ref Range    QT Interval 379 ms   Urinalysis With Microscopic If Indicated (No Culture) - Urine, Catheter    Collection Time: 03/06/21 11:49 AM    Specimen: Urine, Catheter   Result Value Ref Range    Color, UA Yellow Yellow, Straw    Appearance, UA Clear Clear    pH, UA 5.5 5.0 - 8.0    Specific Gravity, UA 1.015 1.005 - 1.030    Glucose, UA Negative Negative    Ketones, UA Negative Negative    Bilirubin, UA Negative Negative    Blood, UA Negative Negative    Protein, UA Negative Negative    Leuk Esterase, UA Negative Negative    Nitrite, UA Negative Negative    Urobilinogen, UA 0.2 E.U./dL 0.2 - 1.0 E.U./dL   Respiratory Panel PCR w/COVID-19(SARS-CoV-2) ADDISON/LILLIAN/MARIA M/PAD/COR/MAD/SOLO In-House, NP Swab in UTM/VTM, 3-4 HR TAT - Swab, Nasopharynx    Collection Time: 03/06/21 12:09 PM    Specimen: Nasopharynx; Swab   Result Value Ref Range    ADENOVIRUS, PCR Not Detected Not Detected    Coronavirus 229E Not Detected Not Detected    Coronavirus HKU1 Not Detected Not Detected    Coronavirus NL63 Not Detected Not Detected    Coronavirus OC43 Not Detected Not Detected    COVID19 Not Detected Not Detected - Ref. Range    Human Metapneumovirus Not Detected Not Detected    Human Rhinovirus/Enterovirus Not Detected Not Detected    Influenza A PCR Not Detected Not Detected    Influenza B PCR Not Detected Not Detected    Parainfluenza Virus 1 Not Detected Not Detected    Parainfluenza Virus 2 Not Detected Not Detected    Parainfluenza Virus 3 Not Detected Not Detected    Parainfluenza Virus 4 Not Detected Not Detected    RSV, PCR Not Detected Not Detected     Bordetella pertussis pcr Not Detected Not Detected    Bordetella parapertussis PCR Not Detected Not Detected    Chlamydophila pneumoniae PCR Not Detected Not Detected    Mycoplasma pneumo by PCR Not Detected Not Detected   CK    Collection Time: 03/06/21 12:12 PM    Specimen: Blood   Result Value Ref Range    Creatine Kinase 26 20 - 180 U/L   Urinalysis With Microscopic If Indicated (No Culture) - Urine, Clean Catch    Collection Time: 03/06/21  3:33 PM    Specimen: Urine, Clean Catch   Result Value Ref Range    Color, UA Yellow Yellow, Straw    Appearance, UA Clear Clear    pH, UA 6.0 5.0 - 8.0    Specific Gravity, UA 1.015 1.005 - 1.030    Glucose, UA Negative Negative    Ketones, UA Negative Negative    Bilirubin, UA Negative Negative    Blood, UA Negative Negative    Protein, UA Negative Negative    Leuk Esterase, UA Trace (A) Negative    Nitrite, UA Negative Negative    Urobilinogen, UA 0.2 E.U./dL 0.2 - 1.0 E.U./dL   Urinalysis, Microscopic Only - Urine, Clean Catch    Collection Time: 03/06/21  3:33 PM    Specimen: Urine, Clean Catch   Result Value Ref Range    RBC, UA 0-2 None Seen, 0-2 /HPF    WBC, UA 0-2 None Seen, 0-2 /HPF    Bacteria, UA None Seen None Seen /HPF    Squamous Epithelial Cells, UA 0-2 None Seen, 0-2 /HPF    Hyaline Casts, UA 0-2 None Seen /LPF    Methodology Automated Microscopy    Basic Metabolic Panel    Collection Time: 03/07/21  6:07 AM    Specimen: Blood   Result Value Ref Range    Glucose 103 (H) 65 - 99 mg/dL    BUN 41 (H) 8 - 23 mg/dL    Creatinine 1.38 (H) 0.57 - 1.00 mg/dL    Sodium 136 136 - 145 mmol/L    Potassium 4.0 3.5 - 5.2 mmol/L    Chloride 102 98 - 107 mmol/L    CO2 25.1 22.0 - 29.0 mmol/L    Calcium 8.5 (L) 8.6 - 10.5 mg/dL    eGFR Non African Amer 37 (L) >60 mL/min/1.73    BUN/Creatinine Ratio 29.7 (H) 7.0 - 25.0    Anion Gap 8.9 5.0 - 15.0 mmol/L   CBC Auto Differential    Collection Time: 03/07/21  6:07 AM    Specimen: Blood   Result Value Ref Range    WBC 8.26  3.40 - 10.80 10*3/mm3    RBC 3.65 (L) 3.77 - 5.28 10*6/mm3    Hemoglobin 10.5 (L) 12.0 - 15.9 g/dL    Hematocrit 32.0 (L) 34.0 - 46.6 %    MCV 87.7 79.0 - 97.0 fL    MCH 28.8 26.6 - 33.0 pg    MCHC 32.8 31.5 - 35.7 g/dL    RDW 14.4 12.3 - 15.4 %    RDW-SD 44.9 37.0 - 54.0 fl    MPV 9.7 6.0 - 12.0 fL    Platelets 170 140 - 450 10*3/mm3    Neutrophil % 70.5 42.7 - 76.0 %    Lymphocyte % 12.7 (L) 19.6 - 45.3 %    Monocyte % 9.0 5.0 - 12.0 %    Eosinophil % 3.4 0.3 - 6.2 %    Basophil % 0.4 0.0 - 1.5 %    Immature Grans % 4.0 (H) 0.0 - 0.5 %    Neutrophils, Absolute 5.83 1.70 - 7.00 10*3/mm3    Lymphocytes, Absolute 1.05 0.70 - 3.10 10*3/mm3    Monocytes, Absolute 0.74 0.10 - 0.90 10*3/mm3    Eosinophils, Absolute 0.28 0.00 - 0.40 10*3/mm3    Basophils, Absolute 0.03 0.00 - 0.20 10*3/mm3    Immature Grans, Absolute 0.33 (H) 0.00 - 0.05 10*3/mm3    nRBC 0.0 0.0 - 0.2 /100 WBC       Ordered the above labs and independently reviewed the results.        RADIOLOGY  XR Chest 1 View    Result Date: 3/6/2021  ONE VIEW PORTABLE CHEST  HISTORY: Generalized weakness. Hypertension.  FINDINGS: The lungs are moderately well-expanded and clear. There is myocardial megaly unchanged from 06/03/2019. No acute abnormality is seen.  This report was finalized on 3/6/2021 2:55 PM by Dr. Lance Piper M.D.        I ordered the above noted radiological studies. Reviewed by me and discussed with radiologist.  See dictation for official radiology interpretation.      PROCEDURES    Procedures      MEDICATIONS GIVEN IN ER    Medications   sodium chloride 0.9 % flush 10 mL (10 mL Intravenous Given 3/7/21 0847)   HYDROcodone-acetaminophen (NORCO) 5-325 MG per tablet 1 tablet (1 tablet Oral Given 3/7/21 0610)   sodium chloride 0.9 % bolus 500 mL ( Intravenous Currently Infusing 3/7/21 0610)         PROGRESS, DATA ANALYSIS, CONSULTS, AND MEDICAL DECISION MAKING    All labs have been independently reviewed by me.  All radiology studies have been  reviewed by me and discussed with radiologist dictating the report.   EKG's independently viewed and interpreted by me.  Discussion below represents my analysis of pertinent findings related to patient's condition, differential diagnosis, treatment plan and final disposition.    Working diagnosis for this patient is bilateral sacral alar fractures, L5 transverse process fracture, intractable pain, dehydration, acute on chronic renal insufficiency.  This is her fourth ER visit in the past 7 days.  She is obviously failing outpatient therapy and is unable to care for self at home.  Given the full clinical picture is felt the patient would be better served by admission at this time.    ED Course as of Mar 07 1012   Sat Mar 06, 2021   1120 We will place the patient on a monitor at this time and obtain an EKG, chest x-ray, CBC, magnesium, CMP, CK, respiratory viral panel, cath urine, troponin to further evaluate her generalized weakness.    [RC]   1128 Will order a [place] to treat the patient's known lumbar fracture/pain    [RC]   1129 3/18/2019 the patient had an echocardiogram showed an ejection fraction of 63%.  To treat the patient's dehydration I will order a 500 cc bolus of IV normal saline and then a slow drip at 125 cc an hour.    [RC]   1203 EKG readingEKG was done at 1141Is a rate of 70 and is sinus rhythmThere is some intraventricular conduction delayThere is left axis deviationThere is Q waves in 3 and aVFThere are some nonspecific T wave changes in several leadsQT looks normalCompared to previous EKG from 5/31/2019.  Has lower voltage in V3 through V6.  Which is nonspecific.  She is morbidly obese very well could be due to lead placement.    [MM]   1223 Glucose(!): 106 [RC]   1223 BUN(!): 58 [RC]   1223 Creatinine(!): 1.83 [RC]   1223 Sodium(!): 135 [RC]   1223 eGFR Non  Am(!): 26 [RC]   1223 CO2: 26.7 [RC]   1223 Anion Gap: 9.3 [RC]   1224 Patient's creatinine is 1.83 today.  A year ago it was 0.84.   I suspect this is likely due to volume depletion as the patient has had trouble getting around her house and having normal oral intake.    [RC]   1229 This is the patient's fourth visit to an emergency department over the past 8 days.  Given her social situation, her significant back pain, her acute renal insufficiency, and the failure of pain control on an outpatient basis.  It is felt the patient would be better admitted to the hospital at this time.  She was last admitted to this hospital 2 years ago to Dr. Arjun Sainz.  We will place a call to him at this time.    [RC]   1245 Patient's pain is slightly improved with the hydrocodone given in the emergency department.  Given the patient's full clinical picture of dehydration, acute on chronic renal insufficiency, living at home alone and being unable to perform her ADLs, and outpatient failure, it is felt the patient will be better admitted at this time.  We will place a call to the hospitalist to discuss further management of the patient.    [RC]   1330 I viewed the patient's chest x-ray and see no acute findings    [RC]   1337 Discussed patient's case with Dr. Villar.  To admit the patient to her care at this time.    [RC]      ED Course User Index  [MM] Gordon Camejo MD  [RC] Jairo Oliveira III, PA     Patient was placed in face mask in first look. Patient was wearing facemask when I entered the room and throughout our encounter. I wore full protective equipment throughout this patient encounter including a face mask, eye shield, gown and gloves. Hand hygiene was performed before donning protective equipment and after removal when leaving the room.    AS OF 10:12 EST VITALS:    BP - 152/66  HR - 82  TEMP - 97.8 °F (36.6 °C) (Oral)  O2 SATS - 95%        DIAGNOSIS  Final diagnoses:   Generalized weakness   Acute on chronic renal insufficiency   Dehydration   Closed fracture of transverse process of lumbar vertebra, initial encounter (CMS/[facility]) L5    Closed fracture of sacrum, unspecified portion of sacrum, initial encounter (CMS/[facility])-Sacral alar fractures         DISPOSITION  ADMISSION    Discussed treatment plan and reason for admission with pt/family and admitting physician.  Pt/family voiced understanding of the plan for admission for further testing/treatment as needed.              Jairo Oliveira III, PA  03/07/21 [id number]

## 2021-03-06 NOTE — ED NOTES
"Nursing report ED to floor  Latanya Olsen  80 y.o.  female    HPI (triage note):   Chief Complaint   Patient presents with   • Weakness - Generalized       Admitting doctor:   Kailey Villar MD    Admitting diagnosis:   The primary encounter diagnosis was Generalized weakness. Diagnoses of Acute on chronic renal insufficiency, Dehydration, Closed fracture of transverse process of lumbar vertebra, initial encounter (CMS/[facility]) L5, and Closed fracture of sacrum, unspecified portion of sacrum, initial encounter (CMS/[facility])-Sacral alar fractures were also pertinent to this visit.    Code status:   Current Code Status     Date Active Code Status Order ID Comments User Context       Prior    Advance Care Planning Activity          Allergies:   Doxycycline    Weight:       03/06/21  1214   Weight: 102 kg (225 lb)       Most recent vitals:   Vitals:    03/06/21 1050 03/06/21 1128 03/06/21 1214 03/06/21 1505   BP: 155/79 144/70  137/69   Pulse: 76 71  70   Resp: 16   16   Temp: 98.6 °F (37 °C)      SpO2: 95% 96%  96%   Weight:   102 kg (225 lb)    Height:   167.6 cm (66\")        Active LDAs/IV Access:   Lines, Drains & Airways    Active LDAs     Name:   Placement date:   Placement time:   Site:   Days:    Peripheral IV 03/06/21 Left Antecubital   03/06/21    --    Antecubital   less than 1    Peripheral IV 03/06/21 1125 Right Antecubital   03/06/21    1125    Antecubital   less than 1                Labs (abnormal labs have a star):   Labs Reviewed   COMPREHENSIVE METABOLIC PANEL - Abnormal; Notable for the following components:       Result Value    Glucose 106 (*)     BUN 58 (*)     Creatinine 1.83 (*)     Sodium 135 (*)     Alkaline Phosphatase 214 (*)     eGFR Non African Amer 26 (*)     BUN/Creatinine Ratio 31.7 (*)     All other components within normal limits    Narrative:     GFR Normal >60  Chronic Kidney Disease <60  Kidney Failure <15     CBC WITH AUTO DIFFERENTIAL - Abnormal; Notable for the following " components:    WBC 11.70 (*)     Hemoglobin 11.4 (*)     Lymphocyte % 11.9 (*)     Immature Grans % 4.1 (*)     Neutrophils, Absolute 8.68 (*)     Monocytes, Absolute 0.91 (*)     Immature Grans, Absolute 0.48 (*)     All other components within normal limits   RESPIRATORY PANEL PCR W/ COVID-19 (SARS-COV-2) ADDISON/LILLIAN/MARIA M/PAD/COR/MAD/SOLO IN-HOUSE, NP SWAB IN UTM/VTP, 3-4 HR TAT - Normal    Narrative:     Fact sheet for providers: https://docs.[employer]/wp-content/uploads/DHE9346-2414-AE1.1-EUA-Provider-Fact-Sheet-3.pdf    Fact sheet for patients: https://docs.[employer]/wp-content/uploads/OFP2225-1063-XC6.1-EUA-Patient-Fact-Sheet-1.pdf    Test performed by PCR.   TROPONIN (IN-HOUSE) - Normal    Narrative:     Troponin T Reference Range:  <= 0.03 ng/mL-   Negative for AMI  >0.03 ng/mL-     Abnormal for myocardial necrosis.  Clinicians would have to utilize clinical acumen, EKG, Troponin and serial changes to determine if it is an Acute Myocardial Infarction or myocardial injury due to an underlying chronic condition.       Results may be falsely decreased if patient taking Biotin.     URINALYSIS W/ MICROSCOPIC IF INDICATED (NO CULTURE) - Normal    Narrative:     Urine microscopic not indicated.   CK - Normal   URINALYSIS W/ MICROSCOPIC IF INDICATED (NO CULTURE)   CBC AND DIFFERENTIAL    Narrative:     The following orders were created for panel order CBC & Differential.  Procedure                               Abnormality         Status                     ---------                               -----------         ------                     CBC Auto Differential[[id number]]        Abnormal            Final result                 Please view results for these tests on the individual orders.       EKG:   ECG 12 Lead   Final Result   HEART RATE= 70  bpm   RR Interval= 856  ms   WA Interval= 170  ms   P Horizontal Axis= 45  deg   P Front Axis= 66  deg   QRSD Interval= 114  ms   QT Interval= 379  ms   QRS Axis= -22   deg   T Wave Axis= 4  deg   - ABNORMAL ECG -   Sinus rhythm   Incomplete right bundle branch block New versus previous ECG   Low voltage, precordial leads   Electronically Signed By: Zafar Falcon ([facility]) 06-Mar-2021 12:38:44   Date and Time of Study: 2021-03-06 11:41:12          Meds given in ED:   Medications   sodium chloride 0.9 % flush 10 mL (has no administration in time range)   HYDROcodone-acetaminophen (NORCO) 5-325 MG per tablet 1 tablet (1 tablet Oral Given 3/6/21 1213)   sodium chloride 0.9 % bolus 500 mL (has no administration in time range)   sodium chloride 0.9 % infusion (has no administration in time range)   ondansetron ODT (ZOFRAN-ODT) disintegrating tablet 4 mg (4 mg Oral Given 3/6/21 1206)       Imaging results:  No radiology results for the last day    Ambulatory status:   bedrest  Social issues:   Social History     Socioeconomic History   • Marital status:      Spouse name: Not on file   • Number of children: Not on file   • Years of education: Not on file   • Highest education level: Not on file   Tobacco Use   • Smoking status: Never Smoker   • Smokeless tobacco: Never Used   Substance and Sexual Activity   • Alcohol use: No   • Drug use: Defer   • Sexual activity: Defer    Nursing report ED to floor     Carolann Hampton RN  03/06/21 [id number]

## 2021-03-06 NOTE — ED PROVIDER NOTES
I supervised care provided by the midlevel provider.   We have discussed this patient's history, physical exam, and treatment plan.  I have reviewed the note and personally saw and examined the patient and agree with the plan of care.     I have seen and evaluated this patient.  I spoke with the patient's niece who is here in the emergency department.  The niece is concerned that she has been taking too much of her medicine for pain.  She was little groggy this morning and unable to stand and get up.  Currently the patient is back to her normal self according to the niece.  There was no known trauma that the patient or the niece is aware of.  Has been complaining of left lower buttocks pain with radiation down the left lateral aspect of her leg to the knee for the past 6 to 8 weeks.  She has been do to a [facility] ED, our ED twice and this would be her third ED visit.  She lives alone.  She has been taking the hydrocodone as prescribed for pain.  She thinks she might be taking too much at times because the pain is so annoying.  She has no urinary or fecal incontinence.  She states that she is not urinating that much.  She is also reports constipation.  Denies any focal weakness or sensory change to her lower extremities.  Denies any headache, chest pain, shortness of breath, or any upper extremity pain.  Denies any abdominal pain.    GENERAL: Elderly female that is stable in no distress sit in the bed.  Not distressed  HENT: nares patent  Head/neck/ face are symmetric without gross deformity or swelling  EYES: no scleral icterus  CV: regular rhythm, regular rate with intact distal pulses  RESPIRATORY: normal effort and no respiratory distress  ABDOMEN: soft and non-tender.  Morbidly obese.  On back exam she has normal back inspection.  She has no pain in palpation of the cervical, thoracic, lumbar spine.  Her pain is mainly in the left buttocks with some radiation across midline and more prominent radiation down  the lateral aspect of her upper leg.  This pain is reproducible when she tiera to either side or when she raises her left leg.  She has normal saddle sensation and she has normal rectal tone.  She has a positive straight leg on the left.  Her extensor pollicis longus is intact.  She has no motor or sensory changes to her lower extremities bilaterally.  She has intact distal pulses with no coolness, or pallor.  She got chronic bony deformities to her feet.  MUSCULOSKELETAL: no deformity  NEURO: alert and appropriate, moves all extremities, follows commands  SKIN: warm, dry    Vital signs and nursing notes reviewed.    Plan patient is getting symptoms very consistent with sciatica or lumbar radiculopathy.  She lives alone and is morbidly obese.  This is caused immobilization.  She probably has been accidentally overdosing on her pain medicine help control her pain.  Currently she is alert and oriented x3 and appropriate.  Niece states that she is acting her normal self.  We will check lab work and urinalysis.  I discussed with the patient and the niece.  Patient will need to be admitted and likely placed for rehab.  We will also get further work-up as indicated.  Right now she does not need any emergent MRI.  All questions answered    We are currently under a pandemic from the COVID19 infection.  The patient presented to the emergency department by ambulance or personal vehicle. I followed the current protocols required by Infection Control at [facility] in my evaluation and treatment of the patient. The patient was wearing a face mask during my evaluation and throughout my encounter. During my whole encounter with this patient I used appropriate personal protective equipment.  This equipment consisted of eye protection, facemask, gown, and gloves.  I applied this equipment before entering the room.  ED Course as of Mar 06 1630   Sat Mar 06, 2021   1120 We will place the patient on a monitor at this time  and obtain an EKG, chest x-ray, CBC, magnesium, CMP, CK, respiratory viral panel, cath urine, troponin to further evaluate her generalized weakness.    [RC]   1128 Will order a Section to treat the patient's known lumbar fracture/pain    [RC]   1129 3/18/2019 the patient had an echocardiogram showed an ejection fraction of 63%.  To treat the patient's dehydration I will order a 500 cc bolus of IV normal saline and then a slow drip at 125 cc an hour.    [RC]   1203 EKG readingEKG was done at 1141Is a rate of 70 and is sinus rhythmThere is some intraventricular conduction delayThere is left axis deviationThere is Q waves in 3 and aVFThere are some nonspecific T wave changes in several leadsQT looks normalCompared to previous EKG from 5/31/2019.  Has lower voltage in V3 through V6.  Which is nonspecific.  She is morbidly obese very well could be due to lead placement.    [MM]   1223 Glucose(!): 106 [RC]   1223 BUN(!): 58 [RC]   1223 Creatinine(!): 1.83 [RC]   1223 Sodium(!): 135 [RC]   1223 eGFR Non  Am(!): 26 [RC]   1223 CO2: 26.7 [RC]   1223 Anion Gap: 9.3 [RC]   1224 Patient's creatinine is 1.83 today.  A year ago it was 0.84.  I suspect this is likely due to volume depletion as the patient has had trouble getting around her house and having normal oral intake.    [RC]   1229 This is the patient's fourth visit to an emergency department over the past 8 days.  Given her social situation, her significant back pain, her acute renal insufficiency, and the failure of pain control on an outpatient basis.  It is felt the patient would be better admitted to the hospital at this time.  She was last admitted to this hospital 2 years ago to Dr. Arjun Sainz.  We will place a call to him at this time.    [RC]   1245 Patient's pain is slightly improved with the hydrocodone given in the emergency department.  Given the patient's full clinical picture of dehydration, acute on chronic renal insufficiency, living at home alone  and being unable to perform her ADLs, and outpatient failure, it is felt the patient will be better admitted at this time.  We will place a call to the hospitalist to discuss further management of the patient.    [RC]   1330 I viewed the patient's chest x-ray and see no acute findings    [RC]   1337 Discussed patient's case with Dr. Villar.  To admit the patient to her care at this time.    [RC]      ED Course User Index  [MM] Gordon Camejo MD  [RC] Jairo Oliveira III, PA         Patient was seen February 26, 2021 and then again 3/4/2021 for the same problem.  This occurred after a fall.  She is got a fracture of her lumbar spine of the transverse process.  I reviewed those ER visits.     Gordon Camejo MD  03/06/21 5424

## 2021-03-06 NOTE — ED TRIAGE NOTES
Pt brought to ER by [facility] from home for generalized weakness. Pt normally ambulatory, was able to get from bedroom to living room. Pt states she hurts all over. Patient masked in first look triage. I was wearing mask and goggles.

## 2021-03-07 ENCOUNTER — APPOINTMENT (OUTPATIENT)
Dept: GENERAL RADIOLOGY | Facility: HOSPITAL | Age: 81
End: 2021-03-07

## 2021-03-07 PROBLEM — N17.9 ARF (ACUTE RENAL FAILURE): Status: ACTIVE | Noted: 2021-03-07

## 2021-03-07 LAB
ANION GAP SERPL CALCULATED.3IONS-SCNC: 8.9 MMOL/L (ref 5–15)
BASOPHILS # BLD AUTO: 0.03 10*3/MM3 (ref 0–0.2)
BASOPHILS NFR BLD AUTO: 0.4 % (ref 0–1.5)
BUN SERPL-MCNC: 41 MG/DL (ref 8–23)
BUN/CREAT SERPL: 29.7 (ref 7–25)
CALCIUM SPEC-SCNC: 8.5 MG/DL (ref 8.6–10.5)
CHLORIDE SERPL-SCNC: 102 MMOL/L (ref 98–107)
CO2 SERPL-SCNC: 25.1 MMOL/L (ref 22–29)
CREAT SERPL-MCNC: 1.38 MG/DL (ref 0.57–1)
DEPRECATED RDW RBC AUTO: 44.9 FL (ref 37–54)
EOSINOPHIL # BLD AUTO: 0.28 10*3/MM3 (ref 0–0.4)
EOSINOPHIL NFR BLD AUTO: 3.4 % (ref 0.3–6.2)
ERYTHROCYTE [DISTWIDTH] IN BLOOD BY AUTOMATED COUNT: 14.4 % (ref 12.3–15.4)
GFR SERPL CREATININE-BSD FRML MDRD: 37 ML/MIN/1.73
GLUCOSE SERPL-MCNC: 103 MG/DL (ref 65–99)
HCT VFR BLD AUTO: 32 % (ref 34–46.6)
HGB BLD-MCNC: 10.5 G/DL (ref 12–15.9)
IMM GRANULOCYTES # BLD AUTO: 0.33 10*3/MM3 (ref 0–0.05)
IMM GRANULOCYTES NFR BLD AUTO: 4 % (ref 0–0.5)
LYMPHOCYTES # BLD AUTO: 1.05 10*3/MM3 (ref 0.7–3.1)
LYMPHOCYTES NFR BLD AUTO: 12.7 % (ref 19.6–45.3)
MCH RBC QN AUTO: 28.8 PG (ref 26.6–33)
MCHC RBC AUTO-ENTMCNC: 32.8 G/DL (ref 31.5–35.7)
MCV RBC AUTO: 87.7 FL (ref 79–97)
MONOCYTES # BLD AUTO: 0.74 10*3/MM3 (ref 0.1–0.9)
MONOCYTES NFR BLD AUTO: 9 % (ref 5–12)
NEUTROPHILS NFR BLD AUTO: 5.83 10*3/MM3 (ref 1.7–7)
NEUTROPHILS NFR BLD AUTO: 70.5 % (ref 42.7–76)
NRBC BLD AUTO-RTO: 0 /100 WBC (ref 0–0.2)
PLATELET # BLD AUTO: 170 10*3/MM3 (ref 140–450)
PMV BLD AUTO: 9.7 FL (ref 6–12)
POTASSIUM SERPL-SCNC: 4 MMOL/L (ref 3.5–5.2)
RBC # BLD AUTO: 3.65 10*6/MM3 (ref 3.77–5.28)
SODIUM SERPL-SCNC: 136 MMOL/L (ref 136–145)
WBC # BLD AUTO: 8.26 10*3/MM3 (ref 3.4–10.8)

## 2021-03-07 PROCEDURE — 94799 UNLISTED PULMONARY SVC/PX: CPT

## 2021-03-07 PROCEDURE — 94640 AIRWAY INHALATION TREATMENT: CPT

## 2021-03-07 PROCEDURE — 85025 COMPLETE CBC W/AUTO DIFF WBC: CPT | Performed by: INTERNAL MEDICINE

## 2021-03-07 PROCEDURE — 73521 X-RAY EXAM HIPS BI 2 VIEWS: CPT

## 2021-03-07 PROCEDURE — 99221 1ST HOSP IP/OBS SF/LOW 40: CPT | Performed by: ORTHOPAEDIC SURGERY

## 2021-03-07 PROCEDURE — 80048 BASIC METABOLIC PNL TOTAL CA: CPT | Performed by: INTERNAL MEDICINE

## 2021-03-07 RX ADMIN — PRAMIPEXOLE DIHYDROCHLORIDE 1.5 MG: 1.5 TABLET ORAL at 19:55

## 2021-03-07 RX ADMIN — PANTOPRAZOLE SODIUM 40 MG: 40 TABLET, DELAYED RELEASE ORAL at 06:10

## 2021-03-07 RX ADMIN — HYDROCODONE BITARTRATE AND ACETAMINOPHEN 1 TABLET: 5; 325 TABLET ORAL at 19:55

## 2021-03-07 RX ADMIN — CYCLOBENZAPRINE 10 MG: 10 TABLET, FILM COATED ORAL at 20:00

## 2021-03-07 RX ADMIN — LEVOTHYROXINE SODIUM 88 MCG: 0.09 TABLET ORAL at 06:10

## 2021-03-07 RX ADMIN — SODIUM CHLORIDE, PRESERVATIVE FREE 10 ML: 5 INJECTION INTRAVENOUS at 08:47

## 2021-03-07 RX ADMIN — FUROSEMIDE 40 MG: 40 TABLET ORAL at 08:44

## 2021-03-07 RX ADMIN — HYDROCODONE BITARTRATE AND ACETAMINOPHEN 1 TABLET: 5; 325 TABLET ORAL at 06:10

## 2021-03-07 RX ADMIN — BUDESONIDE AND FORMOTEROL FUMARATE DIHYDRATE 2 PUFF: 160; 4.5 AEROSOL RESPIRATORY (INHALATION) at 09:32

## 2021-03-07 RX ADMIN — DILTIAZEM HYDROCHLORIDE 120 MG: 120 CAPSULE, COATED, EXTENDED RELEASE ORAL at 08:44

## 2021-03-07 RX ADMIN — BUDESONIDE AND FORMOTEROL FUMARATE DIHYDRATE 2 PUFF: 160; 4.5 AEROSOL RESPIRATORY (INHALATION) at 19:50

## 2021-03-07 RX ADMIN — POTASSIUM CHLORIDE 20 MEQ: 750 TABLET, EXTENDED RELEASE ORAL at 08:44

## 2021-03-07 RX ADMIN — HYDROCHLOROTHIAZIDE: 25 TABLET ORAL at 08:44

## 2021-03-07 NOTE — PLAN OF CARE
VSS, ROOM AIR, NSR ON MONITOR, C/O PAIN X1, NO OTHER ISSUES NOTED WILL CONTINUE TO MONITOR    Problem: Adult Inpatient Plan of Care  Goal: Plan of Care Review  Outcome: Ongoing, Progressing  Flowsheets (Taken 3/7/2021 0302)  Plan of Care Reviewed With: patient     Problem: Skin Injury Risk Increased  Goal: Skin Health and Integrity  Outcome: Ongoing, Progressing  Intervention: Optimize Skin Protection  Recent Flowsheet Documentation  Taken 3/7/2021 0216 by Brandon Nash RN  Head of Bed (HOB): HOB elevated  Taken 3/7/2021 0040 by Brandon Nash RN  Head of Bed (HOB): HOB at 15 degrees  Taken 3/6/2021 2251 by Brandon Nash RN  Head of Bed (HOB): HOB elevated  Taken 3/6/2021 2100 by Brandon Nash RN  Pressure Reduction Techniques:   frequent weight shift encouraged   weight shift assistance provided  Head of Bed (HOB): HOB at 20-30 degrees  Pressure Reduction Devices:   alternating pressure pump (ADD)   pressure-redistributing mattress utilized   positioning supports utilized  Skin Protection:   adhesive use limited   incontinence pads utilized     Problem: Fall Injury Risk  Goal: Absence of Fall and Fall-Related Injury  Outcome: Ongoing, Progressing  Intervention: Identify and Manage Contributors to Fall Injury Risk  Recent Flowsheet Documentation  Taken 3/6/2021 2100 by Brandon Nash RN  Medication Review/Management: medications reviewed  Self-Care Promotion: independence encouraged  Intervention: Promote Injury-Free Environment  Recent Flowsheet Documentation  Taken 3/7/2021 0216 by Brandon Nash RN  Safety Promotion/Fall Prevention: safety round/check completed  Taken 3/7/2021 0040 by Brandon Nash RN  Safety Promotion/Fall Prevention: safety round/check completed  Taken 3/6/2021 2251 by Brandon Nash RN  Safety Promotion/Fall Prevention: safety round/check completed  Taken 3/6/2021 2100 by Brandon Nash RN  Safety Promotion/Fall Prevention:   activity supervised   assistive  device/personal items within reach   clutter free environment maintained   fall prevention program maintained   safety round/check completed   Goal Outcome Evaluation:  Plan of Care Reviewed With: patient

## 2021-03-07 NOTE — PROGRESS NOTES
"    DAILY PROGRESS NOTE  [facility]    Patient Identification:  Name: Latanya Olsen  Age: 80 y.o.  Sex: female  :  1940  MRN: [id number]         Primary Care Physician: Mary Perkins MD    Subjective:  Interval History: Complains of back and hip pains.  Denies any current chest pain shortness of breath.  She states she had upcoming appointment with Dr. Shoemaker in the near future.  She does follow with a nephrologist and has been told she has CKD but cannot recall the name.  She denies any current chest pain shortness of breath troubles breathing.  Denies any current nausea or vomiting.  Denies any complete focal loss of function but admits to generalized weakness.  Denies any recent trauma    Objective: Very pleasant interactive and conversational.  She does not appear toxic nor does she appear in any significant amount of pain currently.  No family present at bedside.  Morbidly obese    Scheduled Meds:budesonide-formoterol, 2 puff, Inhalation, BID - RT  dilTIAZem CD, 120 mg, Oral, Q24H  levothyroxine, 88 mcg, Oral, Q AM  pantoprazole, 40 mg, Oral, QAM  pramipexole, 1.5 mg, Oral, Nightly      Continuous Infusions:sodium chloride, 125 mL/hr, Last Rate: Stopped (21 2100)        Vital signs in last 24 hours:  Temp:  [97.8 °F (36.6 °C)-98.6 °F (37 °C)] 97.8 °F (36.6 °C)  Heart Rate:  [70-87] 72  Resp:  [16-18] 18  BP: (137-155)/(62-79) 152/66    Intake/Output:    Intake/Output Summary (Last 24 hours) at 3/7/2021 0905  Last data filed at 3/7/2021 0547  Gross per 24 hour   Intake 245 ml   Output 900 ml   Net -655 ml       Exam:  /66 (BP Location: Right arm, Patient Position: Lying)   Pulse 72   Temp 97.8 °F (36.6 °C) (Oral)   Resp 18   Ht 167.6 cm (66\")   Wt 102 kg (225 lb)   SpO2 91%   BMI 36.32 kg/m²     General Appearance:    Alert, cooperative, AAOx3                          Head:    Normocephalic, without obvious abnormality, atraumatic                          Throat:   " Oral mucosa pink and moist                           Neck:   Supple, symmetrical, trachea midline, no JVD                         Lungs:    Clear to auscultation bilaterally, respirations unlabored                          Heart:    Regular rate and rhythm, S1 and S2 normal                 Abdomen:     Soft, nontender, bowel sounds active                 Extremities: Changes consistent with lymphedema but nothing pitting, no cyanosis                        Pulses:   Pulses palpable in lower extremities                  Neurologic:   CNII-XII intact, moving all while sitting though discomfort noted in lower extremities with movement     Data Review:  Labs in chart were reviewed.    Assessment:  Active Hospital Problems    Diagnosis  POA   • **Generalized weakness [R53.1]  Yes   • ARF (acute renal failure) (CMS/HCC) [N17.9]  Unknown   • Hypertension [I10]  Yes   • Chronic obstructive pulmonary disease (CMS/[facility]) [J44.9]  Yes   • Spinal stenosis of lumbar region without neurogenic claudication [M48.061]  Yes   • Obstructive sleep apnea syndrome [G47.33]  Yes      Resolved Hospital Problems   No resolved problems to display.       Plan:    Generalized weakness likely secondary to ARF   -I have written to hold ARB/HCTZ/Lasix/potassium but all these were already given this a.m.   -BUN/creatinine improving I will continue IVF but decreased rate to 75 cc an hour and continue for just 1 more liter of fluid   -Review of labs dating back multiple years concerning for possible aspects of CKD 3 though I cannot appreciate any GFR's that are decreased so we will treat as ARF.  After discussing case with patient, she does admit to having previous issues with CKD probably stage III and [facility] but she cannot recall name   -Known spinal stenosis complains of low back as well as hip discomfort and admits to past history of fracture of which she cannot further clarify.  She had outpatient  appointment pending in the near future with Dr. Shoemaker and orthopedics has been consulted.  I will go ahead and x-ray hip but no need to repeat x-rays of the lumbar spine as this was done back on 2/26/2021 demonstrating diffuse degenerative changes   -Concerns for polypharmacy/redundancy -review of home med rec also shows this patient is utilizing metolazone as needed   -Morbid obesity a complicating feature    Hypothyroidism -check TSH in a.m. to ensure this is not contributory to the above    COPD without acute exacerbation -continue Symbicort    HTN -see above as she already received her medications we will continue with diltiazem.  Currently ARB/HCTZ/Lasix on hold.  No plans to resume dual diuretics as likely will resume Lasix in place of HCTZ given abnormal CKD    SIERRA - Encourage use of CPAP while here    SCDs for DVT prophylaxis    Issac Garcia MD  3/7/2021  09:05 EST

## 2021-03-07 NOTE — PLAN OF CARE
Patient calm and cooperative. Frustrated as she can not remember time frames of events from the last couple days. Stated she was scared when she couldn't move this morning. Is moving extremities better, except left leg which she is unable to move. Alert and oriented. Dr. Crenshaw called to obtain history and will see patient tomorrow.     Problem: Adult Inpatient Plan of Care  Goal: Plan of Care Review  Outcome: Ongoing, Not Progressing  Goal: Patient-Specific Goal (Individualized)  Outcome: Ongoing, Not Progressing  Goal: Absence of Hospital-Acquired Illness or Injury  Outcome: Ongoing, Not Progressing  Intervention: Identify and Manage Fall Risk  Recent Flowsheet Documentation  Taken 3/6/2021 1845 by Sultana Reeves RN  Safety Promotion/Fall Prevention:   safety round/check completed   activity supervised  Taken 3/6/2021 1715 by Sultana Reeves RN  Safety Promotion/Fall Prevention:   activity supervised   nonskid shoes/slippers when out of bed   safety round/check completed  Intervention: Prevent Skin Injury  Recent Flowsheet Documentation  Taken 3/6/2021 1715 by Sultana Reeves RN  Body Position: (off coccyx) turned  Intervention: Prevent Infection  Recent Flowsheet Documentation  Taken 3/6/2021 1715 by Sultana Reeves, KAITLIN  Infection Prevention:   environmental surveillance performed   single patient room provided   visitors restricted/screened  Goal: Optimal Comfort and Wellbeing  Outcome: Ongoing, Not Progressing  Intervention: Provide Person-Centered Care  Recent Flowsheet Documentation  Taken 3/6/2021 1715 by Sultana Reeves RN  Trust Relationship/Rapport: care explained  Goal: Readiness for Transition of Care  Outcome: Ongoing, Not Progressing   Goal Outcome Evaluation:

## 2021-03-07 NOTE — PLAN OF CARE
Very pleasant lady, alert and oriented x 4. Able to bear weight on both legs today. Stood and pivoted to stretcher taking 2-3 steps. PT eval and treat order was placed so they should see her tomorrow.   Report given to KAITLIN Saini.    Problem: Adult Inpatient Plan of Care  Goal: Absence of Hospital-Acquired Illness or Injury  Intervention: Identify and Manage Fall Risk  Recent Flowsheet Documentation  Taken 3/7/2021 1610 by Sultana Reeves RN  Safety Promotion/Fall Prevention:  • safety round/check completed  • activity supervised  Taken 3/7/2021 1415 by Sultana Reeves RN  Safety Promotion/Fall Prevention: safety round/check completed  Taken 3/7/2021 1010 by Sultana Reeves RN  Safety Promotion/Fall Prevention:  • safety round/check completed  • activity supervised  Taken 3/7/2021 0825 by Sultana Reeves RN  Safety Promotion/Fall Prevention:  • activity supervised  • nonskid shoes/slippers when out of bed  Intervention: Prevent Skin Injury  Recent Flowsheet Documentation  Taken 3/7/2021 0825 by Sultana Reeves RN  Body Position: position changed independently  Intervention: Prevent and Manage VTE (venous thromboembolism) Risk  Recent Flowsheet Documentation  Taken 3/7/2021 0825 by Sultana Reeves RN  VTE Prevention/Management:  • bilateral  • sequential compression devices on  Intervention: Prevent Infection  Recent Flowsheet Documentation  Taken 3/7/2021 0825 by Sultana Reeves RN  Infection Prevention:  • visitors restricted/screened  • single patient room provided  • environmental surveillance performed   Goal Outcome Evaluation:

## 2021-03-07 NOTE — H&P
HISTORY AND PHYSICAL   [facility]        Patient Identification:  Name: Latanya Olsen  Age: 80 y.o.  Sex: female  :  1940  MRN: [id number]                     Primary Care Physician: Mary Perkins MD    Chief Complaint:  80 year old female who presented to the emergency room with weakness and low back pain; the pain is sharp and goes down her left leg; she has not been able to do her adls; she could barely move off the couch according to the daughter; she denies fever or chills; no sick contacts; denies falls;   History of Present Illness:   As above    Past Medical History:  Past Medical History:   Diagnosis Date   • Anemia    • Arthritis    • Atrial fibrillation (CMS/HCC)    • Constipation    • COPD (chronic obstructive pulmonary disease) (CMS/HCC)    • Diverticulosis    • GERD (gastroesophageal reflux disease)    • Hx of degenerative disc disease    • Hyperlipidemia    • Hypertension    • Hypokalemia    • Hypothyroidism    • Knee swelling    • Lung cancer (CMS/HCC)     history   • Renal disorder    • Restless leg syndrome    • Sleep apnea with use of continuous positive airway pressure (CPAP)      Past Surgical History:  Past Surgical History:   Procedure Laterality Date   • BUNIONECTOMY Bilateral    • CATARACT EXTRACTION     • CHOLECYSTECTOMY     • COLONOSCOPY     • ENDOSCOPY N/A 2016    Procedure: ESOPHAGOGASTRODUODENOSCOPY  with biopsies;  Surgeon: Von Hernández MD;  Location: [facility];  Service:    • LUNG LOBECTOMY Left     lower lobe   • REPLACEMENT TOTAL KNEE Left    • THYROID BIOPSY     • TOTAL HIP ARTHROPLASTY Right 2019    Procedure: BIPOLAR HIP CEMENTED POSTERIOR;  Surgeon: Ashley Crenshaw MD;  Location: [facility];  Service: Orthopedics      Home Meds:  Medications Prior to Admission   Medication Sig Dispense Refill Last Dose   • cyclobenzaprine (FLEXERIL) 10 MG tablet Take 1 tablet by mouth 2 (Two) Times a Day As Needed for Muscle  Spasms. 14 tablet 0    • esomeprazole (NexIUM) 40 MG capsule Take 40 mg by mouth every morning before breakfast.      • furosemide (LASIX) 40 MG tablet TAKE ONE TABLET BY MOUTH EVERY MORNING FOR FLUID  0    • HYDROcodone-acetaminophen (NORCO) 5-325 MG per tablet Take 1 tablet by mouth Every 6 (Six) Hours As Needed for Moderate Pain . 15 tablet 0    • ipratropium-albuterol (DUO-NEB) 0.5-2.5 mg/mL nebulizer USE 1 VIAL IN NEBULIZER 2 TIMES DAILY. Generic: DUONEB 60 mL 10    • KLOR-CON 20 MEQ CR tablet       • lactulose (CHRONULAC) 10 GM/15ML solution TAKE 15 ML BY MOUTH 2 3 TIMES A DAY AS NEEDED FOR CONSTIPATION      • levothyroxine (SYNTHROID, LEVOTHROID) 88 MCG tablet Take 88 mcg by mouth Daily. Takes daily except Sundays (takes 6 days a week)      • linaclotide (LINZESS) 145 MCG capsule capsule Take 145 mcg by mouth Every Morning Before Breakfast.      • methylPREDNISolone (MEDROL) 4 MG dose pack Take as directed on package instructions. 21 each 0    • metOLazone (ZAROXOLYN) 5 MG tablet TAKE 1 TABLET BY MOUTH ON M/W/F AS DIRECTED      • pramipexole (MIRAPEX) 1.5 MG tablet Take 1.5 mg by mouth every night at bedtime.      • SYMBICORT 160-4.5 MCG/ACT inhaler       • Tiadylt  MG 24 hr capsule TAKE ONE CAPSULE BY MOUTH DAILY FOR BLOOD PRESSURE      • traMADol (ULTRAM) 50 MG tablet TAKE 1 TABLET BY MOUTH 3 TIMES A DAY AS NEEDED FOR PAIN      • valsartan-hydrochlorothiazide (DIOVAN-HCT) 320-25 MG per tablet Take 1 tablet by mouth Daily.          Allergies:  Allergies   Allergen Reactions   • Doxycycline Anaphylaxis     Facial swelling, shortness of air, dizzines     Immunizations:  Immunization History   Administered Date(s) Administered   • Td 08/25/2017     Social History:   Social History     Social History Narrative   • Not on file     Social History     Socioeconomic History   • Marital status:      Spouse name: Not on file   • Number of children: Not on file   • Years of education: Not on file   •  "Highest education level: Not on file   Tobacco Use   • Smoking status: Never Smoker   • Smokeless tobacco: Never Used   Substance and Sexual Activity   • Alcohol use: No   • Drug use: Defer   • Sexual activity: Defer       Family History:  Family History   Problem Relation Age of Onset   • Heart attack Mother    • Coronary artery disease Mother    • Hypertension Mother    • Heart attack Sister    • Colon cancer Neg Hx    • Colon polyps Neg Hx    • Esophageal cancer Neg Hx         Review of Systems  See history of present illness and past medical history.  Patient denies headache, dizziness, syncope, falls, trauma, change in vision, change in hearing, change in taste, changes in weight, changes in appetite, focal weakness, numbness, or paresthesia.  Patient denies chest pain, palpitations, dyspnea, orthopnea, PND, cough, sinus pressure, rhinorrhea, epistaxis, hemoptysis, nausea, vomiting,hematemesis, diarrhea, constipation or hematchezia.  Denies cold or heat intolerance, polydipsia, polyuria, polyphagia. Denies hematuria, pyuria, dysuria, hesitancy, frequency or urgency. Denies consumption of raw and under cooked meats foods or change in water source.  Denies fever, chills, sweats, night sweats.  Denies missing any routine medications. Remainder of ROS is negative.    Objective:  T Max 24 hrs: Temp (24hrs), Av.5 °F (36.9 °C), Min:98.4 °F (36.9 °C), Max:98.6 °F (37 °C)    Vitals Ranges:   Temp:  [98.4 °F (36.9 °C)-98.6 °F (37 °C)] 98.4 °F (36.9 °C)  Heart Rate:  [70-87] 87  Resp:  [16-18] 18  BP: (137-155)/(62-79) 139/62      Exam:  /62 (BP Location: Right arm, Patient Position: Lying)   Pulse 87   Temp 98.4 °F (36.9 °C) (Oral)   Resp 18   Ht 167.6 cm (66\")   Wt 102 kg (225 lb)   SpO2 96%   BMI 36.32 kg/m²     General Appearance:    Alert, cooperative, no distress, appears stated age   Head:    Normocephalic, without obvious abnormality, atraumatic   Eyes:    PERRL, conjunctivae/corneas clear, EOM's " intact, both eyes   Ears:    Normal external ear canals, both ears   Nose:   Nares normal, septum midline, mucosa normal, no drainage    or sinus tenderness   Throat:   Lips, mucosa, and tongue normal   Neck:   Supple, symmetrical, trachea midline, no adenopathy;     thyroid:  no enlargement/tenderness/nodules; no carotid    bruit or JVD   Back:     Symmetric, no curvature, ROM normal, no CVA tenderness   Lungs:     Decreased breath sounds bilaterally, respirations unlabored   Chest Wall:    No tenderness or deformity    Heart:    Regular rate and rhythm, S1 and S2 normal, no murmur, rub   or gallop   Abdomen:     Soft, nontender, bowel sounds active all four quadrants,     no masses, no hepatomegaly, no splenomegaly   Extremities:   Extremities normal, atraumatic, no cyanosis or edema   Pulses:   2+ and symmetric all extremities   Skin:   Skin color, texture, turgor normal, no rashes or lesions   Lymph nodes:   Cervical, supraclavicular, and axillary nodes normal   Neurologic:   CNII-XII intact, normal strength, sensation intact throughout      .    Data Review:  Labs in chart were reviewed.  WBC   Date Value Ref Range Status   03/06/2021 11.70 (H) 3.40 - 10.80 10*3/mm3 Final     Hemoglobin   Date Value Ref Range Status   03/06/2021 11.4 (L) 12.0 - 15.9 g/dL Final     Hematocrit   Date Value Ref Range Status   03/06/2021 34.6 34.0 - 46.6 % Final     Platelets   Date Value Ref Range Status   03/06/2021 188 140 - 450 10*3/mm3 Final     Sodium   Date Value Ref Range Status   03/06/2021 135 (L) 136 - 145 mmol/L Final     Potassium   Date Value Ref Range Status   03/06/2021 4.2 3.5 - 5.2 mmol/L Final     Chloride   Date Value Ref Range Status   03/06/2021 99 98 - 107 mmol/L Final     CO2   Date Value Ref Range Status   03/06/2021 26.7 22.0 - 29.0 mmol/L Final     BUN   Date Value Ref Range Status   03/06/2021 58 (H) 8 - 23 mg/dL Final     Creatinine   Date Value Ref Range Status   03/06/2021 1.83 (H) 0.57 - 1.00 mg/dL  Final     Glucose   Date Value Ref Range Status   03/06/2021 106 (H) 65 - 99 mg/dL Final     Calcium   Date Value Ref Range Status   03/06/2021 9.0 8.6 - 10.5 mg/dL Final     AST (SGOT)   Date Value Ref Range Status   03/06/2021 10 1 - 32 U/L Final     ALT (SGPT)   Date Value Ref Range Status   03/06/2021 17 1 - 33 U/L Final     Alkaline Phosphatase   Date Value Ref Range Status   03/06/2021 214 (H) 39 - 117 U/L Final                Imaging Results (All)     Procedure Component Value Units Date/Time    XR Chest 1 View [[id number]] Collected: 03/06/21 1219     Updated: 03/06/21 1458    Narrative:      ONE VIEW PORTABLE CHEST     HISTORY: Generalized weakness. Hypertension.     FINDINGS: The lungs are moderately well-expanded and clear. There is  myocardial megaly unchanged from 06/03/2019. No acute abnormality is  seen.     This report was finalized on 3/6/2021 2:55 PM by Dr. Lance Piper M.D.           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   • Diverticulosis of intestine K57.90   • Obstructive sleep apnea syndrome G47.33   • Weight loss R63.4   • Vitamin D deficiency E55.9   • Overweight (BMI 25.0-29.9) E66.3   • Spinal stenosis of lumbar region without neurogenic claudication M48.061   • Osteoarthritis of knee M17.10   • Obesity (BMI 30-39.9) E66.9   • Neutropenia (CMS/HCC) D70.9   • Chronic lower back pain M54.5, G89.29   • Knee pain M25.569   • Insomnia G47.00   • Hypothyroidism E03.9   • Hypokalemia E87.6   • Hypertension I10   • Hyperlipidemia E78.5   • Generalized osteoarthritis M15.9   • Gastroparesis K31.84   • Foot pain M79.673   • Chronic obstructive pulmonary disease (CMS/HCC) J44.9   • Chronic constipation K59.09   • Anemia D64.9   • Weight gain R63.5   • Pain of left breast N64.4   • Elevated serum alkaline phosphatase level R74.8   • Neck pain M54.2   • Volume overload E87.70   • Status post total hip replacement, right Z96.641   • Generalized weakness R53.1        Assessment:    Generalized weakness  back pain  Hypertension  Acute kidney injury  Copd  Sleep apnea  obesity    Plan:  Will ask physical therapy to see her  She has a transverse process fracture identified on a prior ED visit  Gentle fluids  Repeat labs tomorrow  Present pcp is unassigned  Physical therapy to see  Kailey Villar MD  3/6/2021  20:55 EST

## 2021-03-08 ENCOUNTER — ANESTHESIA (OUTPATIENT)
Dept: PAIN MEDICINE | Facility: HOSPITAL | Age: 81
End: 2021-03-08

## 2021-03-08 ENCOUNTER — APPOINTMENT (OUTPATIENT)
Dept: GENERAL RADIOLOGY | Facility: HOSPITAL | Age: 81
End: 2021-03-08

## 2021-03-08 ENCOUNTER — ANESTHESIA EVENT (OUTPATIENT)
Dept: PAIN MEDICINE | Facility: HOSPITAL | Age: 81
End: 2021-03-08

## 2021-03-08 ENCOUNTER — APPOINTMENT (OUTPATIENT)
Dept: PAIN MEDICINE | Facility: HOSPITAL | Age: 81
End: 2021-03-08

## 2021-03-08 LAB
ALBUMIN SERPL-MCNC: 3.4 G/DL (ref 3.5–5.2)
ANION GAP SERPL CALCULATED.3IONS-SCNC: 9.7 MMOL/L (ref 5–15)
BUN SERPL-MCNC: 31 MG/DL (ref 8–23)
BUN/CREAT SERPL: 23.5 (ref 7–25)
CALCIUM SPEC-SCNC: 8.7 MG/DL (ref 8.6–10.5)
CHLORIDE SERPL-SCNC: 99 MMOL/L (ref 98–107)
CO2 SERPL-SCNC: 28.3 MMOL/L (ref 22–29)
CREAT SERPL-MCNC: 1.32 MG/DL (ref 0.57–1)
GFR SERPL CREATININE-BSD FRML MDRD: 39 ML/MIN/1.73
GLUCOSE SERPL-MCNC: 99 MG/DL (ref 65–99)
MAGNESIUM SERPL-MCNC: 1.8 MG/DL (ref 1.6–2.4)
PHOSPHATE SERPL-MCNC: 3.5 MG/DL (ref 2.5–4.5)
POTASSIUM SERPL-SCNC: 4.4 MMOL/L (ref 3.5–5.2)
SODIUM SERPL-SCNC: 137 MMOL/L (ref 136–145)
TSH SERPL DL<=0.05 MIU/L-ACNC: 5.31 UIU/ML (ref 0.27–4.2)

## 2021-03-08 PROCEDURE — 94799 UNLISTED PULMONARY SVC/PX: CPT

## 2021-03-08 PROCEDURE — 83735 ASSAY OF MAGNESIUM: CPT | Performed by: HOSPITALIST

## 2021-03-08 PROCEDURE — 97530 THERAPEUTIC ACTIVITIES: CPT

## 2021-03-08 PROCEDURE — 3E0R33Z INTRODUCTION OF ANTI-INFLAMMATORY INTO SPINAL CANAL, PERCUTANEOUS APPROACH: ICD-10-PCS | Performed by: ANESTHESIOLOGY

## 2021-03-08 PROCEDURE — 80069 RENAL FUNCTION PANEL: CPT | Performed by: HOSPITALIST

## 2021-03-08 PROCEDURE — 77003 FLUOROGUIDE FOR SPINE INJECT: CPT

## 2021-03-08 PROCEDURE — 25010000002 METHYLPREDNISOLONE PER 80 MG: Performed by: ANESTHESIOLOGY

## 2021-03-08 PROCEDURE — 97161 PT EVAL LOW COMPLEX 20 MIN: CPT

## 2021-03-08 PROCEDURE — 84443 ASSAY THYROID STIM HORMONE: CPT | Performed by: HOSPITALIST

## 2021-03-08 RX ORDER — METHYLPREDNISOLONE ACETATE 80 MG/ML
80 INJECTION, SUSPENSION INTRA-ARTICULAR; INTRALESIONAL; INTRAMUSCULAR; SOFT TISSUE ONCE
Status: COMPLETED | OUTPATIENT
Start: 2021-03-08 | End: 2021-03-08

## 2021-03-08 RX ORDER — VALSARTAN 320 MG/1
320 TABLET ORAL
Status: DISCONTINUED | OUTPATIENT
Start: 2021-03-08 | End: 2021-03-12 | Stop reason: HOSPADM

## 2021-03-08 RX ORDER — FUROSEMIDE 40 MG/1
40 TABLET ORAL DAILY
Status: DISCONTINUED | OUTPATIENT
Start: 2021-03-09 | End: 2021-03-12 | Stop reason: HOSPADM

## 2021-03-08 RX ORDER — LEVOTHYROXINE SODIUM 0.1 MG/1
100 TABLET ORAL
Status: DISCONTINUED | OUTPATIENT
Start: 2021-03-09 | End: 2021-03-12 | Stop reason: HOSPADM

## 2021-03-08 RX ADMIN — CYCLOBENZAPRINE 10 MG: 10 TABLET, FILM COATED ORAL at 20:37

## 2021-03-08 RX ADMIN — VALSARTAN 320 MG: 320 TABLET, FILM COATED ORAL at 15:38

## 2021-03-08 RX ADMIN — CYCLOBENZAPRINE 10 MG: 10 TABLET, FILM COATED ORAL at 08:49

## 2021-03-08 RX ADMIN — PANTOPRAZOLE SODIUM 40 MG: 40 TABLET, DELAYED RELEASE ORAL at 06:00

## 2021-03-08 RX ADMIN — LEVOTHYROXINE SODIUM 88 MCG: 0.09 TABLET ORAL at 06:00

## 2021-03-08 RX ADMIN — BUDESONIDE AND FORMOTEROL FUMARATE DIHYDRATE 2 PUFF: 160; 4.5 AEROSOL RESPIRATORY (INHALATION) at 08:10

## 2021-03-08 RX ADMIN — HYDROCODONE BITARTRATE AND ACETAMINOPHEN 1 TABLET: 5; 325 TABLET ORAL at 01:48

## 2021-03-08 RX ADMIN — METHYLPREDNISOLONE ACETATE 80 MG: 80 INJECTION, SUSPENSION INTRA-ARTICULAR; INTRALESIONAL; INTRAMUSCULAR; SOFT TISSUE at 10:44

## 2021-03-08 RX ADMIN — PRAMIPEXOLE DIHYDROCHLORIDE 1.5 MG: 1.5 TABLET ORAL at 20:37

## 2021-03-08 RX ADMIN — BUDESONIDE AND FORMOTEROL FUMARATE DIHYDRATE 2 PUFF: 160; 4.5 AEROSOL RESPIRATORY (INHALATION) at 20:21

## 2021-03-08 RX ADMIN — HYDROCODONE BITARTRATE AND ACETAMINOPHEN 1 TABLET: 5; 325 TABLET ORAL at 15:43

## 2021-03-08 RX ADMIN — DILTIAZEM HYDROCHLORIDE 120 MG: 120 CAPSULE, COATED, EXTENDED RELEASE ORAL at 08:50

## 2021-03-08 RX ADMIN — HYDROCODONE BITARTRATE AND ACETAMINOPHEN 1 TABLET: 5; 325 TABLET ORAL at 08:49

## 2021-03-08 RX ADMIN — HYDROCODONE BITARTRATE AND ACETAMINOPHEN 1 TABLET: 5; 325 TABLET ORAL at 22:15

## 2021-03-08 NOTE — CONSULTS
Patient Name: Latanya Olsen  :  1940  MRN:  [id number]  Date of Admission: 3/6/2021    Subjective     Patient is a 80 y.o. female presents with chief complaint of acute, constant, severe low back and left lower extremity pain.  Onset of symptoms was gradual starting 6 weeks ago.  Symptoms are associated/aggravated by activity, lifting, sitting, standing, straining or walking for more than a few minutes. Symptoms improve with pain medication and rest.  On the pain scale from 0-10, she rates her pain as a 7 while at rest and a 10 with activity.  She describes the pain as sharp and stabbing in nature.  She has undergone lumbar epidural steroid injections in the past with favorable results.  She was referred to the pain clinic for a new series.    Her lumbar MRI results are as follows:    .  Multifocal degenerative disease involving the lumbar spine including multilevel neural foraminal compromise, loss of disc height, vacuum disc effect and posterior  element degenerative disease. Canal stenosis is most prominent at L4-L5  where it is estimated to be moderate. See above. No obvious disc  herniation is identified. Further evaluation could be performed with MRI  examination of the lumbar spine.  2.  Bilateral sacral (alar fractures are appreciated more extensive with  greater sclerosis on the right. There is subtle stranding anterior to  the left sacral ala. A transverse fracture involving the medial aspect  of the left L5 transverse process is appreciated with approximately 2 mm  of displacement. There is no evidence of associated callus bridging the  transverse process fracture or adjacent periosteal reaction. This likely  represents an acute to subacute fracture. Further evaluation could be  performed with a bone scan.  3.  There is partial visualization of the bladder but the bladder is at  least moderately distended. Clinical correlation is recommended. Further  evaluation could be performed with MRI  examination of the spine as  indicated.    The following portions of the patients history were reviewed and updated as appropriate: current medications, allergies, past medical history, past surgical history, past family history, past social history and problem list                Objective     Past Medical History:   Past Medical History:   Diagnosis Date   • Anemia    • Arthritis    • Atrial fibrillation (CMS/HCC)    • Constipation    • COPD (chronic obstructive pulmonary disease) (CMS/HCC)    • Diverticulosis    • GERD (gastroesophageal reflux disease)    • Hx of degenerative disc disease    • Hyperlipidemia    • Hypertension    • Hypokalemia    • Hypothyroidism    • Knee swelling    • Lung cancer (CMS/HCC)     history   • Renal disorder    • Restless leg syndrome    • Sleep apnea with use of continuous positive airway pressure (CPAP)      Past Surgical History:   Past Surgical History:   Procedure Laterality Date   • BUNIONECTOMY Bilateral    • CATARACT EXTRACTION     • CHOLECYSTECTOMY     • COLONOSCOPY     • ENDOSCOPY N/A 6/24/2016    Procedure: ESOPHAGOGASTRODUODENOSCOPY  with biopsies;  Surgeon: Von Hernández MD;  Location: [facility];  Service:    • LUNG LOBECTOMY Left     lower lobe   • REPLACEMENT TOTAL KNEE Left    • THYROID BIOPSY     • TOTAL HIP ARTHROPLASTY Right 6/1/2019    Procedure: BIPOLAR HIP CEMENTED POSTERIOR;  Surgeon: Ashley Crenshaw MD;  Location: [facility];  Service: Orthopedics     Family History:   Family History   Problem Relation Age of Onset   • Heart attack Mother    • Coronary artery disease Mother    • Hypertension Mother    • Heart attack Sister    • Colon cancer Neg Hx    • Colon polyps Neg Hx    • Esophageal cancer Neg Hx      Social History:   Social History     Socioeconomic History   • Marital status:      Spouse name: Not on file   • Number of children: Not on file   • Years of education: Not on file   • Highest education level: Not on file  "  Tobacco Use   • Smoking status: Never Smoker   • Smokeless tobacco: Never Used   Substance and Sexual Activity   • Alcohol use: No   • Drug use: Defer   • Sexual activity: Defer       Vital Signs Range for the last 24 hours  Temperature: Temp:  [36.2 °C (97.1 °F)-37.2 °C (99 °F)] 36.2 °C (97.1 °F)   Temp Source: Temp src: Oral   BP: BP: (133-151)/(50-75) 151/74   Pulse: Heart Rate:  [73-96] 76   Respirations: Resp:  [16-18] 16   SPO2: SpO2:  [92 %-97 %] 96 %   O2 Amount (l/min):     O2 Devices Device (Oxygen Therapy): room air   Weight:       Flowsheet Rows      First Filed Value   Admission Height  167.6 cm (66\") Documented at 03/06/2021 1214   Admission Weight  102 kg (225 lb) Documented at 03/06/2021 1214          --------------------------------------------------------------------------------    Current Facility-Administered Medications   Medication Dose Route Frequency Provider Last Rate Last Admin   • acetaminophen (TYLENOL) tablet 650 mg  650 mg Oral Q4H PRN Kailey Villar MD        Or   • acetaminophen (TYLENOL) 160 MG/5ML solution 650 mg  650 mg Oral Q4H PRN Kailey Villar MD        Or   • acetaminophen (TYLENOL) suppository 650 mg  650 mg Rectal Q4H PRN Kailey Villar MD       • budesonide-formoterol (SYMBICORT) 160-4.5 MCG/ACT inhaler 2 puff  2 puff Inhalation BID - RT Kailey Villar MD   2 puff at 03/08/21 0810   • cyclobenzaprine (FLEXERIL) tablet 10 mg  10 mg Oral BID PRN Kailey Villar MD   10 mg at 03/08/21 0849   • dilTIAZem CD (CARDIZEM CD) 24 hr capsule 120 mg  120 mg Oral Q24H StingKailey khoury MD   120 mg at 03/08/21 0850   • HYDROcodone-acetaminophen (NORCO) 5-325 MG per tablet 1 tablet  1 tablet Oral Q6H PRN Gordon Camejo MD   1 tablet at 03/08/21 0849   • ipratropium-albuterol (DUO-NEB) nebulizer solution 3 mL  3 mL Nebulization Q4H PRN Kailey Villar MD       • lactulose (CHRONULAC) 10 GM/15ML solution 20 g  20 g Oral BID PRN Jian, " Kailey Kowalski MD       • levothyroxine (SYNTHROID, LEVOTHROID) tablet 88 mcg  88 mcg Oral Q AM StingKailey khoury MD   88 mcg at 03/08/21 0600   • ondansetron (ZOFRAN) injection 4 mg  4 mg Intravenous Q6H PRN Kailey Villar MD       • pantoprazole (PROTONIX) EC tablet 40 mg  40 mg Oral QAM StingKailey khoury MD   40 mg at 03/08/21 0600   • pramipexole (MIRAPEX) tablet 1.5 mg  1.5 mg Oral Nightly StingKailey khoury MD   1.5 mg at 03/07/21 1955   • sodium chloride 0.9 % flush 10 mL  10 mL Intravenous PRN Jairo Oliveira III, PA   10 mL at 03/07/21 0847   • sodium chloride 0.9 % infusion  75 mL/hr Intravenous Continuous Issac Garcia MD 75 mL/hr at 03/07/21 1002 75 mL/hr at 03/07/21 1002       --------------------------------------------------------------------------------  Assessment/Plan      Anesthesia Evaluation     Patient summary reviewed and Nursing notes reviewed   NPO Solid Status: > 8 hours  NPO Liquid Status: > 2 hours    Pain impairs ability to perform ADLs: Bathing, Dressing, Toileting, Meal prep/Eating, Safe restroom use, Housekeeping, Ambulation and Sleeping  Modalities previously tried to control pain with limited effectiveness within the last 4-6 weeks: Rest, OTC medications and Prescription medications     Airway   Mallampati: II  TM distance: >3 FB  Neck ROM: full  Dental - normal exam     Pulmonary - negative pulmonary ROS and normal exam    breath sounds clear to auscultation  Cardiovascular - normal exam    Rhythm: regular  Rate: normal    (+) hypertension, dysrhythmias Atrial Fib, hyperlipidemia,   (-) angina, orthopnea, PND, MCKEON      Neuro/Psych- negative ROS  GI/Hepatic/Renal/Endo    (+) obesity,  GERD,  renal disease,     Musculoskeletal     (+) back pain, chronic pain, neck pain,   Abdominal  - normal exam    Abdomen: soft.  Bowel sounds: normal.   Substance History - negative use     OB/GYN negative ob/gyn ROS         Other   arthritis,    history of  cancer               Diagnosis and Plan    Treatment Plan  ASA 3      Procedures: Lumbar Epidural Steroid Injection(LESI) (Target area will be L4-5), With fluoroscopy,       Anesthetic plan and risks discussed with patient.        Alternative management options include physical therapy, medical management with nonsteroidal anti-inflammatory medications or narcotics, chiropractic manipulation and surgical intervention.    1.  Lumbar 5 epidural steroid injections, up to 3, spaced 1-2 weeks apart.  If pain control is acceptable after 1 or 2 injections, it was discussed with the patient that they may return for the subsequent injections if and when their pain returns.  The risks were discussed with the patient including failure of relief, worsening pain, Headache (post dural puncture headache), bleeding (epidural hematoma) and infection (epidural abscess or skin infection).  2.  Physical therapy exercises at home as prescribed by physical therapy or from the pain clinic handout (given to the patient).  Continuation of these exercises every day, or multiple times per week, even when the patient has good pain relief, was stressed to the patient as a preventative measure to decrease the frequency and severity of future pain episodes.  3.  Continue pain medicines as already prescribed.  If patient not currently taking any, it is recommended to begin Acetaminophen 1000 mg po q 8 hours.  If other medicines containing Acetaminophen are currently prescribed, maintain daily dose at 3000 mg.    4.  If they can tolerate NSAIDS, it is recommended to take Ibuprofen 600 mg po q 6 hours for 7 days during pain exacerbations.  Alternatively, they may substitute an NSAID of their choice (e.g. Aleve).  This may be taken at the same time as Acetaminophen.  5.  Heat and ice to the affected area as tolerated for pain control.  It was discussed that heating pads can cause burns.  6.  Daily low impact exercise such as walking or water exercise  was recommended to maintain overall health and aid in weight control.   7.  Follow up as needed for subsequent injections.  8.  Patient was counseled to abstain from tobacco products.      Diagnosis     * Degeneration of lumbar intervertebral disc [M51.36]     * Spinal stenosis of lumbar region with neurogenic claudication [M48.062]     * Lumbar radiculopathy [M54.16]

## 2021-03-08 NOTE — ANESTHESIA PROCEDURE NOTES
PAIN Epidural block      Patient reassessed immediately prior to procedure    Patient location during procedure: pain clinic  Start Time: 3/8/2021 10:30 AM  Stop Time: 3/8/2021 10:46 AM  Indication:procedure for pain  Performed By  Anesthesiologist: Gregory Harris MD  Preanesthetic Checklist  Completed: patient identified, site marked, risks and benefits discussed, surgical consent, monitors and equipment checked, pre-op evaluation and timeout performed  Additional Notes  Post-Op Diagnosis Codes:     * Degeneration of lumbar intervertebral disc (M51.36)     * Spinal stenosis of lumbar region with neurogenic claudication (M48.062)     * Lumbar radiculopathy (M54.16)    The patient was observed in recovery with no evidence of neurological deficits or other problems. All questions were answered. The patient was discharged with appropriate instructions.  Prep:  Pt Position:prone  Sterile Tech:cap, gloves, mask and sterile barrier  Prep:chlorhexidine gluconate and isopropyl alcohol  Monitoring:blood pressure monitoring, continuous pulse oximetry and EKG  Procedure:Sedation: no     Approach:left paramedian  Guidance: fluoroscopy  Location:lumbar  Level:L5-S1 (Interlaminar)  Needle Type:Tuohy  Needle Gauge:20 G  Aspiration:negative  Medications:  Depomedrol:80 mg  Preservative Free Saline:3mL  Comments:Contrast was not utilized due to poor kidney function.  Post Assessment:  Dressing:occlusive dressing applied  Pt Tolerance:patient tolerated the procedure well with no apparent complications  Complications:no

## 2021-03-08 NOTE — THERAPY EVALUATION
Patient Name: Latanya Olsen  : 1940    MRN: [id number]                              Today's Date: 3/8/2021       Admit Date: 3/6/2021    Visit Dx:     ICD-10-CM ICD-9-CM   1. Generalized weakness  R53.1 780.79   2. Acute on chronic renal insufficiency  N28.9 593.9    N18.9 585.9   3. Dehydration  E86.0 276.51   4. Closed fracture of transverse process of lumbar vertebra, initial encounter (CMS/[facility]ly [facility]) L5  S32.009A 805.4   5. Closed fracture of sacrum, unspecified portion of sacrum, initial encounter (CMS/Formerly [facility])-Sacral alar fractures  S32.10XA 805.6   6. Spinal stenosis of lumbar region without neurogenic claudication  M48.061 724.02     Patient Active Problem List   Diagnosis   • Carcinoid tumor of lung   • Diverticulosis of intestine   • Obstructive sleep apnea syndrome   • Weight loss   • Vitamin D deficiency   • Overweight (BMI 25.0-29.9)   • Spinal stenosis of lumbar region without neurogenic claudication   • Osteoarthritis of knee   • Obesity (BMI 30-39.9)   • Neutropenia (CMS/[facility])   • Chronic lower back pain   • Knee pain   • Insomnia   • Hypothyroidism   • Hypokalemia   • Hypertension   • Hyperlipidemia   • Generalized osteoarthritis   • Gastroparesis   • Foot pain   • Chronic obstructive pulmonary disease (CMS/[facility])   • Chronic constipation   • Anemia   • Weight gain   • Pain of left breast   • Elevated serum alkaline phosphatase level   • Neck pain   • Volume overload   • Status post total hip replacement, right   • Generalized weakness   • ARF (acute renal failure) (CMS/[facility])     Past Medical History:   Diagnosis Date   • Anemia    • Arthritis    • Atrial fibrillation (CMS/[facility])    • Constipation    • COPD (chronic obstructive pulmonary disease) (CMS/[facility])    • Diverticulosis    • GERD (gastroesophageal reflux disease)    • Hx of degenerative disc disease    • Hyperlipidemia    • Hypertension    • Hypokalemia    • Hypothyroidism    • Knee swelling    • Lung cancer (CMS/[facility])     history   • Renal disorder     • Restless leg syndrome    • Sleep apnea with use of continuous positive airway pressure (CPAP)      Past Surgical History:   Procedure Laterality Date   • BUNIONECTOMY Bilateral    • CATARACT EXTRACTION     • CHOLECYSTECTOMY     • COLONOSCOPY     • ENDOSCOPY N/A 6/24/2016    Procedure: ESOPHAGOGASTRODUODENOSCOPY  with biopsies;  Surgeon: Von Hernández MD;  Location: [facility] OR;  Service:    • LUNG LOBECTOMY Left     lower lobe   • REPLACEMENT TOTAL KNEE Left    • THYROID BIOPSY     • TOTAL HIP ARTHROPLASTY Right 6/1/2019    Procedure: BIPOLAR HIP CEMENTED POSTERIOR;  Surgeon: Ashley Crenshaw MD;  Location: [facility] MAIN OR;  Service: Orthopedics     General Information     Row Name 03/08/21 1609          Physical Therapy Time and Intention    Document Type  evaluation  -AP     Mode of Treatment  physical therapy  -AP     Row Name 03/08/21 1609          General Information    Patient Profile Reviewed  yes  -AP     Prior Level of Function  independent:  -AP     Existing Precautions/Restrictions  fall  -AP     Barriers to Rehab  none identified  -AP     Row Name 03/08/21 1609          Living Environment    Lives With  alone  -AP     Row Name 03/08/21 1609          Home Main Entrance    Number of Stairs, Main Entrance  none ramped entrance  -AP     Row Name 03/08/21 1609          Stairs Within Home, Primary    Number of Stairs, Within Home, Primary  none  -AP     Row Name 03/08/21 1609          Cognition    Orientation Status (Cognition)  oriented x 4  -AP     Row Name 03/08/21 1609          Safety Issues, Functional Mobility    Impairments Affecting Function (Mobility)  endurance/activity tolerance;pain;strength  -AP       User Key  (r) = Recorded By, (t) = Taken By, (c) = Cosigned By    Initials Name Provider Type    AP Suzy Horton PT Physical Therapist        Mobility     Row Name 03/08/21 1609          Bed Mobility    Bed Mobility  bed mobility (all) activities  -AP     All Activities,  [place] (Bed Mobility)  minimum assist (75% patient effort);1 person assist  -AP     Assistive Device (Bed Mobility)  bed rails;head of bed elevated  -AP     Comment (Bed Mobility)  with vc not to hold her breath during mobility as well  -AP     Row Name 03/08/21 1609          Transfers    Comment (Transfers)  STS completed x 2 during therapy with min A of therapist and vc for posture and sequencing  -AP     Row Name 03/08/21 1609          Bed-Chair Transfer    Bed-Chair Lucile (Transfers)  minimum assist (75% patient effort);1 person assist  -AP     Assistive Device (Bed-Chair Transfers)  walker, front-wheeled  -AP     Row Name 03/08/21 1609          Sit-Stand Transfer    Sit-Stand Lucile (Transfers)  minimum assist (75% patient effort);1 person assist  -AP     Assistive Device (Sit-Stand Transfers)  walker, front-wheeled  -AP     Row Name 03/08/21 1609          Gait/Stairs (Locomotion)    [place] Level (Gait)  contact guard;1 person assist  -AP     Assistive Device (Gait)  walker, front-wheeled  -AP     Distance in Feet (Gait)  25 with one standing rest break correction through  -AP     Deviations/Abnormal Patterns (Gait)  antalgic;bilateral deviations;base of support, wide;weight shifting decreased;stride length decreased;gait speed decreased;festinating/shuffling  -AP     Comment (Gait/Stairs)  B legs having external rotation noted and wide DENILSON, at one point stubbing her Rt big toe on the back pole of walker d/t widened DENILSON. shuffling steps with poor foot clearance B and unable to fully pass either foot with each stride. Flat foot without clear heel strike  -AP       User Key  (r) = Recorded By, (t) = Taken By, (c) = Cosigned By    Initials Name Provider Type    AP Suzy Horton PT Physical Therapist        Obj/Interventions     Row Name 03/08/21 1612          Range of Motion Comprehensive    General Range of Motion  no range of motion deficits identified  -AP     Row Name 03/08/21 1612           Strength Comprehensive (MMT)    General Manual Muscle Testing (MMT) Assessment  lower extremity strength deficits identified  -AP     Comment, General Manual Muscle Testing (MMT) Assessment  Generalized weakness of BLE approx 4/5, fatigue quickly with activity however  -AP     Row Name 03/08/21 1612          Motor Skills    Therapeutic Exercise  other (see comments) BLE therex including LAQ, marches, AP, hip abd/add, and mini squats, weight shifting in standing x 10 each  -AP     Row Name 03/08/21 1612          Balance    Balance Interventions  standing;weight shifting activity  -AP     Comment, Balance  good static standing balance, reports cramping and pain in hips/low back with this activity  -AP     Row Name 03/08/21 1612          Sensory Assessment (Somatosensory)    Sensory Assessment (Somatosensory)  sensation intact  -AP       User Key  (r) = Recorded By, (t) = Taken By, (c) = Cosigned By    Initials Name Provider Type    AP Suzy Horton, PT Physical Therapist        Goals/Plan     Row Name 03/08/21 1616          Bed Mobility Goal 1 (PT)    Activity/Assistive Device (Bed Mobility Goal 1, PT)  bed mobility activities, all  -AP     [place] Level/Cues Needed (Bed Mobility Goal 1, PT)  modified independence  -AP     Time Frame (Bed Mobility Goal 1, PT)  short term goal (STG);1 week  -AP     Row Name 03/08/21 1616          Transfer Goal 1 (PT)    Activity/Assistive Device (Transfer Goal 1, PT)  transfers, all;sit-to-stand/stand-to-sit;bed-to-chair/chair-to-bed;toilet  -AP     [place] Level/Cues Needed (Transfer Goal 1, PT)  modified independence  -AP     Time Frame (Transfer Goal 1, PT)  short term goal (STG);1 week  -AP     Row Name 03/08/21 1616          Gait Training Goal 1 (PT)    Activity/Assistive Device (Gait Training Goal 1, PT)  gait (walking locomotion)  -AP     [place] Level (Gait Training Goal 1, PT)  supervision required  -AP     Distance (Gait Training Goal 1, PT)  150  -AP      Time Frame (Gait Training Goal 1, PT)  1 week;short term goal (STG)  -AP     Row Name 03/08/21 1616          Patient Education Goal (PT)    Activity (Patient Education Goal, PT)  Verbalize and demonstrate falls risk precautions to return home safely.  -AP     [place]/Cues/Accuracy (Memory Goal 2, PT)  demonstrates adequately  -AP     Time Frame (Patient Education Goal, PT)  short term goal (STG);1 week  -AP       User Key  (r) = Recorded By, (t) = Taken By, (c) = Cosigned By    Initials Name Provider Type    AP Suzy Horton, PT Physical Therapist        Clinical Impression     Row Name 03/08/21 1613          Pain    Additional Documentation  Pain Scale: Numbers Pre/Post-Treatment (Group)  -AP     Row Name 03/08/21 1613          Pain Scale: Numbers Pre/Post-Treatment    Pretreatment Pain Rating  7/10  -AP     Posttreatment Pain Rating  7/10  -AP     Pain Location - Side  Bilateral  -AP     Pain Location - Orientation  lower  -AP     Pain Location  extremity  -AP     Pre/Posttreatment Pain Comment  pain in front of right shin, left hip, low back  -AP     Pain Intervention(s)  Medication (See MAR);Ambulation/increased activity;Repositioned  -AP     Row Name 03/08/21 1613          Plan of Care Review    Plan of Care Reviewed With  patient  -AP     Progress  no change  -AP     Outcome Summary  Pt is 79 yo F presenting to [facility] 03/06/2021 via ED for weakness and LBP with sharp radiating pain into her left leg. She has been unable to participate in ADL completion and states she hasn't been able to move off her couch. PMH sig for anemia, arthritis, Afib, COPD, GERD, DDD, HLD, HTN, hypothyroidism, lung CA, and sleep apnea on cpap. She presents with generalized weakness of BLE as she fatigues quickly after very minimal activity, stating she had been independent priorto admission but has felt herself getting progressively weak over the past several weeks. She will benefit from skilled PT in the acute setting to address  functional deficits in standing stability and balance, decreased mobility, decreased BLE muscular endurance, and gait deviations including poor foot clearance and lack of heel strike B. Anticipate d/c to SNF to which pt is agreeable.  -AP     Row Name 03/08/21 1613          Therapy Assessment/Plan (PT)    Patient/Family Therapy Goals Statement (PT)  Get stronger and back to moving around her house independently  -AP     Rehab Potential (PT)  good, to achieve stated therapy goals  -AP     Criteria for Skilled Interventions Met (PT)  yes  -AP     Predicted Duration of Therapy Intervention (PT)  1 week  -AP     Row Name 03/08/21 1613          Positioning and Restraints    Pre-Treatment Position  in bed  -AP     Post Treatment Position  chair  -AP     In Chair  reclined;call light within reach;encouraged to call for assist;exit alarm on;legs elevated  -AP       User Key  (r) = Recorded By, (t) = Taken By, (c) = Cosigned By    Initials Name Provider Type    Suzy Vazquez PT Physical Therapist        Outcome Measures     Row Name 03/08/21 1617          How much help from another person do you currently need...    Turning from your back to your side while in flat bed without using bedrails?  3  -AP     Moving from lying on back to sitting on the side of a flat bed without bedrails?  3  -AP     Moving to and from a bed to a chair (including a wheelchair)?  3  -AP     Standing up from a chair using your arms (e.g., wheelchair, bedside chair)?  3  -AP     Climbing 3-5 steps with a railing?  1  -AP     To walk in hospital room?  3  -AP     AM-PAC 6 Clicks Score (PT)  16  -AP     Row Name 03/08/21 1617          Functional Assessment    Outcome Measure Options  AM-PAC 6 Clicks Basic Mobility (PT)  -AP       User Key  (r) = Recorded By, (t) = Taken By, (c) = Cosigned By    Initials Name Provider Type    Suzy Vazquez PT Physical Therapist        Physical Therapy Education                 Title: PT OT SLP Therapies (Done)      Topic: Physical Therapy (Done)     Point: Mobility training (Done)     Learning Progress Summary           Patient Acceptance, E,TB, VU by  at 3/8/2021 1618                   Point: Home exercise program (Done)     Learning Progress Summary           Patient Acceptance, E,TB, VU by AP at 3/8/2021 1618                   Point: Body mechanics (Done)     Learning Progress Summary           Patient Acceptance, E,TB, VU by AP at 3/8/2021 1618                   Point: Precautions (Done)     Learning Progress Summary           Patient Acceptance, E,TB, VU by AP at 3/8/2021 1618                               User Key     Initials Effective Dates Name Provider Type Discipline     09/24/20 -  Suzy Horton, PT Physical Therapist PT              PT Recommendation and Plan  Planned Therapy Interventions (PT): balance training, bed mobility training, gait training, home exercise program, patient/family education, stair training, transfer training, strengthening  Plan of Care Reviewed With: patient  Progress: no change  Outcome Summary: Pt is 79 yo F presenting to [facility] 03/06/2021 via ED for weakness and LBP with sharp radiating pain into her left leg. She has been unable to participate in ADL completion and states she hasn't been able to move off her couch. PMH sig for anemia, arthritis, Afib, COPD, GERD, DDD, HLD, HTN, hypothyroidism, lung CA, and sleep apnea on cpap. She presents with generalized weakness of BLE as she fatigues quickly after very minimal activity, stating she had been independent priorto admission but has felt herself getting progressively weak over the past several weeks. She will benefit from skilled PT in the acute setting to address functional deficits in standing stability and balance, decreased mobility, decreased BLE muscular endurance, and gait deviations including poor foot clearance and lack of heel strike B. Anticipate d/c to SNF to which pt is agreeable.     Time Calculation:   PT Charges      Row Name 03/08/21 1618             Time Calculation    Start Time  1505  -AP      Stop Time  1528  -AP      Time Calculation (min)  23 min  -AP      PT Received On  03/08/21  -AP      PT - Next Appointment  03/09/21  -AP      PT Goal Re-Cert Due Date  03/15/21  -AP         Time Calculation- PT    Total Timed Code Minutes- PT  15 minute(s)  -AP        User Key  (r) = Recorded By, (t) = Taken By, (c) = Cosigned By    Initials Name Provider Type    AP Suzy Horton, PT Physical Therapist        Therapy Charges for Today     Code Description Service Date Service Provider Modifiers Qty    68617227839 HC PT EVAL LOW COMPLEXITY 2 3/8/2021 Suzy Horton, PT GP 1    69523128201 HC PT THERAPEUTIC ACT EA 15 MIN 3/8/2021 Suzy Horton PT GP 1          PT G-Codes  Outcome Measure Options: AM-PAC 6 Clicks Basic Mobility (PT)  AM-PAC 6 Clicks Score (PT): 16    Suzy Horton PT  3/8/2021

## 2021-03-08 NOTE — PLAN OF CARE
Goal Outcome Evaluation:  Plan of Care Reviewed With: patient  Progress: no change  Outcome Summary: Pt is 81 yo F presenting to [facility] 03/06/2021 via ED for weakness and LBP with sharp radiating pain into her left leg. She has been unable to participate in ADL completion and states she hasn't been able to move off her couch. PMH sig for anemia, arthritis, Afib, COPD, GERD, DDD, HLD, HTN, hypothyroidism, lung CA, and sleep apnea on cpap. She presents with generalized weakness of BLE as she fatigues quickly after very minimal activity, stating she had been independent priorto admission but has felt herself getting progressively weak over the past several weeks. She will benefit from skilled PT in the acute setting to address functional deficits in standing stability and balance, decreased mobility, decreased BLE muscular endurance, and gait deviations including poor foot clearance and lack of heel strike B. Anticipate d/c to SNF to which pt is agreeable.  Patient was intermittently wearing a face mask during this therapy encounter. Therapist used appropriate personal protective equipment including eye protection, mask, and gloves.  Mask used was standard procedure mask. Appropriate PPE was worn during the entire therapy session. Hand hygiene was completed before and after therapy session. Patient is not in enhanced droplet precautions.

## 2021-03-08 NOTE — PLAN OF CARE
Problem: Adult Inpatient Plan of Care  Goal: Absence of Hospital-Acquired Illness or Injury  Intervention: Identify and Manage Fall Risk  Recent Flowsheet Documentation  Taken 3/7/2021 1810 by Sultana Reeves RN  Safety Promotion/Fall Prevention: safety round/check completed  Taken 3/7/2021 1610 by Sultana Reeves RN  Safety Promotion/Fall Prevention:   safety round/check completed   activity supervised  Taken 3/7/2021 1415 by Sultana Reeves RN  Safety Promotion/Fall Prevention: safety round/check completed  Taken 3/7/2021 1010 by Sultana Reeves RN  Safety Promotion/Fall Prevention:   safety round/check completed   activity supervised  Taken 3/7/2021 0825 by Sultana Reeves RN  Safety Promotion/Fall Prevention:   activity supervised   nonskid shoes/slippers when out of bed  Intervention: Prevent Skin Injury  Recent Flowsheet Documentation  Taken 3/7/2021 0825 by Sultana Reeves RN  Body Position: position changed independently  Intervention: Prevent and Manage VTE (venous thromboembolism) Risk  Recent Flowsheet Documentation  Taken 3/7/2021 0825 by Sultana Reeves RN  VTE Prevention/Management:   bilateral   sequential compression devices on  Intervention: Prevent Infection  Recent Flowsheet Documentation  Taken 3/7/2021 0825 by Sultana Reeves RN  Infection Prevention:   visitors restricted/screened   single patient room provided   environmental surveillance performed   Goal Outcome Evaluation:

## 2021-03-08 NOTE — PROGRESS NOTES
Discharge Planning Assessment  [facility]     Patient Name: Latanya Olsen  MRN: [id number]  Today's Date: 3/8/2021    Admit Date: 3/6/2021    Discharge Needs Assessment     Row Name 03/08/21 1448       Living Environment    Lives With  alone    Current Living Arrangements  home/apartment/condo    Primary Care Provided by  self    Provides Primary Care For  no one, unable/limited ability to care for self    Family Caregiver if Needed  other relative(s)    Family Caregiver Names  Niece Alfred can assist    Quality of Family Relationships  helpful;involved;supportive    Able to Return to Prior Arrangements  yes       Resource/Environmental Concerns    Resource/Environmental Concerns  none    Transportation Concerns  car, none       Transition Planning    Patient/Family Anticipates Transition to  inpatient rehabilitation facility    Patient/Family Anticipated Services at Transition  ;skilled nursing    Transportation Anticipated  health plan transportation       Discharge Needs Assessment    Readmission Within the Last 30 Days  no previous admission in last 30 days    Current Outpatient/Agency/Support Group  skilled nursing facility    Equipment Currently Used at Home  cpap;shower chair;cane, straight;walker, rolling;other (see comments) Lift recliner    Concerns to be Addressed  discharge planning    Anticipated Changes Related to Illness  none    Equipment Needed After Discharge  none    Outpatient/Agency/Support Group Needs  skilled nursing facility        Discharge Plan     Row Name 03/08/21 1868       Plan    Plan  SNF, referral pending for [place]    Patient/Family in Agreement with Plan  yes    Plan Comments  Met with patient and niece at the bedside. Explained CCP role and verified face sheet. Patient lives in a home with a basement and a ramp to enter the home. She has a cpap, shower chair, cane, rolling walker, and lift recliner. She has been to [facility] in the past but requested a  referral to [facility] her sister is there. She used HH 2 years ago but is unsure who she used. Referral placed in [facility] and spoke with Celina/Sanjuanita and she will follow and let CCP know if able to accept. Partial packet in CCP office. Bri Torres, RN        Continued Care and Services - Admitted Since 3/6/2021     Destination     Service Provider Request Status Selected Services Address Phone Fax Patient Preferred    [facility]  Pending - Request Sent N/A [address] [facility] [address] [phone] --                Demographic Summary     Row Name 03/08/21 1447       General Information    Admission Type  inpatient    Arrived From  emergency department    Referral Source  admission list    Reason for Consult  discharge planning    Preferred Language  English       Contact Information    Permission Granted to Share Info With  family/designee        Functional Status     Row Name 03/08/21 1447       Functional Status    Usual Activity Tolerance  fair    Current Activity Tolerance  fair       Functional Status, IADL    Medications  independent    Meal Preparation  independent    Housekeeping  independent    Laundry  independent    Shopping  independent       Mental Status    General Appearance WDL  WDL       Mental Status Summary    Recent Changes in Mental Status/Cognitive Functioning  no changes        Psychosocial     Row Name 03/08/21 1447       Behavior WDL    Behavior WDL  WDL       Emotion Mood WDL    Emotion/Mood/Affect WDL  WDL       Speech WDL    Speech WDL  WDL       Perceptual State WDL    Perceptual State WDL  WDL       Thought Process WDL    Thought Process WDL  WDL       Intellectual Performance WDL    Intellectual Performance WDL  WDL       Coping/Stress    Major Change/Loss/Stressor  none    Patient Personal Strengths  able to adapt    Sources of Support  other family members    Techniques to [place] with Loss/Stress/Change  counseling    Reaction to Health  Status  accepting    Understanding of Condition and Treatment  adequate understanding of medical condition;adequate understanding of treatment       Developmental Stage (Eriksson's)    Developmental Stage  Stage 8 (65 years-death/Late Adulthood) Integrity vs. Despair        Abuse/Neglect     Row Name 03/08/21 1449       Personal Safety    Feels Unsafe at Home or Work/School  no    Feels Threatened by Someone  no    Does Anyone Try to Keep You From Having Contact with Others or Doing Things Outside Your Home?  no    Physical Signs of Abuse Present  no        Legal    No documentation.       Substance Abuse    No documentation.       Patient Forms    No documentation.           Bri Torres RN

## 2021-03-08 NOTE — DISCHARGE PLACEMENT REQUEST
"Latanya Troncoso (80 y.o. Female)     Date of Birth Social Security Number Address Home Phone MRN    1940  [id number] [place] [id number] [phone] [id number]    Faith Marital Status          Amish        Admission Date Admission Type Admitting Provider Attending Provider Department, Room/Bed    3/6/21 Emergency Stingl, MD Radha Umana Troy Andrew, MD [address], P584/1    Discharge Date Discharge Disposition Discharge Destination                       Attending Provider: Issac Garcia MD    Allergies: Doxycycline    Isolation: None   Infection: None   Code Status: CPR    Ht: 167.6 cm (66\")   Wt: 102 kg (225 lb)    Admission Cmt: None   Principal Problem: Generalized weakness [R53.1]                 Active Insurance as of 3/6/2021     Primary Coverage     Payor Plan Insurance Group Employer/Plan Group    MEDICARE MEDICARE A & B      Payor Plan Address Payor Plan Phone Number Payor Plan Fax Number Effective Dates    [address] [phone]  9/1/2004 - None Entered    [facility] [id number]       Subscriber Name Subscriber Birth Date Member ID       LATANYA TRONCOSO 1940 [id number]           Secondary Coverage     Payor Plan Insurance Group Employer/Plan Group    AARP MC SUP AARP HEALTH CARE OPTIONS      Payor Plan Address Payor Plan Phone Number Payor Plan Fax Number Effective Dates    [facility] [phone]  1/1/2006 - None Entered    [address]       [facility] [id number]       Subscriber Name Subscriber Birth Date Member ID       LATANYA TRONCOSO 1940 [id number]                 Emergency Contacts      (Rel.) Home Phone Work Phone Mobile Phone    JAJA (NIECE)SRAVANTHI (Relative) [phone] -- [phone]            {Outbreak/Travel/Exposure Documentation......;  Question Available Choices Patient Response   Outbreak Screen: Do you currently have a new onset of the following symptoms?        Fever/Chils, Cough, " Shortness of air, Loss of taste or smell, No, Unknown  No (03/06/21 1049)   Outbreak Screen: In the last 14 days, have you had contact with anyone who is ill, has show any of the symptoms listed above and/or has been diagnosis with the 2019 Novel Coronavirus? This includes any immediate household members but excludes any patients with whom you have been in contact within your normal work duties wearing proper PPE, if you are a healthcare worker.  Yes, No, Unknown              No (03/06/21 1049)   Outbreak Screen: Who was notified?    Free text  (not recorded)   Travel Screen: Have you traveled in the last month? If so, to what country have you traveled? If [place] what state? Yes, No, Unknown  List of all countries  List of all States No (03/06/21 1049)  (not recorded)  (not recorded)   Infection Risk: Do you currently have the following symptoms?  (If cough is selected, the Tuberculosis Screen is performed.) Cough, Fever, Rash, No No (03/06/21 1049)   Tuberculosis Screen: Do you have any of the following Tuberculosis Risks?  · Have you lived or spent time with anyone who had or may have TB?  · Have you lived in or visited any of the following areas for more than one month: [place], [place], [place], [place] or [place], the [place] or [place]?  · Do you have HIV/AIDS?  · Have you lived in or worked in a nursing home, homeless shelter, correctional facility, or substance abuse treatment facility?   · No    If Yes do you have any of the following symptoms? Yes responses display to the right    If Yes, symptoms listed are:  Cough greater than or equal to 3 weeks, Loss of appetite, Unexplained weight loss, Night sweats, Bloody sputum or hemoptysis, Hoarseness, Fever, Fatigue, Chest pain, No (not recorded)  (not recorded)   Exposure Screen: Have you been exposed to any of these contagious diseases in the last month? Measles, Chickenpox, Meningitis, Pertussis, Whooping Cough, No No (03/06/21 1049)

## 2021-03-08 NOTE — PLAN OF CARE
Problem: Adult Inpatient Plan of Care  Goal: Plan of Care Review  Outcome: Ongoing, Progressing  Flowsheets  Taken 3/8/2021 0414 by Yessenia Cast, RN  Progress: no change  Outcome Summary: A&Ox4, VSS, norco and flexeril given for patient control, purewick in use, urine output is good, c/o more right lower leg pain now around ankle and back of calf, calf is not warm to touch and is not red, 2L at hs for patient comfort, will continue to monitor.

## 2021-03-08 NOTE — PROGRESS NOTES
"    DAILY PROGRESS NOTE  [facility]    Patient Identification:  Name: Latanya Olsen  Age: 80 y.o.  Sex: female  :  1940  MRN: [id number]         Primary Care Physician: Mary Perkins MD    Subjective:  Interval History: Feels much better after seeing the epidural injection.  She is concerned about some of her swelling of her lower extremities and I reassured her as she has received IV fluids due to resolving ARF and currently she has a very mild edematous change but nothing that is pitting at this time.  Denies any chest pain troubles breathing altered mentation nausea and/or vomiting    Objective: Resting comfortably in bed.  Interactive conversational and nontoxic-appearing.  No family at bedside    Scheduled Meds:budesonide-formoterol, 2 puff, Inhalation, BID - RT  dilTIAZem CD, 120 mg, Oral, Q24H  iopamidol, 12 mL, Epidural, Once in imaging  [START ON 3/9/2021] levothyroxine, 100 mcg, Oral, Q AM  pantoprazole, 40 mg, Oral, QAM  pramipexole, 1.5 mg, Oral, Nightly      Continuous Infusions:sodium chloride, 75 mL/hr, Last Rate: 75 mL/hr (21 1002)        Vital signs in last 24 hours:  Temp:  [97.1 °F (36.2 °C)-99 °F (37.2 °C)] 98.1 °F (36.7 °C)  Heart Rate:  [68-96] 84  Resp:  [16-18] 17  BP: (133-162)/(50-75) 142/61    Intake/Output:    Intake/Output Summary (Last 24 hours) at 3/8/2021 1322  Last data filed at 3/8/2021 0120  Gross per 24 hour   Intake --   Output 2300 ml   Net -2300 ml       Exam:  /61 (BP Location: Left arm, Patient Position: Lying)   Pulse 84   Temp 98.1 °F (36.7 °C) (Oral)   Resp 17   Ht 167.6 cm (66\")   Wt 102 kg (225 lb)   SpO2 97%   BMI 36.32 kg/m²     General Appearance:    Alert, cooperative, AAOx3                          Head:    Normocephalic, without obvious abnormality, atraumatic                        Throat:   Oral mucosa pink and moist                           Neck:   Supple, symmetrical, trachea midline, no JVD                      "    Lungs:    Clear to auscultation bilaterally, respirations unlabored                          Heart:    Regular rate and rhythm, S1 and S2 normal                 Abdomen:     Soft, nontender, bowel sounds active                 Extremities:  No cyanosis with what seems more consistent with lymphedema and nothing pitting at this point                        Pulses:   Pulses palpable in lower extremities                            Skin:   Skin is warm and dry                  Neurologic:   CNII-XII intact     Data Review:  Labs in chart were reviewed.    Assessment:  Active Hospital Problems    Diagnosis  POA   • **Generalized weakness [R53.1]  Yes   • ARF (acute renal failure) (CMS/HCC) [N17.9]  Unknown   • Hypertension [I10]  Yes   • Chronic obstructive pulmonary disease (CMS/[facility]) [J44.9]  Yes   • Spinal stenosis of lumbar region without neurogenic claudication [M48.061]  Yes   • Obstructive sleep apnea syndrome [G47.33]  Yes      Resolved Hospital Problems   No resolved problems to display.       Plan:    ARF resolved with probable CKD 3   -BP stable despite holding ARB/diuretics - I will reinstate ARB today and restart diuretic tomorrow and continue to monitor kidney function   -No plans to resume hydrochlorothiazide    Appreciate orthopedic consultation.  Patient noted clinical improvement after receiving epidural steroid injection    Hypothyroidism with subtherapeutic TSH with increase in levothyroxine to 100 mcg daily    PT and CCP for discharge needs -should be medically ready by tomorrow    Issac Garcia MD  3/8/2021  13:22 EST

## 2021-03-09 LAB
ANION GAP SERPL CALCULATED.3IONS-SCNC: 9.3 MMOL/L (ref 5–15)
BUN SERPL-MCNC: 27 MG/DL (ref 8–23)
BUN/CREAT SERPL: 24.5 (ref 7–25)
CALCIUM SPEC-SCNC: 8.9 MG/DL (ref 8.6–10.5)
CHLORIDE SERPL-SCNC: 99 MMOL/L (ref 98–107)
CO2 SERPL-SCNC: 27.7 MMOL/L (ref 22–29)
CREAT SERPL-MCNC: 1.1 MG/DL (ref 0.57–1)
GFR SERPL CREATININE-BSD FRML MDRD: 48 ML/MIN/1.73
GLUCOSE SERPL-MCNC: 121 MG/DL (ref 65–99)
POTASSIUM SERPL-SCNC: 4.1 MMOL/L (ref 3.5–5.2)
SODIUM SERPL-SCNC: 136 MMOL/L (ref 136–145)

## 2021-03-09 PROCEDURE — 94799 UNLISTED PULMONARY SVC/PX: CPT

## 2021-03-09 PROCEDURE — 80048 BASIC METABOLIC PNL TOTAL CA: CPT | Performed by: HOSPITALIST

## 2021-03-09 PROCEDURE — 25010000002 ONDANSETRON PER 1 MG: Performed by: INTERNAL MEDICINE

## 2021-03-09 RX ORDER — LEVOTHYROXINE SODIUM 0.1 MG/1
100 TABLET ORAL DAILY
Start: 2021-03-09 | End: 2022-07-20 | Stop reason: SDUPTHER

## 2021-03-09 RX ORDER — VALSARTAN 320 MG/1
320 TABLET ORAL
Start: 2021-03-09 | End: 2022-06-27

## 2021-03-09 RX ADMIN — DILTIAZEM HYDROCHLORIDE 120 MG: 120 CAPSULE, COATED, EXTENDED RELEASE ORAL at 08:49

## 2021-03-09 RX ADMIN — LACTULOSE 20 G: 20 SOLUTION ORAL at 08:50

## 2021-03-09 RX ADMIN — PANTOPRAZOLE SODIUM 40 MG: 40 TABLET, DELAYED RELEASE ORAL at 06:19

## 2021-03-09 RX ADMIN — FUROSEMIDE 40 MG: 40 TABLET ORAL at 08:49

## 2021-03-09 RX ADMIN — ONDANSETRON 4 MG: 2 INJECTION INTRAMUSCULAR; INTRAVENOUS at 16:19

## 2021-03-09 RX ADMIN — BUDESONIDE AND FORMOTEROL FUMARATE DIHYDRATE 2 PUFF: 160; 4.5 AEROSOL RESPIRATORY (INHALATION) at 20:06

## 2021-03-09 RX ADMIN — HYDROCODONE BITARTRATE AND ACETAMINOPHEN 1 TABLET: 5; 325 TABLET ORAL at 08:49

## 2021-03-09 RX ADMIN — PRAMIPEXOLE DIHYDROCHLORIDE 1.5 MG: 1.5 TABLET ORAL at 20:05

## 2021-03-09 RX ADMIN — BUDESONIDE AND FORMOTEROL FUMARATE DIHYDRATE 2 PUFF: 160; 4.5 AEROSOL RESPIRATORY (INHALATION) at 08:40

## 2021-03-09 RX ADMIN — LEVOTHYROXINE SODIUM 100 MCG: 0.1 TABLET ORAL at 06:19

## 2021-03-09 RX ADMIN — VALSARTAN 320 MG: 320 TABLET, FILM COATED ORAL at 08:49

## 2021-03-09 RX ADMIN — ONDANSETRON 4 MG: 2 INJECTION INTRAMUSCULAR; INTRAVENOUS at 10:23

## 2021-03-09 NOTE — DISCHARGE SUMMARY
[facility]    Date of Discharge:  3/9/2021    PCP: Mary Perkins MD    Discharge Diagnosis:   Active Hospital Problems    Diagnosis  POA   • **ARF (acute renal failure) (CMS/[facility]) [N17.9]  Unknown   • Generalized weakness [R53.1]  Yes   • Hypertension [I10]  Yes   • Chronic obstructive pulmonary disease (CMS/[facility]) [J44.9]  Yes   • Spinal stenosis of lumbar region without neurogenic claudication [M48.061]  Yes   • Obstructive sleep apnea syndrome [G47.33]  Yes      Resolved Hospital Problems   No resolved problems to display.          Consults     Date and Time Order Name Status Description    3/6/2021  5:13 PM Inpatient Orthopedic Surgery Consult Completed     3/6/2021  1:00 PM LIPSYLVESTER (on-call MD unless specified) Completed         Hospital Course  Please see history and physical for details. Patient is a 80 y.o. female initially admitted for weakness with low back pain.  She does have a chronic history of low back pain but it got excessively worse.  As a result she was also found to be in acute renal failure.  I think she has some medication redundancy that could be contributory to renal dysfunction as she also has baseline CKD stage III.  At discharge I have resumed all of her blood pressure and diuretic meds but her HCTZ was not resumed and I would not endorse resuming that with the patient already on a loop diuretic daily.  I have also written to hold her metolazone which she takes 3 days a week right now until further follow-up with PCP and monitoring of BMP is ascertained.  At discharge she is euvolemic and her creatinine has trended down to 1.10 with a BUN of 27 and a GFR of 48 and a potassium of 4.1.  Orthopedics saw in consultation and suggested anesthesia consultation.  I appreciate the input and assistance from both and use of recent epidural gave this patient significant benefit in regards to her complaints of pain.  Her thyroid labs are also a  little bit off.  I have increased her levothyroxine to 100 mcg and she will need repeat of her TSH in the next 4 to 6 weeks per PCP.  She was formally on 88 mcg.  At this juncture all questions been answered to the patient she clinically feels much better.  She is very proactive when to get to rehab today and I feel she is more than medically stable at this juncture.  All questions answered to the best my ability.  No family present at bedside.  Patient did not asked me to reach out discussed case with any additional family members either at the time of discharge.      Condition on Discharge: Improved.     Temp:  [96.8 °F (36 °C)-98.1 °F (36.7 °C)] 96.8 °F (36 °C)  Heart Rate:  [68-96] 80  Resp:  [16-18] 18  BP: (140-162)/(61-93) 149/71  Body mass index is 36.32 kg/m².    Physical Exam  HENT:      Head: Normocephalic.      Nose: Nose normal.      Mouth/Throat:      Mouth: Mucous membranes are moist.      Pharynx: Oropharynx is clear.   Eyes:      General: No scleral icterus.     Conjunctiva/sclera: Conjunctivae normal.   Cardiovascular:      Rate and Rhythm: Normal rate and regular rhythm.   Pulmonary:      Effort: Pulmonary effort is normal.      Breath sounds: Normal breath sounds.   Abdominal:      General: Bowel sounds are normal.      Palpations: Abdomen is soft.   Skin:     General: Skin is warm and dry.   Neurological:      Mental Status: She is alert and oriented to person, place, and time. Mental status is at baseline.   Psychiatric:         Mood and Affect: Mood normal.         Behavior: Behavior normal.         Thought Content: Thought content normal.         Judgment: Judgment normal.     [unfilled]    Disposition: Skilled Nursing Facility (DC - External)       Discharge Medications      New Medications      Instructions Start Date   valsartan 320 MG tablet  Commonly known as: DIOVAN   320 mg, Oral, Every 24 Hours Scheduled         Changes to Medications      Instructions Start Date   levothyroxine 100 MCG  tablet  Commonly known as: SYNTHROID, LEVOTHROID  What changed:   · medication strength  · how much to take  · additional instructions   100 mcg, Oral, Daily         Continue These Medications      Instructions Start Date   cyclobenzaprine 10 MG tablet  Commonly known as: FLEXERIL   10 mg, Oral, 2 Times Daily PRN      esomeprazole 40 MG capsule  Commonly known as: nexIUM   40 mg, Oral, Every Morning Before Breakfast      furosemide 40 MG tablet  Commonly known as: LASIX   TAKE ONE TABLET BY MOUTH EVERY MORNING FOR FLUID      HYDROcodone-acetaminophen 5-325 MG per tablet  Commonly known as: NORCO   1 tablet, Oral, Every 6 Hours PRN      ipratropium-albuterol 0.5-2.5 mg/3 ml nebulizer  Commonly known as: DUO-NEB   USE 1 VIAL IN NEBULIZER 2 TIMES DAILY. Generic: DUONEB      lactulose 10 GM/15ML solution  Commonly known as: CHRONULAC   TAKE 15 ML BY MOUTH 2 3 TIMES A DAY AS NEEDED FOR CONSTIPATION      linaclotide 145 MCG capsule capsule  Commonly known as: LINZESS   145 mcg, Oral, Every Morning Before Breakfast      pramipexole 1.5 MG tablet  Commonly known as: MIRAPEX   1.5 mg, Oral, Every Night at Bedtime      Symbicort 160-4.5 MCG/ACT inhaler  Generic drug: budesonide-formoterol   No dose, route, or frequency recorded.      Tiadylt  MG 24 hr capsule  Generic drug: dilTIAZem   TAKE ONE CAPSULE BY MOUTH DAILY FOR BLOOD PRESSURE         Stop These Medications    KLOR-CON 20 MEQ CR tablet  Generic drug: potassium chloride     methylPREDNISolone 4 MG dose pack  Commonly known as: MEDROL     metOLazone 5 MG tablet  Commonly known as: ZAROXOLYN     traMADol 50 MG tablet  Commonly known as: ULTRAM     valsartan-hydrochlorothiazide 320-25 MG per tablet  Commonly known as: DIOVAN-HCT             Additional Instructions for the Follow-ups that You Need to Schedule     Discharge Follow-up with PCP   As directed       Currently Documented PCP:    Mary Perkins MD    PCP Phone Number:    [phone]     Follow Up  Details: PCP post rehab.  Surgery and anesthesia per their recommendations           Follow-up Information     Mary Perkins MD .    Specialty: Family Medicine  Why: PCP post rehab.  Surgery and anesthesia per their recommendations  Contact information:  [address] [facility] [address] [phone]                     Issac Garcia MD  03/09/21  08:48 EST    Discharge time spent greater than 30 minutes.

## 2021-03-09 NOTE — PROGRESS NOTES
Continued Stay Note  [facility]     Patient Name: Latanya Olsen  MRN: [id number]  Today's Date: 3/9/2021    Admit Date: 3/6/2021    Discharge Plan     Row Name 03/09/21 0950       Plan    Plan  [facility]    Patient/Family in Agreement with Plan  yes    Plan Comments  Outbound call to [facility] to update patient will be DC'd today and DC summary was faxed. Met with patient at the bedside and updated her that she can admit to [facility] today and DC orders were in. Patient stated her neice would be here at Noon and would transport her. Packet at nurses station with scripts. Bir Torres RN    Row Name 03/09/21 4454       Plan    Plan Comments  Inbound call from [facility]. Patient has been accepted and can admit when medically stable. Partial packet in CCP office. Bri Torres RN        Discharge Codes    No documentation.       Expected Discharge Date and Time     Expected Discharge Date Expected Discharge Time    Mar 9, 2021             Bri Torres RN

## 2021-03-09 NOTE — PROGRESS NOTES
Continued Stay Note  [facility]     Patient Name: Latanya Olsen  MRN: [id number]  Today's Date: 3/9/2021    Admit Date: 3/6/2021    Discharge Plan     Row Name 03/09/21 0830       Plan    Plan Comments  Inbound call from [facility]. Patient has been accepted and can admit when medically stable. Partial packet in CCP office. Bri Torres RN        Discharge Codes    No documentation.             Bri Torres RN     Spoke with pt discussed concerns, will write letter to limit exposure to covid pt if possible and sent to my chart all questions answered

## 2021-03-10 ENCOUNTER — APPOINTMENT (OUTPATIENT)
Dept: CT IMAGING | Facility: HOSPITAL | Age: 81
End: 2021-03-10

## 2021-03-10 ENCOUNTER — APPOINTMENT (OUTPATIENT)
Dept: GENERAL RADIOLOGY | Facility: HOSPITAL | Age: 81
End: 2021-03-10

## 2021-03-10 PROBLEM — K59.00 CONSTIPATION: Status: ACTIVE | Noted: 2021-03-10

## 2021-03-10 PROBLEM — N17.9 ARF (ACUTE RENAL FAILURE): Status: RESOLVED | Noted: 2021-03-07 | Resolved: 2021-03-10

## 2021-03-10 PROCEDURE — 97116 GAIT TRAINING THERAPY: CPT

## 2021-03-10 PROCEDURE — 74018 RADEX ABDOMEN 1 VIEW: CPT

## 2021-03-10 PROCEDURE — 74176 CT ABD & PELVIS W/O CONTRAST: CPT

## 2021-03-10 PROCEDURE — 94799 UNLISTED PULMONARY SVC/PX: CPT

## 2021-03-10 PROCEDURE — U0004 COV-19 TEST NON-CDC HGH THRU: HCPCS | Performed by: HOSPITALIST

## 2021-03-10 PROCEDURE — 82962 GLUCOSE BLOOD TEST: CPT

## 2021-03-10 PROCEDURE — 97110 THERAPEUTIC EXERCISES: CPT

## 2021-03-10 PROCEDURE — U0005 INFEC AGEN DETEC AMPLI PROBE: HCPCS | Performed by: HOSPITALIST

## 2021-03-10 RX ORDER — AMOXICILLIN 250 MG
2 CAPSULE ORAL 2 TIMES DAILY
Status: DISCONTINUED | OUTPATIENT
Start: 2021-03-10 | End: 2021-03-12 | Stop reason: HOSPADM

## 2021-03-10 RX ORDER — LACTULOSE 10 G/15ML
20 SOLUTION ORAL 3 TIMES DAILY
Status: DISCONTINUED | OUTPATIENT
Start: 2021-03-10 | End: 2021-03-12 | Stop reason: HOSPADM

## 2021-03-10 RX ORDER — LACTULOSE 10 G/15ML
20 SOLUTION ORAL 2 TIMES DAILY
Status: DISCONTINUED | OUTPATIENT
Start: 2021-03-10 | End: 2021-03-10

## 2021-03-10 RX ADMIN — CYCLOBENZAPRINE 10 MG: 10 TABLET, FILM COATED ORAL at 16:58

## 2021-03-10 RX ADMIN — LACTULOSE 20 G: 20 SOLUTION ORAL at 16:58

## 2021-03-10 RX ADMIN — BUDESONIDE AND FORMOTEROL FUMARATE DIHYDRATE 2 PUFF: 160; 4.5 AEROSOL RESPIRATORY (INHALATION) at 20:55

## 2021-03-10 RX ADMIN — PRAMIPEXOLE DIHYDROCHLORIDE 1.5 MG: 1.5 TABLET ORAL at 21:07

## 2021-03-10 RX ADMIN — SODIUM CHLORIDE, PRESERVATIVE FREE 10 ML: 5 INJECTION INTRAVENOUS at 21:07

## 2021-03-10 RX ADMIN — LACTULOSE 20 G: 20 SOLUTION ORAL at 21:07

## 2021-03-10 RX ADMIN — LEVOTHYROXINE SODIUM 100 MCG: 0.1 TABLET ORAL at 08:28

## 2021-03-10 RX ADMIN — DOCUSATE SODIUM 50MG AND SENNOSIDES 8.6MG 2 TABLET: 8.6; 5 TABLET, FILM COATED ORAL at 08:28

## 2021-03-10 RX ADMIN — VALSARTAN 320 MG: 320 TABLET, FILM COATED ORAL at 08:28

## 2021-03-10 RX ADMIN — PANTOPRAZOLE SODIUM 40 MG: 40 TABLET, DELAYED RELEASE ORAL at 08:28

## 2021-03-10 RX ADMIN — FUROSEMIDE 40 MG: 40 TABLET ORAL at 08:28

## 2021-03-10 RX ADMIN — BUDESONIDE AND FORMOTEROL FUMARATE DIHYDRATE 2 PUFF: 160; 4.5 AEROSOL RESPIRATORY (INHALATION) at 08:38

## 2021-03-10 RX ADMIN — DILTIAZEM HYDROCHLORIDE 120 MG: 120 CAPSULE, COATED, EXTENDED RELEASE ORAL at 08:28

## 2021-03-10 RX ADMIN — DOCUSATE SODIUM 50MG AND SENNOSIDES 8.6MG 2 TABLET: 8.6; 5 TABLET, FILM COATED ORAL at 21:07

## 2021-03-10 RX ADMIN — LACTULOSE 20 G: 20 SOLUTION ORAL at 10:03

## 2021-03-10 NOTE — PROGRESS NOTES
Continued Stay Note  [facility]     Patient Name: Latanya Olsen  MRN: [id number]  Today's Date: 3/10/2021    Admit Date: 3/6/2021    Discharge Plan     Row Name 03/10/21 1024       Plan    Plan Comments  Inbound call from [facility] and they can accept the patient today even if she doesn't have a BM. Message sent to MD and no plans for DC today but can DC tomorrow. Updated [facility] and patient needs a repeat covid test d/t vomiting. MD updated. Partial packet at nurses station. Bri Torres RN        Discharge Codes    No documentation.       Expected Discharge Date and Time     Expected Discharge Date Expected Discharge Time    Mar 9, 2021             Bri Torres RN

## 2021-03-10 NOTE — PLAN OF CARE
Goal Outcome Evaluation:  Vitals stable. No falls. No c/o pain. Pt did not have a bowel movement this shift, had 1 episode of brown thick emesis. Pt belching frequently, smells like stool. KUB complete, stat CT abd/pelvis ordered for further diagnosis. Pt was supposed to discharge to [facility] pending bowel movement. Resting comfortably. Monitoring closely.

## 2021-03-10 NOTE — PROGRESS NOTES
"    DAILY PROGRESS NOTE  [facility]    Patient Identification:  Name: Latanya Olsen  Age: 80 y.o.  Sex: female  :  1940  MRN: [id number]         Primary Care Physician: Mary Perkins MD    Subjective:  Interval History: Admits to nausea and claims emesis x1.  She denies any abdominal pains.  Admits to chronic constipation.  She is passing gas but has not had a BM to this point.  Denies chest pain confusion or shortness of breath    Objective: Emesis bag at bedside MD.  Patient personally looks clinically stable and more improved to me.  Conversational pleasant and appears in absolutely no distress.  Nontoxic-appearing    Scheduled Meds:budesonide-formoterol, 2 puff, Inhalation, BID - RT  dilTIAZem CD, 120 mg, Oral, Q24H  furosemide, 40 mg, Oral, Daily  iopamidol, 12 mL, Epidural, Once in imaging  lactulose, 20 g, Oral, TID  levothyroxine, 100 mcg, Oral, Q AM  pantoprazole, 40 mg, Oral, QAM  pramipexole, 1.5 mg, Oral, Nightly  senna-docusate sodium, 2 tablet, Oral, BID  valsartan, 320 mg, Oral, Q24H      Continuous Infusions:     Vital signs in last 24 hours:  Temp:  [97 °F (36.1 °C)-97.7 °F (36.5 °C)] 97.4 °F (36.3 °C)  Heart Rate:  [69-89] 69  Resp:  [18] 18  BP: (135-158)/(59-77) 135/65    Intake/Output:    Intake/Output Summary (Last 24 hours) at 3/10/2021 0829  Last data filed at 3/9/2021 2004  Gross per 24 hour   Intake 100 ml   Output --   Net 100 ml       Exam:  /65 (BP Location: Right arm, Patient Position: Lying)   Pulse 69   Temp 97.4 °F (36.3 °C) (Oral)   Resp 18   Ht 167.6 cm (66\")   Wt 102 kg (225 lb)   SpO2 93%   BMI 36.32 kg/m²     General Appearance:    Alert, cooperative, no distress, AAOx3                        Throat:   Oral mucosa pink and moist                           Neck:   No JVD                         Lungs:    Clear to auscultation bilaterally, respirations unlabored                           Heart:    Regular rate and rhythm, S1 and S2 " normal                  Abdomen:     Soft, nontender, bowel sounds active, no rebound no guarding                 extremities: Moving all, no cyanosis with baseline edema                        Pulses:   Pulses palpable in lower extremities                            Data Review:  Labs in chart were reviewed.    Assessment:  Active Hospital Problems    Diagnosis  POA   • **Constipation [K59.00]  Unknown   • Generalized weakness [R53.1]  Yes   • Hypertension [I10]  Yes   • Chronic obstructive pulmonary disease (CMS/HCC) [J44.9]  Yes   • Spinal stenosis of lumbar region without neurogenic claudication [M48.061]  Yes   • Obstructive sleep apnea syndrome [G47.33]  Yes      Resolved Hospital Problems    Diagnosis Date Resolved POA   • ARF (acute renal failure) (CMS/[facility]) [N17.9] 03/10/2021 Unknown       Plan:    Discharge canceled yesterday because of facility request and no BM.  Patient also had issues with nausea and had one bout of emesis but no complaints of abdominal pain.  Unfortunately this led to an x-ray which then led to a CT and ultimately no acute situation is going on.  This patient has issues with chronic constipation and she is awaiting bowel movement.  I personally think transfer to rehab would have done her some benefit with increased activity but we will hold her another day and await return of bowel function.  As needed lactulose made scheduled 3 times daily and senna S made twice daily.    Issac Garcia MD  3/10/2021  08:29 EST

## 2021-03-10 NOTE — NURSING NOTE
Pt AOx4, VSS, pain controlled with PRN meds. Up x1 w/ walker and gaitbelt. Plan to discharge to rehab, however facility wants pt to have bowel movement prior to discharge. Lactulose given this morning, prune juice given at lunch time, enema given about 1300. Pt has bowel sounds and passing lots of gas. Discharge packet in cubby with prescriptions inside, AVS copy already given to pt and family, report called to [facility]. Facility will just need to be updated prior to patient leaving. Will continue to monitor.

## 2021-03-10 NOTE — THERAPY TREATMENT NOTE
Patient Name: Latanya Olsen  : 1940    MRN: [id number]                              Today's Date: 3/10/2021       Admit Date: 3/6/2021    Visit Dx:     ICD-10-CM ICD-9-CM   1. Generalized weakness  R53.1 780.79   2. Acute on chronic renal insufficiency  N28.9 593.9    N18.9 585.9   3. Dehydration  E86.0 276.51   4. Closed fracture of transverse process of lumbar vertebra, initial encounter (CMS/[facility]) L5  S32.009A 805.4   5. Closed fracture of sacrum, unspecified portion of sacrum, initial encounter (CMS/[facility])-Sacral alar fractures  S32.10XA 805.6   6. Spinal stenosis of lumbar region without neurogenic claudication  M48.061 724.02     Patient Active Problem List   Diagnosis   • Carcinoid tumor of lung   • Diverticulosis of intestine   • Obstructive sleep apnea syndrome   • Weight loss   • Vitamin D deficiency   • Overweight (BMI 25.0-29.9)   • Spinal stenosis of lumbar region without neurogenic claudication   • Osteoarthritis of knee   • Obesity (BMI 30-39.9)   • Neutropenia (CMS/[facility])   • Chronic lower back pain   • Knee pain   • Insomnia   • Hypothyroidism   • Hypokalemia   • Hypertension   • Hyperlipidemia   • Generalized osteoarthritis   • Gastroparesis   • Foot pain   • Chronic obstructive pulmonary disease (CMS/[facility])   • Chronic constipation   • Anemia   • Weight gain   • Pain of left breast   • Elevated serum alkaline phosphatase level   • Neck pain   • Volume overload   • Status post total hip replacement, right   • Generalized weakness   • Constipation     Past Medical History:   Diagnosis Date   • Anemia    • Arthritis    • Atrial fibrillation (CMS/[facility])    • Constipation    • COPD (chronic obstructive pulmonary disease) (CMS/[facility])    • Diverticulosis    • GERD (gastroesophageal reflux disease)    • Hx of degenerative disc disease    • Hyperlipidemia    • Hypertension    • Hypokalemia    • Hypothyroidism    • Knee swelling    • Lung cancer (CMS/[facility])     history   • Renal disorder    • Restless leg  syndrome    • Sleep apnea with use of continuous positive airway pressure (CPAP)      Past Surgical History:   Procedure Laterality Date   • BUNIONECTOMY Bilateral    • CATARACT EXTRACTION     • CHOLECYSTECTOMY     • COLONOSCOPY     • ENDOSCOPY N/A 6/24/2016    Procedure: ESOPHAGOGASTRODUODENOSCOPY  with biopsies;  Surgeon: Von Hernández MD;  Location: [facility] OR;  Service:    • LUNG LOBECTOMY Left     lower lobe   • REPLACEMENT TOTAL KNEE Left    • THYROID BIOPSY     • TOTAL HIP ARTHROPLASTY Right 6/1/2019    Procedure: BIPOLAR HIP CEMENTED POSTERIOR;  Surgeon: Ashley Crenshaw MD;  Location: [facility] MAIN OR;  Service: Orthopedics     General Information     Row Name 03/10/21 1607          Physical Therapy Time and Intention    Document Type  therapy note (daily note)  -     Mode of Treatment  physical therapy;individual therapy  -     Row Name 03/10/21 1607          General Information    Existing Precautions/Restrictions  fall  -     Row Name 03/10/21 1607          Cognition    Orientation Status (Cognition)  oriented x 4  -     Row Name 03/10/21 1607          Safety Issues, Functional Mobility    Impairments Affecting Function (Mobility)  endurance/activity tolerance;strength  -       User Key  (r) = Recorded By, (t) = Taken By, (c) = Cosigned By    Initials Name Provider Type     Tarah Smith PTA Physical Therapy Assistant        Mobility     Row Name 03/10/21 1607          Bed Mobility    Comment (Bed Mobility)  NT-[facility]  -     Row Name 03/10/21 1607          Sit-Stand Transfer    Sit-Stand Summers (Transfers)  contact guard;verbal cues verbal cues for hand placement  -     Assistive Device (Sit-Stand Transfers)  walker, front-wheeled  -     Row Name 03/10/21 1607          Gait/Stairs (Locomotion)    Summers Level (Gait)  contact guard;1 person assist  -     Assistive Device (Gait)  walker, front-wheeled  -     Distance in Feet (Gait)  25  -      Deviations/Abnormal Patterns (Gait)  stride length decreased;weight shifting decreased;base of support, wide;gait speed decreased;robbie decreased  -     Bilateral Gait Deviations  forward flexed posture;heel strike decreased  -     Comment (Gait/Stairs)  Noticable Left LE swelling, pt c/o legs feeling heavy. Pt required cues for posture, to increase step length.  -       User Key  (r) = Recorded By, (t) = Taken By, (c) = Cosigned By    Initials Name Provider Type     Tarah Smith PTA Physical Therapy Assistant        Obj/Interventions     Row Name 03/10/21 1609          Motor Skills    Therapeutic Exercise  -- BLE: LAQs, AP, Hip abd/add, HS, QS (X10)  -       User Key  (r) = Recorded By, (t) = Taken By, (c) = Cosigned By    Initials Name Provider Type     Tarah Smith PTA Physical Therapy Assistant        Goals/Plan    No documentation.       Clinical Impression     [facility] Name 03/10/21 1610          Pain Scale: Numbers Pre/Post-Treatment    Pre/Posttreatment Pain Comment  Pt c/o BLEs with swelling.  -[facility] Name 03/10/21 1610          Plan of Care Review    Plan of Care Reviewed With  patient  -     Progress  improving  -     Outcome Summary  Pt tolerated treatment with c/o bilateral LE feeling heavy. Noticable swelling with the left LE. Pt is CGA with STS transfers. Pt ambulated 25ft with rwx, CGA. VC's for pt to increase step length and to maintain upright posture. Pt's ambulation distance limited due to fatigue. Pt performed bilateral LE exercises. Will continue to progress pt as tolerated.  -[facility] Name 03/10/21 1610          Positioning and Restraints    Pre-Treatment Position  sitting in chair/recliner  -     Post Treatment Position  chair  -     In Chair  reclined;call light within reach;encouraged to call for assist;exit alarm on  -       User Key  (r) = Recorded By, (t) = Taken By, (c) = Cosigned By    Initials Name Provider Type     Tarah Smith PTA Physical Therapy  Assistant        Outcome Measures     Row Name 03/10/21 1612          How much help from another person do you currently need...    Turning from your back to your side while in flat bed without using bedrails?  3  -EH     Moving from lying on back to sitting on the side of a flat bed without bedrails?  3  -EH     Moving to and from a bed to a chair (including a wheelchair)?  3  -EH     Standing up from a chair using your arms (e.g., wheelchair, bedside chair)?  3  -EH     Climbing 3-5 steps with a railing?  1  -EH     To walk in hospital room?  3  -EH     AM-PAC 6 Clicks Score (PT)  16  -       User Key  (r) = Recorded By, (t) = Taken By, (c) = Cosigned By    Initials Name Provider Type     Tarah Smith PTA Physical Therapy Assistant        Physical Therapy Education                 Title: PT OT SLP Therapies (Done)     Topic: Physical Therapy (Done)     Point: Mobility training (Done)     Learning Progress Summary           Patient Acceptance, E,D, VU,DU,NR by  at 3/10/2021 1613    Acceptance, E,TB, VU by  at 3/8/2021 1618                   Point: Home exercise program (Done)     Learning Progress Summary           Patient Acceptance, E,D, VU,DU,NR by  at 3/10/2021 1613    Acceptance, E,TB, VU by  at 3/8/2021 1618                   Point: Body mechanics (Done)     Learning Progress Summary           Patient Acceptance, E,D, VU,DU,NR by  at 3/10/2021 1613    Acceptance, E,TB, VU by  at 3/8/2021 1618                   Point: Precautions (Done)     Learning Progress Summary           Patient Acceptance, E,D, VU,DU,NR by  at 3/10/2021 1613    Acceptance, E,TB, VU by  at 3/8/2021 1618                               User Key     Initials Effective Dates Name Provider Type Discipline     08/19/18 -  Tarah Smith PTA Physical Therapy Assistant PT     09/24/20 -  Suzy Horton PT Physical Therapist PT              PT Recommendation and Plan     Plan of Care Reviewed With: patient  Progress:  improving  Outcome Summary: Pt tolerated treatment with c/o bilateral LE feeling heavy. Noticable swelling with the left LE. Pt is CGA with STS transfers. Pt ambulated 25ft with rwx, CGA. VC's for pt to increase step length and to maintain upright posture. Pt's ambulation distance limited due to fatigue. Pt performed bilateral LE exercises. Will continue to progress pt as tolerated.     Time Calculation:   PT Charges     Row Name 03/10/21 1606             Time Calculation    Start Time  1354  -      Stop Time  1418  -      Time Calculation (min)  24 min  -      PT Received On  03/10/21  -      PT - Next Appointment  03/11/21  -         Time Calculation- PT    Total Timed Code Minutes- PT  24 minute(s)  -        User Key  (r) = Recorded By, (t) = Taken By, (c) = Cosigned By    Initials Name Provider Type     Tarah Smith PTA Physical Therapy Assistant        Therapy Charges for Today     Code Description Service Date Service Provider Modifiers Qty    75219109805 HC GAIT TRAINING EA 15 MIN 3/10/2021 Tarah Smith PTA GP 1    23338219467 HC PT THER PROC EA 15 MIN 3/10/2021 Tarah Smith PTA GP 1          PT G-Codes  Outcome Measure Options: AM-PAC 6 Clicks Basic Mobility (PT)  AM-PAC 6 Clicks Score (PT): 16    Tarah Smith PTA  3/10/2021

## 2021-03-10 NOTE — PLAN OF CARE
Goal Outcome Evaluation:  Plan of Care Reviewed With: patient  Progress: improving  Outcome Summary: Pt tolerated treatment with c/o bilateral LE feeling heavy. Noticable swelling with the left LE. Pt is CGA with STS transfers. Pt ambulated 25ft with rwx, CGA. VC's for pt to increase step length and to maintain upright posture. Pt's ambulation distance limited due to fatigue. Pt performed bilateral LE exercises. Will continue to progress pt as tolerated.  ..Patient was wearing a face mask during this therapy encounter. Therapist used appropriate personal protective equipment including eye protection, mask, and gloves.  Mask used was standard procedure mask. Appropriate PPE was worn during the entire therapy session. Hand hygiene was completed before and after therapy session. Patient is not in enhanced droplet precautions.

## 2021-03-11 LAB
GLUCOSE BLDC GLUCOMTR-MCNC: 124 MG/DL (ref 70–130)
SARS-COV-2 ORF1AB RESP QL NAA+PROBE: NOT DETECTED

## 2021-03-11 PROCEDURE — 94799 UNLISTED PULMONARY SVC/PX: CPT

## 2021-03-11 PROCEDURE — 82962 GLUCOSE BLOOD TEST: CPT

## 2021-03-11 RX ORDER — BISACODYL 10 MG
10 SUPPOSITORY, RECTAL RECTAL DAILY
Status: DISCONTINUED | OUTPATIENT
Start: 2021-03-11 | End: 2021-03-12 | Stop reason: HOSPADM

## 2021-03-11 RX ORDER — AMOXICILLIN 250 MG
2 CAPSULE ORAL 2 TIMES DAILY
Start: 2021-03-11 | End: 2022-05-09 | Stop reason: SDUPTHER

## 2021-03-11 RX ADMIN — BUDESONIDE AND FORMOTEROL FUMARATE DIHYDRATE 2 PUFF: 160; 4.5 AEROSOL RESPIRATORY (INHALATION) at 20:44

## 2021-03-11 RX ADMIN — SODIUM CHLORIDE, PRESERVATIVE FREE 10 ML: 5 INJECTION INTRAVENOUS at 09:29

## 2021-03-11 RX ADMIN — VALSARTAN 320 MG: 320 TABLET, FILM COATED ORAL at 09:29

## 2021-03-11 RX ADMIN — SODIUM CHLORIDE, PRESERVATIVE FREE 10 ML: 5 INJECTION INTRAVENOUS at 20:28

## 2021-03-11 RX ADMIN — DOCUSATE SODIUM 50MG AND SENNOSIDES 8.6MG 2 TABLET: 8.6; 5 TABLET, FILM COATED ORAL at 20:28

## 2021-03-11 RX ADMIN — HYDROCODONE BITARTRATE AND ACETAMINOPHEN 1 TABLET: 5; 325 TABLET ORAL at 16:37

## 2021-03-11 RX ADMIN — DOCUSATE SODIUM 50MG AND SENNOSIDES 8.6MG 2 TABLET: 8.6; 5 TABLET, FILM COATED ORAL at 09:29

## 2021-03-11 RX ADMIN — LEVOTHYROXINE SODIUM 100 MCG: 0.1 TABLET ORAL at 06:20

## 2021-03-11 RX ADMIN — LACTULOSE 20 G: 20 SOLUTION ORAL at 16:37

## 2021-03-11 RX ADMIN — BUDESONIDE AND FORMOTEROL FUMARATE DIHYDRATE 2 PUFF: 160; 4.5 AEROSOL RESPIRATORY (INHALATION) at 07:59

## 2021-03-11 RX ADMIN — LACTULOSE 20 G: 20 SOLUTION ORAL at 09:29

## 2021-03-11 RX ADMIN — PRAMIPEXOLE DIHYDROCHLORIDE 1.5 MG: 1.5 TABLET ORAL at 20:28

## 2021-03-11 RX ADMIN — PANTOPRAZOLE SODIUM 40 MG: 40 TABLET, DELAYED RELEASE ORAL at 06:20

## 2021-03-11 RX ADMIN — FUROSEMIDE 40 MG: 40 TABLET ORAL at 09:29

## 2021-03-11 RX ADMIN — DILTIAZEM HYDROCHLORIDE 120 MG: 120 CAPSULE, COATED, EXTENDED RELEASE ORAL at 09:29

## 2021-03-11 RX ADMIN — BISACODYL 10 MG: 10 SUPPOSITORY RECTAL at 06:20

## 2021-03-11 RX ADMIN — LACTULOSE 20 G: 20 SOLUTION ORAL at 20:28

## 2021-03-11 RX ADMIN — HYDROCODONE BITARTRATE AND ACETAMINOPHEN 1 TABLET: 5; 325 TABLET ORAL at 02:18

## 2021-03-11 NOTE — DISCHARGE SUMMARY
[facility]    Date of Discharge:  3/11/2021    PCP: Mary Perkins MD    Discharge Diagnosis:   Active Hospital Problems    Diagnosis  POA   • **Constipation [K59.00]  Unknown   • Generalized weakness [R53.1]  Yes   • Hypertension [I10]  Yes   • Chronic obstructive pulmonary disease (CMS/HCC) [J44.9]  Yes   • Spinal stenosis of lumbar region without neurogenic claudication [M48.061]  Yes   • Obstructive sleep apnea syndrome [G47.33]  Yes      Resolved Hospital Problems    Diagnosis Date Resolved POA   • ARF (acute renal failure) (CMS/HCC) [N17.9] 03/10/2021 Unknown          Consults     Date and Time Order Name Status Description    3/6/2021  5:13 PM Inpatient Orthopedic Surgery Consult Completed     3/6/2021  1:00 PM JOEL (on-call MD unless specified) Completed         Hospital Course  Please see history and physical for details. Patient is a 80 y.o. female initially admitted for weakness with low back pain.  She does have a chronic history of low back pain but it got excessively worse.  As a result she was also found to be in acute renal failure.  I think she has some medication redundancy that could be contributory to renal dysfunction as she also has baseline CKD stage III.  At discharge I have resumed all of her blood pressure and diuretic meds but her HCTZ was not resumed and I would not endorse resuming that with the patient already on a loop diuretic daily.  I have also written to hold her metolazone which she takes 3 days a week right now until further follow-up with PCP and monitoring of BMP is ascertained.  At discharge she is euvolemic and her creatinine has trended down to 1.10 with a BUN of 27 and a GFR of 48 and a potassium of 4.1.  Orthopedics saw in consultation and suggested anesthesia consultation.  I appreciate the input and assistance from both and use of recent epidural gave this patient significant benefit in regards to her  complaints of pain.  Her thyroid labs are also a little bit off.  I have increased her levothyroxine to 100 mcg and she will need repeat of her TSH in the next 4 to 6 weeks per PCP.  She was formally on 88 mcg.  At this juncture all questions been answered to the patient she clinically feels much better.  She is very proactive when to get to rehab today and I feel she is more than medically stable at this juncture.  All questions answered to the best my ability.  No family present at bedside.  Patient did not asked me to reach out discussed case with any additional family members either at the time of discharge.      Addendum -transfer to facility was held secondary to their roles and requirements regarding BM.  This patient unfortunately has issues with chronic constipation which is made worse due to inactivity due to exacerbation of back and hip pain.  She is already on opiates and she used to take Linzess prior to admission.  I personally feel holding further transfer is not in this patient's best interest.  I think increase activity will help return of her gut function.  She has no associated complications with this constipation and has no nausea no vomiting no abdominal pain and is passing gas.  We have tried additional regimens such as increasing lactulose to 3 times daily and using of senna S since Linzess is nonformulary and we have also tried suppositories and enemas.  There are no concerning features on her CT of the abdomen pelvis obtained just the day or so prior to discharge shows a heavy stool burden but no aspects of fecal impaction.  Again I think this patient is more than medically ready for transition and discharge orders have been given pending BM.         Condition on Discharge: Improved.     Temp:  [98 °F (36.7 °C)-98.8 °F (37.1 °C)] 98.3 °F (36.8 °C)  Heart Rate:  [79-95] 81  Resp:  [16-20] 18  BP: (106-141)/(56-86) 121/59  Body mass index is 36.32 kg/m².    Physical Exam  HENT:      Head:  Normocephalic.      Nose: Nose normal.      Mouth/Throat:      Mouth: Mucous membranes are moist.      Pharynx: Oropharynx is clear.   Eyes:      General: No scleral icterus.     Conjunctiva/sclera: Conjunctivae normal.   Cardiovascular:      Rate and Rhythm: Normal rate and regular rhythm.   Pulmonary:      Effort: Pulmonary effort is normal.      Breath sounds: Normal breath sounds.   Abdominal:      General: Bowel sounds are normal. There is no distension.      Palpations: Abdomen is soft. There is no mass.      Tenderness: There is no abdominal tenderness. There is no guarding or rebound.   Musculoskeletal:         General: No swelling.   Skin:     Coloration: Skin is not jaundiced.   Neurological:      Mental Status: She is alert and oriented to person, place, and time.   Psychiatric:         Mood and Affect: Mood normal.         Behavior: Behavior normal.         Thought Content: Thought content normal.         Judgment: Judgment normal.     [unfilled]    Disposition: Skilled Nursing Facility (DC - External)       Discharge Medications      New Medications      Instructions Start Date   sennosides-docusate 8.6-50 MG per tablet  Commonly known as: PERICOLACE   2 tablets, Oral, 2 Times Daily      valsartan 320 MG tablet  Commonly known as: DIOVAN   320 mg, Oral, Every 24 Hours Scheduled         Changes to Medications      Instructions Start Date   levothyroxine 100 MCG tablet  Commonly known as: SYNTHROID, LEVOTHROID  What changed:   · medication strength  · how much to take  · additional instructions   100 mcg, Oral, Daily         Continue These Medications      Instructions Start Date   cyclobenzaprine 10 MG tablet  Commonly known as: FLEXERIL   10 mg, Oral, 2 Times Daily PRN      esomeprazole 40 MG capsule  Commonly known as: nexIUM   40 mg, Oral, Every Morning Before Breakfast      furosemide 40 MG tablet  Commonly known as: LASIX   TAKE ONE TABLET BY MOUTH EVERY MORNING FOR FLUID      HYDROcodone-acetaminophen  5-325 MG per tablet  Commonly known as: NORCO   1 tablet, Oral, Every 6 Hours PRN      ipratropium-albuterol 0.5-2.5 mg/3 ml nebulizer  Commonly known as: DUO-NEB   USE 1 VIAL IN NEBULIZER 2 TIMES DAILY. Generic: DUONEB      lactulose 10 GM/15ML solution  Commonly known as: CHRONULAC   TAKE 15 ML BY MOUTH 2 3 TIMES A DAY AS NEEDED FOR CONSTIPATION      linaclotide 145 MCG capsule capsule  Commonly known as: LINZESS   145 mcg, Oral, Every Morning Before Breakfast      pramipexole 1.5 MG tablet  Commonly known as: MIRAPEX   1.5 mg, Oral, Every Night at Bedtime      Symbicort 160-4.5 MCG/ACT inhaler  Generic drug: budesonide-formoterol   No dose, route, or frequency recorded.      Tiadylt  MG 24 hr capsule  Generic drug: dilTIAZem   TAKE ONE CAPSULE BY MOUTH DAILY FOR BLOOD PRESSURE         Stop These Medications    KLOR-CON 20 MEQ CR tablet  Generic drug: potassium chloride     methylPREDNISolone 4 MG dose pack  Commonly known as: MEDROL     metOLazone 5 MG tablet  Commonly known as: ZAROXOLYN     traMADol 50 MG tablet  Commonly known as: ULTRAM     valsartan-hydrochlorothiazide 320-25 MG per tablet  Commonly known as: DIOVAN-HCT             Additional Instructions for the Follow-ups that You Need to Schedule     Discharge Follow-up with PCP   As directed       Currently Documented PCP:    Mary Perkins MD    PCP Phone Number:    [phone]     Follow Up Details: PCP post rehab.  Surgery and anesthesia per their recommendations           Follow-up Information     Mary Perkins MD .    Specialty: Family Medicine  Why: PCP post rehab.  Surgery and anesthesia per their recommendations  Contact information:  [address] [facility] [facility] [address] [phone]             Elgin Lake MD. Call in 6 week(s).    Specialty: Orthopedic Surgery  Contact information:  [address]  [address] [address] [phone]                     Issac Garcia MD  03/11/21  08:36 EST    Discharge  time spent greater than 30 minutes.

## 2021-03-11 NOTE — NURSING NOTE
AOx4. VSS on room air. Voids to the toilet. Pt still awaiting to have BM. BM regimen given. Pt asked for suppository overnight. Paged and spoke to on-call NP. Suppository scheduled to be given in AM. Pain managed with PRN pain meds. Poss. D/c tomorrow to [facility]. Will continue to monitor patient's progress.

## 2021-03-11 NOTE — PROGRESS NOTES
Continued Stay Note  [facility]     Patient Name: Latanya Olsen  MRN: [id number]  Today's Date: 3/11/2021    Admit Date: 3/6/2021    Discharge Plan     Row Name 03/11/21 0917       Plan    Plan  [facility]    Patient/Family in Agreement with Plan  yes    Plan Comments  Orders noted to DC if patient has a BM. Outbound call to [facility]. Left VM updating her on order to DC if patient has a BM. CCP will follow. Packet at nurse's station. Bri Torres RN        Discharge Codes    No documentation.       Expected Discharge Date and Time     Expected Discharge Date Expected Discharge Time    Mar 9, 2021             Bri Torres RN

## 2021-03-11 NOTE — PLAN OF CARE
Problem: Adult Inpatient Plan of Care  Goal: Absence of Hospital-Acquired Illness or Injury  Intervention: Identify and Manage Fall Risk  Recent Flowsheet Documentation  Taken 3/11/2021 1809 by Anderson uKmar RN  Safety Promotion/Fall Prevention:   activity supervised   assistive device/personal items within reach   clutter free environment maintained   elopement precautions   fall prevention program maintained   gait belt   lighting adjusted   mobility aid in reach   muscle strengthening facilitated   nonskid shoes/slippers when out of bed   room organization consistent   safety round/check completed   toileting scheduled  Taken 3/11/2021 1606 by Anderson Kumar RN  Safety Promotion/Fall Prevention:   activity supervised   assistive device/personal items within reach   elopement precautions   clutter free environment maintained   lighting adjusted   gait belt   fall prevention program maintained   mobility aid in reach   muscle strengthening facilitated   nonskid shoes/slippers when out of bed   room organization consistent   safety round/check completed   toileting scheduled  Taken 3/11/2021 1411 by Anderson Kumar RN  Safety Promotion/Fall Prevention:   activity supervised   assistive device/personal items within reach   clutter free environment maintained   gait belt   fall prevention program maintained   elopement precautions   lighting adjusted   mobility aid in reach   muscle strengthening facilitated   nonskid shoes/slippers when out of bed   room organization consistent   safety round/check completed   toileting scheduled  Taken 3/11/2021 1049 by Anderson Kumar RN  Safety Promotion/Fall Prevention:   activity supervised   elopement precautions   assistive device/personal items within reach   clutter free environment maintained   gait belt   lighting adjusted   fall prevention program maintained   mobility aid in reach   muscle strengthening facilitated   nonskid shoes/slippers when out of bed   room  organization consistent   safety round/check completed   toileting scheduled  Taken 3/11/2021 0817 by Anderson Kumar RN  Safety Promotion/Fall Prevention:   activity supervised   assistive device/personal items within reach   clutter free environment maintained   elopement precautions   fall prevention program maintained   gait belt   mobility aid in reach   lighting adjusted   muscle strengthening facilitated   nonskid shoes/slippers when out of bed   room organization consistent   safety round/check completed   toileting scheduled  Intervention: Prevent Skin Injury  Recent Flowsheet Documentation  Taken 3/11/2021 1809 by Anderson Kumar RN  Body Position: position changed independently  Taken 3/11/2021 1606 by Anderson Kumar RN  Body Position: position changed independently  Taken 3/11/2021 1411 by Anderson Kumar RN  Body Position: position changed independently  Taken 3/11/2021 1049 by Anderson Kumar RN  Body Position: position changed independently  Taken 3/11/2021 0817 by Anderson Kumar RN  Body Position: position changed independently  Intervention: Prevent and Manage VTE (venous thromboembolism) Risk  Recent Flowsheet Documentation  Taken 3/11/2021 1809 by Anderson Kumar RN  VTE Prevention/Management:   bilateral   sequential compression devices on  Taken 3/11/2021 1606 by Anderson Kumar RN  VTE Prevention/Management:   bilateral   sequential compression devices on  Taken 3/11/2021 1411 by Anderson Kumar RN  VTE Prevention/Management:   bilateral   sequential compression devices on  Taken 3/11/2021 1049 by Anderson Kumar RN  VTE Prevention/Management:   bilateral   sequential compression devices on  Taken 3/11/2021 0817 by Anderson Kumar RN  VTE Prevention/Management:   bilateral   sequential compression devices on  Intervention: Prevent Infection  Recent Flowsheet Documentation  Taken 3/11/2021 1809 by Anderson Kumar RN  Infection Prevention:   cohorting utilized   environmental surveillance  performed   equipment surfaces disinfected   hand hygiene promoted   personal protective equipment utilized   rest/sleep promoted   single patient room provided   visitors restricted/screened  Taken 3/11/2021 1606 by Anderson Kumar RN  Infection Prevention:   cohorting utilized   environmental surveillance performed   equipment surfaces disinfected   hand hygiene promoted   rest/sleep promoted   personal protective equipment utilized   single patient room provided   visitors restricted/screened  Taken 3/11/2021 1411 by Anderson Kumar RN  Infection Prevention:   cohorting utilized   environmental surveillance performed   equipment surfaces disinfected   hand hygiene promoted   personal protective equipment utilized   single patient room provided   visitors restricted/screened   rest/sleep promoted  Taken 3/11/2021 1049 by Anderson Kumar RN  Infection Prevention:   cohorting utilized   environmental surveillance performed   equipment surfaces disinfected   hand hygiene promoted   personal protective equipment utilized   rest/sleep promoted   single patient room provided   visitors restricted/screened  Taken 3/11/2021 0817 by Anderson Kumar RN  Infection Prevention:   cohorting utilized   environmental surveillance performed   hand hygiene promoted   equipment surfaces disinfected   personal protective equipment utilized   rest/sleep promoted   single patient room provided   visitors restricted/screened     Problem: Skin Injury Risk Increased  Goal: Skin Health and Integrity  Intervention: Optimize Skin Protection  Recent Flowsheet Documentation  Taken 3/11/2021 1809 by Anderson Kumar RN  Head of Bed (HOB): HOB at 30-45 degrees  Taken 3/11/2021 1606 by Anderson Kumar RN  Head of Bed (HOB): HOB at 30-45 degrees  Taken 3/11/2021 1411 by Anderson Kumar RN  Head of Bed (HOB): HOB at 30-45 degrees  Taken 3/11/2021 1049 by Anderson Kumar RN  Head of Bed (HOB): HOB elevated  Taken 3/11/2021 0817 by Anderson Kumar  RN  Head of Bed (HOB): HOB elevated     Problem: Fall Injury Risk  Goal: Absence of Fall and Fall-Related Injury  Intervention: Identify and Manage Contributors to Fall Injury Risk  Recent Flowsheet Documentation  Taken 3/11/2021 1809 by Anderson Kumar RN  Medication Review/Management: medications reviewed  Taken 3/11/2021 1606 by Anderson Kumar RN  Medication Review/Management: medications reviewed  Taken 3/11/2021 1411 by Anderson Kumar RN  Medication Review/Management: medications reviewed  Taken 3/11/2021 1049 by Anderson Kumar RN  Medication Review/Management: medications reviewed  Taken 3/11/2021 0817 by Anderson Kumra RN  Medication Review/Management: medications reviewed  Intervention: Promote Injury-Free Environment  Recent Flowsheet Documentation  Taken 3/11/2021 1809 by Anderson Kumar RN  Safety Promotion/Fall Prevention:   activity supervised   assistive device/personal items within reach   clutter free environment maintained   elopement precautions   fall prevention program maintained   gait belt   lighting adjusted   mobility aid in reach   muscle strengthening facilitated   nonskid shoes/slippers when out of bed   room organization consistent   safety round/check completed   toileting scheduled  Taken 3/11/2021 1606 by Anderson Kumar RN  Safety Promotion/Fall Prevention:   activity supervised   assistive device/personal items within reach   elopement precautions   clutter free environment maintained   lighting adjusted   gait belt   fall prevention program maintained   mobility aid in reach   muscle strengthening facilitated   nonskid shoes/slippers when out of bed   room organization consistent   safety round/check completed   toileting scheduled  Taken 3/11/2021 1411 by Anderson Kumar RN  Safety Promotion/Fall Prevention:   activity supervised   assistive device/personal items within reach   clutter free environment maintained   gait belt   fall prevention program maintained   elopement  precautions   lighting adjusted   mobility aid in reach   muscle strengthening facilitated   nonskid shoes/slippers when out of bed   room organization consistent   safety round/check completed   toileting scheduled  Taken 3/11/2021 1049 by Anderson Kumar RN  Safety Promotion/Fall Prevention:   activity supervised   elopement precautions   assistive device/personal items within reach   clutter free environment maintained   gait belt   lighting adjusted   fall prevention program maintained   mobility aid in reach   muscle strengthening facilitated   nonskid shoes/slippers when out of bed   room organization consistent   safety round/check completed   toileting scheduled  Taken 3/11/2021 0817 by Anderson Kumar, RN  Safety Promotion/Fall Prevention:   activity supervised   assistive device/personal items within reach   clutter free environment maintained   elopement precautions   fall prevention program maintained   gait belt   mobility aid in reach   lighting adjusted   muscle strengthening facilitated   nonskid shoes/slippers when out of bed   room organization consistent   safety round/check completed   toileting scheduled   Goal Outcome Evaluation:

## 2021-03-12 VITALS
HEART RATE: 86 BPM | DIASTOLIC BLOOD PRESSURE: 69 MMHG | BODY MASS INDEX: 36.16 KG/M2 | SYSTOLIC BLOOD PRESSURE: 117 MMHG | OXYGEN SATURATION: 96 % | TEMPERATURE: 98.7 F | WEIGHT: 225 LBS | RESPIRATION RATE: 18 BRPM | HEIGHT: 66 IN

## 2021-03-12 PROCEDURE — 94799 UNLISTED PULMONARY SVC/PX: CPT

## 2021-03-12 RX ADMIN — DILTIAZEM HYDROCHLORIDE 120 MG: 120 CAPSULE, COATED, EXTENDED RELEASE ORAL at 08:59

## 2021-03-12 RX ADMIN — FUROSEMIDE 40 MG: 40 TABLET ORAL at 08:59

## 2021-03-12 RX ADMIN — LEVOTHYROXINE SODIUM 100 MCG: 0.1 TABLET ORAL at 06:48

## 2021-03-12 RX ADMIN — BUDESONIDE AND FORMOTEROL FUMARATE DIHYDRATE 2 PUFF: 160; 4.5 AEROSOL RESPIRATORY (INHALATION) at 07:55

## 2021-03-12 RX ADMIN — PANTOPRAZOLE SODIUM 40 MG: 40 TABLET, DELAYED RELEASE ORAL at 06:48

## 2021-03-12 RX ADMIN — VALSARTAN 320 MG: 320 TABLET, FILM COATED ORAL at 09:00

## 2021-03-12 RX ADMIN — DOCUSATE SODIUM 50MG AND SENNOSIDES 8.6MG 2 TABLET: 8.6; 5 TABLET, FILM COATED ORAL at 08:59

## 2021-03-12 RX ADMIN — LACTULOSE 20 G: 20 SOLUTION ORAL at 09:00

## 2021-03-12 NOTE — PROGRESS NOTES
Pt has now had BM and as per Dr. Garcia's DC Summary and orders she is fine to go to facility now. D/w RN.

## 2021-03-12 NOTE — PROGRESS NOTES
Discharge Planning Assessment  [facility]     Patient Name: Latanya Olsen  MRN: [id number]  Today's Date: 3/12/2021    Admit Date: 3/6/2021    Discharge Needs Assessment    No documentation.       Discharge Plan     Row Name 03/12/21 1409       Plan    Plan  [facility] accepted and has a bed today    Final Discharge Disposition Code  03 - skilled nursing facility (SNF)    Final Note  Call received from [facility] has a bed today. Patient has been discharged and family will transport. Nancy MADRIGAL        Continued Care and Services - Discharged on 3/12/2021 Admission date: 3/6/2021 - Discharge disposition: Skilled Nursing Facility (DC - External)    Destination Coordination complete    Service Provider Request Status Selected Services Address Phone Fax Patient Preferred    [facility]   Selected Skilled Nursing [address] [place] [address] [phone] --              Expected Discharge Date and Time     Expected Discharge Date Expected Discharge Time    Mar 9, 2021         Demographic Summary    No documentation.       Functional Status    No documentation.       Psychosocial    No documentation.       Abuse/Neglect    No documentation.       Legal    No documentation.       Substance Abuse    No documentation.       Patient Forms    No documentation.           Alis Camacho, RN

## 2021-03-17 LAB
GLUCOSE BLDC GLUCOMTR-MCNC: 128 MG/DL (ref 70–130)
GLUCOSE BLDC GLUCOMTR-MCNC: 94 MG/DL (ref 70–130)

## 2021-03-24 ENCOUNTER — HOSPITAL ENCOUNTER (INPATIENT)
Facility: HOSPITAL | Age: 81
LOS: 10 days | Discharge: NURSING, REHAB OR LONG-TERM CARE | End: 2021-04-03
Attending: EMERGENCY MEDICINE | Admitting: INTERNAL MEDICINE

## 2021-03-24 ENCOUNTER — APPOINTMENT (OUTPATIENT)
Dept: CARDIOLOGY | Facility: HOSPITAL | Age: 81
End: 2021-03-24

## 2021-03-24 DIAGNOSIS — D64.9 ANEMIA: ICD-10-CM

## 2021-03-24 DIAGNOSIS — I82.422 ACUTE DEEP VEIN THROMBOSIS (DVT) OF ILIAC VEIN OF LEFT LOWER EXTREMITY (HCC): Primary | ICD-10-CM

## 2021-03-24 LAB
ALBUMIN SERPL-MCNC: 3.3 G/DL (ref 3.5–5.2)
ALBUMIN/GLOB SERPL: 1.4 G/DL
ALP SERPL-CCNC: 133 U/L (ref 39–117)
ALT SERPL W P-5'-P-CCNC: 30 U/L (ref 1–33)
ANION GAP SERPL CALCULATED.3IONS-SCNC: 11.5 MMOL/L (ref 5–15)
APTT PPP: 65.8 SECONDS (ref 22.7–35.4)
APTT PPP: 69.6 SECONDS (ref 22.7–35.4)
AST SERPL-CCNC: 18 U/L (ref 1–32)
BH CV LOW VAS LEFT COMMON FEMORAL SPONT: 1
BH CV LOW VAS LEFT DISTAL FEMORAL SPONT: 1
BH CV LOW VAS LEFT EXTERNAL ILIAC AUGMENT: NORMAL
BH CV LOW VAS LEFT EXTERNAL ILIAC COMPRESS: NORMAL
BH CV LOW VAS LEFT EXTERNAL ILIAC SPONT: 1
BH CV LOW VAS LEFT EXTERNAL ILIAC THROMBUS: NORMAL
BH CV LOW VAS LEFT GASTRONEMIUS VESSEL: 1
BH CV LOW VAS LEFT LESSER SAPH VESSEL: 1
BH CV LOW VAS LEFT MID FEMORAL SPONT: 1
BH CV LOW VAS LEFT PERONEAL VESSEL: 1
BH CV LOW VAS LEFT POPLITEAL SPONT: 1
BH CV LOW VAS LEFT POSTERIOR TIBIAL VESSEL: 1
BH CV LOW VAS LEFT PROFUNDA FEMORAL SPONT: 1
BH CV LOW VAS LEFT PROXIMAL FEMORAL SPONT: 1
BH CV LOW VAS LEFT SAPHENOFEMORAL JUNCTION SPONT: 1
BH CV LOW VAS LEFT SOLEAL VESSEL: 1
BH CV LOWER VASCULAR LEFT COMMON FEMORAL AUGMENT: NORMAL
BH CV LOWER VASCULAR LEFT COMMON FEMORAL COMPRESS: NORMAL
BH CV LOWER VASCULAR LEFT COMMON FEMORAL PHASIC: NORMAL
BH CV LOWER VASCULAR LEFT COMMON FEMORAL SPONT: NORMAL
BH CV LOWER VASCULAR LEFT COMMON FEMORAL THROMBUS: NORMAL
BH CV LOWER VASCULAR LEFT DISTAL FEMORAL COMPRESS: NORMAL
BH CV LOWER VASCULAR LEFT DISTAL FEMORAL THROMBUS: NORMAL
BH CV LOWER VASCULAR LEFT EXTERNAL ILIAC PHASIC: NORMAL
BH CV LOWER VASCULAR LEFT EXTERNAL ILIAC SPONT: NORMAL
BH CV LOWER VASCULAR LEFT GASTRONEMIUS COMPRESS: NORMAL
BH CV LOWER VASCULAR LEFT GASTRONEMIUS THROMBUS: NORMAL
BH CV LOWER VASCULAR LEFT GREATER SAPH AK COMPRESS: NORMAL
BH CV LOWER VASCULAR LEFT GREATER SAPH BK COMPRESS: NORMAL
BH CV LOWER VASCULAR LEFT LESSER SAPH COMPRESS: NORMAL
BH CV LOWER VASCULAR LEFT LESSER SAPH THROMBUS: NORMAL
BH CV LOWER VASCULAR LEFT MID FEMORAL AUGMENT: NORMAL
BH CV LOWER VASCULAR LEFT MID FEMORAL COMPRESS: NORMAL
BH CV LOWER VASCULAR LEFT MID FEMORAL PHASIC: NORMAL
BH CV LOWER VASCULAR LEFT MID FEMORAL SPONT: NORMAL
BH CV LOWER VASCULAR LEFT MID FEMORAL THROMBUS: NORMAL
BH CV LOWER VASCULAR LEFT PERONEAL COMPRESS: NORMAL
BH CV LOWER VASCULAR LEFT PERONEAL THROMBUS: NORMAL
BH CV LOWER VASCULAR LEFT POPLITEAL AUGMENT: NORMAL
BH CV LOWER VASCULAR LEFT POPLITEAL COMPRESS: NORMAL
BH CV LOWER VASCULAR LEFT POPLITEAL PHASIC: NORMAL
BH CV LOWER VASCULAR LEFT POPLITEAL SPONT: NORMAL
BH CV LOWER VASCULAR LEFT POPLITEAL THROMBUS: NORMAL
BH CV LOWER VASCULAR LEFT POSTERIOR TIBIAL COMPRESS: NORMAL
BH CV LOWER VASCULAR LEFT POSTERIOR TIBIAL THROMBUS: NORMAL
BH CV LOWER VASCULAR LEFT PROFUNDA FEMORAL COMPRESS: NORMAL
BH CV LOWER VASCULAR LEFT PROFUNDA FEMORAL THROMBUS: NORMAL
BH CV LOWER VASCULAR LEFT PROXIMAL FEMORAL COMPRESS: NORMAL
BH CV LOWER VASCULAR LEFT PROXIMAL FEMORAL THROMBUS: NORMAL
BH CV LOWER VASCULAR LEFT SAPHENOFEMORAL JUNCTION COMPRESS: NORMAL
BH CV LOWER VASCULAR LEFT SAPHENOFEMORAL JUNCTION THROMBUS: NORMAL
BH CV LOWER VASCULAR LEFT SOLEAL COMPRESS: NORMAL
BH CV LOWER VASCULAR LEFT SOLEAL THROMBUS: NORMAL
BH CV LOWER VASCULAR RIGHT COMMON FEMORAL AUGMENT: NORMAL
BH CV LOWER VASCULAR RIGHT COMMON FEMORAL COMPETENT: NORMAL
BH CV LOWER VASCULAR RIGHT COMMON FEMORAL COMPRESS: NORMAL
BH CV LOWER VASCULAR RIGHT COMMON FEMORAL PHASIC: NORMAL
BH CV LOWER VASCULAR RIGHT COMMON FEMORAL SPONT: NORMAL
BILIRUB SERPL-MCNC: 0.3 MG/DL (ref 0–1.2)
BUN SERPL-MCNC: 21 MG/DL (ref 8–23)
BUN/CREAT SERPL: 16.4 (ref 7–25)
CALCIUM SPEC-SCNC: 8.8 MG/DL (ref 8.6–10.5)
CHLORIDE SERPL-SCNC: 100 MMOL/L (ref 98–107)
CO2 SERPL-SCNC: 24.5 MMOL/L (ref 22–29)
CREAT SERPL-MCNC: 1.28 MG/DL (ref 0.57–1)
DEPRECATED RDW RBC AUTO: 46.4 FL (ref 37–54)
EOSINOPHIL # BLD MANUAL: 0.15 10*3/MM3 (ref 0–0.4)
EOSINOPHIL NFR BLD MANUAL: 2 % (ref 0.3–6.2)
ERYTHROCYTE [DISTWIDTH] IN BLOOD BY AUTOMATED COUNT: 14.5 % (ref 12.3–15.4)
GFR SERPL CREATININE-BSD FRML MDRD: 40 ML/MIN/1.73
GLOBULIN UR ELPH-MCNC: 2.4 GM/DL
GLUCOSE SERPL-MCNC: 105 MG/DL (ref 65–99)
HCT VFR BLD AUTO: 27.9 % (ref 34–46.6)
HGB BLD-MCNC: 9 G/DL (ref 12–15.9)
INR PPP: 1.8 (ref 0.9–1.1)
INR PPP: 1.8 (ref 0.9–1.1)
LYMPHOCYTES # BLD MANUAL: 1.19 10*3/MM3 (ref 0.7–3.1)
LYMPHOCYTES NFR BLD MANUAL: 16.2 % (ref 19.6–45.3)
LYMPHOCYTES NFR BLD MANUAL: 9.1 % (ref 5–12)
MCH RBC QN AUTO: 28.6 PG (ref 26.6–33)
MCHC RBC AUTO-ENTMCNC: 32.3 G/DL (ref 31.5–35.7)
MCV RBC AUTO: 88.6 FL (ref 79–97)
MONOCYTES # BLD AUTO: 0.67 10*3/MM3 (ref 0.1–0.9)
NEUTROPHILS # BLD AUTO: 5.2 10*3/MM3 (ref 1.7–7)
NEUTROPHILS NFR BLD MANUAL: 70.7 % (ref 42.7–76)
NRBC BLD AUTO-RTO: 0 /100 WBC (ref 0–0.2)
PLAT MORPH BLD: NORMAL
PLATELET # BLD AUTO: 264 10*3/MM3 (ref 140–450)
PMV BLD AUTO: 9 FL (ref 6–12)
POLYCHROMASIA BLD QL SMEAR: ABNORMAL
POTASSIUM SERPL-SCNC: 3.4 MMOL/L (ref 3.5–5.2)
PROT SERPL-MCNC: 5.7 G/DL (ref 6–8.5)
PROTHROMBIN TIME: 20.7 SECONDS (ref 11.7–14.2)
PROTHROMBIN TIME: 20.7 SECONDS (ref 11.7–14.2)
RBC # BLD AUTO: 3.15 10*6/MM3 (ref 3.77–5.28)
SODIUM SERPL-SCNC: 136 MMOL/L (ref 136–145)
VARIANT LYMPHS NFR BLD MANUAL: 2 % (ref 0–5)
WBC # BLD AUTO: 7.36 10*3/MM3 (ref 3.4–10.8)
WBC MORPH BLD: NORMAL

## 2021-03-24 PROCEDURE — 93971 EXTREMITY STUDY: CPT

## 2021-03-24 PROCEDURE — 85730 THROMBOPLASTIN TIME PARTIAL: CPT | Performed by: EMERGENCY MEDICINE

## 2021-03-24 PROCEDURE — 25010000002 HEPARIN (PORCINE) PER 1000 UNITS: Performed by: EMERGENCY MEDICINE

## 2021-03-24 PROCEDURE — U0005 INFEC AGEN DETEC AMPLI PROBE: HCPCS | Performed by: EMERGENCY MEDICINE

## 2021-03-24 PROCEDURE — 85610 PROTHROMBIN TIME: CPT | Performed by: EMERGENCY MEDICINE

## 2021-03-24 PROCEDURE — U0004 COV-19 TEST NON-CDC HGH THRU: HCPCS | Performed by: EMERGENCY MEDICINE

## 2021-03-24 PROCEDURE — 94640 AIRWAY INHALATION TREATMENT: CPT

## 2021-03-24 PROCEDURE — 99284 EMERGENCY DEPT VISIT MOD MDM: CPT

## 2021-03-24 PROCEDURE — 94799 UNLISTED PULMONARY SVC/PX: CPT

## 2021-03-24 PROCEDURE — 80053 COMPREHEN METABOLIC PANEL: CPT | Performed by: EMERGENCY MEDICINE

## 2021-03-24 PROCEDURE — 85007 BL SMEAR W/DIFF WBC COUNT: CPT | Performed by: EMERGENCY MEDICINE

## 2021-03-24 PROCEDURE — 36415 COLL VENOUS BLD VENIPUNCTURE: CPT | Performed by: EMERGENCY MEDICINE

## 2021-03-24 PROCEDURE — 85025 COMPLETE CBC W/AUTO DIFF WBC: CPT | Performed by: EMERGENCY MEDICINE

## 2021-03-24 RX ORDER — BUDESONIDE AND FORMOTEROL FUMARATE DIHYDRATE 160; 4.5 UG/1; UG/1
2 AEROSOL RESPIRATORY (INHALATION)
Status: DISCONTINUED | OUTPATIENT
Start: 2021-03-24 | End: 2021-04-03 | Stop reason: HOSPADM

## 2021-03-24 RX ORDER — SODIUM CHLORIDE 9 MG/ML
75 INJECTION, SOLUTION INTRAVENOUS CONTINUOUS
Status: DISCONTINUED | OUTPATIENT
Start: 2021-03-24 | End: 2021-03-25

## 2021-03-24 RX ORDER — ACETAMINOPHEN 160 MG/5ML
650 SOLUTION ORAL EVERY 4 HOURS PRN
Status: DISCONTINUED | OUTPATIENT
Start: 2021-03-24 | End: 2021-04-03 | Stop reason: HOSPADM

## 2021-03-24 RX ORDER — HYDROCODONE BITARTRATE AND ACETAMINOPHEN 5; 325 MG/1; MG/1
1 TABLET ORAL EVERY 6 HOURS PRN
Status: DISCONTINUED | OUTPATIENT
Start: 2021-03-24 | End: 2021-03-26 | Stop reason: SDUPTHER

## 2021-03-24 RX ORDER — NITROGLYCERIN 0.4 MG/1
0.4 TABLET SUBLINGUAL
Status: DISCONTINUED | OUTPATIENT
Start: 2021-03-24 | End: 2021-03-24

## 2021-03-24 RX ORDER — PANTOPRAZOLE SODIUM 40 MG/1
40 TABLET, DELAYED RELEASE ORAL EVERY MORNING
Status: DISCONTINUED | OUTPATIENT
Start: 2021-03-25 | End: 2021-04-03 | Stop reason: HOSPADM

## 2021-03-24 RX ORDER — IPRATROPIUM BROMIDE AND ALBUTEROL SULFATE 2.5; .5 MG/3ML; MG/3ML
3 SOLUTION RESPIRATORY (INHALATION)
Status: DISCONTINUED | OUTPATIENT
Start: 2021-03-24 | End: 2021-03-26 | Stop reason: SDUPTHER

## 2021-03-24 RX ORDER — LEVOTHYROXINE SODIUM 0.1 MG/1
100 TABLET ORAL
Status: DISCONTINUED | OUTPATIENT
Start: 2021-03-25 | End: 2021-04-03 | Stop reason: HOSPADM

## 2021-03-24 RX ORDER — FERROUS SULFATE 325(65) MG
325 TABLET ORAL
COMMUNITY

## 2021-03-24 RX ORDER — ONDANSETRON 2 MG/ML
4 INJECTION INTRAMUSCULAR; INTRAVENOUS EVERY 6 HOURS PRN
Status: DISCONTINUED | OUTPATIENT
Start: 2021-03-24 | End: 2021-03-24 | Stop reason: SDUPTHER

## 2021-03-24 RX ORDER — MORPHINE SULFATE 2 MG/ML
1 INJECTION, SOLUTION INTRAMUSCULAR; INTRAVENOUS EVERY 4 HOURS PRN
Status: ACTIVE | OUTPATIENT
Start: 2021-03-24 | End: 2021-03-31

## 2021-03-24 RX ORDER — CHOLECALCIFEROL (VITAMIN D3) 125 MCG
1000 CAPSULE ORAL DAILY
Status: DISCONTINUED | OUTPATIENT
Start: 2021-03-25 | End: 2021-04-03 | Stop reason: HOSPADM

## 2021-03-24 RX ORDER — LACTULOSE 10 G/15ML
10 SOLUTION ORAL DAILY
Status: DISCONTINUED | OUTPATIENT
Start: 2021-03-25 | End: 2021-04-01

## 2021-03-24 RX ORDER — SODIUM CHLORIDE 0.9 % (FLUSH) 0.9 %
10 SYRINGE (ML) INJECTION EVERY 12 HOURS SCHEDULED
Status: DISCONTINUED | OUTPATIENT
Start: 2021-03-24 | End: 2021-04-03 | Stop reason: HOSPADM

## 2021-03-24 RX ORDER — CYCLOBENZAPRINE HCL 10 MG
10 TABLET ORAL 2 TIMES DAILY PRN
Status: DISCONTINUED | OUTPATIENT
Start: 2021-03-24 | End: 2021-04-03 | Stop reason: HOSPADM

## 2021-03-24 RX ORDER — PRAMIPEXOLE DIHYDROCHLORIDE 1.5 MG/1
1.5 TABLET ORAL DAILY
Status: DISCONTINUED | OUTPATIENT
Start: 2021-03-25 | End: 2021-04-03 | Stop reason: HOSPADM

## 2021-03-24 RX ORDER — ACETAMINOPHEN 650 MG/1
650 SUPPOSITORY RECTAL EVERY 4 HOURS PRN
Status: DISCONTINUED | OUTPATIENT
Start: 2021-03-24 | End: 2021-04-03 | Stop reason: HOSPADM

## 2021-03-24 RX ORDER — WARFARIN SODIUM 6 MG/1
6 TABLET ORAL
COMMUNITY
End: 2021-04-03 | Stop reason: HOSPADM

## 2021-03-24 RX ORDER — HEPARIN SODIUM 10000 [USP'U]/100ML
14.7 INJECTION, SOLUTION INTRAVENOUS
Status: DISCONTINUED | OUTPATIENT
Start: 2021-03-24 | End: 2021-03-26 | Stop reason: SDUPTHER

## 2021-03-24 RX ORDER — CHOLECALCIFEROL (VITAMIN D3) 125 MCG
5 CAPSULE ORAL NIGHTLY PRN
Status: DISCONTINUED | OUTPATIENT
Start: 2021-03-24 | End: 2021-04-03 | Stop reason: HOSPADM

## 2021-03-24 RX ORDER — LANOLIN ALCOHOL/MO/W.PET/CERES
1000 CREAM (GRAM) TOPICAL DAILY
COMMUNITY

## 2021-03-24 RX ORDER — NITROGLYCERIN 0.4 MG/1
0.4 TABLET SUBLINGUAL
Status: DISCONTINUED | OUTPATIENT
Start: 2021-03-24 | End: 2021-03-26 | Stop reason: SDUPTHER

## 2021-03-24 RX ORDER — SODIUM CHLORIDE 0.9 % (FLUSH) 0.9 %
10 SYRINGE (ML) INJECTION AS NEEDED
Status: DISCONTINUED | OUTPATIENT
Start: 2021-03-24 | End: 2021-04-03 | Stop reason: HOSPADM

## 2021-03-24 RX ORDER — HEPARIN SODIUM 5000 [USP'U]/ML
40-80 INJECTION, SOLUTION INTRAVENOUS; SUBCUTANEOUS EVERY 6 HOURS PRN
Status: DISCONTINUED | OUTPATIENT
Start: 2021-03-24 | End: 2021-04-01

## 2021-03-24 RX ORDER — ONDANSETRON 2 MG/ML
4 INJECTION INTRAMUSCULAR; INTRAVENOUS EVERY 6 HOURS PRN
Status: DISCONTINUED | OUTPATIENT
Start: 2021-03-24 | End: 2021-04-03 | Stop reason: HOSPADM

## 2021-03-24 RX ORDER — ALUMINA, MAGNESIA, AND SIMETHICONE 2400; 2400; 240 MG/30ML; MG/30ML; MG/30ML
15 SUSPENSION ORAL EVERY 6 HOURS PRN
Status: DISCONTINUED | OUTPATIENT
Start: 2021-03-24 | End: 2021-04-03 | Stop reason: HOSPADM

## 2021-03-24 RX ORDER — FUROSEMIDE 40 MG/1
40 TABLET ORAL DAILY
Status: DISCONTINUED | OUTPATIENT
Start: 2021-03-25 | End: 2021-04-03 | Stop reason: HOSPADM

## 2021-03-24 RX ORDER — NALOXONE HCL 0.4 MG/ML
0.4 VIAL (ML) INJECTION
Status: DISCONTINUED | OUTPATIENT
Start: 2021-03-24 | End: 2021-04-03 | Stop reason: HOSPADM

## 2021-03-24 RX ORDER — FERROUS SULFATE 325(65) MG
325 TABLET ORAL
Status: DISCONTINUED | OUTPATIENT
Start: 2021-03-25 | End: 2021-04-03 | Stop reason: HOSPADM

## 2021-03-24 RX ORDER — DILTIAZEM HYDROCHLORIDE 120 MG/1
120 CAPSULE, COATED, EXTENDED RELEASE ORAL
Status: DISCONTINUED | OUTPATIENT
Start: 2021-03-25 | End: 2021-04-03 | Stop reason: HOSPADM

## 2021-03-24 RX ORDER — UREA 10 %
3 LOTION (ML) TOPICAL
COMMUNITY
End: 2023-01-31

## 2021-03-24 RX ORDER — VALSARTAN 320 MG/1
320 TABLET ORAL
Status: DISCONTINUED | OUTPATIENT
Start: 2021-03-25 | End: 2021-04-03 | Stop reason: HOSPADM

## 2021-03-24 RX ORDER — HEPARIN SODIUM 5000 [USP'U]/ML
80 INJECTION, SOLUTION INTRAVENOUS; SUBCUTANEOUS ONCE
Status: COMPLETED | OUTPATIENT
Start: 2021-03-24 | End: 2021-03-24

## 2021-03-24 RX ORDER — ACETAMINOPHEN 325 MG/1
650 TABLET ORAL EVERY 4 HOURS PRN
Status: DISCONTINUED | OUTPATIENT
Start: 2021-03-24 | End: 2021-04-03 | Stop reason: HOSPADM

## 2021-03-24 RX ADMIN — HEPARIN SODIUM 8200 UNITS: 5000 INJECTION INTRAVENOUS; SUBCUTANEOUS at 20:33

## 2021-03-24 RX ADMIN — IPRATROPIUM BROMIDE AND ALBUTEROL SULFATE 3 ML: 2.5; .5 SOLUTION RESPIRATORY (INHALATION) at 23:47

## 2021-03-24 RX ADMIN — HEPARIN SODIUM 14.7 UNITS/KG/HR: 10000 INJECTION, SOLUTION INTRAVENOUS at 20:28

## 2021-03-25 LAB
ANION GAP SERPL CALCULATED.3IONS-SCNC: 11.5 MMOL/L (ref 5–15)
ANISOCYTOSIS BLD QL: ABNORMAL
APTT PPP: 157.7 SECONDS (ref 22.7–35.4)
APTT PPP: 72 SECONDS (ref 22.7–35.4)
APTT PPP: >200 SECONDS (ref 22.7–35.4)
BUN SERPL-MCNC: 18 MG/DL (ref 8–23)
BUN/CREAT SERPL: 16.4 (ref 7–25)
CALCIUM SPEC-SCNC: 8.6 MG/DL (ref 8.6–10.5)
CHLORIDE SERPL-SCNC: 102 MMOL/L (ref 98–107)
CO2 SERPL-SCNC: 25.5 MMOL/L (ref 22–29)
CREAT SERPL-MCNC: 1.1 MG/DL (ref 0.57–1)
DEPRECATED RDW RBC AUTO: 47.5 FL (ref 37–54)
ERYTHROCYTE [DISTWIDTH] IN BLOOD BY AUTOMATED COUNT: 14.6 % (ref 12.3–15.4)
GFR SERPL CREATININE-BSD FRML MDRD: 48 ML/MIN/1.73
GLUCOSE SERPL-MCNC: 85 MG/DL (ref 65–99)
HCT VFR BLD AUTO: 28.3 % (ref 34–46.6)
HGB BLD-MCNC: 9.3 G/DL (ref 12–15.9)
INR PPP: 1.81 (ref 0.9–1.1)
LYMPHOCYTES # BLD MANUAL: 0.63 10*3/MM3 (ref 0.7–3.1)
LYMPHOCYTES NFR BLD MANUAL: 9 % (ref 19.6–45.3)
LYMPHOCYTES NFR BLD MANUAL: 9 % (ref 5–12)
MCH RBC QN AUTO: 29.5 PG (ref 26.6–33)
MCHC RBC AUTO-ENTMCNC: 32.9 G/DL (ref 31.5–35.7)
MCV RBC AUTO: 89.8 FL (ref 79–97)
MICROCYTES BLD QL: ABNORMAL
MONOCYTES # BLD AUTO: 0.63 10*3/MM3 (ref 0.1–0.9)
NEUTROPHILS # BLD AUTO: 5.72 10*3/MM3 (ref 1.7–7)
NEUTROPHILS NFR BLD MANUAL: 82 % (ref 42.7–76)
PLAT MORPH BLD: NORMAL
PLATELET # BLD AUTO: 278 10*3/MM3 (ref 140–450)
PMV BLD AUTO: 9.2 FL (ref 6–12)
POTASSIUM SERPL-SCNC: 3.5 MMOL/L (ref 3.5–5.2)
PROTHROMBIN TIME: 20.8 SECONDS (ref 11.7–14.2)
RBC # BLD AUTO: 3.15 10*6/MM3 (ref 3.77–5.28)
SARS-COV-2 ORF1AB RESP QL NAA+PROBE: NOT DETECTED
SODIUM SERPL-SCNC: 139 MMOL/L (ref 136–145)
WBC # BLD AUTO: 6.98 10*3/MM3 (ref 3.4–10.8)
WBC MORPH BLD: NORMAL

## 2021-03-25 PROCEDURE — 94799 UNLISTED PULMONARY SVC/PX: CPT

## 2021-03-25 PROCEDURE — 25010000002 HEPARIN (PORCINE) PER 1000 UNITS: Performed by: EMERGENCY MEDICINE

## 2021-03-25 PROCEDURE — 85730 THROMBOPLASTIN TIME PARTIAL: CPT | Performed by: HOSPITALIST

## 2021-03-25 PROCEDURE — 85610 PROTHROMBIN TIME: CPT | Performed by: NURSE PRACTITIONER

## 2021-03-25 PROCEDURE — 85730 THROMBOPLASTIN TIME PARTIAL: CPT | Performed by: INTERNAL MEDICINE

## 2021-03-25 PROCEDURE — 80048 BASIC METABOLIC PNL TOTAL CA: CPT | Performed by: INTERNAL MEDICINE

## 2021-03-25 PROCEDURE — 85025 COMPLETE CBC W/AUTO DIFF WBC: CPT | Performed by: INTERNAL MEDICINE

## 2021-03-25 PROCEDURE — 85007 BL SMEAR W/DIFF WBC COUNT: CPT | Performed by: INTERNAL MEDICINE

## 2021-03-25 RX ORDER — POTASSIUM CHLORIDE 750 MG/1
40 TABLET, FILM COATED, EXTENDED RELEASE ORAL ONCE
Status: COMPLETED | OUTPATIENT
Start: 2021-03-25 | End: 2021-03-25

## 2021-03-25 RX ADMIN — SODIUM CHLORIDE, PRESERVATIVE FREE 10 ML: 5 INJECTION INTRAVENOUS at 21:01

## 2021-03-25 RX ADMIN — IPRATROPIUM BROMIDE AND ALBUTEROL SULFATE 3 ML: 2.5; .5 SOLUTION RESPIRATORY (INHALATION) at 11:30

## 2021-03-25 RX ADMIN — FERROUS SULFATE TAB 325 MG (65 MG ELEMENTAL FE) 325 MG: 325 (65 FE) TAB at 08:37

## 2021-03-25 RX ADMIN — LACTULOSE 10 G: 10 SOLUTION ORAL at 08:37

## 2021-03-25 RX ADMIN — SODIUM CHLORIDE, PRESERVATIVE FREE 10 ML: 5 INJECTION INTRAVENOUS at 20:59

## 2021-03-25 RX ADMIN — DILTIAZEM HYDROCHLORIDE 120 MG: 120 CAPSULE, COATED, EXTENDED RELEASE ORAL at 08:37

## 2021-03-25 RX ADMIN — PRAMIPEXOLE DIHYDROCHLORIDE 1.5 MG: 1.5 TABLET ORAL at 08:37

## 2021-03-25 RX ADMIN — IPRATROPIUM BROMIDE AND ALBUTEROL SULFATE 3 ML: 2.5; .5 SOLUTION RESPIRATORY (INHALATION) at 15:40

## 2021-03-25 RX ADMIN — SODIUM CHLORIDE 75 ML/HR: 9 INJECTION, SOLUTION INTRAVENOUS at 00:16

## 2021-03-25 RX ADMIN — SODIUM CHLORIDE, PRESERVATIVE FREE 10 ML: 5 INJECTION INTRAVENOUS at 00:16

## 2021-03-25 RX ADMIN — HYDROCODONE BITARTRATE AND ACETAMINOPHEN 1 TABLET: 5; 325 TABLET ORAL at 16:02

## 2021-03-25 RX ADMIN — FUROSEMIDE 40 MG: 40 TABLET ORAL at 08:37

## 2021-03-25 RX ADMIN — HEPARIN SODIUM 8.7 UNITS/KG/HR: 10000 INJECTION, SOLUTION INTRAVENOUS at 19:25

## 2021-03-25 RX ADMIN — BUDESONIDE AND FORMOTEROL FUMARATE DIHYDRATE 2 PUFF: 160; 4.5 AEROSOL RESPIRATORY (INHALATION) at 19:29

## 2021-03-25 RX ADMIN — VALSARTAN 320 MG: 320 TABLET, FILM COATED ORAL at 08:37

## 2021-03-25 RX ADMIN — BUDESONIDE AND FORMOTEROL FUMARATE DIHYDRATE 2 PUFF: 160; 4.5 AEROSOL RESPIRATORY (INHALATION) at 07:15

## 2021-03-25 RX ADMIN — POTASSIUM CHLORIDE 40 MEQ: 750 TABLET, EXTENDED RELEASE ORAL at 14:46

## 2021-03-25 RX ADMIN — ACETAMINOPHEN 650 MG: 325 TABLET, FILM COATED ORAL at 21:12

## 2021-03-25 RX ADMIN — Medication 1000 MCG: at 08:37

## 2021-03-26 ENCOUNTER — APPOINTMENT (OUTPATIENT)
Dept: GENERAL RADIOLOGY | Facility: HOSPITAL | Age: 81
End: 2021-03-26

## 2021-03-26 ENCOUNTER — ANESTHESIA (OUTPATIENT)
Dept: PERIOP | Facility: HOSPITAL | Age: 81
End: 2021-03-26

## 2021-03-26 ENCOUNTER — ANESTHESIA EVENT (OUTPATIENT)
Dept: PERIOP | Facility: HOSPITAL | Age: 81
End: 2021-03-26

## 2021-03-26 LAB
ABO GROUP BLD: NORMAL
APTT PPP: 100.7 SECONDS (ref 22.7–35.4)
APTT PPP: 134.6 SECONDS (ref 22.7–35.4)
APTT PPP: 54.2 SECONDS (ref 22.7–35.4)
APTT PPP: 68.9 SECONDS (ref 22.7–35.4)
APTT PPP: 70.5 SECONDS (ref 22.7–35.4)
BASOPHILS # BLD MANUAL: 0.07 10*3/MM3 (ref 0–0.2)
BASOPHILS NFR BLD AUTO: 1 % (ref 0–1.5)
BLD GP AB SCN SERPL QL: NEGATIVE
DEPRECATED RDW RBC AUTO: 45.5 FL (ref 37–54)
EOSINOPHIL # BLD MANUAL: 0.21 10*3/MM3 (ref 0–0.4)
EOSINOPHIL NFR BLD MANUAL: 3.1 % (ref 0.3–6.2)
ERYTHROCYTE [DISTWIDTH] IN BLOOD BY AUTOMATED COUNT: 14.4 % (ref 12.3–15.4)
HCT VFR BLD AUTO: 26.2 % (ref 34–46.6)
HGB BLD-MCNC: 8.5 G/DL (ref 12–15.9)
INR PPP: 1.27 (ref 0.9–1.1)
INR PPP: 1.28 (ref 0.9–1.1)
LYMPHOCYTES # BLD MANUAL: 1.3 10*3/MM3 (ref 0.7–3.1)
LYMPHOCYTES NFR BLD MANUAL: 10.2 % (ref 5–12)
LYMPHOCYTES NFR BLD MANUAL: 19.4 % (ref 19.6–45.3)
MCH RBC QN AUTO: 28.5 PG (ref 26.6–33)
MCHC RBC AUTO-ENTMCNC: 32.4 G/DL (ref 31.5–35.7)
MCV RBC AUTO: 87.9 FL (ref 79–97)
MONOCYTES # BLD AUTO: 0.68 10*3/MM3 (ref 0.1–0.9)
NEUTROPHILS # BLD AUTO: 4.45 10*3/MM3 (ref 1.7–7)
NEUTROPHILS NFR BLD MANUAL: 66.3 % (ref 42.7–76)
PLAT MORPH BLD: NORMAL
PLATELET # BLD AUTO: 276 10*3/MM3 (ref 140–450)
PMV BLD AUTO: 9 FL (ref 6–12)
PROTHROMBIN TIME: 15.7 SECONDS (ref 11.7–14.2)
PROTHROMBIN TIME: 15.7 SECONDS (ref 11.7–14.2)
RBC # BLD AUTO: 2.98 10*6/MM3 (ref 3.77–5.28)
RBC MORPH BLD: NORMAL
RH BLD: POSITIVE
T&S EXPIRATION DATE: NORMAL
WBC # BLD AUTO: 6.71 10*3/MM3 (ref 3.4–10.8)
WBC MORPH BLD: NORMAL

## 2021-03-26 PROCEDURE — 25010000002 HEPARIN (PORCINE) PER 1000 UNITS: Performed by: EMERGENCY MEDICINE

## 2021-03-26 PROCEDURE — 85007 BL SMEAR W/DIFF WBC COUNT: CPT | Performed by: INTERNAL MEDICINE

## 2021-03-26 PROCEDURE — 25010000002 HYDROMORPHONE PER 4 MG: Performed by: NURSE ANESTHETIST, CERTIFIED REGISTERED

## 2021-03-26 PROCEDURE — 85730 THROMBOPLASTIN TIME PARTIAL: CPT | Performed by: SURGERY

## 2021-03-26 PROCEDURE — 04CJ3ZZ EXTIRPATION OF MATTER FROM LEFT EXTERNAL ILIAC ARTERY, PERCUTANEOUS APPROACH: ICD-10-PCS | Performed by: SURGERY

## 2021-03-26 PROCEDURE — 04CN3ZZ EXTIRPATION OF MATTER FROM LEFT POPLITEAL ARTERY, PERCUTANEOUS APPROACH: ICD-10-PCS | Performed by: SURGERY

## 2021-03-26 PROCEDURE — 86923 COMPATIBILITY TEST ELECTRIC: CPT

## 2021-03-26 PROCEDURE — C1725 CATH, TRANSLUMIN NON-LASER: HCPCS | Performed by: SURGERY

## 2021-03-26 PROCEDURE — B5191ZZ FLUOROSCOPY OF INFERIOR VENA CAVA USING LOW OSMOLAR CONTRAST: ICD-10-PCS | Performed by: SURGERY

## 2021-03-26 PROCEDURE — 86850 RBC ANTIBODY SCREEN: CPT | Performed by: SURGERY

## 2021-03-26 PROCEDURE — 85610 PROTHROMBIN TIME: CPT | Performed by: SURGERY

## 2021-03-26 PROCEDURE — C1769 GUIDE WIRE: HCPCS | Performed by: SURGERY

## 2021-03-26 PROCEDURE — 047L3ZZ DILATION OF LEFT FEMORAL ARTERY, PERCUTANEOUS APPROACH: ICD-10-PCS | Performed by: SURGERY

## 2021-03-26 PROCEDURE — B54CZZ3 ULTRASONOGRAPHY OF LEFT LOWER EXTREMITY VEINS, INTRAVASCULAR: ICD-10-PCS | Performed by: SURGERY

## 2021-03-26 PROCEDURE — 25010000003 CEFAZOLIN IN DEXTROSE 2-4 GM/100ML-% SOLUTION: Performed by: SURGERY

## 2021-03-26 PROCEDURE — B41G1ZZ FLUOROSCOPY OF LEFT LOWER EXTREMITY ARTERIES USING LOW OSMOLAR CONTRAST: ICD-10-PCS | Performed by: SURGERY

## 2021-03-26 PROCEDURE — 25010000002 HEPARIN (PORCINE) PER 1000 UNITS: Performed by: NURSE ANESTHETIST, CERTIFIED REGISTERED

## 2021-03-26 PROCEDURE — 25010000003 CEFAZOLIN IN DEXTROSE 2-4 GM/100ML-% SOLUTION: Performed by: NURSE ANESTHETIST, CERTIFIED REGISTERED

## 2021-03-26 PROCEDURE — C1894 INTRO/SHEATH, NON-LASER: HCPCS | Performed by: SURGERY

## 2021-03-26 PROCEDURE — 047N3ZZ DILATION OF LEFT POPLITEAL ARTERY, PERCUTANEOUS APPROACH: ICD-10-PCS | Performed by: SURGERY

## 2021-03-26 PROCEDURE — 25010000002 FENTANYL CITRATE (PF) 100 MCG/2ML SOLUTION: Performed by: NURSE ANESTHETIST, CERTIFIED REGISTERED

## 2021-03-26 PROCEDURE — 85730 THROMBOPLASTIN TIME PARTIAL: CPT | Performed by: HOSPITALIST

## 2021-03-26 PROCEDURE — 25010000002 PHENYLEPHRINE PER 1 ML: Performed by: NURSE ANESTHETIST, CERTIFIED REGISTERED

## 2021-03-26 PROCEDURE — 25010000002 NEOSTIGMINE 5 MG/10ML SOLUTION: Performed by: ANESTHESIOLOGY

## 2021-03-26 PROCEDURE — 94799 UNLISTED PULMONARY SVC/PX: CPT

## 2021-03-26 PROCEDURE — 85025 COMPLETE CBC W/AUTO DIFF WBC: CPT | Performed by: INTERNAL MEDICINE

## 2021-03-26 PROCEDURE — 86901 BLOOD TYPING SEROLOGIC RH(D): CPT | Performed by: SURGERY

## 2021-03-26 PROCEDURE — 25010000002 DEXAMETHASONE PER 1 MG: Performed by: NURSE ANESTHETIST, CERTIFIED REGISTERED

## 2021-03-26 PROCEDURE — 047J3DZ DILATION OF LEFT EXTERNAL ILIAC ARTERY WITH INTRALUMINAL DEVICE, PERCUTANEOUS APPROACH: ICD-10-PCS | Performed by: SURGERY

## 2021-03-26 PROCEDURE — 25010000002 MIDAZOLAM PER 1 MG: Performed by: ANESTHESIOLOGY

## 2021-03-26 PROCEDURE — 25010000002 HEPARIN (PORCINE) PER 1000 UNITS: Performed by: SURGERY

## 2021-03-26 PROCEDURE — 04CL3ZZ EXTIRPATION OF MATTER FROM LEFT FEMORAL ARTERY, PERCUTANEOUS APPROACH: ICD-10-PCS | Performed by: SURGERY

## 2021-03-26 PROCEDURE — 25010000002 PROPOFOL 10 MG/ML EMULSION: Performed by: NURSE ANESTHETIST, CERTIFIED REGISTERED

## 2021-03-26 PROCEDURE — C1757 CATH, THROMBECTOMY/EMBOLECT: HCPCS | Performed by: SURGERY

## 2021-03-26 PROCEDURE — C1876 STENT, NON-COA/NON-COV W/DEL: HCPCS | Performed by: SURGERY

## 2021-03-26 PROCEDURE — C1753 CATH, INTRAVAS ULTRASOUND: HCPCS | Performed by: SURGERY

## 2021-03-26 PROCEDURE — 25010000003 LIDOCAINE 1 % SOLUTION 20 ML VIAL: Performed by: SURGERY

## 2021-03-26 PROCEDURE — 0 IOPAMIDOL PER 1 ML: Performed by: HOSPITALIST

## 2021-03-26 PROCEDURE — 86900 BLOOD TYPING SEROLOGIC ABO: CPT | Performed by: SURGERY

## 2021-03-26 PROCEDURE — 25010000002 ONDANSETRON PER 1 MG: Performed by: SURGERY

## 2021-03-26 DEVICE — VENOVO® VENOUS STENT SYSTEM 16 MM X 100 MM (120 CM DELIVERY CATHETER)
Type: IMPLANTABLE DEVICE | Site: LEG | Status: FUNCTIONAL
Brand: VENOVO®

## 2021-03-26 RX ORDER — DEXAMETHASONE SODIUM PHOSPHATE 10 MG/ML
INJECTION INTRAMUSCULAR; INTRAVENOUS AS NEEDED
Status: DISCONTINUED | OUTPATIENT
Start: 2021-03-26 | End: 2021-03-26 | Stop reason: SURG

## 2021-03-26 RX ORDER — OXYCODONE AND ACETAMINOPHEN 7.5; 325 MG/1; MG/1
1 TABLET ORAL ONCE AS NEEDED
Status: DISCONTINUED | OUTPATIENT
Start: 2021-03-26 | End: 2021-03-26 | Stop reason: HOSPADM

## 2021-03-26 RX ORDER — FAMOTIDINE 10 MG/ML
20 INJECTION, SOLUTION INTRAVENOUS ONCE
Status: COMPLETED | OUTPATIENT
Start: 2021-03-26 | End: 2021-03-26

## 2021-03-26 RX ORDER — PROMETHAZINE HYDROCHLORIDE 25 MG/1
25 TABLET ORAL ONCE AS NEEDED
Status: DISCONTINUED | OUTPATIENT
Start: 2021-03-26 | End: 2021-03-26 | Stop reason: HOSPADM

## 2021-03-26 RX ORDER — ONDANSETRON 4 MG/1
4 TABLET, FILM COATED ORAL EVERY 6 HOURS PRN
Status: DISCONTINUED | OUTPATIENT
Start: 2021-03-26 | End: 2021-04-03 | Stop reason: HOSPADM

## 2021-03-26 RX ORDER — EPHEDRINE SULFATE 50 MG/ML
5 INJECTION, SOLUTION INTRAVENOUS ONCE AS NEEDED
Status: DISCONTINUED | OUTPATIENT
Start: 2021-03-26 | End: 2021-03-26 | Stop reason: HOSPADM

## 2021-03-26 RX ORDER — HYDRALAZINE HYDROCHLORIDE 20 MG/ML
5 INJECTION INTRAMUSCULAR; INTRAVENOUS
Status: DISCONTINUED | OUTPATIENT
Start: 2021-03-26 | End: 2021-03-26 | Stop reason: HOSPADM

## 2021-03-26 RX ORDER — LIDOCAINE HYDROCHLORIDE 20 MG/ML
INJECTION, SOLUTION INFILTRATION; PERINEURAL AS NEEDED
Status: DISCONTINUED | OUTPATIENT
Start: 2021-03-26 | End: 2021-03-26 | Stop reason: SURG

## 2021-03-26 RX ORDER — NEOSTIGMINE METHYLSULFATE 0.5 MG/ML
INJECTION, SOLUTION INTRAVENOUS AS NEEDED
Status: DISCONTINUED | OUTPATIENT
Start: 2021-03-26 | End: 2021-03-26 | Stop reason: SURG

## 2021-03-26 RX ORDER — ONDANSETRON 2 MG/ML
4 INJECTION INTRAMUSCULAR; INTRAVENOUS EVERY 6 HOURS PRN
Status: DISCONTINUED | OUTPATIENT
Start: 2021-03-26 | End: 2021-04-03 | Stop reason: HOSPADM

## 2021-03-26 RX ORDER — DIPHENHYDRAMINE HCL 25 MG
25 CAPSULE ORAL
Status: DISCONTINUED | OUTPATIENT
Start: 2021-03-26 | End: 2021-03-26 | Stop reason: HOSPADM

## 2021-03-26 RX ORDER — CEFAZOLIN SODIUM 2 G/100ML
2 INJECTION, SOLUTION INTRAVENOUS EVERY 8 HOURS
Status: COMPLETED | OUTPATIENT
Start: 2021-03-26 | End: 2021-03-27

## 2021-03-26 RX ORDER — CEFAZOLIN SODIUM 2 G/100ML
2 INJECTION, SOLUTION INTRAVENOUS ONCE
Status: COMPLETED | OUTPATIENT
Start: 2021-03-26 | End: 2021-03-26

## 2021-03-26 RX ORDER — NALOXONE HCL 0.4 MG/ML
0.2 VIAL (ML) INJECTION AS NEEDED
Status: DISCONTINUED | OUTPATIENT
Start: 2021-03-26 | End: 2021-03-26 | Stop reason: HOSPADM

## 2021-03-26 RX ORDER — HEPARIN SODIUM 10000 [USP'U]/100ML
12 INJECTION, SOLUTION INTRAVENOUS
Status: DISCONTINUED | OUTPATIENT
Start: 2021-03-26 | End: 2021-04-01

## 2021-03-26 RX ORDER — HYDROCODONE BITARTRATE AND ACETAMINOPHEN 5; 325 MG/1; MG/1
1 TABLET ORAL EVERY 4 HOURS PRN
Status: DISPENSED | OUTPATIENT
Start: 2021-03-26 | End: 2021-04-02

## 2021-03-26 RX ORDER — PROMETHAZINE HYDROCHLORIDE 25 MG/1
25 SUPPOSITORY RECTAL ONCE AS NEEDED
Status: DISCONTINUED | OUTPATIENT
Start: 2021-03-26 | End: 2021-03-26 | Stop reason: HOSPADM

## 2021-03-26 RX ORDER — CEFAZOLIN SODIUM 2 G/100ML
INJECTION, SOLUTION INTRAVENOUS AS NEEDED
Status: DISCONTINUED | OUTPATIENT
Start: 2021-03-26 | End: 2021-03-26 | Stop reason: SURG

## 2021-03-26 RX ORDER — SODIUM CHLORIDE 0.9 % (FLUSH) 0.9 %
3-10 SYRINGE (ML) INJECTION AS NEEDED
Status: DISCONTINUED | OUTPATIENT
Start: 2021-03-26 | End: 2021-03-26 | Stop reason: HOSPADM

## 2021-03-26 RX ORDER — ONDANSETRON 2 MG/ML
4 INJECTION INTRAMUSCULAR; INTRAVENOUS ONCE AS NEEDED
Status: DISCONTINUED | OUTPATIENT
Start: 2021-03-26 | End: 2021-03-26 | Stop reason: HOSPADM

## 2021-03-26 RX ORDER — ROCURONIUM BROMIDE 10 MG/ML
INJECTION, SOLUTION INTRAVENOUS AS NEEDED
Status: DISCONTINUED | OUTPATIENT
Start: 2021-03-26 | End: 2021-03-26 | Stop reason: SURG

## 2021-03-26 RX ORDER — SODIUM CHLORIDE 9 MG/ML
100 INJECTION, SOLUTION INTRAVENOUS CONTINUOUS
Status: DISCONTINUED | OUTPATIENT
Start: 2021-03-26 | End: 2021-03-27

## 2021-03-26 RX ORDER — HEPARIN SODIUM 5000 [USP'U]/ML
40-80 INJECTION, SOLUTION INTRAVENOUS; SUBCUTANEOUS EVERY 6 HOURS PRN
Status: DISCONTINUED | OUTPATIENT
Start: 2021-03-26 | End: 2021-03-26 | Stop reason: SDUPTHER

## 2021-03-26 RX ORDER — DIPHENHYDRAMINE HYDROCHLORIDE 50 MG/ML
12.5 INJECTION INTRAMUSCULAR; INTRAVENOUS
Status: DISCONTINUED | OUTPATIENT
Start: 2021-03-26 | End: 2021-03-26 | Stop reason: HOSPADM

## 2021-03-26 RX ORDER — HYDROCODONE BITARTRATE AND ACETAMINOPHEN 7.5; 325 MG/1; MG/1
1 TABLET ORAL ONCE AS NEEDED
Status: COMPLETED | OUTPATIENT
Start: 2021-03-26 | End: 2021-03-26

## 2021-03-26 RX ORDER — MIDAZOLAM HYDROCHLORIDE 1 MG/ML
0.5 INJECTION INTRAMUSCULAR; INTRAVENOUS
Status: DISCONTINUED | OUTPATIENT
Start: 2021-03-26 | End: 2021-03-26 | Stop reason: HOSPADM

## 2021-03-26 RX ORDER — PROPOFOL 10 MG/ML
VIAL (ML) INTRAVENOUS AS NEEDED
Status: DISCONTINUED | OUTPATIENT
Start: 2021-03-26 | End: 2021-03-26 | Stop reason: SURG

## 2021-03-26 RX ORDER — NITROGLYCERIN 0.4 MG/1
0.4 TABLET SUBLINGUAL
Status: DISCONTINUED | OUTPATIENT
Start: 2021-03-26 | End: 2021-04-03 | Stop reason: HOSPADM

## 2021-03-26 RX ORDER — MIDAZOLAM HYDROCHLORIDE 1 MG/ML
1 INJECTION INTRAMUSCULAR; INTRAVENOUS
Status: DISCONTINUED | OUTPATIENT
Start: 2021-03-26 | End: 2021-03-26 | Stop reason: HOSPADM

## 2021-03-26 RX ORDER — IPRATROPIUM BROMIDE AND ALBUTEROL SULFATE 2.5; .5 MG/3ML; MG/3ML
3 SOLUTION RESPIRATORY (INHALATION)
Status: DISPENSED | OUTPATIENT
Start: 2021-03-26 | End: 2021-03-28

## 2021-03-26 RX ORDER — SODIUM CHLORIDE, SODIUM LACTATE, POTASSIUM CHLORIDE, CALCIUM CHLORIDE 600; 310; 30; 20 MG/100ML; MG/100ML; MG/100ML; MG/100ML
9 INJECTION, SOLUTION INTRAVENOUS CONTINUOUS
Status: DISCONTINUED | OUTPATIENT
Start: 2021-03-26 | End: 2021-03-27

## 2021-03-26 RX ORDER — LABETALOL HYDROCHLORIDE 5 MG/ML
5 INJECTION, SOLUTION INTRAVENOUS
Status: DISCONTINUED | OUTPATIENT
Start: 2021-03-26 | End: 2021-03-26 | Stop reason: HOSPADM

## 2021-03-26 RX ORDER — WARFARIN SODIUM 6 MG/1
6 TABLET ORAL
Status: DISCONTINUED | OUTPATIENT
Start: 2021-03-26 | End: 2021-03-31

## 2021-03-26 RX ORDER — FENTANYL CITRATE 50 UG/ML
INJECTION, SOLUTION INTRAMUSCULAR; INTRAVENOUS AS NEEDED
Status: DISCONTINUED | OUTPATIENT
Start: 2021-03-26 | End: 2021-03-26 | Stop reason: SURG

## 2021-03-26 RX ORDER — SODIUM CHLORIDE 0.9 % (FLUSH) 0.9 %
3 SYRINGE (ML) INJECTION EVERY 12 HOURS SCHEDULED
Status: DISCONTINUED | OUTPATIENT
Start: 2021-03-26 | End: 2021-03-26 | Stop reason: HOSPADM

## 2021-03-26 RX ORDER — FENTANYL CITRATE 50 UG/ML
50 INJECTION, SOLUTION INTRAMUSCULAR; INTRAVENOUS
Status: DISCONTINUED | OUTPATIENT
Start: 2021-03-26 | End: 2021-03-26 | Stop reason: HOSPADM

## 2021-03-26 RX ORDER — FLUMAZENIL 0.1 MG/ML
0.2 INJECTION INTRAVENOUS AS NEEDED
Status: DISCONTINUED | OUTPATIENT
Start: 2021-03-26 | End: 2021-03-26 | Stop reason: HOSPADM

## 2021-03-26 RX ORDER — HYDROMORPHONE HYDROCHLORIDE 1 MG/ML
0.5 INJECTION, SOLUTION INTRAMUSCULAR; INTRAVENOUS; SUBCUTANEOUS
Status: DISCONTINUED | OUTPATIENT
Start: 2021-03-26 | End: 2021-03-26 | Stop reason: HOSPADM

## 2021-03-26 RX ORDER — HEPARIN SODIUM 1000 [USP'U]/ML
INJECTION, SOLUTION INTRAVENOUS; SUBCUTANEOUS AS NEEDED
Status: DISCONTINUED | OUTPATIENT
Start: 2021-03-26 | End: 2021-03-26 | Stop reason: SURG

## 2021-03-26 RX ORDER — GLYCOPYRROLATE 0.2 MG/ML
INJECTION INTRAMUSCULAR; INTRAVENOUS AS NEEDED
Status: DISCONTINUED | OUTPATIENT
Start: 2021-03-26 | End: 2021-03-26 | Stop reason: SURG

## 2021-03-26 RX ADMIN — SODIUM CHLORIDE, POTASSIUM CHLORIDE, SODIUM LACTATE AND CALCIUM CHLORIDE 9 ML/HR: 600; 310; 30; 20 INJECTION, SOLUTION INTRAVENOUS at 13:19

## 2021-03-26 RX ADMIN — PHENYLEPHRINE HYDROCHLORIDE 100 MCG: 10 INJECTION INTRAVENOUS at 14:42

## 2021-03-26 RX ADMIN — FENTANYL CITRATE 50 MCG: 50 INJECTION INTRAMUSCULAR; INTRAVENOUS at 14:02

## 2021-03-26 RX ADMIN — HYDROCODONE BITARTRATE AND ACETAMINOPHEN 1 TABLET: 5; 325 TABLET ORAL at 22:48

## 2021-03-26 RX ADMIN — FENTANYL CITRATE 50 MCG: 50 INJECTION INTRAMUSCULAR; INTRAVENOUS at 14:24

## 2021-03-26 RX ADMIN — FAMOTIDINE 20 MG: 10 INJECTION INTRAVENOUS at 13:18

## 2021-03-26 RX ADMIN — SODIUM CHLORIDE, PRESERVATIVE FREE 10 ML: 5 INJECTION INTRAVENOUS at 20:10

## 2021-03-26 RX ADMIN — HEPARIN SODIUM 4100 UNITS: 5000 INJECTION INTRAVENOUS; SUBCUTANEOUS at 02:55

## 2021-03-26 RX ADMIN — HEPARIN SODIUM 5000 UNITS: 1000 INJECTION INTRAVENOUS; SUBCUTANEOUS at 14:43

## 2021-03-26 RX ADMIN — PROPOFOL 100 MG: 10 INJECTION, EMULSION INTRAVENOUS at 14:02

## 2021-03-26 RX ADMIN — DEXAMETHASONE SODIUM PHOSPHATE 8 MG: 10 INJECTION INTRAMUSCULAR; INTRAVENOUS at 14:48

## 2021-03-26 RX ADMIN — SODIUM CHLORIDE, PRESERVATIVE FREE 10 ML: 5 INJECTION INTRAVENOUS at 10:09

## 2021-03-26 RX ADMIN — HYDROCODONE BITARTRATE AND ACETAMINOPHEN 1 TABLET: 7.5; 325 TABLET ORAL at 17:06

## 2021-03-26 RX ADMIN — IPRATROPIUM BROMIDE AND ALBUTEROL SULFATE 3 ML: 2.5; .5 SOLUTION RESPIRATORY (INHALATION) at 11:01

## 2021-03-26 RX ADMIN — CEFAZOLIN SODIUM 2 G: 2 INJECTION, SOLUTION INTRAVENOUS at 13:39

## 2021-03-26 RX ADMIN — SODIUM CHLORIDE 100 ML/HR: 9 INJECTION, SOLUTION INTRAVENOUS at 20:09

## 2021-03-26 RX ADMIN — FENTANYL CITRATE 50 MCG: 50 INJECTION, SOLUTION INTRAMUSCULAR; INTRAVENOUS at 16:30

## 2021-03-26 RX ADMIN — LIDOCAINE HYDROCHLORIDE 100 MG: 20 INJECTION, SOLUTION INFILTRATION; PERINEURAL at 14:02

## 2021-03-26 RX ADMIN — HYDROMORPHONE HYDROCHLORIDE 0.5 MG: 1 INJECTION, SOLUTION INTRAMUSCULAR; INTRAVENOUS; SUBCUTANEOUS at 17:47

## 2021-03-26 RX ADMIN — HYDROMORPHONE HYDROCHLORIDE 0.5 MG: 1 INJECTION, SOLUTION INTRAMUSCULAR; INTRAVENOUS; SUBCUTANEOUS at 16:57

## 2021-03-26 RX ADMIN — WARFARIN 6 MG: 6 TABLET ORAL at 20:41

## 2021-03-26 RX ADMIN — SODIUM CHLORIDE, PRESERVATIVE FREE 10 ML: 5 INJECTION INTRAVENOUS at 10:08

## 2021-03-26 RX ADMIN — NEOSTIGMINE METHYLSULFATE 3.5 MG: 0.5 INJECTION INTRAVENOUS at 15:50

## 2021-03-26 RX ADMIN — ONDANSETRON 4 MG: 2 INJECTION INTRAMUSCULAR; INTRAVENOUS at 20:05

## 2021-03-26 RX ADMIN — IPRATROPIUM BROMIDE AND ALBUTEROL SULFATE 3 ML: 2.5; .5 SOLUTION RESPIRATORY (INHALATION) at 23:52

## 2021-03-26 RX ADMIN — ROCURONIUM BROMIDE 30 MG: 50 INJECTION INTRAVENOUS at 14:02

## 2021-03-26 RX ADMIN — GLYCOPYRROLATE 0.6 MG: 0.2 INJECTION INTRAMUSCULAR; INTRAVENOUS at 15:50

## 2021-03-26 RX ADMIN — CEFAZOLIN SODIUM 2 G: 2 INJECTION, SOLUTION INTRAVENOUS at 14:22

## 2021-03-26 RX ADMIN — MIDAZOLAM 0.5 MG: 1 INJECTION INTRAMUSCULAR; INTRAVENOUS at 13:19

## 2021-03-26 RX ADMIN — ROCURONIUM BROMIDE 20 MG: 50 INJECTION INTRAVENOUS at 14:24

## 2021-03-26 RX ADMIN — FENTANYL CITRATE 50 MCG: 50 INJECTION, SOLUTION INTRAMUSCULAR; INTRAVENOUS at 16:22

## 2021-03-26 RX ADMIN — CEFAZOLIN SODIUM 2 G: 2 INJECTION, SOLUTION INTRAVENOUS at 20:31

## 2021-03-26 RX ADMIN — IOPAMIDOL 105 ML: 510 INJECTION, SOLUTION INTRAVASCULAR at 16:08

## 2021-03-26 RX ADMIN — BUDESONIDE AND FORMOTEROL FUMARATE DIHYDRATE 2 PUFF: 160; 4.5 AEROSOL RESPIRATORY (INHALATION) at 07:31

## 2021-03-26 NOTE — ANESTHESIA PREPROCEDURE EVALUATION
Anesthesia Evaluation     no history of anesthetic complications:  NPO Solid Status: > 8 hours  NPO Liquid Status: > 2 hours           Airway   Mallampati: II  Neck ROM: full  no difficulty expected  Dental - normal exam     Pulmonary - normal exam   (+) lung cancer (s/p L lower lobectomy), COPD, sleep apnea on CPAP,   (-) asthma, not a smoker    PE comment: nonlabored  Cardiovascular - normal exam    Rhythm: regular  Rate: normal    (+) hypertension, dysrhythmias Atrial Fib, DVT, hyperlipidemia,   (-) valvular problems/murmurs, past MI, CAD, angina      Neuro/Psych- negative ROS  (-) seizures, TIA, CVA  GI/Hepatic/Renal/Endo    (+) morbid obesity, GERD,  renal disease (CKD), thyroid problem hypothyroidism  (-) liver disease, diabetes    Musculoskeletal     (+) arthralgias, chronic pain, neck pain,   Abdominal    Substance History      OB/GYN          Other   arthritis,    history of cancer (Lung Cancer (carcinoid)--s/p L lower lobectomy )                    Anesthesia Plan    ASA 3     general   (Pt has NO CPR order, but I spoke with pt about code status and she wishes to rescind that order temporarily and be FULL CODE starting now and continuing through PACU.)  intravenous induction     Anesthetic plan, all risks, benefits, and alternatives have been provided, discussed and informed consent has been obtained with: patient.

## 2021-03-26 NOTE — ANESTHESIA PROCEDURE NOTES
Airway  Urgency: elective    Date/Time: 3/26/2021 2:04 PM  Airway not difficult    General Information and Staff    Patient location during procedure: OR  Anesthesiologist: Truong Reeves MD  CRNA: Mehreen Jaramillo CRNA    Indications and Patient Condition  Indications for airway management: airway protection    Preoxygenated: yes  MILS maintained throughout  Mask difficulty assessment: 1 - vent by mask    Final Airway Details  Final airway type: endotracheal airway      Successful airway: ETT  Cuffed: yes   Successful intubation technique: direct laryngoscopy  Endotracheal tube insertion site: oral  Blade: Clemente  Blade size: 2  ETT size (mm): 7.0  Cormack-Lehane Classification: grade I - full view of glottis  Placement verified by: chest auscultation, bronchoscopy and capnometry   Measured from: teeth  ETT/EBT  to teeth (cm): 22  Number of attempts at approach: 1  Assessment: lips, teeth, and gum same as pre-op and atraumatic intubation

## 2021-03-26 NOTE — ANESTHESIA POSTPROCEDURE EVALUATION
"Patient: Latanya Olsen    Procedure Summary     Date: 03/26/21 Room / [facility]    Anesthesia Start: 1346 Anesthesia Stop: 1611    Procedure: left leg clot treiver possible venous stent *supine* (Left ) Diagnosis:     Surgeons: Rogerio Vega MD Provider: Gregory Harris MD    Anesthesia Type: general ASA Status: 3          Anesthesia Type: general    Vitals  Vitals Value Taken Time   /70 03/26/21 1730   Temp 36.9 °C (98.4 °F) 03/26/21 1611   Pulse 86 03/26/21 1742   Resp 16 03/26/21 1730   SpO2 95 % 03/26/21 1742   Vitals shown include unvalidated device data.        Post Anesthesia Care and Evaluation    Patient location during evaluation: bedside  Patient participation: complete - patient participated  Level of consciousness: awake and alert  Pain management: adequate  Airway patency: patent  Anesthetic complications: No anesthetic complications    Cardiovascular status: acceptable  Respiratory status: acceptable  Hydration status: acceptable    Comments: /70   Pulse 91   Temp 36.9 °C (98.4 °F) (Oral)   Resp 16   Ht 167.6 cm (66\")   Wt 102 kg (225 lb)   SpO2 94%   BMI 36.32 kg/m²       "

## 2021-03-27 LAB
ANION GAP SERPL CALCULATED.3IONS-SCNC: 9.9 MMOL/L (ref 5–15)
ANISOCYTOSIS BLD QL: ABNORMAL
APTT PPP: 118.8 SECONDS (ref 22.7–35.4)
APTT PPP: 153.5 SECONDS (ref 22.7–35.4)
APTT PPP: 46.2 SECONDS (ref 22.7–35.4)
APTT PPP: 49.2 SECONDS (ref 22.7–35.4)
BUN SERPL-MCNC: 16 MG/DL (ref 8–23)
BUN/CREAT SERPL: 15.5 (ref 7–25)
BURR CELLS BLD QL SMEAR: ABNORMAL
CALCIUM SPEC-SCNC: 8.9 MG/DL (ref 8.6–10.5)
CHLORIDE SERPL-SCNC: 102 MMOL/L (ref 98–107)
CO2 SERPL-SCNC: 26.1 MMOL/L (ref 22–29)
CREAT SERPL-MCNC: 1.03 MG/DL (ref 0.57–1)
DEPRECATED RDW RBC AUTO: 46 FL (ref 37–54)
ERYTHROCYTE [DISTWIDTH] IN BLOOD BY AUTOMATED COUNT: 14.4 % (ref 12.3–15.4)
GFR SERPL CREATININE-BSD FRML MDRD: 51 ML/MIN/1.73
GLUCOSE SERPL-MCNC: 173 MG/DL (ref 65–99)
HCT VFR BLD AUTO: 26.2 % (ref 34–46.6)
HGB BLD-MCNC: 8.6 G/DL (ref 12–15.9)
INR PPP: 1.25 (ref 0.9–1.1)
LYMPHOCYTES # BLD MANUAL: 0.52 10*3/MM3 (ref 0.7–3.1)
LYMPHOCYTES NFR BLD MANUAL: 3.1 % (ref 5–12)
LYMPHOCYTES NFR BLD MANUAL: 7.1 % (ref 19.6–45.3)
MCH RBC QN AUTO: 29.2 PG (ref 26.6–33)
MCHC RBC AUTO-ENTMCNC: 32.8 G/DL (ref 31.5–35.7)
MCV RBC AUTO: 88.8 FL (ref 79–97)
METAMYELOCYTES NFR BLD MANUAL: 1 % (ref 0–0)
MICROCYTES BLD QL: ABNORMAL
MONOCYTES # BLD AUTO: 0.23 10*3/MM3 (ref 0.1–0.9)
MYELOCYTES NFR BLD MANUAL: 4.1 % (ref 0–0)
NEUTROPHILS # BLD AUTO: 6.17 10*3/MM3 (ref 1.7–7)
NEUTROPHILS NFR BLD MANUAL: 84.7 % (ref 42.7–76)
NRBC BLD AUTO-RTO: 0.1 /100 WBC (ref 0–0.2)
NRBC SPEC MANUAL: 1 /100 WBC (ref 0–0.2)
PLAT MORPH BLD: NORMAL
PLATELET # BLD AUTO: 244 10*3/MM3 (ref 140–450)
PMV BLD AUTO: 8.9 FL (ref 6–12)
POIKILOCYTOSIS BLD QL SMEAR: ABNORMAL
POLYCHROMASIA BLD QL SMEAR: ABNORMAL
POTASSIUM SERPL-SCNC: 4.6 MMOL/L (ref 3.5–5.2)
PROTHROMBIN TIME: 15.5 SECONDS (ref 11.7–14.2)
RBC # BLD AUTO: 2.95 10*6/MM3 (ref 3.77–5.28)
SODIUM SERPL-SCNC: 138 MMOL/L (ref 136–145)
WBC # BLD AUTO: 7.28 10*3/MM3 (ref 3.4–10.8)
WBC MORPH BLD: NORMAL

## 2021-03-27 PROCEDURE — 80048 BASIC METABOLIC PNL TOTAL CA: CPT | Performed by: SURGERY

## 2021-03-27 PROCEDURE — 97110 THERAPEUTIC EXERCISES: CPT

## 2021-03-27 PROCEDURE — 94760 N-INVAS EAR/PLS OXIMETRY 1: CPT

## 2021-03-27 PROCEDURE — 94799 UNLISTED PULMONARY SVC/PX: CPT

## 2021-03-27 PROCEDURE — 25010000002 HEPARIN (PORCINE) PER 1000 UNITS: Performed by: SURGERY

## 2021-03-27 PROCEDURE — 97162 PT EVAL MOD COMPLEX 30 MIN: CPT

## 2021-03-27 PROCEDURE — 85007 BL SMEAR W/DIFF WBC COUNT: CPT | Performed by: SURGERY

## 2021-03-27 PROCEDURE — 94664 DEMO&/EVAL PT USE INHALER: CPT

## 2021-03-27 PROCEDURE — 85610 PROTHROMBIN TIME: CPT | Performed by: SURGERY

## 2021-03-27 PROCEDURE — 25010000003 CEFAZOLIN IN DEXTROSE 2-4 GM/100ML-% SOLUTION: Performed by: SURGERY

## 2021-03-27 PROCEDURE — 94640 AIRWAY INHALATION TREATMENT: CPT

## 2021-03-27 PROCEDURE — 85730 THROMBOPLASTIN TIME PARTIAL: CPT | Performed by: SURGERY

## 2021-03-27 PROCEDURE — 85025 COMPLETE CBC W/AUTO DIFF WBC: CPT | Performed by: SURGERY

## 2021-03-27 RX ADMIN — SODIUM CHLORIDE, PRESERVATIVE FREE 10 ML: 5 INJECTION INTRAVENOUS at 20:33

## 2021-03-27 RX ADMIN — SODIUM CHLORIDE, PRESERVATIVE FREE 10 ML: 5 INJECTION INTRAVENOUS at 20:32

## 2021-03-27 RX ADMIN — PRAMIPEXOLE DIHYDROCHLORIDE 1.5 MG: 1.5 TABLET ORAL at 09:09

## 2021-03-27 RX ADMIN — HEPARIN SODIUM 5.7 UNITS/KG/HR: 10000 INJECTION, SOLUTION INTRAVENOUS at 05:09

## 2021-03-27 RX ADMIN — CEFAZOLIN SODIUM 2 G: 2 INJECTION, SOLUTION INTRAVENOUS at 06:00

## 2021-03-27 RX ADMIN — WARFARIN 6 MG: 6 TABLET ORAL at 17:00

## 2021-03-27 RX ADMIN — SODIUM CHLORIDE 100 ML/HR: 9 INJECTION, SOLUTION INTRAVENOUS at 04:59

## 2021-03-27 RX ADMIN — HYDROCODONE BITARTRATE AND ACETAMINOPHEN 1 TABLET: 5; 325 TABLET ORAL at 09:09

## 2021-03-27 RX ADMIN — FERROUS SULFATE TAB 325 MG (65 MG ELEMENTAL FE) 325 MG: 325 (65 FE) TAB at 09:09

## 2021-03-27 RX ADMIN — IPRATROPIUM BROMIDE AND ALBUTEROL SULFATE 3 ML: 2.5; .5 SOLUTION RESPIRATORY (INHALATION) at 23:31

## 2021-03-27 RX ADMIN — VALSARTAN 320 MG: 320 TABLET, FILM COATED ORAL at 09:09

## 2021-03-27 RX ADMIN — HEPARIN SODIUM 8200 UNITS: 5000 INJECTION INTRAVENOUS; SUBCUTANEOUS at 05:10

## 2021-03-27 RX ADMIN — PANTOPRAZOLE SODIUM 40 MG: 40 TABLET, DELAYED RELEASE ORAL at 06:00

## 2021-03-27 RX ADMIN — BUDESONIDE AND FORMOTEROL FUMARATE DIHYDRATE 2 PUFF: 160; 4.5 AEROSOL RESPIRATORY (INHALATION) at 11:59

## 2021-03-27 RX ADMIN — DILTIAZEM HYDROCHLORIDE 120 MG: 120 CAPSULE, COATED, EXTENDED RELEASE ORAL at 09:09

## 2021-03-27 RX ADMIN — IPRATROPIUM BROMIDE AND ALBUTEROL SULFATE 3 ML: 2.5; .5 SOLUTION RESPIRATORY (INHALATION) at 08:42

## 2021-03-27 RX ADMIN — IPRATROPIUM BROMIDE AND ALBUTEROL SULFATE 3 ML: 2.5; .5 SOLUTION RESPIRATORY (INHALATION) at 16:23

## 2021-03-27 RX ADMIN — HYDROCODONE BITARTRATE AND ACETAMINOPHEN 1 TABLET: 5; 325 TABLET ORAL at 16:58

## 2021-03-27 RX ADMIN — LEVOTHYROXINE SODIUM 100 MCG: 0.1 TABLET ORAL at 06:00

## 2021-03-27 RX ADMIN — LACTULOSE 10 G: 10 SOLUTION ORAL at 09:09

## 2021-03-27 RX ADMIN — FUROSEMIDE 40 MG: 40 TABLET ORAL at 09:09

## 2021-03-27 RX ADMIN — Medication 1000 MCG: at 09:09

## 2021-03-27 RX ADMIN — BUDESONIDE AND FORMOTEROL FUMARATE DIHYDRATE 2 PUFF: 160; 4.5 AEROSOL RESPIRATORY (INHALATION) at 19:14

## 2021-03-28 LAB
APTT PPP: 72.1 SECONDS (ref 22.7–35.4)
DEPRECATED RDW RBC AUTO: 48.9 FL (ref 37–54)
EOSINOPHIL # BLD MANUAL: 0.22 10*3/MM3 (ref 0–0.4)
EOSINOPHIL NFR BLD MANUAL: 2 % (ref 0.3–6.2)
ERYTHROCYTE [DISTWIDTH] IN BLOOD BY AUTOMATED COUNT: 14.9 % (ref 12.3–15.4)
HCT VFR BLD AUTO: 30.9 % (ref 34–46.6)
HGB BLD-MCNC: 9.8 G/DL (ref 12–15.9)
INR PPP: 1.4 (ref 0.9–1.1)
LYMPHOCYTES # BLD MANUAL: 1.55 10*3/MM3 (ref 0.7–3.1)
LYMPHOCYTES NFR BLD MANUAL: 14 % (ref 19.6–45.3)
LYMPHOCYTES NFR BLD MANUAL: 2 % (ref 5–12)
MCH RBC QN AUTO: 29.2 PG (ref 26.6–33)
MCHC RBC AUTO-ENTMCNC: 31.7 G/DL (ref 31.5–35.7)
MCV RBC AUTO: 92 FL (ref 79–97)
METAMYELOCYTES NFR BLD MANUAL: 4 % (ref 0–0)
MONOCYTES # BLD AUTO: 0.22 10*3/MM3 (ref 0.1–0.9)
MYELOCYTES NFR BLD MANUAL: 4 % (ref 0–0)
NEUTROPHILS # BLD AUTO: 7.96 10*3/MM3 (ref 1.7–7)
NEUTROPHILS NFR BLD MANUAL: 72 % (ref 42.7–76)
NRBC BLD AUTO-RTO: 0.5 /100 WBC (ref 0–0.2)
PLAT MORPH BLD: NORMAL
PLATELET # BLD AUTO: 285 10*3/MM3 (ref 140–450)
PMV BLD AUTO: 9 FL (ref 6–12)
PROMYELOCYTES NFR BLD MANUAL: 2 % (ref 0–0)
PROTHROMBIN TIME: 16.9 SECONDS (ref 11.7–14.2)
RBC # BLD AUTO: 3.36 10*6/MM3 (ref 3.77–5.28)
RBC MORPH BLD: NORMAL
WBC # BLD AUTO: 11.05 10*3/MM3 (ref 3.4–10.8)
WBC MORPH BLD: NORMAL

## 2021-03-28 PROCEDURE — 94760 N-INVAS EAR/PLS OXIMETRY 1: CPT

## 2021-03-28 PROCEDURE — 85610 PROTHROMBIN TIME: CPT | Performed by: SURGERY

## 2021-03-28 PROCEDURE — 94799 UNLISTED PULMONARY SVC/PX: CPT

## 2021-03-28 PROCEDURE — 85007 BL SMEAR W/DIFF WBC COUNT: CPT | Performed by: SURGERY

## 2021-03-28 PROCEDURE — 85025 COMPLETE CBC W/AUTO DIFF WBC: CPT | Performed by: SURGERY

## 2021-03-28 PROCEDURE — 25010000002 HEPARIN (PORCINE) PER 1000 UNITS: Performed by: SURGERY

## 2021-03-28 PROCEDURE — 97110 THERAPEUTIC EXERCISES: CPT

## 2021-03-28 PROCEDURE — 85730 THROMBOPLASTIN TIME PARTIAL: CPT | Performed by: HOSPITALIST

## 2021-03-28 RX ADMIN — SODIUM CHLORIDE, PRESERVATIVE FREE 10 ML: 5 INJECTION INTRAVENOUS at 20:34

## 2021-03-28 RX ADMIN — IPRATROPIUM BROMIDE AND ALBUTEROL SULFATE 3 ML: 2.5; .5 SOLUTION RESPIRATORY (INHALATION) at 15:14

## 2021-03-28 RX ADMIN — LEVOTHYROXINE SODIUM 100 MCG: 0.1 TABLET ORAL at 06:00

## 2021-03-28 RX ADMIN — SODIUM CHLORIDE, PRESERVATIVE FREE 10 ML: 5 INJECTION INTRAVENOUS at 20:35

## 2021-03-28 RX ADMIN — WARFARIN 6 MG: 6 TABLET ORAL at 17:54

## 2021-03-28 RX ADMIN — HEPARIN SODIUM 10.7 UNITS/KG/HR: 10000 INJECTION, SOLUTION INTRAVENOUS at 04:06

## 2021-03-28 RX ADMIN — BUDESONIDE AND FORMOTEROL FUMARATE DIHYDRATE 2 PUFF: 160; 4.5 AEROSOL RESPIRATORY (INHALATION) at 07:04

## 2021-03-28 RX ADMIN — PRAMIPEXOLE DIHYDROCHLORIDE 1.5 MG: 1.5 TABLET ORAL at 08:53

## 2021-03-28 RX ADMIN — PANTOPRAZOLE SODIUM 40 MG: 40 TABLET, DELAYED RELEASE ORAL at 06:00

## 2021-03-28 RX ADMIN — VALSARTAN 320 MG: 320 TABLET, FILM COATED ORAL at 08:52

## 2021-03-28 RX ADMIN — Medication 1000 MCG: at 08:52

## 2021-03-28 RX ADMIN — FUROSEMIDE 40 MG: 40 TABLET ORAL at 08:52

## 2021-03-28 RX ADMIN — DILTIAZEM HYDROCHLORIDE 120 MG: 120 CAPSULE, COATED, EXTENDED RELEASE ORAL at 08:52

## 2021-03-28 RX ADMIN — LACTULOSE 10 G: 10 SOLUTION ORAL at 08:53

## 2021-03-28 RX ADMIN — FERROUS SULFATE TAB 325 MG (65 MG ELEMENTAL FE) 325 MG: 325 (65 FE) TAB at 09:01

## 2021-03-28 RX ADMIN — BUDESONIDE AND FORMOTEROL FUMARATE DIHYDRATE 2 PUFF: 160; 4.5 AEROSOL RESPIRATORY (INHALATION) at 20:10

## 2021-03-29 LAB
ANION GAP SERPL CALCULATED.3IONS-SCNC: 9 MMOL/L (ref 5–15)
ANISOCYTOSIS BLD QL: ABNORMAL
APTT PPP: 88.2 SECONDS (ref 22.7–35.4)
BUN SERPL-MCNC: 16 MG/DL (ref 8–23)
BUN/CREAT SERPL: 16.3 (ref 7–25)
BURR CELLS BLD QL SMEAR: ABNORMAL
CALCIUM SPEC-SCNC: 9.4 MG/DL (ref 8.6–10.5)
CHLORIDE SERPL-SCNC: 100 MMOL/L (ref 98–107)
CO2 SERPL-SCNC: 27 MMOL/L (ref 22–29)
CREAT SERPL-MCNC: 0.98 MG/DL (ref 0.57–1)
DEPRECATED RDW RBC AUTO: 48.6 FL (ref 37–54)
EOSINOPHIL # BLD MANUAL: 0.23 10*3/MM3 (ref 0–0.4)
EOSINOPHIL NFR BLD MANUAL: 3.2 % (ref 0.3–6.2)
ERYTHROCYTE [DISTWIDTH] IN BLOOD BY AUTOMATED COUNT: 14.8 % (ref 12.3–15.4)
FOLATE SERPL-MCNC: 8.31 NG/ML (ref 4.78–24.2)
GFR SERPL CREATININE-BSD FRML MDRD: 54 ML/MIN/1.73
GLUCOSE SERPL-MCNC: 91 MG/DL (ref 65–99)
HCT VFR BLD AUTO: 26 % (ref 34–46.6)
HGB BLD-MCNC: 8.3 G/DL (ref 12–15.9)
HYPOCHROMIA BLD QL: ABNORMAL
INR PPP: 1.5 (ref 0.9–1.1)
IRON 24H UR-MRATE: 45 MCG/DL (ref 37–145)
IRON SATN MFR SERPL: 20 % (ref 20–50)
LYMPHOCYTES # BLD MANUAL: 1 10*3/MM3 (ref 0.7–3.1)
LYMPHOCYTES NFR BLD MANUAL: 13.7 % (ref 19.6–45.3)
LYMPHOCYTES NFR BLD MANUAL: 6.3 % (ref 5–12)
MCH RBC QN AUTO: 28.7 PG (ref 26.6–33)
MCHC RBC AUTO-ENTMCNC: 31.9 G/DL (ref 31.5–35.7)
MCV RBC AUTO: 90 FL (ref 79–97)
MONOCYTES # BLD AUTO: 0.46 10*3/MM3 (ref 0.1–0.9)
MYELOCYTES NFR BLD MANUAL: 3.2 % (ref 0–0)
NEUTROPHILS # BLD AUTO: 5.37 10*3/MM3 (ref 1.7–7)
NEUTROPHILS NFR BLD MANUAL: 73.7 % (ref 42.7–76)
NRBC BLD AUTO-RTO: 0.4 /100 WBC (ref 0–0.2)
PLAT MORPH BLD: NORMAL
PLATELET # BLD AUTO: 270 10*3/MM3 (ref 140–450)
PMV BLD AUTO: 9.1 FL (ref 6–12)
POIKILOCYTOSIS BLD QL SMEAR: ABNORMAL
POLYCHROMASIA BLD QL SMEAR: ABNORMAL
POTASSIUM SERPL-SCNC: 3.7 MMOL/L (ref 3.5–5.2)
PROTHROMBIN TIME: 17.9 SECONDS (ref 11.7–14.2)
RBC # BLD AUTO: 2.89 10*6/MM3 (ref 3.77–5.28)
SODIUM SERPL-SCNC: 136 MMOL/L (ref 136–145)
TIBC SERPL-MCNC: 226 MCG/DL (ref 298–536)
TRANSFERRIN SERPL-MCNC: 152 MG/DL (ref 200–360)
VIT B12 BLD-MCNC: 703 PG/ML (ref 211–946)
WBC # BLD AUTO: 7.29 10*3/MM3 (ref 3.4–10.8)
WBC MORPH BLD: NORMAL

## 2021-03-29 PROCEDURE — 80048 BASIC METABOLIC PNL TOTAL CA: CPT | Performed by: HOSPITALIST

## 2021-03-29 PROCEDURE — 85610 PROTHROMBIN TIME: CPT | Performed by: SURGERY

## 2021-03-29 PROCEDURE — 25010000002 HEPARIN (PORCINE) PER 1000 UNITS: Performed by: SURGERY

## 2021-03-29 PROCEDURE — 85730 THROMBOPLASTIN TIME PARTIAL: CPT | Performed by: SURGERY

## 2021-03-29 PROCEDURE — 85007 BL SMEAR W/DIFF WBC COUNT: CPT | Performed by: SURGERY

## 2021-03-29 PROCEDURE — 85025 COMPLETE CBC W/AUTO DIFF WBC: CPT | Performed by: SURGERY

## 2021-03-29 PROCEDURE — 84466 ASSAY OF TRANSFERRIN: CPT | Performed by: HOSPITALIST

## 2021-03-29 PROCEDURE — 97530 THERAPEUTIC ACTIVITIES: CPT

## 2021-03-29 PROCEDURE — 82746 ASSAY OF FOLIC ACID SERUM: CPT | Performed by: HOSPITALIST

## 2021-03-29 PROCEDURE — 94799 UNLISTED PULMONARY SVC/PX: CPT

## 2021-03-29 PROCEDURE — 82607 VITAMIN B-12: CPT | Performed by: HOSPITALIST

## 2021-03-29 PROCEDURE — 83540 ASSAY OF IRON: CPT | Performed by: HOSPITALIST

## 2021-03-29 RX ADMIN — HEPARIN SODIUM 10.7 UNITS/KG/HR: 10000 INJECTION, SOLUTION INTRAVENOUS at 05:34

## 2021-03-29 RX ADMIN — LACTULOSE 10 G: 10 SOLUTION ORAL at 08:17

## 2021-03-29 RX ADMIN — DILTIAZEM HYDROCHLORIDE 120 MG: 120 CAPSULE, COATED, EXTENDED RELEASE ORAL at 08:18

## 2021-03-29 RX ADMIN — PANTOPRAZOLE SODIUM 40 MG: 40 TABLET, DELAYED RELEASE ORAL at 05:34

## 2021-03-29 RX ADMIN — FERROUS SULFATE TAB 325 MG (65 MG ELEMENTAL FE) 325 MG: 325 (65 FE) TAB at 08:18

## 2021-03-29 RX ADMIN — BUDESONIDE AND FORMOTEROL FUMARATE DIHYDRATE 2 PUFF: 160; 4.5 AEROSOL RESPIRATORY (INHALATION) at 20:10

## 2021-03-29 RX ADMIN — BUDESONIDE AND FORMOTEROL FUMARATE DIHYDRATE 2 PUFF: 160; 4.5 AEROSOL RESPIRATORY (INHALATION) at 07:12

## 2021-03-29 RX ADMIN — PRAMIPEXOLE DIHYDROCHLORIDE 1.5 MG: 1.5 TABLET ORAL at 08:18

## 2021-03-29 RX ADMIN — LEVOTHYROXINE SODIUM 100 MCG: 0.1 TABLET ORAL at 05:34

## 2021-03-29 RX ADMIN — WARFARIN 9 MG: 6 TABLET ORAL at 15:30

## 2021-03-29 RX ADMIN — Medication 1000 MCG: at 08:18

## 2021-03-29 RX ADMIN — VALSARTAN 320 MG: 320 TABLET, FILM COATED ORAL at 08:18

## 2021-03-29 RX ADMIN — FUROSEMIDE 40 MG: 40 TABLET ORAL at 08:18

## 2021-03-30 LAB
ANISOCYTOSIS BLD QL: ABNORMAL
APTT PPP: 94.7 SECONDS (ref 22.7–35.4)
BH BB BLOOD EXPIRATION DATE: NORMAL
BH BB BLOOD EXPIRATION DATE: NORMAL
BH BB BLOOD TYPE BARCODE: 7300
BH BB BLOOD TYPE BARCODE: 7300
BH BB DISPENSE STATUS: NORMAL
BH BB DISPENSE STATUS: NORMAL
BH BB PRODUCT CODE: NORMAL
BH BB PRODUCT CODE: NORMAL
BH BB UNIT NUMBER: NORMAL
BH BB UNIT NUMBER: NORMAL
CROSSMATCH INTERPRETATION: NORMAL
CROSSMATCH INTERPRETATION: NORMAL
DEPRECATED RDW RBC AUTO: 47.7 FL (ref 37–54)
EOSINOPHIL # BLD MANUAL: 0.16 10*3/MM3 (ref 0–0.4)
EOSINOPHIL NFR BLD MANUAL: 2.1 % (ref 0.3–6.2)
ERYTHROCYTE [DISTWIDTH] IN BLOOD BY AUTOMATED COUNT: 14.8 % (ref 12.3–15.4)
HCT VFR BLD AUTO: 29.3 % (ref 34–46.6)
HGB BLD-MCNC: 9.7 G/DL (ref 12–15.9)
INR PPP: 1.6 (ref 0.9–1.1)
LYMPHOCYTES # BLD MANUAL: 1.86 10*3/MM3 (ref 0.7–3.1)
LYMPHOCYTES NFR BLD MANUAL: 2.1 % (ref 5–12)
LYMPHOCYTES NFR BLD MANUAL: 24 % (ref 19.6–45.3)
MCH RBC QN AUTO: 29.7 PG (ref 26.6–33)
MCHC RBC AUTO-ENTMCNC: 33.1 G/DL (ref 31.5–35.7)
MCV RBC AUTO: 89.6 FL (ref 79–97)
METAMYELOCYTES NFR BLD MANUAL: 2.1 % (ref 0–0)
MICROCYTES BLD QL: ABNORMAL
MONOCYTES # BLD AUTO: 0.16 10*3/MM3 (ref 0.1–0.9)
NEUTROPHILS # BLD AUTO: 5.41 10*3/MM3 (ref 1.7–7)
NEUTROPHILS NFR BLD MANUAL: 69.8 % (ref 42.7–76)
PLAT MORPH BLD: NORMAL
PLATELET # BLD AUTO: 282 10*3/MM3 (ref 140–450)
PMV BLD AUTO: 9.2 FL (ref 6–12)
PROTHROMBIN TIME: 18.8 SECONDS (ref 11.7–14.2)
RBC # BLD AUTO: 3.27 10*6/MM3 (ref 3.77–5.28)
UNIT  ABO: NORMAL
UNIT  ABO: NORMAL
UNIT  RH: NORMAL
UNIT  RH: NORMAL
WBC # BLD AUTO: 7.75 10*3/MM3 (ref 3.4–10.8)
WBC MORPH BLD: NORMAL

## 2021-03-30 PROCEDURE — 94799 UNLISTED PULMONARY SVC/PX: CPT

## 2021-03-30 PROCEDURE — 85610 PROTHROMBIN TIME: CPT | Performed by: SURGERY

## 2021-03-30 PROCEDURE — 25010000002 HEPARIN (PORCINE) PER 1000 UNITS: Performed by: SURGERY

## 2021-03-30 PROCEDURE — 97530 THERAPEUTIC ACTIVITIES: CPT

## 2021-03-30 PROCEDURE — 85025 COMPLETE CBC W/AUTO DIFF WBC: CPT | Performed by: SURGERY

## 2021-03-30 PROCEDURE — 85730 THROMBOPLASTIN TIME PARTIAL: CPT | Performed by: SURGERY

## 2021-03-30 PROCEDURE — 85007 BL SMEAR W/DIFF WBC COUNT: CPT | Performed by: SURGERY

## 2021-03-30 RX ORDER — BISACODYL 10 MG
10 SUPPOSITORY, RECTAL RECTAL DAILY
Status: DISCONTINUED | OUTPATIENT
Start: 2021-03-30 | End: 2021-04-03 | Stop reason: HOSPADM

## 2021-03-30 RX ORDER — BISACODYL 5 MG/1
10 TABLET, DELAYED RELEASE ORAL DAILY PRN
Status: DISCONTINUED | OUTPATIENT
Start: 2021-03-30 | End: 2021-04-03 | Stop reason: HOSPADM

## 2021-03-30 RX ADMIN — FUROSEMIDE 40 MG: 40 TABLET ORAL at 08:20

## 2021-03-30 RX ADMIN — WARFARIN 9 MG: 6 TABLET ORAL at 16:59

## 2021-03-30 RX ADMIN — BUDESONIDE AND FORMOTEROL FUMARATE DIHYDRATE 2 PUFF: 160; 4.5 AEROSOL RESPIRATORY (INHALATION) at 07:37

## 2021-03-30 RX ADMIN — PANTOPRAZOLE SODIUM 40 MG: 40 TABLET, DELAYED RELEASE ORAL at 06:59

## 2021-03-30 RX ADMIN — LACTULOSE 10 G: 10 SOLUTION ORAL at 08:21

## 2021-03-30 RX ADMIN — BISACODYL 10 MG: 10 SUPPOSITORY RECTAL at 20:39

## 2021-03-30 RX ADMIN — FERROUS SULFATE TAB 325 MG (65 MG ELEMENTAL FE) 325 MG: 325 (65 FE) TAB at 08:20

## 2021-03-30 RX ADMIN — VALSARTAN 320 MG: 320 TABLET, FILM COATED ORAL at 08:20

## 2021-03-30 RX ADMIN — SODIUM CHLORIDE, PRESERVATIVE FREE 10 ML: 5 INJECTION INTRAVENOUS at 20:38

## 2021-03-30 RX ADMIN — DILTIAZEM HYDROCHLORIDE 120 MG: 120 CAPSULE, COATED, EXTENDED RELEASE ORAL at 08:20

## 2021-03-30 RX ADMIN — Medication 1000 MCG: at 08:20

## 2021-03-30 RX ADMIN — HEPARIN SODIUM 10.7 UNITS/KG/HR: 10000 INJECTION, SOLUTION INTRAVENOUS at 05:43

## 2021-03-30 RX ADMIN — PRAMIPEXOLE DIHYDROCHLORIDE 1.5 MG: 1.5 TABLET ORAL at 08:20

## 2021-03-30 RX ADMIN — BUDESONIDE AND FORMOTEROL FUMARATE DIHYDRATE 2 PUFF: 160; 4.5 AEROSOL RESPIRATORY (INHALATION) at 19:40

## 2021-03-30 RX ADMIN — SODIUM CHLORIDE, PRESERVATIVE FREE 10 ML: 5 INJECTION INTRAVENOUS at 08:21

## 2021-03-30 RX ADMIN — LEVOTHYROXINE SODIUM 100 MCG: 0.1 TABLET ORAL at 06:59

## 2021-03-31 LAB
APTT PPP: 95.9 SECONDS (ref 22.7–35.4)
BASOPHILS # BLD AUTO: 0.02 10*3/MM3 (ref 0–0.2)
BASOPHILS NFR BLD AUTO: 0.3 % (ref 0–1.5)
DEPRECATED RDW RBC AUTO: 49.9 FL (ref 37–54)
EOSINOPHIL # BLD AUTO: 0.15 10*3/MM3 (ref 0–0.4)
EOSINOPHIL NFR BLD AUTO: 1.9 % (ref 0.3–6.2)
ERYTHROCYTE [DISTWIDTH] IN BLOOD BY AUTOMATED COUNT: 15.1 % (ref 12.3–15.4)
HCT VFR BLD AUTO: 29.6 % (ref 34–46.6)
HGB BLD-MCNC: 9.9 G/DL (ref 12–15.9)
IMM GRANULOCYTES # BLD AUTO: 0.78 10*3/MM3 (ref 0–0.05)
IMM GRANULOCYTES NFR BLD AUTO: 10.1 % (ref 0–0.5)
INR PPP: 1.68 (ref 0.9–1.1)
LYMPHOCYTES # BLD AUTO: 1.97 10*3/MM3 (ref 0.7–3.1)
LYMPHOCYTES NFR BLD AUTO: 25.5 % (ref 19.6–45.3)
MCH RBC QN AUTO: 30.4 PG (ref 26.6–33)
MCHC RBC AUTO-ENTMCNC: 33.4 G/DL (ref 31.5–35.7)
MCV RBC AUTO: 90.8 FL (ref 79–97)
MONOCYTES # BLD AUTO: 0.78 10*3/MM3 (ref 0.1–0.9)
MONOCYTES NFR BLD AUTO: 10.1 % (ref 5–12)
NEUTROPHILS NFR BLD AUTO: 4.03 10*3/MM3 (ref 1.7–7)
NEUTROPHILS NFR BLD AUTO: 52.1 % (ref 42.7–76)
NRBC BLD AUTO-RTO: 0.1 /100 WBC (ref 0–0.2)
PLAT MORPH BLD: NORMAL
PLATELET # BLD AUTO: 301 10*3/MM3 (ref 140–450)
PMV BLD AUTO: 9.2 FL (ref 6–12)
PROTHROMBIN TIME: 19.6 SECONDS (ref 11.7–14.2)
RBC # BLD AUTO: 3.26 10*6/MM3 (ref 3.77–5.28)
RBC MORPH BLD: NORMAL
WBC # BLD AUTO: 7.73 10*3/MM3 (ref 3.4–10.8)
WBC MORPH BLD: NORMAL

## 2021-03-31 PROCEDURE — 85610 PROTHROMBIN TIME: CPT | Performed by: SURGERY

## 2021-03-31 PROCEDURE — 85025 COMPLETE CBC W/AUTO DIFF WBC: CPT | Performed by: SURGERY

## 2021-03-31 PROCEDURE — 97530 THERAPEUTIC ACTIVITIES: CPT

## 2021-03-31 PROCEDURE — 25010000002 HEPARIN (PORCINE) PER 1000 UNITS: Performed by: SURGERY

## 2021-03-31 PROCEDURE — 94799 UNLISTED PULMONARY SVC/PX: CPT

## 2021-03-31 PROCEDURE — 85007 BL SMEAR W/DIFF WBC COUNT: CPT | Performed by: SURGERY

## 2021-03-31 PROCEDURE — 94664 DEMO&/EVAL PT USE INHALER: CPT

## 2021-03-31 PROCEDURE — 85730 THROMBOPLASTIN TIME PARTIAL: CPT | Performed by: SURGERY

## 2021-03-31 RX ADMIN — BUDESONIDE AND FORMOTEROL FUMARATE DIHYDRATE 2 PUFF: 160; 4.5 AEROSOL RESPIRATORY (INHALATION) at 19:10

## 2021-03-31 RX ADMIN — BISACODYL 10 MG: 5 TABLET ORAL at 06:45

## 2021-03-31 RX ADMIN — BUDESONIDE AND FORMOTEROL FUMARATE DIHYDRATE 2 PUFF: 160; 4.5 AEROSOL RESPIRATORY (INHALATION) at 07:28

## 2021-03-31 RX ADMIN — PRAMIPEXOLE DIHYDROCHLORIDE 1.5 MG: 1.5 TABLET ORAL at 08:16

## 2021-03-31 RX ADMIN — HYDROCODONE BITARTRATE AND ACETAMINOPHEN 1 TABLET: 5; 325 TABLET ORAL at 04:06

## 2021-03-31 RX ADMIN — LACTULOSE 10 G: 10 SOLUTION ORAL at 08:19

## 2021-03-31 RX ADMIN — LEVOTHYROXINE SODIUM 100 MCG: 0.1 TABLET ORAL at 06:45

## 2021-03-31 RX ADMIN — VALSARTAN 320 MG: 320 TABLET, FILM COATED ORAL at 08:16

## 2021-03-31 RX ADMIN — SODIUM CHLORIDE, PRESERVATIVE FREE 10 ML: 5 INJECTION INTRAVENOUS at 08:23

## 2021-03-31 RX ADMIN — SODIUM CHLORIDE, PRESERVATIVE FREE 10 ML: 5 INJECTION INTRAVENOUS at 08:24

## 2021-03-31 RX ADMIN — FUROSEMIDE 40 MG: 40 TABLET ORAL at 08:16

## 2021-03-31 RX ADMIN — Medication 1000 MCG: at 08:16

## 2021-03-31 RX ADMIN — BISACODYL 10 MG: 10 SUPPOSITORY RECTAL at 08:19

## 2021-03-31 RX ADMIN — PANTOPRAZOLE SODIUM 40 MG: 40 TABLET, DELAYED RELEASE ORAL at 06:45

## 2021-03-31 RX ADMIN — DILTIAZEM HYDROCHLORIDE 120 MG: 120 CAPSULE, COATED, EXTENDED RELEASE ORAL at 08:19

## 2021-03-31 RX ADMIN — MAGNESIUM HYDROXIDE,ALUMINUM HYDROXICE,SIMETHICONE 15 ML: 240; 2400; 2400 SUSPENSION ORAL at 16:31

## 2021-03-31 RX ADMIN — FERROUS SULFATE TAB 325 MG (65 MG ELEMENTAL FE) 325 MG: 325 (65 FE) TAB at 08:15

## 2021-03-31 RX ADMIN — HEPARIN SODIUM 10.7 UNITS/KG/HR: 10000 INJECTION, SOLUTION INTRAVENOUS at 04:09

## 2021-03-31 RX ADMIN — WARFARIN 9 MG: 6 TABLET ORAL at 16:30

## 2021-04-01 LAB
APTT PPP: 122.9 SECONDS (ref 22.7–35.4)
APTT PPP: 35.7 SECONDS (ref 22.7–35.4)
APTT PPP: 40.4 SECONDS (ref 22.7–35.4)
APTT PPP: 57.4 SECONDS (ref 22.7–35.4)
BASOPHILS # BLD MANUAL: 0.07 10*3/MM3 (ref 0–0.2)
BASOPHILS NFR BLD AUTO: 1 % (ref 0–1.5)
DEPRECATED RDW RBC AUTO: 51.3 FL (ref 37–54)
DEPRECATED RDW RBC AUTO: 52.9 FL (ref 37–54)
ERYTHROCYTE [DISTWIDTH] IN BLOOD BY AUTOMATED COUNT: 15.1 % (ref 12.3–15.4)
ERYTHROCYTE [DISTWIDTH] IN BLOOD BY AUTOMATED COUNT: 15.2 % (ref 12.3–15.4)
HCT VFR BLD AUTO: 29.2 % (ref 34–46.6)
HCT VFR BLD AUTO: 36.8 % (ref 34–46.6)
HGB BLD-MCNC: 11.5 G/DL (ref 12–15.9)
HGB BLD-MCNC: 9.2 G/DL (ref 12–15.9)
INR PPP: 1.81 (ref 0.9–1.1)
INR PPP: 1.91 (ref 0.9–1.1)
INR PPP: 1.99 (ref 0.9–1.1)
LYMPHOCYTES # BLD MANUAL: 1.21 10*3/MM3 (ref 0.7–3.1)
LYMPHOCYTES # BLD MANUAL: 1.36 10*3/MM3 (ref 0.7–3.1)
LYMPHOCYTES NFR BLD MANUAL: 14.1 % (ref 5–12)
LYMPHOCYTES NFR BLD MANUAL: 16 % (ref 19.6–45.3)
LYMPHOCYTES NFR BLD MANUAL: 19.2 % (ref 19.6–45.3)
LYMPHOCYTES NFR BLD MANUAL: 3 % (ref 5–12)
MCH RBC QN AUTO: 29.4 PG (ref 26.6–33)
MCH RBC QN AUTO: 29.7 PG (ref 26.6–33)
MCHC RBC AUTO-ENTMCNC: 31.3 G/DL (ref 31.5–35.7)
MCHC RBC AUTO-ENTMCNC: 31.5 G/DL (ref 31.5–35.7)
MCV RBC AUTO: 93.3 FL (ref 79–97)
MCV RBC AUTO: 95.1 FL (ref 79–97)
METAMYELOCYTES NFR BLD MANUAL: 2 % (ref 0–0)
MONOCYTES # BLD AUTO: 0.23 10*3/MM3 (ref 0.1–0.9)
MONOCYTES # BLD AUTO: 1 10*3/MM3 (ref 0.1–0.9)
MYELOCYTES NFR BLD MANUAL: 2 % (ref 0–0)
NEUTROPHILS # BLD AUTO: 4.64 10*3/MM3 (ref 1.7–7)
NEUTROPHILS # BLD AUTO: 5.83 10*3/MM3 (ref 1.7–7)
NEUTROPHILS NFR BLD MANUAL: 65.7 % (ref 42.7–76)
NEUTROPHILS NFR BLD MANUAL: 77 % (ref 42.7–76)
NRBC BLD AUTO-RTO: 0.1 /100 WBC (ref 0–0.2)
PLAT MORPH BLD: NORMAL
PLAT MORPH BLD: NORMAL
PLATELET # BLD AUTO: 253 10*3/MM3 (ref 140–450)
PLATELET # BLD AUTO: 253 10*3/MM3 (ref 140–450)
PMV BLD AUTO: 9.3 FL (ref 6–12)
PMV BLD AUTO: 9.4 FL (ref 6–12)
PROTHROMBIN TIME: 20.8 SECONDS (ref 11.7–14.2)
PROTHROMBIN TIME: 21.6 SECONDS (ref 11.7–14.2)
PROTHROMBIN TIME: 22.4 SECONDS (ref 11.7–14.2)
RBC # BLD AUTO: 3.13 10*6/MM3 (ref 3.77–5.28)
RBC # BLD AUTO: 3.87 10*6/MM3 (ref 3.77–5.28)
RBC MORPH BLD: NORMAL
RBC MORPH BLD: NORMAL
WBC # BLD AUTO: 7.07 10*3/MM3 (ref 3.4–10.8)
WBC # BLD AUTO: 7.57 10*3/MM3 (ref 3.4–10.8)
WBC MORPH BLD: NORMAL
WBC MORPH BLD: NORMAL

## 2021-04-01 PROCEDURE — 85730 THROMBOPLASTIN TIME PARTIAL: CPT | Performed by: SURGERY

## 2021-04-01 PROCEDURE — 85730 THROMBOPLASTIN TIME PARTIAL: CPT | Performed by: HOSPITALIST

## 2021-04-01 PROCEDURE — 25010000002 HEPARIN (PORCINE) PER 1000 UNITS: Performed by: SURGERY

## 2021-04-01 PROCEDURE — 85610 PROTHROMBIN TIME: CPT | Performed by: HOSPITALIST

## 2021-04-01 PROCEDURE — 85007 BL SMEAR W/DIFF WBC COUNT: CPT | Performed by: HOSPITALIST

## 2021-04-01 PROCEDURE — 85025 COMPLETE CBC W/AUTO DIFF WBC: CPT | Performed by: SURGERY

## 2021-04-01 PROCEDURE — 94799 UNLISTED PULMONARY SVC/PX: CPT

## 2021-04-01 PROCEDURE — 85610 PROTHROMBIN TIME: CPT | Performed by: SURGERY

## 2021-04-01 PROCEDURE — 85025 COMPLETE CBC W/AUTO DIFF WBC: CPT | Performed by: HOSPITALIST

## 2021-04-01 PROCEDURE — 97530 THERAPEUTIC ACTIVITIES: CPT

## 2021-04-01 PROCEDURE — 85007 BL SMEAR W/DIFF WBC COUNT: CPT | Performed by: SURGERY

## 2021-04-01 PROCEDURE — 25010000002 HEPARIN (PORCINE) PER 1000 UNITS: Performed by: HOSPITALIST

## 2021-04-01 RX ORDER — HEPARIN SODIUM 10000 [USP'U]/100ML
14.7 INJECTION, SOLUTION INTRAVENOUS
Status: DISCONTINUED | OUTPATIENT
Start: 2021-04-01 | End: 2021-04-03 | Stop reason: HOSPADM

## 2021-04-01 RX ORDER — WARFARIN SODIUM 6 MG/1
6 TABLET ORAL
Status: DISCONTINUED | OUTPATIENT
Start: 2021-04-01 | End: 2021-04-01

## 2021-04-01 RX ORDER — POLYETHYLENE GLYCOL 3350 17 G/17G
17 POWDER, FOR SOLUTION ORAL DAILY
Status: DISCONTINUED | OUTPATIENT
Start: 2021-04-01 | End: 2021-04-01

## 2021-04-01 RX ORDER — WARFARIN SODIUM 4 MG/1
8 TABLET ORAL
Status: DISCONTINUED | OUTPATIENT
Start: 2021-04-01 | End: 2021-04-02

## 2021-04-01 RX ORDER — LACTULOSE 10 G/15ML
10 SOLUTION ORAL 2 TIMES DAILY
Status: DISCONTINUED | OUTPATIENT
Start: 2021-04-01 | End: 2021-04-03 | Stop reason: HOSPADM

## 2021-04-01 RX ORDER — ACETAMINOPHEN 325 MG/1
650 TABLET ORAL EVERY 4 HOURS PRN
Start: 2021-04-01

## 2021-04-01 RX ORDER — WARFARIN SODIUM 7.5 MG/1
7.5 TABLET ORAL NIGHTLY
Start: 2021-04-01 | End: 2021-04-22 | Stop reason: HOSPADM

## 2021-04-01 RX ORDER — HEPARIN SODIUM 5000 [USP'U]/ML
40-80 INJECTION, SOLUTION INTRAVENOUS; SUBCUTANEOUS EVERY 6 HOURS PRN
Status: DISCONTINUED | OUTPATIENT
Start: 2021-04-01 | End: 2021-04-03 | Stop reason: HOSPADM

## 2021-04-01 RX ORDER — LACTULOSE 10 G/15ML
20 SOLUTION ORAL DAILY
Status: DISCONTINUED | OUTPATIENT
Start: 2021-04-02 | End: 2021-04-01

## 2021-04-01 RX ADMIN — LACTULOSE 10 G: 10 SOLUTION ORAL at 08:19

## 2021-04-01 RX ADMIN — HEPARIN SODIUM 7.7 UNITS/KG/HR: 10000 INJECTION, SOLUTION INTRAVENOUS at 15:44

## 2021-04-01 RX ADMIN — HEPARIN SODIUM 7.7 UNITS/KG/HR: 10000 INJECTION, SOLUTION INTRAVENOUS at 07:30

## 2021-04-01 RX ADMIN — SODIUM CHLORIDE, PRESERVATIVE FREE 10 ML: 5 INJECTION INTRAVENOUS at 20:22

## 2021-04-01 RX ADMIN — PANTOPRAZOLE SODIUM 40 MG: 40 TABLET, DELAYED RELEASE ORAL at 06:34

## 2021-04-01 RX ADMIN — SODIUM CHLORIDE, PRESERVATIVE FREE 10 ML: 5 INJECTION INTRAVENOUS at 20:23

## 2021-04-01 RX ADMIN — Medication 1000 MCG: at 08:19

## 2021-04-01 RX ADMIN — LACTULOSE 10 G: 10 SOLUTION ORAL at 20:22

## 2021-04-01 RX ADMIN — BUDESONIDE AND FORMOTEROL FUMARATE DIHYDRATE 2 PUFF: 160; 4.5 AEROSOL RESPIRATORY (INHALATION) at 07:47

## 2021-04-01 RX ADMIN — LINACLOTIDE 145 MCG: 145 CAPSULE, GELATIN COATED ORAL at 11:40

## 2021-04-01 RX ADMIN — VALSARTAN 320 MG: 320 TABLET, FILM COATED ORAL at 08:19

## 2021-04-01 RX ADMIN — BUDESONIDE AND FORMOTEROL FUMARATE DIHYDRATE 2 PUFF: 160; 4.5 AEROSOL RESPIRATORY (INHALATION) at 19:45

## 2021-04-01 RX ADMIN — WARFARIN 8 MG: 4 TABLET ORAL at 17:27

## 2021-04-01 RX ADMIN — HEPARIN SODIUM 11.7 UNITS/KG/HR: 10000 INJECTION, SOLUTION INTRAVENOUS at 23:57

## 2021-04-01 RX ADMIN — SODIUM CHLORIDE, PRESERVATIVE FREE 10 ML: 5 INJECTION INTRAVENOUS at 08:19

## 2021-04-01 RX ADMIN — PRAMIPEXOLE DIHYDROCHLORIDE 1.5 MG: 1.5 TABLET ORAL at 08:19

## 2021-04-01 RX ADMIN — LEVOTHYROXINE SODIUM 100 MCG: 0.1 TABLET ORAL at 06:34

## 2021-04-01 RX ADMIN — SODIUM CHLORIDE, PRESERVATIVE FREE 10 ML: 5 INJECTION INTRAVENOUS at 08:20

## 2021-04-01 RX ADMIN — DILTIAZEM HYDROCHLORIDE 120 MG: 120 CAPSULE, COATED, EXTENDED RELEASE ORAL at 08:19

## 2021-04-01 RX ADMIN — FERROUS SULFATE TAB 325 MG (65 MG ELEMENTAL FE) 325 MG: 325 (65 FE) TAB at 08:19

## 2021-04-01 RX ADMIN — FUROSEMIDE 40 MG: 40 TABLET ORAL at 08:19

## 2021-04-01 NOTE — PLAN OF CARE
Goal Outcome Evaluation:  Plan of Care Reviewed With: patient  Progress: improving  Outcome Summary: VSS, pt did have small BM with regimen. INR 1.99 today, heparin gtt DC'd. Plan to DC to rehab today. Will continue to tory.

## 2021-04-01 NOTE — PLAN OF CARE
Goal Outcome Evaluation:  Plan of Care Reviewed With: patient  Progress: no change  Outcome Summary: Pt agreeable to PT and amb 60' and 50' req SBA and use of fww. Pt req 3 seated rests - after sitting up at EOB and after each gait trial - w/ pt exhibiting SOA and limited in distance by fatigue. PT will prog as pt holly.    Patient was intermittently wearing a face mask during this therapy encounter. Therapist used appropriate personal protective equipment including eye protection, mask, and gloves.  Mask used was standard procedure mask. Appropriate PPE was worn during the entire therapy session. Hand hygiene was completed before and after therapy session. Patient is not in enhanced droplet precautions.

## 2021-04-01 NOTE — PLAN OF CARE
Goal Outcome Evaluation:  Plan of Care Reviewed With: patient  Progress: no change  Outcome Summary: Heparin drip continues to infuse, awaiting therapeutic INR. Plan d/c [facility] once discharged. Pt turns self in bed. Purewick in place. Will continue to monitor.

## 2021-04-01 NOTE — THERAPY TREATMENT NOTE
Patient Name: Latanya Olsen  : 1940    MRN: [id number]                              Today's Date: 2021       Admit Date: 3/24/2021    Visit Dx:     ICD-10-CM ICD-9-CM   1. Acute deep vein thrombosis (DVT) of iliac vein of left lower extremity (CMS/HCC)  I82.422 453.41   2. Anemia  D64.9 285.9     Patient Active Problem List   Diagnosis   • Carcinoid tumor of lung   • Diverticulosis of intestine   • Obstructive sleep apnea syndrome   • Weight loss   • Vitamin D deficiency   • Overweight (BMI 25.0-29.9)   • Spinal stenosis of lumbar region without neurogenic claudication   • Osteoarthritis of knee   • Obesity (BMI 30-39.9)   • Neutropenia (CMS/HCC)   • Chronic lower back pain   • Knee pain   • Insomnia   • Hypothyroidism   • Hypokalemia   • Hypertension   • Hyperlipidemia   • Generalized osteoarthritis   • Gastroparesis   • Foot pain   • Chronic obstructive pulmonary disease (CMS/HCC)   • Chronic constipation   • Anemia   • Weight gain   • Pain of left breast   • Elevated serum alkaline phosphatase level   • Neck pain   • Volume overload   • Status post total hip replacement, right   • Generalized weakness   • Constipation   • Acute deep vein thrombosis (DVT) of iliac vein of left lower extremity (CMS/HCC)     Past Medical History:   Diagnosis Date   • Anemia    • Arthritis    • Atrial fibrillation (CMS/HCC)    • Constipation    • COPD (chronic obstructive pulmonary disease) (CMS/HCC)    • Diverticulosis    • GERD (gastroesophageal reflux disease)    • Hx of degenerative disc disease    • Hyperlipidemia    • Hypertension    • Hypokalemia    • Hypothyroidism    • Knee swelling    • Lung cancer (CMS/HCC)     history   • Renal disorder    • Restless leg syndrome    • Sleep apnea with use of continuous positive airway pressure (CPAP)      Past Surgical History:   Procedure Laterality Date   • BUNIONECTOMY Bilateral    • CATARACT EXTRACTION     • CHOLECYSTECTOMY     • COLONOSCOPY     • ENDOSCOPY N/A 2016     Procedure: ESOPHAGOGASTRODUODENOSCOPY  with biopsies;  Surgeon: Von Hernández MD;  Location: [facility] OR;  Service:    • LUNG LOBECTOMY Left     lower lobe   • LYTIC THROMBIN THERAPY Left 3/26/2021    Procedure: left leg clot treiver possible venous stent *supine*;  Surgeon: Rogerio Vega MD;  Location: [facility] OR 18/19;  Service: Vascular;  Laterality: Left;   • REPLACEMENT TOTAL KNEE Left    • THYROID BIOPSY     • TOTAL HIP ARTHROPLASTY Right 6/1/2019    Procedure: BIPOLAR HIP CEMENTED POSTERIOR;  Surgeon: Ashley Crenshaw MD;  Location: [facility] MAIN OR;  Service: Orthopedics     General Information     Row Name 04/01/21 0946          Physical Therapy Time and Intention    Document Type  therapy note (daily note)  -     Mode of Treatment  physical therapy  -     Row Name 04/01/21 0946          Safety Issues, Functional Mobility    Impairments Affecting Function (Mobility)  balance;endurance/activity tolerance;strength;postural/trunk control  -       User Key  (r) = Recorded By, (t) = Taken By, (c) = Cosigned By    Initials Name Provider Type    PH Jenni Summers PTA Physical Therapy Assistant        Mobility     Row Name 04/01/21 0946          Bed Mobility    Bed Mobility  supine-sit  -PH     Supine-Sit [place] (Bed Mobility)  supervision;verbal cues  -PH     Assistive Device (Bed Mobility)  bed rails;head of bed elevated  -PH     Comment (Bed Mobility)  incr time to EOB w/ pt requesting a seated rest before gait/transfer w/ pt resting 3 min  -     Row Name 04/01/21 0946          Transfers    Comment (Transfers)  STS x 2; from EOB/from recliner  -PH     Row Name 04/01/21 0946          Sit-Stand Transfer    Sit-Stand [place] (Transfers)  supervision;verbal cues  -     Assistive Device (Sit-Stand Transfers)  walker, front-wheeled  -PH     Row Name 04/01/21 0946          Gait/Stairs (Locomotion)    [place] Level (Gait)  standby assist;supervision  -      Assistive Device (Gait)  walker, front-wheeled  -PH     Distance in Feet (Gait)  60' w/ 4 min seated rest in chair then 50'; pt exhibiting SOA  -PH     Deviations/Abnormal Patterns (Gait)  robbie decreased;gait speed decreased  -PH     Bilateral Gait Deviations  forward flexed posture;heel strike decreased  -PH     Comment (Gait/Stairs)  Pt amb on 2L of suppl O2 w/ O2 sats at 94% when pt returned to chair x 2; pt req approx 3 min rest after gait trials 2/2 fatigue/SOA  -PH       User Key  (r) = Recorded By, (t) = Taken By, (c) = Cosigned By    Initials Name Provider Type     Jenni Summers PTA Physical Therapy Assistant        Obj/Interventions     Row Name 04/01/21 0949          Motor Skills    Therapeutic Exercise  other (see comments) BLE TE: AP, LAQ, seated march - all x 10 reps  -PH     Row Name 04/01/21 0949          Balance    Balance Assessment  sitting static balance  -PH     Static Sitting Balance  WFL;unsupported;sitting, edge of bed  -PH     Comment, Balance  Pt SBA for sit bal at EOB for 4,3,3 min  -PH       User Key  (r) = Recorded By, (t) = Taken By, (c) = Cosigned By    Initials Name Provider Type     Jenni Summers PTA Physical Therapy Assistant        Goals/Plan    No documentation.       Clinical Impression     Row Name 04/01/21 0950          Pain    Additional Documentation  Pain Scale: FACES Pre/Post-Treatment (Group)  -PH     Row Name 04/01/21 0950          Pain Scale: FACES Pre/Post-Treatment    Pain: FACES Scale, Pretreatment  2-->hurts little bit  -PH     Posttreatment Pain Rating  2-->hurts little bit  -PH     Pain Location - Side  Bilateral  -PH     Pain Location - Orientation  lower  -PH     Pain Location  back  -PH     Row Name 04/01/21 0950          Plan of Care Review    Plan of Care Reviewed With  patient  -PH     Progress  no change  -PH     Outcome Summary  Pt agreeable to PT and amb 60' and 50' req SBA and use of fww. Pt req 3 seated rests - after sitting  up at EOB and after each gait trial - w/ pt exhibiting SOA and limited in distance by fatigue. PT will prog as pt holly.  -PH     Row Name 04/01/21 0950          Vital Signs    Pre SpO2 (%)  93  -PH     O2 Delivery Pre Treatment  nasal cannula  -PH     Intra SpO2 (%)  94  -PH     O2 Delivery Intra Treatment  supplemental O2  -PH     Post SpO2 (%)  94  -PH     O2 Delivery Post Treatment  nasal cannula  -PH     Row Name 04/01/21 0950          Positioning and Restraints    Pre-Treatment Position  in bed  -PH     Post Treatment Position  chair  -PH     In Chair  reclined;call light within reach;encouraged to call for assist;exit alarm on  -PH       User Key  (r) = Recorded By, (t) = Taken By, (c) = Cosigned By    Initials Name Provider Type     Jenni Summers PTA Physical Therapy Assistant        Outcome Measures     Row Name 04/01/21 0953          How much help from another person do you currently need...    Turning from your back to your side while in flat bed without using bedrails?  4  -PH     Moving from lying on back to sitting on the side of a flat bed without bedrails?  3  -PH     Moving to and from a bed to a chair (including a wheelchair)?  3  -PH     Standing up from a chair using your arms (e.g., wheelchair, bedside chair)?  3  -PH     Climbing 3-5 steps with a railing?  2  -PH     To walk in hospital room?  3  -PH     AM-PAC 6 Clicks Score (PT)  18  -PH     Row Name 04/01/21 0953          Functional Assessment    Outcome Measure Options  AM-PAC 6 Clicks Basic Mobility (PT)  -PH       User Key  (r) = Recorded By, (t) = Taken By, (c) = Cosigned By    Initials Name Provider Type     Jenni Summers PTA Physical Therapy Assistant        Physical Therapy Education                 Title: PT OT SLP Therapies (Done)     Topic: Physical Therapy (Done)     Point: Mobility training (Done)     Learning Progress Summary           Patient Acceptance, KHAI,NEHEMIAS, JOSE JOHNSON by  at 4/1/2021 0953    Acceptance,  E,D, VU,NR by PH at 3/31/2021 0906    Acceptance, E,D, VU,NR by PH at 3/30/2021 1405    Acceptance, E,D, VU,NR by PH at 3/29/2021 1050    Acceptance, E,TB, VU,NR by  at 3/27/2021 1347                   Point: Home exercise program (Done)     Learning Progress Summary           Patient Acceptance, E,D, VU,DU by PH at 4/1/2021 0953    Acceptance, E,D, VU,NR by PH at 3/31/2021 0906    Acceptance, E,D, VU,NR by PH at 3/30/2021 1405    Acceptance, E,D, VU,NR by PH at 3/29/2021 1050    Acceptance, E,TB, VU,NR by CS at 3/27/2021 1347                   Point: Body mechanics (Done)     Learning Progress Summary           Patient Acceptance, E,D, VU,DU by PH at 4/1/2021 0953    Acceptance, E,D, VU,NR by PH at 3/31/2021 0906    Acceptance, E,D, VU,NR by PH at 3/30/2021 1405    Acceptance, E,D, VU,NR by PH at 3/29/2021 1050    Acceptance, E,TB, VU,NR by  at 3/27/2021 1347                   Point: Precautions (Done)     Learning Progress Summary           Patient Acceptance, E,D, VU,DU by PH at 4/1/2021 0953    Acceptance, E,D, VU,NR by PH at 3/31/2021 0906    Acceptance, E,D, VU,NR by PH at 3/30/2021 1405    Acceptance, E,D, VU,NR by PH at 3/29/2021 1050    Acceptance, E,TB, VU,NR by  at 3/27/2021 1347                               User Key     Initials Effective Dates Name Provider Type Discipline     05/14/18 -  Delfino Muhammad, PT Physical Therapist PT     08/20/19 -  Jenni Summers PTA Physical Therapy Assistant PT              PT Recommendation and Plan     Plan of Care Reviewed With: patient  Progress: no change  Outcome Summary: Pt agreeable to PT and amb 60' and 50' req SBA and use of fww. Pt req 3 seated rests - after sitting up at EOB and after each gait trial - w/ pt exhibiting SOA and limited in distance by fatigue. PT will prog as pt holly.     Time Calculation:   PT Charges     Row Name 04/01/21 0925             Time Calculation    Start Time  0838  -PH      Stop Time  0907  -PH      Time  Calculation (min)  29 min  -PH      PT Received On  04/01/21  -PH      PT - Next Appointment  04/02/21  -        User Key  (r) = Recorded By, (t) = Taken By, (c) = Cosigned By    Initials Name Provider Type    Jenni Fish PTA Physical Therapy Assistant        Therapy Charges for Today     Code Description Service Date Service Provider Modifiers Qty    09853342914  PT THERAPEUTIC ACT EA 15 MIN 3/31/2021 Jenni Summers PTA GP 2    [id number]  PT THERAPEUTIC ACT EA 15 MIN 4/1/2021 Jenni Summers PTA GP 2          PT G-Codes  Outcome Measure Options: AM-PAC 6 Clicks Basic Mobility (PT)  AM-PAC 6 Clicks Score (PT): 18    Jenni Summers PTA  4/1/2021

## 2021-04-01 NOTE — PLAN OF CARE
Problem: Adult Inpatient Plan of Care  Goal: Plan of Care Review  Outcome: Ongoing, Progressing  Flowsheets  Taken 4/1/2021 1606  Progress: no change  Plan of Care Reviewed With: patient  Outcome Summary: Patient INR recheck 1.81 so discharge held, heparin gtt restarted. Hopeful to DC tomorrow.  Taken 4/1/2021 1437  Progress: improving  Plan of Care Reviewed With: patient  Outcome Summary: VSS, pt did have small BM with regimen. INR 1.99 today, heparin gtt DC'd. Plan to DC to rehab today. Will continue to [facility].   Goal Outcome Evaluation:  Plan of Care Reviewed With: patient  Progress: no change  Outcome Summary: Patient INR recheck 1.81 so discharge held, heparin gtt restarted. Hopeful to DC tomorrow.

## 2021-04-01 NOTE — PROGRESS NOTES
"DAILY PROGRESS NOTE  [facility]    Patient Identification:  Name: Latanya Olsen  Age: 80 y.o.  Sex: female  :  1940  MRN: [id number]         Primary Care Physician: Mary Perkins MD      Subjective  Patient with no complaints.  Report that patient has not had a bowel movement over 3 days.  Dulcolax suppository was unsuccessful.  She has not uncomfortable but the skilled unit would not accept her until she is had a BM.  This is a recurrent issue for her.    Objective:  General Appearance:  Comfortable, well-appearing, in no acute distress and not in pain.    Vital signs: (most recent): Blood pressure 141/62, pulse 82, temperature 98.1 °F (36.7 °C), temperature source Oral, resp. rate 14, height 167.6 cm (66\"), weight 102 kg (225 lb), SpO2 95 %.    Lungs:  Normal effort and normal respiratory rate.  Breath sounds clear to auscultation.    Heart: Normal rate.  Regular rhythm.    Extremities: There is no dependent edema.    Neurological: Patient is alert and oriented to person, place and time.    Skin:  Warm and dry.                Vital signs in last 24 hours:  Temp:  [97.6 °F (36.4 °C)-98.1 °F (36.7 °C)] 98.1 °F (36.7 °C)  Heart Rate:  [80-93] 82  Resp:  [14-18] 14  BP: (133-151)/(62-70) 141/62    Intake/Output:    Intake/Output Summary (Last 24 hours) at 2021 1010  Last data filed at 2021 0634  Gross per 24 hour   Intake 700 ml   Output 1000 ml   Net -300 ml         Results from last 7 days   Lab Units 21  0510 21  0645 21  0743 21  0508 21  0235 21  0213 21  0854   WBC 10*3/mm3 7.07 7.73 7.75 7.29 11.05* 7.28 6.71   HEMOGLOBIN g/dL 9.2* 9.9* 9.7* 8.3* 9.8* 8.6* 8.5*   PLATELETS 10*3/mm3 253 301 282 270 285 244 276     Results from last 7 days   Lab Units 21  0508 21  0213   SODIUM mmol/L 136 138   POTASSIUM mmol/L 3.7 4.6   CHLORIDE mmol/L 100 102   CO2 mmol/L 27.0 26.1   BUN mg/dL 16 16   CREATININE mg/dL 0.98 1.03* "   GLUCOSE mg/dL 91 173*   Estimated Creatinine Clearance: 54.3 mL/min (by C-G formula based on SCr of 0.98 mg/dL).  Results from last 7 days   Lab Units 03/29/21  0508 03/27/21  0213   CALCIUM mg/dL 9.4 8.9         Assessment:    Acute deep vein thrombosis (DVT) of iliac vein of left lower extremity: Status post thrombectomy with angioplasty and stent placement 3/26/21.  Coumadin with heparin drip bridge.  INR now 1.99.    Anemia: Stable.  Monitor.    PAF:    Obstructive sleep apnea syndrome    Spinal stenosis of lumbar region without neurogenic claudication    Obesity (BMI 30-39.9)    Hypothyroidism    Hypertension    Hyperlipidemia    Chronic obstructive pulmonary disease (CMS/HCC)    Generalized weakness    Constipation: Fleets enema x1.  Increase lactulose.  Resume Linzess      Plan:  Please see above.  Possible discharge today.  Discussed with CCP.  Discussed with RN.    José Herrera MD  4/1/2021  10:10 EDT    Addendum:  Unfortunately the INR is drifting back down.  Considering the presentation and the extent of the initial DVT I will resume heparin and reevaluate in the morning.  Electronically signed by José Herrera MD, 04/01/21, 3:31 PM EDT.

## 2021-04-01 NOTE — PROGRESS NOTES
Pharmacy Consult: Warfarin Dosing/ Monitoring    Latanya Olsen is a 80 y.o. female, estimated creatinine clearance is 54.3 mL/min (by C-G formula based on SCr of 0.98 mg/dL). weighing 102 kg (225 lb).     has a past medical history of Anemia, Arthritis, Atrial fibrillation (CMS/HCC), Constipation, COPD (chronic obstructive pulmonary disease) (CMS/[facility]), Diverticulosis, GERD (gastroesophageal reflux disease), degenerative disc disease, Hyperlipidemia, Hypertension, Hypokalemia, Hypothyroidism, Knee swelling, Lung cancer (CMS/[facility]), Renal disorder, Restless leg syndrome, and Sleep apnea with use of continuous positive airway pressure (CPAP).    Social History     Tobacco Use    Smoking status: Never Smoker    Smokeless tobacco: Never Used   Substance Use Topics    Alcohol use: No    Drug use: Defer       Results from last 7 days   Lab Units 04/01/21  0510 03/31/21  0645 03/30/21  0743 03/29/21  0508 03/28/21  0235 03/27/21  1921 03/27/21  1241 03/27/21  0213 03/26/21 2003 03/26/21  1126 03/26/21  0854   INR  1.99* 1.68* 1.60* 1.50* 1.40*  --   --  1.25* 1.28*   < >  --    APTT seconds 122.9* 95.9* 94.7* 88.2* 72.1* 46.2* 118.8* 49.2* 70.5*  --  134.6*   HEMOGLOBIN g/dL 9.2* 9.9* 9.7* 8.3* 9.8*  --   --  8.6*  --   --  8.5*   HEMATOCRIT % 29.2* 29.6* 29.3* 26.0* 30.9*  --   --  26.2*  --   --  26.2*   PLATELETS 10*3/mm3 253 301 282 270 285  --   --  244  --   --  276    < > = values in this interval not displayed.     Results from last 7 days   Lab Units 03/29/21  0508 03/27/21  0213   SODIUM mmol/L 136 138   POTASSIUM mmol/L 3.7 4.6   CHLORIDE mmol/L 100 102   CO2 mmol/L 27.0 26.1   BUN mg/dL 16 16   CREATININE mg/dL 0.98 1.03*   CALCIUM mg/dL 9.4 8.9   GLUCOSE mg/dL 91 173*     Anticoagulation history: H/o atrial fibrillation, recently diagnosed with DVT in left leg about a week ago at nursing facility and started on lovenox and coumadin. Warfarin regimen noted as warfarin 6mg daily.    Hospital  Anticoagulation:  Consulting provider: Dr. Vega  Start date: 3/26/21  Indication: DVT/PE  Target INR: 2-3  Expected duration: Indefinite  Bridge Therapy: Yes              and unfractionated heparin  Date 3/26 3/27 3/28 3/29  3/30 3/31 4/1      INR 1.27 1.25 1.4 1.5  1.6 1.68 1.99       Warfarin dose 6 mg 6 mg 6 mg   9 mg  9 mg  9 mg  6 mg         Potential drug interactions:   May enhance the anticoagulant effect of warfarin:  - Levothyroxine (home med)  - Lactulose (home med)    Relevant nutrition status: regular diet % meals   Other: POD5 from lower extremity venogram with mechanical thrombectomy with angioplasty and stent placement of the common iliac vein    Education complete?/ Date: TBD    Assessment/Plan:  1) Give warfarin 6 mg daily   2) INR ordered daily with AM labs while inpatient.    Pharmacy will continue to follow until discharge or discontinuation of warfarin.     Rhonda Carrera RPH  4/1/2021

## 2021-04-01 NOTE — DISCHARGE SUMMARY
PHYSICIAN DISCHARGE SUMMARY                                                                        [facility]    Patient Identification:  Name: Latanya Olsen  Age: 80 y.o.  Sex: female  :  1940  MRN: [id number]  Primary Care Physician: Mary Perkins MD    Admit date: 3/24/2021  Discharge date and time: 2021     Discharged Condition: good    Discharge Diagnoses:   Acute deep vein thrombosis (DVT) of iliac vein of left lower extremity: Status post thrombectomy with angioplasty and stent placement 3/26/21.      Anemia: Stable.    PAF:    Obstructive sleep apnea syndrome    Spinal stenosis of lumbar region without neurogenic claudication    Obesity (BMI 30-39.9)    Hypothyroidism    Hypertension    Hyperlipidemia    Chronic obstructive pulmonary disease (CMS/HCC)    Generalized weakness    Constipation:       Hospital Course:  Pleasant 80-year-old female from a skilled facility was being treated for acute left lower extremity DVT.  She was still on Lovenox bridge.  She was sent due to continued pain and swelling of the left lower extremity.  Venous Dopplers were obtained and revealed the following:  · Acute left lower extremity deep vein thrombosis noted in the external iliac, common femoral, deep femoral, proximal femoral, mid femoral, distal femoral, popliteal, posterial tibial, peroneal, gastrocnemius and soleal.  The patient was put on a heparin drip and vascular surgery consultation was obtained.  On  she underwent thrombectomy with angioplasty and stent placement.  She did well post operatively with marked improvement in left lower extremity pain and swelling.  She is also undergoing wraps to the left lower extremity.  She was maintained on the heparin drip as we transition back to Coumadin again.  INR today does finally return that essentially 2.0.  The heparin drip can be discontinued at this point with  "close follow-up of the INR.      Consults:     Consults     Date and Time Order Name Status Description    3/24/2021 10:17 PM Inpatient Vascular Surgery Consult Completed     3/24/2021  7:29 PM REYES (on-call MD unless specified) Details Completed     3/24/2021  7:28 PM Inpatient Vascular Surgery Consult Completed     3/6/2021  5:13 PM Inpatient Orthopedic Surgery Consult Completed     3/6/2021  1:00 PM JOEL (on-call MD unless specified) Completed             Discharge Exam:  General Appearance:  Comfortable, well-appearing, in no acute distress and not in pain.    Vital signs: (most recent): Blood pressure 141/62, pulse 82, temperature 98.1 °F (36.7 °C), temperature source Oral, resp. rate 14, height 167.6 cm (66\"), weight 102 kg (225 lb), SpO2 95 %.    Lungs:  Normal effort and normal respiratory rate.  Breath sounds clear to auscultation.    Heart: Normal rate.  Regular rhythm.    Extremities: There is no dependent edema.    Neurological: Patient is alert and oriented to person, place and time.    Skin:  Warm and dry.      Disposition:  Rehab    Patient Instructions:      Discharge Medications      New Medications      Instructions Start Date   acetaminophen 325 MG tablet  Commonly known as: TYLENOL   650 mg, Oral, Every 4 Hours PRN         Changes to Medications      Instructions Start Date   warfarin 7.5 MG tablet  Commonly known as: Coumadin  What changed:   · medication strength  · how much to take  · when to take this   7.5 mg, Oral, Nightly         Continue These Medications      Instructions Start Date   cyclobenzaprine 10 MG tablet  Commonly known as: FLEXERIL   10 mg, Oral, 2 Times Daily PRN      esomeprazole 40 MG capsule  Commonly known as: nexIUM   40 mg, Oral, Every Morning Before Breakfast      ferrous sulfate 325 (65 FE) MG tablet   325 mg, Oral, Daily With Breakfast      furosemide 40 MG tablet  Commonly known as: LASIX   TAKE ONE TABLET BY MOUTH EVERY MORNING FOR FLUID      ipratropium-albuterol " 0.5-2.5 mg/3 ml nebulizer  Commonly known as: DUO-NEB   USE 1 VIAL IN NEBULIZER 2 TIMES DAILY. Generic: DUONEB      lactulose 10 GM/15ML solution  Commonly known as: CHRONULAC   TAKE 15 ML BY MOUTH 2 3 TIMES A DAY AS NEEDED FOR CONSTIPATION      levothyroxine 100 MCG tablet  Commonly known as: SYNTHROID, LEVOTHROID   100 mcg, Oral, Daily      linaclotide 145 MCG capsule capsule  Commonly known as: LINZESS   145 mcg, Oral, Every Morning Before Breakfast      melatonin 1 MG tablet   3 mg, Oral      pramipexole 1.5 MG tablet  Commonly known as: MIRAPEX   1.5 mg, Oral, Every Night at Bedtime      sennosides-docusate 8.6-50 MG per tablet  Commonly known as: PERICOLACE   2 tablets, Oral, 2 Times Daily      Symbicort 160-4.5 MCG/ACT inhaler  Generic drug: budesonide-formoterol   No dose, route, or frequency recorded.      Tiadylt  MG 24 hr capsule  Generic drug: dilTIAZem   TAKE ONE CAPSULE BY MOUTH DAILY FOR BLOOD PRESSURE      valsartan 320 MG tablet  Commonly known as: DIOVAN   320 mg, Oral, Every 24 Hours Scheduled      vitamin B-12 1000 MCG tablet  Commonly known as: CYANOCOBALAMIN   1,000 mcg, Oral, Daily         Stop These Medications    enoxaparin 80 MG/0.8ML solution syringe  Commonly known as: LOVENOX     HYDROcodone-acetaminophen 5-325 MG per tablet  Commonly known as: NORCO          Diet Instructions     Diet: Regular      Discharge Diet: Regular        No future appointments.  Additional Instructions for the Follow-ups that You Need to Schedule     Discharge Follow-up with PCP   As directed       Currently Documented PCP:    Mary Perkins MD    PCP Phone Number:    [phone]     Follow Up Details: 1 week         Discharge Follow-up with Specialty: Vascular Surgery.   As directed      Specialty: Vascular Surgery.    Follow Up Details: As directed.         Protime-INR    Apr 02, 2021 (Approximate)      DAILY INR TILL STABLE.  INR goal 2.0-3.0    Order Comments: DAILY INR TILL STABLE.  INR goal  2.0-3.0             Contact information for follow-up providers     Rogerio Vega MD Follow up in 1 month(s).    Specialty: Vascular Surgery  Why: With left iliac and left lower extremity venous duplex before appointment.  Contact information:  [address]  [facility] [address]  [phone]             Mary Perkins MD .    Specialty: Family Medicine  Why: 1 week  Contact information:  [address]  [facility] [address] [phone]                   Contact information for after-discharge care     Destination     [facility] .    Service: Skilled Nursing  [facility] information:  [address] [facility] [address] [phone]                           Discharge Order (From admission, onward)    None            Total time spent discharging patient including evaluation,post hospitalization follow up,  medication and post hospitalization instructions and education total time exceeds 30 minutes.    Signed:  José Herrera MD  4/1/2021  14:48 EDT    EMR Dragon/Transcription disclaimer:   Much of this encounter note is an electronic transcription/translation of spoken language to printed text. The electronic translation of spoken language may permit erroneous, or at times, nonsensical words or phrases to be inadvertently transcribed; Although I have reviewed the note for such errors, some may still exist.

## 2021-04-02 LAB
ANISOCYTOSIS BLD QL: NORMAL
APTT PPP: 147.6 SECONDS (ref 22.7–35.4)
APTT PPP: 87.3 SECONDS (ref 22.7–35.4)
DEPRECATED RDW RBC AUTO: 49.7 FL (ref 37–54)
ERYTHROCYTE [DISTWIDTH] IN BLOOD BY AUTOMATED COUNT: 14.9 % (ref 12.3–15.4)
HCT VFR BLD AUTO: 33.8 % (ref 34–46.6)
HGB BLD-MCNC: 10.9 G/DL (ref 12–15.9)
INR PPP: 1.98 (ref 0.9–1.1)
LYMPHOCYTES # BLD MANUAL: 2.87 10*3/MM3 (ref 0.7–3.1)
LYMPHOCYTES NFR BLD MANUAL: 33 % (ref 19.6–45.3)
LYMPHOCYTES NFR BLD MANUAL: 5 % (ref 5–12)
MCH RBC QN AUTO: 29.4 PG (ref 26.6–33)
MCHC RBC AUTO-ENTMCNC: 32.2 G/DL (ref 31.5–35.7)
MCV RBC AUTO: 91.1 FL (ref 79–97)
MICROCYTES BLD QL: NORMAL
MONOCYTES # BLD AUTO: 0.44 10*3/MM3 (ref 0.1–0.9)
NEUTROPHILS # BLD AUTO: 5.4 10*3/MM3 (ref 1.7–7)
NEUTROPHILS NFR BLD MANUAL: 62 % (ref 42.7–76)
PLAT MORPH BLD: NORMAL
PLATELET # BLD AUTO: 254 10*3/MM3 (ref 140–450)
PMV BLD AUTO: 9.4 FL (ref 6–12)
PROTHROMBIN TIME: 22.2 SECONDS (ref 11.7–14.2)
RBC # BLD AUTO: 3.71 10*6/MM3 (ref 3.77–5.28)
WBC # BLD AUTO: 8.71 10*3/MM3 (ref 3.4–10.8)
WBC MORPH BLD: NORMAL

## 2021-04-02 PROCEDURE — 25010000002 HEPARIN (PORCINE) PER 1000 UNITS: Performed by: HOSPITALIST

## 2021-04-02 PROCEDURE — 85730 THROMBOPLASTIN TIME PARTIAL: CPT | Performed by: HOSPITALIST

## 2021-04-02 PROCEDURE — 85025 COMPLETE CBC W/AUTO DIFF WBC: CPT | Performed by: HOSPITALIST

## 2021-04-02 PROCEDURE — 85730 THROMBOPLASTIN TIME PARTIAL: CPT | Performed by: SURGERY

## 2021-04-02 PROCEDURE — 85610 PROTHROMBIN TIME: CPT | Performed by: SURGERY

## 2021-04-02 PROCEDURE — 94799 UNLISTED PULMONARY SVC/PX: CPT

## 2021-04-02 PROCEDURE — 85007 BL SMEAR W/DIFF WBC COUNT: CPT | Performed by: HOSPITALIST

## 2021-04-02 RX ORDER — WARFARIN SODIUM 10 MG/1
10 TABLET ORAL
Status: DISCONTINUED | OUTPATIENT
Start: 2021-04-02 | End: 2021-04-03 | Stop reason: HOSPADM

## 2021-04-02 RX ADMIN — LINACLOTIDE 145 MCG: 145 CAPSULE, GELATIN COATED ORAL at 08:12

## 2021-04-02 RX ADMIN — BUDESONIDE AND FORMOTEROL FUMARATE DIHYDRATE 2 PUFF: 160; 4.5 AEROSOL RESPIRATORY (INHALATION) at 19:41

## 2021-04-02 RX ADMIN — FUROSEMIDE 40 MG: 40 TABLET ORAL at 08:12

## 2021-04-02 RX ADMIN — PANTOPRAZOLE SODIUM 40 MG: 40 TABLET, DELAYED RELEASE ORAL at 06:15

## 2021-04-02 RX ADMIN — SODIUM CHLORIDE, PRESERVATIVE FREE 10 ML: 5 INJECTION INTRAVENOUS at 08:13

## 2021-04-02 RX ADMIN — DILTIAZEM HYDROCHLORIDE 120 MG: 120 CAPSULE, COATED, EXTENDED RELEASE ORAL at 08:12

## 2021-04-02 RX ADMIN — LEVOTHYROXINE SODIUM 100 MCG: 0.1 TABLET ORAL at 06:15

## 2021-04-02 RX ADMIN — LACTULOSE 10 G: 10 SOLUTION ORAL at 08:12

## 2021-04-02 RX ADMIN — BUDESONIDE AND FORMOTEROL FUMARATE DIHYDRATE 2 PUFF: 160; 4.5 AEROSOL RESPIRATORY (INHALATION) at 08:53

## 2021-04-02 RX ADMIN — HEPARIN SODIUM 8.7 UNITS/KG/HR: 10000 INJECTION, SOLUTION INTRAVENOUS at 14:39

## 2021-04-02 RX ADMIN — Medication 1000 MCG: at 08:12

## 2021-04-02 RX ADMIN — PRAMIPEXOLE DIHYDROCHLORIDE 1.5 MG: 1.5 TABLET ORAL at 08:12

## 2021-04-02 RX ADMIN — SODIUM CHLORIDE, PRESERVATIVE FREE 10 ML: 5 INJECTION INTRAVENOUS at 20:23

## 2021-04-02 RX ADMIN — FERROUS SULFATE TAB 325 MG (65 MG ELEMENTAL FE) 325 MG: 325 (65 FE) TAB at 08:12

## 2021-04-02 RX ADMIN — WARFARIN 10 MG: 10 TABLET ORAL at 17:02

## 2021-04-02 RX ADMIN — LACTULOSE 10 G: 10 SOLUTION ORAL at 20:23

## 2021-04-02 RX ADMIN — ACETAMINOPHEN 650 MG: 325 TABLET, FILM COATED ORAL at 00:54

## 2021-04-02 RX ADMIN — VALSARTAN 320 MG: 320 TABLET, FILM COATED ORAL at 08:12

## 2021-04-02 RX ADMIN — SODIUM CHLORIDE, PRESERVATIVE FREE 10 ML: 5 INJECTION INTRAVENOUS at 20:24

## 2021-04-02 NOTE — SIGNIFICANT NOTE
04/02/21 1420   OTHER   Discipline physical therapist   Rehab Time/Intention   Session Not Performed patient/family declined treatment  (Pt declined, tired and just got back in bed. Asked PT to return tomorrow.)   Recommendation   PT - Next Appointment 04/03/21

## 2021-04-02 NOTE — PLAN OF CARE
Problem: Adult Inpatient Plan of Care  Goal: Plan of Care Review  Recent Flowsheet Documentation  Taken 4/2/2021 0416 by Sophia Haley, RN  Outcome Summary: VSS. Pt INR not therapeutic so discharge held and heparin restarted. Possible discharge in AM.  Goal: Absence of Hospital-Acquired Illness or Injury  Intervention: Identify and Manage Fall Risk  Recent Flowsheet Documentation  Taken 4/2/2021 0255 by Sophia Haley, RN  Safety Promotion/Fall Prevention:   activity supervised   assistive device/personal items within reach   clutter free environment maintained   fall prevention program maintained   lighting adjusted   mobility aid in reach   nonskid shoes/slippers when out of bed   safety round/check completed   toileting scheduled  Taken 4/2/2021 0036 by Sophia Haley, RN  Safety Promotion/Fall Prevention:   activity supervised   assistive device/personal items within reach   clutter free environment maintained   fall prevention program maintained   lighting adjusted   mobility aid in reach   nonskid shoes/slippers when out of bed   safety round/check completed   toileting scheduled  Taken 4/1/2021 2242 by Sophia Haley, RN  Safety Promotion/Fall Prevention:   activity supervised   assistive device/personal items within reach   clutter free environment maintained   fall prevention program maintained   mobility aid in reach   lighting adjusted   nonskid shoes/slippers when out of bed   safety round/check completed  Taken 4/1/2021 2040 by Sophia Haley, RN  Safety Promotion/Fall Prevention:   activity supervised   assistive device/personal items within reach   clutter free environment maintained   fall prevention program maintained   lighting adjusted   mobility aid in reach   safety round/check completed   toileting scheduled   nonskid shoes/slippers when out of bed  Intervention: Prevent and Manage VTE (venous thromboembolism) Risk  Recent Flowsheet Documentation  Taken 4/2/2021  0255 by Sophia Haley RN  VTE Prevention/Management:   bilateral   dorsiflexion/plantar flexion performed  Taken 4/1/2021 2040 by Sophia Haley RN  VTE Prevention/Management:   bilateral   dorsiflexion/plantar flexion performed  Goal: Optimal Comfort and Wellbeing  Intervention: Provide Person-Centered Care  Recent Flowsheet Documentation  Taken 4/2/2021 0255 by Sophia Haley RN  Trust Relationship/Rapport:   care explained   choices provided   emotional support provided   questions answered   questions encouraged   reassurance provided   thoughts/feelings acknowledged  Taken 4/1/2021 2040 by Sophia Haley, KAITLIN  Trust Relationship/Rapport:   care explained   choices provided   emotional support provided   questions answered   questions encouraged   reassurance provided   thoughts/feelings acknowledged   Goal Outcome Evaluation:        Outcome Summary: VSS. Pt INR not therapeutic so discharge held and heparin restarted. Possible discharge in AM.

## 2021-04-02 NOTE — PLAN OF CARE
Goal Outcome Evaluation:  Plan of Care Reviewed With: patient  Progress: improving  Outcome Summary: patient vss. on heparin drip. INR still not therapeutic. probable dc tomorrow. will monitor

## 2021-04-02 NOTE — PROGRESS NOTES
Continued Stay Note  [facility]     Patient Name: Latanya Olsen  MRN: [id number]  Today's Date: 4/2/2021    Admit Date: 3/24/2021    Discharge Plan     Row Name 04/02/21 1211       Plan    Plan  [facility]    Patient/Family in Agreement with Plan  yes    Plan Comments  Pt accepted to [facility] and bed available once pt medically ready for discharge. Family to transport.  CAROLINE Mann        Discharge Codes    No documentation.       Expected Discharge Date and Time     Expected Discharge Date Expected Discharge Time    Apr 1, 2021             Yessenia Garcia RN

## 2021-04-02 NOTE — PROGRESS NOTES
"DAILY PROGRESS NOTE  [facility]    Patient Identification:  Name: Latanya Olsen  Age: 80 y.o.  Sex: female  :  1940  MRN: [id number]         Primary Care Physician: Mary Perkins MD      Subjective  Patient with no complaints.    Objective:  General Appearance:  Comfortable, well-appearing, in no acute distress and not in pain.    Vital signs: (most recent): Blood pressure 165/68, pulse 86, temperature 97.9 °F (36.6 °C), temperature source Oral, resp. rate 18, height 167.6 cm (66\"), weight 102 kg (225 lb), SpO2 96 %.    Lungs:  Normal effort and normal respiratory rate.  Breath sounds clear to auscultation.    Heart: Normal rate.  Regular rhythm.    Extremities: There is no dependent edema.    Neurological: Patient is alert and oriented to person, place and time.    Skin:  Warm and dry.                Vital signs in last 24 hours:  Temp:  [97.7 °F (36.5 °C)-98 °F (36.7 °C)] 97.9 °F (36.6 °C)  Heart Rate:  [77-89] 86  Resp:  [16-18] 18  BP: (134-170)/(56-84) 165/68    Intake/Output:    Intake/Output Summary (Last 24 hours) at 2021 1306  Last data filed at 2021 0900  Gross per 24 hour   Intake 720 ml   Output --   Net 720 ml         Results from last 7 days   Lab Units 21  0559 21  1650 21  0510 21  0645 21  0743 21  0508 21  0235   WBC 10*3/mm3 8.71 7.57 7.07 7.73 7.75 7.29 11.05*   HEMOGLOBIN g/dL 10.9* 11.5* 9.2* 9.9* 9.7* 8.3* 9.8*   PLATELETS 10*3/mm3 254 253 253 301 282 270 285     Results from last 7 days   Lab Units 21  0508 21  0213   SODIUM mmol/L 136 138   POTASSIUM mmol/L 3.7 4.6   CHLORIDE mmol/L 100 102   CO2 mmol/L 27.0 26.1   BUN mg/dL 16 16   CREATININE mg/dL 0.98 1.03*   GLUCOSE mg/dL 91 173*   Estimated Creatinine Clearance: 54.3 mL/min (by C-G formula based on SCr of 0.98 mg/dL).  Results from last 7 days   Lab Units 21  0508 21  0213   CALCIUM mg/dL 9.4 8.9         Assessment:     Acute deep " vein thrombosis (DVT) of iliac vein of left lower extremity: Status post thrombectomy with angioplasty and stent placement 3/26/21.  Coumadin with heparin drip bridge.   INR still subtherapeutic. No increase in INR despite increasing the Coumadin to 8 mg last night. She appears to be metabolizing it quite rapidly. We will go ahead and increase it to 10 mg tonight and monitor closely.     PAF:    Obstructive sleep apnea syndrome    Spinal stenosis of lumbar region without neurogenic claudication    Obesity (BMI 30-39.9)    Hypothyroidism    Hypertension    Hyperlipidemia    Chronic obstructive pulmonary disease (CMS/HCC)    Generalized weakness    Constipation: Lactulose increased and Linzess resumed.      Plan:  Please see above.  Discussed with pharmacy.      José Herrera MD  4/2/2021  13:06 EDT  .

## 2021-04-03 VITALS
DIASTOLIC BLOOD PRESSURE: 72 MMHG | RESPIRATION RATE: 16 BRPM | WEIGHT: 225 LBS | BODY MASS INDEX: 36.16 KG/M2 | OXYGEN SATURATION: 96 % | HEART RATE: 84 BPM | TEMPERATURE: 98.4 F | HEIGHT: 66 IN | SYSTOLIC BLOOD PRESSURE: 139 MMHG

## 2021-04-03 LAB
APTT PPP: 93.9 SECONDS (ref 22.7–35.4)
DEPRECATED RDW RBC AUTO: 47.6 FL (ref 37–54)
ERYTHROCYTE [DISTWIDTH] IN BLOOD BY AUTOMATED COUNT: 14.9 % (ref 12.3–15.4)
HCT VFR BLD AUTO: 27.5 % (ref 34–46.6)
HGB BLD-MCNC: 9 G/DL (ref 12–15.9)
HYPOCHROMIA BLD QL: ABNORMAL
INR PPP: 2.03 (ref 0.9–1.1)
LYMPHOCYTES # BLD MANUAL: 0.89 10*3/MM3 (ref 0.7–3.1)
LYMPHOCYTES NFR BLD MANUAL: 12 % (ref 5–12)
LYMPHOCYTES NFR BLD MANUAL: 13 % (ref 19.6–45.3)
MCH RBC QN AUTO: 29.1 PG (ref 26.6–33)
MCHC RBC AUTO-ENTMCNC: 32.7 G/DL (ref 31.5–35.7)
MCV RBC AUTO: 89 FL (ref 79–97)
MONOCYTES # BLD AUTO: 0.82 10*3/MM3 (ref 0.1–0.9)
NEUTROPHILS # BLD AUTO: 5.12 10*3/MM3 (ref 1.7–7)
NEUTROPHILS NFR BLD MANUAL: 75 % (ref 42.7–76)
NRBC BLD AUTO-RTO: 0.1 /100 WBC (ref 0–0.2)
PLAT MORPH BLD: NORMAL
PLATELET # BLD AUTO: 257 10*3/MM3 (ref 140–450)
PMV BLD AUTO: 9.5 FL (ref 6–12)
PROTHROMBIN TIME: 22.7 SECONDS (ref 11.7–14.2)
RBC # BLD AUTO: 3.09 10*6/MM3 (ref 3.77–5.28)
SPHEROCYTES BLD QL SMEAR: ABNORMAL
WBC # BLD AUTO: 6.83 10*3/MM3 (ref 3.4–10.8)
WBC MORPH BLD: NORMAL

## 2021-04-03 PROCEDURE — 85610 PROTHROMBIN TIME: CPT | Performed by: SURGERY

## 2021-04-03 PROCEDURE — 85007 BL SMEAR W/DIFF WBC COUNT: CPT | Performed by: HOSPITALIST

## 2021-04-03 PROCEDURE — 85025 COMPLETE CBC W/AUTO DIFF WBC: CPT | Performed by: HOSPITALIST

## 2021-04-03 PROCEDURE — 94799 UNLISTED PULMONARY SVC/PX: CPT

## 2021-04-03 PROCEDURE — 85730 THROMBOPLASTIN TIME PARTIAL: CPT | Performed by: HOSPITALIST

## 2021-04-03 RX ADMIN — PANTOPRAZOLE SODIUM 40 MG: 40 TABLET, DELAYED RELEASE ORAL at 06:07

## 2021-04-03 RX ADMIN — Medication 1000 MCG: at 08:15

## 2021-04-03 RX ADMIN — SODIUM CHLORIDE, PRESERVATIVE FREE 10 ML: 5 INJECTION INTRAVENOUS at 08:17

## 2021-04-03 RX ADMIN — BUDESONIDE AND FORMOTEROL FUMARATE DIHYDRATE 2 PUFF: 160; 4.5 AEROSOL RESPIRATORY (INHALATION) at 10:24

## 2021-04-03 RX ADMIN — DILTIAZEM HYDROCHLORIDE 120 MG: 120 CAPSULE, COATED, EXTENDED RELEASE ORAL at 08:16

## 2021-04-03 RX ADMIN — FUROSEMIDE 40 MG: 40 TABLET ORAL at 08:16

## 2021-04-03 RX ADMIN — PRAMIPEXOLE DIHYDROCHLORIDE 1.5 MG: 1.5 TABLET ORAL at 08:16

## 2021-04-03 RX ADMIN — FERROUS SULFATE TAB 325 MG (65 MG ELEMENTAL FE) 325 MG: 325 (65 FE) TAB at 08:15

## 2021-04-03 RX ADMIN — LACTULOSE 10 G: 10 SOLUTION ORAL at 08:15

## 2021-04-03 RX ADMIN — LINACLOTIDE 145 MCG: 145 CAPSULE, GELATIN COATED ORAL at 08:17

## 2021-04-03 RX ADMIN — LEVOTHYROXINE SODIUM 100 MCG: 0.1 TABLET ORAL at 06:07

## 2021-04-03 RX ADMIN — VALSARTAN 320 MG: 320 TABLET, FILM COATED ORAL at 08:16

## 2021-04-03 RX ADMIN — ACETAMINOPHEN 650 MG: 325 TABLET, FILM COATED ORAL at 01:59

## 2021-04-03 NOTE — PLAN OF CARE
Problem: Adult Inpatient Plan of Care  Goal: Plan of Care Review  Recent Flowsheet Documentation  Taken 4/3/2021 0437 by Sophia Haley, RN  Outcome Summary: VSS. Pt on heparin gtt. INR lizbet this AM. Probable discharge in the AM. Will continue to monitor.  Goal: Absence of Hospital-Acquired Illness or Injury  Intervention: Identify and Manage Fall Risk  Recent Flowsheet Documentation  Taken 4/3/2021 0403 by Sophia Haley, RN  Safety Promotion/Fall Prevention:   activity supervised   assistive device/personal items within reach   clutter free environment maintained   fall prevention program maintained   lighting adjusted   mobility aid in reach   nonskid shoes/slippers when out of bed   safety round/check completed   toileting scheduled  Taken 4/3/2021 0254 by Sophia Haley, RN  Safety Promotion/Fall Prevention:   activity supervised   assistive device/personal items within reach   clutter free environment maintained   fall prevention program maintained   lighting adjusted   mobility aid in reach   nonskid shoes/slippers when out of bed   safety round/check completed   toileting scheduled  Taken 4/3/2021 0027 by Sophia Haley, RN  Safety Promotion/Fall Prevention:   activity supervised   assistive device/personal items within reach   clutter free environment maintained   fall prevention program maintained   lighting adjusted   mobility aid in reach   nonskid shoes/slippers when out of bed   safety round/check completed   toileting scheduled  Taken 4/2/2021 2236 by Sophia Haley, RN  Safety Promotion/Fall Prevention:   activity supervised   assistive device/personal items within reach   clutter free environment maintained   fall prevention program maintained   lighting adjusted   mobility aid in reach   nonskid shoes/slippers when out of bed   safety round/check completed   toileting scheduled  Taken 4/2/2021 2052 by Sophia Haley, RN  Safety Promotion/Fall Prevention:    activity supervised   assistive device/personal items within reach   clutter free environment maintained   fall prevention program maintained   lighting adjusted   mobility aid in reach   nonskid shoes/slippers when out of bed   safety round/check completed   toileting scheduled  Intervention: Prevent and Manage VTE (venous thromboembolism) Risk  Recent Flowsheet Documentation  Taken 4/2/2021 2052 by Sophia Haley RN  VTE Prevention/Management:   bilateral   dorsiflexion/plantar flexion performed  Goal: Optimal Comfort and Wellbeing  Intervention: Provide Person-Centered Care  Recent Flowsheet Documentation  Taken 4/2/2021 2052 by Sophia Haley RN  Trust Relationship/Rapport:   care explained   choices provided   emotional support provided   questions answered   questions encouraged   reassurance provided   thoughts/feelings acknowledged   Goal Outcome Evaluation:        Outcome Summary: VSS. Pt on heparin gtt. INR lizbet this AM. Probable discharge in the AM. Will continue to monitor.

## 2021-04-03 NOTE — DISCHARGE SUMMARY
Patient Name: Latanya Olsen  : 1940  MRN: [id number]    Date of Admission: 3/24/2021  Date of Discharge:  4/3/2021  Primary Care Physician: Mary Perkins MD      Chief Complaint:   Leg Swelling      Discharge Diagnoses     Active Hospital Problems    Diagnosis  POA   • **Acute deep vein thrombosis (DVT) of iliac vein of left lower extremity (CMS/HCC) [I82.422]  Yes   • Generalized weakness [R53.1]  Yes   • Obstructive sleep apnea syndrome [G47.33]  Yes   • Obesity (BMI 30-39.9) [E66.9]  Yes   • Hypothyroidism [E03.9]  Yes   • Hypertension [I10]  Yes   • Hyperlipidemia [E78.5]  Yes   • Chronic obstructive pulmonary disease (CMS/HCC) [J44.9]  Yes   • Anemia [D64.9]  Yes   • Spinal stenosis of lumbar region without neurogenic claudication [M48.061]  Yes      Resolved Hospital Problems   No resolved problems to display.        Hospital Course     Ms. Olsen is a 80 y.o. female with a history of chronic atrial fibrillation, chronic obstructive pulmonary disease, lung cancer, hypertension, hyperlipidemia.  She was recently diagnosed with deep venous thrombosis involving the left leg started on Lovenox and Coumadin at the nursing facility however despite this she had increasing pain and swelling and was subsequently sent to the emergency department.  Please see the admitting history and physical for further details.  She was found to have extensive deep venous thrombosis.and was admitted to the hospital for further evaluation and treatment.     Hospital course: Patient was admitted to the medical service.  Vascular surgery was consulted secondary to extensive deep venous thrombosis.  Vascular performed a duplex directed access with left lower extremity venogram and inferior venacavogram, left common iliac, external iliac, common femoral, femoral and popliteal vein mechanical clotriever thrombectomy with angioplasty of the popliteal femoral veins and common and external iliac veins.  And underwent stent  placement of the right common iliac vein.  She was treated with heparin drip and her Coumadin was adjusted by pharmacy.  Patient's INR on the day of discharge is improved at 2.03 suspect some of her difficulty bridging was secondary to the size of the clot as well as her possibly being a fast metabolizer.  Nursing staff also report the patient has been eating a lot of green leafy vegetables but they are encouraging her not to do so.  Patient is anxious to return to the nursing home.  We will discharge later this afternoon with instructions to repeat check her INR on Monday morning.      Day of Discharge     Subjective:  No complaints    Physical Exam:  Temp:  [97.5 °F (36.4 °C)-98.4 °F (36.9 °C)] 98.4 °F (36.9 °C)  Heart Rate:  [79-92] 84  Resp:  [16-18] 16  BP: (139-159)/(56-72) 139/72  Body mass index is 36.32 kg/m².  Physical Exam  Vitals and nursing note reviewed.   Constitutional:       General: She is not in acute distress.  HENT:      Head: Normocephalic and atraumatic.      Nose: Nose normal.      Mouth/Throat:      Pharynx: Oropharynx is clear.   Eyes:      General: No scleral icterus.     Extraocular Movements: Extraocular movements intact.   Cardiovascular:      Rate and Rhythm: Normal rate and regular rhythm.      Pulses: Normal pulses.      Heart sounds: Normal heart sounds.   Pulmonary:      Effort: Pulmonary effort is normal.      Breath sounds: Normal breath sounds.   Abdominal:      General: Abdomen is flat. Bowel sounds are normal.      Palpations: Abdomen is soft.   Musculoskeletal:      Cervical back: Normal range of motion and neck supple.   Skin:     Capillary Refill: Capillary refill takes less than 2 seconds.   Neurological:      General: No focal deficit present.      Mental Status: She is alert and oriented to person, place, and time.         Consultants     Consult Orders (all) (From admission, onward)     Start     Ordered    03/25/21 0000  Inpatient Case Management   Consult  Once     Provider:  (Not yet assigned)    03/24/21 2347    03/24/21 2216  Inpatient Vascular Surgery Consult  Once     Specialty:  Vascular Surgery  Provider:  Rogerio Vega MD    03/24/21 2217 03/24/21 1930  [facility] (on-call MD unless specified) Details  Once     Specialty:  Hospitalist  Provider:  (Not yet assigned)    03/24/21 1929 03/24/21 1928  LIPSYLVESTER (on-call MD unless specified)  Once,   Status:  Canceled     Specialty:  Internal Medicine  Provider:  (Not yet assigned)    03/24/21 1928 03/24/21 1928  Inpatient Vascular Surgery Consult  Once     Specialty:  Vascular Surgery  Provider:  (Not yet assigned)    03/24/21 1928              Procedures     left leg clot treiver possible venous stent *supine*      Imaging Results (All)     Procedure Component Value Units Date/Time    Arteriogram (Autofinalize) [[id number]] Resulted: 03/26/21 1609     Updated: 03/26/21 1609    Narrative:      This procedure was auto-finalized with no dictation required.        Results for orders placed during the hospital encounter of 03/24/21    Duplex Venous Lower Extremity - Left CAR    Interpretation Summary  · Acute left lower extremity deep vein thrombosis noted in the external iliac, common femoral, deep femoral, proximal femoral, mid femoral, distal femoral, popliteal, posterial tibial, peroneal, gastrocnemius and soleal.  · Acute left lower extremity superficial thrombophlebitis noted in the saphenofemoral junction and small saphenous.  · All other left sided veins appeared normal.    Results for orders placed during the hospital encounter of 03/18/19    Adult Transthoracic Echo Complete W/ Cont if Necessary Per Protocol    Interpretation Summary  · Calculated EF = 63.0%.  · Left ventricular systolic function is normal.  · Left ventricular diastolic dysfunction (grade I) consistent with impaired relaxation.  · Mild mitral valve regurgitation is present  · Mild tricuspid valve regurgitation is present.  ·  Estimated right ventricular systolic pressure from tricuspid regurgitation is normal (<35 mmHg).    Pertinent Labs     Results from last 7 days   Lab Units 04/03/21  0507 04/02/21  0559 04/01/21  1650 04/01/21  0510   WBC 10*3/mm3 6.83 8.71 7.57 7.07   HEMOGLOBIN g/dL 9.0* 10.9* 11.5* 9.2*   PLATELETS 10*3/mm3 257 254 253 253     Results from last 7 days   Lab Units 03/29/21  0508   SODIUM mmol/L 136   POTASSIUM mmol/L 3.7   CHLORIDE mmol/L 100   CO2 mmol/L 27.0   BUN mg/dL 16   CREATININE mg/dL 0.98   GLUCOSE mg/dL 91   Estimated Creatinine Clearance: 54.3 mL/min (by C-G formula based on SCr of 0.98 mg/dL).    Results from last 7 days   Lab Units 03/29/21  0508   CALCIUM mg/dL 9.4               Invalid input(s): LDLCALC          Test Results Pending at Discharge       Discharge Details        Discharge Medications      New Medications      Instructions Start Date   acetaminophen 325 MG tablet  Commonly known as: TYLENOL   650 mg, Oral, Every 4 Hours PRN         Changes to Medications      Instructions Start Date   warfarin 7.5 MG tablet  Commonly known as: Coumadin  What changed:   · medication strength  · how much to take  · when to take this   7.5 mg, Oral, Nightly         Continue These Medications      Instructions Start Date   cyclobenzaprine 10 MG tablet  Commonly known as: FLEXERIL   10 mg, Oral, 2 Times Daily PRN      esomeprazole 40 MG capsule  Commonly known as: nexIUM   40 mg, Oral, Every Morning Before Breakfast      ferrous sulfate 325 (65 FE) MG tablet   325 mg, Oral, Daily With Breakfast      furosemide 40 MG tablet  Commonly known as: LASIX   TAKE ONE TABLET BY MOUTH EVERY MORNING FOR FLUID      ipratropium-albuterol 0.5-2.5 mg/3 ml nebulizer  Commonly known as: DUO-NEB   USE 1 VIAL IN NEBULIZER 2 TIMES DAILY. Generic: DUONEB      lactulose 10 GM/15ML solution  Commonly known as: CHRONULAC   TAKE 15 ML BY MOUTH 2 3 TIMES A DAY AS NEEDED FOR CONSTIPATION      levothyroxine 100 MCG tablet  Commonly  known as: SYNTHROID, LEVOTHROID   100 mcg, Oral, Daily      linaclotide 145 MCG capsule capsule  Commonly known as: LINZESS   145 mcg, Oral, Every Morning Before Breakfast      melatonin 1 MG tablet   3 mg, Oral      pramipexole 1.5 MG tablet  Commonly known as: MIRAPEX   1.5 mg, Oral, Every Night at Bedtime      sennosides-docusate 8.6-50 MG per tablet  Commonly known as: PERICOLACE   2 tablets, Oral, 2 Times Daily      Symbicort 160-4.5 MCG/ACT inhaler  Generic drug: budesonide-formoterol   No dose, route, or frequency recorded.      Tiadylt  MG 24 hr capsule  Generic drug: dilTIAZem   TAKE ONE CAPSULE BY MOUTH DAILY FOR BLOOD PRESSURE      valsartan 320 MG tablet  Commonly known as: DIOVAN   320 mg, Oral, Every 24 Hours Scheduled      vitamin B-12 1000 MCG tablet  Commonly known as: CYANOCOBALAMIN   1,000 mcg, Oral, Daily         Stop These Medications    enoxaparin 80 MG/0.8ML solution syringe  Commonly known as: LOVENOX     HYDROcodone-acetaminophen 5-325 MG per tablet  Commonly known as: NORCO            Allergies   Allergen Reactions   • Doxycycline Anaphylaxis     Facial swelling, shortness of air, dizzines         Discharge Disposition:  Skilled Nursing Facility (DC - External)    Discharge Diet:  Diet Order   Procedures   • Diet Regular; Consistent Carbohydrate, Renal, Cardiac       Discharge Activity:   Activity Instructions     Activity as Tolerated            CODE STATUS:    Code Status and Medical Interventions:   Ordered at: 03/25/21 1215     Limited Support to NOT Include:    Artificial Nutrition    Cardioversion/Defibrillation    Intubation     Level Of Support Discussed With:    Patient     Code Status:    No CPR     Medical Interventions (Level of Support Prior to Arrest):    Limited       No future appointments.  Additional Instructions for the Follow-ups that You Need to Schedule     Discharge Follow-up with PCP   As directed       Currently Documented PCP:    Mary Perkins MD    PCP  Phone Number:    [phone]     Follow Up Details: 1 week         Discharge Follow-up with Specialty: Vascular Surgery.   As directed      Specialty: Vascular Surgery.    Follow Up Details: As directed.            Contact information for follow-up providers     Rogerio Vega MD Follow up in 1 month(s).    Specialty: Vascular Surgery  Why: With left iliac and left lower extremity venous duplex before appointment.  Contact information:  [address]  [facility] [address]  [facility] [id number] [phone]             Mary Perkins MD .    Specialty: Family Medicine  Why: 1 week  Contact information:  [address] [facility] [address] [phone]                   Contact information for after-discharge care     Destination     [facility] .    Service: Skilled Nursing  Contact information:  [address] [facility] [address] [phone]                             Additional Instructions for the Follow-ups that You Need to Schedule     Discharge Follow-up with PCP   As directed       Currently Documented PCP:    Mary Perkins MD    PCP Phone Number:    [phone]     Follow Up Details: 1 week         Discharge Follow-up with Specialty: Vascular Surgery.   As directed      Specialty: Vascular Surgery.    Follow Up Details: As directed.           Time Spent on Discharge:  Greater than 30 minutes      Tay Kincaid MD  [facility]  04/03/21  13:17 EDT

## 2021-04-03 NOTE — PLAN OF CARE
Goal Outcome Evaluation:  Plan of Care Reviewed With: patient  Progress: improving  Outcome Summary: patient vss. inr therapeutic today. plans to go to rehab. will monitor.

## 2021-04-05 NOTE — PROGRESS NOTES
Case Management Discharge Note      Final Note: Pt discharged to [facility].  Transported by family.  CAROLINE Garcia RN    Provided Post Acute Provider List?: N/A  N/A Provider List Comment: has been at [facility] and plans to return    Selected Continued Care - Discharged on 4/3/2021 Admission date: 3/24/2021 - Discharge disposition: Skilled Nursing Facility (DC - External)    Destination Coordination complete    Service Provider Selected Services Address Phone Fax Patient Preferred    [facility]  Skilled Nursing [address] [facility] [address] [phone] --          Durable Medical Equipment    No services have been selected for the patient.              Dialysis/Infusion    No services have been selected for the patient.              Home Medical Care    No services have been selected for the patient.              Therapy    No services have been selected for the patient.              Community Resources    No services have been selected for the patient.                Selected Continued Care - Prior Encounters Includes selections from prior encounters from 12/24/2020 to 4/3/2021    Discharged on 3/12/2021 Admission date: 3/6/2021 - Discharge disposition: Skilled Nursing Facility (DC - External)    Destination     Service Provider Selected Services Address Phone Fax Patient Preferred    [facility]  Skilled Nursing [address] [facility] [address] [phone] --                    Transportation Services  Private: Car    Final Discharge Disposition Code: 03 - skilled nursing facility (SNF)

## 2021-04-20 ENCOUNTER — APPOINTMENT (OUTPATIENT)
Dept: GENERAL RADIOLOGY | Facility: HOSPITAL | Age: 81
End: 2021-04-20

## 2021-04-20 ENCOUNTER — APPOINTMENT (OUTPATIENT)
Dept: CARDIOLOGY | Facility: HOSPITAL | Age: 81
End: 2021-04-20

## 2021-04-20 ENCOUNTER — HOSPITAL ENCOUNTER (INPATIENT)
Facility: HOSPITAL | Age: 81
LOS: 1 days | Discharge: HOME HEALTH | End: 2021-04-22
Attending: EMERGENCY MEDICINE | Admitting: INTERNAL MEDICINE

## 2021-04-20 ENCOUNTER — APPOINTMENT (OUTPATIENT)
Dept: CT IMAGING | Facility: HOSPITAL | Age: 81
End: 2021-04-20

## 2021-04-20 DIAGNOSIS — J18.1 RIGHT UPPER LOBE CONSOLIDATION (HCC): ICD-10-CM

## 2021-04-20 DIAGNOSIS — I82.442 ACUTE DEEP VEIN THROMBOSIS (DVT) OF TIBIAL VEIN OF LEFT LOWER EXTREMITY (HCC): Primary | ICD-10-CM

## 2021-04-20 PROBLEM — I48.91 ATRIAL FIBRILLATION: Status: ACTIVE | Noted: 2021-04-20

## 2021-04-20 LAB
ALBUMIN SERPL-MCNC: 4 G/DL (ref 3.5–5.2)
ALBUMIN/GLOB SERPL: 1.9 G/DL
ALP SERPL-CCNC: 109 U/L (ref 39–117)
ALT SERPL W P-5'-P-CCNC: 13 U/L (ref 1–33)
ANION GAP SERPL CALCULATED.3IONS-SCNC: 11.4 MMOL/L (ref 5–15)
AST SERPL-CCNC: 13 U/L (ref 1–32)
BASOPHILS # BLD AUTO: 0.03 10*3/MM3 (ref 0–0.2)
BASOPHILS NFR BLD AUTO: 0.5 % (ref 0–1.5)
BH CV LOW VAS LEFT POSTERIOR TIBIAL VESSEL: 1
BH CV LOW VAS LEFT SAPHENOFEMORAL JUNCTION SPONT: 1
BH CV LOWER VASCULAR LEFT COMMON FEMORAL AUGMENT: NORMAL
BH CV LOWER VASCULAR LEFT COMMON FEMORAL COMPETENT: NORMAL
BH CV LOWER VASCULAR LEFT COMMON FEMORAL COMPRESS: NORMAL
BH CV LOWER VASCULAR LEFT COMMON FEMORAL PHASIC: NORMAL
BH CV LOWER VASCULAR LEFT COMMON FEMORAL SPONT: NORMAL
BH CV LOWER VASCULAR LEFT DISTAL FEMORAL COMPRESS: NORMAL
BH CV LOWER VASCULAR LEFT GASTRONEMIUS COMPRESS: NORMAL
BH CV LOWER VASCULAR LEFT GREATER SAPH AK COMPRESS: NORMAL
BH CV LOWER VASCULAR LEFT GREATER SAPH BK COMPRESS: NORMAL
BH CV LOWER VASCULAR LEFT LESSER SAPH COMPRESS: NORMAL
BH CV LOWER VASCULAR LEFT MID FEMORAL AUGMENT: NORMAL
BH CV LOWER VASCULAR LEFT MID FEMORAL COMPETENT: NORMAL
BH CV LOWER VASCULAR LEFT MID FEMORAL COMPRESS: NORMAL
BH CV LOWER VASCULAR LEFT MID FEMORAL PHASIC: NORMAL
BH CV LOWER VASCULAR LEFT MID FEMORAL SPONT: NORMAL
BH CV LOWER VASCULAR LEFT PERONEAL COMPRESS: NORMAL
BH CV LOWER VASCULAR LEFT POPLITEAL AUGMENT: NORMAL
BH CV LOWER VASCULAR LEFT POPLITEAL COMPETENT: NORMAL
BH CV LOWER VASCULAR LEFT POPLITEAL COMPRESS: NORMAL
BH CV LOWER VASCULAR LEFT POPLITEAL PHASIC: NORMAL
BH CV LOWER VASCULAR LEFT POPLITEAL SPONT: NORMAL
BH CV LOWER VASCULAR LEFT POSTERIOR TIBIAL COMPRESS: NORMAL
BH CV LOWER VASCULAR LEFT POSTERIOR TIBIAL THROMBUS: NORMAL
BH CV LOWER VASCULAR LEFT PROFUNDA FEMORAL COMPRESS: NORMAL
BH CV LOWER VASCULAR LEFT PROXIMAL FEMORAL COMPRESS: NORMAL
BH CV LOWER VASCULAR LEFT SAPHENOFEMORAL JUNCTION COMPRESS: NORMAL
BH CV LOWER VASCULAR LEFT SAPHENOFEMORAL JUNCTION THROMBUS: NORMAL
BH CV LOWER VASCULAR RIGHT COMMON FEMORAL AUGMENT: NORMAL
BH CV LOWER VASCULAR RIGHT COMMON FEMORAL COMPETENT: NORMAL
BH CV LOWER VASCULAR RIGHT COMMON FEMORAL COMPRESS: NORMAL
BH CV LOWER VASCULAR RIGHT COMMON FEMORAL PHASIC: NORMAL
BH CV LOWER VASCULAR RIGHT COMMON FEMORAL SPONT: NORMAL
BH CV LOWER VASCULAR RIGHT DISTAL FEMORAL COMPRESS: NORMAL
BH CV LOWER VASCULAR RIGHT GASTRONEMIUS COMPRESS: NORMAL
BH CV LOWER VASCULAR RIGHT GREATER SAPH AK COMPRESS: NORMAL
BH CV LOWER VASCULAR RIGHT GREATER SAPH BK COMPRESS: NORMAL
BH CV LOWER VASCULAR RIGHT LESSER SAPH COMPRESS: NORMAL
BH CV LOWER VASCULAR RIGHT MID FEMORAL AUGMENT: NORMAL
BH CV LOWER VASCULAR RIGHT MID FEMORAL COMPETENT: NORMAL
BH CV LOWER VASCULAR RIGHT MID FEMORAL COMPRESS: NORMAL
BH CV LOWER VASCULAR RIGHT MID FEMORAL PHASIC: NORMAL
BH CV LOWER VASCULAR RIGHT MID FEMORAL SPONT: NORMAL
BH CV LOWER VASCULAR RIGHT PERONEAL COMPRESS: NORMAL
BH CV LOWER VASCULAR RIGHT POPLITEAL AUGMENT: NORMAL
BH CV LOWER VASCULAR RIGHT POPLITEAL COMPETENT: NORMAL
BH CV LOWER VASCULAR RIGHT POPLITEAL COMPRESS: NORMAL
BH CV LOWER VASCULAR RIGHT POPLITEAL PHASIC: NORMAL
BH CV LOWER VASCULAR RIGHT POPLITEAL SPONT: NORMAL
BH CV LOWER VASCULAR RIGHT POSTERIOR TIBIAL COMPRESS: NORMAL
BH CV LOWER VASCULAR RIGHT PROFUNDA FEMORAL COMPRESS: NORMAL
BH CV LOWER VASCULAR RIGHT PROXIMAL FEMORAL COMPRESS: NORMAL
BH CV LOWER VASCULAR RIGHT SAPHENOFEMORAL JUNCTION COMPRESS: NORMAL
BILIRUB SERPL-MCNC: 0.2 MG/DL (ref 0–1.2)
BUN SERPL-MCNC: 20 MG/DL (ref 8–23)
BUN/CREAT SERPL: 20 (ref 7–25)
CALCIUM SPEC-SCNC: 9.5 MG/DL (ref 8.6–10.5)
CHLORIDE SERPL-SCNC: 107 MMOL/L (ref 98–107)
CO2 SERPL-SCNC: 25.6 MMOL/L (ref 22–29)
CREAT SERPL-MCNC: 1 MG/DL (ref 0.57–1)
DEPRECATED RDW RBC AUTO: 48.3 FL (ref 37–54)
EOSINOPHIL # BLD AUTO: 0.13 10*3/MM3 (ref 0–0.4)
EOSINOPHIL NFR BLD AUTO: 2 % (ref 0.3–6.2)
ERYTHROCYTE [DISTWIDTH] IN BLOOD BY AUTOMATED COUNT: 15.1 % (ref 12.3–15.4)
GFR SERPL CREATININE-BSD FRML MDRD: 53 ML/MIN/1.73
GLOBULIN UR ELPH-MCNC: 2.1 GM/DL
GLUCOSE SERPL-MCNC: 97 MG/DL (ref 65–99)
HCT VFR BLD AUTO: 33 % (ref 34–46.6)
HGB BLD-MCNC: 10.9 G/DL (ref 12–15.9)
HOLD SPECIMEN: NORMAL
HOLD SPECIMEN: NORMAL
IMM GRANULOCYTES # BLD AUTO: 0.08 10*3/MM3 (ref 0–0.05)
IMM GRANULOCYTES NFR BLD AUTO: 1.2 % (ref 0–0.5)
INR PPP: 2.11 (ref 0.9–1.1)
LYMPHOCYTES # BLD AUTO: 2.25 10*3/MM3 (ref 0.7–3.1)
LYMPHOCYTES NFR BLD AUTO: 34.1 % (ref 19.6–45.3)
MCH RBC QN AUTO: 29.7 PG (ref 26.6–33)
MCHC RBC AUTO-ENTMCNC: 33 G/DL (ref 31.5–35.7)
MCV RBC AUTO: 89.9 FL (ref 79–97)
MONOCYTES # BLD AUTO: 0.53 10*3/MM3 (ref 0.1–0.9)
MONOCYTES NFR BLD AUTO: 8 % (ref 5–12)
NEUTROPHILS NFR BLD AUTO: 3.58 10*3/MM3 (ref 1.7–7)
NEUTROPHILS NFR BLD AUTO: 54.2 % (ref 42.7–76)
NRBC BLD AUTO-RTO: 0 /100 WBC (ref 0–0.2)
NT-PROBNP SERPL-MCNC: 632.1 PG/ML (ref 0–1800)
PLATELET # BLD AUTO: 234 10*3/MM3 (ref 140–450)
PMV BLD AUTO: 9.7 FL (ref 6–12)
POTASSIUM SERPL-SCNC: 3.7 MMOL/L (ref 3.5–5.2)
PROT SERPL-MCNC: 6.1 G/DL (ref 6–8.5)
PROTHROMBIN TIME: 23.4 SECONDS (ref 11.7–14.2)
QT INTERVAL: 376 MS
RBC # BLD AUTO: 3.67 10*6/MM3 (ref 3.77–5.28)
SODIUM SERPL-SCNC: 144 MMOL/L (ref 136–145)
TROPONIN T SERPL-MCNC: <0.01 NG/ML (ref 0–0.03)
WBC # BLD AUTO: 6.6 10*3/MM3 (ref 3.4–10.8)
WHOLE BLOOD HOLD SPECIMEN: NORMAL
WHOLE BLOOD HOLD SPECIMEN: NORMAL

## 2021-04-20 PROCEDURE — 85025 COMPLETE CBC W/AUTO DIFF WBC: CPT | Performed by: EMERGENCY MEDICINE

## 2021-04-20 PROCEDURE — 83880 ASSAY OF NATRIURETIC PEPTIDE: CPT | Performed by: EMERGENCY MEDICINE

## 2021-04-20 PROCEDURE — G0378 HOSPITAL OBSERVATION PER HR: HCPCS

## 2021-04-20 PROCEDURE — 71046 X-RAY EXAM CHEST 2 VIEWS: CPT

## 2021-04-20 PROCEDURE — 99284 EMERGENCY DEPT VISIT MOD MDM: CPT

## 2021-04-20 PROCEDURE — 80053 COMPREHEN METABOLIC PANEL: CPT | Performed by: EMERGENCY MEDICINE

## 2021-04-20 PROCEDURE — U0005 INFEC AGEN DETEC AMPLI PROBE: HCPCS | Performed by: PHYSICIAN ASSISTANT

## 2021-04-20 PROCEDURE — 25010000002 ENOXAPARIN PER 10 MG: Performed by: PHYSICIAN ASSISTANT

## 2021-04-20 PROCEDURE — U0004 COV-19 TEST NON-CDC HGH THRU: HCPCS | Performed by: PHYSICIAN ASSISTANT

## 2021-04-20 PROCEDURE — 93970 EXTREMITY STUDY: CPT

## 2021-04-20 PROCEDURE — 0 IOPAMIDOL PER 1 ML: Performed by: EMERGENCY MEDICINE

## 2021-04-20 PROCEDURE — 84484 ASSAY OF TROPONIN QUANT: CPT | Performed by: EMERGENCY MEDICINE

## 2021-04-20 PROCEDURE — 93010 ELECTROCARDIOGRAM REPORT: CPT | Performed by: INTERNAL MEDICINE

## 2021-04-20 PROCEDURE — 85610 PROTHROMBIN TIME: CPT | Performed by: EMERGENCY MEDICINE

## 2021-04-20 PROCEDURE — 93005 ELECTROCARDIOGRAM TRACING: CPT | Performed by: EMERGENCY MEDICINE

## 2021-04-20 PROCEDURE — 71275 CT ANGIOGRAPHY CHEST: CPT

## 2021-04-20 PROCEDURE — 25010000002 FUROSEMIDE PER 20 MG: Performed by: INTERNAL MEDICINE

## 2021-04-20 RX ORDER — LACTULOSE 10 G/15ML
20 SOLUTION ORAL 2 TIMES DAILY
Status: DISCONTINUED | OUTPATIENT
Start: 2021-04-20 | End: 2021-04-22 | Stop reason: HOSPADM

## 2021-04-20 RX ORDER — FERROUS SULFATE 325(65) MG
325 TABLET ORAL
Status: DISCONTINUED | OUTPATIENT
Start: 2021-04-21 | End: 2021-04-22 | Stop reason: HOSPADM

## 2021-04-20 RX ORDER — IPRATROPIUM BROMIDE AND ALBUTEROL SULFATE 2.5; .5 MG/3ML; MG/3ML
3 SOLUTION RESPIRATORY (INHALATION)
Status: DISCONTINUED | OUTPATIENT
Start: 2021-04-21 | End: 2021-04-22 | Stop reason: HOSPADM

## 2021-04-20 RX ORDER — CYCLOBENZAPRINE HCL 10 MG
10 TABLET ORAL 2 TIMES DAILY PRN
Status: DISCONTINUED | OUTPATIENT
Start: 2021-04-20 | End: 2021-04-22 | Stop reason: HOSPADM

## 2021-04-20 RX ORDER — PANTOPRAZOLE SODIUM 40 MG/1
40 TABLET, DELAYED RELEASE ORAL EVERY MORNING
Status: DISCONTINUED | OUTPATIENT
Start: 2021-04-21 | End: 2021-04-22 | Stop reason: HOSPADM

## 2021-04-20 RX ORDER — LEVOTHYROXINE SODIUM 0.1 MG/1
100 TABLET ORAL
Status: DISCONTINUED | OUTPATIENT
Start: 2021-04-21 | End: 2021-04-22 | Stop reason: HOSPADM

## 2021-04-20 RX ORDER — BUDESONIDE AND FORMOTEROL FUMARATE DIHYDRATE 160; 4.5 UG/1; UG/1
2 AEROSOL RESPIRATORY (INHALATION)
Status: DISCONTINUED | OUTPATIENT
Start: 2021-04-21 | End: 2021-04-22 | Stop reason: HOSPADM

## 2021-04-20 RX ORDER — PRAMIPEXOLE DIHYDROCHLORIDE 1.5 MG/1
1.5 TABLET ORAL NIGHTLY
Status: DISCONTINUED | OUTPATIENT
Start: 2021-04-21 | End: 2021-04-22 | Stop reason: HOSPADM

## 2021-04-20 RX ORDER — CHOLECALCIFEROL (VITAMIN D3) 125 MCG
1000 CAPSULE ORAL DAILY
Status: DISCONTINUED | OUTPATIENT
Start: 2021-04-21 | End: 2021-04-22 | Stop reason: HOSPADM

## 2021-04-20 RX ORDER — DILTIAZEM HYDROCHLORIDE 120 MG/1
120 CAPSULE, COATED, EXTENDED RELEASE ORAL
Status: DISCONTINUED | OUTPATIENT
Start: 2021-04-21 | End: 2021-04-22 | Stop reason: HOSPADM

## 2021-04-20 RX ORDER — FUROSEMIDE 10 MG/ML
40 INJECTION INTRAMUSCULAR; INTRAVENOUS EVERY 12 HOURS
Status: DISCONTINUED | OUTPATIENT
Start: 2021-04-20 | End: 2021-04-22 | Stop reason: HOSPADM

## 2021-04-20 RX ORDER — VALSARTAN 320 MG/1
320 TABLET ORAL
Status: DISCONTINUED | OUTPATIENT
Start: 2021-04-21 | End: 2021-04-22 | Stop reason: HOSPADM

## 2021-04-20 RX ORDER — SODIUM CHLORIDE 0.9 % (FLUSH) 0.9 %
10 SYRINGE (ML) INJECTION AS NEEDED
Status: DISCONTINUED | OUTPATIENT
Start: 2021-04-20 | End: 2021-04-22 | Stop reason: HOSPADM

## 2021-04-20 RX ADMIN — ENOXAPARIN SODIUM 100 MG: 100 INJECTION SUBCUTANEOUS at 21:25

## 2021-04-20 RX ADMIN — FUROSEMIDE 40 MG: 10 INJECTION, SOLUTION INTRAMUSCULAR; INTRAVENOUS at 23:23

## 2021-04-20 RX ADMIN — IOPAMIDOL 85 ML: 755 INJECTION, SOLUTION INTRAVENOUS at 19:49

## 2021-04-20 NOTE — ED PROVIDER NOTES
EMERGENCY DEPARTMENT ENCOUNTER    Room Number:  E552/1  Date seen:  4/21/2021  Time seen: 16:33 EDT  PCP: Mary Perkins MD  Historian: Patient    HPI:  Chief complaint: Leg swelling  A complete HPI/ROS/PMH/PSH/SH/FH are unobtainable due to: None  Context:Latanya Olsen is a 80 y.o. female, who presents to the ED with c/o bilateral leg swelling and right calf tenderness when she got home from the nursing home 2 days ago.  She says she has been compliant with her Coumadin.  She reports that her right calf pain is similar to when patient had a left DVT about 1 month ago.  Reports prolonged mobilization while being in the nursing home last month.  She also complaining of mild shortness of breath which started 2 days ago.  She said the shortness of breath is worse with exertion.    Patient was placed in face mask in first look. Patient was wearing facemask when I entered the room and throughout our encounter. I wore full protective equipment throughout this patient encounter including a face mask, goggles, and gloves. Hand hygiene was performed before donning protective equipment and after removal when leaving the room.      MEDICAL RECORD REVIEW  Patient in March 2021 patient had a known DVT to her left lower extremity.  At that time she was treated with Lovenox and Coumadin at the nursing home, however she was having increased pain and swelling and was sent to the ER.  At that time patient was admitted and underwent stent placement, she was followed by vascular surgery here at [facility].    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   • Diverticulosis of intestine 02/19/2016   • Obstructive sleep apnea syndrome 02/19/2016   • Weight loss 02/19/2016   • Vitamin D deficiency 02/19/2016   • Overweight (BMI 25.0-29.9) 02/19/2016   • Spinal stenosis of lumbar region without neurogenic claudication 02/19/2016   •  Osteoarthritis of knee 02/19/2016   • Obesity (BMI 30-39.9) 02/19/2016   • Neutropenia (CMS/[facility]) 02/19/2016   • Chronic lower back pain 02/19/2016   • Knee pain 02/19/2016   • Insomnia 02/19/2016   • Hypothyroidism 02/19/2016   • Hypokalemia 02/19/2016   • Hypertension 02/19/2016   • Hyperlipidemia 02/19/2016   • Generalized osteoarthritis 02/19/2016   • Gastroparesis 02/19/2016   • Foot pain 02/19/2016   • Chronic obstructive pulmonary disease (CMS/[facility]) 02/19/2016   • Chronic constipation 02/19/2016   • Anemia 02/19/2016   • Weight gain 02/19/2016   • Pain of left breast 02/22/2016   • Elevated serum alkaline phosphatase level 02/22/2016   • Neck pain 11/28/2017   • Volume overload 03/19/2019   • Status post total hip replacement, 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   • Generalized weakness 03/06/2021   • Constipation 03/10/2021   • Acute deep vein thrombosis (DVT) of iliac vein of left lower extremity (CMS/HCC) 03/24/2021     Resolved Ambulatory Problems     Diagnosis Date Noted   • Finger injury 02/19/2016   • Upper respiratory tract infection 02/19/2016   • Contusion of upper arm 02/19/2016   • Tingling of skin 02/19/2016   • Rash 02/19/2016   • Candidiasis of mouth 02/19/2016   • Spasm 02/19/2016   • Low back pain 02/19/2016   • Heartburn 02/19/2016   • Diarrhea 02/19/2016   • Bloating symptom 02/19/2016   • Gastroesophageal reflux disease with esophagitis 02/19/2016   • Drug-induced photosensitivity 02/19/2016   • Cramp of limb 02/19/2016   • Contact dermatitis 02/19/2016   • Chronic kidney disease, stage II (mild) 02/19/2016   • Ankle joint pain 02/19/2016   • Atopic rhinitis 02/19/2016   • Allergic reaction to drug 02/19/2016   • Acute sinusitis 02/19/2016   • Generalized abdominal pain 02/19/2016   • Closed fracture of neck of right femur (CMS/HCC) 05/31/2019   • ARF (acute renal failure) (CMS/HCC) 03/07/2021     Past Medical History:   Diagnosis Date   • Arthritis    • Atrial fibrillation (CMS/HCC)    • COPD (chronic  obstructive pulmonary disease) (CMS/HCC)    • Diverticulosis    • GERD (gastroesophageal reflux disease)    • Hx of degenerative disc disease    • Knee swelling    • Lung cancer (CMS/HCC)    • Renal disorder    • Restless leg syndrome    • Sleep apnea with use of continuous positive airway pressure (CPAP)        PAST SURGICAL HISTORY  Past Surgical History:   Procedure Laterality Date   • BUNIONECTOMY Bilateral    • CATARACT EXTRACTION     • CHOLECYSTECTOMY     • COLONOSCOPY     • ENDOSCOPY N/A 6/24/2016    Procedure: ESOPHAGOGASTRODUODENOSCOPY  with biopsies;  Surgeon: Von Hernández MD;  Location: [facility] OR;  Service:    • LUNG LOBECTOMY Left     lower lobe   • LYTIC THROMBIN THERAPY Left 3/26/2021    Procedure: left leg clot treiver possible venous stent *supine*;  Surgeon: Rogerio Vega MD;  Location: [facility] 18/19;  Service: Vascular;  Laterality: Left;   • REPLACEMENT TOTAL KNEE Left    • THYROID BIOPSY     • TOTAL HIP ARTHROPLASTY Right 6/1/2019    Procedure: BIPOLAR HIP CEMENTED POSTERIOR;  Surgeon: Ashley Crenshaw MD;  Location: [facility];  Service: Orthopedics       FAMILY HISTORY  Family History   Problem Relation Age of Onset   • Heart attack Mother    • Coronary artery disease Mother    • Hypertension Mother    • Heart attack Sister    • Colon cancer Neg Hx    • Colon polyps Neg Hx    • Esophageal cancer Neg Hx        SOCIAL HISTORY  Social History     Socioeconomic History   • Marital status:      Spouse name: Not on file   • Number of children: Not on file   • Years of education: Not on file   • Highest education level: Not on file   Tobacco Use   • Smoking status: Never Smoker   • Smokeless tobacco: Never Used   Substance and Sexual Activity   • Alcohol use: No   • Drug use: Defer   • Sexual activity: Defer       REVIEW OF SYSTEMS  Review of Systems    All systems reviewed and negative except for those discussed in HPI.     PHYSICAL EXAM    ED Triage  Vitals [04/20/21 1442]   Temp Heart Rate Resp BP SpO2   98.1 °F (36.7 °C) 90 20 160/70 94 %      Temp src Heart Rate Source Patient Position BP Location FiO2 (%)   -- -- -- -- --     Physical Exam    I have reviewed the triage vital signs and nursing notes.      GENERAL: not distressed  HENT: nares patent  EYES: no scleral icterus  NECK: no ROM limitations  CV: regular rhythm, regular rate  RESPIRATORY: normal effort; diminished breath sounds to bilateral lower lobes  ABDOMEN: soft, nontender  : deferred  MUSCULOSKELETAL: no deformity; 3+ pitting edema to the bilateral lower extremities, mild tenderness palpation to the bilateral calves, 2+ patella pedal pulses bilaterally  NEURO: alert, moves all extremities, follows commands  SKIN: warm, dry    LAB RESULTS  Recent Results (from the past 24 hour(s))   ECG 12 Lead    Collection Time: 04/20/21  4:52 PM   Result Value Ref Range    QT Interval 376 ms   Comprehensive Metabolic Panel    Collection Time: 04/20/21  5:00 PM    Specimen: Blood   Result Value Ref Range    Glucose 97 65 - 99 mg/dL    BUN 20 8 - 23 mg/dL    Creatinine 1.00 0.57 - 1.00 mg/dL    Sodium 144 136 - 145 mmol/L    Potassium 3.7 3.5 - 5.2 mmol/L    Chloride 107 98 - 107 mmol/L    CO2 25.6 22.0 - 29.0 mmol/L    Calcium 9.5 8.6 - 10.5 mg/dL    Total Protein 6.1 6.0 - 8.5 g/dL    Albumin 4.00 3.50 - 5.20 g/dL    ALT (SGPT) 13 1 - 33 U/L    AST (SGOT) 13 1 - 32 U/L    Alkaline Phosphatase 109 39 - 117 U/L    Total Bilirubin 0.2 0.0 - 1.2 mg/dL    eGFR Non African Amer 53 (L) >60 mL/min/1.73    Globulin 2.1 gm/dL    A/G Ratio 1.9 g/dL    BUN/Creatinine Ratio 20.0 7.0 - 25.0    Anion Gap 11.4 5.0 - 15.0 mmol/L   BNP    Collection Time: 04/20/21  5:00 PM    Specimen: Blood   Result Value Ref Range    proBNP 632.1 0.0-1,800.0 pg/mL   Troponin    Collection Time: 04/20/21  5:00 PM    Specimen: Blood   Result Value Ref Range    Troponin T <0.010 0.000 - 0.030 ng/mL   Green Top (Gel)    Collection Time:  04/20/21  5:00 PM   Result Value Ref Range    Extra Tube Hold for add-ons.    Lavender Top    Collection Time: 04/20/21  5:00 PM   Result Value Ref Range    Extra Tube hold for add-on    Gold Top - SST    Collection Time: 04/20/21  5:00 PM   Result Value Ref Range    Extra Tube Hold for add-ons.    CBC Auto Differential    Collection Time: 04/20/21  5:00 PM    Specimen: Blood   Result Value Ref Range    WBC 6.60 3.40 - 10.80 10*3/mm3    RBC 3.67 (L) 3.77 - 5.28 10*6/mm3    Hemoglobin 10.9 (L) 12.0 - 15.9 g/dL    Hematocrit 33.0 (L) 34.0 - 46.6 %    MCV 89.9 79.0 - 97.0 fL    MCH 29.7 26.6 - 33.0 pg    MCHC 33.0 31.5 - 35.7 g/dL    RDW 15.1 12.3 - 15.4 %    RDW-SD 48.3 37.0 - 54.0 fl    MPV 9.7 6.0 - 12.0 fL    Platelets 234 140 - 450 10*3/mm3    Neutrophil % 54.2 42.7 - 76.0 %    Lymphocyte % 34.1 19.6 - 45.3 %    Monocyte % 8.0 5.0 - 12.0 %    Eosinophil % 2.0 0.3 - 6.2 %    Basophil % 0.5 0.0 - 1.5 %    Immature Grans % 1.2 (H) 0.0 - 0.5 %    Neutrophils, Absolute 3.58 1.70 - 7.00 10*3/mm3    Lymphocytes, Absolute 2.25 0.70 - 3.10 10*3/mm3    Monocytes, Absolute 0.53 0.10 - 0.90 10*3/mm3    Eosinophils, Absolute 0.13 0.00 - 0.40 10*3/mm3    Basophils, Absolute 0.03 0.00 - 0.20 10*3/mm3    Immature Grans, Absolute 0.08 (H) 0.00 - 0.05 10*3/mm3    nRBC 0.0 0.0 - 0.2 /100 WBC   Protime-INR    Collection Time: 04/20/21  5:01 PM    Specimen: Blood   Result Value Ref Range    Protime 23.4 (H) 11.7 - 14.2 Seconds    INR 2.11 (H) 0.90 - 1.10   Light Blue Top    Collection Time: 04/20/21  5:01 PM   Result Value Ref Range    Extra Tube hold for add-on    Duplex Venous Lower Extremity - Bilateral CAR    Collection Time: 04/20/21  6:37 PM   Result Value Ref Range    Left Saphenofemoral Junction Spont 1     Left Posterior Tibial Vessel 1     Right Common Femoral Spont Y     Right Common Femoral Phasic Y     Right Common Femoral Augment Y     Right Common Femoral Competent Y     Right Common Femoral Compress C     Right  Saphenofemoral Junction Compress C     Right Profunda Femoral Compress C     Right Proximal Femoral Compress C     Right Mid Femoral Spont Y     Right Mid Femoral Phasic Y     Right Mid Femoral Augment Y     Right Mid Femoral Competent Y     Right Mid Femoral Compress C     Right Distal Femoral Compress C     Right Popliteal Spont Y     Right Popliteal Phasic Y     Right Popliteal Augment Y     Right Popliteal Competent Y     Right Popliteal Compress C     Right Posterior Tibial Compress C     Right Peroneal Compress C     Right GastronemiusSoleal Compress C     Right Greater Saph AK Compress C     Right Greater Saph BK Compress C     Right Lesser Saph Compress C     Left Common Femoral Spont Y     Left Common Femoral Phasic Y     Left Common Femoral Augment Y     Left Common Femoral Competent Y     Left Common Femoral Compress C     Left Saphenofemoral Junction Compress P     Left Saphenofemoral Junction Thrombus C     Left Profunda Femoral Compress C     Left Proximal Femoral Compress C     Left Mid Femoral Spont Y     Left Mid Femoral Phasic Y     Left Mid Femoral Augment Y     Left Mid Femoral Competent Y     Left Mid Femoral Compress C     Left Distal Femoral Compress C     Left Popliteal Spont Y     Left Popliteal Phasic Y     Left Popliteal Augment Y     Left Popliteal Competent Y     Left Popliteal Compress C     Left Posterior Tibial Compress N     Left Posterior Tibial Thrombus A     Left Peroneal Compress C     Left GastronemiusSoleal Compress C     Left Greater Saph AK Compress C     Left Greater Saph BK Compress C     Left Lesser Saph Compress C          RADIOLOGY RESULTS  CT Angiogram Chest   Final Result       No pulmonary embolism. Indeterminate density anteriorly in the right   upper lobe, clinical correlation and follow-up recommended.       This report was finalized on 4/20/2021 8:11 PM by Dr. Gregory Baptiste M.D.          XR Chest 2 View   Final Result   No focal pulmonary consolidation.  "Mild cardiomegaly.   Tortuous aorta. Follow-up as clinically indicated.       This report was finalized on 4/20/2021 3:16 PM by Dr. Gregory Baptiste M.D.                PROGRESS, DATA ANALYSIS, CONSULTS AND MEDICAL DECISION MAKING  All labs have been independently reviewed by me.  All radiology studies have been reviewed by me and discussed with radiologist dictating the report. Discussion below represents my analysis of pertinent findings related to patient's condition, differential diagnosis, treatment plan and final disposition.     ED Course as of Apr 21 0000   Tue Apr 20, 2021   1907 Patti from ultrasound informed me of patient's new acute DVT in the left calf vein, the other DVTs appear to be improving compared to last ultrasound done.    [SS]   2051 I discussed with Lizet, nurse practitioner for Dr. eBar regarding patient.  Agrees admit patient all question addressed at this time.    [SS]   2052 I discussed with Dr. Scruggs, hematologist regarding patient's persistent new DVTs, he recommends Lovenox at this time.  All question addressed at this time.    [SS]      ED Course User Index  [SS] Emmy Casey, LOLIS       The differential diagnosis include but are not limited to acute DVT, PE, CHF.       Reviewed pt's history and workup with Dr. Newman.  After a bedside evaluation, Dr. Newman agrees with the plan of care.      (FOR ADMIT) Based on the patient's lab findings and presenting symptoms, the doctor and I feel it is appropriate to admit the patient for further management, evaluation, and treatment.  I have discussed this with the admitting team.  I have also discussed this with the patient/family.  They are in agreement with admission.          Disposition vitals:  /70 (BP Location: Right arm, Patient Position: Lying)   Pulse 81   Temp 98.6 °F (37 °C) (Oral)   Resp 18   Ht 167.6 cm (66\")   Wt 103 kg (227 lb 9.6 oz)   SpO2 96%   BMI 36.74 kg/m²       DIAGNOSIS  Final diagnoses:   Acute " deep vein thrombosis (DVT) of tibial vein of left lower extremity (CMS/HCC)   Right upper lobe consolidation (CMS/HCC)       FOLLOW UP   No follow-up provider specified.       Emmy Casey PA-C  04/21/21 0000

## 2021-04-20 NOTE — ED NOTES
Hand hygiene maintained before and after. Wore appropriate PPE, greeted patient and made sure needs were met w/ call light in reach.      Kirsten Andrews, PCT  04/20/21 0767

## 2021-04-20 NOTE — ED PROVIDER NOTES
Brief history of present illness: 80-year-old female with recently diagnosed acute DVT of the left iliac vein was recently hospitalized for same was just released from a nursing facility on Saturday.  She reports that at home, where she lives alone, she has significant dyspnea with any exertion and has had increasing swelling of bilateral lower extremities with significant discomfort and limitations of ambulation.    Physical exam:   ED Triage Vitals   Temp Heart Rate Resp BP SpO2   04/20/21 1442 04/20/21 1442 04/20/21 1442 04/20/21 1442 04/20/21 1442   98.1 °F (36.7 °C) 90 20 160/70 94 %      Temp src Heart Rate Source Patient Position BP Location FiO2 (%)   -- 04/20/21 1647 04/20/21 1647 -- --    Monitor Lying       Ill-appearing.  No respiratory distress or tachypnea.  Pink warm well perfused.  4+ bilateral lower extremity edema distal to the knee.  Abdomen soft and large.  Normal mood and affect.    MDM:   Results for orders placed or performed during the hospital encounter of 04/20/21   Comprehensive Metabolic Panel    Specimen: Blood   Result Value Ref Range    Glucose 97 65 - 99 mg/dL    BUN 20 8 - 23 mg/dL    Creatinine 1.00 0.57 - 1.00 mg/dL    Sodium 144 136 - 145 mmol/L    Potassium 3.7 3.5 - 5.2 mmol/L    Chloride 107 98 - 107 mmol/L    CO2 25.6 22.0 - 29.0 mmol/L    Calcium 9.5 8.6 - 10.5 mg/dL    Total Protein 6.1 6.0 - 8.5 g/dL    Albumin 4.00 3.50 - 5.20 g/dL    ALT (SGPT) 13 1 - 33 U/L    AST (SGOT) 13 1 - 32 U/L    Alkaline Phosphatase 109 39 - 117 U/L    Total Bilirubin 0.2 0.0 - 1.2 mg/dL    eGFR Non African Amer 53 (L) >60 mL/min/1.73    Globulin 2.1 gm/dL    A/G Ratio 1.9 g/dL    BUN/Creatinine Ratio 20.0 7.0 - 25.0    Anion Gap 11.4 5.0 - 15.0 mmol/L   BNP    Specimen: Blood   Result Value Ref Range    proBNP 632.1 0.0-1,800.0 pg/mL   Troponin    Specimen: Blood   Result Value Ref Range    Troponin T <0.010 0.000 - 0.030 ng/mL   Protime-INR    Specimen: Blood   Result Value Ref Range     Protime 23.4 (H) 11.7 - 14.2 Seconds    INR 2.11 (H) 0.90 - 1.10   CBC Auto Differential    Specimen: Blood   Result Value Ref Range    WBC 6.60 3.40 - 10.80 10*3/mm3    RBC 3.67 (L) 3.77 - 5.28 10*6/mm3    Hemoglobin 10.9 (L) 12.0 - 15.9 g/dL    Hematocrit 33.0 (L) 34.0 - 46.6 %    MCV 89.9 79.0 - 97.0 fL    MCH 29.7 26.6 - 33.0 pg    MCHC 33.0 31.5 - 35.7 g/dL    RDW 15.1 12.3 - 15.4 %    RDW-SD 48.3 37.0 - 54.0 fl    MPV 9.7 6.0 - 12.0 fL    Platelets 234 140 - 450 10*3/mm3    Neutrophil % 54.2 42.7 - 76.0 %    Lymphocyte % 34.1 19.6 - 45.3 %    Monocyte % 8.0 5.0 - 12.0 %    Eosinophil % 2.0 0.3 - 6.2 %    Basophil % 0.5 0.0 - 1.5 %    Immature Grans % 1.2 (H) 0.0 - 0.5 %    Neutrophils, Absolute 3.58 1.70 - 7.00 10*3/mm3    Lymphocytes, Absolute 2.25 0.70 - 3.10 10*3/mm3    Monocytes, Absolute 0.53 0.10 - 0.90 10*3/mm3    Eosinophils, Absolute 0.13 0.00 - 0.40 10*3/mm3    Basophils, Absolute 0.03 0.00 - 0.20 10*3/mm3    Immature Grans, Absolute 0.08 (H) 0.00 - 0.05 10*3/mm3    nRBC 0.0 0.0 - 0.2 /100 WBC   ECG 12 Lead   Result Value Ref Range    QT Interval 376 ms     XR Chest 2 View    Result Date: 4/20/2021  Narrative: XR CHEST 2 VW-  HISTORY: Female who is 80 years-old,  short of breath  TECHNIQUE: Frontal and lateral views of the chest  COMPARISON: 03/06/2021  FINDINGS: The heart is mildly enlarged. Aorta is tortuous, calcified. Pulmonary vasculature is unremarkable. No focal pulmonary consolidation, pleural effusion, or pneumothorax. Likely bone island the peripheral right lower rib in correlation with CT from 03/10/2021. No acute osseous process.      Impression: No focal pulmonary consolidation. Mild cardiomegaly. Tortuous aorta. Follow-up as clinically indicated.  This report was finalized on 4/20/2021 3:16 PM by Dr. Gregory Baptiste M.D.      Duplex Venous Lower Extremity - Left CAR    Result Date: 3/24/2021  Narrative: · Acute left lower extremity deep vein thrombosis noted in the external iliac,  common femoral, deep femoral, proximal femoral, mid femoral, distal femoral, popliteal, posterial tibial, peroneal, gastrocnemius and soleal. · Acute left lower extremity superficial thrombophlebitis noted in the saphenofemoral junction and small saphenous. · All other left sided veins appeared normal.      Arteriogram (Autofinalize)    Result Date: 3/26/2021  Narrative: This procedure was auto-finalized with no dictation required.    I personally reviewed and interpreted the EKG obtained today in the emergency department at 1652, and with treatment.  EKG reveals sinus rhythm, 80; IVCD with associated ST/T wave repolarization abnormality; no evidence of acute STEMI.  Compared to 3/6/2021-no acute change    Agree with plan for radiologic evaluation this patient.  My primary concern is for acute life threat and I secondary concern is for patient's wellbeing at home by herself.  Disposition will have to be carefully weighed.    I have seen and personally evaluated this patient, discussed the case with the treating advanced practice provider, and reviewed their note. I was involved in the medical decision making during the evaluation, testing and disposition planning for this patient.     Dimitry Newman MD  04/20/21 6129

## 2021-04-20 NOTE — PROGRESS NOTES
"Discharge Planning Assessment  [facility]     Patient Name: Latanya Olsen  MRN: [id number]  Today's Date: 4/20/2021    Admit Date: 4/20/2021    Discharge Needs Assessment     Row Name 04/20/21 1924       Living Environment    Lives With  alone    Unique Family Situation  Pt states that she has no one to care for her and that all she has is neighbors to help her    Primary Care Provided by  self    Provides Primary Care For  no one, unable/limited ability to care for self    Family Caregiver if Needed  none    Quality of Family Relationships  non-existent    Able to Return to Prior Arrangements  yes Pt wants to return to [place]       Resource/Environmental Concerns    Transportation Concerns  rides, unreliable from others       Transition Planning    Patient/Family Anticipates Transition to  long-term care facility    Patient/Family Anticipated Services at Transition  rehabilitation services    Transportation Anticipated  family or friend will provide       Discharge Needs Assessment    Readmission Within the Last 30 Days  previous discharge plan unsuccessful;other (see comments) Pt is unable to care for self    Equipment Currently Used at Home  bath bench;cane, straight;commode;grab bar;ramp;shower chair;walker, standard    Concerns to be Addressed  discharge planning    Anticipated Changes Related to Illness  inability to care for self    Equipment Needed After Discharge  none        Discharge Plan     Row Name 04/20/21 1927       Plan    Plan  Face sheet verified, role of CCP explained . Pt states that she cannot care for self at this time, \"I just stay in bed\" Pt wants to return to [facility]. Was just released from [facility] \"a couple of days ago\"    Plan Comments  Pt would like to go to rehab upon discharge. Prefers [facility], referral placed.        Continued Care and Services - Admitted Since 4/20/2021    Coordination has not been started for this encounter.     Selected Continued Care - " Prior Encounters Includes selections from prior encounters from 1/20/2021 to 4/20/2021    Discharged on 4/3/2021 Admission date: 3/24/2021 - Discharge disposition: Skilled Nursing Facility (DC - External)    Destination     Service Provider Selected Services Address Phone Fax Patient Preferred    [facility]  Skilled Nursing [address] [address] [phone] --                Discharged on 3/12/2021 Admission date: 3/6/2021 - Discharge disposition: Skilled Nursing Facility (DC - External)    Destination     Service Provider Selected Services Address Phone Fax Patient Preferred    [facility]  Skilled Nursing [address] [address] [phone] --                      Demographic Summary    No documentation.       Functional Status    No documentation.       Psychosocial    No documentation.       Abuse/Neglect    No documentation.       Legal    No documentation.       Substance Abuse    No documentation.       Patient Forms    No documentation.           Tiffanie Tay, RN

## 2021-04-20 NOTE — ED TRIAGE NOTES
Pt c/o bilateral lower extremity swelling and worsening shortness of air on exertion. Pt states that she is concerned for her heart and kidney function and states that this feels similar to past episodes of heart and renal failure. Pt was recently discharged after left DVT but states that current swelling is bilateral and not confined to one leg.       Pt masked in triage. Staff warning masks.

## 2021-04-20 NOTE — DISCHARGE PLACEMENT REQUEST
"Latanya Troncoso (80 y.o. Female)     Date of Birth Social Security Number Address Home Phone MRN    1940  [address] [place] [id number] [phone] [id number]    Evangelical Marital Status          Scientologist        Admission Date Admission Type Admitting Provider Attending Provider Department, Room/Bed    4/20/21 Emergency  Dimitry Newman MD [facility] Emergency Department, 05/05    Discharge Date Discharge Disposition Discharge Destination                       Attending Provider: Dimitry Newman MD    Allergies: Doxycycline    Isolation: None   Infection: None   Code Status: Prior    Ht: 167.6 cm (66\")   Wt: 103 kg (227 lb 9.6 oz)    Admission Cmt: None   Principal Problem: None                Active Insurance as of 4/20/2021     Primary Coverage     Payor Plan Insurance Group Employer/Plan Group    MEDICARE MEDICARE A & B      Payor Plan Address Payor Plan Phone Number Payor Plan Fax Number Effective Dates    [address] [phone]  9/1/2004 - None Entered    [facility] [address]       Subscriber Name Subscriber Birth Date Member ID       LATANYA TRONCOSO 1940 [id number]           Secondary Coverage     Payor Plan Insurance Group Employer/Plan Group    AARP MC SUP AARP HEALTH CARE OPTIONS      Payor Plan Address Payor Plan Phone Number Payor Plan Fax Number Effective Dates    [facility] [phone]  1/1/2006 - None Entered    [address]       [facility] [address]       Subscriber Name Subscriber Birth Date Member ID       LATANYA TRONCOSO 1940 [id number]                 Emergency Contacts      (Rel.) Home Phone Work Phone Mobile Phone    JAJA (NIECE)SRAVANTHI (Relative) [phone] -- [phone]              "

## 2021-04-21 ENCOUNTER — APPOINTMENT (OUTPATIENT)
Dept: CARDIOLOGY | Facility: HOSPITAL | Age: 81
End: 2021-04-21

## 2021-04-21 LAB
AORTIC DIMENSIONLESS INDEX: 0.8 (DI)
BH CV ECHO MEAS - AO MAX PG: 6 MMHG
BH CV ECHO MEAS - AO MEAN PG (FULL): 1 MMHG
BH CV ECHO MEAS - AO MEAN PG: 3 MMHG
BH CV ECHO MEAS - AO V2 MAX: 122.7 CM/SEC
BH CV ECHO MEAS - AO V2 MEAN: 74.2 CM/SEC
BH CV ECHO MEAS - AO V2 VTI: 22 CM
BH CV ECHO MEAS - AVA(I,A): 3.4 CM^2
BH CV ECHO MEAS - AVA(I,D): 3.4 CM^2
BH CV ECHO MEAS - BSA(HAYCOCK): 2.2 M^2
BH CV ECHO MEAS - BSA: 2.1 M^2
BH CV ECHO MEAS - BZI_BMI: 36.6 KILOGRAMS/M^2
BH CV ECHO MEAS - BZI_METRIC_HEIGHT: 167.6 CM
BH CV ECHO MEAS - BZI_METRIC_WEIGHT: 103 KG
BH CV ECHO MEAS - EDV(CUBED): 64 ML
BH CV ECHO MEAS - EDV(MOD-SP2): 68 ML
BH CV ECHO MEAS - EDV(MOD-SP4): 65 ML
BH CV ECHO MEAS - EDV(TEICH): 70 ML
BH CV ECHO MEAS - EF(CUBED): 83.4 %
BH CV ECHO MEAS - EF(MOD-BP): 58 %
BH CV ECHO MEAS - EF(MOD-SP2): 57.4 %
BH CV ECHO MEAS - EF(MOD-SP4): 53.8 %
BH CV ECHO MEAS - EF(TEICH): 76.9 %
BH CV ECHO MEAS - ESV(CUBED): 10.6 ML
BH CV ECHO MEAS - ESV(MOD-SP2): 29 ML
BH CV ECHO MEAS - ESV(MOD-SP4): 30 ML
BH CV ECHO MEAS - ESV(TEICH): 16.2 ML
BH CV ECHO MEAS - FS: 45 %
BH CV ECHO MEAS - IVS/LVPW: 0.91
BH CV ECHO MEAS - IVSD: 1 CM
BH CV ECHO MEAS - LAT PEAK E' VEL: 10.6 CM/SEC
BH CV ECHO MEAS - LV DIASTOLIC VOL/BSA (35-75): 30.8 ML/M^2
BH CV ECHO MEAS - LV MASS(C)D: 136.2 GRAMS
BH CV ECHO MEAS - LV MASS(C)DI: 64.5 GRAMS/M^2
BH CV ECHO MEAS - LV MAX PG: 3.1 MMHG
BH CV ECHO MEAS - LV MEAN PG: 2 MMHG
BH CV ECHO MEAS - LV SYSTOLIC VOL/BSA (12-30): 14.2 ML/M^2
BH CV ECHO MEAS - LV V1 MAX: 88 CM/SEC
BH CV ECHO MEAS - LV V1 MEAN: 56.7 CM/SEC
BH CV ECHO MEAS - LV V1 VTI: 18.1 CM
BH CV ECHO MEAS - LVIDD: 4 CM
BH CV ECHO MEAS - LVIDS: 2.2 CM
BH CV ECHO MEAS - LVLD AP2: 7.5 CM
BH CV ECHO MEAS - LVLD AP4: 6.7 CM
BH CV ECHO MEAS - LVLS AP2: 5.7 CM
BH CV ECHO MEAS - LVLS AP4: 5.8 CM
BH CV ECHO MEAS - LVOT AREA (M): 4.2 CM^2
BH CV ECHO MEAS - LVOT AREA: 4.2 CM^2
BH CV ECHO MEAS - LVOT DIAM: 2.3 CM
BH CV ECHO MEAS - LVPWD: 1.1 CM
BH CV ECHO MEAS - MED PEAK E' VEL: 8.7 CM/SEC
BH CV ECHO MEAS - MV A DUR: 0.12 SEC
BH CV ECHO MEAS - MV A MAX VEL: 112 CM/SEC
BH CV ECHO MEAS - MV DEC SLOPE: 555 CM/SEC^2
BH CV ECHO MEAS - MV DEC TIME: 153 SEC
BH CV ECHO MEAS - MV E MAX VEL: 72.1 CM/SEC
BH CV ECHO MEAS - MV E/A: 0.64
BH CV ECHO MEAS - MV MEAN PG: 2 MMHG
BH CV ECHO MEAS - MV P1/2T MAX VEL: 93.7 CM/SEC
BH CV ECHO MEAS - MV P1/2T: 49.4 MSEC
BH CV ECHO MEAS - MV V2 MEAN: 62 CM/SEC
BH CV ECHO MEAS - MV V2 VTI: 19.1 CM
BH CV ECHO MEAS - MVA P1/2T LCG: 2.3 CM^2
BH CV ECHO MEAS - MVA(P1/2T): 4.4 CM^2
BH CV ECHO MEAS - MVA(VTI): 3.9 CM^2
BH CV ECHO MEAS - PA ACC SLOPE: 1412 CM/SEC^2
BH CV ECHO MEAS - PA ACC TIME: 0.07 SEC
BH CV ECHO MEAS - PA MAX PG (FULL): 3.1 MMHG
BH CV ECHO MEAS - PA MAX PG: 4.3 MMHG
BH CV ECHO MEAS - PA PR(ACCEL): 48.9 MMHG
BH CV ECHO MEAS - PA V2 MAX: 104 CM/SEC
BH CV ECHO MEAS - PULM A REVS DUR: 0.12 SEC
BH CV ECHO MEAS - PULM A REVS VEL: 35.5 CM/SEC
BH CV ECHO MEAS - PULM DIAS VEL: 42 CM/SEC
BH CV ECHO MEAS - PULM S/D: 1.2
BH CV ECHO MEAS - PULM SYS VEL: 50.2 CM/SEC
BH CV ECHO MEAS - RAP SYSTOLE: 3 MMHG
BH CV ECHO MEAS - RV MAX PG: 1.3 MMHG
BH CV ECHO MEAS - RV MEAN PG: 1 MMHG
BH CV ECHO MEAS - RV V1 MAX: 56.4 CM/SEC
BH CV ECHO MEAS - RV V1 MEAN: 39.5 CM/SEC
BH CV ECHO MEAS - RV V1 VTI: 12.7 CM
BH CV ECHO MEAS - RVSP: 26 MMHG
BH CV ECHO MEAS - SI(CUBED): 25.3 ML/M^2
BH CV ECHO MEAS - SI(LVOT): 35.6 ML/M^2
BH CV ECHO MEAS - SI(MOD-SP2): 18.5 ML/M^2
BH CV ECHO MEAS - SI(MOD-SP4): 16.6 ML/M^2
BH CV ECHO MEAS - SI(TEICH): 25.5 ML/M^2
BH CV ECHO MEAS - SV(CUBED): 53.4 ML
BH CV ECHO MEAS - SV(LVOT): 75.2 ML
BH CV ECHO MEAS - SV(MOD-SP2): 39 ML
BH CV ECHO MEAS - SV(MOD-SP4): 35 ML
BH CV ECHO MEAS - SV(TEICH): 53.8 ML
BH CV ECHO MEAS - TAPSE (>1.6): 2.6 CM
BH CV ECHO MEAS - TR MAX PG: 23 MMHG
BH CV ECHO MEAS - TR MAX VEL: 242 CM/SEC
BH CV ECHO MEASUREMENTS AVERAGE E/E' RATIO: 7.47
BH CV XLRA - RV BASE: 3.4 CM
BH CV XLRA - RV LENGTH: 6.8 CM
BH CV XLRA - RV MID: 2.7 CM
BH CV XLRA - TDI S': 12.6 CM/SEC
FERRITIN SERPL-MCNC: 128 NG/ML (ref 13–150)
FOLATE SERPL-MCNC: 11 NG/ML (ref 4.78–24.2)
HCYS SERPL-MCNC: 18.9 UMOL/L (ref 0–15)
HGB RETIC QN AUTO: 30 PG (ref 29.8–36.1)
IMM RETICS NFR: 21 % (ref 3–15.8)
INR PPP: 1.95 (ref 0.9–1.1)
IRON 24H UR-MRATE: 53 MCG/DL (ref 37–145)
IRON SATN MFR SERPL: 18 % (ref 20–50)
LEFT ATRIUM VOLUME INDEX: 21.8 ML/M2
LV EF 2D ECHO EST: 60 %
MAXIMAL PREDICTED HEART RATE: 139 BPM
PROTHROMBIN TIME: 22 SECONDS (ref 11.7–14.2)
RETICS # AUTO: 0.07 10*6/MM3 (ref 0.02–0.13)
RETICS/RBC NFR AUTO: 2.1 % (ref 0.7–1.9)
SARS-COV-2 ORF1AB RESP QL NAA+PROBE: NOT DETECTED
STRESS TARGET HR: 118 BPM
TIBC SERPL-MCNC: 288 MCG/DL (ref 298–536)
TRANSFERRIN SERPL-MCNC: 193 MG/DL (ref 200–360)
VIT B12 BLD-MCNC: 863 PG/ML (ref 211–946)

## 2021-04-21 PROCEDURE — 85613 RUSSELL VIPER VENOM DILUTED: CPT | Performed by: INTERNAL MEDICINE

## 2021-04-21 PROCEDURE — 86148 ANTI-PHOSPHOLIPID ANTIBODY: CPT | Performed by: INTERNAL MEDICINE

## 2021-04-21 PROCEDURE — 83520 IMMUNOASSAY QUANT NOS NONAB: CPT | Performed by: INTERNAL MEDICINE

## 2021-04-21 PROCEDURE — 94799 UNLISTED PULMONARY SVC/PX: CPT

## 2021-04-21 PROCEDURE — 93306 TTE W/DOPPLER COMPLETE: CPT | Performed by: INTERNAL MEDICINE

## 2021-04-21 PROCEDURE — 85306 CLOT INHIBIT PROT S FREE: CPT | Performed by: INTERNAL MEDICINE

## 2021-04-21 PROCEDURE — 82607 VITAMIN B-12: CPT | Performed by: INTERNAL MEDICINE

## 2021-04-21 PROCEDURE — 25010000002 FUROSEMIDE PER 20 MG: Performed by: INTERNAL MEDICINE

## 2021-04-21 PROCEDURE — 85610 PROTHROMBIN TIME: CPT | Performed by: HOSPITALIST

## 2021-04-21 PROCEDURE — 83090 ASSAY OF HOMOCYSTEINE: CPT | Performed by: INTERNAL MEDICINE

## 2021-04-21 PROCEDURE — 85705 THROMBOPLASTIN INHIBITION: CPT | Performed by: INTERNAL MEDICINE

## 2021-04-21 PROCEDURE — 86146 BETA-2 GLYCOPROTEIN ANTIBODY: CPT | Performed by: INTERNAL MEDICINE

## 2021-04-21 PROCEDURE — 99222 1ST HOSP IP/OBS MODERATE 55: CPT | Performed by: INTERNAL MEDICINE

## 2021-04-21 PROCEDURE — 94640 AIRWAY INHALATION TREATMENT: CPT

## 2021-04-21 PROCEDURE — 81240 F2 GENE: CPT | Performed by: INTERNAL MEDICINE

## 2021-04-21 PROCEDURE — 85300 ANTITHROMBIN III ACTIVITY: CPT | Performed by: INTERNAL MEDICINE

## 2021-04-21 PROCEDURE — 85302 CLOT INHIBIT PROT C ANTIGEN: CPT | Performed by: INTERNAL MEDICINE

## 2021-04-21 PROCEDURE — 85305 CLOT INHIBIT PROT S TOTAL: CPT | Performed by: INTERNAL MEDICINE

## 2021-04-21 PROCEDURE — 85732 THROMBOPLASTIN TIME PARTIAL: CPT | Performed by: INTERNAL MEDICINE

## 2021-04-21 PROCEDURE — 25010000002 ENOXAPARIN PER 10 MG: Performed by: HOSPITALIST

## 2021-04-21 PROCEDURE — 83540 ASSAY OF IRON: CPT | Performed by: INTERNAL MEDICINE

## 2021-04-21 PROCEDURE — 82746 ASSAY OF FOLIC ACID SERUM: CPT | Performed by: INTERNAL MEDICINE

## 2021-04-21 PROCEDURE — 85670 THROMBIN TIME PLASMA: CPT | Performed by: INTERNAL MEDICINE

## 2021-04-21 PROCEDURE — 93306 TTE W/DOPPLER COMPLETE: CPT

## 2021-04-21 PROCEDURE — 82728 ASSAY OF FERRITIN: CPT | Performed by: INTERNAL MEDICINE

## 2021-04-21 PROCEDURE — 86147 CARDIOLIPIN ANTIBODY EA IG: CPT | Performed by: INTERNAL MEDICINE

## 2021-04-21 PROCEDURE — 85303 CLOT INHIBIT PROT C ACTIVITY: CPT | Performed by: INTERNAL MEDICINE

## 2021-04-21 PROCEDURE — 85046 RETICYTE/HGB CONCENTRATE: CPT | Performed by: INTERNAL MEDICINE

## 2021-04-21 PROCEDURE — 81241 F5 GENE: CPT | Performed by: INTERNAL MEDICINE

## 2021-04-21 PROCEDURE — 84466 ASSAY OF TRANSFERRIN: CPT | Performed by: INTERNAL MEDICINE

## 2021-04-21 RX ADMIN — VALSARTAN 320 MG: 320 TABLET, FILM COATED ORAL at 09:08

## 2021-04-21 RX ADMIN — FUROSEMIDE 40 MG: 10 INJECTION, SOLUTION INTRAMUSCULAR; INTRAVENOUS at 11:54

## 2021-04-21 RX ADMIN — LACTULOSE 20 G: 20 SOLUTION ORAL at 09:08

## 2021-04-21 RX ADMIN — FERROUS SULFATE TAB 325 MG (65 MG ELEMENTAL FE) 325 MG: 325 (65 FE) TAB at 09:08

## 2021-04-21 RX ADMIN — PRAMIPEXOLE DIHYDROCHLORIDE 1.5 MG: 1.5 TABLET ORAL at 20:24

## 2021-04-21 RX ADMIN — FUROSEMIDE 40 MG: 10 INJECTION, SOLUTION INTRAMUSCULAR; INTRAVENOUS at 23:07

## 2021-04-21 RX ADMIN — IPRATROPIUM BROMIDE AND ALBUTEROL SULFATE 3 ML: 2.5; .5 SOLUTION RESPIRATORY (INHALATION) at 06:48

## 2021-04-21 RX ADMIN — Medication 1000 MCG: at 09:08

## 2021-04-21 RX ADMIN — CYCLOBENZAPRINE 10 MG: 10 TABLET, FILM COATED ORAL at 09:11

## 2021-04-21 RX ADMIN — SODIUM CHLORIDE, PRESERVATIVE FREE 10 ML: 5 INJECTION INTRAVENOUS at 09:11

## 2021-04-21 RX ADMIN — IPRATROPIUM BROMIDE AND ALBUTEROL SULFATE 3 ML: 2.5; .5 SOLUTION RESPIRATORY (INHALATION) at 14:23

## 2021-04-21 RX ADMIN — LEVOTHYROXINE SODIUM 100 MCG: 0.1 TABLET ORAL at 09:08

## 2021-04-21 RX ADMIN — ENOXAPARIN SODIUM 100 MG: 100 INJECTION SUBCUTANEOUS at 23:07

## 2021-04-21 RX ADMIN — BUDESONIDE AND FORMOTEROL FUMARATE DIHYDRATE 2 PUFF: 160; 4.5 AEROSOL RESPIRATORY (INHALATION) at 09:27

## 2021-04-21 RX ADMIN — DILTIAZEM HYDROCHLORIDE 120 MG: 120 CAPSULE, COATED, EXTENDED RELEASE ORAL at 09:08

## 2021-04-21 RX ADMIN — PANTOPRAZOLE SODIUM 40 MG: 40 TABLET, DELAYED RELEASE ORAL at 09:08

## 2021-04-21 RX ADMIN — CYCLOBENZAPRINE 10 MG: 10 TABLET, FILM COATED ORAL at 22:27

## 2021-04-21 RX ADMIN — LACTULOSE 20 G: 20 SOLUTION ORAL at 20:24

## 2021-04-21 RX ADMIN — ENOXAPARIN SODIUM 100 MG: 100 INJECTION SUBCUTANEOUS at 12:56

## 2021-04-21 RX ADMIN — BUDESONIDE AND FORMOTEROL FUMARATE DIHYDRATE 2 PUFF: 160; 4.5 AEROSOL RESPIRATORY (INHALATION) at 19:54

## 2021-04-21 NOTE — ED NOTES
Attempt to call report-RN receiving report unable to take it at this time.     Rina Hampton, RN  04/20/21 0460

## 2021-04-21 NOTE — CASE MANAGEMENT/SOCIAL WORK
Continued Stay Note  [facility]     Patient Name: Latanya Olsen  MRN: [id number]  Today's Date: 4/21/2021    Admit Date: 4/20/2021    Discharge Plan     Row Name 04/21/21 1117       Plan    Plan  SNF    Patient/Family in Agreement with Plan  yes    Plan Comments  Received call from Alicia who states pt had been at [facility]. Referral to [facility] placed in [facility].  CAROLINE Garcia RN        Discharge Codes    No documentation.             Yessenia Garcia, RN

## 2021-04-21 NOTE — PLAN OF CARE
Goal Outcome Evaluation:  Plan of Care Reviewed With: patient  Progress: improving  Outcome Summary: patient vss. no pain reported. dopplerable pulses ble. on lovenox currently. will monitor.

## 2021-04-21 NOTE — DISCHARGE PLACEMENT REQUEST
"Latanya Troncoso (80 y.o. Female)     Date of Birth Social Security Number Address Home Phone MRN    1940  [address] [place] [id number] [phone] [id number]    Sikh Marital Status          Latter day        Admission Date Admission Type Admitting Provider Attending Provider Department, Room/Bed    4/20/21 Emergency Chris Bear MD [place], Matthew Brett, MD [address], [address]    Discharge Date Discharge Disposition Discharge Destination                       Attending Provider: Rogerio Botello MD    Allergies: Doxycycline    Isolation: None   Infection: None   Code Status: No CPR    Ht: 167.6 cm (66\")   Wt: 103 kg (227 lb)    Admission Cmt: None   Principal Problem: Acute deep vein thrombosis (DVT) of tibial vein of left lower extremity (CMS/[facility]) [I82.442]                 Active Insurance as of 4/20/2021     Primary Coverage     Payor Plan Insurance Group Employer/Plan Group    MEDICARE MEDICARE A & B      Payor Plan Address Payor Plan Phone Number Payor Plan Fax Number Effective Dates    [address] [phone]  9/1/2004 - None Entered    [facility] [address]       Subscriber Name Subscriber Birth Date Member ID       LATANYA TRONCOSO 1940 [id number]           Secondary Coverage     Payor Plan Insurance Group Employer/Plan Group    AARP MC SUP AAR HEALTH CARE OPTIONS      Payor Plan Address Payor Plan Phone Number Payor Plan Fax Number Effective Dates    [facility] [phone]  1/1/2006 - None Entered    [address]       [facility] [address]       Subscriber Name Subscriber Birth Date Member ID       LATANYA TRONCOSO 1940 [id number]                 Emergency Contacts      (Rel.) Home Phone Work Phone Mobile Phone    JAJA (NIECE)SRAVANTHI (Relative) [phone] -- [phone]          "

## 2021-04-21 NOTE — CASE MANAGEMENT/SOCIAL WORK
Continued Stay Note  [facility]     Patient Name: Latanya Olsen  MRN: [id number]  Today's Date: 4/21/2021    Admit Date: 4/20/2021    Discharge Plan     Row Name 04/21/21 1411       Plan    Plan  [facility]    Patient/Family in Agreement with Plan  yes    Plan Comments  [facility] [place] [facility] pt can return and will have bed available.  CAROLINE Garcia RN    Row Name 04/21/21 1117       Plan    Plan  SNF    Patient/Family in Agreement with Plan  yes    Plan Comments  Received call from Alicia who states pt had been at [facility]. Referral to [facility] placed in Epic.  CAROLINE Garcia RN        Discharge Codes    No documentation.             Yessenia Garcia, RN

## 2021-04-21 NOTE — PROGRESS NOTES
Name: Latanya Olsen ADMIT: 2021   : 1940  PCP: Mary Perkins MD    MRN: [id number] LOS: 0 days   AGE/SEX: 80 y.o. female  ROOM: [facility]     Subjective   Subjective   No new complaints, leg is a little sore.  Feels okay    Review of Systems   Respiratory: Negative for shortness of breath.    Cardiovascular: Positive for leg swelling. Negative for chest pain.   All other systems reviewed and are negative.       Objective   Objective   Vital Signs  Temp:  [98.1 °F (36.7 °C)-98.6 °F (37 °C)] 98.2 °F (36.8 °C)  Heart Rate:  [76-96] 96  Resp:  [16-20] 16  BP: (133-160)/(55-89) 133/64  SpO2:  [92 %-98 %] 94 %  on   ;   Device (Oxygen Therapy): room air  Body mass index is 36.64 kg/m².  Physical Exam  Vitals and nursing note reviewed.   Constitutional:       General: She is not in acute distress.     Appearance: Normal appearance.   HENT:      Head: Normocephalic and atraumatic.      Mouth/Throat:      Mouth: Mucous membranes are moist.      Pharynx: No posterior oropharyngeal erythema.   Eyes:      General: No scleral icterus.  Neck:      Vascular: No JVD.   Cardiovascular:      Rate and Rhythm: Normal rate and regular rhythm.      Pulses: Normal pulses.      Heart sounds: Normal heart sounds. No murmur heard.     Pulmonary:      Effort: Pulmonary effort is normal. No respiratory distress.      Breath sounds: Normal breath sounds.   Abdominal:      General: Bowel sounds are normal. There is no distension.      Palpations: Abdomen is soft.      Tenderness: There is no abdominal tenderness.   Musculoskeletal:         General: No swelling or tenderness.      Cervical back: Neck supple.      Right lower leg: Edema present.      Left lower leg: Edema present.      Comments: 2+ bilateral, left greater than right   Skin:     General: Skin is warm and dry.      Coloration: Skin is not jaundiced.      Findings: No rash.   Neurological:      General: No focal deficit present.      Mental Status: She is alert  and oriented to person, place, and time.   Psychiatric:         Mood and Affect: Mood normal.         Behavior: Behavior normal.         Results Review     I reviewed the patient's new clinical results.  Results from last 7 days   Lab Units 04/20/21  1700   WBC 10*3/mm3 6.60   HEMOGLOBIN g/dL 10.9*   PLATELETS 10*3/mm3 234     Results from last 7 days   Lab Units 04/20/21  1700   SODIUM mmol/L 144   POTASSIUM mmol/L 3.7   CHLORIDE mmol/L 107   CO2 mmol/L 25.6   BUN mg/dL 20   CREATININE mg/dL 1.00   GLUCOSE mg/dL 97   Estimated Creatinine Clearance: 53.5 mL/min (by C-G formula based on SCr of 1 mg/dL).  Results from last 7 days   Lab Units 04/20/21  1700   ALBUMIN g/dL 4.00   BILIRUBIN mg/dL 0.2   ALK PHOS U/L 109   AST (SGOT) U/L 13   ALT (SGPT) U/L 13     Results from last 7 days   Lab Units 04/20/21  1700   CALCIUM mg/dL 9.5   ALBUMIN g/dL 4.00       COVID19   Date Value Ref Range Status   04/20/2021 Not Detected Not Detected - Ref. Range Final   03/24/2021 Not Detected Not Detected - Ref. Range Final     No results found for: HGBA1C, POCGLU    Duplex Venous Lower Extremity - Bilateral CAR  · Acute left lower extremity deep vein thrombosis noted in the posterial   tibial.  · Thrombus also present in tributary of left popliteal vein.  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  · All other veins appeared normal bilaterally.     CT Angiogram Chest  Narrative: CT ANGIOGRAM CHEST-     INDICATIONS: Possible pulmonary embolism.  Radiation dose reduction  techniques were utilized, including automated exposure control and  exposure modulation based on body size.     TECHNIQUE: CT angiography of the chest with three-dimensional  reconstructions.     COMPARISON: 08/04/2008     FINDINGS:     No pulmonary embolism. No aortic dissection.     The heart size is borderline without pericardial effusion. Small  pericardial recess fluid is seen, appearance is similar from  08/04/2008.  A few subcentimeter short axis mediastinal lymph nodes are seen that are  not significant by size criteria.     The airways appear clear.     No pleural effusion or pneumothorax.     The lungs show a small focus of reticulonodular density anteriorly in  the right upper lobe, about 1.4 cm on on axial image 38 is not seen on  the prior exam from 08/04/2008, potentially an area of infection,  correlate clinically, 3 month follow-up CT recommended to exclude any  possibility of an underlying lesion. Mild dependent atelectasis is  present.     Upper abdominal structures appear unremarkable.     Degenerative changes are seen in the spine. Sternal foramen is noted. No  acute fracture is identified.     Impression:    No pulmonary embolism. Indeterminate density anteriorly in the right  upper lobe, clinical correlation and follow-up recommended.     This report was finalized on 4/20/2021 8:11 PM by Dr. Gregory Baptiste M.D.     XR Chest 2 View  Narrative: XR CHEST 2 VW-     HISTORY: Female who is 80 years-old,  short of breath     TECHNIQUE: Frontal and lateral views of the chest     COMPARISON: 03/06/2021     FINDINGS: The heart is mildly enlarged. Aorta is tortuous, calcified.  Pulmonary vasculature is unremarkable. No focal pulmonary consolidation,  pleural effusion, or pneumothorax. Likely bone island the peripheral  right lower rib in correlation with CT from 03/10/2021. No acute osseous  process.     Impression: No focal pulmonary consolidation. Mild cardiomegaly.  Tortuous aorta. Follow-up as clinically indicated.     This report was finalized on 4/20/2021 3:16 PM by Dr. Gregory Baptiste M.D.       Scheduled Medications  budesonide-formoterol, 2 puff, Inhalation, BID - RT  dilTIAZem CD, 120 mg, Oral, Q24H  enoxaparin, 1 mg/kg, Subcutaneous, Q12H  ferrous sulfate, 325 mg, Oral, Daily With Breakfast  furosemide, 40 mg, Intravenous, Q12H  ipratropium-albuterol, 3 mL, Nebulization, Q6H While Awake -  RT  lactulose, 20 g, Oral, BID  levothyroxine, 100 mcg, Oral, Q AM  pantoprazole, 40 mg, Oral, QAM  pramipexole, 1.5 mg, Oral, Nightly  valsartan, 320 mg, Oral, Q24H  vitamin B-12, 1,000 mcg, Oral, Daily    Infusions  Pharmacy to Dose enoxaparin (LOVENOX),     Diet  Diet Regular       Assessment/Plan     Active Hospital Problems    Diagnosis  POA   • **Acute deep vein thrombosis (DVT) of tibial vein of left lower extremity (CMS/HCC) [I82.442]  Yes   • Atrial fibrillation (CMS/HCC) [I48.91]  Unknown   • Hyperlipidemia [E78.5]  Yes   • Hypertension [I10]  Yes   • Hypothyroidism [E03.9]  Yes   • 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       80 y.o. female recently diagnosed with DVT of the left lower extremity, status post thrombectomy and right iliac vein stent placement 3/26, now with new left lower extremity DVT despite being on warfarin    · Extensive review of records.  Also discussed with patient.  I do not think she was ever consistently therapeutic on the warfarin.  She only had one therapeutic INR here prior to discharge (bridged with heparin) and she says that the last 2 she remembers at the [facility] facility were only around 1.6.  · Discussed with Dr. Scruggs.  At this point I see no contraindication to Eliquis.  She has obviously been very difficult to keep therapeutic on warfarin and perhaps Eliquis would be a better option for her.  Lovenox will continue today and anticipate starting Eliquis tomorrow if labs are stable.  · Follow-up hypercoagulable work-up ordered by CBC  · Disposition: Anticipate being able to go back home in the next day or 2 with Eliquis and possibly home health.        Rogerio Botello MD  [facility]  04/21/21  12:46 EDT

## 2021-04-21 NOTE — PROGRESS NOTES
Pharmacy consult for Lovenox dosing    Latanya Olsen is a 80 y.o. female 103 kg (227 lb).  Pharmacy consulted to dose for acute DVT of LLE per Dr Hawkins's request.       Estimated Creatinine Clearance: 53.5 mL/min (by C-G formula based on SCr of 1 mg/dL).  Creatinine   Date Value Ref Range Status   04/20/2021 1.00 0.57 - 1.00 mg/dL Final     Platelets   Date Value Ref Range Status   04/20/2021 234 140 - 450 10*3/mm3 Final     Lab Results   Component Value Date    HGB 10.9 (L) 04/20/2021    HGB 9.0 (L) 04/03/2021    HGB 10.9 (L) 04/02/2021     Lab Results   Component Value Date    INR 2.11 (H) 04/20/2021     Assessment:   Patient was recently discharged from [facility] in March of 2021 and had an acute DVT of iliac of of LLE which was treated with warfarin w/ Lovenox bridge. Patient presented to our ED last evening from [facility] with complaint of increased pain and swelling to her lower extremities. Noted that she started having SOB with exertion 2 days prior to presenting. Duplex venous scan this visit showed acute DVT of tibial vein on LLE, patient was subsequently given a dose of Lovenox 100 mg subq x 1 dose in the ED last night @2125. Of note, she had a therapeutic INR of 2.11 in the ED.     PLAN:  1. Will start Lovenox 100 mg subQ  Q12H.    2. Pharmacy will discontinue the Pharmacy to Dose consult at this time. Renal function will continue to be monitored and dosing adjustments will be made by pharmacy based on renal function if necessary.    Jodie Solis, Pharm.D.  04/21/21 10:50 EDT

## 2021-04-21 NOTE — PLAN OF CARE
Goal Outcome Evaluation:         VSS, pt from ED with LLE DVT, chronic thrombophlebitis L SFJ, on Coumadin, given Lovenox in ED. BLE 4+ pitting edema, IV lasix given, purewick in place. BLE flushed, red, elevated. Consult to [facility] placed. CXR showed pulmonary consolidation CTA unremarkable. Pt ambulates with walker at baseline.

## 2021-04-21 NOTE — H&P
Patient Name:  Latanya Olsen  YOB: 1940  MRN:  [id number]  Admit Date:  4/20/2021  Patient Care Team:  Mary Perkins MD as PCP - General (Family Medicine)      Subjective   History Present Illness     Chief Complaint   Patient presents with   • Leg Swelling   • Shortness of Breath     Patient is 80-year-old female with known history of hypertension, hyperlipidemia, hypothyroidism was recently diagnosed with a left leg DVT and was placed on warfarin.  She was just discharged 2 days from a rehab facility and she started noticing worsening right leg swelling as well as pain therefore decided to come to the emergency room today.  In the ER venous Dopplers were suggestive of DVT in the right leg as well while being on warfarin and while INR is therapeutic and given the above she is being hospitalized.  Of note patient denies any PND, orthopnea, dyspnea on exertion.  CTA of the chest with no evidence of any pulmonary embolism.        History of Present Illness  Review of Systems   Constitutional: Positive for activity change. Negative for chills, fatigue and fever.   HENT: Negative for congestion, dental problem, ear discharge, ear pain, facial swelling, hearing loss, nosebleeds, rhinorrhea and sinus pain.    Eyes: Negative for photophobia, pain, discharge and visual disturbance.   Respiratory: Negative for apnea, cough, choking, chest tightness, shortness of breath and stridor.    Cardiovascular: Positive for leg swelling. Negative for chest pain and palpitations.   Gastrointestinal: Negative for abdominal distention, abdominal pain, constipation, nausea and vomiting.   Endocrine: Negative for cold intolerance, heat intolerance, polyphagia and polyuria.   Genitourinary: Negative for difficulty urinating, dysuria, flank pain and hematuria.   Musculoskeletal: Negative for arthralgias, back pain, myalgias, neck pain and neck stiffness.   Skin: Negative for color change, rash and wound.    Allergic/Immunologic: Negative for environmental allergies and food allergies.   Neurological: Positive for weakness. Negative for dizziness, tremors, seizures, syncope, facial asymmetry and numbness.   Psychiatric/Behavioral: Negative for agitation, behavioral problems, confusion, decreased concentration, hallucinations and self-injury. The patient is not nervous/anxious.         Personal History     Past Medical History:   Diagnosis Date   • Anemia    • Arthritis    • Atrial fibrillation (CMS/HCC)    • Constipation    • COPD (chronic obstructive pulmonary disease) (CMS/HCC)    • Diverticulosis    • GERD (gastroesophageal reflux disease)    • Hx of degenerative disc disease    • Hyperlipidemia    • Hypertension    • Hypokalemia    • Hypothyroidism    • Knee swelling    • Lung cancer (CMS/HCC)     history   • Renal disorder    • Restless leg syndrome    • Sleep apnea with use of continuous positive airway pressure (CPAP)      Past Surgical History:   Procedure Laterality Date   • BUNIONECTOMY Bilateral    • CATARACT EXTRACTION     • CHOLECYSTECTOMY     • COLONOSCOPY     • ENDOSCOPY N/A 6/24/2016    Procedure: ESOPHAGOGASTRODUODENOSCOPY  with biopsies;  Surgeon: Von Hernández MD;  Location: [facility];  Service:    • LUNG LOBECTOMY Left     lower lobe   • LYTIC THROMBIN THERAPY Left 3/26/2021    Procedure: left leg clot treiver possible venous stent *supine*;  Surgeon: Rogerio Vega MD;  Location: [facility] OR 18/19;  Service: Vascular;  Laterality: Left;   • REPLACEMENT TOTAL KNEE Left    • THYROID BIOPSY     • TOTAL HIP ARTHROPLASTY Right 6/1/2019    Procedure: BIPOLAR HIP CEMENTED POSTERIOR;  Surgeon: Ashley Crenshaw MD;  Location: [facility];  Service: Orthopedics     Family History   Problem Relation Age of Onset   • Heart attack Mother    • Coronary artery disease Mother    • Hypertension Mother    • Heart attack Sister    • Colon cancer Neg Hx    • Colon polyps Neg Hx    •  Esophageal cancer Neg Hx      Social History     Tobacco Use   • Smoking status: Never Smoker   • Smokeless tobacco: Never Used   Substance Use Topics   • Alcohol use: No   • Drug use: Defer     No current facility-administered medications on file prior to encounter.     Current Outpatient Medications on File Prior to Encounter   Medication Sig Dispense Refill   • acetaminophen (TYLENOL) 325 MG tablet Take 2 tablets by mouth Every 4 (Four) Hours As Needed for Mild Pain .     • cyclobenzaprine (FLEXERIL) 10 MG tablet Take 1 tablet by mouth 2 (Two) Times a Day As Needed for Muscle Spasms. 14 tablet 0   • esomeprazole (NexIUM) 40 MG capsule Take 40 mg by mouth every morning before breakfast.     • ferrous sulfate 325 (65 FE) MG tablet Take 325 mg by mouth Daily With Breakfast.     • furosemide (LASIX) 40 MG tablet TAKE ONE TABLET BY MOUTH EVERY MORNING FOR FLUID  0   • ipratropium-albuterol (DUO-NEB) 0.5-2.5 mg/mL nebulizer USE 1 VIAL IN NEBULIZER 2 TIMES DAILY. Generic: DUONEB 60 mL 10   • lactulose (CHRONULAC) 10 GM/15ML solution TAKE 15 ML BY MOUTH 2 3 TIMES A DAY AS NEEDED FOR CONSTIPATION     • levothyroxine (SYNTHROID, LEVOTHROID) 100 MCG tablet Take 1 tablet by mouth Daily.     • linaclotide (LINZESS) 145 MCG capsule capsule Take 145 mcg by mouth Every Morning Before Breakfast.     • melatonin 1 MG tablet Take 3 mg by mouth.     • pramipexole (MIRAPEX) 1.5 MG tablet Take 1.5 mg by mouth every night at bedtime.     • sennosides-docusate (PERICOLACE) 8.6-50 MG per tablet Take 2 tablets by mouth 2 (Two) Times a Day.     • SYMBICORT 160-4.5 MCG/ACT inhaler      • Tiadylt  MG 24 hr capsule TAKE ONE CAPSULE BY MOUTH DAILY FOR BLOOD PRESSURE     • valsartan (DIOVAN) 320 MG tablet Take 1 tablet by mouth Daily.     • vitamin B-12 (CYANOCOBALAMIN) 1000 MCG tablet Take 1,000 mcg by mouth Daily.     • warfarin (Coumadin) 7.5 MG tablet Take 1 tablet by mouth Every Night.       Allergies   Allergen Reactions   •  Doxycycline Anaphylaxis     Facial swelling, shortness of air, dizzines       Objective    Objective     Vital Signs  Temp:  [98.1 °F (36.7 °C)-98.6 °F (37 °C)] 98.6 °F (37 °C)  Heart Rate:  [76-90] 81  Resp:  [17-20] 18  BP: (143-160)/(55-89) 147/70  SpO2:  [94 %-98 %] 96 %  on   ;   Device (Oxygen Therapy): room air  Body mass index is 36.74 kg/m².    Physical Exam  Constitutional:       General: She is not in acute distress.     Appearance: Normal appearance. She is obese.   HENT:      Head: Normocephalic and atraumatic.      Right Ear: External ear normal.      Left Ear: External ear normal.      Nose: Nose normal.      Mouth/Throat:      Mouth: Mucous membranes are moist.   Eyes:      Extraocular Movements: Extraocular movements intact.      Conjunctiva/sclera: Conjunctivae normal.      Pupils: Pupils are equal, round, and reactive to light.   Cardiovascular:      Rate and Rhythm: Normal rate and regular rhythm.      Pulses: Normal pulses.      Heart sounds: Normal heart sounds.   Pulmonary:      Effort: Pulmonary effort is normal. No respiratory distress.      Breath sounds: Normal breath sounds.   Abdominal:      General: Bowel sounds are normal. There is no distension.      Palpations: Abdomen is soft.      Tenderness: There is no abdominal tenderness.   Musculoskeletal:         General: Swelling present. No tenderness. Normal range of motion.      Cervical back: Normal range of motion and neck supple. No rigidity.      Right lower leg: Edema present.      Left lower leg: Edema present.   Skin:     General: Skin is warm and dry.      Coloration: Skin is not jaundiced.   Neurological:      General: No focal deficit present.      Mental Status: She is alert and oriented to person, place, and time. Mental status is at baseline.      Cranial Nerves: No cranial nerve deficit.   Psychiatric:         Mood and Affect: Mood normal.         Thought Content: Thought content normal.         Judgment: Judgment normal.          Results Review:  I reviewed the patient's new clinical results.  I reviewed the patient's new imaging results and agree with the interpretation.  I reviewed the patient's other test results and agree with the interpretation  I personally viewed and interpreted the patient's EKG/Telemetry data  Discussed with ED provider.    Lab Results (last 24 hours)     Procedure Component Value Units Date/Time    CBC & Differential [[id number]]  (Abnormal) Collected: 04/20/21 1700    Specimen: Blood Updated: 04/20/21 1717    Narrative:      The following orders were created for panel order CBC & Differential.  Procedure                               Abnormality         Status                     ---------                               -----------         ------                     CBC Auto Differential[[id number]]        Abnormal            Final result                 Please view results for these tests on the individual orders.    Comprehensive Metabolic Panel [[id number]]  (Abnormal) Collected: 04/20/21 1700    Specimen: Blood Updated: 04/20/21 1736     Glucose 97 mg/dL      BUN 20 mg/dL      Creatinine 1.00 mg/dL      Sodium 144 mmol/L      Potassium 3.7 mmol/L      Chloride 107 mmol/L      CO2 25.6 mmol/L      Calcium 9.5 mg/dL      Total Protein 6.1 g/dL      Albumin 4.00 g/dL      ALT (SGPT) 13 U/L      AST (SGOT) 13 U/L      Alkaline Phosphatase 109 U/L      Total Bilirubin 0.2 mg/dL      eGFR Non African Amer 53 mL/min/1.73      Globulin 2.1 gm/dL      A/G Ratio 1.9 g/dL      BUN/Creatinine Ratio 20.0     Anion Gap 11.4 mmol/L     Narrative:      GFR Normal >60  Chronic Kidney Disease <60  Kidney Failure <15      BNP [[id number]]  (Normal) Collected: 04/20/21 1700    Specimen: Blood Updated: 04/20/21 1734     proBNP 632.1 pg/mL     Narrative:      Among patients with dyspnea, NT-proBNP is highly sensitive for the detection of acute congestive heart failure. In addition NT-proBNP of <300 pg/ml effectively rules out  acute congestive heart failure with 99% negative predictive value.    Results may be falsely decreased if patient taking Biotin.      Troponin [[id number]]  (Normal) Collected: 04/20/21 1700    Specimen: Blood Updated: 04/20/21 1735     Troponin T <0.010 ng/mL     Narrative:      Troponin T Reference Range:  <= 0.03 ng/mL-   Negative for AMI  >0.03 ng/mL-     Abnormal for myocardial necrosis.  Clinicians would have to utilize clinical acumen, EKG, Troponin and serial changes to determine if it is an Acute Myocardial Infarction or myocardial injury due to an underlying chronic condition.       Results may be falsely decreased if patient taking Biotin.      CBC Auto Differential [[id number]]  (Abnormal) Collected: 04/20/21 1700    Specimen: Blood Updated: 04/20/21 1717     WBC 6.60 10*3/mm3      RBC 3.67 10*6/mm3      Hemoglobin 10.9 g/dL      Hematocrit 33.0 %      MCV 89.9 fL      MCH 29.7 pg      MCHC 33.0 g/dL      RDW 15.1 %      RDW-SD 48.3 fl      MPV 9.7 fL      Platelets 234 10*3/mm3      Neutrophil % 54.2 %      Lymphocyte % 34.1 %      Monocyte % 8.0 %      Eosinophil % 2.0 %      Basophil % 0.5 %      Immature Grans % 1.2 %      Neutrophils, Absolute 3.58 10*3/mm3      Lymphocytes, Absolute 2.25 10*3/mm3      Monocytes, Absolute 0.53 10*3/mm3      Eosinophils, Absolute 0.13 10*3/mm3      Basophils, Absolute 0.03 10*3/mm3      Immature Grans, Absolute 0.08 10*3/mm3      nRBC 0.0 /100 WBC     Protime-INR [[id number]]  (Abnormal) Collected: 04/20/21 1701    Specimen: Blood Updated: 04/20/21 1753     Protime 23.4 Seconds      INR 2.11    COVID PRE-OP / PRE-PROCEDURE SCREENING ORDER (NO ISOLATION) - Swab, Nasopharynx [[id number]] Collected: 04/20/21 2123    Specimen: Swab from Nasopharynx Updated: 04/20/21 2153    Narrative:      The following orders were created for panel order COVID PRE-OP / PRE-PROCEDURE SCREENING ORDER (NO ISOLATION) - Swab, Nasopharynx.  Procedure                               Abnormality          Status                     ---------                               -----------         ------                     COVID-19,APTIMA PANTHER,...[[id number]]                      In process                   Please view results for these tests on the individual orders.    COVID-19,APTIMA PANTHER,[facility]SE, NP/OP SWAB IN UTM/VTM/[facility]ALINE TRANSPORT MEDIA,24 HR TAT - Swab, Nasopharynx [[id number]] Collected: 04/20/21 2123    Specimen: Swab from Nasopharynx Updated: 04/20/21 2153          Imaging Results (Last 24 Hours)     Procedure Component Value Units Date/Time    CT Angiogram Chest [[id number]] Collected: 04/20/21 2001     Updated: 04/20/21 2014    Narrative:      CT ANGIOGRAM CHEST-     INDICATIONS: Possible pulmonary embolism.  Radiation dose reduction  techniques were utilized, including automated exposure control and  exposure modulation based on body size.     TECHNIQUE: CT angiography of the chest with three-dimensional  reconstructions.     COMPARISON: 08/04/2008     FINDINGS:     No pulmonary embolism. No aortic dissection.     The heart size is borderline without pericardial effusion. Small  pericardial recess fluid is seen, appearance is similar from 08/04/2008.  A few subcentimeter short axis mediastinal lymph nodes are seen that are  not significant by size criteria.     The airways appear clear.     No pleural effusion or pneumothorax.     The lungs show a small focus of reticulonodular density anteriorly in  the right upper lobe, about 1.4 cm on on axial image 38 is not seen on  the prior exam from 08/04/2008, potentially an area of infection,  correlate clinically, 3 month follow-up CT recommended to exclude any  possibility of an underlying lesion. Mild dependent atelectasis is  present.     Upper abdominal structures appear unremarkable.     Degenerative changes are seen in the spine. Sternal foramen is noted. No  acute fracture is identified.       Impression:         No pulmonary embolism.  Indeterminate density anteriorly in the right  upper lobe, clinical correlation and follow-up recommended.     This report was finalized on 4/20/2021 8:11 PM by Dr. Gregory Baptiste M.D.       XR Chest 2 View [[id number]] Collected: 04/20/21 1513     Updated: 04/20/21 1519    Narrative:      XR CHEST 2 VW-     HISTORY: Female who is 80 years-old,  short of breath     TECHNIQUE: Frontal and lateral views of the chest     COMPARISON: 03/06/2021     FINDINGS: The heart is mildly enlarged. Aorta is tortuous, calcified.  Pulmonary vasculature is unremarkable. No focal pulmonary consolidation,  pleural effusion, or pneumothorax. Likely bone island the peripheral  right lower rib in correlation with CT from 03/10/2021. No acute osseous  process.       Impression:      No focal pulmonary consolidation. Mild cardiomegaly.  Tortuous aorta. Follow-up as clinically indicated.     This report was finalized on 4/20/2021 3:16 PM by Dr. Gregory Baptiste M.D.             Results for orders placed during the hospital encounter of 03/18/19    Adult Transthoracic Echo Complete W/ Cont if Necessary Per Protocol    Interpretation Summary  · Calculated EF = 63.0%.  · Left ventricular systolic function is normal.  · Left ventricular diastolic dysfunction (grade I) consistent with impaired relaxation.  · Mild mitral valve regurgitation is present  · Mild tricuspid valve regurgitation is present.  · Estimated right ventricular systolic pressure from tricuspid regurgitation is normal (<35 mmHg).      ECG 12 Lead   Final Result   HEART RATE= 80  bpm   RR Interval= 752  ms   CT Interval= 166  ms   P Horizontal Axis= -4  deg   P Front Axis= 171  deg   QRSD Interval= 116  ms   QT Interval= 376  ms   QRS Axis= -31  deg   T Wave Axis= -22  deg   - ABNORMAL ECG -   Ectopic atrial rhythm - new    Incomplete right bundle branch block   Electronically Signed By: Mireya Park ([facility]) 20-Apr-2021 20:08:33   Date and Time of Study: 2021-04-20  16:52:47           Assessment/Plan     Active Hospital Problems    Diagnosis  POA   • **Acute deep vein thrombosis (DVT) of tibial vein of left lower extremity (CMS/HCC) [I82.442]  Yes   • Atrial fibrillation (CMS/HCC) [I48.91]  Unknown   • Hyperlipidemia [E78.5]  Yes   • Hypertension [I10]  Yes   • Hypothyroidism [E03.9]  Yes   • 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       1.Acute right leg DVT with known history of recent left leg DVT, patient is being initiated on Lovenox given that she developed this right leg DVT while INR is therapeutic, and is on warfarin.  Hematology consult will also be obtained.  2.  Hypertension, continue with Cardizem as well as valsartan.  3.  Hyperlipidemia, on statins.  4.  Hypothyroidism, on Synthroid.  5.  Lower extremity edema, 2D echo will be obtained and patient will be diuresed cautiously and ins and outs will be monitored closely.  6.  History of carcinoid tumor of the lung, underwent resection and currently in remission according to the patient.  7.  CODE STATUS is full code.       Chris Bear MD  [facility]  04/20/21  22:27 EDT

## 2021-04-22 ENCOUNTER — TRANSCRIBE ORDERS (OUTPATIENT)
Dept: ADMINISTRATIVE | Facility: HOSPITAL | Age: 81
End: 2021-04-22

## 2021-04-22 VITALS
RESPIRATION RATE: 20 BRPM | HEIGHT: 66 IN | SYSTOLIC BLOOD PRESSURE: 139 MMHG | TEMPERATURE: 98 F | HEART RATE: 92 BPM | DIASTOLIC BLOOD PRESSURE: 65 MMHG | OXYGEN SATURATION: 97 % | BODY MASS INDEX: 36.48 KG/M2 | WEIGHT: 227 LBS

## 2021-04-22 DIAGNOSIS — M51.36 DDD (DEGENERATIVE DISC DISEASE), LUMBAR: Primary | ICD-10-CM

## 2021-04-22 LAB
BASOPHILS # BLD AUTO: 0.03 10*3/MM3 (ref 0–0.2)
BASOPHILS NFR BLD AUTO: 0.5 % (ref 0–1.5)
CARDIOLIPIN IGG SER IA-ACNC: 13 GPL U/ML (ref 0–14)
CARDIOLIPIN IGM SER IA-ACNC: <9 MPL U/ML (ref 0–12)
DEPRECATED RDW RBC AUTO: 49.6 FL (ref 37–54)
EOSINOPHIL # BLD AUTO: 0.16 10*3/MM3 (ref 0–0.4)
EOSINOPHIL NFR BLD AUTO: 2.7 % (ref 0.3–6.2)
ERYTHROCYTE [DISTWIDTH] IN BLOOD BY AUTOMATED COUNT: 15.1 % (ref 12.3–15.4)
HCT VFR BLD AUTO: 33 % (ref 34–46.6)
HGB BLD-MCNC: 10.9 G/DL (ref 12–15.9)
IMM GRANULOCYTES # BLD AUTO: 0.06 10*3/MM3 (ref 0–0.05)
IMM GRANULOCYTES NFR BLD AUTO: 1 % (ref 0–0.5)
LYMPHOCYTES # BLD AUTO: 2.46 10*3/MM3 (ref 0.7–3.1)
LYMPHOCYTES NFR BLD AUTO: 41.4 % (ref 19.6–45.3)
MCH RBC QN AUTO: 29.8 PG (ref 26.6–33)
MCHC RBC AUTO-ENTMCNC: 33 G/DL (ref 31.5–35.7)
MCV RBC AUTO: 90.2 FL (ref 79–97)
MONOCYTES # BLD AUTO: 0.47 10*3/MM3 (ref 0.1–0.9)
MONOCYTES NFR BLD AUTO: 7.9 % (ref 5–12)
NEUTROPHILS NFR BLD AUTO: 2.76 10*3/MM3 (ref 1.7–7)
NEUTROPHILS NFR BLD AUTO: 46.5 % (ref 42.7–76)
NRBC BLD AUTO-RTO: 0 /100 WBC (ref 0–0.2)
PLATELET # BLD AUTO: 221 10*3/MM3 (ref 140–450)
PMV BLD AUTO: 10.1 FL (ref 6–12)
RBC # BLD AUTO: 3.66 10*6/MM3 (ref 3.77–5.28)
WBC # BLD AUTO: 5.94 10*3/MM3 (ref 3.4–10.8)

## 2021-04-22 PROCEDURE — 94799 UNLISTED PULMONARY SVC/PX: CPT

## 2021-04-22 PROCEDURE — 25010000002 FUROSEMIDE PER 20 MG: Performed by: INTERNAL MEDICINE

## 2021-04-22 PROCEDURE — 85025 COMPLETE CBC W/AUTO DIFF WBC: CPT | Performed by: INTERNAL MEDICINE

## 2021-04-22 PROCEDURE — 99232 SBSQ HOSP IP/OBS MODERATE 35: CPT | Performed by: INTERNAL MEDICINE

## 2021-04-22 RX ORDER — FOLIC ACID 1 MG/1
1 TABLET ORAL DAILY
Status: DISCONTINUED | OUTPATIENT
Start: 2021-04-22 | End: 2021-04-22 | Stop reason: HOSPADM

## 2021-04-22 RX ORDER — FOLIC ACID 1 MG/1
1 TABLET ORAL DAILY
Qty: 30 TABLET | Refills: 1 | Status: SHIPPED | OUTPATIENT
Start: 2021-04-22 | End: 2021-05-14 | Stop reason: SDUPTHER

## 2021-04-22 RX ADMIN — BUDESONIDE AND FORMOTEROL FUMARATE DIHYDRATE 2 PUFF: 160; 4.5 AEROSOL RESPIRATORY (INHALATION) at 09:38

## 2021-04-22 RX ADMIN — FERROUS SULFATE TAB 325 MG (65 MG ELEMENTAL FE) 325 MG: 325 (65 FE) TAB at 08:01

## 2021-04-22 RX ADMIN — LACTULOSE 20 G: 20 SOLUTION ORAL at 08:01

## 2021-04-22 RX ADMIN — VALSARTAN 320 MG: 320 TABLET, FILM COATED ORAL at 08:01

## 2021-04-22 RX ADMIN — APIXABAN 10 MG: 5 TABLET, FILM COATED ORAL at 09:32

## 2021-04-22 RX ADMIN — IPRATROPIUM BROMIDE AND ALBUTEROL SULFATE 3 ML: 2.5; .5 SOLUTION RESPIRATORY (INHALATION) at 06:48

## 2021-04-22 RX ADMIN — IPRATROPIUM BROMIDE AND ALBUTEROL SULFATE 3 ML: 2.5; .5 SOLUTION RESPIRATORY (INHALATION) at 11:14

## 2021-04-22 RX ADMIN — Medication 1000 MCG: at 08:01

## 2021-04-22 RX ADMIN — FUROSEMIDE 40 MG: 10 INJECTION, SOLUTION INTRAMUSCULAR; INTRAVENOUS at 11:35

## 2021-04-22 RX ADMIN — PANTOPRAZOLE SODIUM 40 MG: 40 TABLET, DELAYED RELEASE ORAL at 06:15

## 2021-04-22 RX ADMIN — LEVOTHYROXINE SODIUM 100 MCG: 0.1 TABLET ORAL at 06:15

## 2021-04-22 RX ADMIN — DILTIAZEM HYDROCHLORIDE 120 MG: 120 CAPSULE, COATED, EXTENDED RELEASE ORAL at 08:01

## 2021-04-22 NOTE — PLAN OF CARE
Goal Outcome Evaluation:  Plan of Care Reviewed With: patient  Progress: improving  Outcome Summary: VSS, no pain reported. Pulses dopplerable. Switched to PO eliquis. Plan to DC home today. Will continue to monitor.

## 2021-04-22 NOTE — CASE MANAGEMENT/SOCIAL WORK
Case Management Discharge Note      Final Note: Met with pt at bedside who states she plans to return home at discharge with [facility].  Pt is current with [facility], confirmed with Cristiane.  No further needs identified at this time.  CAROLINE Garcia RN         Selected Continued Care - Admitted Since 4/20/2021     Destination    No services have been selected for the patient.              Durable Medical Equipment    No services have been selected for the patient.              Dialysis/Infusion    No services have been selected for the patient.              Home Medical Care Coordination complete    Service Provider Selected Services Address Phone Fax Patient Preferred    [facility] [address] [place] [id number] [phone] --          Therapy    No services have been selected for the patient.              Community Resources    No services have been selected for the patient.                Selected Continued Care - Prior Encounters Includes selections from prior encounters from 1/20/2021 to 4/22/2021    Discharged on 4/3/2021 Admission date: 3/24/2021 - Discharge disposition: Skilled Nursing Facility (DC - External)    Destination     Service Provider Selected Services Address Phone Fax Patient Preferred    [facility]  Skilled Nursing [address] [facility] [address] [phone] --                Discharged on 3/12/2021 Admission date: 3/6/2021 - Discharge disposition: Skilled Nursing Facility (DC - External)    Destination     Service Provider Selected Services Address Phone Fax Patient Preferred    [facility]  Skilled Nursing [address] [facility] [address] [phone] --                    Transportation Services  Private: Car    Final Discharge Disposition Code: 06 - home with home health care

## 2021-04-22 NOTE — PROGRESS NOTES
[facility] GROUP INPATIENT PROGRESS NOTE    Length of Stay:  1 days    CHIEF COMPLAINT/REASON FOR VISIT:  Previous extensive left lower extremity DVT which required clot retrieval and venous stent with vascular surgery in March 2021.  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 female who we are asked to see today in consultation for the above-noted issues.  She had been discharged from the hospital 4/3/2021 after hospitalization for extensive left lower extremity DVT.  On her prior admission she had undergone thrombectomy and clot retrieval by vascular surgery and also had a venous stent placed.  She was transitioned from IV heparin to Coumadin and discharged to a skilled nursing facility.  She recently had been discharged from the skilled nursing facility but developed worsening pain in the leg and presented to the emergency room yesterday.     Repeat Doppler of the legs in the ER showed acute left lower extremity DVT in the posterior tibial vein and in a tributary of the left popliteal vein.  There was chronic left lower extremity superficial thrombophlebitis noted in the saphenofemoral junction.  All other veins were normal bilaterally.     Her INR on admission was 2.1 and the discharge INR from 4/3/2021 was 2.03.  Patient was started on 1 mg/kg subcu Lovenox every 12 hours with a total dose of 100 mg subcu every 12 hours.     Upon reviewing the records from her last hospitalization we do not see that she underwent a thrombophilic evaluation and in this setting I think it probably is reasonable to proceed with a complete hypercoagulable work-up prior to making recommendations on her further long-term anticoagulation.  She may be a good candidate for Eliquis instead of Coumadin.    INTERVAL HISTORY:  4/22/2021  Afebrile and vital signs stable.   Iron 53, Ferritin 128, Iron Saturation 18, Transferrin  198, TIBC 288, Folate 11, Vitamin B 863  PT/INR 22 and 1.95  Hypercoagulable labs pending  Hgb stable at 10.9, platelet count normal   Occult stool order placed today      ROS:  Constitutional: Negative for activity change, chills, fatigue and fever.   HENT: Negative for mouth sores, trouble swallowing and voice change.    Eyes: Negative for pain and visual disturbance.   Respiratory: Negative for cough, shortness of breath and wheezing.    Cardiovascular: Positive for leg swelling. Negative for chest pain and palpitations.   Gastrointestinal: Negative for abdominal pain, constipation, diarrhea, nausea and vomiting.   Genitourinary: Negative for difficulty urinating, frequency and urgency.   Musculoskeletal: Negative for arthralgias and joint swelling.        Dependent edema of the left lower extremity most likely due to venous insufficiency   Skin: Negative for rash.        Erythema of the skin on the left lower extremity most likely due to venous insufficiency   Neurological: Negative for dizziness, seizures, weakness and headaches.   Hematological: Negative for adenopathy. Does not bruise/bleed easily.   Psychiatric/Behavioral: Negative for behavioral problems and confusion. The patient is not nervous/anxious.      Review of Systems     OBJECTIVE:  Vitals:    04/21/21 1957 04/21/21 2254 04/22/21 0648 04/22/21 0700   BP:  138/67  134/66   BP Location:  Right arm  Left arm   Patient Position:  Lying  Lying   Pulse: 83 73 76 76   Resp: 16 17 16 16   Temp:  97.8 °F (36.6 °C)  97.5 °F (36.4 °C)   TempSrc:  Oral  Oral   SpO2: 99% 94% 97% 95%   Weight:       Height:             PHYSICAL EXAMINATION:  Constitutional:       General: She is not in acute distress.     Appearance: She is well-developed.   HENT:      Head: Normocephalic.   Eyes:      General: No scleral icterus.     Conjunctiva/sclera: Conjunctivae normal.      Pupils: Pupils are equal, round, and reactive to light.   Neck:      Thyroid: No thyromegaly.       Vascular: No JVD.   Cardiovascular:      Rate and Rhythm: Normal rate and regular rhythm.      Heart sounds: No murmur heard.   No friction rub. No gallop.    Pulmonary:      Effort: Pulmonary effort is normal.      Breath sounds: Normal breath sounds. No wheezing or rales.      Comments: Left lobectomy scar  Abdominal:      General: There is no distension.      Palpations: Abdomen is soft. There is no mass.      Tenderness: There is no abdominal tenderness.   Musculoskeletal:         General: Swelling present. No deformity. Normal range of motion.      Cervical back: Normal range of motion and neck supple.      Left lower leg: Edema present.   Lymphadenopathy:      Cervical: No cervical adenopathy.   Skin:     General: Skin is warm and dry.      Findings: Erythema present. No rash.   Neurological:      Mental Status: She is alert and oriented to person, place, and time.      Cranial Nerves: No cranial nerve deficit.      Deep Tendon Reflexes: Reflexes are normal and symmetric.   Psychiatric:         Behavior: Behavior normal.         Judgment: Judgment normal.     I have reexamined the patient and the results are consistent with the previously documented exam. Delfino Scruggs MD     DIAGNOSTIC DATA:  Results Review:     I reviewed the patient's new clinical results.    Results from last 7 days   Lab Units 04/22/21  0502 04/20/21  1700   WBC 10*3/mm3 5.94 6.60   HEMOGLOBIN g/dL 10.9* 10.9*   HEMATOCRIT % 33.0* 33.0*   PLATELETS 10*3/mm3 221 234      Results from last 7 days   Lab Units 04/20/21  1700   SODIUM mmol/L 144   POTASSIUM mmol/L 3.7   CHLORIDE mmol/L 107   CO2 mmol/L 25.6   BUN mg/dL 20   CREATININE mg/dL 1.00   CALCIUM mg/dL 9.5   BILIRUBIN mg/dL 0.2   ALK PHOS U/L 109   ALT (SGPT) U/L 13   AST (SGOT) U/L 13   GLUCOSE mg/dL 97      Lab Results   Component Value Date    NEUTROABS 2.76 04/22/2021     Results from last 7 days   Lab Units 04/21/21  1154 04/20/21  1701   INR  1.95* 2.11*     Lab Results    Component Value Date    IRON 53 04/21/2021    TIBC 288 (L) 04/21/2021    FERRITIN 128.00 04/21/2021     Homocysteine, Plasma (Quant)   0.0 - 15.0 umol/L 18.9High      Lab Results   Component Value Date    ISGBMLJV76 863 04/21/2021     Lab Results   Component Value Date    FOLATE 11.00 04/21/2021     IMAGING:    CT ANGIOGRAM CHEST-4/20/2021  IMPRESSION:     No pulmonary embolism. Indeterminate density anteriorly in the right  upper lobe, clinical correlation and follow-up recommended.    VENOUS DOPPLER 4/20/2021  Interpretation Summary    · Acute left lower extremity deep vein thrombosis noted in the posterial tibial.  · Thrombus also present in tributary of left popliteal vein.  · Chronic left lower extremity superficial thrombophlebitis noted in the saphenofemoral junction.  · All other veins appeared normal bilaterally.         Assessment/Plan   ASSESSMENT/PLAN:  This is a 80 y.o. female with:     1.  History of extensive left lower extremity DVT in March 2021.  She required vascular surgery consult with clot retrieval thrombectomy and placement of a venous stent.  She now is admitted with recurrent DVT in the left lower extremity mainly in the calf veins.  We are not sure that she is a Coumadin failure based on the fact that the highest INR available currently for our review was 2.1 on admission.  Patient now transitioned to Eliquis.  2.  Anemia.    No evidence of deficiency state on anemia labs.  Stool for occult blood pending.  3.  CT angiogram negative for pulmonary emboli  4.  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     RECOMMENDATIONS/PLAN:   1. Agree with Eliquis anticoagulation  2. Await results of hypercoagulable lab work-up looking for inherited or acquired thrombophilic defects.  3. We will continue to follow along and make a further recommendation on her long-term anticoagulation based on the results of her thrombophilia work-up.  4. Check stool for occult blood.  5. Folic acid 1  mg p.o. daily for hyper homocystinemia    We will schedule a 2 week follow-up at the [facility] office after discharge to assess the final results of her hypercoagulable work-up and repeat her homocystine level.  We can assess her tolerance to Eliquis at that time as well.     Okay for discharge home from the hematology oncology standpoint.  We would continue folic acid supplementation and Eliquis at discharge.

## 2021-04-22 NOTE — DISCHARGE PLACEMENT REQUEST
"Latanya Troncoso (80 y.o. Female)     Date of Birth Social Security Number Address Home Phone MRN    1940  [address] [place] [id number] [phone] [id number]    [employer] Status          Restoration        Admission Date Admission Type Admitting Provider Attending Provider Department, Room/Bed    4/20/21 Emergency Chris Bear MD [place], Matthew Brett, MD [address], E[address]/1    Discharge Date Discharge Disposition Discharge Destination         Home-Health Care [facility]              Attending Provider: Rogerio Botello MD    Allergies: Doxycycline    Isolation: None   Infection: None   Code Status: No CPR    Ht: 167.6 cm (66\")   Wt: 103 kg (227 lb)    Admission Cmt: None   Principal Problem: Acute deep vein thrombosis (DVT) of tibial vein of left lower extremity (CMS/HCC) [I82.442]                 Active Insurance as of 4/20/2021     Primary Coverage     Payor Plan Insurance Group Employer/Plan Group    MEDICARE MEDICARE A & B      Payor Plan Address Payor Plan Phone Number Payor Plan Fax Number Effective Dates    [address] [phone]  9/1/2004 - None Entered    [facility] [id number]       Subscriber Name Subscriber Birth Date Member ID       LATANYA TRONCOSO 1940 [id number]           Secondary Coverage     Payor Plan Insurance Group Employer/Plan Group    AARP MC SUP AARP HEALTH CARE OPTIONS      Payor Plan Address Payor Plan Phone Number Payor Plan Fax Number Effective Dates    [facility] [phone]  1/1/2006 - None Entered    [address]       [facility] [address]       Subscriber Name Subscriber Birth Date Member ID       LATANYA TRONCOSO 1940 [id number]                 Emergency Contacts      (Rel.) Home Phone Work Phone Mobile Phone    JAJA (NIECE)SRAVANTHI (Relative) [phone] -- [phone]            "

## 2021-04-22 NOTE — PLAN OF CARE
Goal Outcome Evaluation:  VSS. No c/o pain. Good UOP overnight from lasix. C/o leg cramps, relieved with flexeril. Plan to change from lovenox to eliquis. Will continue to monitor.

## 2021-04-23 ENCOUNTER — READMISSION MANAGEMENT (OUTPATIENT)
Dept: CALL CENTER | Facility: HOSPITAL | Age: 81
End: 2021-04-23

## 2021-04-23 LAB
APTT SCREEN TO CONFIRM RATIO: 0.99 RATIO (ref 0–1.4)
AT III PPP CHRO-ACNC: 105 % (ref 90–134)
B2 GLYCOPROT1 IGA SER-ACNC: <9 GPI IGA UNITS (ref 0–25)
B2 GLYCOPROT1 IGG SER-ACNC: <9 GPI IGG UNITS (ref 0–20)
B2 GLYCOPROT1 IGM SER-ACNC: <9 GPI IGM UNITS (ref 0–32)
CONFIRM APTT/NORMAL: 61.1 SEC (ref 0–55)
F5 GENE MUT ANL BLD/T: NORMAL
FACTOR II, DNA ANALYSIS: NORMAL
LA 2 SCREEN W REFLEX-IMP: ABNORMAL
MIXING DRVVT: 37.9 SEC (ref 0–40.4)
PROT C ACT/NOR PPP: 34 % (ref 86–163)
PROT C AG ACT/NOR PPP IA: 82 % (ref 60–150)
PROT S ACT/NOR PPP: 33 % (ref 70–127)
PROT S AG ACT/NOR PPP IA: 64 % (ref 60–150)
PROT S FREE PPP-ACNC: 55 % (ref 49–138)
SCREEN APTT: 41.7 SEC (ref 0–51.9)
SCREEN DRVVT: 48.2 SEC (ref 0–47)
THROMBIN TIME: 19.7 SEC (ref 0–23)

## 2021-04-23 NOTE — DISCHARGE SUMMARY
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        Hospital Course     Ms. Olsen is a 80 y.o. female with a history of AF, HTN, HLD and recent left LE DVT s/p thrombectomy and right common iliac vein stent placement who presented to [facility] initially complaining of LLE pain and swelling.  Please see the admitting history and physical for further details.  She had been on warfarin for her DVT but apparently had a hard time maintaining a therapeutic INR after d/c from the hospital even when at the SNF. Repeat Dopplers showed new clots in the LLE as discussed below. She was started on Lovenox and admitted. CTA showed no PE. She was seen by hematology and hypercoagulable workup was started, most of which is pending but she does have hyperhomocystinemia and will be started on folic acid. Seeing no contraindication after review of her records, we have elected to treat her with Eliquis instead of warfarin given her difficulty managing the warfarin levels. She is still ambulating well enough to go home with  instead of another SNF stay.     Day of Discharge     Subjective:  Very pleasant, no complaints    Physical Exam:  Temp:  [97.5 °F (36.4 °C)-98 °F (36.7 °C)] 98 °F (36.7 °C)  Heart Rate:  [73-92] 92  Resp:  [16-20] 20  BP:  (134-139)/(65-67) 139/65  Body mass index is 36.64 kg/m².  Physical Exam  Vitals and nursing note reviewed.   Constitutional:       General: She is not in acute distress.     Appearance: Normal appearance.   HENT:      Head: Normocephalic and atraumatic.      Mouth/Throat:      Mouth: Mucous membranes are moist.      Pharynx: No posterior oropharyngeal erythema.   Eyes:      General: No scleral icterus.  Neck:      Vascular: No JVD.   Cardiovascular:      Rate and Rhythm: Normal rate and regular rhythm.      Pulses: Normal pulses.      Heart sounds: Normal heart sounds. No murmur heard.     Pulmonary:      Effort: Pulmonary effort is normal. No respiratory distress.      Breath sounds: Normal breath sounds.   Abdominal:      General: Bowel sounds are normal. There is no distension.      Palpations: Abdomen is soft.      Tenderness: There is no abdominal tenderness.   Musculoskeletal:         General: No swelling or tenderness.      Cervical back: Neck supple.      Right lower leg: Edema present.      Left lower leg: Edema present.      Comments: Improved, still left greater than right   Skin:     General: Skin is warm and dry.      Coloration: Skin is not jaundiced.      Findings: No rash.   Neurological:      General: No focal deficit present.      Mental Status: She is alert and oriented to person, place, and time.   Psychiatric:         Mood and Affect: Mood normal.         Behavior: Behavior normal.         Consultants     Consult Orders (all) (From admission, onward)     Start     Ordered    04/20/21 2227  Hematology & Oncology Inpatient Consult  Once     Specialty:  Hematology and Oncology  Provider:  Delfino Scruggs MD    04/20/21 2226 04/20/21 2040  [facility] (on-call MD unless specified) Details  Once     Specialty:  Hospitalist  Provider:  (Not yet assigned)    04/20/21 2040 04/20/21 2040  Hematology and Oncology (on-call MD unless specified)  Once     Specialty:  Hematology and Oncology  Provider:  (Not  yet assigned)    04/20/21 2040              Procedures         Imaging Results (All)     Procedure Component Value Units Date/Time    CT Angiogram Chest [[id number]] Collected: 04/20/21 2001     Updated: 04/20/21 2014    Narrative:      CT ANGIOGRAM CHEST-     INDICATIONS: Possible pulmonary embolism.  Radiation dose reduction  techniques were utilized, including automated exposure control and  exposure modulation based on body size.     TECHNIQUE: CT angiography of the chest with three-dimensional  reconstructions.     COMPARISON: 08/04/2008     FINDINGS:     No pulmonary embolism. No aortic dissection.     The heart size is borderline without pericardial effusion. Small  pericardial recess fluid is seen, appearance is similar from 08/04/2008.  A few subcentimeter short axis mediastinal lymph nodes are seen that are  not significant by size criteria.     The airways appear clear.     No pleural effusion or pneumothorax.     The lungs show a small focus of reticulonodular density anteriorly in  the right upper lobe, about 1.4 cm on on axial image 38 is not seen on  the prior exam from 08/04/2008, potentially an area of infection,  correlate clinically, 3 month follow-up CT recommended to exclude any  possibility of an underlying lesion. Mild dependent atelectasis is  present.     Upper abdominal structures appear unremarkable.     Degenerative changes are seen in the spine. Sternal foramen is noted. No  acute fracture is identified.       Impression:         No pulmonary embolism. Indeterminate density anteriorly in the right  upper lobe, clinical correlation and follow-up recommended.     This report was finalized on 4/20/2021 8:11 PM by Dr. Gregory Baptiste M.D.       XR Chest 2 View [[id number]] Collected: 04/20/21 1513     Updated: 04/20/21 1519    Narrative:      XR CHEST 2 VW-     HISTORY: Female who is 80 years-old,  short of breath     TECHNIQUE: Frontal and lateral views of the chest     COMPARISON:  03/06/2021     FINDINGS: The heart is mildly enlarged. Aorta is tortuous, calcified.  Pulmonary vasculature is unremarkable. No focal pulmonary consolidation,  pleural effusion, or pneumothorax. Likely bone island the peripheral  right lower rib in correlation with CT from 03/10/2021. No acute osseous  process.       Impression:      No focal pulmonary consolidation. Mild cardiomegaly.  Tortuous aorta. Follow-up as clinically indicated.     This report was finalized on 4/20/2021 3:16 PM by Dr. Gregory Baptiste M.D.           Results for orders placed during the hospital encounter of 04/20/21    Duplex Venous Lower Extremity - Bilateral CAR    Interpretation Summary  · Acute left lower extremity deep vein thrombosis noted in the posterial tibial.  · Thrombus also present in tributary of left popliteal vein.  · Chronic left lower extremity superficial thrombophlebitis noted in the saphenofemoral junction.  · All other veins appeared normal bilaterally.    Results for orders placed during the hospital encounter of 04/20/21    Adult Transthoracic Echo Complete W/ Cont if Necessary Per Protocol    Interpretation Summary  · Estimated right ventricular systolic pressure from tricuspid regurgitation is normal (<35 mmHg).  · Estimated left ventricular EF = 60% Left ventricular systolic function is normal.  · Left ventricular diastolic function is consistent with (grade I) impaired relaxation.    Pertinent Labs     Results from last 7 days   Lab Units 04/22/21  0502 04/20/21  1700   WBC 10*3/mm3 5.94 6.60   HEMOGLOBIN g/dL 10.9* 10.9*   PLATELETS 10*3/mm3 221 234     Results from last 7 days   Lab Units 04/20/21  1700   SODIUM mmol/L 144   POTASSIUM mmol/L 3.7   CHLORIDE mmol/L 107   CO2 mmol/L 25.6   BUN mg/dL 20   CREATININE mg/dL 1.00   GLUCOSE mg/dL 97   Estimated Creatinine Clearance: 53.5 mL/min (by C-G formula based on SCr of 1 mg/dL).  Results from last 7 days   Lab Units 04/20/21  1700   ALBUMIN g/dL 4.00    BILIRUBIN mg/dL 0.2   ALK PHOS U/L 109   AST (SGOT) U/L 13   ALT (SGPT) U/L 13     Results from last 7 days   Lab Units 04/20/21  1700   CALCIUM mg/dL 9.5   ALBUMIN g/dL 4.00       Results from last 7 days   Lab Units 04/20/21  1700   TROPONIN T ng/mL <0.010   PROBNP pg/mL 632.1           Invalid input(s): LDLCALC      Results from last 7 days   Lab Units 04/20/21  2123   COVID19  Not Detected       Test Results Pending at Discharge     Pending Labs     Order Current Status    Antiphosphatidylserine IgG / M In process    Antiphosphotidyl Antibodies Panel II In process    Antithrombin III In process    Beta-2 Glycoprotein Antibodies In process    Factor 5 Leiden In process    Factor II, DNA Analysis In process    Lupus Anticoagulant In process    Phosphatidylserine Antibodies In process    Protein C Activity In process    Protein C Antigen, Total In process    Protein S Antigen, Free In process    Protein S Antigen, Total In process    Protein S Functional In process          Discharge Details        Discharge Medications      New Medications      Instructions Start Date   Eliquis 5 MG tablet tablet  Generic drug: apixaban   Take 2 tablets by mouth Every 12 (Twelve) Hours for 13 doses, then 1 tablet every 12 hours thereafter.      apixaban 5 MG tablet tablet  Commonly known as: ELIQUIS   5 mg, Oral, Every 12 Hours Scheduled   Start Date: April 29, 2021     folic acid 1 MG tablet  Commonly known as: FOLVITE   1 mg, Oral, Daily         Continue These Medications      Instructions Start Date   acetaminophen 325 MG tablet  Commonly known as: TYLENOL   650 mg, Oral, Every 4 Hours PRN      cyclobenzaprine 10 MG tablet  Commonly known as: FLEXERIL   10 mg, Oral, 2 Times Daily PRN      esomeprazole 40 MG capsule  Commonly known as: nexIUM   40 mg, Oral, Every Morning Before Breakfast      ferrous sulfate 325 (65 FE) MG tablet   325 mg, Oral, Daily With Breakfast      furosemide 40 MG tablet  Commonly known as: LASIX   TAKE  ONE TABLET BY MOUTH EVERY MORNING FOR FLUID      ipratropium-albuterol 0.5-2.5 mg/3 ml nebulizer  Commonly known as: DUO-NEB   USE 1 VIAL IN NEBULIZER 2 TIMES DAILY. Generic: DUONEB      lactulose 10 GM/15ML solution  Commonly known as: CHRONULAC   TAKE 15 ML BY MOUTH 2 3 TIMES A DAY AS NEEDED FOR CONSTIPATION      levothyroxine 100 MCG tablet  Commonly known as: SYNTHROID, LEVOTHROID   100 mcg, Oral, Daily      linaclotide 145 MCG capsule capsule  Commonly known as: LINZESS   145 mcg, Oral, Every Morning Before Breakfast      melatonin 1 MG tablet   3 mg, Oral      pramipexole 1.5 MG tablet  Commonly known as: MIRAPEX   1.5 mg, Oral, Every Night at Bedtime      sennosides-docusate 8.6-50 MG per tablet  Commonly known as: PERICOLACE   2 tablets, Oral, 2 Times Daily      Symbicort 160-4.5 MCG/ACT inhaler  Generic drug: budesonide-formoterol   No dose, route, or frequency recorded.      Tiadylt  MG 24 hr capsule  Generic drug: dilTIAZem   TAKE ONE CAPSULE BY MOUTH DAILY FOR BLOOD PRESSURE      valsartan 320 MG tablet  Commonly known as: DIOVAN   320 mg, Oral, Every 24 Hours Scheduled      vitamin B-12 1000 MCG tablet  Commonly known as: CYANOCOBALAMIN   1,000 mcg, Oral, Daily         Stop These Medications    warfarin 7.5 MG tablet  Commonly known as: Coumadin            Allergies   Allergen Reactions   • Doxycycline Anaphylaxis     Facial swelling, shortness of air, dizzines         Discharge Disposition:  Home-Health Care Svc    Discharge Diet:  No active diet order      Discharge Activity:       CODE STATUS:    Code Status and Medical Interventions:   Ordered at: 04/21/21 0914     Limited Support to NOT Include:    Artificial Nutrition    Cardioversion/Defibrillation    Intubation     Code Status:    No CPR     Medical Interventions (Level of Support Prior to Arrest):    Limited       Future Appointments   Date Time Provider Department Center   5/7/2021  7:30 AM LAB CHAIR [facility]    5/7/2021  8:00 AM Delfino Scruggs MD [facility]   5/10/2021  8:30 AM [facility] MRI 1 [facility]      Contact information for follow-up providers     Mary Perkins MD .    Specialty: Family Medicine  Contact information:  [address] [facility] [address] [phone]             Delfino Scruggs MD. Schedule an appointment as soon as possible for a visit in 2 week(s).    Specialties: Hematology and Oncology, Oncology, Hematology  Contact information:  [address]  [facility] [address]  [facility] [address] [phone]                   Contact information for after-discharge care     Home Medical Care     [facility] .    Service: Home Health Services  Contact information:  [address] [place] [id number] [phone]                             Time Spent on Discharge:  Greater than 30 minutes      Rogerio Botello MD  [facility]  04/22/21  22:53 EDT

## 2021-04-23 NOTE — OUTREACH NOTE
Prep Survey      Responses   [facility] facility patient discharged from?  [facility]   Is LACE score < 7 ?  No   Emergency Room discharge w/ pulse ox?  No   Eligibility  Readm Mgmt   Discharge diagnosis  DVT   Does the patient have one of the following disease processes/diagnoses(primary or secondary)?  Other   Does the patient have Home health ordered?  Yes   What is the Home health agency?   [facility]    Is there a DME ordered?  No   Comments regarding appointments  call for apmts   Medication alerts for this patient  see AVS   Prep survey completed?  Yes          Danita Newman RN

## 2021-04-24 LAB
PS IGA SER-ACNC: 1 APS IGA (ref 0–20)
PS IGG SER-ACNC: 1 GPS IGG (ref 0–11)
PS IGM SER-ACNC: 3 MPS IGM (ref 0–25)

## 2021-04-26 LAB
PS IGG SER-ACNC: 2 GPS IGG (ref 0–11)
PS IGM SER-ACNC: 4 MPS IGM (ref 0–25)

## 2021-04-27 ENCOUNTER — READMISSION MANAGEMENT (OUTPATIENT)
Dept: CALL CENTER | Facility: HOSPITAL | Age: 81
End: 2021-04-27

## 2021-04-27 NOTE — OUTREACH NOTE
Medical Week 1 Survey      Responses   [facility] patient discharged from?  [place]   Does the patient have one of the following disease processes/diagnoses(primary or secondary)?  Other   Week 1 attempt successful?  No   Unsuccessful attempts  Attempt 1          Cesilia Mcknight LPN

## 2021-04-28 ENCOUNTER — READMISSION MANAGEMENT (OUTPATIENT)
Dept: CALL CENTER | Facility: HOSPITAL | Age: 81
End: 2021-04-28

## 2021-04-28 NOTE — OUTREACH NOTE
Medical Week 1 Survey      Responses   [facility] patient discharged from?  [place]   Does the patient have one of the following disease processes/diagnoses(primary or secondary)?  Other   Week 1 attempt successful?  No   Unsuccessful attempts  Attempt 2   Discharge diagnosis  DVT          Susu Goodrich RN

## 2021-05-03 ENCOUNTER — READMISSION MANAGEMENT (OUTPATIENT)
Dept: CALL CENTER | Facility: HOSPITAL | Age: 81
End: 2021-05-03

## 2021-05-03 NOTE — OUTREACH NOTE
Medical Week 2 Survey      Responses   [facility] patient discharged from?  [place]   Does the patient have one of the following disease processes/diagnoses(primary or secondary)?  Other   Week 2 attempt successful?  No   Unsuccessful attempts  Attempt 1          Shireen Lowry RN

## 2021-05-05 LAB
ANTI-PHOSPHATIDIC ACID: ABNORMAL
ANTI-PHOSPHATIDYL GLYCEROL: ABNORMAL
ANTI-PHOSPHATIDYL INOSITOL: ABNORMAL
ANTI-PHOSPHATIDYLETHANOLAMINE: ABNORMAL
PE IGA SER-ACNC: 1.8 U/ML
PE IGG SER-ACNC: 1.9 U/ML
PE IGM SER-ACNC: 0.9 U/ML
PG IGA SER-ACNC: 4 U/ML
PG IGG SER-ACNC: 12.1 U/ML
PG IGM SER-ACNC: 2.6 U/ML
PHOSPHATIDATE IGA SER-ACNC: 3.8 U/ML
PHOSPHATIDATE IGG SER-ACNC: 9.1 U/ML
PHOSPHATIDATE IGM SER-ACNC: 2.6 U/ML
PI IGA SER-ACNC: 7.3 U/ML
PI IGG SER-ACNC: 18 U/ML
PI IGM SER-ACNC: 1.5 U/ML

## 2021-05-06 ENCOUNTER — READMISSION MANAGEMENT (OUTPATIENT)
Dept: CALL CENTER | Facility: HOSPITAL | Age: 81
End: 2021-05-06

## 2021-05-06 NOTE — OUTREACH NOTE
Medical Week 2 Survey      Responses   [facility] patient discharged from?  [place]   Does the patient have one of the following disease processes/diagnoses(primary or secondary)?  Other   Week 2 attempt successful?  Yes   Call start time  1329   Discharge diagnosis  DVT   Call end time  1334   Person spoke with today (if not patient) and relationship  dean Simon   [place] reviewed with patient/caregiver?  Yes   Is the patient having any side effects they believe may be caused by any medication additions or changes?  No   Does the patient have all medications ordered at discharge?  Yes   Is the patient taking all medications as directed (includes completed medication regime)?  Yes   Does the patient have a primary care provider?   Yes   Does the patient have an appointment with their PCP within 7 days of discharge?  Yes   Has the patient kept scheduled appointments due by today?  Yes   What is the Home health agency?   [facility]   Has home health visited the patient within 72 hours of discharge?  Yes   Psychosocial issues?  Yes   Psychosocial comments  dean states pt attended sister's  yesterday, 21, and is still in process of grieving   Did the patient receive a copy of their discharge instructions?  Yes   Nursing interventions  Reviewed instructions with patient   What is the patient's perception of their health status since discharge?  Improving   Is the patient/caregiver able to teach back signs and symptoms related to disease process for when to call PCP?  Yes   Is the patient/caregiver able to teach back signs and symptoms related to disease process for when to call 911?  Yes   Is the patient/caregiver able to teach back the hierarchy of who to call/visit for symptoms/problems? PCP, Specialist, Home health nurse, Urgent Care, ED, 911  Yes   Week 2 Call Completed?  Yes          Radha Carroll RN

## 2021-05-07 ENCOUNTER — APPOINTMENT (OUTPATIENT)
Dept: OTHER | Facility: HOSPITAL | Age: 81
End: 2021-05-07

## 2021-05-10 ENCOUNTER — HOSPITAL ENCOUNTER (OUTPATIENT)
Dept: MRI IMAGING | Facility: HOSPITAL | Age: 81
Discharge: HOME | End: 2021-05-10
Admitting: NEUROLOGICAL SURGERY

## 2021-05-10 DIAGNOSIS — M51.36 DDD (DEGENERATIVE DISC DISEASE), LUMBAR: ICD-10-CM

## 2021-05-10 DIAGNOSIS — I82.422 ACUTE DEEP VEIN THROMBOSIS (DVT) OF ILIAC VEIN OF LEFT LOWER EXTREMITY (HCC): Primary | ICD-10-CM

## 2021-05-10 PROCEDURE — 72148 MRI LUMBAR SPINE W/O DYE: CPT

## 2021-05-12 ENCOUNTER — READMISSION MANAGEMENT (OUTPATIENT)
Dept: CALL CENTER | Facility: HOSPITAL | Age: 81
End: 2021-05-12

## 2021-05-12 NOTE — OUTREACH NOTE
Medical Week 3 Survey      Responses   [facility] patient discharged from?  [place]   Does the patient have one of the following disease processes/diagnoses(primary or secondary)?  Other   Week 3 attempt successful?  No   Unsuccessful attempts  Attempt 1          Liseth Fan RN

## 2021-05-13 ENCOUNTER — TRANSCRIBE ORDERS (OUTPATIENT)
Dept: ADMINISTRATIVE | Facility: HOSPITAL | Age: 81
End: 2021-05-13

## 2021-05-13 DIAGNOSIS — T07.XXXA FRACTURES: Primary | ICD-10-CM

## 2021-05-14 ENCOUNTER — LAB (OUTPATIENT)
Dept: OTHER | Facility: HOSPITAL | Age: 81
End: 2021-05-14

## 2021-05-14 ENCOUNTER — OFFICE VISIT (OUTPATIENT)
Dept: ONCOLOGY | Facility: CLINIC | Age: 81
End: 2021-05-14

## 2021-05-14 VITALS
RESPIRATION RATE: 17 BRPM | HEART RATE: 79 BPM | DIASTOLIC BLOOD PRESSURE: 75 MMHG | SYSTOLIC BLOOD PRESSURE: 144 MMHG | HEIGHT: 66 IN | TEMPERATURE: 98.2 F | OXYGEN SATURATION: 96 % | WEIGHT: 214.6 LBS | BODY MASS INDEX: 34.49 KG/M2

## 2021-05-14 DIAGNOSIS — E72.11 HYPERHOMOCYSTEINEMIA (HCC): ICD-10-CM

## 2021-05-14 DIAGNOSIS — I82.442 ACUTE DEEP VEIN THROMBOSIS (DVT) OF TIBIAL VEIN OF LEFT LOWER EXTREMITY (HCC): ICD-10-CM

## 2021-05-14 DIAGNOSIS — I63.12 CEREBRAL INFARCTION DUE TO EMBOLISM OF BASILAR ARTERY (HCC): ICD-10-CM

## 2021-05-14 DIAGNOSIS — I82.422 ACUTE DEEP VEIN THROMBOSIS (DVT) OF ILIAC VEIN OF LEFT LOWER EXTREMITY (HCC): ICD-10-CM

## 2021-05-14 DIAGNOSIS — I82.422 ACUTE DEEP VEIN THROMBOSIS (DVT) OF ILIAC VEIN OF LEFT LOWER EXTREMITY (HCC): Primary | ICD-10-CM

## 2021-05-14 DIAGNOSIS — R79.9 ABNORMAL FINDING OF BLOOD CHEMISTRY, UNSPECIFIED: ICD-10-CM

## 2021-05-14 LAB
ALBUMIN SERPL-MCNC: 4.2 G/DL (ref 3.5–5.2)
ALBUMIN/GLOB SERPL: 1.7 G/DL
ALP SERPL-CCNC: 113 U/L (ref 39–117)
ALT SERPL W P-5'-P-CCNC: 10 U/L (ref 1–33)
ANION GAP SERPL CALCULATED.3IONS-SCNC: 12.1 MMOL/L (ref 5–15)
AST SERPL-CCNC: 16 U/L (ref 1–32)
BASOPHILS # BLD AUTO: 0.02 10*3/MM3 (ref 0–0.2)
BASOPHILS NFR BLD AUTO: 0.3 % (ref 0–1.5)
BILIRUB SERPL-MCNC: 0.3 MG/DL (ref 0–1.2)
BUN SERPL-MCNC: 16 MG/DL (ref 8–23)
BUN/CREAT SERPL: 13.4 (ref 7–25)
CALCIUM SPEC-SCNC: 9.9 MG/DL (ref 8.6–10.5)
CHLORIDE SERPL-SCNC: 105 MMOL/L (ref 98–107)
CO2 SERPL-SCNC: 25.9 MMOL/L (ref 22–29)
CREAT SERPL-MCNC: 1.19 MG/DL (ref 0.57–1)
DEPRECATED RDW RBC AUTO: 52.3 FL (ref 37–54)
EOSINOPHIL # BLD AUTO: 0.13 10*3/MM3 (ref 0–0.4)
EOSINOPHIL NFR BLD AUTO: 2.1 % (ref 0.3–6.2)
ERYTHROCYTE [DISTWIDTH] IN BLOOD BY AUTOMATED COUNT: 15.3 % (ref 12.3–15.4)
GFR SERPL CREATININE-BSD FRML MDRD: 44 ML/MIN/1.73
GLOBULIN UR ELPH-MCNC: 2.5 GM/DL
GLUCOSE SERPL-MCNC: 108 MG/DL (ref 65–99)
HCT VFR BLD AUTO: 34.9 % (ref 34–46.6)
HCYS SERPL-MCNC: 14.8 UMOL/L (ref 0–15)
HGB BLD-MCNC: 10.7 G/DL (ref 12–15.9)
IMM GRANULOCYTES # BLD AUTO: 0.05 10*3/MM3 (ref 0–0.05)
IMM GRANULOCYTES NFR BLD AUTO: 0.8 % (ref 0–0.5)
LYMPHOCYTES # BLD AUTO: 2.19 10*3/MM3 (ref 0.7–3.1)
LYMPHOCYTES NFR BLD AUTO: 35 % (ref 19.6–45.3)
MCH RBC QN AUTO: 28.4 PG (ref 26.6–33)
MCHC RBC AUTO-ENTMCNC: 30.7 G/DL (ref 31.5–35.7)
MCV RBC AUTO: 92.6 FL (ref 79–97)
MONOCYTES # BLD AUTO: 0.59 10*3/MM3 (ref 0.1–0.9)
MONOCYTES NFR BLD AUTO: 9.4 % (ref 5–12)
NEUTROPHILS NFR BLD AUTO: 3.28 10*3/MM3 (ref 1.7–7)
NEUTROPHILS NFR BLD AUTO: 52.4 % (ref 42.7–76)
NRBC BLD AUTO-RTO: 0 /100 WBC (ref 0–0.2)
PLATELET # BLD AUTO: 213 10*3/MM3 (ref 140–450)
PMV BLD AUTO: 9.3 FL (ref 6–12)
POTASSIUM SERPL-SCNC: 3.8 MMOL/L (ref 3.5–5.2)
PROT SERPL-MCNC: 6.7 G/DL (ref 6–8.5)
RBC # BLD AUTO: 3.77 10*6/MM3 (ref 3.77–5.28)
SODIUM SERPL-SCNC: 143 MMOL/L (ref 136–145)
WBC # BLD AUTO: 6.26 10*3/MM3 (ref 3.4–10.8)

## 2021-05-14 PROCEDURE — 83090 ASSAY OF HOMOCYSTEINE: CPT | Performed by: INTERNAL MEDICINE

## 2021-05-14 PROCEDURE — 85025 COMPLETE CBC W/AUTO DIFF WBC: CPT | Performed by: INTERNAL MEDICINE

## 2021-05-14 PROCEDURE — 80053 COMPREHEN METABOLIC PANEL: CPT | Performed by: INTERNAL MEDICINE

## 2021-05-14 PROCEDURE — 99214 OFFICE O/P EST MOD 30 MIN: CPT | Performed by: INTERNAL MEDICINE

## 2021-05-14 PROCEDURE — 36415 COLL VENOUS BLD VENIPUNCTURE: CPT

## 2021-05-14 RX ORDER — HYDROCODONE BITARTRATE AND ACETAMINOPHEN 5; 325 MG/1; MG/1
TABLET ORAL
COMMUNITY
Start: 2021-04-27 | End: 2021-09-20

## 2021-05-14 RX ORDER — FOLIC ACID/VIT B COMPLEX AND C 0.8 MG
TABLET ORAL
COMMUNITY
End: 2021-05-14

## 2021-05-14 RX ORDER — FOLIC ACID 1 MG/1
1 TABLET ORAL DAILY
Qty: 30 TABLET | Refills: 5 | Status: SHIPPED | OUTPATIENT
Start: 2021-05-14

## 2021-05-14 RX ORDER — GABAPENTIN 300 MG/1
CAPSULE ORAL
COMMUNITY
Start: 2021-02-23 | End: 2022-05-09 | Stop reason: SDUPTHER

## 2021-05-14 RX ORDER — DILTIAZEM HYDROCHLORIDE 120 MG/1
120 CAPSULE, COATED, EXTENDED RELEASE ORAL DAILY
COMMUNITY
Start: 2021-05-07 | End: 2022-06-27

## 2021-05-14 NOTE — PROGRESS NOTES
[facility] INPATIENT PROGRESS NOTE      REASON FOR VISIT: HOSPITAL FOLLOW UP  Previous extensive left lower extremity DVT which required clot retrieval and venous stent with vascular surgery in March 2021.  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 female who we are asked to see today in consultation for the above-noted issues.  She had been discharged from the hospital 4/3/2021 after hospitalization for extensive left lower extremity DVT.  On her prior admission she had undergone thrombectomy and clot retrieval by vascular surgery and also had a venous stent placed.  She was transitioned from IV heparin to Coumadin and discharged to a skilled nursing facility. She recently had been discharged from the skilled nursing facility but developed worsening pain in the leg and presented to the emergency room yesterday.     Repeat Doppler of the legs in the ER showed acute left lower extremity DVT in the posterior tibial vein and in a tributary of the left popliteal vein.  There was chronic left lower extremity superficial thrombophlebitis noted in the saphenofemoral junction. All other veins were normal bilaterally.     Her INR on admission was 2.1 and the discharge INR from 4/3/2021 was 2.03.  Patient was started on 1 mg/kg subcu Lovenox every 12 hours with a total dose of 100 mg subcu every 12 hours.     Prior to discharge from the hospital we elected to draw a hypercoagulable laboratory evaluation.  She is here today to review the results of her laboratory work-up.  Prior to discharge she also was transitioned from Lovenox to Eliquis which she seems to be tolerating well.    She reports today that she has also followed up with her vascular surgeons earlier in the week.  She still having some discomfort and chronic swelling in the left lower extremity but she is wearing compression stockings  "faithfully.    She has not missed any doses of Eliquis or folic acid.  Review of Systems     Constitutional: Negative for activity change, chills, fatigue and fever.   HENT: Negative for mouth sores, trouble swallowing and voice change.    Eyes: Negative for pain and visual disturbance.   Respiratory: Negative for cough, shortness of breath and wheezing.    Cardiovascular: Positive for leg swelling. Negative for chest pain and palpitations.   Gastrointestinal: Negative for abdominal pain, constipation, diarrhea, nausea and vomiting.   Genitourinary: Negative for difficulty urinating, frequency and urgency.   Musculoskeletal: Negative for arthralgias and joint swelling.        Dependent edema of the left lower extremity most likely due to venous insufficiency   Skin: Negative for rash.        Erythema of the skin on the left lower extremity most likely due to venous insufficiency   Neurological: Negative for dizziness, seizures, weakness and headaches.   Hematological: Negative for adenopathy. Does not bruise/bleed easily.   Psychiatric/Behavioral: Negative for behavioral problems and confusion. The patient is not nervous/anxious.      OBJECTIVE:  Vitals:    05/14/21 1529   BP: 144/75   Pulse: 79   Resp: 17   Temp: 98.2 °F (36.8 °C)   SpO2: 96%   Weight: 97.3 kg (214 lb 9.6 oz)   Height: 167.6 cm (65.98\")         PHYSICAL EXAMINATION:  Constitutional:       General: She is not in acute distress.     Appearance: She is well-developed.  Seated in a wheelchair  HENT:      Head: Normocephalic.   Eyes:      General: No scleral icterus.     Conjunctiva/sclera: Conjunctivae normal.      Pupils: Pupils are equal, round, and reactive to light.   Neck:      Thyroid: No thyromegaly.      Vascular: No JVD.   Cardiovascular:      Rate and Rhythm: Normal rate and regular rhythm.      Heart sounds: No murmur heard.   No friction rub. No gallop.    Pulmonary:      Effort: Pulmonary effort is normal.      Breath sounds: Normal breath " sounds. No wheezing or rales.      Comments: Left lobectomy scar  Abdominal:      General: There is no distension.      Palpations: Abdomen is soft. There is no mass.      Tenderness: There is no abdominal tenderness.   Musculoskeletal:         General: Swelling present. No deformity. Normal range of motion.      Cervical back: Normal range of motion and neck supple.      Left lower leg: Edema present.  She is wearing compression stockings.  Lymphadenopathy:      Cervical: No cervical adenopathy.   Skin:     General: Skin is warm and dry.      Findings: Erythema present. No rash.   Neurological:      Mental Status: She is alert and oriented to person, place, and time.      Cranial Nerves: No cranial nerve deficit.      Deep Tendon Reflexes: Reflexes are normal and symmetric.   Psychiatric:         Behavior: Behavior normal.         Judgment: Judgment normal.     I have reexamined the patient and the results are consistent with the previously documented exam. Delfino Scruggs MD     DIAGNOSTIC DATA:  Results Review:     I reviewed the patient's new clinical results.    Results from last 7 days   Lab Units 05/14/21  1516   WBC 10*3/mm3 6.26   HEMOGLOBIN g/dL 10.7*   HEMATOCRIT % 34.9   PLATELETS 10*3/mm3 213            Lab Results   Component Value Date    IRON 53 04/21/2021    TIBC 288 (L) 04/21/2021    FERRITIN 128.00 04/21/2021     Homocysteine, Plasma (Quant)   0.0 - 15.0 umol/L 18.9High      Lab Results   Component Value Date    KEEDAKSO01 863 04/21/2021     Lab Results   Component Value Date    FOLATE 11.00 04/21/2021     Hypercoagulable lab results from 4/21/2021    Factor V Leiden normal  Prothrombin gene mutation normal  Low protein C and protein S activity consistent with Coumadin effect.  Hyper homocystinemia with homocystine level of 18.9  Mild elevation of Antiphosphatidyl Inositol IgG at 18  Lupus anticoagulant negative    IMAGING:    CT ANGIOGRAM CHEST-4/20/2021  IMPRESSION:     No pulmonary embolism.  Indeterminate density anteriorly in the right  upper lobe, clinical correlation and follow-up recommended.    VENOUS DOPPLER 4/20/2021  Interpretation Summary    · Acute left lower extremity deep vein thrombosis noted in the posterial tibial.  · Thrombus also present in tributary of left popliteal vein.  · Chronic left lower extremity superficial thrombophlebitis noted in the saphenofemoral junction.  · All other veins appeared normal bilaterally.         Assessment/Plan   ASSESSMENT/PLAN:  This is a 80 y.o. female with:     1.  History of extensive left lower extremity DVT in March 2021.  She required vascular surgery consult with clot retrieval thrombectomy and placement of a venous stent.  She now is admitted with recurrent DVT in the left lower extremity mainly in the calf veins.  We are not sure that she is a Coumadin failure based on the fact that the highest INR available currently for our review was 2.1 on admission.  Patient now transitioned to Eliquis.    As noted above, her hypercoagulable work-up was essentially unremarkable.    2.  Anemia.    No evidence of deficiency state on anemia labs.  Hemoglobin stable at 10.7 g/dL     3.  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     PLAN:   1. Continue Eliquis anticoagulation  2. We discussed the results of the hypercoagulable labs with the patient and explained that we do not see any obvious thrombophilic defects.  3. Continue folic acid 1 mg p.o. daily for hyper homocysteinemia  4. We will schedule a follow-up visit in 6 weeks with repeat CBC and homocystine level.  5. We encouraged her to try to stay off the leg is much as possible and keep it elevated when she can.  If she continues to have significant pain in the leg she may need to undergo some follow-up imaging with a Doppler or venogram.    .

## 2021-05-16 ENCOUNTER — READMISSION MANAGEMENT (OUTPATIENT)
Dept: CALL CENTER | Facility: HOSPITAL | Age: 81
End: 2021-05-16

## 2021-05-16 NOTE — OUTREACH NOTE
Medical Week 3 Survey      Responses   [facility] [place] patient discharged from?  [place]   Does the patient have one of the following disease processes/diagnoses(primary or secondary)?  Other   Week 3 attempt successful?  No   Unsuccessful attempts  Attempt 2          Christina Sarmiento RN

## 2021-05-17 ENCOUNTER — TELEPHONE (OUTPATIENT)
Dept: ONCOLOGY | Facility: CLINIC | Age: 81
End: 2021-05-17

## 2021-05-17 NOTE — TELEPHONE ENCOUNTER
Pt calling about medication clarification.   States her discharge papers from hospital say to stop taking eliquis  Per Dr Scruggs progress note of 05/14/21, pt is to continue taking her eliquis.   I discussed this with pts nurse and clarified that she if to continue taking eliquis.   They V/U

## 2021-05-19 ENCOUNTER — HOSPITAL ENCOUNTER (OUTPATIENT)
Dept: CT IMAGING | Facility: HOSPITAL | Age: 81
Discharge: HOME | End: 2021-05-19
Admitting: NEUROLOGICAL SURGERY

## 2021-05-19 DIAGNOSIS — T07.XXXA FRACTURES: ICD-10-CM

## 2021-05-19 PROCEDURE — 72192 CT PELVIS W/O DYE: CPT

## 2021-07-09 ENCOUNTER — TRANSCRIBE ORDERS (OUTPATIENT)
Dept: ADMINISTRATIVE | Facility: HOSPITAL | Age: 81
End: 2021-07-09

## 2021-07-09 ENCOUNTER — HOSPITAL ENCOUNTER (OUTPATIENT)
Dept: ULTRASOUND IMAGING | Facility: HOSPITAL | Age: 81
Discharge: HOME | End: 2021-07-09
Admitting: NEUROLOGICAL SURGERY

## 2021-07-09 DIAGNOSIS — M79.661 PAIN AND SWELLING OF RIGHT LOWER LEG: ICD-10-CM

## 2021-07-09 DIAGNOSIS — M79.89 PAIN AND SWELLING OF RIGHT LOWER LEG: ICD-10-CM

## 2021-07-09 DIAGNOSIS — M79.661 PAIN AND SWELLING OF RIGHT LOWER LEG: Primary | ICD-10-CM

## 2021-07-09 DIAGNOSIS — M79.89 PAIN AND SWELLING OF RIGHT LOWER LEG: Primary | ICD-10-CM

## 2021-07-09 PROCEDURE — 93971 EXTREMITY STUDY: CPT

## 2021-07-14 ENCOUNTER — TELEPHONE (OUTPATIENT)
Dept: ONCOLOGY | Facility: CLINIC | Age: 81
End: 2021-07-14

## 2021-07-14 NOTE — TELEPHONE ENCOUNTER
Caller: Karthik Olsen    Relationship: Self    Best call back number: [phone]    What was the call regarding: KARTHIK WANTS TO KNOW IF SHE CAN STILL TAKE ELIQUIS WHILE GETTING DENTAL WORK DONE AT THE END OF July OR IF SHE NEEDS TO GO OFF OF IT.    Do you require a callback: YES

## 2021-07-14 NOTE — TELEPHONE ENCOUNTER
Patient is has a cavity that will either be filled or have a crown put on and her dentist is concerned about her being on Eliquis. R/w Dr. Scruggs, pt is to hold Eliquis the day before the procedure and can restart it the night of her procedure. Pt v/u.

## 2021-09-20 ENCOUNTER — OFFICE VISIT (OUTPATIENT)
Dept: FAMILY MEDICINE CLINIC | Facility: CLINIC | Age: 81
End: 2021-09-20

## 2021-09-20 VITALS
BODY MASS INDEX: 33.59 KG/M2 | HEART RATE: 97 BPM | HEIGHT: 66 IN | TEMPERATURE: 98.4 F | OXYGEN SATURATION: 95 % | WEIGHT: 209 LBS | SYSTOLIC BLOOD PRESSURE: 142 MMHG | DIASTOLIC BLOOD PRESSURE: 60 MMHG

## 2021-09-20 DIAGNOSIS — M81.0 AGE-RELATED OSTEOPOROSIS WITHOUT CURRENT PATHOLOGICAL FRACTURE: ICD-10-CM

## 2021-09-20 DIAGNOSIS — L75.0 URINARY BODY ODOR: ICD-10-CM

## 2021-09-20 DIAGNOSIS — R80.9 PROTEINURIA, UNSPECIFIED TYPE: ICD-10-CM

## 2021-09-20 DIAGNOSIS — Z86.718 HISTORY OF RECURRENT DEEP VEIN THROMBOSIS (DVT): ICD-10-CM

## 2021-09-20 DIAGNOSIS — N18.31 STAGE 3A CHRONIC KIDNEY DISEASE (HCC): ICD-10-CM

## 2021-09-20 DIAGNOSIS — D50.9 IRON DEFICIENCY ANEMIA, UNSPECIFIED IRON DEFICIENCY ANEMIA TYPE: ICD-10-CM

## 2021-09-20 DIAGNOSIS — R60.9 EDEMA, UNSPECIFIED TYPE: ICD-10-CM

## 2021-09-20 DIAGNOSIS — Z20.822 EXPOSURE TO COVID-19 VIRUS: Primary | ICD-10-CM

## 2021-09-20 DIAGNOSIS — R73.09 ABNORMAL GLUCOSE: ICD-10-CM

## 2021-09-20 LAB
BILIRUB BLD-MCNC: NEGATIVE MG/DL
GLUCOSE UR STRIP-MCNC: NEGATIVE MG/DL
KETONES UR QL: NEGATIVE
LEUKOCYTE EST, POC: NEGATIVE
NITRITE UR-MCNC: NEGATIVE MG/ML
PH UR: 6 [PH] (ref 5–8)
PROT UR STRIP-MCNC: ABNORMAL MG/DL
RBC # UR STRIP: NEGATIVE /UL
SP GR UR: 1.02 (ref 1–1.03)
UROBILINOGEN UR QL: NORMAL

## 2021-09-20 PROCEDURE — 81003 URINALYSIS AUTO W/O SCOPE: CPT | Performed by: FAMILY MEDICINE

## 2021-09-20 PROCEDURE — 99204 OFFICE O/P NEW MOD 45 MIN: CPT | Performed by: FAMILY MEDICINE

## 2021-09-20 RX ORDER — TRAMADOL HYDROCHLORIDE 50 MG/1
50 TABLET ORAL EVERY 8 HOURS PRN
COMMUNITY
Start: 2021-09-13 | End: 2022-08-01

## 2021-09-20 RX ORDER — ZINC GLUCONATE 50 MG
1 TABLET ORAL
COMMUNITY

## 2021-09-20 RX ORDER — THIAMINE HCL 100 MG
TABLET ORAL
COMMUNITY

## 2021-09-20 RX ORDER — CLONIDINE HYDROCHLORIDE 0.2 MG/1
0.2 TABLET ORAL
COMMUNITY
Start: 2021-07-26 | End: 2022-06-27

## 2021-09-21 ENCOUNTER — TELEPHONE (OUTPATIENT)
Dept: FAMILY MEDICINE CLINIC | Facility: CLINIC | Age: 81
End: 2021-09-21

## 2021-09-21 LAB
BASOPHILS # BLD AUTO: 0 X10E3/UL (ref 0–0.2)
BASOPHILS NFR BLD AUTO: 0 %
BUN SERPL-MCNC: 18 MG/DL (ref 8–27)
BUN/CREAT SERPL: 15 (ref 12–28)
CALCIUM SERPL-MCNC: 8.8 MG/DL (ref 8.7–10.3)
CHLORIDE SERPL-SCNC: 105 MMOL/L (ref 96–106)
CO2 SERPL-SCNC: 23 MMOL/L (ref 20–29)
CREAT SERPL-MCNC: 1.17 MG/DL (ref 0.57–1)
EOSINOPHIL # BLD AUTO: 0.1 X10E3/UL (ref 0–0.4)
EOSINOPHIL NFR BLD AUTO: 1 %
ERYTHROCYTE [DISTWIDTH] IN BLOOD BY AUTOMATED COUNT: 15.4 % (ref 11.7–15.4)
FERRITIN SERPL-MCNC: 78 NG/ML (ref 15–150)
GLUCOSE SERPL-MCNC: 99 MG/DL (ref 65–99)
HBA1C MFR BLD: 6.1 % (ref 4.8–5.6)
HCT VFR BLD AUTO: 36.3 % (ref 34–46.6)
HGB BLD-MCNC: 11.9 G/DL (ref 11.1–15.9)
IMM GRANULOCYTES # BLD AUTO: 0.1 X10E3/UL (ref 0–0.1)
IMM GRANULOCYTES NFR BLD AUTO: 1 %
IRON SERPL-MCNC: 76 UG/DL (ref 27–139)
LABCORP SARS-COV-2, NAA 2 DAY TAT: NORMAL
LYMPHOCYTES # BLD AUTO: 1.8 X10E3/UL (ref 0.7–3.1)
LYMPHOCYTES NFR BLD AUTO: 27 %
MCH RBC QN AUTO: 28.6 PG (ref 26.6–33)
MCHC RBC AUTO-ENTMCNC: 32.8 G/DL (ref 31.5–35.7)
MCV RBC AUTO: 87 FL (ref 79–97)
MONOCYTES # BLD AUTO: 0.6 X10E3/UL (ref 0.1–0.9)
MONOCYTES NFR BLD AUTO: 9 %
NEUTROPHILS # BLD AUTO: 4.2 X10E3/UL (ref 1.4–7)
NEUTROPHILS NFR BLD AUTO: 62 %
PLATELET # BLD AUTO: 188 X10E3/UL (ref 150–450)
POTASSIUM SERPL-SCNC: 4.2 MMOL/L (ref 3.5–5.2)
RBC # BLD AUTO: 4.16 X10E6/UL (ref 3.77–5.28)
SARS-COV-2 RNA RESP QL NAA+PROBE: NOT DETECTED
SODIUM SERPL-SCNC: 142 MMOL/L (ref 134–144)
WBC # BLD AUTO: 6.7 X10E3/UL (ref 3.4–10.8)

## 2021-09-21 NOTE — TELEPHONE ENCOUNTER
Caller: Latanya Olsen    Relationship: Self    Best call back number: [phone]      What test was performed: COVID TEST     When was the test performed: 9/20/2021    Where was the test performed: [facility]

## 2021-09-22 LAB
BACTERIA UR CULT: NO GROWTH
BACTERIA UR CULT: NORMAL

## 2021-09-23 LAB
PROT 24H UR-MRATE: 90 MG/24HOURS (ref 0–150)
PROT UR-MCNC: 9 MG/DL

## 2021-10-19 ENCOUNTER — TELEPHONE (OUTPATIENT)
Dept: FAMILY MEDICINE CLINIC | Facility: CLINIC | Age: 81
End: 2021-10-19

## 2021-10-19 NOTE — TELEPHONE ENCOUNTER
PATIENT CALLED BACK TO STATE HER PRESCRIPTION BOTTLE FOR THIS MEDICATION SAYS 290 MCG CAPSULES  MCG.     PLEASE ADVISE PATIENT.

## 2021-10-19 NOTE — TELEPHONE ENCOUNTER
Caller: Ltaanya Olsen (Self)    Best call back number: [phone] (H)      What is the best time to reach you: ANYTIME    Who are you requesting to speak with (clinical staff, provider,  specific staff member): CLINICAL      What was the call regarding: PATIENT CALLED IN TODAY NEEDING A WRITTEN PRESCRIPTION FOR: linaclotide (LINZESS) 145 MCG capsule capsule. PATIENT STATES SHE GETS THIS THROUGH THE  AND NEEDS A WRITTEN PRESCRIPTION TO COME  IN OFFICE. SHE WOULD LIKE TO GET ENOUGH SUPPLY TO LAST HER THE REST OF THE YEAR.     PLEASE CALL AND ADVISE.

## 2021-11-01 ENCOUNTER — OFFICE VISIT (OUTPATIENT)
Dept: FAMILY MEDICINE CLINIC | Facility: CLINIC | Age: 81
End: 2021-11-01

## 2021-11-01 VITALS
OXYGEN SATURATION: 95 % | HEART RATE: 93 BPM | SYSTOLIC BLOOD PRESSURE: 148 MMHG | TEMPERATURE: 97.7 F | HEIGHT: 66 IN | BODY MASS INDEX: 33.11 KG/M2 | DIASTOLIC BLOOD PRESSURE: 80 MMHG | WEIGHT: 206 LBS

## 2021-11-01 DIAGNOSIS — G47.33 OSA (OBSTRUCTIVE SLEEP APNEA): ICD-10-CM

## 2021-11-01 DIAGNOSIS — N18.31 STAGE 3A CHRONIC KIDNEY DISEASE (HCC): ICD-10-CM

## 2021-11-01 DIAGNOSIS — R60.0 LOCALIZED EDEMA: ICD-10-CM

## 2021-11-01 DIAGNOSIS — D50.9 IRON DEFICIENCY ANEMIA, UNSPECIFIED IRON DEFICIENCY ANEMIA TYPE: ICD-10-CM

## 2021-11-01 DIAGNOSIS — Z79.899 CURRENT USE OF PROTON PUMP INHIBITOR: ICD-10-CM

## 2021-11-01 DIAGNOSIS — I10 ESSENTIAL HYPERTENSION: ICD-10-CM

## 2021-11-01 DIAGNOSIS — Z00.00 ENCOUNTER FOR SUBSEQUENT ANNUAL WELLNESS VISIT (AWV) IN MEDICARE PATIENT: Primary | ICD-10-CM

## 2021-11-01 DIAGNOSIS — K92.1 BLACK STOOL: ICD-10-CM

## 2021-11-01 DIAGNOSIS — Z86.718 HISTORY OF RECURRENT DEEP VEIN THROMBOSIS (DVT): ICD-10-CM

## 2021-11-01 PROCEDURE — 99213 OFFICE O/P EST LOW 20 MIN: CPT | Performed by: FAMILY MEDICINE

## 2021-11-01 PROCEDURE — 1170F FXNL STATUS ASSESSED: CPT | Performed by: FAMILY MEDICINE

## 2021-11-01 PROCEDURE — 1159F MED LIST DOCD IN RCRD: CPT | Performed by: FAMILY MEDICINE

## 2021-11-01 PROCEDURE — G0439 PPPS, SUBSEQ VISIT: HCPCS | Performed by: FAMILY MEDICINE

## 2021-11-01 NOTE — PROGRESS NOTES
The ABCs of the Annual Wellness Visit  Subsequent Medicare Wellness Visit    Chief Complaint   Patient presents with   • Medicare Wellness-subsequent      Subjective    History of Present Illness:  Latanya Olsen is a 81 y.o. female who presents for a Subsequent Medicare Wellness Visit.  Patient presents for her Medicare wellness as well as follow-up on her hypertension, chronic kidney disease and chronic pain.  She has been going to a pain specialist.  She also has up-to-date with her nephrologist.  She is asking if I can monitor her kidneys.  She has been concerned about some black stools over the past month.  She has not been taking any Pepto.  She is up-to-date with her colon cancer screening.  She does have a history of A. fib with recurrent DVTs and is on Eliquis.  She has not noticed any easy bruising or bleeding.    The following portions of the patient's history were reviewed and   updated as appropriate: allergies, current medications, past family history, past medical history, past social history, past surgical history and problem list.    Compared to one year ago, the patient feels her physical   health is better.    Compared to one year ago, the patient feels her mental   health is better.    Recent Hospitalizations:  This patient has had a [facility] admission record on file within the last 365 days.    Current Medical Providers:  Patient Care Team:  Kehrer, Meredith Lea, MD as PCP - General (Family Medicine)  Chris Bear MD as Referring Physician (Hospitalist)  Delfino Scruggs MD as Consulting Physician (Hematology and Oncology)    Outpatient Medications Prior to Visit   Medication Sig Dispense Refill   • acetaminophen (TYLENOL) 325 MG tablet Take 2 tablets by mouth Every 4 (Four) Hours As Needed for Mild Pain .     • apixaban (ELIQUIS) 2.5 MG tablet tablet Take 2.5 mg by mouth 2 (Two) Times a Day.     • cloNIDine (CATAPRES) 0.2 MG tablet Take 0.2 mg by mouth.     • cyclobenzaprine  (FLEXERIL) 10 MG tablet Take 1 tablet by mouth 2 (Two) Times a Day As Needed for Muscle Spasms. 14 tablet 0   • dilTIAZem CD (CARDIZEM CD) 120 MG 24 hr capsule Take 120 mg by mouth Daily.     • ferrous sulfate 325 (65 FE) MG tablet Take 325 mg by mouth Daily With Breakfast.     • folic acid (FOLVITE) 1 MG tablet Take 1 tablet by mouth Daily. 30 tablet 5   • furosemide (LASIX) 40 MG tablet TAKE ONE TABLET BY MOUTH EVERY MORNING FOR FLUID  0   • gabapentin (NEURONTIN) 300 MG capsule TAKE ONE CAPSULE BY MOUTH EVERY TWELVE HOURS AS NEEDED     • ipratropium-albuterol (DUO-NEB) 0.5-2.5 mg/mL nebulizer USE 1 VIAL IN NEBULIZER 2 TIMES DAILY. Generic: DUONEB 60 mL 10   • lactulose (CHRONULAC) 10 GM/15ML solution TAKE 15 ML BY MOUTH 2 3 TIMES A DAY AS NEEDED FOR CONSTIPATION     • levothyroxine (SYNTHROID, LEVOTHROID) 100 MCG tablet Take 1 tablet by mouth Daily.     • linaclotide (LINZESS) 145 MCG capsule capsule Take 1 capsule by mouth Every Morning Before Breakfast. 90 capsule 3   • Magnesium 500 MG tablet Take  by mouth.     • melatonin 1 MG tablet Take 3 mg by mouth.     • pramipexole (MIRAPEX) 1.5 MG tablet Take 1.5 mg by mouth every night at bedtime.     • sennosides-docusate (PERICOLACE) 8.6-50 MG per tablet Take 2 tablets by mouth 2 (Two) Times a Day.     • SYMBICORT 160-4.5 MCG/ACT inhaler      • Tiadylt  MG 24 hr capsule TAKE ONE CAPSULE BY MOUTH DAILY FOR BLOOD PRESSURE     • traMADol (ULTRAM) 50 MG tablet Take 50 mg by mouth Every 8 (Eight) Hours As Needed. for pain     • valsartan (DIOVAN) 320 MG tablet Take 1 tablet by mouth Daily.     • vitamin B-12 (CYANOCOBALAMIN) 1000 MCG tablet Take 1,000 mcg by mouth Daily.     • Zinc 50 MG tablet Take 1 tablet by mouth.     • esomeprazole (NexIUM) 40 MG capsule Take 40 mg by mouth every morning before breakfast.       No facility-administered medications prior to visit.       Opioid medication/s are on active medication list.  and I have evaluated her active  treatment plan and pain score trends (see table).  There were no vitals filed for this visit.  I have reviewed the chart for potential of high risk medication and harmful drug interactions in the elderly.            Aspirin is not on active medication list.  Aspirin use is not indicated based on review of current medical condition/s. Risk of harm outweighs potential benefits.  .    Patient Active Problem List   Diagnosis   • Carcinoid tumor of lung   • Diverticulosis of intestine   • Obstructive sleep apnea syndrome   • Weight loss   • Vitamin D deficiency   • Overweight (BMI 25.0-29.9)   • Spinal stenosis of lumbar region without neurogenic claudication   • Osteoarthritis of knee   • Obesity (BMI 30-39.9)   • Neutropenia (HCC)   • Chronic lower back pain   • Knee pain   • Insomnia   • Hypothyroidism   • Hypokalemia   • Hypertension   • Hyperlipidemia   • Generalized osteoarthritis   • Gastroparesis   • Foot pain   • Chronic obstructive pulmonary disease (HCC)   • Chronic constipation   • Anemia   • Weight gain   • Pain of left breast   • Elevated serum alkaline phosphatase level   • Neck pain   • Volume overload   • Status post total hip replacement, right   • Generalized weakness   • Constipation   • Acute deep vein thrombosis (DVT) of iliac vein of left lower extremity (HCC)   • Acute deep vein thrombosis (DVT) of tibial vein of left lower extremity (HCC)   • Atrial fibrillation (HCC)   • Hyperhomocysteinemia (HCC)     Advance Care Planning  Advance Directive is not on file.  ACP discussion was held with the patient during this visit. Patient has an advance directive (not in EMR), copy requested.    Review of Systems   Constitutional: Positive for fatigue. Negative for chills and fever.   HENT: Negative for congestion, ear pain and sore throat.    Eyes: Negative for visual disturbance.   Respiratory: Negative for cough and shortness of breath.    Cardiovascular: Negative for chest pain, palpitations and leg  "swelling.   Gastrointestinal: Negative for diarrhea, nausea and vomiting.   Endocrine: Negative.    Genitourinary: Negative for dysuria and frequency.   Musculoskeletal: Positive for back pain.   Skin: Negative.    Psychiatric/Behavioral: Negative for dysphoric mood and sleep disturbance. The patient is not nervous/anxious.         Objective    Vitals:    11/01/21 1120   BP: 148/80   Pulse: 93   Temp: 97.7 °F (36.5 °C)   SpO2: 95%   Weight: 93.4 kg (206 lb)   Height: 167.6 cm (66\")     BMI Readings from Last 1 Encounters:   11/01/21 33.25 kg/m²   BMI is above normal parameters. Recommendations include: nutrition counseling    Does the patient have evidence of cognitive impairment? No    Physical Exam  Constitutional:       General: She is not in acute distress.     Appearance: Normal appearance. She is well-developed. She is obese.   HENT:      Head: Normocephalic and atraumatic.      Right Ear: Tympanic membrane, ear canal and external ear normal.      Left Ear: Tympanic membrane, ear canal and external ear normal.      Mouth/Throat:      Mouth: Mucous membranes are moist.      Pharynx: Oropharynx is clear.   Eyes:      Conjunctiva/sclera: Conjunctivae normal.      Pupils: Pupils are equal, round, and reactive to light.   Neck:      Thyroid: No thyromegaly.   Cardiovascular:      Rate and Rhythm: Normal rate and regular rhythm.      Heart sounds: No murmur heard.      Pulmonary:      Effort: Pulmonary effort is normal.      Breath sounds: Normal breath sounds. No wheezing.   Abdominal:      General: Bowel sounds are normal.      Palpations: Abdomen is soft.      Tenderness: There is no abdominal tenderness.   Musculoskeletal:         General: Normal range of motion.      Cervical back: Neck supple.      Right lower leg: Edema present.      Left lower leg: Edema present.      Comments: Hose in place bilateral lower extremities   Lymphadenopathy:      Cervical: No cervical adenopathy.   Skin:     General: Skin is " warm and dry.   Neurological:      Mental Status: She is alert and oriented to person, place, and time.   Psychiatric:         Mood and Affect: Mood normal.         Behavior: Behavior normal.       Lab Results   Component Value Date    HGBA1C 6.1 (H) 09/20/2021            HEALTH RISK ASSESSMENT    Smoking Status:  Social History     Tobacco Use   Smoking Status Never Smoker   Smokeless Tobacco Never Used     Alcohol Consumption:  Social History     Substance and Sexual Activity   Alcohol Use No     Fall Risk Screen:    BESSIE Fall Risk Assessment has not been completed.    Depression Screening:  PHQ-2/PHQ-9 Depression Screening 11/1/2021   Little interest or pleasure in doing things 1   Feeling down, depressed, or hopeless 0   Trouble falling or staying asleep, or sleeping too much -   Feeling tired or having little energy -   Poor appetite or overeating -   Feeling bad about yourself - or that you are a failure or have let yourself or your family down -   Trouble concentrating on things, such as reading the newspaper or watching television -   Moving or speaking so slowly that other people could have noticed. Or the opposite - being so fidgety or restless that you have been moving around a lot more than usual -   Thoughts that you would be better off dead, or of hurting yourself in some way -   Total Score 1   If you checked off any problems, how difficult have these problems made it for you to do your work, take care of things at home, or get along with other people? -       Health Habits and Functional and Cognitive Screening:  Functional & Cognitive Status 11/1/2021   Do you have difficulty preparing food and eating? No   Do you have difficulty bathing yourself, getting dressed or grooming yourself? No   Do you have difficulty using the toilet? No   Do you have difficulty moving around from place to place? No   Do you have trouble with steps or getting out of a bed or a chair? No   Current Diet Well Balanced  Diet   Dental Exam Up to date   Eye Exam Up to date   Exercise (times per week) 2 times per week   Current Exercises Include Walking;Gardening   Do you need help using the phone?  No   Are you deaf or do you have serious difficulty hearing?  No   Do you need help with transportation? No   Do you need help shopping? No   Do you need help preparing meals?  No   Do you need help with housework?  No   Do you need help with laundry? No   Do you need help taking your medications? No   Do you need help managing money? No       Age-appropriate Screening Schedule:  Refer to the list below for future screening recommendations based on patient's age, sex and/or medical conditions. Orders for these recommended tests are listed in the plan section. The patient has been provided with a written plan.    Health Maintenance   Topic Date Due   • DXA SCAN  Never done   • ZOSTER VACCINE (1 of 2) Never done   • MAMMOGRAM  02/24/2018   • INFLUENZA VACCINE  08/01/2021   • LIPID PANEL  02/03/2022   • TDAP/TD VACCINES (3 - Td or Tdap) 04/17/2029              Assessment/Plan   CMS Preventative Services Quick Reference  Risk Factors Identified During Encounter  Obesity/Overweight   The above risks/problems have been discussed with the patient.  Follow up actions/plans if indicated are seen below in the Assessment/Plan Section.  Pertinent information has been shared with the patient in the After Visit Summary.    Diagnoses and all orders for this visit:    1. Encounter for subsequent annual wellness visit (AWV) in Medicare patient (Primary)    2. Black stool  -     CBC & Differential  -     Iron  -     Ferritin    3. History of recurrent deep vein thrombosis (DVT)    4. Stage 3a chronic kidney disease (HCC)  -     Basic Metabolic Panel    5. Iron deficiency anemia, unspecified iron deficiency anemia type    6. SIERRA (obstructive sleep apnea)    7. Localized edema  -     TSH    8. Current use of proton pump inhibitor  -     Vitamin B12    9.  Essential hypertension  -     CBC & Differential  -     Basic Metabolic Panel  -     TSH    Black stool-we will do iron ferritin and CBC and I FOBT x3  Hypertension-controlled  Stage III CKD-we will monitor  Iron deficiency anemia-patient not getting better with her labs Endo her I FOBT's come back positive, will get further work-up  Obstructive sleep apnea-off CPAP for now, patient reassured this is probably why she is getting his wrist as usual and having difficulty sleeping    Follow Up:   Return in about 3 months (around 2/1/2022) for Recheck HTN, Blood sugar.     An After Visit Summary and PPPS were made available to the patient.

## 2021-11-02 LAB
BASOPHILS # BLD AUTO: 0 X10E3/UL (ref 0–0.2)
BASOPHILS NFR BLD AUTO: 0 %
BUN SERPL-MCNC: 28 MG/DL (ref 8–27)
BUN/CREAT SERPL: 22 (ref 12–28)
CALCIUM SERPL-MCNC: 9.9 MG/DL (ref 8.7–10.3)
CHLORIDE SERPL-SCNC: 105 MMOL/L (ref 96–106)
CO2 SERPL-SCNC: 25 MMOL/L (ref 20–29)
CREAT SERPL-MCNC: 1.28 MG/DL (ref 0.57–1)
EOSINOPHIL # BLD AUTO: 0 X10E3/UL (ref 0–0.4)
EOSINOPHIL NFR BLD AUTO: 1 %
ERYTHROCYTE [DISTWIDTH] IN BLOOD BY AUTOMATED COUNT: 15.4 % (ref 11.7–15.4)
FERRITIN SERPL-MCNC: 87 NG/ML (ref 15–150)
GLUCOSE SERPL-MCNC: 91 MG/DL (ref 65–99)
HCT VFR BLD AUTO: 38.8 % (ref 34–46.6)
HGB BLD-MCNC: 12.9 G/DL (ref 11.1–15.9)
IMM GRANULOCYTES # BLD AUTO: 0.1 X10E3/UL (ref 0–0.1)
IMM GRANULOCYTES NFR BLD AUTO: 1 %
IRON SERPL-MCNC: 40 UG/DL (ref 27–139)
LYMPHOCYTES # BLD AUTO: 2.5 X10E3/UL (ref 0.7–3.1)
LYMPHOCYTES NFR BLD AUTO: 29 %
MCH RBC QN AUTO: 29.7 PG (ref 26.6–33)
MCHC RBC AUTO-ENTMCNC: 33.2 G/DL (ref 31.5–35.7)
MCV RBC AUTO: 89 FL (ref 79–97)
MONOCYTES # BLD AUTO: 0.7 X10E3/UL (ref 0.1–0.9)
MONOCYTES NFR BLD AUTO: 8 %
NEUTROPHILS # BLD AUTO: 5.2 X10E3/UL (ref 1.4–7)
NEUTROPHILS NFR BLD AUTO: 61 %
PLATELET # BLD AUTO: 211 X10E3/UL (ref 150–450)
POTASSIUM SERPL-SCNC: 4.9 MMOL/L (ref 3.5–5.2)
RBC # BLD AUTO: 4.34 X10E6/UL (ref 3.77–5.28)
SODIUM SERPL-SCNC: 145 MMOL/L (ref 134–144)
TSH SERPL DL<=0.005 MIU/L-ACNC: 1.95 UIU/ML (ref 0.45–4.5)
VIT B12 SERPL-MCNC: 927 PG/ML (ref 232–1245)
WBC # BLD AUTO: 8.5 X10E3/UL (ref 3.4–10.8)

## 2021-11-05 ENCOUNTER — TELEPHONE (OUTPATIENT)
Dept: FAMILY MEDICINE CLINIC | Facility: CLINIC | Age: 81
End: 2021-11-05

## 2021-11-09 NOTE — TELEPHONE ENCOUNTER
Caller: Latanya Olsen    Relationship: Self    Best call back number: [phone]     Requested Prescriptions: apixaban (ELIQUIS) 2.5 MG tablet tablet   Take 2.5 mg by mouth 2 (Two) Times a Day    Pharmacy where request should be sent:  SHE IS WAITING FOR A WRITTEN PRESCRIPTION    Does the patient have less than a 3 day supply:  [x] Yes  [] No    Omar Lay   11/09/21 12:20 EST

## 2021-11-22 ENCOUNTER — TELEPHONE (OUTPATIENT)
Dept: FAMILY MEDICINE CLINIC | Facility: CLINIC | Age: 81
End: 2021-11-22

## 2021-11-22 NOTE — TELEPHONE ENCOUNTER
Caller: Latanya Olsen    Relationship to patient: Self    Best call back number: [phone]     Patient is needing: PATIENT IS RETURNING A CALL FOR GIANNI AND DID NOT LEAVE ANY FURTHER DETAILS.  PLEASE CALL AND ADVISE.

## 2021-11-29 ENCOUNTER — OFFICE VISIT (OUTPATIENT)
Dept: FAMILY MEDICINE CLINIC | Facility: CLINIC | Age: 81
End: 2021-11-29

## 2021-11-29 VITALS
OXYGEN SATURATION: 98 % | SYSTOLIC BLOOD PRESSURE: 152 MMHG | HEIGHT: 66 IN | DIASTOLIC BLOOD PRESSURE: 84 MMHG | WEIGHT: 207.6 LBS | TEMPERATURE: 97.1 F | BODY MASS INDEX: 33.37 KG/M2 | HEART RATE: 88 BPM

## 2021-11-29 DIAGNOSIS — Z86.718 HISTORY OF RECURRENT DEEP VEIN THROMBOSIS (DVT): ICD-10-CM

## 2021-11-29 DIAGNOSIS — S81.801A WOUND OF RIGHT LOWER EXTREMITY, INITIAL ENCOUNTER: ICD-10-CM

## 2021-11-29 DIAGNOSIS — M80.00XD AGE-RELATED OSTEOPOROSIS WITH CURRENT PATHOLOGICAL FRACTURE WITH ROUTINE HEALING, SUBSEQUENT ENCOUNTER: ICD-10-CM

## 2021-11-29 DIAGNOSIS — M51.36 DDD (DEGENERATIVE DISC DISEASE), LUMBAR: ICD-10-CM

## 2021-11-29 DIAGNOSIS — R60.0 LOCALIZED EDEMA: Primary | ICD-10-CM

## 2021-11-29 PROCEDURE — 99214 OFFICE O/P EST MOD 30 MIN: CPT | Performed by: FAMILY MEDICINE

## 2021-11-29 RX ORDER — HYDROCODONE BITARTRATE AND ACETAMINOPHEN 5; 325 MG/1; MG/1
1 TABLET ORAL EVERY 6 HOURS PRN
COMMUNITY
Start: 2021-11-18 | End: 2023-01-31

## 2021-11-29 RX ORDER — LANOLIN ALCOHOL/MO/W.PET/CERES
3 CREAM (GRAM) TOPICAL
COMMUNITY
Start: 2021-11-12 | End: 2023-01-31

## 2021-11-29 RX ORDER — ALENDRONATE SODIUM 70 MG/1
70 TABLET ORAL
Qty: 12 TABLET | Refills: 3 | Status: SHIPPED | OUTPATIENT
Start: 2021-11-29 | End: 2023-01-31

## 2021-11-29 NOTE — PROGRESS NOTES
"Chief Complaint  sore on leg (right)    Subjective          Latanya Olsen presents to [facility]  Patient presents to discuss an open wound on her right shin.  She is worried that her Eliquis is causing it.  Has been keeping it clean but is just not healing.   She denies any drainage.   She denies any fever or chills.  She needs clearance for an epidural to hold her Eliquis.  Her last blood clot was back in March.  She wants to talk about treatment for osteoporosis because of her recent spine fracture.  She was wondering about the injection but voiced understanding that it is better to do the Fosamax.      Objective   Vital Signs:   /84   Pulse 88   Temp 97.1 °F (36.2 °C)   Ht 167.6 cm (66\")   Wt 94.2 kg (207 lb 9.6 oz)   SpO2 98%   BMI 33.51 kg/m²     Physical Exam  Constitutional:       General: She is not in acute distress.     Appearance: Normal appearance. She is well-developed. She is obese.   HENT:      Head: Normocephalic and atraumatic.      Right Ear: External ear normal.      Left Ear: External ear normal.   Eyes:      Conjunctiva/sclera: Conjunctivae normal.      Pupils: Pupils are equal, round, and reactive to light.   Neck:      Thyroid: No thyromegaly.   Cardiovascular:      Rate and Rhythm: Normal rate and regular rhythm.   Pulmonary:      Effort: Pulmonary effort is normal.      Breath sounds: Normal breath sounds.   Musculoskeletal:      Right lower leg: Edema present.      Left lower leg: Edema present.   Skin:     Findings: Lesion present.      Comments: Right anterior shin with a 2 x 6 cm black wound with some peeling skin, surrounding erythema without warmth or drainage   Neurological:      Mental Status: She is alert and oriented to person, place, and time.   Psychiatric:         Mood and Affect: Mood normal.         Behavior: Behavior normal.        Result Review :                 Assessment and Plan    Diagnoses and all orders for this " visit:    1. Localized edema (Primary)  -     Ambulatory Referral to Wound Clinic    2. History of recurrent deep vein thrombosis (DVT)  -     Ambulatory Referral to Wound Clinic    3. Wound of right lower extremity, initial encounter  -     Ambulatory Referral to Wound Clinic    4. DDD (degenerative disc disease), lumbar    5. Age-related osteoporosis with current pathological fracture with routine healing, subsequent encounter  -     alendronate (Fosamax) 70 MG tablet; Take 1 tablet by mouth Every 7 (Seven) Days.  Dispense: 12 tablet; Refill: 3    Edema from venous stasis with history of recurrent DVT-continue Eliquis  Wound of extremity-go to wound clinic to help it heal  Degenerative disc disease-cleared patient for holding Eliquis for couple of days to have epidural  Osteoporosis-we will start Fosamax    Follow Up   No follow-ups on file.  Patient was given instructions and counseling regarding her condition or for health maintenance advice. Please see specific information pulled into the AVS if appropriate.

## 2021-12-03 ENCOUNTER — APPOINTMENT (OUTPATIENT)
Dept: WOUND CARE | Facility: HOSPITAL | Age: 81
End: 2021-12-03

## 2021-12-03 PROCEDURE — G0463 HOSPITAL OUTPT CLINIC VISIT: HCPCS

## 2021-12-07 ENCOUNTER — APPOINTMENT (OUTPATIENT)
Dept: WOUND CARE | Facility: HOSPITAL | Age: 81
End: 2021-12-07

## 2021-12-07 ENCOUNTER — TELEPHONE (OUTPATIENT)
Dept: FAMILY MEDICINE CLINIC | Facility: CLINIC | Age: 81
End: 2021-12-07

## 2021-12-10 ENCOUNTER — APPOINTMENT (OUTPATIENT)
Dept: WOUND CARE | Facility: HOSPITAL | Age: 81
End: 2021-12-10

## 2021-12-16 ENCOUNTER — APPOINTMENT (OUTPATIENT)
Dept: WOUND CARE | Facility: HOSPITAL | Age: 81
End: 2021-12-16

## 2021-12-23 ENCOUNTER — APPOINTMENT (OUTPATIENT)
Dept: WOUND CARE | Facility: HOSPITAL | Age: 81
End: 2021-12-23

## 2021-12-30 ENCOUNTER — APPOINTMENT (OUTPATIENT)
Dept: WOUND CARE | Facility: HOSPITAL | Age: 81
End: 2021-12-30

## 2022-01-07 ENCOUNTER — APPOINTMENT (OUTPATIENT)
Dept: WOUND CARE | Facility: HOSPITAL | Age: 82
End: 2022-01-07

## 2022-01-11 ENCOUNTER — APPOINTMENT (OUTPATIENT)
Dept: WOUND CARE | Facility: HOSPITAL | Age: 82
End: 2022-01-11

## 2022-01-11 DIAGNOSIS — Z86.718 HISTORY OF RECURRENT DEEP VEIN THROMBOSIS (DVT): Primary | ICD-10-CM

## 2022-01-13 RX ORDER — LACTULOSE 10 G/15ML
SOLUTION ORAL
Qty: 1350 ML | Refills: 2 | Status: SHIPPED | OUTPATIENT
Start: 2022-01-13 | End: 2022-04-18

## 2022-01-13 NOTE — TELEPHONE ENCOUNTER
Caller: Pretty Latanya    Relationship: Self    Best call back number: [phone]    Requested Prescriptions:   Requested Prescriptions     Pending Prescriptions Disp Refills   • lactulose (CHRONULAC) 10 GM/15ML solution          Pharmacy where request should be sent: [id number] - [place], [place] - [address] - [phone]  - [phone]     Additional details provided by patient: PLEASE ALSO ADVISE IF PAPERWORK FOR ELIQUIS IS READY TO BE PICKED UP.     Does the patient have less than a 3 day supply:  [x] Yes  [] No    Meño Leiva Rep   01/13/22 15:11 EST

## 2022-01-14 ENCOUNTER — APPOINTMENT (OUTPATIENT)
Dept: WOUND CARE | Facility: HOSPITAL | Age: 82
End: 2022-01-14

## 2022-01-14 PROCEDURE — G0463 HOSPITAL OUTPT CLINIC VISIT: HCPCS

## 2022-02-01 ENCOUNTER — OFFICE VISIT (OUTPATIENT)
Dept: FAMILY MEDICINE CLINIC | Facility: CLINIC | Age: 82
End: 2022-02-01

## 2022-02-01 VITALS
BODY MASS INDEX: 34.55 KG/M2 | DIASTOLIC BLOOD PRESSURE: 80 MMHG | OXYGEN SATURATION: 99 % | WEIGHT: 215 LBS | HEART RATE: 82 BPM | TEMPERATURE: 97.1 F | SYSTOLIC BLOOD PRESSURE: 130 MMHG | HEIGHT: 66 IN

## 2022-02-01 DIAGNOSIS — R60.0 LOCALIZED EDEMA: ICD-10-CM

## 2022-02-01 DIAGNOSIS — I10 ESSENTIAL HYPERTENSION: Primary | ICD-10-CM

## 2022-02-01 DIAGNOSIS — J06.9 UPPER RESPIRATORY TRACT INFECTION, UNSPECIFIED TYPE: ICD-10-CM

## 2022-02-01 DIAGNOSIS — N18.31 STAGE 3A CHRONIC KIDNEY DISEASE: ICD-10-CM

## 2022-02-01 DIAGNOSIS — D50.9 IRON DEFICIENCY ANEMIA, UNSPECIFIED IRON DEFICIENCY ANEMIA TYPE: ICD-10-CM

## 2022-02-01 LAB
EXPIRATION DATE: NORMAL
EXPIRATION DATE: NORMAL
FLUAV AG NPH QL: NEGATIVE
FLUBV AG NPH QL: NEGATIVE
INTERNAL CONTROL: NORMAL
INTERNAL CONTROL: NORMAL
Lab: NORMAL
Lab: NORMAL
S PYO AG THROAT QL: NEGATIVE

## 2022-02-01 PROCEDURE — 99214 OFFICE O/P EST MOD 30 MIN: CPT | Performed by: FAMILY MEDICINE

## 2022-02-01 PROCEDURE — 87880 STREP A ASSAY W/OPTIC: CPT | Performed by: FAMILY MEDICINE

## 2022-02-01 PROCEDURE — 87804 INFLUENZA ASSAY W/OPTIC: CPT | Performed by: FAMILY MEDICINE

## 2022-02-01 RX ORDER — BENZONATATE 200 MG/1
200 CAPSULE ORAL 3 TIMES DAILY PRN
Qty: 30 CAPSULE | Refills: 0 | Status: SHIPPED | OUTPATIENT
Start: 2022-02-01 | End: 2022-08-01

## 2022-02-01 RX ORDER — IPRATROPIUM BROMIDE 42 UG/1
2 SPRAY, METERED NASAL 4 TIMES DAILY
Qty: 15 ML | Refills: 0 | Status: SHIPPED | OUTPATIENT
Start: 2022-02-01

## 2022-02-01 NOTE — PROGRESS NOTES
"Chief Complaint  Med Refill, Anemia, Hypertension, and Leg Swelling (left leg )    Subjective          Latanya Olsen presents to [facility]  History of Present Illness     Patient verbalized consent to the visit recording.    The patient presents today for 3-month follow-up on hypertension, anemia, and lower extremity pain; however, she awoke up yesterday morning with a sore throat, headache, and congestion. She states she has been experiencing chills and a cough productive of sputum, although denies any fevers. Denies exposure to individuals with similar symptoms. She  has received the COVID vaccine series including the booster.     Hypertension.   She states her blood pressure has been averaging in an appropriate range when monitoring at home.     Wound on lower extremity.  The patient notes the wound has healed appropriately and she has been discharged from wound care. She states she has been compliant with wearing compression stockings.     Objective    Review of Systems  A review of systems was performed, and pertinent negatives are noted in the HPI.     Vital Signs:   /80   Pulse 82   Temp 97.1 °F (36.2 °C)   Ht 167.6 cm (66\")   Wt 97.5 kg (215 lb)   SpO2 99%   BMI 34.70 kg/m²     Physical Exam  Constitutional:       General: She is not in acute distress.     Appearance: Normal appearance. She is well-developed.   HENT:      Head: Normocephalic and atraumatic.      Right Ear: Tympanic membrane, ear canal and external ear normal.      Left Ear: Tympanic membrane, ear canal and external ear normal.      Nose:      Comments: Nares with slight erythema of mucosa, minimal erythema.     Mouth/Throat:      Mouth: Mucous membranes are moist.      Pharynx: Oropharynx is clear.      Comments: Posterior pharynx without petechiae or exudate.   Eyes:      Conjunctiva/sclera: Conjunctivae normal.      Pupils: Pupils are equal, round, and reactive to light.   Neck:      Thyroid: " No thyromegaly.   Cardiovascular:      Rate and Rhythm: Normal rate and regular rhythm.      Heart sounds: No murmur heard.      Pulmonary:      Effort: Pulmonary effort is normal.      Breath sounds: Normal breath sounds. No wheezing.   Abdominal:      General: Bowel sounds are normal.      Palpations: Abdomen is soft.      Tenderness: There is no abdominal tenderness.   Musculoskeletal:         General: Normal range of motion.      Cervical back: Neck supple.      Right lower leg: Edema present.      Left lower leg: Edema present.      Comments: 3+ chronic edema bilateral lower extremities, has hose in place.   Lymphadenopathy:      Cervical: No cervical adenopathy.   Skin:     General: Skin is warm and dry.   Neurological:      Mental Status: She is alert and oriented to person, place, and time.   Psychiatric:         Mood and Affect: Mood normal.         Behavior: Behavior normal.        Result Review :   The following data was reviewed by: Meredith Lea Kehrer, MD on 02/01/2022:  Influenza A&B    [facility] 2/1/22   Rapid Influenza A Ag Negative   Rapid Influenza B Ag Negative           Strep    Common Labsle 2/1/22   POC Strep A, Molecular Negative                     Assessment and Plan    Diagnoses and all orders for this visit:    1. Essential hypertension (Primary)        -    The patient's blood pressure is controlled. We are going to obtain monitoring labs.   -     Basic Metabolic Panel    2. Localized edema    3. Iron deficiency anemia, unspecified iron deficiency anemia type        -     We will obtain monitoring lab work.  -     CBC & Differential  -     Ferritin  -     Iron    4. Stage 3a chronic kidney disease (HCC)  -     Basic Metabolic Panel    5. Upper respiratory tract infection, unspecified type        -     We will rule out strep, COVID, and influenza today in office and recommend symptomatic treatment.   -     POC Rapid Strep A  -     POC Influenza A / B  -     COVID-19,[facility] ROUTINE,  NP/OP SWAB IN TRANSPORT MEDIA OR ESWAB 72 HR TAT - Swab, Nasopharynx; Future  -     ipratropium (ATROVENT) 0.06 % nasal spray; 2 sprays into the nostril(s) as directed by provider 4 (Four) Times a Day.  Dispense: 15 mL; Refill: 0  -     benzonatate (TESSALON) 200 MG capsule; Take 1 capsule by mouth 3 (Three) Times a Day As Needed for Cough.  Dispense: 30 capsule; Refill: 0  -     COVID-19,[facility] ROUTINE, NP/OP SWAB IN TRANSPORT MEDIA OR ESWAB 72 HR TAT - Swab, Nasopharynx        Follow Up   Return in about 6 months (around 8/1/2022) for Recheck BP.  Patient was given instructions and counseling regarding her condition or for health maintenance advice. Please see specific information pulled into the AVS if appropriate.     Transcribed from ambient dictation for Meredith Lea Kehrer, MD by Elisabet Smith.  02/01/22   15:13 EST

## 2022-02-02 LAB
BASOPHILS # BLD AUTO: 0 X10E3/UL (ref 0–0.2)
BASOPHILS NFR BLD AUTO: 0 %
BUN SERPL-MCNC: 20 MG/DL (ref 8–27)
BUN/CREAT SERPL: 17 (ref 12–28)
CALCIUM SERPL-MCNC: 9.2 MG/DL (ref 8.7–10.3)
CHLORIDE SERPL-SCNC: 102 MMOL/L (ref 96–106)
CO2 SERPL-SCNC: 24 MMOL/L (ref 20–29)
CREAT SERPL-MCNC: 1.16 MG/DL (ref 0.57–1)
EOSINOPHIL # BLD AUTO: 0.1 X10E3/UL (ref 0–0.4)
EOSINOPHIL NFR BLD AUTO: 1 %
ERYTHROCYTE [DISTWIDTH] IN BLOOD BY AUTOMATED COUNT: 12.4 % (ref 11.7–15.4)
FERRITIN SERPL-MCNC: 84 NG/ML (ref 15–150)
GLUCOSE SERPL-MCNC: 96 MG/DL (ref 65–99)
HCT VFR BLD AUTO: 35.3 % (ref 34–46.6)
HGB BLD-MCNC: 11.6 G/DL (ref 11.1–15.9)
IMM GRANULOCYTES # BLD AUTO: 0 X10E3/UL (ref 0–0.1)
IMM GRANULOCYTES NFR BLD AUTO: 0 %
IRON SERPL-MCNC: 37 UG/DL (ref 27–139)
LABCORP SARS-COV-2, NAA 2 DAY TAT: NORMAL
LYMPHOCYTES # BLD AUTO: 1.5 X10E3/UL (ref 0.7–3.1)
LYMPHOCYTES NFR BLD AUTO: 27 %
MCH RBC QN AUTO: 29.2 PG (ref 26.6–33)
MCHC RBC AUTO-ENTMCNC: 32.9 G/DL (ref 31.5–35.7)
MCV RBC AUTO: 89 FL (ref 79–97)
MONOCYTES # BLD AUTO: 0.6 X10E3/UL (ref 0.1–0.9)
MONOCYTES NFR BLD AUTO: 11 %
NEUTROPHILS # BLD AUTO: 3.5 X10E3/UL (ref 1.4–7)
NEUTROPHILS NFR BLD AUTO: 61 %
PLATELET # BLD AUTO: 168 X10E3/UL (ref 150–450)
POTASSIUM SERPL-SCNC: 4.1 MMOL/L (ref 3.5–5.2)
RBC # BLD AUTO: 3.97 X10E6/UL (ref 3.77–5.28)
SARS-COV-2 RNA RESP QL NAA+PROBE: DETECTED
SODIUM SERPL-SCNC: 139 MMOL/L (ref 134–144)
WBC # BLD AUTO: 5.7 X10E3/UL (ref 3.4–10.8)

## 2022-02-03 ENCOUNTER — TELEPHONE (OUTPATIENT)
Dept: FAMILY MEDICINE CLINIC | Facility: CLINIC | Age: 82
End: 2022-02-03

## 2022-02-03 NOTE — TELEPHONE ENCOUNTER
Caller: Pretty Latnaya    Relationship: Self    Best call back number:     Caller requesting test results:     What test was performed: COVID TEST    When was the test performed: 02/01/22    Where was the test performed: OFFICE OF DR KEHRER    Additional notes: PATIENT IS CALLING IN TO SEE IF THE OFFICE HAS HER COVID RESULTS.

## 2022-04-13 ENCOUNTER — OFFICE VISIT (OUTPATIENT)
Dept: FAMILY MEDICINE CLINIC | Facility: CLINIC | Age: 82
End: 2022-04-13

## 2022-04-13 VITALS
HEIGHT: 66 IN | SYSTOLIC BLOOD PRESSURE: 124 MMHG | DIASTOLIC BLOOD PRESSURE: 76 MMHG | WEIGHT: 221.6 LBS | BODY MASS INDEX: 35.62 KG/M2 | HEART RATE: 72 BPM | OXYGEN SATURATION: 97 % | TEMPERATURE: 98.6 F

## 2022-04-13 DIAGNOSIS — R60.0 LOCALIZED EDEMA: Primary | ICD-10-CM

## 2022-04-13 DIAGNOSIS — G62.9 PERIPHERAL POLYNEUROPATHY: ICD-10-CM

## 2022-04-13 DIAGNOSIS — I10 ESSENTIAL HYPERTENSION: ICD-10-CM

## 2022-04-13 DIAGNOSIS — Z86.718 HISTORY OF RECURRENT DEEP VEIN THROMBOSIS (DVT): ICD-10-CM

## 2022-04-13 DIAGNOSIS — R60.9 EDEMA, UNSPECIFIED TYPE: ICD-10-CM

## 2022-04-13 DIAGNOSIS — R06.00 DYSPNEA, UNSPECIFIED TYPE: ICD-10-CM

## 2022-04-13 DIAGNOSIS — N18.31 STAGE 3A CHRONIC KIDNEY DISEASE: ICD-10-CM

## 2022-04-13 DIAGNOSIS — I45.10 INCOMPLETE RBBB: ICD-10-CM

## 2022-04-13 PROCEDURE — 99214 OFFICE O/P EST MOD 30 MIN: CPT | Performed by: FAMILY MEDICINE

## 2022-04-13 PROCEDURE — 93000 ELECTROCARDIOGRAM COMPLETE: CPT | Performed by: FAMILY MEDICINE

## 2022-04-13 RX ORDER — POTASSIUM CHLORIDE 750 MG/1
10 TABLET, FILM COATED, EXTENDED RELEASE ORAL 2 TIMES DAILY
COMMUNITY

## 2022-04-13 RX ORDER — UBIDECARENONE 100 MG
100 CAPSULE ORAL DAILY
COMMUNITY

## 2022-04-13 NOTE — PROGRESS NOTES
"Chief Complaint  Leg Pain and Leg Swelling    Subjective          Latanya Olsen presents to [facility] PRIMARY [facility]  History of Present Illness    Localized edema  The patient was not fitted for support hose due to they could not get any to fit on her legs. It was recommended that she have some custom made. She takes Lasix 40 mg. She states she did have wraps on her legs when in the nursing home in 2021. She does have a history of blood clots in her legs. She believes she was seen at the edema clinic. She states they wanted her to go three times per week but it was too far for her to travel. She denies any skin breakdown. She has had sores from edema in the past and was treated at wound care, which helped. She states she lives alone and does not have family nearby.     Objective    A review of systems was performed, and the pertinent positives are noted in the HPI.   Vital Signs:   /76   Pulse 72   Temp 98.6 °F (37 °C)   Ht 167.6 cm (66\")   Wt 101 kg (221 lb 9.6 oz)   SpO2 97%   BMI 35.77 kg/m²            Physical Exam  Vitals reviewed.   Constitutional:       Appearance: Normal appearance. She is not ill-appearing.   HENT:      Right Ear: External ear normal.      Left Ear: External ear normal.      Nose: Nose normal.   Eyes:      Extraocular Movements: Extraocular movements intact.      Conjunctiva/sclera: Conjunctivae normal.   Cardiovascular:      Rate and Rhythm: Normal rate and regular rhythm.      Heart sounds: No murmur heard.     Comments: Bilateral 1+ to 2+ pitting edema to the knees  Pulmonary:      Effort: Pulmonary effort is normal.      Breath sounds: Normal breath sounds.   Abdominal:      Palpations: Abdomen is soft.      Tenderness: There is no abdominal tenderness.   Musculoskeletal:         General: No swelling. Normal range of motion.   Lymphadenopathy:      Head:      Right side of head: No tonsillar adenopathy.      Left side of head: No tonsillar adenopathy.     "  Cervical: No cervical adenopathy.      Comments: Chronic lymphedema   Skin:     General: Skin is warm.      Findings: No rash.   Neurological:      General: No focal deficit present.      Mental Status: She is alert and oriented to person, place, and time.   Psychiatric:         Mood and Affect: Mood normal.         Behavior: Behavior normal.        Result Review :            ECG 12 Lead    Date/Time: 4/13/2022 4:15 PM  Performed by: Kehrer, Meredith Lea, MD  Authorized by: Kehrer, Meredith Lea, MD   Comparison: compared with previous ECG from 4/20/2021  Similar to previous ECG  Rhythm: sinus rhythm  Rate: normal  Conduction: incomplete right bundle branch block  Other findings: low voltage    Clinical impression: abnormal EKG              Assessment and Plan    Diagnoses and all orders for this visit:    1. Localized edema (Primary)  -     Vitamin B12  -     TSH  -     BNP  -     CBC & Differential  -     Comprehensive Metabolic Panel  -     Ambulatory Referral to Lymphedema Clinic    2. Stage 3a chronic kidney disease (HCC)    3. Essential hypertension  -     CBC & Differential  -     Comprehensive Metabolic Panel    4. Dyspnea, unspecified type  -     BNP    5. Edema, unspecified type  -     Ambulatory Referral to Lymphedema Clinic    6. Peripheral polyneuropathy  -     Vitamin B12  -     TSH  -     BNP  -     CBC & Differential  -     Comprehensive Metabolic Panel  -     Ambulatory Referral to Neurology    7. History of recurrent deep vein thrombosis (DVT)  -     Ambulatory Referral to Lymphedema Clinic    8. Incomplete RBBB    Other orders  -     ECG 12 Lead      1. Localized edema  · Her chronic edema is a question of CIPD verses damage to the nerves secondary to chronic swelling  · Will refer to neurology  · Will check labs  · Will inquire about wraps for her legs  · Will order EKG      Follow Up   No follow-ups on file.  Patient was given instructions and counseling regarding her condition or for health  maintenance advice. Please see specific information pulled into the AVS if appropriate.     Transcribed from ambient dictation for Meredith Lea Kehrer, MD by Malissa Bryant.  04/13/22   18:03 EDT    Patient verbalized consent to the visit recording.  I have personally performed the services described in this document as transcribed by the above individual, and it is both accurate and complete.  Malissa Bryant  4/13/2022  18:03 EDT

## 2022-04-14 LAB
ALBUMIN SERPL-MCNC: 4.5 G/DL (ref 3.6–4.6)
ALBUMIN/GLOB SERPL: 2.6 {RATIO} (ref 1.2–2.2)
ALP SERPL-CCNC: 124 IU/L (ref 44–121)
ALT SERPL-CCNC: 15 IU/L (ref 0–32)
AST SERPL-CCNC: 14 IU/L (ref 0–40)
BASOPHILS # BLD AUTO: 0 X10E3/UL (ref 0–0.2)
BASOPHILS NFR BLD AUTO: 0 %
BILIRUB SERPL-MCNC: <0.2 MG/DL (ref 0–1.2)
BNP SERPL-MCNC: 31.8 PG/ML (ref 0–100)
BUN SERPL-MCNC: 32 MG/DL (ref 8–27)
BUN/CREAT SERPL: 24 (ref 12–28)
CALCIUM SERPL-MCNC: 9.6 MG/DL (ref 8.7–10.3)
CHLORIDE SERPL-SCNC: 102 MMOL/L (ref 96–106)
CO2 SERPL-SCNC: 22 MMOL/L (ref 20–29)
CREAT SERPL-MCNC: 1.34 MG/DL (ref 0.57–1)
EGFRCR SERPLBLD CKD-EPI 2021: 40 ML/MIN/1.73
EOSINOPHIL # BLD AUTO: 0.1 X10E3/UL (ref 0–0.4)
EOSINOPHIL NFR BLD AUTO: 2 %
ERYTHROCYTE [DISTWIDTH] IN BLOOD BY AUTOMATED COUNT: 13.1 % (ref 11.7–15.4)
GLOBULIN SER CALC-MCNC: 1.7 G/DL (ref 1.5–4.5)
GLUCOSE SERPL-MCNC: 99 MG/DL (ref 65–99)
HCT VFR BLD AUTO: 34.9 % (ref 34–46.6)
HGB BLD-MCNC: 11.6 G/DL (ref 11.1–15.9)
IMM GRANULOCYTES # BLD AUTO: 0 X10E3/UL (ref 0–0.1)
IMM GRANULOCYTES NFR BLD AUTO: 0 %
LYMPHOCYTES # BLD AUTO: 2.6 X10E3/UL (ref 0.7–3.1)
LYMPHOCYTES NFR BLD AUTO: 35 %
MCH RBC QN AUTO: 29.4 PG (ref 26.6–33)
MCHC RBC AUTO-ENTMCNC: 33.2 G/DL (ref 31.5–35.7)
MCV RBC AUTO: 88 FL (ref 79–97)
MONOCYTES # BLD AUTO: 0.7 X10E3/UL (ref 0.1–0.9)
MONOCYTES NFR BLD AUTO: 9 %
NEUTROPHILS # BLD AUTO: 3.9 X10E3/UL (ref 1.4–7)
NEUTROPHILS NFR BLD AUTO: 54 %
PLATELET # BLD AUTO: 221 X10E3/UL (ref 150–450)
POTASSIUM SERPL-SCNC: 4.6 MMOL/L (ref 3.5–5.2)
PROT SERPL-MCNC: 6.2 G/DL (ref 6–8.5)
RBC # BLD AUTO: 3.95 X10E6/UL (ref 3.77–5.28)
SODIUM SERPL-SCNC: 140 MMOL/L (ref 134–144)
TSH SERPL DL<=0.005 MIU/L-ACNC: 4.45 UIU/ML (ref 0.45–4.5)
VIT B12 SERPL-MCNC: 669 PG/ML (ref 232–1245)
WBC # BLD AUTO: 7.4 X10E3/UL (ref 3.4–10.8)

## 2022-04-17 RX ORDER — LACTULOSE 10 G/15ML
SOLUTION ORAL
Qty: 1,350 ML | Refills: 2 | Status: CANCELLED | OUTPATIENT
Start: 2022-04-17

## 2022-04-18 ENCOUNTER — TELEPHONE (OUTPATIENT)
Dept: FAMILY MEDICINE CLINIC | Facility: CLINIC | Age: 82
End: 2022-04-18

## 2022-04-18 RX ORDER — LACTULOSE 10 G/15ML
SOLUTION ORAL
Qty: 1350 ML | Refills: 2 | Status: SHIPPED | OUTPATIENT
Start: 2022-04-18 | End: 2023-02-16 | Stop reason: SDUPTHER

## 2022-04-18 NOTE — TELEPHONE ENCOUNTER
PATIENT SCHEDULED AN APPOINTMENT WITH NEUROLOGIST ON June 28, 2022.    PATIENT ALSO CALLED ABOUT REFERRAL TO GET HER LEGS WRAPPED AS THEY ARE VERY SWOLLEN.    PLEASE ADVISE  [phone]

## 2022-04-18 NOTE — TELEPHONE ENCOUNTER
She was referred to occupational therapy at [place] for their lymphedema clinic to do leg wraps.

## 2022-04-18 NOTE — TELEPHONE ENCOUNTER
SPOKE WITH PATIENT . SHE HAS NUMBER FOR [facility]. WILL CALL BACK IF SHE HAS ANY PROBLEMS

## 2022-04-18 NOTE — TELEPHONE ENCOUNTER
Has appt been made for [facility] wound [facility]

## 2022-04-20 NOTE — TELEPHONE ENCOUNTER
She does not qualify for home health right now.  I do not know why she would need to see them 3 times a week if she does not have any open wounds.  Unna boots only need to be changed once weekly for swelling.  Is there is somebody that we can talk to there?

## 2022-04-20 NOTE — TELEPHONE ENCOUNTER
THE PATIENT WOULD LIKE TO HAVE HOME HEALTH ORDERED TO COME OUT AND DRESS HER LEG. SHE STATES THAT [facility] WANTS TO SEE HER 3 TIMES A WEEK, AND SHE CANNOT COME TO THE OFFICE THAT MANY TIMES. SHE DOES NOT LIKE TO GET OUT. PLEASE ADVISE BY CALLING [phone].

## 2022-04-20 NOTE — TELEPHONE ENCOUNTER
Spoke with pt she was instructed to go 3 days a week and she can not drive to [facility] that many times a week,  she will call back with the number to call herb GARCIA

## 2022-04-25 NOTE — TELEPHONE ENCOUNTER
So, if Unna boots only need to be placed on a patient once per week and changed, who and all of Religion will do Unna boots on a patient instead of forcing them to be seen 3-5 times per week in a clinic?

## 2022-04-25 NOTE — TELEPHONE ENCOUNTER
I have spoke with the wound care clinic and they lymphadema clinic  the policy is that she must agree to be seen in the office 3-5 times a week.  If they can not commit to that they will not be seen.  I explained that to the patient she wanted to see if home health would come out and wrap legs,   per insurance she probably doesn't qualify for home health.  Patient is at a loss on what to do

## 2022-04-25 NOTE — TELEPHONE ENCOUNTER
AILYN WITH  WOUND CENTER IS CALLING TO LET THE OFFICE KNOW THAT THIS IS NOT THEIR PATIENT ANYMORE. SHE WAS DISCHARGED FROM THEIR CARE 1/14/2022.    SHE IS CURRENTLY GOING TO [place] [facility] [phone]

## 2022-04-26 ENCOUNTER — TELEPHONE (OUTPATIENT)
Dept: FAMILY MEDICINE CLINIC | Facility: CLINIC | Age: 82
End: 2022-04-26

## 2022-04-26 DIAGNOSIS — Z74.09 IMMOBILITY: ICD-10-CM

## 2022-04-26 DIAGNOSIS — R60.0 LOCALIZED EDEMA: Primary | ICD-10-CM

## 2022-04-26 NOTE — TELEPHONE ENCOUNTER
We can try the home health since it is getting harder for her to get out.  Just not sure about a qualifying diagnosis.  Does she have a preference on which organization?

## 2022-04-29 ENCOUNTER — TELEPHONE (OUTPATIENT)
Dept: FAMILY MEDICINE CLINIC | Facility: CLINIC | Age: 82
End: 2022-04-29

## 2022-05-03 RX ORDER — GABAPENTIN 300 MG/1
CAPSULE ORAL
Qty: 30 CAPSULE | OUTPATIENT
Start: 2022-05-03

## 2022-05-03 RX ORDER — AMOXICILLIN 250 MG
2 CAPSULE ORAL 2 TIMES DAILY
Start: 2022-05-03

## 2022-05-03 NOTE — TELEPHONE ENCOUNTER
Spoke to patient and informed her that she would need to come in and see dr cordoba for that since it is a narcotic prescription

## 2022-05-06 ENCOUNTER — TELEPHONE (OUTPATIENT)
Dept: FAMILY MEDICINE CLINIC | Facility: CLINIC | Age: 82
End: 2022-05-06

## 2022-05-09 ENCOUNTER — OFFICE VISIT (OUTPATIENT)
Dept: FAMILY MEDICINE CLINIC | Facility: CLINIC | Age: 82
End: 2022-05-09

## 2022-05-09 VITALS
DIASTOLIC BLOOD PRESSURE: 80 MMHG | TEMPERATURE: 98.6 F | WEIGHT: 228 LBS | SYSTOLIC BLOOD PRESSURE: 168 MMHG | BODY MASS INDEX: 36.64 KG/M2 | HEIGHT: 66 IN | OXYGEN SATURATION: 94 % | HEART RATE: 92 BPM

## 2022-05-09 DIAGNOSIS — N18.31 STAGE 3A CHRONIC KIDNEY DISEASE: ICD-10-CM

## 2022-05-09 DIAGNOSIS — G62.9 PERIPHERAL POLYNEUROPATHY: ICD-10-CM

## 2022-05-09 DIAGNOSIS — R60.0 LOCALIZED EDEMA: Primary | ICD-10-CM

## 2022-05-09 DIAGNOSIS — K59.00 CONSTIPATION, UNSPECIFIED CONSTIPATION TYPE: ICD-10-CM

## 2022-05-09 PROCEDURE — 99214 OFFICE O/P EST MOD 30 MIN: CPT | Performed by: FAMILY MEDICINE

## 2022-05-09 RX ORDER — GABAPENTIN 300 MG/1
300 CAPSULE ORAL 2 TIMES DAILY
Qty: 60 CAPSULE | Refills: 2 | Status: SHIPPED | OUTPATIENT
Start: 2022-05-09 | End: 2022-08-01 | Stop reason: SINTOL

## 2022-05-09 RX ORDER — AMOXICILLIN 250 MG
2 CAPSULE ORAL 2 TIMES DAILY
Qty: 120 TABLET | Refills: 2 | Status: SHIPPED | OUTPATIENT
Start: 2022-05-09 | End: 2022-07-21

## 2022-05-09 NOTE — PROGRESS NOTES
"Chief Complaint  follow up  (Gabapentin )    Subjective          Latanya Olsen presents to [facility] PRIMARY [facility]  History of Present Illness    Peripheral polyneuropathy  the patient is accompanied by a caregiver. The patient reports she was told she has neuropathy while having her toenails nipped at Dr. Pinto. She underwent an EMG. She was prescribed gabapentin that she took once a day, in the late afternoon, which helped. She states she has been off the gabapentin for 1 week.  She notes bilateral leg pain every 2 hours. She notes waking up with pain in the night. She does have an appointment with neurology on 06/27/2022. The patient reports tingling in her buttocks. She questions if it is related to 3 broken bones in her back in 2021. The patient states she wears a back brace. She reports shortness of breath with exercise. She adds, her lung doctor told her he did not see anymore damage to her lungs. She denies bloating in her belly. The patient reports taking a multivitamin, COQ10, zinc, calcium, magnesium, and potassium.     Edema  The patient notes swelling in bilateral lower extremities. She notes having her legs wrapped with zinc wrap once per week by home health. She states she is taking Zinc 50 mg by mouth. She states she has been nauseated.     Objective    A review of systems was performed, and the pertinent positives are noted in the HPI.   Vital Signs:  /80   Pulse 92   Temp 98.6 °F (37 °C)   Ht 167.6 cm (66\")   Wt 103 kg (228 lb)   SpO2 94%   BMI 36.80 kg/m²           Physical Exam  Vitals reviewed.   Constitutional:       Appearance: Normal appearance. She is not ill-appearing.   HENT:      Right Ear: External ear normal.      Left Ear: External ear normal.      Nose: Nose normal.   Eyes:      Extraocular Movements: Extraocular movements intact.      Conjunctiva/sclera: Conjunctivae normal.   Cardiovascular:      Rate and Rhythm: Normal rate and regular rhythm.      " Heart sounds: No murmur heard.     Comments: 3+ chronic edema bilateral ankles with Unna boot on. Brace in place.  Pulmonary:      Effort: Pulmonary effort is normal.      Breath sounds: Normal breath sounds.   Abdominal:      Palpations: Abdomen is soft.      Tenderness: There is no abdominal tenderness.   Musculoskeletal:         General: No swelling. Normal range of motion.      Right lower leg: No edema.      Left lower leg: No edema.   Lymphadenopathy:      Head:      Right side of head: No tonsillar adenopathy.      Left side of head: No tonsillar adenopathy.      Cervical: No cervical adenopathy.   Skin:     General: Skin is warm.      Findings: No rash.   Neurological:      General: No focal deficit present.      Mental Status: She is alert and oriented to person, place, and time.   Psychiatric:         Mood and Affect: Mood normal.         Behavior: Behavior normal.        Result Review :                 Assessment and Plan    Diagnoses and all orders for this visit:    1. Localized edema (Primary)    2. Stage 3a chronic kidney disease (HCC)    3. Peripheral polyneuropathy  -     gabapentin (NEURONTIN) 300 MG capsule; Take 1 capsule by mouth 2 (Two) Times a Day.  Dispense: 60 capsule; Refill: 2    4. Constipation, unspecified constipation type  -     sennosides-docusate (PERICOLACE) 8.6-50 MG per tablet; Take 2 tablets by mouth 2 (Two) Times a Day.  Dispense: 120 tablet; Refill: 2      1. Localized edema  · Controlled.  · She will continue to receive home health and have her legs wrapped.  · Will order physical therapy.    2. Stage 3a chronic kidney disease  · Continue present management.    3. Peripheral polyneuropathy  · She will increase gabapentin to every 12 hours.   · She will let me know in 1 week how she is doing.  · She will watch for signs of dizziness, lightheadedness, or off balance.           Follow Up   No follow-ups on file.  Patient was given instructions and counseling regarding her  condition or for health maintenance advice. Please see specific information pulled into the AVS if appropriate.     Transcribed from ambient dictation for Meredith Lea Kehrer, MD by Malissa Bryant.  05/09/22   16:38 EDT    Patient verbalized consent to the visit recording.

## 2022-05-26 ENCOUNTER — TELEPHONE (OUTPATIENT)
Dept: FAMILY MEDICINE CLINIC | Facility: CLINIC | Age: 82
End: 2022-05-26

## 2022-05-26 NOTE — TELEPHONE ENCOUNTER
Spoke with patient on 5/26/22 at 11:04 and she will be by to  two boxes of Eliquis 2.5 tablet sample packs. Patient stated this medication is so expensive. Patient said thank you so much.

## 2022-06-14 ENCOUNTER — TELEPHONE (OUTPATIENT)
Dept: FAMILY MEDICINE CLINIC | Facility: CLINIC | Age: 82
End: 2022-06-14

## 2022-06-14 DIAGNOSIS — G62.9 PERIPHERAL POLYNEUROPATHY: ICD-10-CM

## 2022-06-14 NOTE — TELEPHONE ENCOUNTER
Caller: JOSE LUIS    Relationship: [employer]    Best call back number: [phone]    What is the best time to reach you: ANYTIME    Who are you requesting to speak with (clinical staff, provider,  specific staff member): DR KEHRER OR MA    What was the call regarding: JOSE LUIS WITH [facility] STATES SHE WOULD LIKE TO DISCUSS INCREASING THE DOSAGE OF GABAPENTIN OR TRAMADOL DUE TO PAIN IN BACK    Do you require a callback: YES

## 2022-06-14 NOTE — TELEPHONE ENCOUNTER
Since she only has 10 more days until her consultation, have her try increasing her gabapentin to 3 times a day.  When I saw her last over a month ago, she told me the gabapentin was helping.  Please let me know if she needs a prescription sent in.

## 2022-06-14 NOTE — TELEPHONE ENCOUNTER
SPOKE TO JOSE LUIS AND SHE IS STATING THAT PT IS HAVING INCREASED PAIN IN BACK AND LOWER EXTREMITIES.  PT IS STATING THAT THE MEDICATIONS ARE NOT DOING ANYTHING FOR IT.  PLEASE ADVISE IF YOU NEED TO SEE PATIENT.  PT IS SEEING A NEURO ON THE 24TH TO DISCUSS NEUROPATHY PAIN

## 2022-06-14 NOTE — TELEPHONE ENCOUNTER
Spoke Gwendolyn and she is stating that the patient will needs a new script called in because her script is about out.

## 2022-06-15 RX ORDER — GABAPENTIN 100 MG/1
100 CAPSULE ORAL 3 TIMES DAILY
Qty: 90 CAPSULE | Refills: 0 | Status: SHIPPED | OUTPATIENT
Start: 2022-06-15 | End: 2022-08-01 | Stop reason: SDUPTHER

## 2022-06-24 ENCOUNTER — OFFICE VISIT (OUTPATIENT)
Dept: NEUROLOGY | Facility: CLINIC | Age: 82
End: 2022-06-24

## 2022-06-24 ENCOUNTER — LAB (OUTPATIENT)
Dept: LAB | Facility: HOSPITAL | Age: 82
End: 2022-06-24

## 2022-06-24 VITALS
BODY MASS INDEX: 35.36 KG/M2 | HEIGHT: 66 IN | HEART RATE: 82 BPM | DIASTOLIC BLOOD PRESSURE: 82 MMHG | WEIGHT: 220 LBS | SYSTOLIC BLOOD PRESSURE: 140 MMHG | OXYGEN SATURATION: 97 %

## 2022-06-24 DIAGNOSIS — G60.9 IDIOPATHIC PERIPHERAL NEUROPATHY: Primary | ICD-10-CM

## 2022-06-24 PROCEDURE — 84155 ASSAY OF PROTEIN SERUM: CPT | Performed by: PSYCHIATRY & NEUROLOGY

## 2022-06-24 PROCEDURE — 36415 COLL VENOUS BLD VENIPUNCTURE: CPT | Performed by: PSYCHIATRY & NEUROLOGY

## 2022-06-24 PROCEDURE — 84165 PROTEIN E-PHORESIS SERUM: CPT | Performed by: PSYCHIATRY & NEUROLOGY

## 2022-06-24 PROCEDURE — 99204 OFFICE O/P NEW MOD 45 MIN: CPT | Performed by: PSYCHIATRY & NEUROLOGY

## 2022-06-24 RX ORDER — LIDOCAINE 50 MG/G
1 OINTMENT TOPICAL 3 TIMES DAILY
Qty: 50 G | Refills: 3 | Status: SHIPPED | OUTPATIENT
Start: 2022-06-24

## 2022-06-24 NOTE — ASSESSMENT & PLAN NOTE
81 year old woman with peripheral polyneuropathy.  She started experiencing numbness in her feet starting 8 months ago and in her hands starting 6 months ago.  She started also having pain around the same in her hands and feet.  Of note she had fractures in bilateral sacrum noted on lumbar spine MRI on 5/10/202 that required surgery last year.  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  She was started on gabapentin 100 mg TID which she thinks has been helpful 50% for the pain.  She has significant swelling in her feet which are currently wrapped.  She has had more recent lab work including CMP, TSH, Vit B12 and globulin levels which looked good.  She is not diabetic.  She was not a heavy alcohol drinker.  She denies any family history of neuropathy.  She does have CKD.  She is being currently evaluated and treated by physical therapy.  I will check a SPEP today.  Given her CKD and swelling in her legs I do not think we should increase gabapentin or start Lyrica or Cymbalta.  Instead I would recommend topical ointments.  I will prescribe lidocaine ointment and informed her she can try to purchase Outback topica ointment and also discuss other potential ointments with her podiatrist.

## 2022-06-27 LAB
ALBUMIN SERPL ELPH-MCNC: 3.5 G/DL (ref 2.9–4.4)
ALBUMIN/GLOB SERPL: 1.2 {RATIO} (ref 0.7–1.7)
ALPHA1 GLOB SERPL ELPH-MCNC: 0.3 G/DL (ref 0–0.4)
ALPHA2 GLOB SERPL ELPH-MCNC: 0.9 G/DL (ref 0.4–1)
B-GLOBULIN SERPL ELPH-MCNC: 1.1 G/DL (ref 0.7–1.3)
GAMMA GLOB SERPL ELPH-MCNC: 0.6 G/DL (ref 0.4–1.8)
GLOBULIN SER CALC-MCNC: 2.9 G/DL (ref 2.2–3.9)
LABORATORY COMMENT REPORT: NORMAL
M PROTEIN SERPL ELPH-MCNC: NORMAL G/DL
PROT PATTERN SERPL ELPH-IMP: NORMAL
PROT SERPL-MCNC: 6.4 G/DL (ref 6–8.5)

## 2022-06-27 RX ORDER — VALSARTAN 320 MG/1
320 TABLET ORAL
Qty: 90 TABLET | Refills: 1 | Status: SHIPPED | OUTPATIENT
Start: 2022-06-27 | End: 2022-12-30 | Stop reason: SDUPTHER

## 2022-06-27 RX ORDER — DILTIAZEM HYDROCHLORIDE 120 MG/1
CAPSULE, COATED, EXTENDED RELEASE ORAL
Qty: 90 CAPSULE | Refills: 1 | Status: SHIPPED | OUTPATIENT
Start: 2022-06-27 | End: 2022-12-30 | Stop reason: SDUPTHER

## 2022-06-27 RX ORDER — CLONIDINE HYDROCHLORIDE 0.2 MG/1
TABLET ORAL
Qty: 180 TABLET | Refills: 1 | Status: SHIPPED | OUTPATIENT
Start: 2022-06-27 | End: 2022-12-30 | Stop reason: SDUPTHER

## 2022-07-20 ENCOUNTER — TELEPHONE (OUTPATIENT)
Dept: FAMILY MEDICINE CLINIC | Facility: CLINIC | Age: 82
End: 2022-07-20

## 2022-07-20 RX ORDER — LEVOTHYROXINE SODIUM 0.1 MG/1
100 TABLET ORAL DAILY
Start: 2022-07-20 | End: 2022-07-21 | Stop reason: SDUPTHER

## 2022-07-20 NOTE — TELEPHONE ENCOUNTER
Caller: Latanya Olsen    Relationship: Self    Best call back number: [phone]    Requested Prescriptions:   Requested Prescriptions     Pending Prescriptions Disp Refills   • levothyroxine (SYNTHROID, LEVOTHROID) 100 MCG tablet       Sig: Take 1 tablet by mouth Daily.        Pharmacy where request should be sent: [id number] - [address] - [phone]  - [phone] FX     Additional details provided by patient: PATIENT IS OUT OF THIS MEDICATION.     PATIENT HAS BEEN OUT OF THIS MEDICATION FOR 3 DAYS.  [facility] WAS SUPPOSED TO SEND OVER A REFILL REQUEST DAYS AGO.     Does the patient have less than a 3 day supply:  [x] Yes  [] No    Meño Eng Rep   07/20/22 09:56 EDT            appropriate

## 2022-07-20 NOTE — TELEPHONE ENCOUNTER
Caller: Latanya Olsen    Relationship: Self    Best call back number: [phone]    What is the best time to reach you: ANYTIME     Who are you requesting to speak with (clinical staff, provider,  specific staff member): CLINICAL     What was the call regarding: PATIENT IS GETTING RELEASED FROM HOME HEALTH TODAY 07.20.22.     PATIENT WOULD LIKE TO DISCUSS THE NEXT STEPS IN PLAN OF CARE FOR THE SWELLING IN LEGS.     PLEASE CALL TO DISCUSS AND ADVISE.

## 2022-07-20 NOTE — TELEPHONE ENCOUNTER
Spoke to patient and she is stating that she tried to get into the office before 08/01/22 please advise

## 2022-07-20 NOTE — TELEPHONE ENCOUNTER
So, she was supposed to let me know if home health coming out to wrap her legs helped with the swelling at all.

## 2022-07-21 DIAGNOSIS — K59.00 CONSTIPATION, UNSPECIFIED CONSTIPATION TYPE: ICD-10-CM

## 2022-07-21 RX ORDER — DOCUSATE SODIUM 50 MG AND SENNOSIDES 8.6 MG 8.6; 5 MG/1; MG/1
TABLET, FILM COATED ORAL
Qty: 120 TABLET | Refills: 2 | Status: SHIPPED | OUTPATIENT
Start: 2022-07-21 | End: 2022-10-03

## 2022-07-21 RX ORDER — LEVOTHYROXINE SODIUM 0.1 MG/1
100 TABLET ORAL DAILY
Qty: 90 TABLET | Refills: 0 | Status: SHIPPED | OUTPATIENT
Start: 2022-07-21 | End: 2022-10-03

## 2022-07-21 NOTE — TELEPHONE ENCOUNTER
Caller: Latanya Olsen    Relationship: Self    Best call back number: [phone]    Requested Prescriptions:   Requested Prescriptions     Pending Prescriptions Disp Refills   • levothyroxine (SYNTHROID, LEVOTHROID) 100 MCG tablet       Sig: Take 1 tablet by mouth Daily.        Pharmacy where request should be sent: [facility] #[id number] - [address] - [phone]  - [phone] FX     Additional details provided by patient: PATIENT IS OUT    Does the patient have less than a 3 day supply:  [x] Yes  [] No    Meño Kinsey Rep   07/21/22 10:48 EDT

## 2022-07-22 ENCOUNTER — TELEPHONE (OUTPATIENT)
Dept: FAMILY MEDICINE CLINIC | Facility: CLINIC | Age: 82
End: 2022-07-22

## 2022-08-01 ENCOUNTER — TELEPHONE (OUTPATIENT)
Dept: FAMILY MEDICINE CLINIC | Facility: CLINIC | Age: 82
End: 2022-08-01

## 2022-08-01 ENCOUNTER — OFFICE VISIT (OUTPATIENT)
Dept: FAMILY MEDICINE CLINIC | Facility: CLINIC | Age: 82
End: 2022-08-01

## 2022-08-01 VITALS
SYSTOLIC BLOOD PRESSURE: 132 MMHG | OXYGEN SATURATION: 98 % | HEART RATE: 76 BPM | DIASTOLIC BLOOD PRESSURE: 76 MMHG | HEIGHT: 66 IN | WEIGHT: 216.8 LBS | TEMPERATURE: 97.8 F | BODY MASS INDEX: 34.84 KG/M2

## 2022-08-01 DIAGNOSIS — G62.9 PERIPHERAL POLYNEUROPATHY: ICD-10-CM

## 2022-08-01 DIAGNOSIS — N18.31 STAGE 3A CHRONIC KIDNEY DISEASE: Primary | ICD-10-CM

## 2022-08-01 DIAGNOSIS — N18.31 STAGE 3A CHRONIC KIDNEY DISEASE: ICD-10-CM

## 2022-08-01 DIAGNOSIS — R60.0 LOCALIZED EDEMA: ICD-10-CM

## 2022-08-01 DIAGNOSIS — J01.10 ACUTE NON-RECURRENT FRONTAL SINUSITIS: ICD-10-CM

## 2022-08-01 DIAGNOSIS — I10 ESSENTIAL HYPERTENSION: Primary | ICD-10-CM

## 2022-08-01 DIAGNOSIS — E03.9 HYPOTHYROIDISM, UNSPECIFIED TYPE: ICD-10-CM

## 2022-08-01 DIAGNOSIS — D50.9 IRON DEFICIENCY ANEMIA, UNSPECIFIED IRON DEFICIENCY ANEMIA TYPE: ICD-10-CM

## 2022-08-01 PROBLEM — I89.0 LYMPHEDEMA: Status: ACTIVE | Noted: 2022-08-01

## 2022-08-01 PROCEDURE — 99214 OFFICE O/P EST MOD 30 MIN: CPT | Performed by: FAMILY MEDICINE

## 2022-08-01 RX ORDER — AMOXICILLIN 500 MG/1
1000 CAPSULE ORAL 2 TIMES DAILY
Qty: 28 CAPSULE | Refills: 0 | Status: SHIPPED | OUTPATIENT
Start: 2022-08-01 | End: 2022-11-16

## 2022-08-01 RX ORDER — CHLORHEXIDINE GLUCONATE 0.12 MG/ML
RINSE ORAL
COMMUNITY
Start: 2022-05-03

## 2022-08-01 RX ORDER — GABAPENTIN 100 MG/1
100 CAPSULE ORAL 3 TIMES DAILY
Qty: 90 CAPSULE | Refills: 2 | Status: SHIPPED | OUTPATIENT
Start: 2022-08-01 | End: 2022-11-07

## 2022-08-01 NOTE — TELEPHONE ENCOUNTER
----- Message from Meredith Lea Kehrer, MD sent at 8/1/2022  9:20 AM EDT -----  The patient doing well with Unna boots for her venous stasis edema.  Insurance will not cover home health coming to her house every week.  We tried to get her to the lymphedema clinic at [place].  They insisted she needed to be seen 3 times a week.  This did not make any sense to me because she does not have any open wounds.  Can we see if they will see her weekly for wraps? She can get a ride once a week but not 3 times a week.  Tone was unable to fit her in any kind of support hose that her insurance will cover.

## 2022-08-01 NOTE — TELEPHONE ENCOUNTER
----- Message from Meredith Lea Kehrer, MD sent at 8/1/2022  9:20 AM EDT -----  The patient doing well with Unna boots for her venous stasis edema.  Insurance will not cover home health coming to her house every week.  We tried to get her to the lymphedema clinic at [place].  They insisted she needed to be seen 3 times a week.  This did not make any sense to me because she does not have any open wounds.  Can we see if they will see her weekly for wraps? She can get a ride once a week but not 3 times a week.  Tone was unable to fit her in any kind of support hose that her insurance will cover.

## 2022-08-01 NOTE — TELEPHONE ENCOUNTER
I called lymphedema clinic at [place] [phone] and spoke to ligia delvalle.  She said to re-enter the referral.  I have pend.  She stated to put in the note that the pt does not have a wound and seh can only be seen once weekly

## 2022-08-02 ENCOUNTER — TELEPHONE (OUTPATIENT)
Dept: FAMILY MEDICINE CLINIC | Facility: CLINIC | Age: 82
End: 2022-08-02

## 2022-08-02 LAB
ALBUMIN SERPL-MCNC: 4.4 G/DL (ref 3.6–4.6)
ALBUMIN/GLOB SERPL: 2.2 {RATIO} (ref 1.2–2.2)
ALP SERPL-CCNC: 109 IU/L (ref 44–121)
ALT SERPL-CCNC: 14 IU/L (ref 0–32)
AST SERPL-CCNC: 15 IU/L (ref 0–40)
BASOPHILS # BLD AUTO: 0 X10E3/UL (ref 0–0.2)
BASOPHILS NFR BLD AUTO: 0 %
BILIRUB SERPL-MCNC: 0.3 MG/DL (ref 0–1.2)
BUN SERPL-MCNC: 32 MG/DL (ref 8–27)
BUN/CREAT SERPL: 21 (ref 12–28)
CALCIUM SERPL-MCNC: 9.2 MG/DL (ref 8.7–10.3)
CHLORIDE SERPL-SCNC: 103 MMOL/L (ref 96–106)
CO2 SERPL-SCNC: 23 MMOL/L (ref 20–29)
CREAT SERPL-MCNC: 1.51 MG/DL (ref 0.57–1)
EGFRCR SERPLBLD CKD-EPI 2021: 34 ML/MIN/1.73
EOSINOPHIL # BLD AUTO: 0.2 X10E3/UL (ref 0–0.4)
EOSINOPHIL NFR BLD AUTO: 3 %
ERYTHROCYTE [DISTWIDTH] IN BLOOD BY AUTOMATED COUNT: 13.3 % (ref 11.7–15.4)
GLOBULIN SER CALC-MCNC: 2 G/DL (ref 1.5–4.5)
GLUCOSE SERPL-MCNC: 111 MG/DL (ref 65–99)
HCT VFR BLD AUTO: 35.8 % (ref 34–46.6)
HGB BLD-MCNC: 11.7 G/DL (ref 11.1–15.9)
IMM GRANULOCYTES # BLD AUTO: 0.1 X10E3/UL (ref 0–0.1)
IMM GRANULOCYTES NFR BLD AUTO: 1 %
LYMPHOCYTES # BLD AUTO: 2.2 X10E3/UL (ref 0.7–3.1)
LYMPHOCYTES NFR BLD AUTO: 29 %
MCH RBC QN AUTO: 28.6 PG (ref 26.6–33)
MCHC RBC AUTO-ENTMCNC: 32.7 G/DL (ref 31.5–35.7)
MCV RBC AUTO: 88 FL (ref 79–97)
MONOCYTES # BLD AUTO: 0.7 X10E3/UL (ref 0.1–0.9)
MONOCYTES NFR BLD AUTO: 9 %
NEUTROPHILS # BLD AUTO: 4.3 X10E3/UL (ref 1.4–7)
NEUTROPHILS NFR BLD AUTO: 58 %
PLATELET # BLD AUTO: 181 X10E3/UL (ref 150–450)
POTASSIUM SERPL-SCNC: 4.5 MMOL/L (ref 3.5–5.2)
PROT SERPL-MCNC: 6.4 G/DL (ref 6–8.5)
RBC # BLD AUTO: 4.09 X10E6/UL (ref 3.77–5.28)
SODIUM SERPL-SCNC: 140 MMOL/L (ref 134–144)
TSH SERPL DL<=0.005 MIU/L-ACNC: 3.44 UIU/ML (ref 0.45–4.5)
WBC # BLD AUTO: 7.5 X10E3/UL (ref 3.4–10.8)

## 2022-08-02 NOTE — TELEPHONE ENCOUNTER
She will be able to get a ride there.  Is there is no one at the entire [facility] that can put an Unna boot on someone?

## 2022-08-03 NOTE — TELEPHONE ENCOUNTER
The [place] lymphadema clinic called back they do not do the milena boots, have not for years and her insurance medicare will not cover any of it.   They will do dressing changes no milena boots if she wants to pay out of pocket. Around $200 a visit, home health will not see her because she does not have a wound it is only for edema. I can call the wound care clinic or pt at [facility] but insurance will not cover with the diagnosis.

## 2022-08-29 ENCOUNTER — HOSPITAL ENCOUNTER (OUTPATIENT)
Dept: OCCUPATIONAL THERAPY | Facility: HOSPITAL | Age: 82
Setting detail: THERAPIES SERIES
Discharge: HOME | End: 2022-08-29

## 2022-08-29 DIAGNOSIS — I89.0 LYMPHEDEMA OF BOTH LOWER EXTREMITIES: Primary | ICD-10-CM

## 2022-08-29 PROCEDURE — 97166 OT EVAL MOD COMPLEX 45 MIN: CPT

## 2022-08-29 PROCEDURE — 97535 SELF CARE MNGMENT TRAINING: CPT

## 2022-09-13 ENCOUNTER — HOSPITAL ENCOUNTER (OUTPATIENT)
Dept: OCCUPATIONAL THERAPY | Facility: HOSPITAL | Age: 82
Setting detail: THERAPIES SERIES
Discharge: HOME | End: 2022-09-13

## 2022-09-13 DIAGNOSIS — I89.0 LYMPHEDEMA OF BOTH LOWER EXTREMITIES: Primary | ICD-10-CM

## 2022-09-13 PROCEDURE — 97535 SELF CARE MNGMENT TRAINING: CPT

## 2022-09-13 NOTE — THERAPY TREATMENT NOTE
Outpatient Occupational Therapy Lymphedema Treatment Note  ANNAMARIE Grimes     Patient Name: Latanya Olsen  : 1940  MRN: [id number]  Today's Date: 2022      Visit Date: 2022    Patient Active Problem List   Diagnosis   • Carcinoid tumor of lung   • Diverticulosis of intestine   • Obstructive sleep apnea syndrome   • Weight loss   • Vitamin D deficiency   • Overweight (BMI 25.0-29.9)   • Spinal stenosis of lumbar region without neurogenic claudication   • Osteoarthritis of knee   • Obesity (BMI 30-39.9)   • Neutropenia (HCC)   • Chronic lower back pain   • Knee pain   • Insomnia   • Hypothyroidism   • Hypokalemia   • Hypertension   • Hyperlipidemia   • Generalized osteoarthritis   • Gastroparesis   • Foot pain   • Chronic obstructive pulmonary disease (HCC)   • Chronic constipation   • Anemia   • Weight gain   • Pain of left breast   • Elevated serum alkaline phosphatase level   • Neck pain   • Volume overload   • Status post total hip replacement, right   • Generalized weakness   • Constipation   • Acute deep vein thrombosis (DVT) of iliac vein of left lower extremity (HCC)   • Acute deep vein thrombosis (DVT) of tibial vein of left lower extremity (HCC)   • Atrial fibrillation (HCC)   • Hyperhomocysteinemia (HCC)   • Idiopathic peripheral neuropathy   • Lymphedema        Past Medical History:   Diagnosis Date   • Anemia    • Arthritis    • Atrial fibrillation (HCC)    • CKD (chronic kidney disease)     Stage 3   • Constipation    • COPD (chronic obstructive pulmonary disease) (HCC)    • DDD (degenerative disc disease), lumbar    • Depression    • Diverticulosis    • GERD (gastroesophageal reflux disease)    • Hx of degenerative disc disease    • Hyperlipidemia    • Hypertension    • Hypokalemia    • Hypothyroidism    • Knee swelling    • Lung cancer (HCC)     history   • Renal disorder    • Restless leg syndrome    • Sleep apnea with use of continuous positive airway pressure (CPAP)         Past  Surgical History:   Procedure Laterality Date   • BUNIONECTOMY Bilateral    • CATARACT EXTRACTION     • CHOLECYSTECTOMY     • COLONOSCOPY     • ENDOSCOPY N/A 6/24/2016    Procedure: ESOPHAGOGASTRODUODENOSCOPY  with biopsies;  Surgeon: Von Hernández MD;  Location: [facility];  Service:    • LUNG LOBECTOMY Left     lower lobe   • LYTIC THROMBIN THERAPY Left 3/26/2021    Procedure: left leg clot treiver possible venous stent *supine*;  Surgeon: Rogerio Vega MD;  Location: [facility] OR 18/19;  Service: Vascular;  Laterality: Left;   • REPLACEMENT TOTAL KNEE Left    • THYROID BIOPSY     • TOTAL HIP ARTHROPLASTY Right 6/1/2019    Procedure: BIPOLAR HIP CEMENTED POSTERIOR;  Surgeon: Ashley Crenshaw MD;  Location: [facility] OR;  Service: Orthopedics         Visit Dx:      ICD-10-CM ICD-9-CM   1. Lymphedema of both lower extremities  I89.0 457.1        Lymphedema     Row Name 09/13/22 1300             Subjective Pain    Able to rate subjective pain? yes  -JJ      Pre-Treatment Pain Level 7  -JJ      Post-Treatment Pain Level 7  -JJ      Subjective Pain Comment pt co pain in B feet 2 neuropathy, pain is chronic  -JJ              Subjective Comments    Subjective Comments pt states she has friend available to assist with bandaging however she states friend is possibly going out of town soon.  -JJ              Lymphedema Assessment    Lymphedema Classification RLE:;LLE:;secondary;stage 2 (Spontaneously Irreversible)  -JJ      Infections or Cellulitis? no  -JJ      Lymphedema Precautions kidney disease  -JJ      Lymphedema Assessment Comments hx of 2 blood clots in L LE. pt states May 2021  -JJ              LLIS - Physical Concerns    The amount of pain associated with my lymphedema is: 2  -JJ      The amount of limb heaviness associated with my lymphedema is: 4  -JJ      The amount of skin tightness associated with my lymphedema is: 4  -JJ      The size of my swollen limb(s) seems: 4  -JJ       "Lymphedema affects the movement of my swollen limb(s): 3  -JJ      The strength in my swollen limb(s) is: 3  -JJ              LLIS - Psychosocial Concerns    Lymphedema affects my body image (i.e., \"how I think I look\"). 4  -JJ      Lymphedema affects my socializing with others. 3  -JJ      Lymphedema affects my intimate relations with spouse or partner (rate 0 if not applicable 0  -JJ      Lymphedema \"gets me down\" (i.e., depression, frustration, or anger) 4  -JJ      I must rely on others for help due to my lymphedema. 4  -JJ      I know what to do to manage my lymphedema 3  -JJ              LLIS - Functional Concerns    Lymphedema affects my ability to perform self-care activities (i.e. eating, dressing, hygiene) 3  -JJ      Lymphedema affects my ability to perform routine home or work-related activities. 4  -JJ      Lymphedema affects my performance of preferred leisure activities. 0  -JJ      Lymphedema affects proper fit of clothing/shoes 3  -JJ      Lymphedema affects my sleep 2  -JJ              Lymphedema Sensation    Lymphedema Light Touch RLE:;LLE:;moderate impairment  -JJ      Lymphedema Sensation Comments pt states neuropathy in B feet. pt states numbness/pain in feet, stops at ankles  -JJ              Lymphedema Measurements    Measurement Type(s) Circumferential  -JJ      Circumferential Areas Lower extremities  -JJ              BLE Circumferential (cm)    Measurement Location 1 10 cm above the knees  -JJ      Left 1 57.5 cm  -JJ      Right 1 60 cm  -JJ      Measurement Location 2 knee  -JJ      Left 2 52 cm  -JJ      Right 2 52 cm  -JJ      Measurement Location 3 10 cm below the knee  -JJ      Left 3 50 cm  -JJ      Right 3 46.5 cm  -JJ      Measurement Location 4 20 cm below the knee  -JJ      Left 4 49.5 cm  -JJ      Right 4 43 cm  -JJ      Measurement Location 5 30 cm below the knee  -JJ      Left 5 43.5 cm  -JJ      Right 5 37 cm  -JJ      Measurement Location 6 ankle  -JJ      Left 6 35 cm  -JJ  "     Right 6 28 cm  -JJ      Measurement Location 7 midfoot  -JJ      Left 7 23.5 cm  -JJ      Right 7 23.5 cm  -JJ      LLE Circumferential Total 311 cm  -JJ      RLE Circumferential Total 290 cm  -JJ              Compression/Skin Care    Compression/Skin Care skin care;bandaging  -JJ      Skin Care washed/dried;moisturizing lotion applied  -JJ      Bandage Layers cotton liner;padding/fluff layer;short-stretch bandages (comment size/quantity)  -JJ      Bandaging Comments L: cotton liner, cellona x 2, rosidal K: 1- 8cm, 1-10cm, 1-12cm  RLE: cotton liner, cellona x 2, rosidal K: 1-8cm, 1-10cm, 1-12cm  -JJ      Bandaging Technique circumferential/spiral;moderate compression  -JJ      Compression/Skin Care Comments pt with co neuropathy pain in B LEs. pt states her pain increases at night and only relief is epsom salt soak and lidocaine cream. pt requests no wrapping on feet. therapist recommended bandaging on feet however agreeable to attempt bandaging from ankle to mid thigh and pt to return to clinic to tomorrow to assess edema. pt to monitor skin/ edema changes closely on B feet and remove bandages at home immediately if pain occurs or visible redness/swelling of feet  -JJ              Lymphedema Life Impact Scale Totals    A.  Total Q1 - Q17 (Do not include Q18) 50  -JJ      B.  Total number of questions answered (Q1-Q17) 17  -JJ      C. Divide A by B 2.94  -JJ      D. Multiple C by 25 73.5  -JJ            User Key  (r) = Recorded By, (t) = Taken By, (c) = Cosigned By    Initials Name Provider Type    Priscilla Fang, OTR Occupational Therapist                         OT Assessment/Plan     Row Name 09/13/22 2432          OT Assessment    Assessment Comments pt arrived to clinic with moderate soft edema in B LEs from ankle to right above knees. pt seen for evaluation on 8/29/22 by OT. recommended CDT and educated on bandaging and treatment plan. performed circumferential measurements, discussed patients  ability for assist at home with bandaging. pt states friend available to assist and will have friend come into clinic for education with bandaging. Pt educated on monitoring for side effects with bandaging and removing bandages immediately if they occur.  -GAVI            OT Plan    OT Frequency 2x/week;3x/week;4x/week  -GAVI     OT Plan Comments will see pt 3-4 x week and will decrease to 2-3 x when pt/ friend increased independence with home bandaging and care. Pt agreeable to plan of CDT treatment for barron 4-6 weeks with fitting for compression garment and transition to self care of condition at end of treatment. -GAVI           User Key  (r) = Recorded By, (t) = Taken By, (c) = Cosigned By    Initials Name Provider Type    Priscilla Fang, OTR Occupational Therapist                          Therapy Education  Education Details: pt educated on bandaging, monitoring for skin breakdown, skin changes, and increased pain, SOA. pt educated on condition of lymphedema and treatment plan  Given: HEP, Edema management, Symptoms/condition management, Bandaging/dressing change  Program: New, Reinforced  How Provided: Verbal, Demonstration  Provided to: Patient  Level of Understanding: Verbalized                Time Calculation:   OT Start Time: 1300  OT Stop Time: 1418  OT Time Calculation (min): 78 min     Therapy Charges for Today     Code Description Service Date Service Provider Modifiers Qty    79830438365 HC OT SELF CARE/MGMT/TRAIN EA 15 MIN 9/13/2022 Priscilla Gallagher, RENATO GO 3                      RENATO Murrieta  9/13/2022

## 2022-09-14 ENCOUNTER — HOSPITAL ENCOUNTER (OUTPATIENT)
Dept: OCCUPATIONAL THERAPY | Facility: HOSPITAL | Age: 82
Setting detail: THERAPIES SERIES
Discharge: HOME | End: 2022-09-14

## 2022-09-14 PROCEDURE — 97530 THERAPEUTIC ACTIVITIES: CPT

## 2022-09-14 PROCEDURE — 97535 SELF CARE MNGMENT TRAINING: CPT

## 2022-09-14 NOTE — THERAPY TREATMENT NOTE
Outpatient Occupational Therapy Lymphedema Treatment Note  ANNAMARIE Grimes     Patient Name: Latanya Olsen  : 1940  MRN: [id number]  Today's Date: 2022      Visit Date: 2022    Patient Active Problem List   Diagnosis   • Carcinoid tumor of lung   • Diverticulosis of intestine   • Obstructive sleep apnea syndrome   • Weight loss   • Vitamin D deficiency   • Overweight (BMI 25.0-29.9)   • Spinal stenosis of lumbar region without neurogenic claudication   • Osteoarthritis of knee   • Obesity (BMI 30-39.9)   • Neutropenia (HCC)   • Chronic lower back pain   • Knee pain   • Insomnia   • Hypothyroidism   • Hypokalemia   • Hypertension   • Hyperlipidemia   • Generalized osteoarthritis   • Gastroparesis   • Foot pain   • Chronic obstructive pulmonary disease (HCC)   • Chronic constipation   • Anemia   • Weight gain   • Pain of left breast   • Elevated serum alkaline phosphatase level   • Neck pain   • Volume overload   • Status post total hip replacement, right   • Generalized weakness   • Constipation   • Acute deep vein thrombosis (DVT) of iliac vein of left lower extremity (HCC)   • Acute deep vein thrombosis (DVT) of tibial vein of left lower extremity (HCC)   • Atrial fibrillation (HCC)   • Hyperhomocysteinemia (HCC)   • Idiopathic peripheral neuropathy   • Lymphedema        Past Medical History:   Diagnosis Date   • Anemia    • Arthritis    • Atrial fibrillation (HCC)    • CKD (chronic kidney disease)     Stage 3   • Constipation    • COPD (chronic obstructive pulmonary disease) (HCC)    • DDD (degenerative disc disease), lumbar    • Depression    • Diverticulosis    • GERD (gastroesophageal reflux disease)    • Hx of degenerative disc disease    • Hyperlipidemia    • Hypertension    • Hypokalemia    • Hypothyroidism    • Knee swelling    • Lung cancer (HCC)     history   • Renal disorder    • Restless leg syndrome    • Sleep apnea with use of continuous positive airway pressure (CPAP)         Past  "Surgical History:   Procedure Laterality Date   • BUNIONECTOMY Bilateral    • CATARACT EXTRACTION     • CHOLECYSTECTOMY     • COLONOSCOPY     • ENDOSCOPY N/A 6/24/2016    Procedure: ESOPHAGOGASTRODUODENOSCOPY  with biopsies;  Surgeon: Von Hernández MD;  Location: [facility];  Service:    • LUNG LOBECTOMY Left     lower lobe   • LYTIC THROMBIN THERAPY Left 3/26/2021    Procedure: left leg clot treiver possible venous stent *supine*;  Surgeon: Rogerio Vega MD;  Location: [facility] OR 18/19;  Service: Vascular;  Laterality: Left;   • REPLACEMENT TOTAL KNEE Left    • THYROID BIOPSY     • TOTAL HIP ARTHROPLASTY Right 6/1/2019    Procedure: BIPOLAR HIP CEMENTED POSTERIOR;  Surgeon: Ashley Crenshaw MD;  Location: [facility] OR;  Service: Orthopedics         Visit Dx:    No diagnosis found.     Lymphedema     Row Name 09/14/22 1300             Subjective Pain    Able to rate subjective pain? yes  -JJ      Subjective Pain Comment pt co B feet pain chronic. did not rate  -JJ              Subjective Comments    Subjective Comments pt states she had some leg cramping last night but felt bandaging went \"fine\" and she states she was able to walk around without difficutly  -JJ              Lymphedema Assessment    Lymphedema Classification RLE:;LLE:;secondary;stage 2 (Spontaneously Irreversible)  -JJ              Lymphedema Measurements    Measurement Type(s) Circumferential  -JJ      Circumferential Areas Lower extremities  -JJ              BLE Circumferential (cm)    Measurement Location 1 10 cm above the knee  -JJ      Left 1 57 cm  -JJ      Right 1 59 cm  -JJ      Measurement Location 2 knee  -JJ      Left 2 47.5 cm  -JJ      Right 2 51 cm  -JJ      Measurement Location 3 10 cm below the knee  -JJ      Left 3 46.5 cm  -JJ      Right 3 46 cm  -JJ      Measurement Location 4 20 cm below the knee  -JJ      Left 4 46.5 cm  -JJ      Right 4 42 cm  -JJ      Measurement Location 5 30 cm below the knee  " -JJ      Left 5 38.5 cm  -JJ      Right 5 33 cm  -JJ      Measurement Location 6 ankle  -JJ      Left 6 29 cm  -JJ      Right 6 25.5 cm  -JJ      Measurement Location 7 midfoot  -JJ      Left 7 23 cm  -JJ      Right 7 22 cm  -JJ      LLE Circumferential Total 288 cm  -JJ      RLE Circumferential Total 278.5 cm  -JJ              Compression/Skin Care    Compression/Skin Care skin care;bandaging;remove bandages  -JJ      Skin Care washed/dried;moisturizing lotion applied  -JJ      Bandage Layers cotton liner;padding/fluff layer;short-stretch bandages (comment size/quantity)  -JJ      Bandaging Comments L: cotton liner, cellona x2 rosidal K: 1-8cm, 1-10cm, 1-12 cm R: cotton liner, cellona x 2, rosidal K: 1-8cm, 1-10cm, 1-12cm  -JJ      Bandaging Technique circumferential/spiral;moderate compression  -JJ      Compression/Skin Care Comments wrapped patients feet today bilaterally to ensure pt able to tolerate. pt will be in clinic tomorrow  -JJ            User Key  (r) = Recorded By, (t) = Taken By, (c) = Cosigned By    Initials Name Provider Type    Priscilla Fang OTR Occupational Therapist                         OT Assessment/Plan     Row Name 09/14/22 1425          OT Assessment    Assessment Comments pt with decrease in B circumferential measurements. pt states tolerance and compliance with B LE bandaging and HEP including AROM of B LEs and diaphragmatic breathing  -JJ            OT Plan    OT Plan Comments cont poc  -JJ           User Key  (r) = Recorded By, (t) = Taken By, (c) = Cosigned By    Initials Name Provider Type    Priscilla Fang OTHASN Occupational Therapist                          Therapy Education  Given: HEP, Symptoms/condition management, Edema management, Bandaging/dressing change  Program: Reinforced  How Provided: Verbal, Demonstration  Provided to: Patient  Level of Understanding: Verbalized                Time Calculation:   OT Start Time: 1300  OT Stop Time: 1415  OT Time  Calculation (min): 75 min     Therapy Charges for Today     Code Description Service Date Service Provider Modifiers Qty    62309131254  OT THERAPEUTIC ACT EA 15 MIN 9/14/2022 Priscilla Gallagher, OTR GO 2    [id number]  OT SELF CARE/MGMT/TRAIN EA 15 MIN 9/14/2022 Priscilla Gallagher, OTR GO 2                      Priscilla Gallagher OTR  9/14/2022

## 2022-09-15 ENCOUNTER — HOSPITAL ENCOUNTER (OUTPATIENT)
Dept: OCCUPATIONAL THERAPY | Facility: HOSPITAL | Age: 82
Setting detail: THERAPIES SERIES
Discharge: HOME | End: 2022-09-15

## 2022-09-15 DIAGNOSIS — I89.0 LYMPHEDEMA: Primary | ICD-10-CM

## 2022-09-15 PROCEDURE — 97535 SELF CARE MNGMENT TRAINING: CPT

## 2022-09-15 PROCEDURE — 97530 THERAPEUTIC ACTIVITIES: CPT

## 2022-09-15 NOTE — THERAPY TREATMENT NOTE
Outpatient Occupational Therapy Lymphedema Treatment Note  ANNAMARIE Grimes     Patient Name: Latanya Olsen  : 1940  MRN: [id number]  Today's Date: 9/15/2022      Visit Date: 09/15/2022    Patient Active Problem List   Diagnosis   • Carcinoid tumor of lung   • Diverticulosis of intestine   • Obstructive sleep apnea syndrome   • Weight loss   • Vitamin D deficiency   • Overweight (BMI 25.0-29.9)   • Spinal stenosis of lumbar region without neurogenic claudication   • Osteoarthritis of knee   • Obesity (BMI 30-39.9)   • Neutropenia (HCC)   • Chronic lower back pain   • Knee pain   • Insomnia   • Hypothyroidism   • Hypokalemia   • Hypertension   • Hyperlipidemia   • Generalized osteoarthritis   • Gastroparesis   • Foot pain   • Chronic obstructive pulmonary disease (HCC)   • Chronic constipation   • Anemia   • Weight gain   • Pain of left breast   • Elevated serum alkaline phosphatase level   • Neck pain   • Volume overload   • Status post total hip replacement, right   • Generalized weakness   • Constipation   • Acute deep vein thrombosis (DVT) of iliac vein of left lower extremity (HCC)   • Acute deep vein thrombosis (DVT) of tibial vein of left lower extremity (HCC)   • Atrial fibrillation (HCC)   • Hyperhomocysteinemia (HCC)   • Idiopathic peripheral neuropathy   • Lymphedema        Past Medical History:   Diagnosis Date   • Anemia    • Arthritis    • Atrial fibrillation (HCC)    • CKD (chronic kidney disease)     Stage 3   • Constipation    • COPD (chronic obstructive pulmonary disease) (HCC)    • DDD (degenerative disc disease), lumbar    • Depression    • Diverticulosis    • GERD (gastroesophageal reflux disease)    • Hx of degenerative disc disease    • Hyperlipidemia    • Hypertension    • Hypokalemia    • Hypothyroidism    • Knee swelling    • Lung cancer (HCC)     history   • Renal disorder    • Restless leg syndrome    • Sleep apnea with use of continuous positive airway pressure (CPAP)         Past  Surgical History:   Procedure Laterality Date   • BUNIONECTOMY Bilateral    • CATARACT EXTRACTION     • CHOLECYSTECTOMY     • COLONOSCOPY     • ENDOSCOPY N/A 6/24/2016    Procedure: ESOPHAGOGASTRODUODENOSCOPY  with biopsies;  Surgeon: Von Hernández MD;  Location: [facility];  Service:    • LUNG LOBECTOMY Left     lower lobe   • LYTIC THROMBIN THERAPY Left 3/26/2021    Procedure: left leg clot treiver possible venous stent *supine*;  Surgeon: Rogerio Vega MD;  Location: [facility] 18/19;  Service: Vascular;  Laterality: Left;   • REPLACEMENT TOTAL KNEE Left    • THYROID BIOPSY     • TOTAL HIP ARTHROPLASTY Right 6/1/2019    Procedure: BIPOLAR HIP CEMENTED POSTERIOR;  Surgeon: Ashley Crenshaw MD;  Location: [facility];  Service: Orthopedics         Visit Dx:      ICD-10-CM ICD-9-CM   1. Lymphedema  I89.0 457.1        Lymphedema     Row Name 09/15/22 1300             Subjective Pain    Able to rate subjective pain? yes  -JJ      Subjective Pain Comment pt co B feet pain, did not rate  -JJ              Subjective Comments    Subjective Comments pt states tolerating bandages and no leg cramps last night, no new reports  -JJ              Lymphedema Measurements    Measurement Type(s) Circumferential  -JJ      Circumferential Areas Lower extremities  -JJ              BLE Circumferential (cm)    Measurement Location 1 10 cm above the knee  -JJ      Left 1 57.5 cm  -JJ      Right 1 56 cm  -JJ      Measurement Location 2 knee  -JJ      Left 2 49 cm  -JJ      Right 2 51 cm  -JJ      Measurement Location 3 10 cm below the knee  -JJ      Left 3 47 cm  -JJ      Right 3 46 cm  -JJ      Measurement Location 4 20 cm below the knee  -JJ      Left 4 46 cm  -JJ      Right 4 41 cm  -JJ      Measurement Location 5 30 cm below the knee  -JJ      Left 5 37 cm  -JJ      Right 5 32.5 cm  -JJ      Measurement Location 6 ankle  -JJ      Left 6 28 cm  -JJ      Right 6 25 cm  -JJ      Measurement Location 7  midfoot  -JJ      Left 7 22 cm  -JJ      Right 7 21 cm  -JJ      LLE Circumferential Total 286.5 cm  -JJ      RLE Circumferential Total 272.5 cm  -JJ              Compression/Skin Care    Compression/Skin Care skin care;bandaging;remove bandages  -JJ      Skin Care washed/dried;lotion applied  -JJ      Bandage Layers cotton liner;padding/fluff layer;short-stretch bandages (comment size/quantity)  -JJ      Bandaging Comments L: cotton liner, cellona x 2, rosidal K: 1-8cm , 1-10cm, 1-12cm R : cotton liner, cellona x 2, rosidal K: 1-8cm ,2-10cm, 2-12cm  -JJ      Bandaging Technique circumferential/spiral;moderate compression  -JJ            User Key  (r) = Recorded By, (t) = Taken By, (c) = Cosigned By    Initials Name Provider Type    Priscilla Fang OTR Occupational Therapist                         OT Assessment/Plan     Row Name 09/15/22 1557          OT Assessment    Assessment Comments pt with decreased circumferential measurements on B LEs. pt states tolerated bandaging on B feet.  -JJ            OT Plan    OT Plan Comments cont poc. pt to wrap B LEs over weekend with firend assist as needed. pt watched youtube videos recommended for instruction and pt states no further questions  -J           User Key  (r) = Recorded By, (t) = Taken By, (c) = Cosigned By    Initials Name Provider Type    Priscilla Fang OTR Occupational Therapist                          Therapy Education  Given: HEP, Symptoms/condition management, Edema management, Bandaging/dressing change  Program: Reinforced  How Provided: Verbal, Demonstration  Provided to: Patient, Caregiver                Time Calculation:   OT Start Time: 1330  OT Stop Time: 1439  OT Time Calculation (min): 69 min     Therapy Charges for Today     Code Description Service Date Service Provider Modifiers Qty    80106944283  OT THERAPEUTIC ACT EA 15 MIN 9/15/2022 Priscilla Gallagher OTR GO 2    [id number]  OT SELF CARE/MGMT/TRAIN EA 15 MIN  9/15/2022 Aileen, Priscilla Romero, OTR GO 2                      Priscilla Gallagher, OTR  9/15/2022

## 2022-09-20 ENCOUNTER — APPOINTMENT (OUTPATIENT)
Dept: OCCUPATIONAL THERAPY | Facility: HOSPITAL | Age: 82
End: 2022-09-20

## 2022-09-21 ENCOUNTER — HOSPITAL ENCOUNTER (OUTPATIENT)
Dept: OCCUPATIONAL THERAPY | Facility: HOSPITAL | Age: 82
Setting detail: THERAPIES SERIES
Discharge: HOME | End: 2022-09-21

## 2022-09-21 DIAGNOSIS — I89.0 LYMPHEDEMA: Primary | ICD-10-CM

## 2022-09-21 PROCEDURE — 97530 THERAPEUTIC ACTIVITIES: CPT

## 2022-09-21 PROCEDURE — 97535 SELF CARE MNGMENT TRAINING: CPT

## 2022-09-21 NOTE — THERAPY TREATMENT NOTE
Outpatient Occupational Therapy Lymphedema Treatment Note  ANNAMARIE Grimes     Patient Name: Latanya Olsen  : 1940  MRN: [id number]  Today's Date: 2022      Visit Date: 2022    Patient Active Problem List   Diagnosis   • Carcinoid tumor of lung   • Diverticulosis of intestine   • Obstructive sleep apnea syndrome   • Weight loss   • Vitamin D deficiency   • Overweight (BMI 25.0-29.9)   • Spinal stenosis of lumbar region without neurogenic claudication   • Osteoarthritis of knee   • Obesity (BMI 30-39.9)   • Neutropenia (HCC)   • Chronic lower back pain   • Knee pain   • Insomnia   • Hypothyroidism   • Hypokalemia   • Hypertension   • Hyperlipidemia   • Generalized osteoarthritis   • Gastroparesis   • Foot pain   • Chronic obstructive pulmonary disease (HCC)   • Chronic constipation   • Anemia   • Weight gain   • Pain of left breast   • Elevated serum alkaline phosphatase level   • Neck pain   • Volume overload   • Status post total hip replacement, right   • Generalized weakness   • Constipation   • Acute deep vein thrombosis (DVT) of iliac vein of left lower extremity (HCC)   • Acute deep vein thrombosis (DVT) of tibial vein of left lower extremity (HCC)   • Atrial fibrillation (HCC)   • Hyperhomocysteinemia (HCC)   • Idiopathic peripheral neuropathy   • Lymphedema        Past Medical History:   Diagnosis Date   • Anemia    • Arthritis    • Atrial fibrillation (HCC)    • CKD (chronic kidney disease)     Stage 3   • Constipation    • COPD (chronic obstructive pulmonary disease) (HCC)    • DDD (degenerative disc disease), lumbar    • Depression    • Diverticulosis    • GERD (gastroesophageal reflux disease)    • Hx of degenerative disc disease    • Hyperlipidemia    • Hypertension    • Hypokalemia    • Hypothyroidism    • Knee swelling    • Lung cancer (HCC)     history   • Renal disorder    • Restless leg syndrome    • Sleep apnea with use of continuous positive airway pressure (CPAP)         Past  Surgical History:   Procedure Laterality Date   • BUNIONECTOMY Bilateral    • CATARACT EXTRACTION     • CHOLECYSTECTOMY     • COLONOSCOPY     • ENDOSCOPY N/A 6/24/2016    Procedure: ESOPHAGOGASTRODUODENOSCOPY  with biopsies;  Surgeon: Von Hernández MD;  Location: [facility];  Service:    • LUNG LOBECTOMY Left     lower lobe   • LYTIC THROMBIN THERAPY Left 3/26/2021    Procedure: left leg clot treiver possible venous stent *supine*;  Surgeon: Rogerio Vega MD;  Location: [facility] 18/19;  Service: Vascular;  Laterality: Left;   • REPLACEMENT TOTAL KNEE Left    • THYROID BIOPSY     • TOTAL HIP ARTHROPLASTY Right 6/1/2019    Procedure: BIPOLAR HIP CEMENTED POSTERIOR;  Surgeon: Ashley Crenshaw MD;  Location: [facility] OR;  Service: Orthopedics         Visit Dx:      ICD-10-CM ICD-9-CM   1. Lymphedema  I89.0 457.1        Lymphedema     Row Name 09/21/22 1300             Subjective Pain    Able to rate subjective pain? yes  -JJ      Subjective Pain Comment pt with chronic B feet pain, did not rate  -JJ              Subjective Comments    Subjective Comments pt states she tolerated bandaging, although found it challenging without assist from friend. pt states she has has pain in the tops of her feet with bandages and has put small sanitary pads on tops of B feet to assist with pain per pt report  -JJ              Lymphedema Assessment    Lymphedema Classification RLE:;LLE:;stage 2 (Spontaneously Irreversible)  -JJ              Lymphedema Measurements    Measurement Type(s) Circumferential  -JJ      Circumferential Areas Lower extremities  -JJ              BLE Circumferential (cm)    Measurement Location 1 10 cm above the knee  -JJ      Left 1 59 cm  -JJ      Right 1 59 cm  -JJ      Measurement Location 2 knee  -JJ      Left 2 51.5 cm  -JJ      Right 2 52 cm  -JJ      Measurement Location 3 10 cm below the knee  -JJ      Left 3 48 cm  -JJ      Right 3 47 cm  -JJ      Measurement Location 4  20 cm below the knee  -JJ      Left 4 42 cm  -JJ      Right 4 41 cm  -JJ      Measurement Location 5 30 cm below the knee  -JJ      Left 5 36 cm  -JJ      Right 5 32 cm  -JJ      Measurement Location 6 ankle  -JJ      Left 6 31 cm  -JJ      Right 6 26 cm  -JJ      Measurement Location 7 midfoot  -JJ      Left 7 23 cm  -JJ      Right 7 22 cm  -JJ      LLE Circumferential Total 290.5 cm  -JJ      RLE Circumferential Total 279 cm  -JJ              Compression/Skin Care    Compression/Skin Care skin care;bandaging;remove bandages  -JJ      Skin Care washed/dried;moisturizing lotion applied  -JJ      Bandage Layers cotton liner;padding/fluff layer;short-stretch bandages (comment size/quantity)  -JJ      Bandaging Comments L: cotton liner, cellona x 2, rosdial K: 1-8cm, 1-10cm, 1-12cm R: cotton liner, cellona x 2, rosdial K: 1- 8cm, 1-10cm, 1-12cm  -JJ      Bandaging Technique circumferential/spiral;moderate compression  -JJ      Compression/Skin Care Comments increased padding on B feet 2 to complaints of pain in top of foot  -JJ            User Key  (r) = Recorded By, (t) = Taken By, (c) = Cosigned By    Initials Name Provider Type    Priscilla Fang, OTR Occupational Therapist                         OT Assessment/Plan     Row Name 09/21/22 1548          OT Assessment    Assessment Comments pt circumferential measurements increased bilaterally since last appointment. pt has been self bandaging with friend being unable to assist over past several days. pts bandaging appeared too loose today, provided education on bandaging techniques again. increased padding on top of B feet 2 co pain  -JJ            OT Plan    OT Plan Comments cont poc  -JJ           User Key  (r) = Recorded By, (t) = Taken By, (c) = Cosigned By    Initials Name Provider Type    Priscilla Fang, OTR Occupational Therapist                          Therapy Education  Given: HEP, Symptoms/condition management, Edema management,  Bandaging/dressing change  Program: Reinforced  How Provided: Verbal, Demonstration  Provided to: Patient  Level of Understanding: Verbalized                Time Calculation:   OT Start Time: 1300  OT Stop Time: 1410  OT Time Calculation (min): 70 min     Therapy Charges for Today     Code Description Service Date Service Provider Modifiers Qty    [id number]  OT SELF CARE/MGMT/TRAIN EA 15 MIN 9/21/2022 Priscilla Gallagher, OTR GO 2    [id number]  OT THERAPEUTIC ACT EA 15 MIN 9/21/2022 Priscilla Gallagher, OTR GO 1                      RENATO Murrieta  9/21/2022

## 2022-09-22 ENCOUNTER — HOSPITAL ENCOUNTER (OUTPATIENT)
Dept: OCCUPATIONAL THERAPY | Facility: HOSPITAL | Age: 82
Setting detail: THERAPIES SERIES
Discharge: HOME | End: 2022-09-22

## 2022-09-22 PROCEDURE — 97535 SELF CARE MNGMENT TRAINING: CPT

## 2022-09-22 PROCEDURE — 97530 THERAPEUTIC ACTIVITIES: CPT

## 2022-09-22 NOTE — THERAPY TREATMENT NOTE
Outpatient Occupational Therapy Lymphedema Treatment Note  ANNAMARIE Grimes     Patient Name: Latanya Olsen  : 1940  MRN: [id number]  Today's Date: 2022      Visit Date: 2022    Patient Active Problem List   Diagnosis   • Carcinoid tumor of lung   • Diverticulosis of intestine   • Obstructive sleep apnea syndrome   • Weight loss   • Vitamin D deficiency   • Overweight (BMI 25.0-29.9)   • Spinal stenosis of lumbar region without neurogenic claudication   • Osteoarthritis of knee   • Obesity (BMI 30-39.9)   • Neutropenia (HCC)   • Chronic lower back pain   • Knee pain   • Insomnia   • Hypothyroidism   • Hypokalemia   • Hypertension   • Hyperlipidemia   • Generalized osteoarthritis   • Gastroparesis   • Foot pain   • Chronic obstructive pulmonary disease (HCC)   • Chronic constipation   • Anemia   • Weight gain   • Pain of left breast   • Elevated serum alkaline phosphatase level   • Neck pain   • Volume overload   • Status post total hip replacement, right   • Generalized weakness   • Constipation   • Acute deep vein thrombosis (DVT) of iliac vein of left lower extremity (HCC)   • Acute deep vein thrombosis (DVT) of tibial vein of left lower extremity (HCC)   • Atrial fibrillation (HCC)   • Hyperhomocysteinemia (HCC)   • Idiopathic peripheral neuropathy   • Lymphedema        Past Medical History:   Diagnosis Date   • Anemia    • Arthritis    • Atrial fibrillation (HCC)    • CKD (chronic kidney disease)     Stage 3   • Constipation    • COPD (chronic obstructive pulmonary disease) (HCC)    • DDD (degenerative disc disease), lumbar    • Depression    • Diverticulosis    • GERD (gastroesophageal reflux disease)    • Hx of degenerative disc disease    • Hyperlipidemia    • Hypertension    • Hypokalemia    • Hypothyroidism    • Knee swelling    • Lung cancer (HCC)     history   • Renal disorder    • Restless leg syndrome    • Sleep apnea with use of continuous positive airway pressure (CPAP)         Past  Surgical History:   Procedure Laterality Date   • BUNIONECTOMY Bilateral    • CATARACT EXTRACTION     • CHOLECYSTECTOMY     • COLONOSCOPY     • ENDOSCOPY N/A 6/24/2016    Procedure: ESOPHAGOGASTRODUODENOSCOPY  with biopsies;  Surgeon: Von Hernández MD;  Location: [facility];  Service:    • LUNG LOBECTOMY Left     lower lobe   • LYTIC THROMBIN THERAPY Left 3/26/2021    Procedure: left leg clot treiver possible venous stent *supine*;  Surgeon: Rogerio Vega MD;  Location: [facility] 18/19;  Service: Vascular;  Laterality: Left;   • REPLACEMENT TOTAL KNEE Left    • THYROID BIOPSY     • TOTAL HIP ARTHROPLASTY Right 6/1/2019    Procedure: BIPOLAR HIP CEMENTED POSTERIOR;  Surgeon: Ashley Crenshaw MD;  Location: [facility];  Service: Orthopedics         Visit Dx:    No diagnosis found.     Lymphedema     Row Name 09/22/22 1300             Subjective Pain    Able to rate subjective pain? yes  -JJ      Subjective Pain Comment pt with chronic B feet pain, did not rate  -JJ              Subjective Comments    Subjective Comments pt states tolerated bandaging, pain in feet improved with extra padding on top of B feet  -JJ              Lymphedema Measurements    Measurement Type(s) Circumferential  -JJ      Circumferential Areas Lower extremities  -JJ              BLE Circumferential (cm)    Measurement Location 1 10 cm above the knee  -JJ      Left 1 57.5 cm  -JJ      Right 1 60 cm  -JJ      Measurement Location 2 knee  -JJ      Left 2 50 cm  -JJ      Right 2 54 cm  -JJ      Measurement Location 3 10 cm below the knee  -JJ      Left 3 47 cm  -JJ      Right 3 45.5 cm  -JJ      Measurement Location 4 20 cm below the knee  -JJ      Left 4 41 cm  -JJ      Right 4 40 cm  -JJ      Measurement Location 5 30 cm below the knee  -JJ      Left 5 35 cm  -JJ      Right 5 32 cm  -JJ      Measurement Location 6 ankle  -JJ      Left 6 28.5 cm  -JJ      Right 6 25 cm  -JJ      Measurement Location 7 midfoot   -JJ      Left 7 22 cm  -JJ      Right 7 23 cm  -JJ      LLE Circumferential Total 281 cm  -JJ      RLE Circumferential Total 279.5 cm  -JJ              Compression/Skin Care    Compression/Skin Care skin care;bandaging  -JJ      Skin Care washed/dried;moisturizing lotion applied  -JJ      Bandage Layers cotton liner;padding/fluff layer;short-stretch bandages (comment size/quantity)  -JJ      Bandaging Comments L: cotton liner, cellona x 2, rosidal K: 1-8cm, 1-10cm, 1-12cm R: cotton liner, cellona x 2, rosdial K: 1-8cm, 1-10cm, 1-12cm  -JJ      Bandaging Technique circumferential/spiral;moderate compression  -JJ      Compression/Skin Care Comments increased padding on  top of B feet  -JJ            User Key  (r) = Recorded By, (t) = Taken By, (c) = Cosigned By    Initials Name Provider Type    Priscilla Fang OTR Occupational Therapist                         OT Assessment/Plan     Row Name 09/22/22 1416          OT Assessment    Assessment Comments pt L LE circumferential measurements decreased overnight with therapist bandaging. pts R LE measurements remained stable. pt states pain in tops of feet improved with increased padding.  -JJ            OT Plan    OT Plan Comments cont poc  -JJ           User Key  (r) = Recorded By, (t) = Taken By, (c) = Cosigned By    Initials Name Provider Type    Priscilla Fang OTR Occupational Therapist                          Therapy Education  Given: HEP, Symptoms/condition management, Edema management  Program: Reinforced  How Provided: Verbal, Demonstration  Provided to: Patient  Level of Understanding: Verbalized                Time Calculation:   OT Start Time: 1300  OT Stop Time: 1409  OT Time Calculation (min): 69 min     Therapy Charges for Today     Code Description Service Date Service Provider Modifiers Qty    [id number]  OT THERAPEUTIC ACT EA 15 MIN 9/22/2022 Priscilla Gallagher OTR GO 1    [id number]  OT SELF CARE/MGMT/TRAIN EA 15 MIN  9/22/2022 Aileen, Priscilla Romero, OTR GO 2                      Priscilla Gallagher, OTR  9/22/2022

## 2022-09-27 ENCOUNTER — HOSPITAL ENCOUNTER (OUTPATIENT)
Dept: OCCUPATIONAL THERAPY | Facility: HOSPITAL | Age: 82
Setting detail: THERAPIES SERIES
Discharge: HOME | End: 2022-09-27

## 2022-09-27 DIAGNOSIS — I89.0 LYMPHEDEMA: Primary | ICD-10-CM

## 2022-09-27 PROCEDURE — 97530 THERAPEUTIC ACTIVITIES: CPT

## 2022-09-27 PROCEDURE — 97535 SELF CARE MNGMENT TRAINING: CPT

## 2022-09-27 NOTE — THERAPY TREATMENT NOTE
Outpatient Occupational Therapy Lymphedema Treatment Note  ANNAMARIE Grimes     Patient Name: Latanya Olsen  : 1940  MRN: [id number]  Today's Date: 2022      Visit Date: 2022    Patient Active Problem List   Diagnosis   • Carcinoid tumor of lung   • Diverticulosis of intestine   • Obstructive sleep apnea syndrome   • Weight loss   • Vitamin D deficiency   • Overweight (BMI 25.0-29.9)   • Spinal stenosis of lumbar region without neurogenic claudication   • Osteoarthritis of knee   • Obesity (BMI 30-39.9)   • Neutropenia (HCC)   • Chronic lower back pain   • Knee pain   • Insomnia   • Hypothyroidism   • Hypokalemia   • Hypertension   • Hyperlipidemia   • Generalized osteoarthritis   • Gastroparesis   • Foot pain   • Chronic obstructive pulmonary disease (HCC)   • Chronic constipation   • Anemia   • Weight gain   • Pain of left breast   • Elevated serum alkaline phosphatase level   • Neck pain   • Volume overload   • Status post total hip replacement, right   • Generalized weakness   • Constipation   • Acute deep vein thrombosis (DVT) of iliac vein of left lower extremity (HCC)   • Acute deep vein thrombosis (DVT) of tibial vein of left lower extremity (HCC)   • Atrial fibrillation (HCC)   • Hyperhomocysteinemia (HCC)   • Idiopathic peripheral neuropathy   • Lymphedema        Past Medical History:   Diagnosis Date   • Anemia    • Arthritis    • Atrial fibrillation (HCC)    • CKD (chronic kidney disease)     Stage 3   • Constipation    • COPD (chronic obstructive pulmonary disease) (HCC)    • DDD (degenerative disc disease), lumbar    • Depression    • Diverticulosis    • GERD (gastroesophageal reflux disease)    • Hx of degenerative disc disease    • Hyperlipidemia    • Hypertension    • Hypokalemia    • Hypothyroidism    • Knee swelling    • Lung cancer (HCC)     history   • Renal disorder    • Restless leg syndrome    • Sleep apnea with use of continuous positive airway pressure (CPAP)         Past  Surgical History:   Procedure Laterality Date   • BUNIONECTOMY Bilateral    • CATARACT EXTRACTION     • CHOLECYSTECTOMY     • COLONOSCOPY     • ENDOSCOPY N/A 6/24/2016    Procedure: ESOPHAGOGASTRODUODENOSCOPY  with biopsies;  Surgeon: Von Hernández MD;  Location: [facility];  Service:    • LUNG LOBECTOMY Left     lower lobe   • LYTIC THROMBIN THERAPY Left 3/26/2021    Procedure: left leg clot treiver possible venous stent *supine*;  Surgeon: Rogerio Vega MD;  Location: [facility] OR 18/19;  Service: Vascular;  Laterality: Left;   • REPLACEMENT TOTAL KNEE Left    • THYROID BIOPSY     • TOTAL HIP ARTHROPLASTY Right 6/1/2019    Procedure: BIPOLAR HIP CEMENTED POSTERIOR;  Surgeon: Ashley Crenshaw MD;  Location: [facility] OR;  Service: Orthopedics         Visit Dx:      ICD-10-CM ICD-9-CM   1. Lymphedema  I89.0 457.1        Lymphedema     Row Name 09/27/22 1300             Subjective Pain    Able to rate subjective pain? yes  -JJ      Subjective Pain Comment pt with chronic B feet pain, did not rate  -JJ              Subjective Comments    Subjective Comments pt states top of R foot hurt with bandaging. pt states she put an extra pad on top of foot and pain improved  -JJ              Lymphedema Measurements    Measurement Type(s) Circumferential  -JJ      Circumferential Areas Lower extremities  -JJ              BLE Circumferential (cm)    Measurement Location 1 10 cm above the knee  -JJ      Left 1 58.5 cm  -JJ      Right 1 56 cm  -JJ      Measurement Location 2 knee  -JJ      Left 2 48.5 cm  -JJ      Right 2 48 cm  -JJ      Measurement Location 3 10 cm below the knee  -JJ      Left 3 48 cm  -JJ      Right 3 43.5 cm  -JJ      Measurement Location 4 20 cm below the knee  -JJ      Left 4 40.5 cm  -JJ      Right 4 40 cm  -JJ      Measurement Location 5 30 cm below the knee  -JJ      Left 5 33 cm  -JJ      Right 5 28 cm  -JJ      Measurement Location 6 ankle  -JJ      Left 6 28.5 cm  -JJ       Right 6 25.5 cm  -JJ      Measurement Location 7 midfoot  -JJ      Left 7 23 cm  -JJ      Right 7 22.5 cm  -JJ      LLE Circumferential Total 280 cm  -JJ      RLE Circumferential Total 263.5 cm  -JJ              Compression/Skin Care    Compression/Skin Care skin care;bandaging;remove bandages  -JJ      Skin Care washed/dried;moisturizing lotion applied  -JJ      Bandage Layers cotton liner;padding/fluff layer;short-stretch bandages (comment size/quantity)  -JJ      Bandaging Comments L: cotton liner, cellona x 2, rosidal K: 1-8cm, 1-10cm, 1-12cm R: cotton liner, cellona x 2, rosidal K: 1-8cm, 1-10cm, 1-12cm  -JJ      Bandaging Technique circumferential/spiral;moderate compression  -JJ      Compression/Skin Care Comments increased padding on volar surface of B feet with gauze pad placed on top of R foot  -JJ            User Key  (r) = Recorded By, (t) = Taken By, (c) = Cosigned By    Initials Name Provider Type    Priscilla Fang, OTR Occupational Therapist                         OT Assessment/Plan     Row Name 09/27/22 1433          OT Assessment    Assessment Comments pt B LE circumferentail measurements continue to decrease with the L LE decreasing slower than the R. pt continues to tolerate bandaging however co pain in tops of feet, more on right than left foot. padding increased  -JJ            OT Plan    OT Plan Comments cont poc  -JJ           User Key  (r) = Recorded By, (t) = Taken By, (c) = Cosigned By    Initials Name Provider Type    Priscilla Fang OTR Occupational Therapist                          Therapy Education  Given: HEP, Symptoms/condition management, Edema management, Bandaging/dressing change  Program: Reinforced  How Provided: Verbal, Demonstration  Provided to: Patient  Level of Understanding: Verbalized                Time Calculation:   OT Start Time: 1300  OT Stop Time: 1410  OT Time Calculation (min): 70 min     Therapy Charges for Today     Code Description  Service Date Service Provider Modifiers Qty    [id number]  OT THERAPEUTIC ACT EA 15 MIN 9/27/2022 Priscilla Gallagher, OTR GO 2    [id number]  OT SELF CARE/MGMT/TRAIN EA 15 MIN 9/27/2022 Priscilla Gallagher, OTR GO 2                      Priscilla Gallagher, OTR  9/27/2022

## 2022-09-28 ENCOUNTER — APPOINTMENT (OUTPATIENT)
Dept: OCCUPATIONAL THERAPY | Facility: HOSPITAL | Age: 82
End: 2022-09-28

## 2022-09-29 ENCOUNTER — HOSPITAL ENCOUNTER (OUTPATIENT)
Dept: OCCUPATIONAL THERAPY | Facility: HOSPITAL | Age: 82
Setting detail: THERAPIES SERIES
Discharge: HOME | End: 2022-09-29

## 2022-09-29 DIAGNOSIS — I89.0 LYMPHEDEMA: Primary | ICD-10-CM

## 2022-09-29 PROCEDURE — 97535 SELF CARE MNGMENT TRAINING: CPT

## 2022-09-29 PROCEDURE — 97530 THERAPEUTIC ACTIVITIES: CPT

## 2022-09-29 NOTE — THERAPY TREATMENT NOTE
Outpatient Occupational Therapy Lymphedema Treatment Note  ANNAMARIE Grimes     Patient Name: Latanya Olsen  : 1940  MRN: [id number]  Today's Date: 2022      Visit Date: 2022    Patient Active Problem List   Diagnosis   • Carcinoid tumor of lung   • Diverticulosis of intestine   • Obstructive sleep apnea syndrome   • Weight loss   • Vitamin D deficiency   • Overweight (BMI 25.0-29.9)   • Spinal stenosis of lumbar region without neurogenic claudication   • Osteoarthritis of knee   • Obesity (BMI 30-39.9)   • Neutropenia (HCC)   • Chronic lower back pain   • Knee pain   • Insomnia   • Hypothyroidism   • Hypokalemia   • Hypertension   • Hyperlipidemia   • Generalized osteoarthritis   • Gastroparesis   • Foot pain   • Chronic obstructive pulmonary disease (HCC)   • Chronic constipation   • Anemia   • Weight gain   • Pain of left breast   • Elevated serum alkaline phosphatase level   • Neck pain   • Volume overload   • Status post total hip replacement, right   • Generalized weakness   • Constipation   • Acute deep vein thrombosis (DVT) of iliac vein of left lower extremity (HCC)   • Acute deep vein thrombosis (DVT) of tibial vein of left lower extremity (HCC)   • Atrial fibrillation (HCC)   • Hyperhomocysteinemia (HCC)   • Idiopathic peripheral neuropathy   • Lymphedema        Past Medical History:   Diagnosis Date   • Anemia    • Arthritis    • Atrial fibrillation (HCC)    • CKD (chronic kidney disease)     Stage 3   • Constipation    • COPD (chronic obstructive pulmonary disease) (HCC)    • DDD (degenerative disc disease), lumbar    • Depression    • Diverticulosis    • GERD (gastroesophageal reflux disease)    • Hx of degenerative disc disease    • Hyperlipidemia    • Hypertension    • Hypokalemia    • Hypothyroidism    • Knee swelling    • Lung cancer (HCC)     history   • Renal disorder    • Restless leg syndrome    • Sleep apnea with use of continuous positive airway pressure (CPAP)         Past  Surgical History:   Procedure Laterality Date   • BUNIONECTOMY Bilateral    • CATARACT EXTRACTION     • CHOLECYSTECTOMY     • COLONOSCOPY     • ENDOSCOPY N/A 6/24/2016    Procedure: ESOPHAGOGASTRODUODENOSCOPY  with biopsies;  Surgeon: Von Hernández MD;  Location: [facility];  Service:    • LUNG LOBECTOMY Left     lower lobe   • LYTIC THROMBIN THERAPY Left 3/26/2021    Procedure: left leg clot treiver possible venous stent *supine*;  Surgeon: Rogerio Vega MD;  Location: [facility] 18/19;  Service: Vascular;  Laterality: Left;   • REPLACEMENT TOTAL KNEE Left    • THYROID BIOPSY     • TOTAL HIP ARTHROPLASTY Right 6/1/2019    Procedure: BIPOLAR HIP CEMENTED POSTERIOR;  Surgeon: Ashley Crenshaw MD;  Location: [facility] OR;  Service: Orthopedics         Visit Dx:      ICD-10-CM ICD-9-CM   1. Lymphedema  I89.0 457.1        Lymphedema     Row Name 09/29/22 1300             Subjective Pain    Able to rate subjective pain? yes  -JJ      Subjective Pain Comment pt with co chronic pain in B feet, chronic 2 neuropathy  -JJ              Subjective Comments    Subjective Comments pt states still has some pain in tops of feet with bandaging however increased padding has decreaesed pain  -JJ              Lymphedema Measurements    Measurement Type(s) Circumferential  -JJ      Circumferential Areas Lower extremities  -JJ              BLE Circumferential (cm)    Measurement Location 1 10 cm above the knee  -JJ      Left 1 56 cm  -JJ      Right 1 58.5 cm  -JJ      Measurement Location 2 knee  -JJ      Left 2 49 cm  -JJ      Right 2 49 cm  -JJ      Measurement Location 3 10 cm below the knee  -JJ      Left 3 45 cm  -JJ      Right 3 45 cm  -JJ      Measurement Location 4 20 cm below the knee  -JJ      Left 4 40.4 cm  -JJ      Right 4 40 cm  -JJ      Measurement Location 5 30 cm below the knee  -JJ      Left 5 32 cm  -JJ      Right 5 30 cm  -JJ      Measurement Location 6 ankle  -JJ      Left 6 27 cm  -JJ       Right 6 25.5 cm  -JJ      Measurement Location 7 midfoot  -JJ      Left 7 24 cm  -JJ      Right 7 23 cm  -JJ      LLE Circumferential Total 273.4 cm  -JJ      RLE Circumferential Total 271 cm  -JJ              Compression/Skin Care    Compression/Skin Care skin care;bandaging  -JJ      Skin Care washed/dried;moisturizing lotion applied  -JJ      Bandage Layers cotton liner;padding/fluff layer;short-stretch bandages (comment size/quantity)  -JJ      Bandaging Comments L: cotton liner, cellona x 2, rosidal K: 1- 8cm, 1-10cm, 1-12cm R: cotton liner, cellona x 2, rosdial K: 1-8cm , 1-10cm ,1-12cm  -JJ      Bandaging Technique circumferential/spiral;moderate compression  -JJ      Compression/Skin Care Comments increased padding on top of B feet, and gauze pad for extra padding on top of R foot  -JJ            User Key  (r) = Recorded By, (t) = Taken By, (c) = Cosigned By    Initials Name Provider Type    Priscilla Fang OTR Occupational Therapist                         OT Assessment/Plan     Row Name 09/29/22 1421          OT Assessment    Assessment Comments pt with decreased circumferential measurements bilaterally this appointment. pt states increased padding on tops of feet have helped reduce pain.  -JJ            OT Plan    OT Plan Comments cont poc  -JJ           User Key  (r) = Recorded By, (t) = Taken By, (c) = Cosigned By    Initials Name Provider Type    Priscilla Fang OTR Occupational Therapist                          Therapy Education  Given: HEP, Symptoms/condition management, Edema management, Bandaging/dressing change  Program: Reinforced  How Provided: Verbal, Demonstration  Provided to: Patient  Level of Understanding: Verbalized                Time Calculation:   OT Start Time: 1300  OT Stop Time: 1355  OT Time Calculation (min): 55 min     Therapy Charges for Today     Code Description Service Date Service Provider Modifiers Qty    31761021472  OT THERAPEUTIC ACT EA 15  MIN 9/29/2022 Priscilla Gallagher, OTR GO 1    11844529968 HC OT SELF CARE/MGMT/TRAIN EA 15 MIN 9/29/2022 Priscilla Gallagher, OTR GO 2                      Priscilla Gallagher, OTR  9/29/2022

## 2022-10-01 DIAGNOSIS — K59.00 CONSTIPATION, UNSPECIFIED CONSTIPATION TYPE: ICD-10-CM

## 2022-10-01 RX ORDER — DOCUSATE SODIUM 50 MG AND SENNOSIDES 8.6 MG 8.6; 5 MG/1; MG/1
TABLET, FILM COATED ORAL
Qty: 120 TABLET | Refills: 2 | Status: CANCELLED | OUTPATIENT
Start: 2022-10-01

## 2022-10-03 RX ORDER — LEVOTHYROXINE SODIUM 0.1 MG/1
100 TABLET ORAL DAILY
Qty: 90 TABLET | Refills: 0 | Status: SHIPPED | OUTPATIENT
Start: 2022-10-03 | End: 2022-12-30

## 2022-10-03 RX ORDER — DOCUSATE SODIUM 50 MG AND SENNOSIDES 8.6 MG 8.6; 5 MG/1; MG/1
TABLET, FILM COATED ORAL
Qty: 120 TABLET | Refills: 2 | Status: SHIPPED | OUTPATIENT
Start: 2022-10-03 | End: 2022-12-30

## 2022-10-04 ENCOUNTER — HOSPITAL ENCOUNTER (OUTPATIENT)
Dept: OCCUPATIONAL THERAPY | Facility: HOSPITAL | Age: 82
Setting detail: THERAPIES SERIES
Discharge: HOME | End: 2022-10-04

## 2022-10-04 ENCOUNTER — TELEPHONE (OUTPATIENT)
Dept: FAMILY MEDICINE CLINIC | Facility: CLINIC | Age: 82
End: 2022-10-04

## 2022-10-04 DIAGNOSIS — I89.0 LYMPHEDEMA: Primary | ICD-10-CM

## 2022-10-04 PROCEDURE — 97535 SELF CARE MNGMENT TRAINING: CPT

## 2022-10-04 PROCEDURE — 97530 THERAPEUTIC ACTIVITIES: CPT

## 2022-10-04 NOTE — THERAPY TREATMENT NOTE
Outpatient Occupational Therapy Lymphedema Treatment Note  ANNAMARIE Grimes     Patient Name: Latanya Olsen  : 1940  MRN: [id number]  Today's Date: 10/4/2022      Visit Date: 10/04/2022    Patient Active Problem List   Diagnosis   • Carcinoid tumor of lung   • Diverticulosis of intestine   • Obstructive sleep apnea syndrome   • Weight loss   • Vitamin D deficiency   • Overweight (BMI 25.0-29.9)   • Spinal stenosis of lumbar region without neurogenic claudication   • Osteoarthritis of knee   • Obesity (BMI 30-39.9)   • Neutropenia (HCC)   • Chronic lower back pain   • Knee pain   • Insomnia   • Hypothyroidism   • Hypokalemia   • Hypertension   • Hyperlipidemia   • Generalized osteoarthritis   • Gastroparesis   • Foot pain   • Chronic obstructive pulmonary disease (HCC)   • Chronic constipation   • Anemia   • Weight gain   • Pain of left breast   • Elevated serum alkaline phosphatase level   • Neck pain   • Volume overload   • Status post total hip replacement, right   • Generalized weakness   • Constipation   • Acute deep vein thrombosis (DVT) of iliac vein of left lower extremity (HCC)   • Acute deep vein thrombosis (DVT) of tibial vein of left lower extremity (HCC)   • Atrial fibrillation (HCC)   • Hyperhomocysteinemia (HCC)   • Idiopathic peripheral neuropathy   • Lymphedema        Past Medical History:   Diagnosis Date   • Anemia    • Arthritis    • Atrial fibrillation (HCC)    • CKD (chronic kidney disease)     Stage 3   • Constipation    • COPD (chronic obstructive pulmonary disease) (HCC)    • DDD (degenerative disc disease), lumbar    • Depression    • Diverticulosis    • GERD (gastroesophageal reflux disease)    • Hx of degenerative disc disease    • Hyperlipidemia    • Hypertension    • Hypokalemia    • Hypothyroidism    • Knee swelling    • Lung cancer (HCC)     history   • Renal disorder    • Restless leg syndrome    • Sleep apnea with use of continuous positive airway pressure (CPAP)         Past  Surgical History:   Procedure Laterality Date   • BUNIONECTOMY Bilateral    • CATARACT EXTRACTION     • CHOLECYSTECTOMY     • COLONOSCOPY     • ENDOSCOPY N/A 6/24/2016    Procedure: ESOPHAGOGASTRODUODENOSCOPY  with biopsies;  Surgeon: Von Hernández MD;  Location: [facility];  Service:    • LUNG LOBECTOMY Left     lower lobe   • LYTIC THROMBIN THERAPY Left 3/26/2021    Procedure: left leg clot treiver possible venous stent *supine*;  Surgeon: Rogerio Vega MD;  Location: [facility] OR 18/19;  Service: Vascular;  Laterality: Left;   • REPLACEMENT TOTAL KNEE Left    • THYROID BIOPSY     • TOTAL HIP ARTHROPLASTY Right 6/1/2019    Procedure: BIPOLAR HIP CEMENTED POSTERIOR;  Surgeon: Ashley Crenshaw MD;  Location: [facility] OR;  Service: Orthopedics         Visit Dx:      ICD-10-CM ICD-9-CM   1. Lymphedema  I89.0 457.1        Lymphedema     Row Name 10/04/22 1100             Subjective Pain    Able to rate subjective pain? yes  -JJ      Subjective Pain Comment pt with co chronic pain in feet, did not rate  -JJ              Subjective Comments    Subjective Comments pt states has tolerated bandaging. friend present at appt today. friend will be assisting her with bandaging at home  -JJ              Lymphedema Measurements    Measurement Type(s) Circumferential  -JJ      Circumferential Areas Lower extremities  -JJ              BLE Circumferential (cm)    Measurement Location 1 10 cmabove the knee  -JJ      Left 1 57 cm  -JJ      Right 1 58 cm  -JJ      Measurement Location 2 knee  -JJ      Left 2 49 cm  -JJ      Right 2 52 cm  -JJ      Measurement Location 3 10 cm below the knee  -JJ      Left 3 50 cm  -JJ      Right 3 45.5 cm  -JJ      Measurement Location 4 20 cm below the knee  -JJ      Left 4 40.5 cm  -JJ      Right 4 39 cm  -JJ      Measurement Location 5 30 cm below the knee  -JJ      Left 5 31 cm  -JJ      Right 5 30 cm  -JJ      Measurement Location 6 ankle  -JJ      Left 6 27 cm  -JJ       Right 6 25.5 cm  -JJ      Measurement Location 7 midfoot  -JJ      Left 7 23 cm  -JJ      Right 7 22 cm  -JJ      LLE Circumferential Total 277.5 cm  -JJ      RLE Circumferential Total 272 cm  -JJ              Compression/Skin Care    Compression/Skin Care skin care;bandaging;remove bandages  -JJ      Skin Care washed/dried;lotion applied  -JJ      Bandage Layers cotton liner;padding/fluff layer;short-stretch bandages (comment size/quantity)  -JJ      Bandaging Comments L: cotton liner, cellona x 2, rosidal K: 1-8cm, 1-10cm, 1-12cm R: cotton liner, cellona x 2, rosidal K: 1-8cm, 1-10cm, 1-12cm  -JJ      Bandaging Technique circumferential/spiral;moderate compression  -JJ      Compression/Skin Care Comments extra padding on top of B feet  -JJ            User Key  (r) = Recorded By, (t) = Taken By, (c) = Cosigned By    Initials Name Provider Type    Priscilla Fang OTR Occupational Therapist                         OT Assessment/Plan     Row Name 10/04/22 1549          OT Assessment    Assessment Comments pt with slight increase in circumferential measurements bilaterally however still edema is down over all. pts firend at appointment today and will assist pt with bandaging at home over the weekends  -JJ            OT Plan    OT Plan Comments cont poc  -JJ           User Key  (r) = Recorded By, (t) = Taken By, (c) = Cosigned By    Initials Name Provider Type    Priscilla Fang OTHANS Occupational Therapist                          Therapy Education  Given: HEP, Symptoms/condition management, Bandaging/dressing change, Edema management  Program: Reinforced  How Provided: Verbal, Demonstration  Provided to: Patient, Caregiver  Level of Understanding: Verbalized                Time Calculation:   OT Start Time: 1100  OT Stop Time: 1200  OT Time Calculation (min): 60 min     Therapy Charges for Today     Code Description Service Date Service Provider Modifiers Qty    27159504247  OT THERAPEUTIC ACT  EA 15 MIN 10/4/2022 Priscilla Gallagher, OTR GO 1    06401660399 HC OT SELF CARE/MGMT/TRAIN EA 15 MIN 10/4/2022 Priscilla Gallagher, OTR GO 2                      Priscilla Gallagher, OTR  10/4/2022

## 2022-10-04 NOTE — TELEPHONE ENCOUNTER
PATIENT IS CALLING IN SHE IS NOT SURE IF PHARMACY UPDATES PROVIDER OR NOT WHEN SHE GETS SHOTS FROM THERE. SHE WANTED TO PUT ON HER RECORD THAT SHE DID GET:    COVID 19 BOOSTER 12     SHINGLES 50 MCG INFUSION     FLU SHOT FLUZONE 65 INJECTION     SHE GOT THAT YESTERDAY ON   10/03/22

## 2022-10-05 ENCOUNTER — HOSPITAL ENCOUNTER (OUTPATIENT)
Dept: OCCUPATIONAL THERAPY | Facility: HOSPITAL | Age: 82
Setting detail: THERAPIES SERIES
Discharge: HOME | End: 2022-10-05

## 2022-10-05 DIAGNOSIS — I89.0 LYMPHEDEMA: Primary | ICD-10-CM

## 2022-10-05 PROCEDURE — 97535 SELF CARE MNGMENT TRAINING: CPT

## 2022-10-05 PROCEDURE — 97530 THERAPEUTIC ACTIVITIES: CPT

## 2022-10-05 NOTE — THERAPY TREATMENT NOTE
Outpatient Occupational Therapy Lymphedema Treatment Note  ANNAMARIE Grimes     Patient Name: Latanya Olsen  : 1940  MRN: [id number]  Today's Date: 10/5/2022      Visit Date: 10/05/2022    Patient Active Problem List   Diagnosis   • Carcinoid tumor of lung   • Diverticulosis of intestine   • Obstructive sleep apnea syndrome   • Weight loss   • Vitamin D deficiency   • Overweight (BMI 25.0-29.9)   • Spinal stenosis of lumbar region without neurogenic claudication   • Osteoarthritis of knee   • Obesity (BMI 30-39.9)   • Neutropenia (HCC)   • Chronic lower back pain   • Knee pain   • Insomnia   • Hypothyroidism   • Hypokalemia   • Hypertension   • Hyperlipidemia   • Generalized osteoarthritis   • Gastroparesis   • Foot pain   • Chronic obstructive pulmonary disease (HCC)   • Chronic constipation   • Anemia   • Weight gain   • Pain of left breast   • Elevated serum alkaline phosphatase level   • Neck pain   • Volume overload   • Status post total hip replacement, right   • Generalized weakness   • Constipation   • Acute deep vein thrombosis (DVT) of iliac vein of left lower extremity (HCC)   • Acute deep vein thrombosis (DVT) of tibial vein of left lower extremity (HCC)   • Atrial fibrillation (HCC)   • Hyperhomocysteinemia (HCC)   • Idiopathic peripheral neuropathy   • Lymphedema        Past Medical History:   Diagnosis Date   • Anemia    • Arthritis    • Atrial fibrillation (HCC)    • CKD (chronic kidney disease)     Stage 3   • Constipation    • COPD (chronic obstructive pulmonary disease) (HCC)    • DDD (degenerative disc disease), lumbar    • Depression    • Diverticulosis    • GERD (gastroesophageal reflux disease)    • Hx of degenerative disc disease    • Hyperlipidemia    • Hypertension    • Hypokalemia    • Hypothyroidism    • Knee swelling    • Lung cancer (HCC)     history   • Renal disorder    • Restless leg syndrome    • Sleep apnea with use of continuous positive airway pressure (CPAP)         Past  Surgical History:   Procedure Laterality Date   • BUNIONECTOMY Bilateral    • CATARACT EXTRACTION     • CHOLECYSTECTOMY     • COLONOSCOPY     • ENDOSCOPY N/A 6/24/2016    Procedure: ESOPHAGOGASTRODUODENOSCOPY  with biopsies;  Surgeon: Von Hernández MD;  Location: [facility];  Service:    • LUNG LOBECTOMY Left     lower lobe   • LYTIC THROMBIN THERAPY Left 3/26/2021    Procedure: left leg clot treiver possible venous stent *supine*;  Surgeon: Rogerio Vega MD;  Location: [facility] 18/19;  Service: Vascular;  Laterality: Left;   • REPLACEMENT TOTAL KNEE Left    • THYROID BIOPSY     • TOTAL HIP ARTHROPLASTY Right 6/1/2019    Procedure: BIPOLAR HIP CEMENTED POSTERIOR;  Surgeon: Ashley Crenshaw MD;  Location: [facility] OR;  Service: Orthopedics         Visit Dx:      ICD-10-CM ICD-9-CM   1. Lymphedema  I89.0 457.1        Lymphedema     Row Name 10/05/22 1300             Subjective Pain    Able to rate subjective pain? yes  -JJ      Subjective Pain Comment pt states soreness in top of R foot still present at night, however much improved with increased padding  -JJ              Subjective Comments    Subjective Comments pt states tolerating bandaging  -JJ              Lymphedema Measurements    Measurement Type(s) Circumferential  -JJ      Circumferential Areas Lower extremities  -JJ              BLE Circumferential (cm)    Measurement Location 1 10 cm above the knee  -JJ      Left 1 57.5 cm  -JJ      Right 1 57.5 cm  -JJ      Measurement Location 2 knee  -JJ      Left 2 50 cm  -JJ      Right 2 49 cm  -JJ      Measurement Location 3 10 cm beloe the knee  -JJ      Left 3 46.5 cm  -JJ      Right 3 44.5 cm  -JJ      Measurement Location 4 20 cm below the knee  -JJ      Left 4 40 cm  -JJ      Right 4 39 cm  -JJ      Measurement Location 5 30 cm below the knee  -JJ      Left 5 29.5 cm  -JJ      Right 5 31 cm  -JJ      Measurement Location 6 ankle  -JJ      Left 6 26.5 cm  -JJ      Right 6 25 cm   -JJ      Measurement Location 7 midfoot  -JJ      Left 7 22 cm  -JJ      Right 7 22 cm  -JJ      LLE Circumferential Total 272 cm  -JJ      RLE Circumferential Total 268 cm  -JJ              Compression/Skin Care    Compression/Skin Care skin care;bandaging;remove bandages  -JJ      Skin Care washed/dried;lotion applied  -JJ      Bandage Layers cotton liner;padding/fluff layer;short-stretch bandages (comment size/quantity)  -JJ      Bandaging Comments L: cotton liner, cellona x 2, rosidal K: 1-8cm, 1-10cm, 1-12cm R: cotton liner, cellona x 2, rosidal K: 1-8cm, 1-10cm, 1-12cm  -JJ      Bandaging Technique circumferential/spiral;moderate compression  -JJ      Compression/Skin Care Comments extra padding on top of B feet  -JJ            User Key  (r) = Recorded By, (t) = Taken By, (c) = Cosigned By    Initials Name Provider Type    Priscilla Fang OTR Occupational Therapist                         OT Assessment/Plan     Row Name 10/05/22 1459          OT Assessment    Assessment Comments pts circumferential measurements have decreased bilaterally. pt with states still some soreness in top of R foot however much improved with increased padding. pt continues to tolerate bandaging  -JJ            OT Plan    OT Plan Comments cont poc  -JJ           User Key  (r) = Recorded By, (t) = Taken By, (c) = Cosigned By    Initials Name Provider Type    Priscilla Fang OTR Occupational Therapist                          Therapy Education  Given: HEP, Edema management, Symptoms/condition management, Bandaging/dressing change  Program: Reinforced  How Provided: Verbal, Demonstration  Provided to: Patient  Level of Understanding: Verbalized                Time Calculation:   OT Start Time: 1300  OT Stop Time: 1400  OT Time Calculation (min): 60 min     Therapy Charges for Today     Code Description Service Date Service Provider Modifiers Qty    71837156380  OT THERAPEUTIC ACT EA 15 MIN 10/5/2022 Aileen  Priscilla Romero, OTR GO 1    [id number] HC OT SELF CARE/MGMT/TRAIN EA 15 MIN 10/5/2022 Priscilla Gallagher, OTR GO 2                      Priscilla Gallagher, OTR  10/5/2022

## 2022-10-06 ENCOUNTER — HOSPITAL ENCOUNTER (OUTPATIENT)
Dept: OCCUPATIONAL THERAPY | Facility: HOSPITAL | Age: 82
Setting detail: THERAPIES SERIES
Discharge: HOME | End: 2022-10-06

## 2022-10-06 DIAGNOSIS — I89.0 LYMPHEDEMA: Primary | ICD-10-CM

## 2022-10-06 PROCEDURE — 97535 SELF CARE MNGMENT TRAINING: CPT

## 2022-10-06 PROCEDURE — 97530 THERAPEUTIC ACTIVITIES: CPT

## 2022-10-06 NOTE — THERAPY TREATMENT NOTE
Outpatient Occupational Therapy Lymphedema Treatment Note  ANNAMARIE Grimes     Patient Name: Latanya Olsen  : 1940  MRN: [id number]  Today's Date: 10/6/2022      Visit Date: 10/06/2022    Patient Active Problem List   Diagnosis   • Carcinoid tumor of lung   • Diverticulosis of intestine   • Obstructive sleep apnea syndrome   • Weight loss   • Vitamin D deficiency   • Overweight (BMI 25.0-29.9)   • Spinal stenosis of lumbar region without neurogenic claudication   • Osteoarthritis of knee   • Obesity (BMI 30-39.9)   • Neutropenia (HCC)   • Chronic lower back pain   • Knee pain   • Insomnia   • Hypothyroidism   • Hypokalemia   • Hypertension   • Hyperlipidemia   • Generalized osteoarthritis   • Gastroparesis   • Foot pain   • Chronic obstructive pulmonary disease (HCC)   • Chronic constipation   • Anemia   • Weight gain   • Pain of left breast   • Elevated serum alkaline phosphatase level   • Neck pain   • Volume overload   • Status post total hip replacement, right   • Generalized weakness   • Constipation   • Acute deep vein thrombosis (DVT) of iliac vein of left lower extremity (HCC)   • Acute deep vein thrombosis (DVT) of tibial vein of left lower extremity (HCC)   • Atrial fibrillation (HCC)   • Hyperhomocysteinemia (HCC)   • Idiopathic peripheral neuropathy   • Lymphedema        Past Medical History:   Diagnosis Date   • Anemia    • Arthritis    • Atrial fibrillation (HCC)    • CKD (chronic kidney disease)     Stage 3   • Constipation    • COPD (chronic obstructive pulmonary disease) (HCC)    • DDD (degenerative disc disease), lumbar    • Depression    • Diverticulosis    • GERD (gastroesophageal reflux disease)    • Hx of degenerative disc disease    • Hyperlipidemia    • Hypertension    • Hypokalemia    • Hypothyroidism    • Knee swelling    • Lung cancer (HCC)     history   • Renal disorder    • Restless leg syndrome    • Sleep apnea with use of continuous positive airway pressure (CPAP)         Past  Surgical History:   Procedure Laterality Date   • BUNIONECTOMY Bilateral    • CATARACT EXTRACTION     • CHOLECYSTECTOMY     • COLONOSCOPY     • ENDOSCOPY N/A 6/24/2016    Procedure: ESOPHAGOGASTRODUODENOSCOPY  with biopsies;  Surgeon: Von Hernández MD;  Location: [facility];  Service:    • LUNG LOBECTOMY Left     lower lobe   • LYTIC THROMBIN THERAPY Left 3/26/2021    Procedure: left leg clot treiver possible venous stent *supine*;  Surgeon: Rogerio Vega MD;  Location: [facility] 18/19;  Service: Vascular;  Laterality: Left;   • REPLACEMENT TOTAL KNEE Left    • THYROID BIOPSY     • TOTAL HIP ARTHROPLASTY Right 6/1/2019    Procedure: BIPOLAR HIP CEMENTED POSTERIOR;  Surgeon: Ashley Crenshaw MD;  Location: [facility] OR;  Service: Orthopedics         Visit Dx:      ICD-10-CM ICD-9-CM   1. Lymphedema  I89.0 457.1        Lymphedema     Row Name 10/06/22 1300             Subjective Pain    Able to rate subjective pain? yes  -JJ              Subjective Comments    Subjective Comments pt with co soreness in B feet which is chronic however continues to tolerate bandaging  -JJ              Lymphedema Measurements    Measurement Type(s) Circumferential  -JJ      Circumferential Areas Lower extremities  -JJ              BLE Circumferential (cm)    Measurement Location 1 10 cm above the knee  -JJ      Left 1 58 cm  -JJ      Right 1 58.5 cm  -JJ      Measurement Location 2 knee  -JJ      Left 2 49.5 cm  -JJ      Right 2 52.5 cm  -JJ      Measurement Location 3 10 cm below the knee  -JJ      Left 3 47 cm  -JJ      Right 3 46 cm  -JJ      Measurement Location 4 20 cm below the knee  -JJ      Left 4 42 cm  -JJ      Right 4 41.5 cm  -JJ      Measurement Location 5 30 cm below the knee  -JJ      Left 5 30 cm  -JJ      Right 5 31 cm  -JJ      Measurement Location 6 ankle  -JJ      Left 6 26.5 cm  -JJ      Right 6 25 cm  -JJ      Measurement Location 7 midfoot  -JJ      Left 7 22 cm  -JJ      Right 7 22  cm  -JJ      LLE Circumferential Total 275 cm  -JJ      RLE Circumferential Total 276.5 cm  -JJ              Compression/Skin Care    Compression/Skin Care skin care;bandaging;remove bandages  -JJ      Skin Care moisturizing lotion applied  -JJ      Bandage Layers cotton liner;padding/fluff layer;short-stretch bandages (comment size/quantity)  -JJ      Bandaging Comments L: cotton liner, cellona x 2, rosidal K: 1-8cm, 1-10cm ,1-12cm R : cotton liner, cellona x 2, rosidal K: 1-8cm, 1-10cm, 1-12cm R: cotton liner, cellona x 2, rosidal K: 1-8cm, 1-10cm, 1-12cm  -JJ      Bandaging Technique circumferential/spiral;moderate compression  -JJ      Compression/Skin Care Comments extra padding on the top of B feet  -JJ            User Key  (r) = Recorded By, (t) = Taken By, (c) = Cosigned By    Initials Name Provider Type    Priscilla Fang OTR Occupational Therapist                         OT Assessment/Plan     Row Name 10/06/22 0772          OT Assessment    Assessment Comments pts circumferential measurements up 3 cm on the left and 8 cm on the right. pt states she is tolerating bandaging and compliant with AROM HEP  -JJ            OT Plan    OT Plan Comments cont poc  -JJ           User Key  (r) = Recorded By, (t) = Taken By, (c) = Cosigned By    Initials Name Provider Type    Priscilla Fang OTR Occupational Therapist                          Therapy Education  Given: HEP, Symptoms/condition management, Edema management, Bandaging/dressing change  Program: Reinforced  How Provided: Verbal, Demonstration  Provided to: Patient  Level of Understanding: Verbalized                Time Calculation:   OT Start Time: 1300  OT Stop Time: 1350  OT Time Calculation (min): 50 min     Therapy Charges for Today     Code Description Service Date Service Provider Modifiers Qty    56212650555  OT THERAPEUTIC ACT EA 15 MIN 10/6/2022 Priscilla Gallagher OTR GO 1    [id number]  OT SELF CARE/MGMT/TRAIN EA 15  MIN 10/6/2022 Aileen, Priscilla Romero, OTR GO 2                      Priscilla Gallagher, OTR  10/6/2022

## 2022-10-11 ENCOUNTER — HOSPITAL ENCOUNTER (OUTPATIENT)
Dept: OCCUPATIONAL THERAPY | Facility: HOSPITAL | Age: 82
Setting detail: THERAPIES SERIES
Discharge: HOME | End: 2022-10-11

## 2022-10-11 DIAGNOSIS — I89.0 LYMPHEDEMA: Primary | ICD-10-CM

## 2022-10-11 PROCEDURE — 97530 THERAPEUTIC ACTIVITIES: CPT

## 2022-10-11 PROCEDURE — 97535 SELF CARE MNGMENT TRAINING: CPT

## 2022-10-11 NOTE — THERAPY TREATMENT NOTE
Outpatient Occupational Therapy Lymphedema Treatment Note.  ANNAMARIE Grimes     Patient Name: Latanya Olsen  : 1940  MRN: [id number]  Today's Date: 10/11/2022      Visit Date: 10/11/2022    Patient Active Problem List   Diagnosis   • Carcinoid tumor of lung   • Diverticulosis of intestine   • Obstructive sleep apnea syndrome   • Weight loss   • Vitamin D deficiency   • Overweight (BMI 25.0-29.9)   • Spinal stenosis of lumbar region without neurogenic claudication   • Osteoarthritis of knee   • Obesity (BMI 30-39.9)   • Neutropenia (HCC)   • Chronic lower back pain   • Knee pain   • Insomnia   • Hypothyroidism   • Hypokalemia   • Hypertension   • Hyperlipidemia   • Generalized osteoarthritis   • Gastroparesis   • Foot pain   • Chronic obstructive pulmonary disease (HCC)   • Chronic constipation   • Anemia   • Weight gain   • Pain of left breast   • Elevated serum alkaline phosphatase level   • Neck pain   • Volume overload   • Status post total hip replacement, right   • Generalized weakness   • Constipation   • Acute deep vein thrombosis (DVT) of iliac vein of left lower extremity (HCC)   • Acute deep vein thrombosis (DVT) of tibial vein of left lower extremity (HCC)   • Atrial fibrillation (HCC)   • Hyperhomocysteinemia (HCC)   • Idiopathic peripheral neuropathy   • Lymphedema        Past Medical History:   Diagnosis Date   • Anemia    • Arthritis    • Atrial fibrillation (HCC)    • CKD (chronic kidney disease)     Stage 3   • Constipation    • COPD (chronic obstructive pulmonary disease) (HCC)    • DDD (degenerative disc disease), lumbar    • Depression    • Diverticulosis    • GERD (gastroesophageal reflux disease)    • Hx of degenerative disc disease    • Hyperlipidemia    • Hypertension    • Hypokalemia    • Hypothyroidism    • Knee swelling    • Lung cancer (HCC)     history   • Renal disorder    • Restless leg syndrome    • Sleep apnea with use of continuous positive airway pressure (CPAP)         Past  Surgical History:   Procedure Laterality Date   • BUNIONECTOMY Bilateral    • CATARACT EXTRACTION     • CHOLECYSTECTOMY     • COLONOSCOPY     • ENDOSCOPY N/A 6/24/2016    Procedure: ESOPHAGOGASTRODUODENOSCOPY  with biopsies;  Surgeon: Von Hernández MD;  Location: [facility];  Service:    • LUNG LOBECTOMY Left     lower lobe   • LYTIC THROMBIN THERAPY Left 3/26/2021    Procedure: left leg clot treiver possible venous stent *supine*;  Surgeon: Rogerio Vega MD;  Location: [facility] 18/19;  Service: Vascular;  Laterality: Left;   • REPLACEMENT TOTAL KNEE Left    • THYROID BIOPSY     • TOTAL HIP ARTHROPLASTY Right 6/1/2019    Procedure: BIPOLAR HIP CEMENTED POSTERIOR;  Surgeon: Ashley Crenshaw MD;  Location: [facility] OR;  Service: Orthopedics         Visit Dx:      ICD-10-CM ICD-9-CM   1. Lymphedema  I89.0 457.1        Lymphedema     Row Name 10/11/22 1300             Subjective Pain    Able to rate subjective pain? yes  -JJ         Subjective Comments    Subjective Comments pt co soreness in top of R foot however is improving and able to tolerate bandages  -JJ         Lymphedema Measurements    Measurement Type(s) Circumferential  -JJ      Circumferential Areas Lower extremities  -JJ         BLE Circumferential (cm)    Measurement Location 1 10 cm above the kne  -JJ      Left 1 59 cm  -JJ      Right 1 58 cm  -JJ      Measurement Location 2 knee  -JJ      Left 2 49 cm  -JJ      Right 2 55 cm  -JJ      Measurement Location 3 10 cm below the knee  -JJ      Left 3 47 cm  -JJ      Right 3 44 cm  -JJ      Measurement Location 4 20 cm below the knee  -JJ      Left 4 42.5 cm  -JJ      Right 4 40 cm  -JJ      Measurement Location 5 30 cm below the knee  -JJ      Left 5 32.4 cm  -JJ      Right 5 32 cm  -JJ      Measurement Location 6 ankle  -JJ      Left 6 25.5 cm  -JJ      Right 6 25 cm  -JJ      Measurement Location 7 midfoot  -JJ      Left 7 23 cm  -JJ      Right 7 23.3 cm  -JJ      LLE  Circumferential Total 278.4 cm  -JJ      RLE Circumferential Total 277.3 cm  -JJ         Compression/Skin Care    Compression/Skin Care skin care;bandaging;remove bandages  -JJ      Skin Care washed/dried;moisturizing lotion applied  -JJ      Bandage Layers cotton liner;padding/fluff layer;short-stretch bandages (comment size/quantity)  -JJ      Bandaging Comments L: cotton liner, cellona x 2, rosidal K: 1-8cm, 1-10cm ,1-12cm R: cotton liner, cellona x 2, rosidal K: 1-8cm, 1-10cm ,1-12cm  -JJ      Bandaging Technique circumferential/spiral;moderate compression  -JJ      Compression/Skin Care Comments extra padding on top of R foot  -J            User Key  (r) = Recorded By, (t) = Taken By, (c) = Cosigned By    Initials Name Provider Type    Priscilla Fang OTHANS Occupational Therapist                         OT Assessment/Plan     Row Name 10/11/22 1411          OT Assessment    Assessment Comments pts neice assisted with bandaging over the weekend, pt tolerated badnaging. pts B circumferential measurements are stable, no signficant change since last appointment  -        OT Plan    OT Plan Comments cont poc ,will measure for compression garment at end of week  -           User Key  (r) = Recorded By, (t) = Taken By, (c) = Cosigned By    Initials Name Provider Type    Priscilla Fang, OTR Occupational Therapist                          Therapy Education  Given: HEP, Bandaging/dressing change, Symptoms/condition management, Edema management  Program: Reinforced  How Provided: Verbal, Demonstration  Provided to: Patient  Level of Understanding: Verbalized                Time Calculation:   OT Start Time: 1300  OT Stop Time: 1409  OT Time Calculation (min): 69 min     Therapy Charges for Today     Code Description Service Date Service Provider Modifiers Qty    [id number]  OT THERAPEUTIC ACT EA 15 MIN 10/11/2022 Priscilla Gallagher OTR GO 1    [id number]  OT SELF CARE/MGMT/TRAIN EA 15  MIN 10/11/2022 Aileen, Priscilla Romero, OTR GO 2                      Priscilla Gallagher, OTR  10/11/2022

## 2022-10-12 ENCOUNTER — APPOINTMENT (OUTPATIENT)
Dept: OCCUPATIONAL THERAPY | Facility: HOSPITAL | Age: 82
End: 2022-10-12

## 2022-10-13 ENCOUNTER — HOSPITAL ENCOUNTER (OUTPATIENT)
Dept: OCCUPATIONAL THERAPY | Facility: HOSPITAL | Age: 82
Setting detail: THERAPIES SERIES
Discharge: HOME | End: 2022-10-13

## 2022-10-13 DIAGNOSIS — I89.0 LYMPHEDEMA: Primary | ICD-10-CM

## 2022-10-13 PROCEDURE — 97530 THERAPEUTIC ACTIVITIES: CPT

## 2022-10-13 PROCEDURE — 97535 SELF CARE MNGMENT TRAINING: CPT

## 2022-10-13 NOTE — THERAPY TREATMENT NOTE
Outpatient Occupational Therapy Lymphedema Treatment Note  ANNAMARIE Grimes     Patient Name: Latanya Olsen  : 1940  MRN: [id number]  Today's Date: 10/13/2022      Visit Date: 10/13/2022    Patient Active Problem List   Diagnosis   • Carcinoid tumor of lung   • Diverticulosis of intestine   • Obstructive sleep apnea syndrome   • Weight loss   • Vitamin D deficiency   • Overweight (BMI 25.0-29.9)   • Spinal stenosis of lumbar region without neurogenic claudication   • Osteoarthritis of knee   • Obesity (BMI 30-39.9)   • Neutropenia (HCC)   • Chronic lower back pain   • Knee pain   • Insomnia   • Hypothyroidism   • Hypokalemia   • Hypertension   • Hyperlipidemia   • Generalized osteoarthritis   • Gastroparesis   • Foot pain   • Chronic obstructive pulmonary disease (HCC)   • Chronic constipation   • Anemia   • Weight gain   • Pain of left breast   • Elevated serum alkaline phosphatase level   • Neck pain   • Volume overload   • Status post total hip replacement, right   • Generalized weakness   • Constipation   • Acute deep vein thrombosis (DVT) of iliac vein of left lower extremity (HCC)   • Acute deep vein thrombosis (DVT) of tibial vein of left lower extremity (HCC)   • Atrial fibrillation (HCC)   • Hyperhomocysteinemia (HCC)   • Idiopathic peripheral neuropathy   • Lymphedema        Past Medical History:   Diagnosis Date   • Anemia    • Arthritis    • Atrial fibrillation (HCC)    • CKD (chronic kidney disease)     Stage 3   • Constipation    • COPD (chronic obstructive pulmonary disease) (HCC)    • DDD (degenerative disc disease), lumbar    • Depression    • Diverticulosis    • GERD (gastroesophageal reflux disease)    • Hx of degenerative disc disease    • Hyperlipidemia    • Hypertension    • Hypokalemia    • Hypothyroidism    • Knee swelling    • Lung cancer (HCC)     history   • Renal disorder    • Restless leg syndrome    • Sleep apnea with use of continuous positive airway pressure (CPAP)         Past  Surgical History:   Procedure Laterality Date   • BUNIONECTOMY Bilateral    • CATARACT EXTRACTION     • CHOLECYSTECTOMY     • COLONOSCOPY     • ENDOSCOPY N/A 6/24/2016    Procedure: ESOPHAGOGASTRODUODENOSCOPY  with biopsies;  Surgeon: Von Hernández MD;  Location: [facility];  Service:    • LUNG LOBECTOMY Left     lower lobe   • LYTIC THROMBIN THERAPY Left 3/26/2021    Procedure: left leg clot treiver possible venous stent *supine*;  Surgeon: Rogerio Vega MD;  Location: [facility] 18/19;  Service: Vascular;  Laterality: Left;   • REPLACEMENT TOTAL KNEE Left    • THYROID BIOPSY     • TOTAL HIP ARTHROPLASTY Right 6/1/2019    Procedure: BIPOLAR HIP CEMENTED POSTERIOR;  Surgeon: Ashley Crenshaw MD;  Location: [facility] OR;  Service: Orthopedics         Visit Dx:      ICD-10-CM ICD-9-CM   1. Lymphedema  I89.0 457.1        Lymphedema     Row Name 10/13/22 1300             Subjective Pain    Able to rate subjective pain? yes  -JJ      Subjective Pain Comment pt with co pain in bottoms of B feet, chronic neuropathy pain, did not rate  -JJ         Subjective Comments    Subjective Comments pt states friend is coming over to assist her with bandaging over the weekend  -JJ         Lymphedema Measurements    Measurement Type(s) Circumferential  -JJ      Circumferential Areas Lower extremities  -JJ         BLE Circumferential (cm)    Measurement Location 1 measured pt for B LE thigh high compression garments. pt fitted for Juzo soft garment. measurements: R: G-65.5 D-44.5 B-25.5 length 60 size III petite L: G-64.5, D-45.5, B-27.5 length- 60.5 size IV petite  -JJ         Compression/Skin Care    Compression/Skin Care skin care;bandaging;remove bandages  -JJ      Skin Care washed/dried;lotion applied  -JJ      Bandage Layers cotton liner;padding/fluff layer;short-stretch bandages (comment size/quantity)  -JJ      Bandaging Comments L: cotton liner, cellona x 2, rosidal K: 1-8cm, 1-10cm ,1-12cm R:  cotton liner, cellona x 2, rosidial K: 1-8cm, 1-10cm, 1-12cm  -JJ      Bandaging Technique circumferential/spiral;moderate compression  -JJ      Compression/Skin Care Comments extra padding on top of B feet  -JJ            User Key  (r) = Recorded By, (t) = Taken By, (c) = Cosigned By    Initials Name Provider Type    Priscilla Fang OTR Occupational Therapist                         OT Assessment/Plan     Row Name 10/13/22 1547          OT Assessment    Assessment Comments pts B LE circumferential mesaurements have stabilized. measured pt for b LE compression garments today. pt tolerated measurements in standing with assist from RW and seated rest breaks  -JJ        OT Plan    OT Plan Comments plan is to see pt x 2 visits next week and then transition to 1 x week to continue monitoring compliance and accuracy with home bandaging and ensure proper fit of compression garments when arrived  -JJ           User Key  (r) = Recorded By, (t) = Taken By, (c) = Cosigned By    Initials Name Provider Type    Priscilla Fang OTR Occupational Therapist                          Therapy Education  Given: HEP, Edema management, Symptoms/condition management, Bandaging/dressing change  Program: Reinforced  How Provided: Verbal, Demonstration  Provided to: Patient  Level of Understanding: Verbalized                Time Calculation:   OT Start Time: 1300  OT Stop Time: 1400  OT Time Calculation (min): 60 min     Therapy Charges for Today     Code Description Service Date Service Provider Modifiers Qty    06300658510  OT THERAPEUTIC ACT EA 15 MIN 10/13/2022 Priscilla Gallagher OTR GO 1    [id number]  OT SELF CARE/MGMT/TRAIN EA 15 MIN 10/13/2022 Priscilla Gallagher OTR GO 2                      RENATO Murrieta  10/13/2022

## 2022-10-18 ENCOUNTER — HOSPITAL ENCOUNTER (OUTPATIENT)
Dept: OCCUPATIONAL THERAPY | Facility: HOSPITAL | Age: 82
Setting detail: THERAPIES SERIES
Discharge: HOME | End: 2022-10-18

## 2022-10-18 DIAGNOSIS — I89.0 LYMPHEDEMA: Primary | ICD-10-CM

## 2022-10-18 PROCEDURE — 97530 THERAPEUTIC ACTIVITIES: CPT

## 2022-10-18 PROCEDURE — 97535 SELF CARE MNGMENT TRAINING: CPT

## 2022-10-18 NOTE — THERAPY TREATMENT NOTE
Outpatient Occupational Therapy Lymphedema Treatment Note  ANNAMARIE Grimes     Patient Name: Latanya Olsen  : 1940  MRN: [id number]  Today's Date: 10/18/2022      Visit Date: 10/18/2022    Patient Active Problem List   Diagnosis   • Carcinoid tumor of lung   • Diverticulosis of intestine   • Obstructive sleep apnea syndrome   • Weight loss   • Vitamin D deficiency   • Overweight (BMI 25.0-29.9)   • Spinal stenosis of lumbar region without neurogenic claudication   • Osteoarthritis of knee   • Obesity (BMI 30-39.9)   • Neutropenia (HCC)   • Chronic lower back pain   • Knee pain   • Insomnia   • Hypothyroidism   • Hypokalemia   • Hypertension   • Hyperlipidemia   • Generalized osteoarthritis   • Gastroparesis   • Foot pain   • Chronic obstructive pulmonary disease (HCC)   • Chronic constipation   • Anemia   • Weight gain   • Pain of left breast   • Elevated serum alkaline phosphatase level   • Neck pain   • Volume overload   • Status post total hip replacement, right   • Generalized weakness   • Constipation   • Acute deep vein thrombosis (DVT) of iliac vein of left lower extremity (HCC)   • Acute deep vein thrombosis (DVT) of tibial vein of left lower extremity (HCC)   • Atrial fibrillation (HCC)   • Hyperhomocysteinemia (HCC)   • Idiopathic peripheral neuropathy   • Lymphedema        Past Medical History:   Diagnosis Date   • Anemia    • Arthritis    • Atrial fibrillation (HCC)    • CKD (chronic kidney disease)     Stage 3   • Constipation    • COPD (chronic obstructive pulmonary disease) (HCC)    • DDD (degenerative disc disease), lumbar    • Depression    • Diverticulosis    • GERD (gastroesophageal reflux disease)    • Hx of degenerative disc disease    • Hyperlipidemia    • Hypertension    • Hypokalemia    • Hypothyroidism    • Knee swelling    • Lung cancer (HCC)     history   • Renal disorder    • Restless leg syndrome    • Sleep apnea with use of continuous positive airway pressure (CPAP)         Past  Surgical History:   Procedure Laterality Date   • BUNIONECTOMY Bilateral    • CATARACT EXTRACTION     • CHOLECYSTECTOMY     • COLONOSCOPY     • ENDOSCOPY N/A 6/24/2016    Procedure: ESOPHAGOGASTRODUODENOSCOPY  with biopsies;  Surgeon: Von Hernández MD;  Location: [facility];  Service:    • LUNG LOBECTOMY Left     lower lobe   • LYTIC THROMBIN THERAPY Left 3/26/2021    Procedure: left leg clot treiver possible venous stent *supine*;  Surgeon: Rogerio Vega MD;  Location: [facility] 18/19;  Service: Vascular;  Laterality: Left;   • REPLACEMENT TOTAL KNEE Left    • THYROID BIOPSY     • TOTAL HIP ARTHROPLASTY Right 6/1/2019    Procedure: BIPOLAR HIP CEMENTED POSTERIOR;  Surgeon: Ashley Crenshaw MD;  Location: [facility];  Service: Orthopedics         Visit Dx:      ICD-10-CM ICD-9-CM   1. Lymphedema  I89.0 457.1        Lymphedema     Row Name 10/18/22 1300             Subjective Pain    Able to rate subjective pain? yes  -JJ      Subjective Pain Comment pt states she stubbed her toe on R foot and its still sore.  -JJ         Subjective Comments    Subjective Comments pt states friends assisted with bandaging over the weekend  -JJ         Lymphedema Measurements    Measurement Type(s) Circumferential  -JJ      Circumferential Areas Lower extremities  -JJ         BLE Circumferential (cm)    Measurement Location 1 10 cm above the knee  -JJ      Left 1 57.5 cm  -JJ      Right 1 57.5 cm  -JJ      Measurement Location 2 knee  -JJ      Left 2 47.5 cm  -JJ      Right 2 50.5 cm  -JJ      Measurement Location 3 10 cm below the knee  -JJ      Left 3 44.5 cm  -JJ      Right 3 44.5 cm  -JJ      Measurement Location 4 20 cm below the knee  -JJ      Left 4 38.5 cm  -JJ      Right 4 38.5 cm  -JJ      Measurement Location 5 30 cm below the knee  -JJ      Left 5 30.5 cm  -JJ      Right 5 29.5 cm  -JJ      Measurement Location 6 ankle  -JJ      Left 6 26 cm  -JJ      Right 6 27 cm  -JJ      Measurement  Location 7 midfoot  -JJ      Left 7 23 cm  -JJ      Right 7 23 cm  -JJ      LLE Circumferential Total 267.5 cm  -JJ      RLE Circumferential Total 270.5 cm  -JJ         Compression/Skin Care    Compression/Skin Care skin care;bandaging;remove bandages  -JJ      Skin Care washed/dried;moisturizing lotion applied  -JJ      Bandage Layers cotton liner;padding/fluff layer;short-stretch bandages (comment size/quantity)  -JJ      Bandaging Comments L: cotton liner, cellona x 2, rosidal K: 1-8cm, 1-10cm, 1-12cm R: cotton liner, cellona x 2, rosdial K: 1-8cm, 1-10cm, 1-12 cm  -JJ      Bandaging Technique circumferential/spiral;moderate compression  -JJ      Compression/Skin Care Comments extra padding on top of B feet  -JJ            User Key  (r) = Recorded By, (t) = Taken By, (c) = Cosigned By    Initials Name Provider Type    Priscilla Fang OTR Occupational Therapist                         OT Assessment/Plan     Row Name 10/18/22 1439          OT Assessment    Assessment Comments pts B circumferential measurements have decreased since last appointment. pt states continued complaince with home bandaging with assist from friend. pain in tops of B feet have decreased with increased padding with bandaging  -JJ        OT Plan    OT Plan Comments cont poc, compression garment measurements, signed by MD sent to [facility]  -J           User Key  (r) = Recorded By, (t) = Taken By, (c) = Cosigned By    Initials Name Provider Type    Priscilla Fang OTHANS Occupational Therapist                          Therapy Education  Given: HEP, Symptoms/condition management, Edema management, Bandaging/dressing change  Program: Reinforced  How Provided: Verbal, Demonstration  Provided to: Patient  Level of Understanding: Verbalized                Time Calculation:   OT Start Time: 1300  OT Stop Time: 1400  OT Time Calculation (min): 60 min     Therapy Charges for Today     Code Description Service Date Service  Provider Modifiers Qty    75538573596 HC OT THERAPEUTIC ACT EA 15 MIN 10/18/2022 Priscilla Gallagher, OTR GO 1    [id number] HC OT SELF CARE/MGMT/TRAIN EA 15 MIN 10/18/2022 Priscilla Gallagher, OTR GO 2                      Priscilla Gallagher, OTR  10/18/2022

## 2022-10-20 ENCOUNTER — HOSPITAL ENCOUNTER (OUTPATIENT)
Dept: OCCUPATIONAL THERAPY | Facility: HOSPITAL | Age: 82
Setting detail: THERAPIES SERIES
Discharge: HOME | End: 2022-10-20

## 2022-10-20 DIAGNOSIS — I89.0 LYMPHEDEMA: Primary | ICD-10-CM

## 2022-10-20 PROCEDURE — 97535 SELF CARE MNGMENT TRAINING: CPT

## 2022-10-20 PROCEDURE — 97530 THERAPEUTIC ACTIVITIES: CPT

## 2022-10-20 NOTE — THERAPY TREATMENT NOTE
Outpatient Occupational Therapy Lymphedema Treatment Note  ANNAMARIE Grimes     Patient Name: Latanya Olsen  : 1940  MRN: [id number]  Today's Date: 10/20/2022      Visit Date: 10/20/2022    Patient Active Problem List   Diagnosis   • Carcinoid tumor of lung   • Diverticulosis of intestine   • Obstructive sleep apnea syndrome   • Weight loss   • Vitamin D deficiency   • Overweight (BMI 25.0-29.9)   • Spinal stenosis of lumbar region without neurogenic claudication   • Osteoarthritis of knee   • Obesity (BMI 30-39.9)   • Neutropenia (HCC)   • Chronic lower back pain   • Knee pain   • Insomnia   • Hypothyroidism   • Hypokalemia   • Hypertension   • Hyperlipidemia   • Generalized osteoarthritis   • Gastroparesis   • Foot pain   • Chronic obstructive pulmonary disease (HCC)   • Chronic constipation   • Anemia   • Weight gain   • Pain of left breast   • Elevated serum alkaline phosphatase level   • Neck pain   • Volume overload   • Status post total hip replacement, right   • Generalized weakness   • Constipation   • Acute deep vein thrombosis (DVT) of iliac vein of left lower extremity (HCC)   • Acute deep vein thrombosis (DVT) of tibial vein of left lower extremity (HCC)   • Atrial fibrillation (HCC)   • Hyperhomocysteinemia (HCC)   • Idiopathic peripheral neuropathy   • Lymphedema        Past Medical History:   Diagnosis Date   • Anemia    • Arthritis    • Atrial fibrillation (HCC)    • CKD (chronic kidney disease)     Stage 3   • Constipation    • COPD (chronic obstructive pulmonary disease) (HCC)    • DDD (degenerative disc disease), lumbar    • Depression    • Diverticulosis    • GERD (gastroesophageal reflux disease)    • Hx of degenerative disc disease    • Hyperlipidemia    • Hypertension    • Hypokalemia    • Hypothyroidism    • Knee swelling    • Lung cancer (HCC)     history   • Renal disorder    • Restless leg syndrome    • Sleep apnea with use of continuous positive airway pressure (CPAP)         Past  Surgical History:   Procedure Laterality Date   • BUNIONECTOMY Bilateral    • CATARACT EXTRACTION     • CHOLECYSTECTOMY     • COLONOSCOPY     • ENDOSCOPY N/A 6/24/2016    Procedure: ESOPHAGOGASTRODUODENOSCOPY  with biopsies;  Surgeon: Von Hernández MD;  Location: [facility];  Service:    • LUNG LOBECTOMY Left     lower lobe   • LYTIC THROMBIN THERAPY Left 3/26/2021    Procedure: left leg clot treiver possible venous stent *supine*;  Surgeon: Rogerio Vega MD;  Location: [facility] 18/19;  Service: Vascular;  Laterality: Left;   • REPLACEMENT TOTAL KNEE Left    • THYROID BIOPSY     • TOTAL HIP ARTHROPLASTY Right 6/1/2019    Procedure: BIPOLAR HIP CEMENTED POSTERIOR;  Surgeon: Ashley Crenshaw MD;  Location: [facility] OR;  Service: Orthopedics         Visit Dx:      ICD-10-CM ICD-9-CM   1. Lymphedema  I89.0 457.1        Lymphedema     Row Name 10/20/22 1300             Subjective Pain    Able to rate subjective pain? yes  -JJ         Subjective Comments    Subjective Comments pt states no pain in tops of feet today.  -JJ         Lymphedema Measurements    Measurement Type(s) Circumferential  -JJ      Circumferential Areas Lower extremities  -JJ         BLE Circumferential (cm)    Measurement Location 1 10 cm above the knee  -JJ      Left 1 59.5 cm  -JJ      Right 1 58 cm  -JJ      Measurement Location 2 knee  -JJ      Left 2 49 cm  -JJ      Right 2 49 cm  -JJ      Measurement Location 3 10 cm below the knee  -JJ      Left 3 46 cm  -JJ      Right 3 45 cm  -JJ      Measurement Location 4 20 cm below the knee  -JJ      Left 4 41 cm  -JJ      Right 4 39 cm  -JJ      Measurement Location 5 30m cm below the knee  -JJ      Left 5 33 cm  -JJ      Right 5 30.5 cm  -JJ      Measurement Location 6 ankle  -JJ      Left 6 26 cm  -JJ      Right 6 24.5 cm  -JJ      Measurement Location 7 midfoot  -JJ      Left 7 23 cm  -JJ      Right 7 22.5 cm  -JJ      LLE Circumferential Total 277.5 cm  -JJ      RLE  Circumferential Total 268.5 cm  -JJ         Compression/Skin Care    Compression/Skin Care skin care;bandaging;remove bandages  -JJ      Skin Care washed/dried;moisturizing lotion applied  -JJ      Bandage Layers cotton liner;padding/fluff layer;short-stretch bandages (comment size/quantity)  -JJ      Bandaging Comments L: cotton liner, cellona x 2, rosidal K: 1-8cm, 1-10cm ,1-12cm R: cotton liner, cellona x 2, rosidal K: 1-8cm, 1-10cm ,1-12cm  -JJ      Compression/Skin Care Comments extra padding on top of feet bilaterally  -JJ            User Key  (r) = Recorded By, (t) = Taken By, (c) = Cosigned By    Initials Name Provider Type    Priscilla Fang OTR Occupational Therapist                         OT Assessment/Plan     Row Name 10/20/22 1527          OT Assessment    Assessment Comments pt with stable circumferential measurements, compliant with HEP and skin care.  -JJ        OT Plan    OT Plan Comments pts friend to assist with bandaging at home. will see pt in 2 weeks to assess measurements and compliance bandaging, compression garment on order  -JJ           User Key  (r) = Recorded By, (t) = Taken By, (c) = Cosigned By    Initials Name Provider Type    Priscilla Fang OTR Occupational Therapist                          Therapy Education  Given: HEP, Symptoms/condition management, Edema management, Bandaging/dressing change  Program: Reinforced  How Provided: Verbal, Demonstration  Provided to: Patient  Level of Understanding: Verbalized                Time Calculation:   OT Start Time: 1300  OT Stop Time: 1400  OT Time Calculation (min): 60 min     Therapy Charges for Today     Code Description Service Date Service Provider Modifiers Qty    79436237588  OT THERAPEUTIC ACT EA 15 MIN 10/20/2022 Priscilla Gallagher OTR GO 1    [id number]  OT SELF CARE/MGMT/TRAIN EA 15 MIN 10/20/2022 Priscilla Gallagher OTR GO 2                      RENATO Murrieta  10/20/2022

## 2022-10-26 ENCOUNTER — APPOINTMENT (OUTPATIENT)
Dept: GENERAL RADIOLOGY | Facility: HOSPITAL | Age: 82
End: 2022-10-26

## 2022-10-26 ENCOUNTER — TELEPHONE (OUTPATIENT)
Dept: FAMILY MEDICINE CLINIC | Facility: CLINIC | Age: 82
End: 2022-10-26

## 2022-10-26 ENCOUNTER — HOSPITAL ENCOUNTER (EMERGENCY)
Facility: HOSPITAL | Age: 82
Discharge: HOME | End: 2022-10-26
Attending: EMERGENCY MEDICINE | Admitting: EMERGENCY MEDICINE

## 2022-10-26 VITALS
OXYGEN SATURATION: 97 % | HEART RATE: 55 BPM | TEMPERATURE: 98 F | WEIGHT: 220 LBS | SYSTOLIC BLOOD PRESSURE: 117 MMHG | BODY MASS INDEX: 36.65 KG/M2 | DIASTOLIC BLOOD PRESSURE: 63 MMHG | RESPIRATION RATE: 19 BRPM | HEIGHT: 65 IN

## 2022-10-26 DIAGNOSIS — R06.00 DYSPNEA, UNSPECIFIED TYPE: Primary | ICD-10-CM

## 2022-10-26 LAB
ALBUMIN SERPL-MCNC: 4.3 G/DL (ref 3.5–5.2)
ALBUMIN/GLOB SERPL: 1.8 G/DL
ALP SERPL-CCNC: 100 U/L (ref 39–117)
ALT SERPL W P-5'-P-CCNC: 15 U/L (ref 1–33)
ANION GAP SERPL CALCULATED.3IONS-SCNC: 10.6 MMOL/L (ref 5–15)
AST SERPL-CCNC: 20 U/L (ref 1–32)
BASOPHILS # BLD AUTO: 0.02 10*3/MM3 (ref 0–0.2)
BASOPHILS NFR BLD AUTO: 0.3 % (ref 0–1.5)
BILIRUB SERPL-MCNC: 0.3 MG/DL (ref 0–1.2)
BUN SERPL-MCNC: 17 MG/DL (ref 8–23)
BUN/CREAT SERPL: 15 (ref 7–25)
CALCIUM SPEC-SCNC: 9.3 MG/DL (ref 8.6–10.5)
CHLORIDE SERPL-SCNC: 104 MMOL/L (ref 98–107)
CO2 SERPL-SCNC: 23.4 MMOL/L (ref 22–29)
CREAT SERPL-MCNC: 1.13 MG/DL (ref 0.57–1)
DEPRECATED RDW RBC AUTO: 48.5 FL (ref 37–54)
EGFRCR SERPLBLD CKD-EPI 2021: 48.4 ML/MIN/1.73
EOSINOPHIL # BLD AUTO: 0.15 10*3/MM3 (ref 0–0.4)
EOSINOPHIL NFR BLD AUTO: 2.1 % (ref 0.3–6.2)
ERYTHROCYTE [DISTWIDTH] IN BLOOD BY AUTOMATED COUNT: 14.4 % (ref 12.3–15.4)
GLOBULIN UR ELPH-MCNC: 2.4 GM/DL
GLUCOSE SERPL-MCNC: 103 MG/DL (ref 65–99)
HCT VFR BLD AUTO: 35.8 % (ref 34–46.6)
HGB BLD-MCNC: 11.5 G/DL (ref 12–15.9)
IMM GRANULOCYTES # BLD AUTO: 0.02 10*3/MM3 (ref 0–0.05)
IMM GRANULOCYTES NFR BLD AUTO: 0.3 % (ref 0–0.5)
LYMPHOCYTES # BLD AUTO: 2.65 10*3/MM3 (ref 0.7–3.1)
LYMPHOCYTES NFR BLD AUTO: 37.6 % (ref 19.6–45.3)
MCH RBC QN AUTO: 29.1 PG (ref 26.6–33)
MCHC RBC AUTO-ENTMCNC: 32.1 G/DL (ref 31.5–35.7)
MCV RBC AUTO: 90.6 FL (ref 79–97)
MONOCYTES # BLD AUTO: 0.59 10*3/MM3 (ref 0.1–0.9)
MONOCYTES NFR BLD AUTO: 8.4 % (ref 5–12)
NEUTROPHILS NFR BLD AUTO: 3.62 10*3/MM3 (ref 1.7–7)
NEUTROPHILS NFR BLD AUTO: 51.3 % (ref 42.7–76)
NT-PROBNP SERPL-MCNC: 625.8 PG/ML (ref 0–1800)
PLATELET # BLD AUTO: 192 10*3/MM3 (ref 140–450)
PMV BLD AUTO: 10.4 FL (ref 6–12)
POTASSIUM SERPL-SCNC: 4.4 MMOL/L (ref 3.5–5.2)
PROT SERPL-MCNC: 6.7 G/DL (ref 6–8.5)
QT INTERVAL: 405 MS
RBC # BLD AUTO: 3.95 10*6/MM3 (ref 3.77–5.28)
SODIUM SERPL-SCNC: 138 MMOL/L (ref 136–145)
TROPONIN T SERPL-MCNC: <0.01 NG/ML (ref 0–0.03)
WBC NRBC COR # BLD: 7.05 10*3/MM3 (ref 3.4–10.8)

## 2022-10-26 PROCEDURE — 84484 ASSAY OF TROPONIN QUANT: CPT

## 2022-10-26 PROCEDURE — 99284 EMERGENCY DEPT VISIT MOD MDM: CPT

## 2022-10-26 PROCEDURE — 80053 COMPREHEN METABOLIC PANEL: CPT

## 2022-10-26 PROCEDURE — 71046 X-RAY EXAM CHEST 2 VIEWS: CPT

## 2022-10-26 PROCEDURE — 93010 ELECTROCARDIOGRAM REPORT: CPT | Performed by: INTERNAL MEDICINE

## 2022-10-26 PROCEDURE — 83880 ASSAY OF NATRIURETIC PEPTIDE: CPT

## 2022-10-26 PROCEDURE — 85025 COMPLETE CBC W/AUTO DIFF WBC: CPT

## 2022-10-26 PROCEDURE — 93005 ELECTROCARDIOGRAM TRACING: CPT

## 2022-10-26 RX ORDER — PREDNISONE 20 MG/1
TABLET ORAL
Qty: 12 TABLET | Refills: 0 | Status: SHIPPED | OUTPATIENT
Start: 2022-10-26 | End: 2022-11-16

## 2022-10-26 RX ORDER — SODIUM CHLORIDE 0.9 % (FLUSH) 0.9 %
10 SYRINGE (ML) INJECTION AS NEEDED
Status: DISCONTINUED | OUTPATIENT
Start: 2022-10-26 | End: 2022-10-26 | Stop reason: HOSPADM

## 2022-10-26 NOTE — TELEPHONE ENCOUNTER
PT CALLED STATING SHE WAS SHORT OF BREATH.  [facility] TRANSFERRED TO OFFICE.  SHE DID SOUND BAD TO ME. STATED SHE HAD COPD AND BREATHING HAS BEEN WORSE IN THE MORNINGS SINCE Friday.  SPOKE WITH DR KEHRER FACE TO FACE AND IT WAS RECOMMENDED SHE GO STRAIGHT TO [place] [facility] VS WORK IN APPT HERE

## 2022-10-31 ENCOUNTER — TELEPHONE (OUTPATIENT)
Dept: FAMILY MEDICINE CLINIC | Facility: CLINIC | Age: 82
End: 2022-10-31

## 2022-10-31 NOTE — TELEPHONE ENCOUNTER
Joelle with [facility] called and is wanting verbal orders for pt.  Stated pt was in the ER, with shortness of breath, and weakness in her legs from lymphedema.  Pt is scheduled to see Kehrer 11/16/22

## 2022-10-31 NOTE — TELEPHONE ENCOUNTER
Per eitan verbal orders were ok for PT and nursing.  Joelle stated they are going to start on Friday per pts request

## 2022-11-03 ENCOUNTER — HOSPITAL ENCOUNTER (OUTPATIENT)
Dept: OCCUPATIONAL THERAPY | Facility: HOSPITAL | Age: 82
Setting detail: THERAPIES SERIES
Discharge: HOME | End: 2022-11-03

## 2022-11-03 DIAGNOSIS — I89.0 LYMPHEDEMA: Primary | ICD-10-CM

## 2022-11-03 PROCEDURE — 97535 SELF CARE MNGMENT TRAINING: CPT

## 2022-11-03 NOTE — THERAPY TREATMENT NOTE
Outpatient Occupational Therapy Lymphedema Treatment Note  ANNAMARIE Grimes     Patient Name: Latanya Olsen  : 1940  MRN: [id number]  Today's Date: 11/3/2022      Visit Date: 2022    Patient Active Problem List   Diagnosis   • Carcinoid tumor of lung   • Diverticulosis of intestine   • Obstructive sleep apnea syndrome   • Weight loss   • Vitamin D deficiency   • Overweight (BMI 25.0-29.9)   • Spinal stenosis of lumbar region without neurogenic claudication   • Osteoarthritis of knee   • Obesity (BMI 30-39.9)   • Neutropenia (HCC)   • Chronic lower back pain   • Knee pain   • Insomnia   • Hypothyroidism   • Hypokalemia   • Hypertension   • Hyperlipidemia   • Generalized osteoarthritis   • Gastroparesis   • Foot pain   • Chronic obstructive pulmonary disease (HCC)   • Chronic constipation   • Anemia   • Weight gain   • Pain of left breast   • Elevated serum alkaline phosphatase level   • Neck pain   • Volume overload   • Status post total hip replacement, right   • Generalized weakness   • Constipation   • Acute deep vein thrombosis (DVT) of iliac vein of left lower extremity (HCC)   • Acute deep vein thrombosis (DVT) of tibial vein of left lower extremity (HCC)   • Atrial fibrillation (HCC)   • Hyperhomocysteinemia (HCC)   • Idiopathic peripheral neuropathy   • Lymphedema        Past Medical History:   Diagnosis Date   • Anemia    • Arthritis    • Atrial fibrillation (HCC)    • CKD (chronic kidney disease)     Stage 3   • Constipation    • COPD (chronic obstructive pulmonary disease) (HCC)    • DDD (degenerative disc disease), lumbar    • Depression    • Diverticulosis    • GERD (gastroesophageal reflux disease)    • Hx of degenerative disc disease    • Hyperlipidemia    • Hypertension    • Hypokalemia    • Hypothyroidism    • Knee swelling    • Lung cancer (HCC)     history   • Renal disorder    • Restless leg syndrome    • Sleep apnea with use of continuous positive airway pressure (CPAP)         Past  Surgical History:   Procedure Laterality Date   • BUNIONECTOMY Bilateral    • CATARACT EXTRACTION     • CHOLECYSTECTOMY     • COLONOSCOPY     • ENDOSCOPY N/A 6/24/2016    Procedure: ESOPHAGOGASTRODUODENOSCOPY  with biopsies;  Surgeon: Von Hernández MD;  Location: [facility] OR;  Service:    • LUNG LOBECTOMY Left     lower lobe   • LYTIC THROMBIN THERAPY Left 3/26/2021    Procedure: left leg clot treiver possible venous stent *supine*;  Surgeon: Rogerio Vega MD;  Location: [facility] OR 18/19;  Service: Vascular;  Laterality: Left;   • REPLACEMENT TOTAL KNEE Left    • THYROID BIOPSY     • TOTAL HIP ARTHROPLASTY Right 6/1/2019    Procedure: BIPOLAR HIP CEMENTED POSTERIOR;  Surgeon: Ashley Crenshaw MD;  Location: [facility] OR;  Service: Orthopedics         Visit Dx:      ICD-10-CM ICD-9-CM   1. Lymphedema  I89.0 457.1        Lymphedema     Row Name 11/03/22 1300             Subjective Pain    Able to rate subjective pain? yes  -J      Subjective Pain Comment pt with no co pain today  -         Subjective Comments    Subjective Comments pt arived with B LEs bandaged. pt brought in B LE compression garments that had been delivered to her and friend who will assist her at home with donning/doffing  -JJ         Compression/Skin Care    Compression/Skin Care Comments pt and friend educated on doninng/doffing compression garments. rec pt use rubber gloves/donning gloves to assist with maintaining integrity of material. therapist assisted with donning of B LE thigh high compression garments today. pt left clinic with B LE garments in place  -            User Key  (r) = Recorded By, (t) = Taken By, (c) = Cosigned By    Initials Name Provider Type    Priscilla Fang, OTR Occupational Therapist                         OT Assessment/Plan     Row Name 11/03/22 8578          OT Assessment    Assessment Comments pt and friend verbalize understanding of care for compression garments and  donning/doffing and wearing schedule. pt tolerated assist from therapist donning garment and left clinic with garments in place  -GAVI        OT Plan    OT Plan Comments pt and friend educated to call therapists with any questions or concerns and to contact [employer] with any needs in the future for ordering more garments of same size  -GAVI           User Key  (r) = Recorded By, (t) = Taken By, (c) = Cosigned By    Initials Name Provider Type    Priscilla Fang, OTR Occupational Therapist                          Therapy Education  Education Details: pt and friend educated on donning/doffing compression garment and care of garment  Given: Bandaging/dressing change, Edema management  Program: New, Reinforced  How Provided: Verbal, Demonstration  Provided to: Patient, Caregiver  Level of Understanding: Verbalized                Time Calculation:   OT Start Time: 1300  OT Stop Time: 1345  OT Time Calculation (min): 45 min     Therapy Charges for Today     Code Description Service Date Service Provider Modifiers Qty    84223363620 HC OT SELF CARE/MGMT/TRAIN EA 15 MIN 11/3/2022 Priscilla Gallagher, RENATO GO 2                      RENATO Murrieta  11/3/2022

## 2022-11-04 ENCOUNTER — TELEPHONE (OUTPATIENT)
Dept: FAMILY MEDICINE CLINIC | Facility: CLINIC | Age: 82
End: 2022-11-04

## 2022-11-04 NOTE — TELEPHONE ENCOUNTER
Caller: VERENA    Relationship:     Best call back number: [phone]    What orders are you requesting (i.e. lab or imaging): SKILLED NURSING ONCE A WEEK FOR 7 WEEKS    In what timeframe would the patient need to come in: AS SOON AS POSSIBLE    Where will you receive your lab/imaging services:PATIENTS HOME

## 2022-11-06 DIAGNOSIS — G62.9 PERIPHERAL POLYNEUROPATHY: ICD-10-CM

## 2022-11-07 RX ORDER — GABAPENTIN 100 MG/1
CAPSULE ORAL
Qty: 90 CAPSULE | Refills: 0 | Status: SHIPPED | OUTPATIENT
Start: 2022-11-07 | End: 2023-02-16 | Stop reason: SDUPTHER

## 2022-11-16 ENCOUNTER — OFFICE VISIT (OUTPATIENT)
Dept: FAMILY MEDICINE CLINIC | Facility: CLINIC | Age: 82
End: 2022-11-16

## 2022-11-16 VITALS
HEART RATE: 72 BPM | DIASTOLIC BLOOD PRESSURE: 70 MMHG | BODY MASS INDEX: 36.39 KG/M2 | SYSTOLIC BLOOD PRESSURE: 108 MMHG | OXYGEN SATURATION: 95 % | HEIGHT: 65 IN | WEIGHT: 218.4 LBS | TEMPERATURE: 98.4 F

## 2022-11-16 DIAGNOSIS — R06.00 DYSPNEA, UNSPECIFIED TYPE: ICD-10-CM

## 2022-11-16 DIAGNOSIS — D50.9 IRON DEFICIENCY ANEMIA, UNSPECIFIED IRON DEFICIENCY ANEMIA TYPE: ICD-10-CM

## 2022-11-16 DIAGNOSIS — G47.33 OSA (OBSTRUCTIVE SLEEP APNEA): ICD-10-CM

## 2022-11-16 DIAGNOSIS — N18.31 STAGE 3A CHRONIC KIDNEY DISEASE: ICD-10-CM

## 2022-11-16 DIAGNOSIS — Z00.00 MEDICARE ANNUAL WELLNESS VISIT, SUBSEQUENT: Primary | ICD-10-CM

## 2022-11-16 DIAGNOSIS — Z91.81 AT MODERATE RISK FOR FALL: ICD-10-CM

## 2022-11-16 DIAGNOSIS — R73.09 ABNORMAL GLUCOSE: ICD-10-CM

## 2022-11-16 DIAGNOSIS — M80.00XD AGE-RELATED OSTEOPOROSIS WITH CURRENT PATHOLOGICAL FRACTURE WITH ROUTINE HEALING, SUBSEQUENT ENCOUNTER: ICD-10-CM

## 2022-11-16 DIAGNOSIS — E53.8 LOW VITAMIN B12 LEVEL: ICD-10-CM

## 2022-11-16 DIAGNOSIS — E03.9 HYPOTHYROIDISM, UNSPECIFIED TYPE: ICD-10-CM

## 2022-11-16 DIAGNOSIS — M46.96 UNSPECIFIED INFLAMMATORY SPONDYLOPATHY, LUMBAR REGION: ICD-10-CM

## 2022-11-16 DIAGNOSIS — Z79.899 HIGH RISK MEDICATION USE: ICD-10-CM

## 2022-11-16 DIAGNOSIS — E66.01 MORBID (SEVERE) OBESITY DUE TO EXCESS CALORIES: ICD-10-CM

## 2022-11-16 DIAGNOSIS — I13.0 HYPERTENSIVE HEART AND CHRONIC KIDNEY DISEASE WITH HEART FAILURE AND STAGE 1 THROUGH STAGE 4 CHRONIC KIDNEY DISEASE, OR UNSPECIFIED CHRONIC KIDNEY DISEASE: ICD-10-CM

## 2022-11-16 DIAGNOSIS — Z78.0 MENOPAUSE: ICD-10-CM

## 2022-11-16 DIAGNOSIS — J44.1 COPD EXACERBATION: ICD-10-CM

## 2022-11-16 DIAGNOSIS — Z12.31 VISIT FOR SCREENING MAMMOGRAM: ICD-10-CM

## 2022-11-16 DIAGNOSIS — I10 ESSENTIAL HYPERTENSION: ICD-10-CM

## 2022-11-16 DIAGNOSIS — G62.9 PERIPHERAL POLYNEUROPATHY: ICD-10-CM

## 2022-11-16 PROBLEM — E72.11 HYPERHOMOCYSTEINEMIA: Status: RESOLVED | Noted: 2021-05-14 | Resolved: 2022-11-16

## 2022-11-16 PROBLEM — I82.442 ACUTE DEEP VEIN THROMBOSIS (DVT) OF TIBIAL VEIN OF LEFT LOWER EXTREMITY (HCC): Status: RESOLVED | Noted: 2021-04-20 | Resolved: 2022-11-16

## 2022-11-16 PROBLEM — I82.422 ACUTE DEEP VEIN THROMBOSIS (DVT) OF ILIAC VEIN OF LEFT LOWER EXTREMITY: Status: RESOLVED | Noted: 2021-03-24 | Resolved: 2022-11-16

## 2022-11-16 PROBLEM — I48.91 ATRIAL FIBRILLATION: Status: RESOLVED | Noted: 2021-04-20 | Resolved: 2022-11-16

## 2022-11-16 PROCEDURE — 1159F MED LIST DOCD IN RCRD: CPT | Performed by: FAMILY MEDICINE

## 2022-11-16 PROCEDURE — G0439 PPPS, SUBSEQ VISIT: HCPCS | Performed by: FAMILY MEDICINE

## 2022-11-16 PROCEDURE — 99214 OFFICE O/P EST MOD 30 MIN: CPT | Performed by: FAMILY MEDICINE

## 2022-11-16 PROCEDURE — 1170F FXNL STATUS ASSESSED: CPT | Performed by: FAMILY MEDICINE

## 2022-11-16 RX ORDER — PREDNISONE 20 MG/1
20 TABLET ORAL 2 TIMES DAILY
Qty: 10 TABLET | Refills: 0 | Status: SHIPPED | OUTPATIENT
Start: 2022-11-16 | End: 2022-11-21

## 2022-11-16 NOTE — PATIENT INSTRUCTIONS
Fall Prevention in the Home, Adult  Falls can cause injuries and affect people of all ages. There are many simple things that you can do to make your home safe and to help prevent falls. Ask for help when making these changes, if needed.  What actions can I take to prevent falls?  General instructions  • Use good lighting in all rooms. Replace any light bulbs that burn out, turn on lights if it is dark, and use night-lights.  • Place frequently used items in easy-to-reach places. Lower the shelves around your home if necessary.  • Set up furniture so that there are clear paths around it. Avoid moving your furniture around.  • Remove throw rugs and other tripping hazards from the floor.  • Avoid walking on wet floors.  • Fix any uneven floor surfaces.  • Add color or contrast paint or tape to grab bars and handrails in your home. Place contrasting color strips on the first and last steps of staircases.  • When you use a stepladder, make sure that it is completely opened and that the sides and supports are firmly locked. Have someone hold the ladder while you are using it. Do not climb a closed stepladder.  • Know where your pets are when moving through your home.  What can I do in the bathroom?     • Keep the floor dry. Immediately clean up any water that is on the floor.  • Remove soap buildup in the tub or shower regularly.  • Use nonskid mats or decals on the floor of the tub or shower.  • Attach bath mats securely with double-sided, nonslip rug tape.  • If you need to sit down while you are in the shower, use a plastic, nonslip stool.  • Install grab bars by the toilet and in the tub and shower. Do not use towel bars as grab bars.  What can I do in the bedroom?  • Make sure that a bedside light is easy to reach.  • Do not use oversized bedding that reaches the floor.  • Have a firm chair that has side arms to use for getting dressed.  What can I do in the kitchen?  • Clean up any spills right away.  • If you  need to reach for something above you, use a sturdy step stool that has a grab bar.  • Keep electrical cables out of the way.  • Do not use floor polish or wax that makes floors slippery. If you must use wax, make sure that it is non-skid floor wax.  What can I do with my stairs?  • Do not leave any items on the stairs.  • Make sure that you have a light switch at the top and the bottom of the stairs. Have them installed if you do not have them.  • Make sure that there are handrails on both sides of the stairs. Fix handrails that are broken or loose. Make sure that handrails are as long as the staircases.  • Install non-slip stair treads on all stairs in your home.  • Avoid having throw rugs at the top or bottom of stairs, or secure the rugs with carpet tape to prevent them from moving.  • Choose a carpet design that does not hide the edge of steps on the stairs.  • Check any carpeting to make sure that it is firmly attached to the stairs. Fix any carpet that is loose or worn.  What can I do on the outside of my home?  • Use bright outdoor lighting.  • Regularly repair the edges of walkways and driveways and fix any cracks.  • Remove high doorway thresholds.  • Trim any shrubbery on the main path into your home.  • Regularly check that handrails are securely fastened and in good repair. Both sides of all steps should have handrails.  • Install guardrails along the edges of any raised decks or porches.  • Clear walkways of debris and clutter, including tools and rocks.  • Have leaves, snow, and ice cleared regularly.  • Use sand or salt on walkways during winter months.  • In the garage, clean up any spills right away, including grease or oil spills.  What other actions can I take?  • Wear closed-toe shoes that fit well and support your feet. Wear shoes that have rubber soles or low heels.  • Use mobility aids as needed, such as canes, walkers, scooters, and crutches.  • Review your medicines with your health care  provider. Some medicines can cause dizziness or changes in blood pressure, which increase your risk of falling.  Talk with your health care provider about other ways that you can decrease your risk of falls. This may include working with a physical therapist or  to improve your strength, balance, and endurance.  Where to find more information  • Centers for Disease Control and Prevention, [place]: www.cdc.gov  • National [employer]ging: www.mariam.nih.gov  Contact a health care provider if:  • You are afraid of falling at home.  • You feel weak, drowsy, or dizzy at home.  • You fall at home.  Summary  • There are many simple things that you can do to make your home safe and to help prevent falls.  • Ways to make your home safe include removing tripping hazards and installing grab bars in the bathroom.  • Ask for help when making these changes in your home.  This information is not intended to replace advice given to you by your health care provider. Make sure you discuss any questions you have with your health care provider.  Document Revised: 07/21/2021 Document Reviewed: 07/21/2021  Elsevier Patient Education © 2022 [employer]

## 2022-11-16 NOTE — PROGRESS NOTES
The ABCs of the Annual Wellness Visit  Subsequent Medicare Wellness Visit    Chief Complaint   Patient presents with   • Medicare Wellness-subsequent   • Back Pain   • Leg Pain     weaknes   • Hyperlipidemia   • Hypertension      Subjective    History of Present Illness:  Latanya Olsen is a 82 y.o. female who presents for a Subsequent Medicare Wellness Visit.  Patient presents for her Medicare wellness and follow-up on her medical problems.  She finally is wearing support hose for her swelling.  She feels much better and then.  She is having increased shortness of breath recently.  She did go to the ER and feels better since then.  She was treated for COPD exacerbation but she is still wheezing and short of breath some.  She does have follow-up scheduled with pulmonary next month.    The following portions of the patient's history were reviewed and   updated as appropriate: allergies, current medications, past family history, past medical history, past social history, past surgical history and problem list.    Compared to one year ago, the patient feels her physical   health is the same.    Compared to one year ago, the patient feels her mental   health is the same.    Recent Hospitalizations:  She was not admitted to the hospital during the last year.       Current Medical Providers:  Patient Care Team:  Kehrer, Meredith Lea, MD as PCP - General (Family Medicine)  Chris Bear MD as Referring Physician (Hospitalist)  Delfino Scruggs MD as Consulting Physician (Hematology and Oncology)    Outpatient Medications Prior to Visit   Medication Sig Dispense Refill   • acetaminophen (TYLENOL) 325 MG tablet Take 2 tablets by mouth Every 4 (Four) Hours As Needed for Mild Pain .     • alendronate (Fosamax) 70 MG tablet Take 1 tablet by mouth Every 7 (Seven) Days. 12 tablet 3   • apixaban (ELIQUIS) 2.5 MG tablet tablet Take 1 tablet by mouth Every 12 (Twelve) Hours. 180 tablet 3   • chlorhexidine (PERIDEX) 0.12 %  solution APPLY 15 ML AS DIRECTED TWICE DAILY SWISH FOR 30 seconds AND expectorate, EVERY MORNING AND IN THE EVENING TO BEGIN in 24 hours AFTER surgery     • cloNIDine (CATAPRES) 0.2 MG tablet TAKE 1 TABLET BY MOUTH TWICE DAILY 180 tablet 1   • coenzyme Q10 100 MG capsule Take 100 mg by mouth Daily.     • cyclobenzaprine (FLEXERIL) 10 MG tablet Take 1 tablet by mouth 2 (Two) Times a Day As Needed for Muscle Spasms. 14 tablet 0   • dilTIAZem CD (CARDIZEM CD) 120 MG 24 hr capsule TAKE 1 CAPSULE BY MOUTH DAILY 90 capsule 1   • esomeprazole (NexIUM) 40 MG capsule Take 40 mg by mouth every morning before breakfast.     • ferrous sulfate 325 (65 FE) MG tablet Take 325 mg by mouth Daily With Breakfast.     • folic acid (FOLVITE) 1 MG tablet Take 1 tablet by mouth Daily. 30 tablet 5   • furosemide (LASIX) 40 MG tablet TAKE ONE TABLET BY MOUTH EVERY MORNING FOR FLUID  0   • gabapentin (NEURONTIN) 100 MG capsule TAKE 1 CAPSULE BY MOUTH THREE TIMES DAILY 90 capsule 0   • HYDROcodone-acetaminophen (NORCO) 5-325 MG per tablet Take 1 tablet by mouth Every 6 (Six) Hours As Needed. for pain     • ipratropium (ATROVENT) 0.06 % nasal spray 2 sprays into the nostril(s) as directed by provider 4 (Four) Times a Day. 15 mL 0   • ipratropium-albuterol (DUO-NEB) 0.5-2.5 mg/mL nebulizer USE 1 VIAL IN NEBULIZER 2 TIMES DAILY. Generic: DUONEB 60 mL 10   • lactulose (CHRONULAC) 10 GM/15ML solution TAKE 15 ML BY MOUTH THREE TIMES DAILY 1350 mL 2   • levothyroxine (SYNTHROID, LEVOTHROID) 100 MCG tablet TAKE 1 TABLET BY MOUTH DAILY 90 tablet 0   • lidocaine (XYLOCAINE) 5 % ointment Apply 1 application topically to the appropriate area as directed 3 (Three) Times a Day. 50 g 3   • Magnesium 500 MG tablet Take  by mouth.     • melatonin 1 MG tablet Take 3 mg by mouth.     • potassium chloride 10 MEQ CR tablet Take 10 mEq by mouth 2 (Two) Times a Day.     • pramipexole (MIRAPEX) 1.5 MG tablet Take 1.5 mg by mouth every night at bedtime.     •  Stimulant Laxative 8.6-50 MG per tablet TAKE 2 TABLETS BY MOUTH TWICE DAILY 120 tablet 2   • SYMBICORT 160-4.5 MCG/ACT inhaler      • Tiadylt  MG 24 hr capsule TAKE ONE CAPSULE BY MOUTH DAILY FOR BLOOD PRESSURE     • valsartan (DIOVAN) 320 MG tablet TAKE 1 TABLET BY MOUTH DAILY 90 tablet 1   • vitamin B-12 (CYANOCOBALAMIN) 1000 MCG tablet Take 1,000 mcg by mouth Daily.     • Zinc 50 MG tablet Take 1 tablet by mouth.     • predniSONE (DELTASONE) 20 MG tablet Take 3 tabs p.o. daily for days 1 and 2, then 2 tabs p.o. daily for days 3 and 4, then 1 tab daily for days 5 and 6. 12 tablet 0   • linaclotide (LINZESS) 145 MCG capsule capsule Take 1 capsule by mouth Every Morning Before Breakfast. 90 capsule 0   • melatonin 3 MG tablet Take 3 mg by mouth every night at bedtime.     • amoxicillin (AMOXIL) 500 MG capsule Take 2 capsules by mouth 2 (Two) Times a Day. 28 capsule 0     No facility-administered medications prior to visit.       Opioid medication/s are on active medication list.  and I have evaluated her active treatment plan and pain score trends (see table).  There were no vitals filed for this visit.  I have reviewed the chart for potential of high risk medication and harmful drug interactions in the elderly.            Aspirin is not on active medication list.  Aspirin use is not indicated based on review of current medical condition/s. Risk of harm outweighs potential benefits.  .    Patient Active Problem List   Diagnosis   • Carcinoid tumor of lung   • Diverticulosis of intestine   • Obstructive sleep apnea syndrome   • Weight loss   • Vitamin D deficiency   • Overweight (BMI 25.0-29.9)   • Spinal stenosis of lumbar region without neurogenic claudication   • Osteoarthritis of knee   • Obesity (BMI 30-39.9)   • Chronic lower back pain   • Knee pain   • Insomnia   • Hypothyroidism   • Hypokalemia   • Hypertension   • Hyperlipidemia   • Generalized osteoarthritis   • Gastroparesis   • Foot pain   • Chronic  "obstructive pulmonary disease (HCC)   • Chronic constipation   • Anemia   • Weight gain   • Pain of left breast   • Elevated serum alkaline phosphatase level   • Neck pain   • Volume overload   • Status post total hip replacement, right   • Generalized weakness   • Constipation   • Idiopathic peripheral neuropathy   • Lymphedema     Advance Care Planning  Advance Directive is not on file.  ACP discussion was held with the patient during this visit. Patient does not have an advance directive, information provided.    Review of Systems   Constitutional: Negative for chills, fatigue and fever.   HENT: Negative for congestion, ear pain and sore throat.    Eyes: Negative for visual disturbance.   Respiratory: Positive for shortness of breath. Negative for cough.    Cardiovascular: Negative for chest pain, palpitations and leg swelling.   Gastrointestinal: Negative for diarrhea, nausea and vomiting.   Endocrine: Negative.    Genitourinary: Negative for dysuria and frequency.   Skin: Negative.    Psychiatric/Behavioral: Negative for dysphoric mood and sleep disturbance. The patient is not nervous/anxious.         Objective    Vitals:    11/16/22 1019   BP: 108/70   Pulse: 72   Temp: 98.4 °F (36.9 °C)   SpO2: 95%   Weight: 99.1 kg (218 lb 6.4 oz)   Height: 165.1 cm (65\")     Estimated body mass index is 36.34 kg/m² as calculated from the following:    Height as of this encounter: 165.1 cm (65\").    Weight as of this encounter: 99.1 kg (218 lb 6.4 oz).    Class 2 Severe Obesity (BMI >=35 and <=39.9). Obesity-related health conditions include the following: obstructive sleep apnea and hypertension. Obesity is unchanged. BMI is is above average; no BMI management plan is appropriate. We discussed portion control.      Does the patient have evidence of cognitive impairment? No    Physical Exam  Constitutional:       General: She is not in acute distress.     Appearance: Normal appearance. She is well-developed. She is obese. "   HENT:      Head: Normocephalic and atraumatic.      Right Ear: Tympanic membrane, ear canal and external ear normal.      Left Ear: Tympanic membrane, ear canal and external ear normal.      Mouth/Throat:      Mouth: Mucous membranes are moist.      Pharynx: Oropharynx is clear.   Eyes:      Conjunctiva/sclera: Conjunctivae normal.      Pupils: Pupils are equal, round, and reactive to light.   Neck:      Thyroid: No thyromegaly.   Cardiovascular:      Rate and Rhythm: Normal rate and regular rhythm.      Heart sounds: No murmur heard.  Pulmonary:      Effort: Pulmonary effort is normal.      Breath sounds: Decreased breath sounds present. No wheezing, rhonchi or rales.   Abdominal:      General: Bowel sounds are normal.      Palpations: Abdomen is soft.      Tenderness: There is no abdominal tenderness.   Musculoskeletal:         General: Normal range of motion.      Cervical back: Neck supple.      Right lower leg: Edema present.      Left lower leg: Edema present.      Comments: Hose in place   Lymphadenopathy:      Cervical: No cervical adenopathy.   Skin:     General: Skin is warm and dry.   Neurological:      Mental Status: She is alert and oriented to person, place, and time.   Psychiatric:         Mood and Affect: Mood normal.         Behavior: Behavior normal.             Troponin (10/26/2022 10:55)  BNP (10/26/2022 10:55)  Comprehensive Metabolic Panel (10/26/2022 10:55)  CBC & Differential (10/26/2022 10:55)      HEALTH RISK ASSESSMENT    Smoking Status:  Social History     Tobacco Use   Smoking Status Never   Smokeless Tobacco Never     Alcohol Consumption:  Social History     Substance and Sexual Activity   Alcohol Use No     Fall Risk Screen:    STEADI Fall Risk Assessment was completed, and patient is at MODERATE risk for falls. Assessment completed on:11/16/2022    Depression Screening:  PHQ-2/PHQ-9 Depression Screening 11/16/2022   Retired PHQ-9 Total Score -   Retired Total Score -   Ana  Interest or Pleasure in Doing Things 0-->not at all   Feeling Down, Depressed or Hopeless 0-->not at all   PHQ-9: Brief Depression Severity Measure Score 0       Health Habits and Functional and Cognitive Screening:  Functional & Cognitive Status 11/16/2022   Do you have difficulty preparing food and eating? No   Do you have difficulty bathing yourself, getting dressed or grooming yourself? No   Do you have difficulty using the toilet? No   Do you have difficulty moving around from place to place? No   Do you have trouble with steps or getting out of a bed or a chair? Yes   Current Diet Unhealthy Diet   Dental Exam Up to date   Eye Exam Not up to date   Exercise (times per week) 0 times per week   Current Exercises Include No Regular Exercise   Do you need help using the phone?  No   Are you deaf or do you have serious difficulty hearing?  No   Do you need help with transportation? No   Do you need help shopping? No   Do you need help preparing meals?  No   Do you need help with housework?  No   Do you need help with laundry? No   Do you need help taking your medications? No   Do you need help managing money? No   Do you ever drive or ride in a car without wearing a seat belt? No       Age-appropriate Screening Schedule:  Refer to the list below for future screening recommendations based on patient's age, sex and/or medical conditions. Orders for these recommended tests are listed in the plan section. The patient has been provided with a written plan.    Health Maintenance   Topic Date Due   • LIPID PANEL  09/24/2020   • DXA SCAN  11/06/2020   • ZOSTER VACCINE (2 of 2) 11/28/2022   • MAMMOGRAM  11/18/2023   • TDAP/TD VACCINES (3 - Td or Tdap) 04/17/2029   • INFLUENZA VACCINE  Completed              Assessment & Plan   CMS Preventative Services Quick Reference  Risk Factors Identified During Encounter  Fall Risk-High or Moderate  Obesity/Overweight   The above risks/problems have been discussed with the  patient.  Follow up actions/plans if indicated are seen below in the Assessment/Plan Section.  Pertinent information has been shared with the patient in the After Visit Summary.    Diagnoses and all orders for this visit:    1. Medicare annual wellness visit, subsequent (Primary)    2. Essential hypertension  -     CBC & Differential  -     Comprehensive Metabolic Panel  -     Lipid Panel  -     TSH    3. Iron deficiency anemia, unspecified iron deficiency anemia type    4. Stage 3a chronic kidney disease (HCC)    5. Hypothyroidism, unspecified type    6. Dyspnea, unspecified type  -     proBNP    7. COPD exacerbation (HCC)  -     predniSONE (DELTASONE) 20 MG tablet; Take 1 tablet by mouth 2 (Two) Times a Day for 5 days.  Dispense: 10 tablet; Refill: 0    8. Peripheral polyneuropathy    9. High risk medication use  -     Drug Profile 750371 - Urine, Clean Catch    10. Abnormal glucose  -     Hemoglobin A1c    11. Unspecified inflammatory spondylopathy, lumbar region (HCC)    12. Hypertensive heart and chronic kidney disease with heart failure and stage 1 through stage 4 chronic kidney disease, or unspecified chronic kidney disease (HCC)  -     proBNP    13. At moderate risk for fall    14. Morbid (severe) obesity due to excess calories (HCC)    15. Low vitamin B12 level  -     Vitamin B12    16. SIERRA (obstructive sleep apnea)    17. Age-related osteoporosis with current pathological fracture with routine healing, subsequent encounter    18. Visit for screening mammogram  -     Mammo Screening Digital Tomosynthesis Bilateral With CAD; Future    19. Menopause  -     DEXA Bone Density Axial; Future    Hypertension and multiple other medical problems controlled-we will check labs today  COPD exacerbation-we will do another round of steroids  Health maintenance-needs to get her bone density scan and requests a mammogram    Follow Up:   Return if symptoms worsen or fail to improve.     An After Visit Summary and PPPS were  made available to the patient.

## 2022-11-17 LAB
ALBUMIN SERPL-MCNC: 4.4 G/DL (ref 3.6–4.6)
ALBUMIN/GLOB SERPL: 2.4 {RATIO} (ref 1.2–2.2)
ALP SERPL-CCNC: 104 IU/L (ref 44–121)
ALT SERPL-CCNC: 14 IU/L (ref 0–32)
AST SERPL-CCNC: 14 IU/L (ref 0–40)
BASOPHILS # BLD AUTO: 0 X10E3/UL (ref 0–0.2)
BASOPHILS NFR BLD AUTO: 0 %
BILIRUB SERPL-MCNC: 0.2 MG/DL (ref 0–1.2)
BUN SERPL-MCNC: 26 MG/DL (ref 8–27)
BUN/CREAT SERPL: 17 (ref 12–28)
CALCIUM SERPL-MCNC: 9.1 MG/DL (ref 8.7–10.3)
CHLORIDE SERPL-SCNC: 101 MMOL/L (ref 96–106)
CHOLEST SERPL-MCNC: 217 MG/DL (ref 100–199)
CO2 SERPL-SCNC: 26 MMOL/L (ref 20–29)
CREAT SERPL-MCNC: 1.53 MG/DL (ref 0.57–1)
EGFRCR SERPLBLD CKD-EPI 2021: 34 ML/MIN/1.73
EOSINOPHIL # BLD AUTO: 0.2 X10E3/UL (ref 0–0.4)
EOSINOPHIL NFR BLD AUTO: 3 %
ERYTHROCYTE [DISTWIDTH] IN BLOOD BY AUTOMATED COUNT: 13.4 % (ref 11.7–15.4)
GLOBULIN SER CALC-MCNC: 1.8 G/DL (ref 1.5–4.5)
GLUCOSE SERPL-MCNC: 117 MG/DL (ref 70–99)
HBA1C MFR BLD: 5.9 % (ref 4.8–5.6)
HCT VFR BLD AUTO: 35.3 % (ref 34–46.6)
HDLC SERPL-MCNC: 67 MG/DL
HGB BLD-MCNC: 11.4 G/DL (ref 11.1–15.9)
IMM GRANULOCYTES # BLD AUTO: 0 X10E3/UL (ref 0–0.1)
IMM GRANULOCYTES NFR BLD AUTO: 0 %
LDLC SERPL CALC-MCNC: 127 MG/DL (ref 0–99)
LYMPHOCYTES # BLD AUTO: 2.3 X10E3/UL (ref 0.7–3.1)
LYMPHOCYTES NFR BLD AUTO: 31 %
MCH RBC QN AUTO: 28.9 PG (ref 26.6–33)
MCHC RBC AUTO-ENTMCNC: 32.3 G/DL (ref 31.5–35.7)
MCV RBC AUTO: 89 FL (ref 79–97)
MONOCYTES # BLD AUTO: 0.7 X10E3/UL (ref 0.1–0.9)
MONOCYTES NFR BLD AUTO: 9 %
NEUTROPHILS # BLD AUTO: 4.1 X10E3/UL (ref 1.4–7)
NEUTROPHILS NFR BLD AUTO: 57 %
NT-PROBNP SERPL-MCNC: 187 PG/ML (ref 0–738)
PLATELET # BLD AUTO: 198 X10E3/UL (ref 150–450)
POTASSIUM SERPL-SCNC: 4.5 MMOL/L (ref 3.5–5.2)
PROT SERPL-MCNC: 6.2 G/DL (ref 6–8.5)
RBC # BLD AUTO: 3.95 X10E6/UL (ref 3.77–5.28)
SODIUM SERPL-SCNC: 141 MMOL/L (ref 134–144)
TRIGL SERPL-MCNC: 130 MG/DL (ref 0–149)
TSH SERPL DL<=0.005 MIU/L-ACNC: 2.02 UIU/ML (ref 0.45–4.5)
VIT B12 SERPL-MCNC: 532 PG/ML (ref 232–1245)
VLDLC SERPL CALC-MCNC: 23 MG/DL (ref 5–40)
WBC # BLD AUTO: 7.5 X10E3/UL (ref 3.4–10.8)

## 2022-11-18 LAB
AMPHETAMINES UR QL SCN: NEGATIVE NG/ML
BARBITURATES UR QL: NEGATIVE NG/ML
BENZODIAZ UR QL SCN: NEGATIVE NG/ML
BZE UR QL: NEGATIVE NG/ML
CANNABINOIDS UR QL SCN: NEGATIVE NG/ML
CREAT UR-MCNC: 37.9 MG/DL (ref 20–300)
ETHANOL UR-MCNC: NEGATIVE %
MEPERIDINE UR QL: NEGATIVE NG/ML
METHADONE UR QL: NEGATIVE NG/ML
OPIATES UR QL SCN: NEGATIVE NG/ML
OXYCODONE+OXYMORPHONE UR QL SCN: NEGATIVE NG/ML
PCP UR QL: NEGATIVE NG/ML
PROPOXYPH UR QL: NEGATIVE NG/ML
TRAMADOL UR QL SCN: NEGATIVE NG/ML

## 2022-11-29 ENCOUNTER — TELEPHONE (OUTPATIENT)
Dept: FAMILY MEDICINE CLINIC | Facility: CLINIC | Age: 82
End: 2022-11-29

## 2022-11-29 NOTE — TELEPHONE ENCOUNTER
PT IS REQUESTING A REFERRAL TO   [facility]   FOR LOWER BACK PAIN  PAIN SCALE 8      THANKS

## 2022-12-01 DIAGNOSIS — M51.36 DDD (DEGENERATIVE DISC DISEASE), LUMBAR: Primary | ICD-10-CM

## 2022-12-02 ENCOUNTER — TELEPHONE (OUTPATIENT)
Dept: ORTHOPEDIC SURGERY | Facility: CLINIC | Age: 82
End: 2022-12-02

## 2022-12-02 NOTE — TELEPHONE ENCOUNTER
INTERNAL REF FROM MEREDITH KEHRER - DX DDD (degenerative disc disease), lumbar - MRI 5/10/21 - TE 11/29/22- CAN EVY SEE?

## 2022-12-07 ENCOUNTER — HOSPITAL ENCOUNTER (OUTPATIENT)
Dept: MAMMOGRAPHY | Facility: HOSPITAL | Age: 82
Discharge: HOME | End: 2022-12-07

## 2022-12-07 ENCOUNTER — APPOINTMENT (OUTPATIENT)
Dept: BONE DENSITY | Facility: HOSPITAL | Age: 82
End: 2022-12-07

## 2022-12-07 DIAGNOSIS — Z78.0 MENOPAUSE: ICD-10-CM

## 2022-12-07 DIAGNOSIS — Z12.31 VISIT FOR SCREENING MAMMOGRAM: ICD-10-CM

## 2022-12-07 PROCEDURE — 77063 BREAST TOMOSYNTHESIS BI: CPT

## 2022-12-07 PROCEDURE — 77067 SCR MAMMO BI INCL CAD: CPT

## 2022-12-07 PROCEDURE — 77080 DXA BONE DENSITY AXIAL: CPT

## 2022-12-09 ENCOUNTER — TELEPHONE (OUTPATIENT)
Dept: FAMILY MEDICINE CLINIC | Facility: CLINIC | Age: 82
End: 2022-12-09

## 2022-12-09 NOTE — TELEPHONE ENCOUNTER
Pt aware of note below.  She stated she has been wearing her stockings not using her wraps. Stated she is drinking about 4 glasses of water a day.  I advised pt to go back to using her wraps, because she can get more compression with them that the stockings and let us know if that makes a difference on monday

## 2022-12-09 NOTE — TELEPHONE ENCOUNTER
We cannot diurese her more aggressively because of her renal function.  Please make sure she is still wearing her wraps.

## 2022-12-09 NOTE — TELEPHONE ENCOUNTER
Caller: Latanya Olsen    Relationship to patient: Self    Best call back number: [phone]    Patient is needing:  PATIENT WAS ADVISED THAT SHE NEEDED TO DRINK MORE WATER, SHE STATES THAT SHE IS DRINKING MORE WATER, BUT IT IS CAUSING HER LEGS TO SWELL, SHE HAS BEEN PROPPING THEM UP BUT THAT DOESN'T SEEM TO BE HELPING WITH THE SWELLING. PLEASE REACH OUT AND ADVISE.

## 2022-12-13 ENCOUNTER — TELEPHONE (OUTPATIENT)
Dept: FAMILY MEDICINE CLINIC | Facility: CLINIC | Age: 82
End: 2022-12-13

## 2022-12-13 NOTE — TELEPHONE ENCOUNTER
I am not sure if your note will be sent back to me because it is not showing on this encounter.  She can go back to using her hose.  Make sure to keep feet up and follow-up with me routinely.

## 2022-12-30 DIAGNOSIS — K59.00 CONSTIPATION, UNSPECIFIED CONSTIPATION TYPE: ICD-10-CM

## 2022-12-30 RX ORDER — DILTIAZEM HYDROCHLORIDE 120 MG/1
CAPSULE, COATED, EXTENDED RELEASE ORAL
Qty: 90 CAPSULE | Refills: 1 | Status: SHIPPED | OUTPATIENT
Start: 2022-12-30

## 2022-12-30 RX ORDER — VALSARTAN 320 MG/1
320 TABLET ORAL
Qty: 90 TABLET | Refills: 1 | Status: SHIPPED | OUTPATIENT
Start: 2022-12-30

## 2022-12-30 RX ORDER — LEVOTHYROXINE SODIUM 0.1 MG/1
100 TABLET ORAL DAILY
Qty: 90 TABLET | Refills: 0 | Status: SHIPPED | OUTPATIENT
Start: 2022-12-30 | End: 2023-03-30

## 2022-12-30 RX ORDER — DOCUSATE SODIUM 50 MG AND SENNOSIDES 8.6 MG 8.6; 5 MG/1; MG/1
TABLET, FILM COATED ORAL
Qty: 120 TABLET | Refills: 2 | Status: SHIPPED | OUTPATIENT
Start: 2022-12-30

## 2022-12-30 RX ORDER — CLONIDINE HYDROCHLORIDE 0.2 MG/1
TABLET ORAL
Qty: 180 TABLET | Refills: 1 | Status: SHIPPED | OUTPATIENT
Start: 2022-12-30

## 2022-12-30 NOTE — TELEPHONE ENCOUNTER
Caller: Latanya Olsen    Relationship: Self    Best call back number:     What is the best time to reach you:     Who are you requesting to speak with (clinical staff, provider,  specific staff member):     Do you know the name of the person who called:     What was the call regarding: PATIENT IS CALLING IN TO DISCUSS A MEDICATION WITH DR KEHRER AND STAFF.  SHE SAYS THAT IT WAS PRESCRIBED BY A DIFFERENT DOCTOR AND SHE WILL NEED DR KEHRER TO START REFILLING THIS.     Do you require a callback: YES

## 2023-01-03 ENCOUNTER — TELEPHONE (OUTPATIENT)
Dept: FAMILY MEDICINE CLINIC | Facility: CLINIC | Age: 83
End: 2023-01-03
Payer: MEDICARE

## 2023-01-03 RX ORDER — PRAMIPEXOLE DIHYDROCHLORIDE 1.5 MG/1
1.5 TABLET ORAL
Status: CANCELLED | OUTPATIENT
Start: 2023-01-03

## 2023-01-06 DIAGNOSIS — M51.36 DDD (DEGENERATIVE DISC DISEASE), LUMBAR: Primary | ICD-10-CM

## 2023-01-06 RX ORDER — PRAMIPEXOLE DIHYDROCHLORIDE 1.5 MG/1
1.5 TABLET ORAL
Qty: 90 TABLET | Refills: 1 | Status: SHIPPED | OUTPATIENT
Start: 2023-01-06

## 2023-01-17 DIAGNOSIS — M51.36 DDD (DEGENERATIVE DISC DISEASE), LUMBAR: ICD-10-CM

## 2023-01-23 ENCOUNTER — TELEPHONE (OUTPATIENT)
Dept: FAMILY MEDICINE CLINIC | Facility: CLINIC | Age: 83
End: 2023-01-23
Payer: MEDICARE

## 2023-01-23 DIAGNOSIS — M51.36 DDD (DEGENERATIVE DISC DISEASE), LUMBAR: Primary | ICD-10-CM

## 2023-01-31 ENCOUNTER — OFFICE VISIT (OUTPATIENT)
Dept: ORTHOPEDIC SURGERY | Facility: CLINIC | Age: 83
End: 2023-01-31
Payer: MEDICARE

## 2023-01-31 VITALS — BODY MASS INDEX: 35.03 KG/M2 | HEIGHT: 66 IN | WEIGHT: 218 LBS

## 2023-01-31 DIAGNOSIS — M48.061 SPINAL STENOSIS OF LUMBAR REGION WITHOUT NEUROGENIC CLAUDICATION: Primary | ICD-10-CM

## 2023-01-31 DIAGNOSIS — M51.36 LUMBAR DEGENERATIVE DISC DISEASE: ICD-10-CM

## 2023-01-31 DIAGNOSIS — R52 PAIN: ICD-10-CM

## 2023-01-31 PROBLEM — M51.369 LUMBAR DEGENERATIVE DISC DISEASE: Status: ACTIVE | Noted: 2023-01-31

## 2023-01-31 PROCEDURE — 99214 OFFICE O/P EST MOD 30 MIN: CPT | Performed by: NURSE PRACTITIONER

## 2023-01-31 NOTE — PROGRESS NOTES
Subjective:     Patient ID: Latanya Olsen is a 82 y.o. female.    Chief Complaint:  Low back pain, new patient to examiner  History of Present Illness  Latanya Olsen 82-year-old female presents to clinic for evaluation of her lumbar spine.  She does report bilateral sacral alae fractures the beginning of 2021 she was at the time inpatient short-term rehab after undergoing hip surgery right lower extremity outside facility.  She began experiencing pain across the low back was taken to [facility] x-ray images were completed as well as additional imaging sacral fractures were identified.  No surgical intervention was completed at that time.  She is currently utilizing lumbar support which she does wear every time she is upright and weightbearing which does help take the pressure off the back.  She is experiencing pain across the low back, central and to the right.  She is currently wrapping bilateral lower extremity secondary to significant swelling.  She has stage III chronic kidney disease has been treated by wound care again who is taught her dressings to apply bilateral lower extremities.  She is established with primary care here in [facility] was discussing her low back sent for an MRI and presents today to establish care.  She has recently completed physical therapy for the low back as well.  Again currently utilizing low back support which does help navigate when she is upright.  She is also utilizing a walker which does seem to brought provide her with support.  Denies any other concerns present at this time.    Social History     Occupational History     Employer: RETIRED   Tobacco Use   • Smoking status: Never   • Smokeless tobacco: Never   Substance and Sexual Activity   • Alcohol use: No   • Drug use: Never   • Sexual activity: Not Currently      Past Medical History:   Diagnosis Date   • Anemia    • Arthritis    • Atrial fibrillation (HCC)    • CKD (chronic kidney disease)      Stage 3   • Constipation    • COPD (chronic obstructive pulmonary disease) (HCC)    • DDD (degenerative disc disease), lumbar    • Depression    • Diverticulosis    • GERD (gastroesophageal reflux disease)    • Hx of degenerative disc disease    • Hyperlipidemia    • Hypertension    • Hypokalemia    • Hypothyroidism    • Knee swelling    • Low back pain Yes   • Lung cancer (HCC)     history   • Obesity 218   • Osteopenia Yes   • Renal disorder    • Restless leg syndrome    • Scoliosis Yes   • Sleep apnea with use of continuous positive airway pressure (CPAP)      Past Surgical History:   Procedure Laterality Date   • ADENOIDECTOMY  2000   • BUNIONECTOMY Bilateral    • CATARACT EXTRACTION     • CHOLECYSTECTOMY     • COLONOSCOPY     • ENDOSCOPY N/A 06/24/2016    Procedure: ESOPHAGOGASTRODUODENOSCOPY  with biopsies;  Surgeon: Von Hernández MD;  Location: [facility] OR;  Service:    • LUNG LOBECTOMY Left     lower lobe   • LYTIC THROMBIN THERAPY Left 03/26/2021    Procedure: left leg clot treiver possible venous stent *supine*;  Surgeon: Rogerio Vega MD;  Location: [facility] OR 18/19;  Service: Vascular;  Laterality: Left;   • REPLACEMENT TOTAL KNEE Left    • THYROID BIOPSY     • TOTAL HIP ARTHROPLASTY Right 06/01/2019    Procedure: BIPOLAR HIP CEMENTED POSTERIOR;  Surgeon: Ashley Crenshaw MD;  Location: [facility] OR;  Service: Orthopedics   • TUBAL ABDOMINAL LIGATION  50 yrs ago       Family History   Problem Relation Age of Onset   • Heart attack Mother    • Coronary artery disease Mother    • Hypertension Mother    • Kidney disease Mother    • Heart attack Sister    • Colon cancer Neg Hx    • Colon polyps Neg Hx    • Esophageal cancer Neg Hx    • Breast cancer Neg Hx                Objective:  Physical Exam    Vital signs reviewed.   General: No acute distress.  Eyes: conjunctiva clear; pupils equally round and reactive  ENT: external ears and nose atraumatic; oropharynx clear  CV: no  "peripheral edema  Resp: normal respiratory effort  Skin: no rashes or wounds; normal turgor  Psych: mood and affect appropriate; recent and remote memory intact    Vitals:    01/31/23 1350   Weight: 98.9 kg (218 lb)   Height: 167.6 cm (66\")         01/31/23  1350   Weight: 98.9 kg (218 lb)     Body mass index is 35.19 kg/m².      Back Exam     Tenderness   The patient is experiencing tenderness in the sacroiliac and lumbar.    Comments:  Positive tenderness paraspinal muscles of lumbar spine  Positive tenderness SI joint right side               Imaging:  Lumbar Spine X-Ray  Indication: Pain  AP and Lateral views    Findings:  No fracture  No bony lesion  Normal soft tissues  Multilevel degenerative disc disease greatest narrowing L4-L5 T12-L1, L1-L2, multilevel facet arthropathy    prior studies were available for comparison x-ray imaging and CT    MRI previously completed outside facility, report available for review only no imaging T12-L1 severe intervertebral disc space loss with circumferential disc bulge.  There is facet arthropathy.  Neural foramina is maintained.  L1-L2 severe intervertebral disc space loss with circumferential disc bulge.  Facet hypertrophy.  Mild thecal sac effacement.  Neuroforamen are maintained.  L2-L3 moderate intervertebral disc space loss with circumferential disc bulge.  Hypertrophic facet changes.  Mild thecal sac effacement.  Mild bilateral neural foraminal narrowing bilaterally without mass-effect in the nerve roots.  L3-L4 moderate intervertebral disc space loss with a generalized posterior disc bulge.  Hypertrophic facet changes.  Mild thecal sac effacement.  There is bilateral neuroforaminal narrowing.  May be some minimal effacement of the existing right-sided nerve root.  L4-L5 severe intervertebral disc space loss with circumferential disc bulge and endplate osteophyte.  Facet hypertrophy noted.  Moderate central canal narrowing.  There is bilateral neural foraminal " narrowing with suggestion of mass-effect on both exiting nerve roots.  L5-S1 mild generalized disc bulge.  Hypertrophic facet changes.  Moderate central canal narrowing.  Moderate left and mild right neural foraminal narrowing.  There is effacement of the existing nerve root on the left and to a lesser extent the right.  Impression is advanced multilevel spondylitic change throughout the lumbosacral spine unchanged from prior examination    Assessment:        1. Spinal stenosis of lumbar region without neurogenic claudication    2. Pain    3. Lumbar degenerative disc disease           Plan:  1. Long discussion with patient and family.  We did discuss the option of surgical intervention however wishes to hold off on any surgical intervention.  We will proceed with lumbar epidural injections.  Discussed we will place order she will be contacted by scheduling to schedule.  She does prefer [facility].  We will plan to see her back in clinic in approximately 5 weeks to reevaluate.  Does prefer the [place] location as it is easier for her to get in and out of the office.  Encouraged to call with any questions or concerns she has between now and follow-up.  All questions answered.  Orders:  Orders Placed This Encounter   Procedures   • XR Spine Lumbar AP & Lateral   • Ambulatory Referral to Hospital Pain Management Department     No orders of the defined types were placed in this encounter.          I ordered and reviewed the DESMOND today.     Dragon Dictation utilized.  Answers for HPI/ROS submitted by the patient on 1/25/2023  What is the primary reason for your visit?: Back Pain

## 2023-02-16 ENCOUNTER — OFFICE VISIT (OUTPATIENT)
Dept: FAMILY MEDICINE CLINIC | Facility: CLINIC | Age: 83
End: 2023-02-16
Payer: MEDICARE

## 2023-02-16 VITALS
HEART RATE: 74 BPM | DIASTOLIC BLOOD PRESSURE: 70 MMHG | BODY MASS INDEX: 35.26 KG/M2 | OXYGEN SATURATION: 97 % | TEMPERATURE: 97.2 F | HEIGHT: 66 IN | SYSTOLIC BLOOD PRESSURE: 124 MMHG | WEIGHT: 219.4 LBS

## 2023-02-16 DIAGNOSIS — N18.31 STAGE 3A CHRONIC KIDNEY DISEASE: ICD-10-CM

## 2023-02-16 DIAGNOSIS — Z79.899 HIGH RISK MEDICATION USE: ICD-10-CM

## 2023-02-16 DIAGNOSIS — R73.09 ABNORMAL GLUCOSE: ICD-10-CM

## 2023-02-16 DIAGNOSIS — L30.9 DERMATITIS: ICD-10-CM

## 2023-02-16 DIAGNOSIS — Z86.718 HISTORY OF RECURRENT DEEP VEIN THROMBOSIS (DVT): ICD-10-CM

## 2023-02-16 DIAGNOSIS — M81.0 AGE-RELATED OSTEOPOROSIS WITHOUT CURRENT PATHOLOGICAL FRACTURE: ICD-10-CM

## 2023-02-16 DIAGNOSIS — K59.09 CHRONIC CONSTIPATION: ICD-10-CM

## 2023-02-16 DIAGNOSIS — D50.9 IRON DEFICIENCY ANEMIA, UNSPECIFIED IRON DEFICIENCY ANEMIA TYPE: ICD-10-CM

## 2023-02-16 DIAGNOSIS — E03.9 HYPOTHYROIDISM, UNSPECIFIED TYPE: ICD-10-CM

## 2023-02-16 DIAGNOSIS — G62.9 PERIPHERAL POLYNEUROPATHY: ICD-10-CM

## 2023-02-16 DIAGNOSIS — I10 ESSENTIAL HYPERTENSION: Primary | ICD-10-CM

## 2023-02-16 PROBLEM — K21.9 GERD (GASTROESOPHAGEAL REFLUX DISEASE): Status: ACTIVE | Noted: 2023-02-16

## 2023-02-16 PROBLEM — C34.90 LUNG CANCER (HCC): Status: ACTIVE | Noted: 2023-02-16

## 2023-02-16 PROCEDURE — 99214 OFFICE O/P EST MOD 30 MIN: CPT | Performed by: FAMILY MEDICINE

## 2023-02-16 RX ORDER — TRIAMCINOLONE ACETONIDE 1 MG/G
1 CREAM TOPICAL 2 TIMES DAILY
Qty: 15 G | Refills: 2 | Status: SHIPPED | OUTPATIENT
Start: 2023-02-16

## 2023-02-16 RX ORDER — LACTULOSE 10 G/15ML
SOLUTION ORAL
Qty: 1350 ML | Refills: 2 | Status: SHIPPED | OUTPATIENT
Start: 2023-02-16

## 2023-02-16 RX ORDER — GABAPENTIN 100 MG/1
100 CAPSULE ORAL 3 TIMES DAILY
Qty: 90 CAPSULE | Refills: 2 | Status: SHIPPED | OUTPATIENT
Start: 2023-02-16

## 2023-02-16 RX ORDER — ALENDRONATE SODIUM 70 MG/1
70 TABLET ORAL
Qty: 13 TABLET | Refills: 3 | Status: SHIPPED | OUTPATIENT
Start: 2023-02-16

## 2023-02-16 NOTE — PROGRESS NOTES
"Chief Complaint  Med Refill, Hypertension, and Hyperlipidemia    Subjective        Latanya Olsen presents to [facility]IMARY [facility]  History of Present Illness  Patient presents for follow-up on her multiple chronic medical problems including peripheral neuropathy that is controlled with gabapentin.  She is up-to-date with her contract and drug profile.  She is finally wearing her support hose for her chronic edema.  She sometimes has to wrap up for couple of days to get her hose to fit again.  She is finally feeling better as far as that is concerned.  She is also due for recheck of some blood work for her renal function and her hypothyroidism.  Her shortness of breath is stable.  She is ready to get on Fosamax since she is done with some dental work.      Objective   Vital Signs:  /70   Pulse 74   Temp 97.2 °F (36.2 °C)   Ht 167.6 cm (66\")   Wt 99.5 kg (219 lb 6.4 oz)   SpO2 97%   BMI 35.41 kg/m²   Estimated body mass index is 35.41 kg/m² as calculated from the following:    Height as of this encounter: 167.6 cm (66\").    Weight as of this encounter: 99.5 kg (219 lb 6.4 oz).             Physical Exam  Constitutional:       General: She is not in acute distress.     Appearance: Normal appearance. She is well-developed.   HENT:      Head: Normocephalic and atraumatic.      Right Ear: Tympanic membrane, ear canal and external ear normal.      Left Ear: Tympanic membrane, ear canal and external ear normal.      Mouth/Throat:      Mouth: Mucous membranes are moist.      Pharynx: Oropharynx is clear.   Eyes:      Conjunctiva/sclera: Conjunctivae normal.      Pupils: Pupils are equal, round, and reactive to light.   Neck:      Thyroid: No thyromegaly.   Cardiovascular:      Rate and Rhythm: Normal rate and regular rhythm.      Heart sounds: No murmur heard.  Pulmonary:      Effort: Pulmonary effort is normal.      Breath sounds: Normal breath sounds. No wheezing.   Abdominal:      " General: Bowel sounds are normal.      Palpations: Abdomen is soft.      Tenderness: There is no abdominal tenderness.   Musculoskeletal:         General: Normal range of motion.      Cervical back: Neck supple.      Comments: Hose in place   Lymphadenopathy:      Cervical: No cervical adenopathy.   Skin:     General: Skin is warm and dry.   Neurological:      Mental Status: She is alert and oriented to person, place, and time.      Gait: Gait abnormal.      Comments: Ambulating with walker   Psychiatric:         Mood and Affect: Mood normal.         Behavior: Behavior normal.        Result Review :          Drug Profile 198861 - Urine, Clean Catch (11/16/2022 12:09)  CONTROLLED SUBSTANCE AGREEMENT - SCAN - GABAPENTIN (11/16/2022)           Assessment and Plan   Diagnoses and all orders for this visit:    1. Essential hypertension (Primary)  -     Basic Metabolic Panel    2. Hypothyroidism, unspecified type  -     TSH    3. Stage 3a chronic kidney disease (HCC)    4. High risk medication use    5. Peripheral polyneuropathy  -     gabapentin (NEURONTIN) 100 MG capsule; Take 1 capsule by mouth 3 (Three) Times a Day.  Dispense: 90 capsule; Refill: 2    6. Age-related osteoporosis without current pathological fracture  -     alendronate (Fosamax) 70 MG tablet; Take 1 tablet by mouth Every 7 (Seven) Days.  Dispense: 13 tablet; Refill: 3    7. Chronic constipation  -     lactulose (CHRONULAC) 10 GM/15ML solution; TAKE 15 ML BY MOUTH THREE TIMES DAILY  Dispense: 1350 mL; Refill: 2    8. Abnormal glucose  -     Hemoglobin A1c    9. Iron deficiency anemia, unspecified iron deficiency anemia type  -     CBC & Differential    10. Dermatitis  -     triamcinolone (KENALOG) 0.1 % cream; Apply 1 application topically to the appropriate area as directed 2 (Two) Times a Day.  Dispense: 15 g; Refill: 2             Follow Up   No follow-ups on file.  Patient was given instructions and counseling regarding her condition or for health  maintenance advice. Please see specific information pulled into the AVS if appropriate.

## 2023-02-17 LAB
BASOPHILS # BLD AUTO: 0 X10E3/UL (ref 0–0.2)
BASOPHILS NFR BLD AUTO: 1 %
BUN SERPL-MCNC: 33 MG/DL (ref 8–27)
BUN/CREAT SERPL: 23 (ref 12–28)
CALCIUM SERPL-MCNC: 9.2 MG/DL (ref 8.7–10.3)
CHLORIDE SERPL-SCNC: 105 MMOL/L (ref 96–106)
CO2 SERPL-SCNC: 23 MMOL/L (ref 20–29)
CREAT SERPL-MCNC: 1.46 MG/DL (ref 0.57–1)
EGFRCR SERPLBLD CKD-EPI 2021: 35 ML/MIN/1.73
EOSINOPHIL # BLD AUTO: 0.1 X10E3/UL (ref 0–0.4)
EOSINOPHIL NFR BLD AUTO: 2 %
ERYTHROCYTE [DISTWIDTH] IN BLOOD BY AUTOMATED COUNT: 13.4 % (ref 11.7–15.4)
GLUCOSE SERPL-MCNC: 116 MG/DL (ref 70–99)
HBA1C MFR BLD: 6 % (ref 4.8–5.6)
HCT VFR BLD AUTO: 35.4 % (ref 34–46.6)
HGB BLD-MCNC: 11.7 G/DL (ref 11.1–15.9)
IMM GRANULOCYTES # BLD AUTO: 0 X10E3/UL (ref 0–0.1)
IMM GRANULOCYTES NFR BLD AUTO: 1 %
LYMPHOCYTES # BLD AUTO: 2.3 X10E3/UL (ref 0.7–3.1)
LYMPHOCYTES NFR BLD AUTO: 35 %
MCH RBC QN AUTO: 28.9 PG (ref 26.6–33)
MCHC RBC AUTO-ENTMCNC: 33.1 G/DL (ref 31.5–35.7)
MCV RBC AUTO: 87 FL (ref 79–97)
MONOCYTES # BLD AUTO: 0.6 X10E3/UL (ref 0.1–0.9)
MONOCYTES NFR BLD AUTO: 9 %
NEUTROPHILS # BLD AUTO: 3.5 X10E3/UL (ref 1.4–7)
NEUTROPHILS NFR BLD AUTO: 52 %
PLATELET # BLD AUTO: 184 X10E3/UL (ref 150–450)
POTASSIUM SERPL-SCNC: 3.8 MMOL/L (ref 3.5–5.2)
RBC # BLD AUTO: 4.05 X10E6/UL (ref 3.77–5.28)
SODIUM SERPL-SCNC: 141 MMOL/L (ref 134–144)
TSH SERPL DL<=0.005 MIU/L-ACNC: 2.25 UIU/ML (ref 0.45–4.5)
WBC # BLD AUTO: 6.6 X10E3/UL (ref 3.4–10.8)

## 2023-03-09 ENCOUNTER — TELEPHONE (OUTPATIENT)
Dept: FAMILY MEDICINE CLINIC | Facility: CLINIC | Age: 83
End: 2023-03-09

## 2023-03-09 NOTE — TELEPHONE ENCOUNTER
Caller: Latanya Olsen    Relationship: Self    Best call back number: [phone]     What was the call regarding: PATIENT CALLING STATING THAT SHE WOULD LIKE ALL MEDICATION TO BE SWITCHED TO [facility]

## 2023-03-10 ENCOUNTER — DOCUMENTATION (OUTPATIENT)
Dept: PERIOP | Facility: HOSPITAL | Age: 83
End: 2023-03-10
Payer: MEDICARE

## 2023-03-15 RX ORDER — FUROSEMIDE 40 MG/1
40 TABLET ORAL DAILY
Qty: 90 TABLET | Refills: 0 | Status: SHIPPED | OUTPATIENT
Start: 2023-03-15

## 2023-03-15 NOTE — TELEPHONE ENCOUNTER
Caller: Latanya Olsen    Relationship: Self    Best call back number: [phone]    Requested Prescriptions:   Requested Prescriptions     Pending Prescriptions Disp Refills   • furosemide (LASIX) 40 MG tablet  0        Pharmacy where request should be sent: [facility] - [address] - [phone]  - [phone]     Additional details provided by patient: PATIENT STATES SHE TAKES 80MG DAILY. THIS MEDICATION WAS PREVIOUSLY PRESCRIBED BY HISTORICAL PROVIDER. PATIENT HAS 4 TABLETS REMAINING.    Does the patient have less than a 3 day supply:  [] Yes  [x] No    Would you like a call back once the refill request has been completed: [] Yes [x] No      Meño Mcclellan Rep   03/15/23 09:00 EDT

## 2023-03-15 NOTE — TELEPHONE ENCOUNTER
Pharmacy said it hasnt been filled since 2021 by another provider.  He was unsure why it got sent to us

## 2023-03-15 NOTE — TELEPHONE ENCOUNTER
Spoke with pt and she has been taking the lasix that was last filled in 2021 for her swelling , twice a day she said that she had a stock pile and just ran out of them,  she would like to stay on them because of the swelling.  Do you need to see her for this  ?

## 2023-03-15 NOTE — TELEPHONE ENCOUNTER
So, just to clarify.  When she had her labs done last month, she was taking 40 mg of Lasix twice daily, correct?  If so, I can continue it but we need to make sure she gets her creatinine checked every few months.

## 2023-03-20 ENCOUNTER — TELEPHONE (OUTPATIENT)
Dept: FAMILY MEDICINE CLINIC | Facility: CLINIC | Age: 83
End: 2023-03-20
Payer: MEDICARE

## 2023-03-20 DIAGNOSIS — F41.8 SITUATIONAL ANXIETY: Primary | ICD-10-CM

## 2023-03-20 RX ORDER — HYDROXYZINE HYDROCHLORIDE 10 MG/1
10 TABLET, FILM COATED ORAL 3 TIMES DAILY PRN
Qty: 15 TABLET | Refills: 0 | Status: SHIPPED | OUTPATIENT
Start: 2023-03-20

## 2023-03-20 NOTE — TELEPHONE ENCOUNTER
I sent in some hydroxyzine for her to take as needed.  Needs to come in to see me if this does not help.

## 2023-03-30 RX ORDER — LEVOTHYROXINE SODIUM 0.1 MG/1
100 TABLET ORAL DAILY
Qty: 90 TABLET | Refills: 0 | Status: SHIPPED | OUTPATIENT
Start: 2023-03-30

## 2023-04-05 ENCOUNTER — DOCUMENTATION (OUTPATIENT)
Dept: PERIOP | Facility: HOSPITAL | Age: 83
End: 2023-04-05
Payer: MEDICARE

## 2023-04-10 ENCOUNTER — HOSPITAL ENCOUNTER (OUTPATIENT)
Dept: GENERAL RADIOLOGY | Facility: HOSPITAL | Age: 83
Discharge: HOME | End: 2023-04-10
Payer: MEDICARE

## 2023-04-10 ENCOUNTER — HOSPITAL ENCOUNTER (OUTPATIENT)
Dept: PAIN MEDICINE | Facility: HOSPITAL | Age: 83
Discharge: HOME | End: 2023-04-10
Payer: MEDICARE

## 2023-04-10 ENCOUNTER — ANESTHESIA (OUTPATIENT)
Dept: PAIN MEDICINE | Facility: HOSPITAL | Age: 83
End: 2023-04-10
Payer: MEDICARE

## 2023-04-10 ENCOUNTER — ANESTHESIA EVENT (OUTPATIENT)
Dept: PAIN MEDICINE | Facility: HOSPITAL | Age: 83
End: 2023-04-10
Payer: MEDICARE

## 2023-04-10 VITALS
HEART RATE: 67 BPM | SYSTOLIC BLOOD PRESSURE: 152 MMHG | OXYGEN SATURATION: 96 % | DIASTOLIC BLOOD PRESSURE: 72 MMHG | TEMPERATURE: 98 F | RESPIRATION RATE: 16 BRPM

## 2023-04-10 DIAGNOSIS — M51.36 LUMBAR DEGENERATIVE DISC DISEASE: ICD-10-CM

## 2023-04-10 DIAGNOSIS — R52 PAIN: ICD-10-CM

## 2023-04-10 DIAGNOSIS — M48.061 SPINAL STENOSIS OF LUMBAR REGION WITHOUT NEUROGENIC CLAUDICATION: ICD-10-CM

## 2023-04-10 PROCEDURE — 77003 FLUOROGUIDE FOR SPINE INJECT: CPT

## 2023-04-10 PROCEDURE — C1755 CATHETER, INTRASPINAL: HCPCS

## 2023-04-10 PROCEDURE — 25010000002 METHYLPREDNISOLONE PER 80 MG: Performed by: ANESTHESIOLOGY

## 2023-04-10 PROCEDURE — 25510000001 IOPAMIDOL 41 % SOLUTION: Performed by: ANESTHESIOLOGY

## 2023-04-10 PROCEDURE — 0 LIDOCAINE 1 % SOLUTION: Performed by: ANESTHESIOLOGY

## 2023-04-10 RX ORDER — LIDOCAINE HYDROCHLORIDE 10 MG/ML
10 INJECTION, SOLUTION INFILTRATION; PERINEURAL ONCE
Status: COMPLETED | OUTPATIENT
Start: 2023-04-10 | End: 2023-04-10

## 2023-04-10 RX ORDER — METHYLPREDNISOLONE ACETATE 80 MG/ML
80 INJECTION, SUSPENSION INTRA-ARTICULAR; INTRALESIONAL; INTRAMUSCULAR; SOFT TISSUE ONCE
Status: COMPLETED | OUTPATIENT
Start: 2023-04-10 | End: 2023-04-10

## 2023-04-10 RX ADMIN — IOPAMIDOL 5 ML: 408 INJECTION, SOLUTION INTRATHECAL at 10:20

## 2023-04-10 RX ADMIN — LIDOCAINE HYDROCHLORIDE 10 ML: 10 INJECTION, SOLUTION INFILTRATION; PERINEURAL at 09:56

## 2023-04-10 RX ADMIN — METHYLPREDNISOLONE ACETATE 80 MG: 80 INJECTION, SUSPENSION INTRA-ARTICULAR; INTRALESIONAL; INTRAMUSCULAR; SOFT TISSUE at 10:21

## 2023-04-10 NOTE — H&P
ANNAMARIE Grimes    History and Physical    Patient Name: Latanya Olsen  :  1940  MRN:  [id number]  Date of Admission: 4/10/2023    Subjective     Patient is a 82 y.o. female presents with chief complaint of chronic, moderate, severe low back and leg: left pain.  Onset of symptoms was gradual starting unknown years ago.  Symptoms are associated/aggravated by sitting, standing for more than a few minutes, walking for more than a few minutes or transitioning out of bed in the morning.. Symptoms improve with ice and lying down.  Mrs. Olsen presents to the anesthesia pain clinic with complaint of aching and stabbing low back pain and pain radiating down into the lateral aspect of her left leg.  The pain does seem to be worsening over time and is especially worse with standing or walking for more than just a few minutes, sitting for too long, and especially transitioning out of bed in the morning.  She has a hard time getting out of bed in the morning and has to use a walker to get up and then get around.  Her pain improves with use of a back brace, she has done physical therapy which ended on 2022, and she does use ice.  She did have an MRI of her lumbar spine that revealed:  HISTORY: Arthritis, chronic back pain.     TECHNIQUE: Multiplanar, multisequence MR images of the lumbosacral spine were performed without  gadolinium contrast.     COMPARISON: MRI dated 2019.     BONE MARROW: Degenerative endplate marrow changes present at every level. No focus of acute suspicious marrow abnormality.   PARASPINAL SOFT TISSUES: Acutely unremarkable.   CONUS MEDULLARIS: Descends to the L1 level without focal enlargement or intrasubstance signal abnormality.     LUMBAR HEIGHT/ALIGNMENT/INTERSPACING: Vertebral height is maintained. Grade 1 retrolisthesis of T12 in relation L1, L1 in relation to L2 and L4 in relation to L5 is stable and most likely on the basis of facet hypertrophy and degenerative  endplate marrow changes.   FRACTURE/SPONDYLOLYSIS/SPONDYLOLISTHESIS: No evidence of spondylolysis or fracture. Disc desiccation is present at every level.     T12-L1: Severe intervertebral disc space loss with circumferential disc bulge. There is facet hypertrophy. Neural foramina are maintained.     L1-L2: Severe intervertebral disc space loss with circumferential disc bulge. Facet hypertrophy. Mild thecal sac effacement. Neural foramina are maintained.     L2-L3: Moderate intervertebral disc space loss with circumferential disc bulge. Hypertrophic facet changes. Mild thecal sac effacement. Mild bilateral neural foraminal narrowing bilaterally without mass effect in the nerve roots.     L3-L4: Moderate intervertebral disc space loss with generalized posterior disc bulge. Hypertrophic facet changes. Mild thecal sac effacement. There is bilateral neural foraminal narrowing. There may be some minimal effacement of the exiting right-sided nerve root.     L4-L5: Severe intervertebral disc space loss with circumferential disc bulge and endplate osteophyte. Facet hypertrophy. Moderate central canal narrowing. There is bilateral neural foraminal narrowing with suggestion of mass effect on both exiting nerve roots.     L5-S1: Mild generalized disc bulge. Hypertrophic facet changes. Moderate central canal narrowing. Moderate left and mild right neural foraminal narrowing. There is effacement of the exiting nerve root on the left and to a lesser extent the right.     IMPRESSION:   1. Advanced multilevel spondylotic changes throughout the lumbosacral spine unchanged from prior exam.     Dictated by: Ayad De La Fuente MD Signed by Ayad De La Fuente MD on 1/16/2023 8:28 PM   ##### Final #####     Her last lumbar epidural steroid injection was performed at [facility] on 3/8/2021 and she reports she had 75 to 80% improvement of her pain for at least 4 months.  Pain is an 8 out of 10 at the worst and a 4 out of 10 at the  least.  She reports her pain is not interrupting her sleep at night, but she does have difficulty transitioning out of bed in the morning.  She does take Eliquis for history of DVT and A-fib but she has stopped this 3 days ago, her last dose being 4/6/2023.  I discussed performing lumbar epidural steroid injection with the risks including, not limited to, bleeding and infection, postdural puncture headache, and the fact that I could not guarantee her pain would improve, in fact may worsen, or undergo no change, she does wish to proceed at this time.  She is awake and alert at the time of the exam with normal mood and affect.  She is somewhat sad as she had a recent loss of her son.  Debility/disabilities: Related to her back pain, she does use a walker to get out of bed in the morning and to get around.  She also has lymphedema of her lower extremities.  The following portions of the patients history were reviewed and updated as appropriate: current medications, allergies, past medical history, past surgical history, past family history, past social history and problem list                Objective     Past Medical History:   Past Medical History:   Diagnosis Date   • Anemia    • Arthritis    • Atrial fibrillation    • CKD (chronic kidney disease)     Stage 3   • Constipation    • COPD (chronic obstructive pulmonary disease)    • DDD (degenerative disc disease), lumbar    • Depression    • Diverticulosis    • GERD (gastroesophageal reflux disease)    • Hx of degenerative disc disease    • Hyperlipidemia    • Hypertension    • Hypokalemia    • Hypothyroidism    • Knee swelling    • Low back pain Yes   • Lung cancer     history   • Obesity 218   • Osteopenia Yes   • Renal disorder    • Restless leg syndrome    • Scoliosis Yes   • Sleep apnea with use of continuous positive airway pressure (CPAP)      Past Surgical History:   Past Surgical History:   Procedure Laterality Date   • ADENOIDECTOMY  2000   • BUNIONECTOMY  Bilateral    • CATARACT EXTRACTION     • CHOLECYSTECTOMY     • COLONOSCOPY     • ENDOSCOPY N/A 06/24/2016    Procedure: ESOPHAGOGASTRODUODENOSCOPY  with biopsies;  Surgeon: Von Hernández MD;  Location: [facility] OR;  Service:    • LUNG LOBECTOMY Left     lower lobe   • LYTIC THROMBIN THERAPY Left 03/26/2021    Procedure: left leg clot treiver possible venous stent *supine*;  Surgeon: Rogerio Vega MD;  Location: [facility] OR 18/19;  Service: Vascular;  Laterality: Left;   • REPLACEMENT TOTAL KNEE Left    • THYROID BIOPSY     • TOTAL HIP ARTHROPLASTY Right 06/01/2019    Procedure: BIPOLAR HIP CEMENTED POSTERIOR;  Surgeon: Ashley Crenshaw MD;  Location: [facility] OR;  Service: Orthopedics   • TUBAL ABDOMINAL LIGATION  50 yrs ago     Family History:   Family History   Problem Relation Age of Onset   • Heart attack Mother    • Coronary artery disease Mother    • Hypertension Mother    • Kidney disease Mother    • Heart attack Sister    • Colon cancer Neg Hx    • Colon polyps Neg Hx    • Esophageal cancer Neg Hx    • Breast cancer Neg Hx      Social History:   Social History     Socioeconomic History   • Marital status:    Tobacco Use   • Smoking status: Never   • Smokeless tobacco: Never   Substance and Sexual Activity   • Alcohol use: No   • Drug use: Never   • Sexual activity: Not Currently       Vital Signs Range for the last 24 hours  Temperature: Temp:  [98 °F (36.7 °C)] 98 °F (36.7 °C)   Temp Source: Temp src: Oral   BP: BP: (152)/(75) 152/75   Pulse: Heart Rate:  [73] 73   Respirations: Resp:  [16] 16   SPO2: SpO2:  [95 %] 95 %   O2 Amount (l/min):     O2 Devices Device (Oxygen Therapy): room air   Weight:           --------------------------------------------------------------------------------    Current Outpatient Medications   Medication Sig Dispense Refill   • acetaminophen (TYLENOL) 325 MG tablet Take 2 tablets by mouth Every 4 (Four) Hours As Needed for Mild Pain .     •  alendronate (Fosamax) 70 MG tablet Take 1 tablet by mouth Every 7 (Seven) Days. 13 tablet 3   • apixaban (ELIQUIS) 2.5 MG tablet tablet Take 1 tablet by mouth Every 12 (Twelve) Hours. 28 tablet 0   • chlorhexidine (PERIDEX) 0.12 % solution APPLY 15 ML AS DIRECTED TWICE DAILY SWISH FOR 30 seconds AND expectorate, EVERY MORNING AND IN THE EVENING TO BEGIN in 24 hours AFTER surgery     • cloNIDine (CATAPRES) 0.2 MG tablet TAKE 1 TABLET BY MOUTH TWICE DAILY 180 tablet 1   • coenzyme Q10 100 MG capsule Take 1 capsule by mouth Daily.     • cyclobenzaprine (FLEXERIL) 10 MG tablet Take 1 tablet by mouth 2 (Two) Times a Day As Needed for Muscle Spasms. 14 tablet 0   • dilTIAZem CD (CARDIZEM CD) 120 MG 24 hr capsule TAKE 1 CAPSULE BY MOUTH DAILY 90 capsule 1   • esomeprazole (NexIUM) 40 MG capsule Take 1 capsule by mouth Every Morning Before Breakfast.     • ferrous sulfate 325 (65 FE) MG tablet Take 1 tablet by mouth Daily With Breakfast.     • folic acid (FOLVITE) 1 MG tablet Take 1 tablet by mouth Daily. 30 tablet 5   • furosemide (LASIX) 40 MG tablet Take 1 tablet by mouth Daily. 90 tablet 0   • gabapentin (NEURONTIN) 100 MG capsule Take 1 capsule by mouth 3 (Three) Times a Day. 90 capsule 2   • hydrOXYzine (ATARAX) 10 MG tablet Take 1 tablet by mouth 3 (Three) Times a Day As Needed for Anxiety. 15 tablet 0   • ipratropium (ATROVENT) 0.06 % nasal spray 2 sprays into the nostril(s) as directed by provider 4 (Four) Times a Day. 15 mL 0   • ipratropium-albuterol (DUO-NEB) 0.5-2.5 mg/mL nebulizer USE 1 VIAL IN NEBULIZER 2 TIMES DAILY. Generic: DUONEB 60 mL 10   • lactulose (CHRONULAC) 10 GM/15ML solution TAKE 15 ML BY MOUTH THREE TIMES DAILY 1350 mL 2   • levothyroxine (SYNTHROID, LEVOTHROID) 100 MCG tablet TAKE 1 TABLET BY MOUTH DAILY 90 tablet 0   • lidocaine (XYLOCAINE) 5 % ointment Apply 1 application topically to the appropriate area as directed 3 (Three) Times a Day. 50 g 3   • Magnesium 500 MG tablet Take  by mouth.      • potassium chloride 10 MEQ CR tablet Take 1 tablet by mouth 2 (Two) Times a Day.     • pramipexole (MIRAPEX) 1.5 MG tablet Take 1 tablet by mouth every night at bedtime. 90 tablet 1   • Stimulant Laxative 8.6-50 MG per tablet TAKE 2 TABLETS BY MOUTH TWICE DAILY 120 tablet 2   • SYMBICORT 160-4.5 MCG/ACT inhaler      • Tiadylt  MG 24 hr capsule TAKE ONE CAPSULE BY MOUTH DAILY FOR BLOOD PRESSURE     • triamcinolone (KENALOG) 0.1 % cream Apply 1 application topically to the appropriate area as directed 2 (Two) Times a Day. 15 g 2   • valsartan (DIOVAN) 320 MG tablet TAKE 1 TABLET BY MOUTH DAILY 90 tablet 1   • vitamin B-12 (CYANOCOBALAMIN) 1000 MCG tablet Take 1 tablet by mouth Daily.     • Zinc 50 MG tablet Take 1 tablet by mouth.       Current Facility-Administered Medications   Medication Dose Route Frequency Provider Last Rate Last Admin   • iopamidol (ISOVUE-M 200) injection 41%  5 mL Epidural Once in imaging Heidi Nowak MD       • lidocaine (XYLOCAINE) 1 % injection 10 mL  10 mL Infiltration Once Heidi Nowak MD       • methylPREDNISolone acetate (DEPO-medrol) injection 80 mg  80 mg Epidural Once Heidi Nowak MD           --------------------------------------------------------------------------------  Assessment & Plan      Anesthesia Evaluation     Patient summary reviewed and Nursing notes reviewed   no history of anesthetic complications:  NPO Solid Status: N/A  NPO Liquid Status: N/A    Pain impairs ability to perform ADLs: Exercise/Activity and Ambulation  Modalities previously tried to control pain with limited effectiveness within the last 4-6 weeks: Ice, Rest, OTC medications, Prescription medications and Physical therapy     Airway   Mallampati: II  TM distance: >3 FB  Neck ROM: full  No difficulty expected  Dental - normal exam     Pulmonary - normal exam    breath sounds clear to auscultation  (+) 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), COPD moderate, sleep apnea on CPAP,    Cardiovascular - normal exam    PT is on anticoagulation therapy  Rhythm: regular  Rate: normal    (+) hypertension well controlled 2 medications or greater, dysrhythmias Atrial Fib, DVT resolved, hyperlipidemia,     ROS comment: Last dose eliqquis 4/6/2023    Neuro/Psych  (+) numbness (peripheral neuropathy),    GI/Hepatic/Renal/Endo    (+)  GERD well controlled,  renal disease CRI, thyroid problem hypothyroidism    Musculoskeletal     (+) back pain, neck pain, radiculopathy Left lower extremity  Abdominal  - normal exam   Substance History - negative use     OB/GYN negative ob/gyn ROS         Other   arthritis,    history of cancer (lung) remission               Diagnosis and Plan    Treatment Plan  ASA 3   Patient has had previous injection/procedure with % improvement.   Procedures: Lumbar Epidural Steroid Injection(LESI), With fluoroscopy,       Anesthetic plan and risks discussed with patient.          Diagnosis     * DDD (degenerative disc disease), lumbosacral [M51.37]     * Lumbar spinal stenosis [M48.061]

## 2023-04-10 NOTE — ANESTHESIA PROCEDURE NOTES
PAIN Epidural block    Pre-sedation assessment completed: 4/10/2023 9:53 AM    Patient reassessed immediately prior to procedure    Patient location during procedure: pain clinic  Start Time: 4/10/2023 9:56 AM  Stop Time: 4/10/2023 10:21 AM  Indication:procedure for pain  Performed By  Anesthesiologist: Heidi Nowak MD  Preanesthetic Checklist  Completed: patient identified, site marked, risks and benefits discussed, surgical consent, monitors and equipment checked, pre-op evaluation and timeout performed  Prep:  Pt Position:prone  Sterile Tech:cap, gloves, mask and sterile barrier  Prep:chlorhexidine gluconate and isopropyl alcohol  Monitoring:blood pressure monitoring and continuous pulse oximetry  Procedure:Sedation: no     Approach:midline  Guidance: fluoroscopy  Location:lumbar  Needle Type:Tuohy  Needle Gauge:20 G  Aspiration:negative  Test Dose:negative  Medications:  Preservative Free Saline:6mL  Isovue:1mL  Comments:Skin infiltration with 1% lidocaine using 27 G needle.Depomedrol:80  Post Assessment:  Dressing:occlusive dressing applied  Pt Tolerance:patient tolerated the procedure well with no apparent complications  Complications:no

## 2023-04-17 ENCOUNTER — OFFICE VISIT (OUTPATIENT)
Dept: FAMILY MEDICINE CLINIC | Facility: CLINIC | Age: 83
End: 2023-04-17
Payer: MEDICARE

## 2023-04-17 VITALS
SYSTOLIC BLOOD PRESSURE: 144 MMHG | WEIGHT: 225.2 LBS | TEMPERATURE: 97.4 F | OXYGEN SATURATION: 98 % | HEIGHT: 66 IN | HEART RATE: 98 BPM | BODY MASS INDEX: 36.19 KG/M2 | DIASTOLIC BLOOD PRESSURE: 86 MMHG

## 2023-04-17 DIAGNOSIS — F43.21 ADJUSTMENT DISORDER WITH DEPRESSED MOOD: Primary | ICD-10-CM

## 2023-04-17 DIAGNOSIS — J01.00 ACUTE MAXILLARY SINUSITIS, RECURRENCE NOT SPECIFIED: ICD-10-CM

## 2023-04-17 PROCEDURE — 3079F DIAST BP 80-89 MM HG: CPT | Performed by: FAMILY MEDICINE

## 2023-04-17 PROCEDURE — 3077F SYST BP >= 140 MM HG: CPT | Performed by: FAMILY MEDICINE

## 2023-04-17 PROCEDURE — 99213 OFFICE O/P EST LOW 20 MIN: CPT | Performed by: FAMILY MEDICINE

## 2023-04-17 RX ORDER — AMOXICILLIN 500 MG/1
1000 CAPSULE ORAL 2 TIMES DAILY
Qty: 28 CAPSULE | Refills: 0 | Status: SHIPPED | OUTPATIENT
Start: 2023-04-17 | End: 2023-04-24

## 2023-04-17 RX ORDER — CITALOPRAM 10 MG/1
10 TABLET ORAL DAILY
Qty: 30 TABLET | Refills: 1 | Status: SHIPPED | OUTPATIENT
Start: 2023-04-17

## 2023-04-17 NOTE — PROGRESS NOTES
"Chief Complaint  Anxiety and Sinusitis    Subjective        Latanya Olsen presents to [facility] PRIMARY [facility]ARE  History of Present Illness  Patient presents with a couple concerns.  She is having issues with severe grief after losing her son unexpectedly this year.  She cries constantly.  She went out with friends today and could not control her tears.  She denies any hopelessness or suicidal homicidal ideation.  She feels pretty alone.  She states her only living son came to the  looked at his brother and left and has not given her any emotional support and lives too far away.    Patient's also had some increased sinus pain and pressure over the past couple weeks with the change in season.  She has had some bloody mucus and feels like she has a sinus infection with pain in her cheeks.      Objective   Vital Signs:  /86   Pulse 98   Temp 97.4 °F (36.3 °C)   Ht 167.6 cm (66\")   Wt 102 kg (225 lb 3.2 oz)   SpO2 98%   BMI 36.35 kg/m²   Estimated body mass index is 36.35 kg/m² as calculated from the following:    Height as of this encounter: 167.6 cm (66\").    Weight as of this encounter: 102 kg (225 lb 3.2 oz).             Physical Exam  Constitutional:       General: She is not in acute distress.     Appearance: Normal appearance. She is well-developed.   HENT:      Head: Normocephalic and atraumatic.      Right Ear: Tympanic membrane, ear canal and external ear normal.      Left Ear: Tympanic membrane, ear canal and external ear normal.      Nose:      Comments: Tender palpation bilateral maxillary sinuses     Mouth/Throat:      Mouth: Mucous membranes are moist.      Pharynx: Oropharynx is clear.      Comments: Small amount of purulent postnasal drainage  Eyes:      Conjunctiva/sclera: Conjunctivae normal.      Pupils: Pupils are equal, round, and reactive to light.   Neck:      Thyroid: No thyromegaly.   Cardiovascular:      Rate and Rhythm: Normal rate and regular rhythm.    "   Heart sounds: No murmur heard.  Pulmonary:      Effort: Pulmonary effort is normal.      Breath sounds: Normal breath sounds. No wheezing.   Abdominal:      General: Bowel sounds are normal.      Palpations: Abdomen is soft.      Tenderness: There is no abdominal tenderness.   Musculoskeletal:         General: Normal range of motion.      Cervical back: Neck supple.   Lymphadenopathy:      Cervical: No cervical adenopathy.   Skin:     General: Skin is warm and dry.   Neurological:      Mental Status: She is alert and oriented to person, place, and time.   Psychiatric:         Mood and Affect: Mood normal.         Behavior: Behavior normal.        Result Review :                   Assessment and Plan   Diagnoses and all orders for this visit:    1. Adjustment disorder with depressed mood (Primary)  -     citalopram (CeleXA) 10 MG tablet; Take 1 tablet by mouth Daily.  Dispense: 30 tablet; Refill: 1    2. DDD (degenerative disc disease), lumbar    3. Acute maxillary sinusitis, recurrence not specified  -     amoxicillin (AMOXIL) 500 MG capsule; Take 2 capsules by mouth 2 (Two) Times a Day for 7 days.  Dispense: 28 capsule; Refill: 0    Adjustment disorder-do a trial of citalopram with close follow-up  Maxillary sinusitis- amoxicillin continue allergy treatment and make sure to hydrate well         Follow Up   Return in about 1 month (around 5/17/2023) for Recheck mood.  Patient was given instructions and counseling regarding her condition or for health maintenance advice. Please see specific information pulled into the AVS if appropriate.

## 2023-04-18 ENCOUNTER — TELEPHONE (OUTPATIENT)
Dept: PAIN MEDICINE | Facility: HOSPITAL | Age: 83
End: 2023-04-18
Payer: MEDICARE

## 2023-04-24 DIAGNOSIS — G62.9 PERIPHERAL POLYNEUROPATHY: ICD-10-CM

## 2023-04-24 RX ORDER — GABAPENTIN 100 MG/1
CAPSULE ORAL
Qty: 90 CAPSULE | Refills: 0 | Status: SHIPPED | OUTPATIENT
Start: 2023-04-24

## 2023-05-04 RX ORDER — APIXABAN 5 MG/1
TABLET, FILM COATED ORAL
Qty: 60 TABLET | Refills: 2 | OUTPATIENT
Start: 2023-05-04

## 2023-05-04 RX ORDER — LEVOTHYROXINE SODIUM 0.1 MG/1
TABLET ORAL
Qty: 30 TABLET | Refills: 2 | OUTPATIENT
Start: 2023-05-04

## 2023-05-15 NOTE — PROGRESS NOTES
Subjective:     Patient ID: Latanya Olsen is a 82 y.o. female.    Chief Complaint:  Follow-up DJD lumbar spine, radiculopathy bilateral lower extremities  History of Present Illness  Latanya Olsen returns to clinic for follow-up of her lumbar spine.  She received epidural injection 4/10/2023 which did take approximately a week before she began experiencing any symptom relief and then noted significant improvement of symptoms.  She is able to ambulate with ease and complete other activities that she has been unable to previously completed.  She is very pleased with symptom relief thus far.  Denies any other concerns present.     Social History     Occupational History     Employer: RETIRED   Tobacco Use   • Smoking status: Never   • Smokeless tobacco: Never   Vaping Use   • Vaping Use: Never used   Substance and Sexual Activity   • Alcohol use: No   • Drug use: Never   • Sexual activity: Not Currently      Past Medical History:   Diagnosis Date   • Anemia    • Arthritis    • Atrial fibrillation    • CKD (chronic kidney disease)     Stage 3   • Constipation    • COPD (chronic obstructive pulmonary disease)    • DDD (degenerative disc disease), lumbar    • Depression    • Diverticulosis    • GERD (gastroesophageal reflux disease)    • Hx of degenerative disc disease    • Hyperlipidemia    • Hypertension    • Hypokalemia    • Hypothyroidism    • Knee swelling    • Low back pain Yes   • Lung cancer     history   • Obesity 218   • Osteopenia Yes   • Renal disorder    • Restless leg syndrome    • Scoliosis Yes   • Sleep apnea with use of continuous positive airway pressure (CPAP)      Past Surgical History:   Procedure Laterality Date   • ADENOIDECTOMY  2000   • BUNIONECTOMY Bilateral    • CATARACT EXTRACTION     • CHOLECYSTECTOMY     • COLONOSCOPY     • ENDOSCOPY N/A 06/24/2016    Procedure: ESOPHAGOGASTRODUODENOSCOPY  with biopsies;  Surgeon: Von Hernández MD;  Location: [facility];  Service:    •  "LUNG LOBECTOMY Left     lower lobe   • LYTIC THROMBIN THERAPY Left 03/26/2021    Procedure: left leg clot treiver possible venous stent *supine*;  Surgeon: Rogerio Vega MD;  Location: [facility] 18/19;  Service: Vascular;  Laterality: Left;   • REPLACEMENT TOTAL KNEE Left    • THYROID BIOPSY     • TOTAL HIP ARTHROPLASTY Right 06/01/2019    Procedure: BIPOLAR HIP CEMENTED POSTERIOR;  Surgeon: Ashley Crenshaw MD;  Location: [facility];  Service: Orthopedics   • TUBAL ABDOMINAL LIGATION  50 yrs ago       Family History   Problem Relation Age of Onset   • Heart attack Mother    • Coronary artery disease Mother    • Hypertension Mother    • Kidney disease Mother    • Heart attack Sister    • Colon cancer Neg Hx    • Colon polyps Neg Hx    • Esophageal cancer Neg Hx    • Breast cancer Neg Hx                Objective:  Physical Exam    General: No acute distress.  Eyes: conjunctiva clear; pupils equally round and reactive  ENT: external ears and nose atraumatic; oropharynx clear  CV: no peripheral edema  Resp: normal respiratory effort  Skin: no rashes or wounds; normal turgor  Psych: mood and affect appropriate; recent and remote memory intact    Vitals:    05/16/23 1421   Weight: 101 kg (222 lb)   Height: 167.6 cm (66\")         05/16/23  1421   Weight: 101 kg (222 lb)     Body mass index is 35.83 kg/m².      Ortho Exam     Positive tenderness lumbar and sacroiliac, positive paraspinal tenderness lumbar spine    Assessment:        1. Lumbar degenerative disc disease    2. Spinal stenosis of lumbar region without neurogenic claudication           Plan:  1. Discussed plan of care with patient.  She does wish to proceed with second epidural injection.  I will place order and have her schedule when she is ready.  We will gladly see her back in clinic as needed.  All questions answered's visit.  Orders:  Orders Placed This Encounter   Procedures   • Ambulatory Referral to Hospital [facility] " Department     No orders of the defined types were placed in this encounter.            Dragon dictation utilized

## 2023-05-16 ENCOUNTER — OFFICE VISIT (OUTPATIENT)
Dept: ORTHOPEDIC SURGERY | Facility: CLINIC | Age: 83
End: 2023-05-16
Payer: MEDICARE

## 2023-05-16 ENCOUNTER — OFFICE VISIT (OUTPATIENT)
Dept: FAMILY MEDICINE CLINIC | Facility: CLINIC | Age: 83
End: 2023-05-16
Payer: MEDICARE

## 2023-05-16 VITALS
WEIGHT: 222.2 LBS | DIASTOLIC BLOOD PRESSURE: 70 MMHG | BODY MASS INDEX: 35.71 KG/M2 | HEART RATE: 84 BPM | HEIGHT: 66 IN | SYSTOLIC BLOOD PRESSURE: 132 MMHG | OXYGEN SATURATION: 98 % | TEMPERATURE: 97.2 F

## 2023-05-16 VITALS — HEIGHT: 66 IN | BODY MASS INDEX: 35.68 KG/M2 | WEIGHT: 222 LBS

## 2023-05-16 DIAGNOSIS — G62.9 PERIPHERAL POLYNEUROPATHY: ICD-10-CM

## 2023-05-16 DIAGNOSIS — Z79.899 HIGH RISK MEDICATION USE: ICD-10-CM

## 2023-05-16 DIAGNOSIS — N18.31 STAGE 3A CHRONIC KIDNEY DISEASE: ICD-10-CM

## 2023-05-16 DIAGNOSIS — R10.11 RIGHT UPPER QUADRANT ABDOMINAL PAIN: ICD-10-CM

## 2023-05-16 DIAGNOSIS — E03.9 HYPOTHYROIDISM, UNSPECIFIED TYPE: ICD-10-CM

## 2023-05-16 DIAGNOSIS — M48.061 SPINAL STENOSIS OF LUMBAR REGION WITHOUT NEUROGENIC CLAUDICATION: ICD-10-CM

## 2023-05-16 DIAGNOSIS — M51.36 LUMBAR DEGENERATIVE DISC DISEASE: Primary | ICD-10-CM

## 2023-05-16 DIAGNOSIS — I10 ESSENTIAL HYPERTENSION: Primary | ICD-10-CM

## 2023-05-16 PROCEDURE — 99213 OFFICE O/P EST LOW 20 MIN: CPT | Performed by: NURSE PRACTITIONER

## 2023-05-16 NOTE — PROGRESS NOTES
"Answers for HPI/ROS submitted by the patient on 5/9/2023  Please describe your symptoms.:  of medicine, 3 mo visit, Pain left side abd  Have you had these symptoms before?: No  How long have you been having these symptoms?: 5-7 days  Please list any medications you are currently taking for this condition.: None  Please describe any probable cause for these symptoms. : None  What is the primary reason for your visit?: Other    Chief Complaint  Med Refill, Depression, Hypertension, Hyperlipidemia, Hypothyroidism, and Flank Pain    Subjective        Latanya Olsen presents to [facility]  History of Present Illness  Patient presents for 3-month follow-up on her hypertension and peripheral neuropathy.  She has been doing well in general except has some stabbing pain in her right side for the past 6 weeks.  No urine sxsx.  Does deal with constipation.  Feels it may be from that.   No F/C.  No N/V.  Pain is intermittent.  None for 1 week now.  No aggravating or alleviating factors.   Had an epidural 6 weeks ago.  Doing well with her gabapentin.      Objective   Vital Signs:  /70   Pulse 84   Temp 97.2 °F (36.2 °C)   Ht 167.6 cm (66\")   Wt 101 kg (222 lb 3.2 oz)   SpO2 98%   BMI 35.86 kg/m²   Estimated body mass index is 35.86 kg/m² as calculated from the following:    Height as of this encounter: 167.6 cm (66\").    Weight as of this encounter: 101 kg (222 lb 3.2 oz).             Physical Exam  Constitutional:       General: She is not in acute distress.     Appearance: Normal appearance. She is well-developed.   HENT:      Head: Normocephalic and atraumatic.      Right Ear: External ear normal.      Left Ear: External ear normal.      Mouth/Throat:      Mouth: Mucous membranes are moist.      Pharynx: Oropharynx is clear.   Eyes:      Conjunctiva/sclera: Conjunctivae normal.      Pupils: Pupils are equal, round, and reactive to light.   Neck:      Thyroid: No thyromegaly. "   Cardiovascular:      Rate and Rhythm: Normal rate and regular rhythm.      Heart sounds: No murmur heard.  Pulmonary:      Effort: Pulmonary effort is normal.      Breath sounds: Normal breath sounds. No wheezing.   Abdominal:      General: Bowel sounds are normal.      Palpations: Abdomen is soft.      Tenderness: There is no abdominal tenderness.   Musculoskeletal:         General: Normal range of motion.      Cervical back: Neck supple.      Right lower leg: Edema present.      Left lower leg: Edema present.      Comments: Back brace on   Lymphadenopathy:      Cervical: No cervical adenopathy.   Skin:     General: Skin is warm and dry.   Neurological:      Mental Status: She is alert and oriented to person, place, and time.   Psychiatric:         Mood and Affect: Mood normal.         Behavior: Behavior normal.        Result Review :                   Assessment and Plan   Diagnoses and all orders for this visit:    1. Essential hypertension (Primary)  -     Basic Metabolic Panel    2. Hypothyroidism, unspecified type  -     TSH    3. Stage 3a chronic kidney disease  -     Basic Metabolic Panel    4. Peripheral polyneuropathy    5. High risk medication use  -     Drug Profile [id number] - Urine, Clean Catch    6. Right upper quadrant abdominal pain    Hypertension-controlled, can current medication  Hypothyroidism-due for TSH  Stage III CKD- check BMP  Peripheral neuropathy-continue gabapentin  High risk medication-UDS obtained, Christian reviewed and contract up-to-date  Right-sided Abdominal pain-I believe it is due to her constipation.  Keep working on bowels       Follow Up   Return in about 3 months (around 8/16/2023) for Recheck meds.  Patient was given instructions and counseling regarding her condition or for health maintenance advice. Please see specific information pulled into the AVS if appropriate.

## 2023-05-17 LAB
BUN SERPL-MCNC: 27 MG/DL (ref 8–27)
BUN/CREAT SERPL: 20 (ref 12–28)
CALCIUM SERPL-MCNC: 9.3 MG/DL (ref 8.7–10.3)
CHLORIDE SERPL-SCNC: 103 MMOL/L (ref 96–106)
CO2 SERPL-SCNC: 22 MMOL/L (ref 20–29)
CREAT SERPL-MCNC: 1.37 MG/DL (ref 0.57–1)
EGFRCR SERPLBLD CKD-EPI 2021: 38 ML/MIN/1.73
GLUCOSE SERPL-MCNC: 87 MG/DL (ref 70–99)
POTASSIUM SERPL-SCNC: 4.7 MMOL/L (ref 3.5–5.2)
SODIUM SERPL-SCNC: 141 MMOL/L (ref 134–144)
TSH SERPL DL<=0.005 MIU/L-ACNC: 1.52 UIU/ML (ref 0.45–4.5)

## 2023-05-18 LAB
AMPHETAMINES UR QL SCN: NEGATIVE NG/ML
BARBITURATES UR QL: NEGATIVE NG/ML
BENZODIAZ UR QL SCN: NEGATIVE NG/ML
BZE UR QL: NEGATIVE NG/ML
CANNABINOIDS UR QL SCN: NEGATIVE NG/ML
CREAT UR-MCNC: 32.8 MG/DL (ref 20–300)
ETHANOL UR-MCNC: NEGATIVE %
MEPERIDINE UR QL: NEGATIVE NG/ML
METHADONE UR QL: NEGATIVE NG/ML
OPIATES UR QL SCN: NEGATIVE NG/ML
OXYCODONE+OXYMORPHONE UR QL SCN: NEGATIVE NG/ML
PCP UR QL: NEGATIVE NG/ML
PROPOXYPH UR QL: NEGATIVE NG/ML
TRAMADOL UR QL SCN: NEGATIVE NG/ML

## 2023-05-26 ENCOUNTER — TELEPHONE (OUTPATIENT)
Dept: FAMILY MEDICINE CLINIC | Facility: CLINIC | Age: 83
End: 2023-05-26

## 2023-05-26 NOTE — TELEPHONE ENCOUNTER
Caller: PrettyLatanya    Relationship: Self    Best call back number: [phone]    What medications are you currently taking:   Current Outpatient Medications on File Prior to Visit   Medication Sig Dispense Refill   • acetaminophen (TYLENOL) 325 MG tablet Take 2 tablets by mouth Every 4 (Four) Hours As Needed for Mild Pain .     • alendronate (Fosamax) 70 MG tablet Take 1 tablet by mouth Every 7 (Seven) Days. 13 tablet 3   • apixaban (Eliquis) 5 MG tablet tablet Take 1 tablet by mouth Every 12 (Twelve) Hours. 60 tablet 2   • chlorhexidine (PERIDEX) 0.12 % solution APPLY 15 ML AS DIRECTED TWICE DAILY SWISH FOR 30 seconds AND expectorate, EVERY MORNING AND IN THE EVENING TO BEGIN in 24 hours AFTER surgery     • cloNIDine (CATAPRES) 0.2 MG tablet TAKE 1 TABLET BY MOUTH TWICE DAILY 180 tablet 1   • coenzyme Q10 100 MG capsule Take 1 capsule by mouth Daily.     • cyclobenzaprine (FLEXERIL) 10 MG tablet Take 1 tablet by mouth 2 (Two) Times a Day As Needed for Muscle Spasms. 14 tablet 0   • dilTIAZem CD (CARDIZEM CD) 120 MG 24 hr capsule TAKE 1 CAPSULE BY MOUTH DAILY 90 capsule 1   • esomeprazole (NexIUM) 40 MG capsule Take 1 capsule by mouth Every Morning Before Breakfast.     • ferrous sulfate 325 (65 FE) MG tablet Take 1 tablet by mouth Daily With Breakfast.     • folic acid (FOLVITE) 1 MG tablet Take 1 tablet by mouth Daily. 30 tablet 5   • furosemide (LASIX) 40 MG tablet Take 1 tablet by mouth Daily. 90 tablet 0   • gabapentin (NEURONTIN) 100 MG capsule TAKE ONE CAPSULE BY MOUTH THREE TIMES DAILY 90 capsule 0   • ipratropium (ATROVENT) 0.06 % nasal spray 2 sprays into the nostril(s) as directed by provider 4 (Four) Times a Day. 15 mL 0   • ipratropium-albuterol (DUO-NEB) 0.5-2.5 mg/mL nebulizer USE 1 VIAL IN NEBULIZER 2 TIMES DAILY. Generic: DUONEB 60 mL 10   • lactulose (CHRONULAC) 10 GM/15ML solution TAKE 15 ML BY MOUTH THREE TIMES DAILY 1350 mL 2   • levothyroxine (SYNTHROID, LEVOTHROID) 100 MCG tablet  TAKE 1 TABLET BY MOUTH DAILY 90 tablet 0   • lidocaine (XYLOCAINE) 5 % ointment Apply 1 application topically to the appropriate area as directed 3 (Three) Times a Day. 50 g 3   • Magnesium 500 MG tablet Take  by mouth.     • potassium chloride 10 MEQ CR tablet Take 1 tablet by mouth 2 (Two) Times a Day.     • pramipexole (MIRAPEX) 1.5 MG tablet Take 1 tablet by mouth every night at bedtime. 90 tablet 1   • Stimulant Laxative 8.6-50 MG per tablet TAKE 2 TABLETS BY MOUTH TWICE DAILY 120 tablet 2   • SYMBICORT 160-4.5 MCG/ACT inhaler      • Tiadylt  MG 24 hr capsule TAKE ONE CAPSULE BY MOUTH DAILY FOR BLOOD PRESSURE     • triamcinolone (KENALOG) 0.1 % cream Apply 1 application topically to the appropriate area as directed 2 (Two) Times a Day. 15 g 2   • valsartan (DIOVAN) 320 MG tablet TAKE 1 TABLET BY MOUTH DAILY 90 tablet 1   • vitamin B-12 (CYANOCOBALAMIN) 1000 MCG tablet Take 1 tablet by mouth Daily.     • Zinc 50 MG tablet Take 1 tablet by mouth.       No current facility-administered medications on file prior to visit.          Which medication are you concerned about: apixaban (Eliquis) 5 MG tablet tablet    Who prescribed you this medication: DR KEHRER    What are your concerns: PATIENT STATED SHE HAS BEEN TAKING 5 MG TWICE DAILY, HOWEVER DR KEHRER ORIGINALLY WROTE THIS PRESCRIPTION FOR 2.5 MG TWICE DAILY.    PATIENT IS REQUESTING TO KNOW IF HER DOSE MAY BE CHANGED BACK TO THIS LOWER DOSE.    PLEASE CALL TO DISCUSS AND ADVISE.

## 2023-08-06 ENCOUNTER — HOSPITAL ENCOUNTER (INPATIENT)
Facility: HOSPITAL | Age: 83
LOS: 4 days | Discharge: NURSING, REHAB OR LONG-TERM CARE | DRG: 287 | End: 2023-08-10
Attending: EMERGENCY MEDICINE | Admitting: INTERNAL MEDICINE
Payer: MEDICARE

## 2023-08-06 ENCOUNTER — APPOINTMENT (OUTPATIENT)
Dept: CT IMAGING | Facility: HOSPITAL | Age: 83
DRG: 287 | End: 2023-08-06
Payer: MEDICARE

## 2023-08-06 DIAGNOSIS — I21.4 NSTEMI (NON-ST ELEVATED MYOCARDIAL INFARCTION): ICD-10-CM

## 2023-08-06 DIAGNOSIS — I21.4 NON-STEMI (NON-ST ELEVATED MYOCARDIAL INFARCTION): Primary | ICD-10-CM

## 2023-08-06 DIAGNOSIS — I10 ESSENTIAL HYPERTENSION: ICD-10-CM

## 2023-08-06 LAB
ALBUMIN SERPL-MCNC: 3.6 G/DL (ref 3.5–5.2)
ALBUMIN/GLOB SERPL: 1.6 G/DL
ALP SERPL-CCNC: 87 U/L (ref 39–117)
ALT SERPL W P-5'-P-CCNC: 13 U/L (ref 1–33)
ANION GAP SERPL CALCULATED.3IONS-SCNC: 11.6 MMOL/L (ref 5–15)
ANION GAP SERPL CALCULATED.3IONS-SCNC: 13.6 MMOL/L (ref 5–15)
APTT PPP: 32.5 SECONDS (ref 22.7–35.4)
AST SERPL-CCNC: 13 U/L (ref 1–32)
BASOPHILS # BLD AUTO: 0.02 10*3/MM3 (ref 0–0.2)
BASOPHILS NFR BLD AUTO: 0.2 % (ref 0–1.5)
BILIRUB SERPL-MCNC: 0.2 MG/DL (ref 0–1.2)
BUN SERPL-MCNC: 24 MG/DL (ref 8–23)
BUN SERPL-MCNC: 26 MG/DL (ref 8–23)
BUN/CREAT SERPL: 16.2 (ref 7–25)
BUN/CREAT SERPL: 18.3 (ref 7–25)
CALCIUM SPEC-SCNC: 9 MG/DL (ref 8.6–10.5)
CALCIUM SPEC-SCNC: 9.4 MG/DL (ref 8.6–10.5)
CHLORIDE SERPL-SCNC: 103 MMOL/L (ref 98–107)
CHLORIDE SERPL-SCNC: 105 MMOL/L (ref 98–107)
CO2 SERPL-SCNC: 25.4 MMOL/L (ref 22–29)
CO2 SERPL-SCNC: 26.4 MMOL/L (ref 22–29)
CREAT SERPL-MCNC: 1.42 MG/DL (ref 0.57–1)
CREAT SERPL-MCNC: 1.48 MG/DL (ref 0.57–1)
DEPRECATED RDW RBC AUTO: 42.5 FL (ref 37–54)
EGFRCR SERPLBLD CKD-EPI 2021: 35 ML/MIN/1.73
EGFRCR SERPLBLD CKD-EPI 2021: 36.8 ML/MIN/1.73
EOSINOPHIL # BLD AUTO: 0.08 10*3/MM3 (ref 0–0.4)
EOSINOPHIL NFR BLD AUTO: 0.9 % (ref 0.3–6.2)
ERYTHROCYTE [DISTWIDTH] IN BLOOD BY AUTOMATED COUNT: 12.8 % (ref 12.3–15.4)
GEN 5 2HR TROPONIN T REFLEX: 1096 NG/L
GLOBULIN UR ELPH-MCNC: 2.3 GM/DL
GLUCOSE SERPL-MCNC: 120 MG/DL (ref 65–99)
GLUCOSE SERPL-MCNC: 126 MG/DL (ref 65–99)
HCT VFR BLD AUTO: 36 % (ref 34–46.6)
HGB BLD-MCNC: 11.6 G/DL (ref 12–15.9)
IMM GRANULOCYTES # BLD AUTO: 0.04 10*3/MM3 (ref 0–0.05)
IMM GRANULOCYTES NFR BLD AUTO: 0.5 % (ref 0–0.5)
INR PPP: 1.26 (ref 0.9–1.1)
LIPASE SERPL-CCNC: 47 U/L (ref 13–60)
LYMPHOCYTES # BLD AUTO: 2.37 10*3/MM3 (ref 0.7–3.1)
LYMPHOCYTES NFR BLD AUTO: 27.4 % (ref 19.6–45.3)
MCH RBC QN AUTO: 29.4 PG (ref 26.6–33)
MCHC RBC AUTO-ENTMCNC: 32.2 G/DL (ref 31.5–35.7)
MCV RBC AUTO: 91.4 FL (ref 79–97)
MONOCYTES # BLD AUTO: 0.74 10*3/MM3 (ref 0.1–0.9)
MONOCYTES NFR BLD AUTO: 8.6 % (ref 5–12)
NEUTROPHILS NFR BLD AUTO: 5.4 10*3/MM3 (ref 1.7–7)
NEUTROPHILS NFR BLD AUTO: 62.4 % (ref 42.7–76)
NRBC BLD AUTO-RTO: 0 /100 WBC (ref 0–0.2)
NT-PROBNP SERPL-MCNC: 454 PG/ML (ref 0–1800)
PLATELET # BLD AUTO: 191 10*3/MM3 (ref 140–450)
PMV BLD AUTO: 10.4 FL (ref 6–12)
POTASSIUM SERPL-SCNC: 3.8 MMOL/L (ref 3.5–5.2)
POTASSIUM SERPL-SCNC: 4 MMOL/L (ref 3.5–5.2)
PROT SERPL-MCNC: 5.9 G/DL (ref 6–8.5)
PROTHROMBIN TIME: 16 SECONDS (ref 11.7–14.2)
QT INTERVAL: 396 MS
QT INTERVAL: 425 MS
RBC # BLD AUTO: 3.94 10*6/MM3 (ref 3.77–5.28)
SODIUM SERPL-SCNC: 142 MMOL/L (ref 136–145)
SODIUM SERPL-SCNC: 143 MMOL/L (ref 136–145)
TROPONIN T DELTA: 304 NG/L
TROPONIN T SERPL HS-MCNC: 1001 NG/L
TROPONIN T SERPL HS-MCNC: 792 NG/L
WBC NRBC COR # BLD: 8.65 10*3/MM3 (ref 3.4–10.8)

## 2023-08-06 PROCEDURE — 36415 COLL VENOUS BLD VENIPUNCTURE: CPT

## 2023-08-06 PROCEDURE — 83690 ASSAY OF LIPASE: CPT | Performed by: EMERGENCY MEDICINE

## 2023-08-06 PROCEDURE — 25010000002 ONDANSETRON PER 1 MG: Performed by: EMERGENCY MEDICINE

## 2023-08-06 PROCEDURE — 25510000001 IOPAMIDOL PER 1 ML: Performed by: EMERGENCY MEDICINE

## 2023-08-06 PROCEDURE — 93010 ELECTROCARDIOGRAM REPORT: CPT | Performed by: INTERNAL MEDICINE

## 2023-08-06 PROCEDURE — 25010000002 MORPHINE PER 10 MG: Performed by: EMERGENCY MEDICINE

## 2023-08-06 PROCEDURE — 99285 EMERGENCY DEPT VISIT HI MDM: CPT

## 2023-08-06 PROCEDURE — 85730 THROMBOPLASTIN TIME PARTIAL: CPT | Performed by: EMERGENCY MEDICINE

## 2023-08-06 PROCEDURE — 84484 ASSAY OF TROPONIN QUANT: CPT | Performed by: NURSE PRACTITIONER

## 2023-08-06 PROCEDURE — 83880 ASSAY OF NATRIURETIC PEPTIDE: CPT | Performed by: EMERGENCY MEDICINE

## 2023-08-06 PROCEDURE — 71275 CT ANGIOGRAPHY CHEST: CPT

## 2023-08-06 PROCEDURE — 85025 COMPLETE CBC W/AUTO DIFF WBC: CPT | Performed by: EMERGENCY MEDICINE

## 2023-08-06 PROCEDURE — 84484 ASSAY OF TROPONIN QUANT: CPT | Performed by: EMERGENCY MEDICINE

## 2023-08-06 PROCEDURE — 93005 ELECTROCARDIOGRAM TRACING: CPT | Performed by: EMERGENCY MEDICINE

## 2023-08-06 PROCEDURE — 85610 PROTHROMBIN TIME: CPT | Performed by: EMERGENCY MEDICINE

## 2023-08-06 PROCEDURE — 80053 COMPREHEN METABOLIC PANEL: CPT | Performed by: EMERGENCY MEDICINE

## 2023-08-06 RX ORDER — ONDANSETRON 2 MG/ML
4 INJECTION INTRAMUSCULAR; INTRAVENOUS ONCE
Status: COMPLETED | OUTPATIENT
Start: 2023-08-06 | End: 2023-08-06

## 2023-08-06 RX ORDER — MORPHINE SULFATE 2 MG/ML
4 INJECTION, SOLUTION INTRAMUSCULAR; INTRAVENOUS ONCE
Status: COMPLETED | OUTPATIENT
Start: 2023-08-06 | End: 2023-08-06

## 2023-08-06 RX ORDER — BISACODYL 5 MG/1
5 TABLET, DELAYED RELEASE ORAL DAILY PRN
Status: DISCONTINUED | OUTPATIENT
Start: 2023-08-06 | End: 2023-08-10 | Stop reason: HOSPADM

## 2023-08-06 RX ORDER — BISACODYL 10 MG
10 SUPPOSITORY, RECTAL RECTAL DAILY PRN
Status: DISCONTINUED | OUTPATIENT
Start: 2023-08-06 | End: 2023-08-10 | Stop reason: HOSPADM

## 2023-08-06 RX ORDER — FERROUS SULFATE 325(65) MG
325 TABLET ORAL
Status: DISCONTINUED | OUTPATIENT
Start: 2023-08-07 | End: 2023-08-10 | Stop reason: HOSPADM

## 2023-08-06 RX ORDER — FOLIC ACID 1 MG/1
1 TABLET ORAL DAILY
Status: DISCONTINUED | OUTPATIENT
Start: 2023-08-07 | End: 2023-08-10 | Stop reason: HOSPADM

## 2023-08-06 RX ORDER — LACTULOSE 10 G/15ML
10 SOLUTION ORAL 2 TIMES DAILY
Status: DISCONTINUED | OUTPATIENT
Start: 2023-08-06 | End: 2023-08-10 | Stop reason: HOSPADM

## 2023-08-06 RX ORDER — POLYETHYLENE GLYCOL 3350 17 G/17G
17 POWDER, FOR SOLUTION ORAL DAILY PRN
Status: DISCONTINUED | OUTPATIENT
Start: 2023-08-06 | End: 2023-08-10 | Stop reason: HOSPADM

## 2023-08-06 RX ORDER — SODIUM CHLORIDE 0.9 % (FLUSH) 0.9 %
10 SYRINGE (ML) INJECTION EVERY 12 HOURS SCHEDULED
Status: DISCONTINUED | OUTPATIENT
Start: 2023-08-06 | End: 2023-08-10 | Stop reason: HOSPADM

## 2023-08-06 RX ORDER — CLONIDINE HYDROCHLORIDE 0.1 MG/1
0.3 TABLET ORAL 2 TIMES DAILY
Status: DISCONTINUED | OUTPATIENT
Start: 2023-08-06 | End: 2023-08-07

## 2023-08-06 RX ORDER — NITROGLYCERIN 0.4 MG/1
0.4 TABLET SUBLINGUAL
Status: DISCONTINUED | OUTPATIENT
Start: 2023-08-06 | End: 2023-08-10 | Stop reason: HOSPADM

## 2023-08-06 RX ORDER — SODIUM CHLORIDE 0.9 % (FLUSH) 0.9 %
10 SYRINGE (ML) INJECTION AS NEEDED
Status: DISCONTINUED | OUTPATIENT
Start: 2023-08-06 | End: 2023-08-10 | Stop reason: HOSPADM

## 2023-08-06 RX ORDER — SODIUM CHLORIDE 9 MG/ML
40 INJECTION, SOLUTION INTRAVENOUS AS NEEDED
Status: DISCONTINUED | OUTPATIENT
Start: 2023-08-06 | End: 2023-08-10 | Stop reason: HOSPADM

## 2023-08-06 RX ORDER — VALSARTAN 320 MG/1
320 TABLET ORAL DAILY
Status: DISCONTINUED | OUTPATIENT
Start: 2023-08-07 | End: 2023-08-07

## 2023-08-06 RX ORDER — CYCLOBENZAPRINE HCL 10 MG
10 TABLET ORAL 2 TIMES DAILY PRN
Status: DISCONTINUED | OUTPATIENT
Start: 2023-08-06 | End: 2023-08-10 | Stop reason: HOSPADM

## 2023-08-06 RX ORDER — FUROSEMIDE 40 MG/1
40 TABLET ORAL 2 TIMES DAILY
Status: DISCONTINUED | OUTPATIENT
Start: 2023-08-06 | End: 2023-08-07

## 2023-08-06 RX ORDER — LEVOTHYROXINE SODIUM 0.1 MG/1
100 TABLET ORAL
Status: DISCONTINUED | OUTPATIENT
Start: 2023-08-07 | End: 2023-08-10 | Stop reason: HOSPADM

## 2023-08-06 RX ORDER — BUDESONIDE AND FORMOTEROL FUMARATE DIHYDRATE 160; 4.5 UG/1; UG/1
2 AEROSOL RESPIRATORY (INHALATION)
Status: DISCONTINUED | OUTPATIENT
Start: 2023-08-06 | End: 2023-08-10 | Stop reason: HOSPADM

## 2023-08-06 RX ORDER — PANTOPRAZOLE SODIUM 40 MG/1
40 TABLET, DELAYED RELEASE ORAL
Status: DISCONTINUED | OUTPATIENT
Start: 2023-08-07 | End: 2023-08-10 | Stop reason: HOSPADM

## 2023-08-06 RX ORDER — AMOXICILLIN 250 MG
2 CAPSULE ORAL 2 TIMES DAILY
Status: DISCONTINUED | OUTPATIENT
Start: 2023-08-06 | End: 2023-08-10 | Stop reason: HOSPADM

## 2023-08-06 RX ORDER — POTASSIUM CHLORIDE 750 MG/1
10 TABLET, FILM COATED, EXTENDED RELEASE ORAL 2 TIMES DAILY
Status: DISCONTINUED | OUTPATIENT
Start: 2023-08-06 | End: 2023-08-07

## 2023-08-06 RX ORDER — ACETAMINOPHEN 325 MG/1
650 TABLET ORAL EVERY 4 HOURS PRN
Status: DISCONTINUED | OUTPATIENT
Start: 2023-08-06 | End: 2023-08-10 | Stop reason: HOSPADM

## 2023-08-06 RX ADMIN — MORPHINE SULFATE 4 MG: 2 INJECTION, SOLUTION INTRAMUSCULAR; INTRAVENOUS at 15:55

## 2023-08-06 RX ADMIN — FUROSEMIDE 40 MG: 40 TABLET ORAL at 23:40

## 2023-08-06 RX ADMIN — Medication 10 ML: at 23:41

## 2023-08-06 RX ADMIN — POTASSIUM CHLORIDE 10 MEQ: 750 TABLET, EXTENDED RELEASE ORAL at 23:40

## 2023-08-06 RX ADMIN — PRAMIPEXOLE DIHYDROCHLORIDE 0.75 MG: 0.5 TABLET ORAL at 23:40

## 2023-08-06 RX ADMIN — NITROGLYCERIN 1 INCH: 20 OINTMENT TOPICAL at 23:40

## 2023-08-06 RX ADMIN — ONDANSETRON 4 MG: 2 INJECTION INTRAMUSCULAR; INTRAVENOUS at 15:51

## 2023-08-06 RX ADMIN — LACTULOSE 10 G: 10 SOLUTION ORAL at 23:39

## 2023-08-06 RX ADMIN — SENNOSIDES AND DOCUSATE SODIUM 2 TABLET: 50; 8.6 TABLET ORAL at 23:40

## 2023-08-06 RX ADMIN — IOPAMIDOL 95 ML: 755 INJECTION, SOLUTION INTRAVENOUS at 16:47

## 2023-08-06 RX ADMIN — NITROGLYCERIN 1 INCH: 20 OINTMENT TOPICAL at 15:53

## 2023-08-06 NOTE — ED PROVIDER NOTES
EMERGENCY DEPARTMENT ENCOUNTER    Room Number:  3118/1  Date seen:  8/6/2023  PCP: Kehrer, Meredith Lea, MD  Historian: Patient and paramedics      HPI:  Chief Complaint: Chest discomfort    A complete HPI/ROS/PMH/PSH/SH/FH are unobtainable due to: N/A    Context: Latanya Olsen is a 82 y.o. female who presents to the ED c/o chest discomfort which started around 130 this afternoon-she states she felt she might of been a little stressed after making a speech at [employer].  Pain radiates into her back.  It is worse with deep inspiration.  No fevers or chills.  No cough or congestion.  No nausea, vomiting or diarrhea.  She feels a little bit more short of breath than baseline.    She received 325 mg of aspirin in route and 3 sublingual nitroglycerin with some relief.    Prior history of DVT-she is faithful with Eliquis.  No prior history of coronary artery disease, atrial fibrillation or valvular heart disease which she is aware.    Additional sources:  - Discussed/ obtained information from independent historians: Yes-paramedics    - External (non-ED) record review: Has a history of hypertension, peripheral neuropathy, lower extremity edema,, sleep apnea, diverticulosis, spinal stenosis with chronic lower back pain, hypothyroidism, gastroparesis, COPD, lung cancer.  Prior history of DVT.  No recent ER visits or hospitalizations at [facility].    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    Diverticulosis of intestine 02/19/2016    Obstructive sleep apnea syndrome 02/19/2016    Weight loss 02/19/2016    Vitamin D deficiency 02/19/2016    Overweight (BMI 25.0-29.9) 02/19/2016    Spinal stenosis of lumbar region without neurogenic claudication 02/19/2016    Osteoarthritis of knee 02/19/2016    Obesity (BMI 30-39.9) 02/19/2016    Chronic lower back pain  02/19/2016    Knee pain 02/19/2016    Insomnia 02/19/2016    Hypothyroidism 02/19/2016    Hypokalemia 02/19/2016    Hypertension 02/19/2016    Hyperlipidemia 02/19/2016    Generalized osteoarthritis 02/19/2016    Gastroparesis 02/19/2016    Foot pain 02/19/2016    Chronic obstructive pulmonary disease 02/19/2016    Chronic constipation 02/19/2016    Anemia 02/19/2016    Weight gain 02/19/2016    Pain of left breast 02/22/2016    Elevated serum alkaline phosphatase level 02/22/2016    Neck pain 11/28/2017    Volume overload 03/19/2019    Status post total hip replacement, 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    Generalized weakness 03/06/2021    Constipation 03/10/2021    Idiopathic peripheral neuropathy 06/24/2022    Lymphedema 08/01/2022    Lumbar degenerative disc disease 01/31/2023    GERD (gastroesophageal reflux disease) 02/16/2023    Lung cancer 02/16/2023     Resolved Ambulatory Problems     Diagnosis Date Noted    Finger injury 02/19/2016    Upper respiratory tract infection 02/19/2016    Contusion of upper arm 02/19/2016    Tingling of skin 02/19/2016    Rash 02/19/2016    Candidiasis of mouth 02/19/2016    Neutropenia 02/19/2016    Spasm 02/19/2016    Low back pain 02/19/2016    Heartburn 02/19/2016    Diarrhea 02/19/2016    Bloating symptom 02/19/2016    Gastroesophageal reflux disease with esophagitis 02/19/2016    Drug-induced photosensitivity 02/19/2016    Cramp of limb 02/19/2016    Contact dermatitis 02/19/2016    Chronic kidney disease, stage II (mild) 02/19/2016    Ankle joint pain 02/19/2016    Atopic rhinitis 02/19/2016    Allergic reaction to drug 02/19/2016    Acute sinusitis 02/19/2016    Generalized abdominal pain 02/19/2016    Closed fracture of neck of right femur 05/31/2019    ARF (acute renal failure) 03/07/2021    Acute deep vein thrombosis (DVT) of iliac vein of left lower extremity 03/24/2021    Acute deep vein thrombosis (DVT) of tibial vein of left lower extremity 04/20/2021    Atrial fibrillation  04/20/2021    Hyperhomocysteinemia 05/14/2021     Past Medical History:   Diagnosis Date    Arthritis     CKD (chronic kidney disease)     COPD (chronic obstructive pulmonary disease)     DDD (degenerative disc disease), lumbar     Depression     Diverticulosis     Hx of degenerative disc disease     Knee swelling     Obesity 218    Osteopenia Yes    Renal disorder     Restless leg syndrome     Scoliosis Yes    Sleep apnea with use of continuous positive airway pressure (CPAP)          PAST SURGICAL HISTORY  Past Surgical History:   Procedure Laterality Date    ADENOIDECTOMY  2000    BUNIONECTOMY Bilateral     CATARACT EXTRACTION      CHOLECYSTECTOMY      COLONOSCOPY      ENDOSCOPY N/A 06/24/2016    Procedure: ESOPHAGOGASTRODUODENOSCOPY  with biopsies;  Surgeon: Von Hernández MD;  Location: [facility] OR;  Service:     LUNG LOBECTOMY Left     lower lobe    LYTIC THROMBIN THERAPY Left 03/26/2021    Procedure: left leg clot treiver possible venous stent *supine*;  Surgeon: Rogerio Vega MD;  Location: [facility] OR 18/19;  Service: Vascular;  Laterality: Left;    REPLACEMENT TOTAL KNEE Left     THYROID BIOPSY      TOTAL HIP ARTHROPLASTY Right 06/01/2019    Procedure: BIPOLAR HIP CEMENTED POSTERIOR;  Surgeon: Ashley Crenshaw MD;  Location: [facility] OR;  Service: Orthopedics    TUBAL ABDOMINAL LIGATION  50 yrs ago         FAMILY HISTORY  Family History   Problem Relation Age of Onset    Heart attack Mother     Coronary artery disease Mother     Hypertension Mother     Kidney disease Mother     Heart attack Sister     Colon cancer Neg Hx     Colon polyps Neg Hx     Esophageal cancer Neg Hx     Breast cancer Neg Hx          SOCIAL HISTORY  Social History     Socioeconomic History    Marital status:    Tobacco Use    Smoking status: Never    Smokeless tobacco: Never   Vaping Use    Vaping Use: Never used   Substance and Sexual Activity    Alcohol use: No    Drug use: Never    Sexual  activity: Not Currently         ALLERGIES  Doxycycline        REVIEW OF SYSTEMS  Review of Systems   Constitutional:  Negative for chills and fever.   Respiratory:  Positive for shortness of breath.    Cardiovascular:  Positive for chest pain and leg swelling (Chronic, unchanged). Negative for palpitations.   Gastrointestinal:  Negative for abdominal pain, blood in stool, diarrhea, nausea and vomiting.          PHYSICAL EXAM  ED Triage Vitals [08/06/23 1536]   Temp Heart Rate Resp BP SpO2   -- 70 -- 144/78 94 %      Temp src Heart Rate Source Patient Position BP Location FiO2 (%)   -- -- -- -- --       Physical Exam      Physical Exam   Constitutional: Pt. is oriented to person, place, and time.  She is well-developed, well-nourished, and in no distress.    HENT: Normocephalic and atraumatic. Pupils are equal, round, and reactive to light.   Neck: Normal range of motion. Neck supple. No JVD present. No tracheal deviation present.   Cardiovascular: Normal rate, regular rhythm and normal heart sounds.   Pulmonary/Chest: Effort normal and breath sounds normal. No stridor. No respiratory distress. No wheezes, no rales.  Anterior chest wall is tender to palpation.  Abdominal: Soft, obese.  No distension. There is no tenderness. There is no rebound and no guarding.   Musculoskeletal: Symmetric lower extremity edema.  She is wearing RODOLFO hose.  No tenderness.    Neurological: She is alert and oriented to person, place, and time.  She has no focal neurologic deficits.  Skin: Skin is warm and dry. Pt. is not diaphoretic.  Psychiatric: Mood, affect normal.  She is pleasant and cooperative.  Nursing note and vitals reviewed.            LAB RESULTS  Recent Results (from the past 24 hour(s))   ECG 12 Lead Chest Pain    Collection Time: 08/06/23  3:40 PM   Result Value Ref Range    QT Interval 396 ms   Comprehensive Metabolic Panel    Collection Time: 08/06/23  3:51 PM    Specimen: Blood   Result Value Ref Range    Glucose 120 (H)  65 - 99 mg/dL    BUN 26 (H) 8 - 23 mg/dL    Creatinine 1.42 (H) 0.57 - 1.00 mg/dL    Sodium 143 136 - 145 mmol/L    Potassium 4.0 3.5 - 5.2 mmol/L    Chloride 105 98 - 107 mmol/L    CO2 26.4 22.0 - 29.0 mmol/L    Calcium 9.4 8.6 - 10.5 mg/dL    Total Protein 5.9 (L) 6.0 - 8.5 g/dL    Albumin 3.6 3.5 - 5.2 g/dL    ALT (SGPT) 13 1 - 33 U/L    AST (SGOT) 13 1 - 32 U/L    Alkaline Phosphatase 87 39 - 117 U/L    Total Bilirubin 0.2 0.0 - 1.2 mg/dL    Globulin 2.3 gm/dL    A/G Ratio 1.6 g/dL    BUN/Creatinine Ratio 18.3 7.0 - 25.0    Anion Gap 11.6 5.0 - 15.0 mmol/L    eGFR 36.8 (L) >60.0 mL/min/1.73   Protime-INR    Collection Time: 08/06/23  3:51 PM    Specimen: Blood   Result Value Ref Range    Protime 16.0 (H) 11.7 - 14.2 Seconds    INR 1.26 (H) 0.90 - 1.10   aPTT    Collection Time: 08/06/23  3:51 PM    Specimen: Blood   Result Value Ref Range    PTT 32.5 22.7 - 35.4 seconds   BNP    Collection Time: 08/06/23  3:51 PM    Specimen: Blood   Result Value Ref Range    proBNP 454.0 0.0 - 1,800.0 pg/mL   High Sensitivity Troponin T    Collection Time: 08/06/23  3:51 PM    Specimen: Blood   Result Value Ref Range    HS Troponin T 792 (C) <10 ng/L   CBC Auto Differential    Collection Time: 08/06/23  3:51 PM    Specimen: Blood   Result Value Ref Range    WBC 8.65 3.40 - 10.80 10*3/mm3    RBC 3.94 3.77 - 5.28 10*6/mm3    Hemoglobin 11.6 (L) 12.0 - 15.9 g/dL    Hematocrit 36.0 34.0 - 46.6 %    MCV 91.4 79.0 - 97.0 fL    MCH 29.4 26.6 - 33.0 pg    MCHC 32.2 31.5 - 35.7 g/dL    RDW 12.8 12.3 - 15.4 %    RDW-SD 42.5 37.0 - 54.0 fl    MPV 10.4 6.0 - 12.0 fL    Platelets 191 140 - 450 10*3/mm3    Neutrophil % 62.4 42.7 - 76.0 %    Lymphocyte % 27.4 19.6 - 45.3 %    Monocyte % 8.6 5.0 - 12.0 %    Eosinophil % 0.9 0.3 - 6.2 %    Basophil % 0.2 0.0 - 1.5 %    Immature Grans % 0.5 0.0 - 0.5 %    Neutrophils, Absolute 5.40 1.70 - 7.00 10*3/mm3    Lymphocytes, Absolute 2.37 0.70 - 3.10 10*3/mm3    Monocytes, Absolute 0.74 0.10 - 0.90  10*3/mm3    Eosinophils, Absolute 0.08 0.00 - 0.40 10*3/mm3    Basophils, Absolute 0.02 0.00 - 0.20 10*3/mm3    Immature Grans, Absolute 0.04 0.00 - 0.05 10*3/mm3    nRBC 0.0 0.0 - 0.2 /100 WBC   Lipase    Collection Time: 08/06/23  3:51 PM    Specimen: Blood   Result Value Ref Range    Lipase 47 13 - 60 U/L   ECG 12 Lead Chest Pain    Collection Time: 08/06/23  5:11 PM   Result Value Ref Range    QT Interval 425 ms   High Sensitivity Troponin T 2Hr    Collection Time: 08/06/23  6:10 PM    Specimen: Blood   Result Value Ref Range    HS Troponin T 1,096 (C) <10 ng/L    Troponin T Delta 304 (C) >=-4 - <+4 ng/L       Ordered the above labs and reviewed the results.        RADIOLOGY  CT Angiogram Chest    Result Date: 8/6/2023  CT ANGIOGRAM OF THE CHEST WITH CONTRAST INCLUDING RECONSTRUCTION IMAGES 08/06/2023  HISTORY: Possible pulmonary embolus.  Following the intravenous contrast injection, CT angiography was performed through the chest. Sagittal, coronal and 3-D reconstruction images were reviewed.  The pulmonary arterial system is well opacified with no evidence of pulmonary embolus. There is some aortic calcification. Prominent left internal jugular vein is again seen, similar to the previous study of 04/20/2021.  There is an approximately 2.5 cm right pretracheal lymph node also seen on the 04/20/2021 study, but may be slightly larger. There is some minimal atelectasis, chronic parenchymal change or inflammatory infiltrates in the bilateral lower lobes which appears slightly more prominent than on the 04/20/2021 study on the right. No suspicious appearing lung masses are seen.      No evidence of pulmonary embolus.  Radiation dose reduction techniques were utilized, including automated exposure control and exposure modulation based on body size.        Ordered the above noted radiological studies. Reviewed by me in PACS.        PROCEDURES  Procedures      MEDICATIONS GIVEN IN ER  Medications   sodium chloride 0.9  % flush 10 mL (has no administration in time range)   iopamidol (ISOVUE-370) 76 % injection 100 mL (has no administration in time range)   nitroglycerin (NITROSTAT) ointment 1 inch (1 inch Topical Given 8/6/23 1553)   morphine injection 4 mg (4 mg Intravenous Given 8/6/23 1555)   ondansetron (ZOFRAN) injection 4 mg (4 mg Intravenous Given 8/6/23 1551)   iopamidol (ISOVUE-370) 76 % injection 100 mL (95 mL Intravenous Given 8/6/23 1647)             MEDICAL DECISION MAKING, PROGRESS, and CONSULTS    All labs have been independently reviewed by me.  All radiology studies have been reviewed by me and discussed with radiologist dictating the report.   EKG's independently viewed and interpreted by me.  Discussion below represents my analysis of pertinent findings related to patient's condition, differential diagnosis, treatment plan and final disposition.      Orders placed during this visit:  Orders Placed This Encounter   Procedures    CT Angiogram Chest    Comprehensive Metabolic Panel    Protime-INR    aPTT    BNP    High Sensitivity Troponin T    CBC Auto Differential    Lipase    High Sensitivity Troponin T 2Hr    NPO Diet NPO Type: Strict NPO    Monitor Blood Pressure    Pulse Oximetry, Continuous    Interventional Cardiology (on-call MD unless specified)    ECG 12 Lead Chest Pain    ECG 12 Lead Chest Pain    Insert Peripheral IV    Inpatient Admission    CBC & Differential       Additional orders considered but not ordered:  N/A    Differential diagnosis:  Differential diagnosis for chest pain/dyspnea includes but is not limited to:  Muscle strain, costochondritis, myositis, pleurisy, rib fracture, intercostal neuritis, herpes zoster, tumor, myocardial infarction, coronary syndrome, unstable angina, angina, aortic dissection, mitral valve prolapse, pericarditis, palpitations, pulmonary embolus, pneumonia, pneumothorax, lung cancer, GERD, esophagitis, esophageal spasm      Independent interpretation of labs,  "radiology studies, and discussions with consultants:  ED Course as of 08/06/23 2018   Sun Aug 06, 2023   1542 EKG performed at 1549 interpreted by me shows normal sinus rhythm with a heart of 84 bpm.  MO intervals are normal.  QRS complexes are unremarkable.  There are nonspecific ST-T changes. [WC]   1610 Hemoglobin(!): 11.6  CBC is otherwise unremarkable. [WC]   1629 proBNP: 454.0  Nml. [WC]   1634 Lipase: 47  Normal. [WC]   1634 BUN(!): 26 [WC]   1634 Creatinine(!): 1.42  This is roughly her baseline [WC]   1658 Pain was initially relieved after Nitropaste and IV morphine. [WC]   1700 HS Troponin T(!!): 792  Patient states she has mild \"tightness\" in her chest.  Repeat EKG pending.  Will discuss with interventional cardiology. [WC]   1716 Repeat EKG performed at 1711 and interpreted by me shows normal sinus rhythm with a heart of 70 bpm.  MO interval's are normal.  There is no significant change compared to earlier this date. [WC]   1736 Case discussed with Dr. Sharma (cardiology)-he agrees to admit the patient to a telemetry bed.  He recommends against heparin since the patient took her Xarelto this afternoon around 1:00. [WC]   1742 CT angiogram was independent visualized by me and discussed with/interpreted by Dr. Thompson (radiology)-no pulmonary emboli identified.  For official interpretation, see dictated report. [WC]   1844 HS Troponin T(!!): 1,096  Discussed with Dr. Bustos. [WC]      ED Course User Index  [WC] Escobar Harden MD              Appropriate PPE was worn by myself and the patient throughout our entire interaction.    DIAGNOSIS  Final diagnoses:   Non-STEMI (non-ST elevated myocardial infarction)         DISPOSITION  Admitted-cardiology            Latest Documented Vital Signs:  As of 20:18 EDT  BP- 117/65 HR- 64 Temp- 98.2 øF (36.8 øC) (Oral) O2 sat- 96%        --    Please note that portions of this were completed with a voice recognition program.       Note Disclaimer: At [facility], " we believe that sharing information builds trust and better relationships. You are receiving this note because you are receiving care at [facility] or recently visited. It is possible you will see health information before a provider has talked with you about it. This kind of information can be easy to misunderstand. To help you fully understand what it means for your health, we urge you to discuss this note with your provider.             Escobar Harden MD  08/06/23 1740       Escobar Harden MD  08/06/23 1743       Escobar Harden MD  08/06/23 1844       Escobar Harden MD  08/06/23 2018

## 2023-08-06 NOTE — ED NOTES
"Nursing report ED to floor  Latanya Olsen  82 y.o.  female    HPI :   Chief Complaint   Patient presents with    Chest Pain       Admitting doctor:   Jeremiah Bustos MD    Admitting diagnosis:   The encounter diagnosis was Non-STEMI (non-ST elevated myocardial infarction).    Code status:   Current Code Status       Date Active Code Status Order ID Comments User Context       Prior            Allergies:   Doxycycline    Isolation:   No active isolations    Intake and Output  No intake or output data in the 24 hours ending 08/06/23 1741    Weight:       08/06/23  1544   Weight: 98.9 kg (218 lb)       Most recent vitals:   Vitals:    08/06/23 1536 08/06/23 1539 08/06/23 1544 08/06/23 1553   BP: 144/78   113/43   Pulse: 70   82   Resp:  18     Temp:   98.6 øF (37 øC)    TempSrc:   Tympanic    SpO2: 94%      Weight:   98.9 kg (218 lb)    Height:   167.6 cm (66\")        Active LDAs/IV Access:   Lines, Drains & Airways       Active LDAs       Name Placement date Placement time Site Days    Peripheral IV 08/06/23 1542 Left;Posterior Forearm 08/06/23  1542  Forearm  less than 1    Peripheral IV 08/06/23 1547 Right Antecubital 08/06/23  1547  Antecubital  less than 1                    Labs (abnormal labs have a star):   Labs Reviewed   COMPREHENSIVE METABOLIC PANEL - Abnormal; Notable for the following components:       Result Value    Glucose 120 (*)     BUN 26 (*)     Creatinine 1.42 (*)     Total Protein 5.9 (*)     eGFR 36.8 (*)     All other components within normal limits    Narrative:     GFR Normal >60  Chronic Kidney Disease <60  Kidney Failure <15    The GFR formula is only valid for adults with stable renal function between ages 18 and 70.   PROTIME-INR - Abnormal; Notable for the following components:    Protime 16.0 (*)     INR 1.26 (*)     All other components within normal limits   TROPONIN - Abnormal; Notable for the following components:    HS Troponin T 792 (*)     All other components within " normal limits    Narrative:     High Sensitive Troponin T Reference Range:  <10.0 ng/L- Negative Female for AMI  <15.0 ng/L- Negative Male for AMI  >=10 - Abnormal Female indicating possible myocardial injury.  >=15 - Abnormal Male indicating possible myocardial injury.   Clinicians would have to utilize clinical acumen, EKG, Troponin, and serial changes to determine if it is an Acute Myocardial Infarction or myocardial injury due to an underlying chronic condition.        CBC WITH AUTO DIFFERENTIAL - Abnormal; Notable for the following components:    Hemoglobin 11.6 (*)     All other components within normal limits   APTT - Normal   BNP (IN-HOUSE) - Normal    Narrative:     Among patients with dyspnea, NT-proBNP is highly sensitive for the detection of acute congestive heart failure. In addition NT-proBNP of <300 pg/ml effectively rules out acute congestive heart failure with 99% negative predictive value.     LIPASE - Normal   HIGH SENSITIVITIY TROPONIN T 2HR   CBC AND DIFFERENTIAL    Narrative:     The following orders were created for panel order CBC & Differential.  Procedure                               Abnormality         Status                     ---------                               -----------         ------                     CBC Auto Differential[[id number]]        Abnormal            Final result                 Please view results for these tests on the individual orders.       EKG:   ECG 12 Lead Chest Pain   Preliminary Result   HEART RATE= 70  bpm   RR Interval= 857  ms   SD Interval= 181  ms   P Horizontal Axis= 28  deg   P Front Axis= 72  deg   QRSD Interval= 113  ms   QT Interval= 425  ms   QRS Axis= -16  deg   T Wave Axis= 13  deg   - ABNORMAL ECG -   Sinus rhythm   Incomplete right bundle branch block   Borderline ST elevation, lateral leads   Electronically Signed By:    Date and Time of Study: 2023-08-06 17:11:31      ECG 12 Lead Chest Pain   Preliminary Result   HEART RATE= 84  bpm   RR  Interval= 714  ms   WI Interval= 179  ms   P Horizontal Axis= 7  deg   P Front Axis= 64  deg   QRSD Interval= 111  ms   QT Interval= 396  ms   QRS Axis= -46  deg   T Wave Axis= 32  deg   - ABNORMAL ECG -   Sinus rhythm   Atrial premature complex   LAD, consider left anterior fascicular block   Consider right ventricular hypertrophy   ST elevation, consider lateral injury   Electronically Signed By:    Date and Time of Study: 2023-08-06 15:40:52          Meds given in ED:   Medications   sodium chloride 0.9 % flush 10 mL (has no administration in time range)   iopamidol (ISOVUE-370) 76 % injection 100 mL (has no administration in time range)   nitroglycerin (NITROSTAT) ointment 1 inch (1 inch Topical Given 8/6/23 1553)   morphine injection 4 mg (4 mg Intravenous Given 8/6/23 1555)   ondansetron (ZOFRAN) injection 4 mg (4 mg Intravenous Given 8/6/23 1551)   iopamidol (ISOVUE-370) 76 % injection 100 mL (95 mL Intravenous Given 8/6/23 1647)       Imaging results:  CT Angiogram Chest    Result Date: 8/6/2023  No evidence of pulmonary embolus.  Radiation dose reduction techniques were utilized, including automated exposure control and exposure modulation based on body size.        Ambulatory status:   - with assist    Social issues:   Social History     Socioeconomic History    Marital status:    Tobacco Use    Smoking status: Never    Smokeless tobacco: Never   Vaping Use    Vaping Use: Never used   Substance and Sexual Activity    Alcohol use: No    Drug use: Never    Sexual activity: Not Currently       NIH Stroke Scale:       Alda Jiménez RN  08/06/23 17:41 EDT

## 2023-08-06 NOTE — Clinical Note
Hemostasis started on the right radial artery. Radial compression device applied to vessel. Hemostasis achieved successfully. Closure device additional comment: 10cc air in vasc band

## 2023-08-06 NOTE — ED NOTES
CP and back pain started about 1.5 hour ago from home. Pt was on cpap when EMS arrived to home. Lung cancer hx. Pt on eliquis. Pt received 4 nitro and 324 aspirin with EMS.

## 2023-08-07 ENCOUNTER — APPOINTMENT (OUTPATIENT)
Dept: CARDIOLOGY | Facility: HOSPITAL | Age: 83
DRG: 287 | End: 2023-08-07
Payer: MEDICARE

## 2023-08-07 LAB
ANION GAP SERPL CALCULATED.3IONS-SCNC: 11.1 MMOL/L (ref 5–15)
AORTIC DIMENSIONLESS INDEX: 1 (DI)
BH CV ECHO MEAS - AO MAX PG: 4.6 MMHG
BH CV ECHO MEAS - AO MEAN PG: 2.7 MMHG
BH CV ECHO MEAS - AO ROOT DIAM: 2.8 CM
BH CV ECHO MEAS - AO V2 MAX: 106.7 CM/SEC
BH CV ECHO MEAS - AO V2 VTI: 22.8 CM
BH CV ECHO MEAS - AVA(I,D): 3.6 CM2
BH CV ECHO MEAS - EDV(CUBED): 74.8 ML
BH CV ECHO MEAS - EDV(MOD-SP2): 120 ML
BH CV ECHO MEAS - EDV(MOD-SP4): 122 ML
BH CV ECHO MEAS - EF(MOD-BP): 40.2 %
BH CV ECHO MEAS - EF(MOD-SP2): 42.5 %
BH CV ECHO MEAS - EF(MOD-SP4): 37.7 %
BH CV ECHO MEAS - ESV(CUBED): 14.4 ML
BH CV ECHO MEAS - ESV(MOD-SP2): 69 ML
BH CV ECHO MEAS - ESV(MOD-SP4): 76 ML
BH CV ECHO MEAS - FS: 42.3 %
BH CV ECHO MEAS - IVS/LVPW: 0.96 CM
BH CV ECHO MEAS - IVSD: 1.07 CM
BH CV ECHO MEAS - LAT PEAK E' VEL: 7.7 CM/SEC
BH CV ECHO MEAS - LV DIASTOLIC VOL/BSA (35-75): 59.6 CM2
BH CV ECHO MEAS - LV MASS(C)D: 156.3 GRAMS
BH CV ECHO MEAS - LV MAX PG: 4.2 MMHG
BH CV ECHO MEAS - LV MEAN PG: 2.16 MMHG
BH CV ECHO MEAS - LV SYSTOLIC VOL/BSA (12-30): 37.1 CM2
BH CV ECHO MEAS - LV V1 MAX: 102.3 CM/SEC
BH CV ECHO MEAS - LV V1 VTI: 23.2 CM
BH CV ECHO MEAS - LVIDD: 4.2 CM
BH CV ECHO MEAS - LVIDS: 2.43 CM
BH CV ECHO MEAS - LVOT AREA: 3.5 CM2
BH CV ECHO MEAS - LVOT DIAM: 2.12 CM
BH CV ECHO MEAS - LVPWD: 1.12 CM
BH CV ECHO MEAS - MED PEAK E' VEL: 6.4 CM/SEC
BH CV ECHO MEAS - MR MAX PG: 37 MMHG
BH CV ECHO MEAS - MR MAX VEL: 304.1 CM/SEC
BH CV ECHO MEAS - MV A DUR: 0.1 SEC
BH CV ECHO MEAS - MV A MAX VEL: 117.6 CM/SEC
BH CV ECHO MEAS - MV DEC SLOPE: 411.7 CM/SEC2
BH CV ECHO MEAS - MV DEC TIME: 195 MSEC
BH CV ECHO MEAS - MV E MAX VEL: 84.2 CM/SEC
BH CV ECHO MEAS - MV E/A: 0.72
BH CV ECHO MEAS - MV MAX PG: 7.2 MMHG
BH CV ECHO MEAS - MV MEAN PG: 2.27 MMHG
BH CV ECHO MEAS - MV P1/2T: 66.1 MSEC
BH CV ECHO MEAS - MV V2 VTI: 23.7 CM
BH CV ECHO MEAS - MVA(P1/2T): 3.3 CM2
BH CV ECHO MEAS - MVA(VTI): 3.4 CM2
BH CV ECHO MEAS - PA ACC TIME: 0.11 SEC
BH CV ECHO MEAS - PA V2 MAX: 90 CM/SEC
BH CV ECHO MEAS - RAP SYSTOLE: 3 MMHG
BH CV ECHO MEAS - RV MAX PG: 1.13 MMHG
BH CV ECHO MEAS - RV V1 MAX: 53.1 CM/SEC
BH CV ECHO MEAS - RV V1 VTI: 10.2 CM
BH CV ECHO MEAS - RVSP: 20 MMHG
BH CV ECHO MEAS - SI(MOD-SP2): 24.9 ML/M2
BH CV ECHO MEAS - SI(MOD-SP4): 22.5 ML/M2
BH CV ECHO MEAS - SV(LVOT): 81.7 ML
BH CV ECHO MEAS - SV(MOD-SP2): 51 ML
BH CV ECHO MEAS - SV(MOD-SP4): 46 ML
BH CV ECHO MEAS - TAPSE (>1.6): 2.3 CM
BH CV ECHO MEAS - TR MAX PG: 16.6 MMHG
BH CV ECHO MEAS - TR MAX VEL: 203.9 CM/SEC
BH CV ECHO MEASUREMENTS AVERAGE E/E' RATIO: 11.94
BH CV XLRA - RV BASE: 2.8 CM
BH CV XLRA - RV LENGTH: 7.9 CM
BH CV XLRA - RV MID: 3.3 CM
BH CV XLRA - TDI S': 8.3 CM/SEC
BUN SERPL-MCNC: 25 MG/DL (ref 8–23)
BUN/CREAT SERPL: 16.7 (ref 7–25)
CALCIUM SPEC-SCNC: 9 MG/DL (ref 8.6–10.5)
CHLORIDE SERPL-SCNC: 105 MMOL/L (ref 98–107)
CO2 SERPL-SCNC: 27.9 MMOL/L (ref 22–29)
CREAT SERPL-MCNC: 1.5 MG/DL (ref 0.57–1)
DEPRECATED RDW RBC AUTO: 41.2 FL (ref 37–54)
EGFRCR SERPLBLD CKD-EPI 2021: 34.4 ML/MIN/1.73
ERYTHROCYTE [DISTWIDTH] IN BLOOD BY AUTOMATED COUNT: 12.5 % (ref 12.3–15.4)
GLUCOSE SERPL-MCNC: 99 MG/DL (ref 65–99)
HCT VFR BLD AUTO: 32.8 % (ref 34–46.6)
HGB BLD-MCNC: 10.8 G/DL (ref 12–15.9)
LEFT ATRIUM VOLUME INDEX: 28.8 ML/M2
MCH RBC QN AUTO: 29.7 PG (ref 26.6–33)
MCHC RBC AUTO-ENTMCNC: 32.9 G/DL (ref 31.5–35.7)
MCV RBC AUTO: 90.1 FL (ref 79–97)
PLATELET # BLD AUTO: 167 10*3/MM3 (ref 140–450)
PMV BLD AUTO: 10.4 FL (ref 6–12)
POTASSIUM SERPL-SCNC: 4 MMOL/L (ref 3.5–5.2)
RBC # BLD AUTO: 3.64 10*6/MM3 (ref 3.77–5.28)
SODIUM SERPL-SCNC: 144 MMOL/L (ref 136–145)
TROPONIN T SERPL HS-MCNC: 716 NG/L
WBC NRBC COR # BLD: 7.11 10*3/MM3 (ref 3.4–10.8)

## 2023-08-07 PROCEDURE — B2151ZZ FLUOROSCOPY OF LEFT HEART USING LOW OSMOLAR CONTRAST: ICD-10-PCS | Performed by: INTERNAL MEDICINE

## 2023-08-07 PROCEDURE — 25010000002 FENTANYL CITRATE (PF) 50 MCG/ML SOLUTION: Performed by: INTERNAL MEDICINE

## 2023-08-07 PROCEDURE — 99152 MOD SED SAME PHYS/QHP 5/>YRS: CPT | Performed by: INTERNAL MEDICINE

## 2023-08-07 PROCEDURE — 94760 N-INVAS EAR/PLS OXIMETRY 1: CPT

## 2023-08-07 PROCEDURE — 85027 COMPLETE CBC AUTOMATED: CPT | Performed by: NURSE PRACTITIONER

## 2023-08-07 PROCEDURE — C1894 INTRO/SHEATH, NON-LASER: HCPCS | Performed by: INTERNAL MEDICINE

## 2023-08-07 PROCEDURE — 94799 UNLISTED PULMONARY SVC/PX: CPT

## 2023-08-07 PROCEDURE — 93458 L HRT ARTERY/VENTRICLE ANGIO: CPT | Performed by: INTERNAL MEDICINE

## 2023-08-07 PROCEDURE — 25510000001 IOPAMIDOL PER 1 ML: Performed by: INTERNAL MEDICINE

## 2023-08-07 PROCEDURE — 25510000001 PERFLUTREN (DEFINITY) 8.476 MG IN SODIUM CHLORIDE (PF) 0.9 % 10 ML INJECTION: Performed by: INTERNAL MEDICINE

## 2023-08-07 PROCEDURE — 25010000002 MIDAZOLAM PER 1 MG: Performed by: INTERNAL MEDICINE

## 2023-08-07 PROCEDURE — 84484 ASSAY OF TROPONIN QUANT: CPT | Performed by: INTERNAL MEDICINE

## 2023-08-07 PROCEDURE — 93306 TTE W/DOPPLER COMPLETE: CPT

## 2023-08-07 PROCEDURE — 25010000002 HEPARIN (PORCINE) PER 1000 UNITS: Performed by: INTERNAL MEDICINE

## 2023-08-07 PROCEDURE — C1769 GUIDE WIRE: HCPCS | Performed by: INTERNAL MEDICINE

## 2023-08-07 PROCEDURE — 80048 BASIC METABOLIC PNL TOTAL CA: CPT | Performed by: NURSE PRACTITIONER

## 2023-08-07 PROCEDURE — 93306 TTE W/DOPPLER COMPLETE: CPT | Performed by: INTERNAL MEDICINE

## 2023-08-07 PROCEDURE — 25010000002 PHENYLEPHRINE 10 MG/ML SOLUTION: Performed by: INTERNAL MEDICINE

## 2023-08-07 PROCEDURE — 94761 N-INVAS EAR/PLS OXIMETRY MLT: CPT

## 2023-08-07 PROCEDURE — 4A023N7 MEASUREMENT OF CARDIAC SAMPLING AND PRESSURE, LEFT HEART, PERCUTANEOUS APPROACH: ICD-10-PCS | Performed by: INTERNAL MEDICINE

## 2023-08-07 PROCEDURE — 94640 AIRWAY INHALATION TREATMENT: CPT

## 2023-08-07 PROCEDURE — 99222 1ST HOSP IP/OBS MODERATE 55: CPT | Performed by: INTERNAL MEDICINE

## 2023-08-07 PROCEDURE — B2111ZZ FLUOROSCOPY OF MULTIPLE CORONARY ARTERIES USING LOW OSMOLAR CONTRAST: ICD-10-PCS | Performed by: INTERNAL MEDICINE

## 2023-08-07 RX ORDER — SODIUM CHLORIDE 9 MG/ML
100 INJECTION, SOLUTION INTRAVENOUS CONTINUOUS
Status: ACTIVE | OUTPATIENT
Start: 2023-08-07 | End: 2023-08-07

## 2023-08-07 RX ORDER — SODIUM CHLORIDE 9 MG/ML
75 INJECTION, SOLUTION INTRAVENOUS CONTINUOUS
Status: DISCONTINUED | OUTPATIENT
Start: 2023-08-07 | End: 2023-08-07

## 2023-08-07 RX ORDER — HEPARIN SODIUM 1000 [USP'U]/ML
INJECTION, SOLUTION INTRAVENOUS; SUBCUTANEOUS
Status: DISCONTINUED | OUTPATIENT
Start: 2023-08-07 | End: 2023-08-07 | Stop reason: HOSPADM

## 2023-08-07 RX ORDER — ASPIRIN 81 MG/1
81 TABLET ORAL DAILY
Status: DISCONTINUED | OUTPATIENT
Start: 2023-08-07 | End: 2023-08-10 | Stop reason: HOSPADM

## 2023-08-07 RX ORDER — VERAPAMIL HYDROCHLORIDE 2.5 MG/ML
INJECTION, SOLUTION INTRAVENOUS
Status: DISCONTINUED | OUTPATIENT
Start: 2023-08-07 | End: 2023-08-07 | Stop reason: HOSPADM

## 2023-08-07 RX ORDER — SODIUM CHLORIDE 9 MG/ML
75 INJECTION, SOLUTION INTRAVENOUS CONTINUOUS
Status: DISCONTINUED | OUTPATIENT
Start: 2023-08-07 | End: 2023-08-10 | Stop reason: HOSPADM

## 2023-08-07 RX ORDER — PHENYLEPHRINE HYDROCHLORIDE 10 MG/ML
INJECTION INTRAVENOUS
Status: DISCONTINUED | OUTPATIENT
Start: 2023-08-07 | End: 2023-08-07 | Stop reason: HOSPADM

## 2023-08-07 RX ORDER — GABAPENTIN 100 MG/1
100 CAPSULE ORAL NIGHTLY
Status: DISCONTINUED | OUTPATIENT
Start: 2023-08-07 | End: 2023-08-10 | Stop reason: HOSPADM

## 2023-08-07 RX ORDER — LIDOCAINE HYDROCHLORIDE 20 MG/ML
INJECTION, SOLUTION INFILTRATION; PERINEURAL
Status: DISCONTINUED | OUTPATIENT
Start: 2023-08-07 | End: 2023-08-07 | Stop reason: HOSPADM

## 2023-08-07 RX ORDER — FENTANYL CITRATE 50 UG/ML
INJECTION, SOLUTION INTRAMUSCULAR; INTRAVENOUS
Status: DISCONTINUED | OUTPATIENT
Start: 2023-08-07 | End: 2023-08-07 | Stop reason: HOSPADM

## 2023-08-07 RX ORDER — ONDANSETRON 2 MG/ML
4 INJECTION INTRAMUSCULAR; INTRAVENOUS EVERY 6 HOURS PRN
Status: DISCONTINUED | OUTPATIENT
Start: 2023-08-07 | End: 2023-08-10 | Stop reason: HOSPADM

## 2023-08-07 RX ORDER — NALOXONE HCL 0.4 MG/ML
0.4 VIAL (ML) INJECTION
Status: DISCONTINUED | OUTPATIENT
Start: 2023-08-07 | End: 2023-08-10 | Stop reason: HOSPADM

## 2023-08-07 RX ORDER — MORPHINE SULFATE 2 MG/ML
1 INJECTION, SOLUTION INTRAMUSCULAR; INTRAVENOUS EVERY 4 HOURS PRN
Status: DISCONTINUED | OUTPATIENT
Start: 2023-08-07 | End: 2023-08-10 | Stop reason: HOSPADM

## 2023-08-07 RX ORDER — MIDAZOLAM HYDROCHLORIDE 1 MG/ML
INJECTION INTRAMUSCULAR; INTRAVENOUS
Status: DISCONTINUED | OUTPATIENT
Start: 2023-08-07 | End: 2023-08-07 | Stop reason: HOSPADM

## 2023-08-07 RX ORDER — HYDROCODONE BITARTRATE AND ACETAMINOPHEN 5; 325 MG/1; MG/1
1 TABLET ORAL EVERY 4 HOURS PRN
Status: DISCONTINUED | OUTPATIENT
Start: 2023-08-07 | End: 2023-08-10 | Stop reason: HOSPADM

## 2023-08-07 RX ORDER — ACETAMINOPHEN 325 MG/1
650 TABLET ORAL EVERY 4 HOURS PRN
Status: DISCONTINUED | OUTPATIENT
Start: 2023-08-07 | End: 2023-08-10 | Stop reason: HOSPADM

## 2023-08-07 RX ORDER — ONDANSETRON 4 MG/1
4 TABLET, FILM COATED ORAL EVERY 6 HOURS PRN
Status: DISCONTINUED | OUTPATIENT
Start: 2023-08-07 | End: 2023-08-10 | Stop reason: HOSPADM

## 2023-08-07 RX ADMIN — SENNOSIDES AND DOCUSATE SODIUM 2 TABLET: 50; 8.6 TABLET ORAL at 20:36

## 2023-08-07 RX ADMIN — FERROUS SULFATE TAB 325 MG (65 MG ELEMENTAL FE) 325 MG: 325 (65 FE) TAB at 08:33

## 2023-08-07 RX ADMIN — BUDESONIDE AND FORMOTEROL FUMARATE DIHYDRATE 2 PUFF: 160; 4.5 AEROSOL RESPIRATORY (INHALATION) at 19:16

## 2023-08-07 RX ADMIN — Medication 10 ML: at 20:36

## 2023-08-07 RX ADMIN — LACTULOSE 10 G: 10 SOLUTION ORAL at 20:36

## 2023-08-07 RX ADMIN — SODIUM CHLORIDE 75 ML/HR: 9 INJECTION, SOLUTION INTRAVENOUS at 08:27

## 2023-08-07 RX ADMIN — SODIUM CHLORIDE 75 ML/HR: 9 INJECTION, SOLUTION INTRAVENOUS at 16:14

## 2023-08-07 RX ADMIN — ACETAMINOPHEN 650 MG: 325 TABLET, FILM COATED ORAL at 15:40

## 2023-08-07 RX ADMIN — ACETAMINOPHEN 650 MG: 325 TABLET, FILM COATED ORAL at 20:34

## 2023-08-07 RX ADMIN — PRAMIPEXOLE DIHYDROCHLORIDE 0.75 MG: 0.5 TABLET ORAL at 21:55

## 2023-08-07 RX ADMIN — GABAPENTIN 100 MG: 100 CAPSULE ORAL at 22:25

## 2023-08-07 RX ADMIN — FOLIC ACID 1 MG: 1 TABLET ORAL at 08:33

## 2023-08-07 RX ADMIN — LEVOTHYROXINE SODIUM 100 MCG: 0.1 TABLET ORAL at 07:15

## 2023-08-07 RX ADMIN — ACETAMINOPHEN 650 MG: 325 TABLET, FILM COATED ORAL at 04:59

## 2023-08-07 RX ADMIN — CLONIDINE HYDROCHLORIDE 0.3 MG: 0.1 TABLET ORAL at 08:33

## 2023-08-07 RX ADMIN — BUDESONIDE AND FORMOTEROL FUMARATE DIHYDRATE 2 PUFF: 160; 4.5 AEROSOL RESPIRATORY (INHALATION) at 07:11

## 2023-08-07 RX ADMIN — Medication 10 ML: at 08:34

## 2023-08-07 RX ADMIN — PANTOPRAZOLE SODIUM 40 MG: 40 TABLET, DELAYED RELEASE ORAL at 07:14

## 2023-08-07 RX ADMIN — NITROGLYCERIN 1 INCH: 20 OINTMENT TOPICAL at 07:15

## 2023-08-07 RX ADMIN — ASPIRIN 81 MG: 81 TABLET, COATED ORAL at 08:33

## 2023-08-07 RX ADMIN — PERFLUTREN 2 ML: 6.52 INJECTION, SUSPENSION INTRAVENOUS at 18:03

## 2023-08-07 NOTE — PROGRESS NOTES
Admitted with NSTEMI.  Holding anticoagulation.  No heparin, she took eliquis just prior to admission.  I discussed with [facility].  As long as chest pain free, no invasive assessment tonight.  If needed, can start nitro drip for chest pain.

## 2023-08-07 NOTE — H&P
[facility] History And Physical Assessment    Patient Name: Latanya Olsen  Age/Sex: 82 y.o. female  : 1940  MRN: [id number]    Date of Hospital Admission: 2023  Date of Encounter Visit: 23  Encounter Provider: Mireya Park MD  Place of Service: [facility] CARDIOLOGY  Patient Care Team:  Kehrer, Meredith Lea, MD as PCP - General (Family Medicine)  Chris Bear MD as Referring Physician (Hospitalist)  Delfino Scruggs MD as Consulting Physician (Hematology and Oncology)    Subjective:     Chief Complaint:  NSTEMI    History of Present Illness:  Latanya Olsen is a 82 y.o. female with reported paroxysmal atrial fibrillation, chronic kidney disease stage III, COPD, lung cancer status post left lower lobectomy in , hypertension, hypothyroidism, obstructive sleep apnea on CPAP, who was admitted to the hospital with a non-STEMI.    The patient reports that she was in her usual state of health when she had a sudden onset of chest tightness and pain radiating to her back yesterday.  She reports that she went to the [employer] as usual yesterday and even cook some food.  She reports she had to give a speech at [employer] that she was nervous about but ultimately she enjoyed giving it.  After [employer] she went on home and waited for her friends to bring the dishes that she had taken discharge.  While she was waiting for them she was sitting in a recliner when she had a sudden onset of chest tightness and pain across her chest and then radiating to her back.  This is associated with shortness of breath.  Due to the shortness of breath she ended up wearing her CPAP until her friends arrived.  She reported her symptoms to them and EMS was subsequently called.  She was treated with sublingual nitroglycerin with some improvement in her symptoms although her symptoms were still present on arrival to the emergency room.    Following arrival to the emergency  room she was treated further with IV morphine with further improvement in her symptoms.  EKG was unremarkable.  Her initial troponin was elevated at 792.  Hemoglobin was mildly decreased but around her baseline of 11.6.  proBNP was normal.  Creatinine was 1.42 on admission which appeared to be around her most recent baseline.  As part of her work-up she did undergo a CT angiogram of the chest which showed no evidence of pulmonary embolism.  Heparin infusion was not started due to chronic anticoagulation with apixaban with her last dose being yesterday morning.    Patient reports that the pain eventually resolved overnight.  Since then she reports some mild tightness and soreness across her chest.  She denies any recurrence of the pain she experienced yesterday.  Her troponins have continued to rise overnight to a peak of 1096 and have trended down to 1001.  Repeat troponin this morning is still pending.  Her creatinine did rise slightly to 1.5 this morning.    She denies any prior cardiac history or cardiac testing.  It appears that she was seen for preoperative consultation by Dr. Bustos in 2019 at the time of a hip fracture.  She reportedly had a diagnosis of paroxysmal atrial fibrillation at that time although the patient is not aware of this diagnosis.  She is on chronic anticoagulation with apixaban which she states was started when she developed a DVT following her hip fracture.         Past Medical History:  Past Medical History:   Diagnosis Date    Anemia     Arthritis     Atrial fibrillation     CKD (chronic kidney disease)     Stage 3    Constipation     COPD (chronic obstructive pulmonary disease)     DDD (degenerative disc disease), lumbar     Depression     Diverticulosis     GERD (gastroesophageal reflux disease)     Hx of degenerative disc disease     Hyperlipidemia     Hypertension     Hypokalemia     Hypothyroidism     Knee swelling     Low back pain Yes    Lung cancer     history    Obesity 218     Osteopenia Yes    Renal disorder     Restless leg syndrome     Scoliosis Yes    Sleep apnea with use of continuous positive airway pressure (CPAP)        Past Surgical History:   Procedure Laterality Date    ADENOIDECTOMY  2000    BUNIONECTOMY Bilateral     CATARACT EXTRACTION      CHOLECYSTECTOMY      COLONOSCOPY      ENDOSCOPY N/A 06/24/2016    Procedure: ESOPHAGOGASTRODUODENOSCOPY  with biopsies;  Surgeon: Von Hernández MD;  Location: [facility] OR;  Service:     LUNG LOBECTOMY Left     lower lobe    LYTIC THROMBIN THERAPY Left 03/26/2021    Procedure: left leg clot treiver possible venous stent *supine*;  Surgeon: Rogerio Vega MD;  Location: [facility] OR 18/19;  Service: Vascular;  Laterality: Left;    REPLACEMENT TOTAL KNEE Left     THYROID BIOPSY      TOTAL HIP ARTHROPLASTY Right 06/01/2019    Procedure: BIPOLAR HIP CEMENTED POSTERIOR;  Surgeon: Ashley Crenshaw MD;  Location: [facility] MAIN OR;  Service: Orthopedics    TUBAL ABDOMINAL LIGATION  50 yrs ago       Home Medications:   Medications Prior to Admission   Medication Sig Dispense Refill Last Dose    acetaminophen (TYLENOL) 325 MG tablet Take 2 tablets by mouth Every 4 (Four) Hours As Needed for Mild Pain .       alendronate (Fosamax) 70 MG tablet Take 1 tablet by mouth Every 7 (Seven) Days. 13 tablet 3     apixaban (Eliquis) 5 MG tablet tablet Take 1 tablet by mouth Every 12 (Twelve) Hours. 60 tablet 2     chlorhexidine (PERIDEX) 0.12 % solution APPLY 15 ML AS DIRECTED TWICE DAILY SWISH FOR 30 seconds AND expectorate, EVERY MORNING AND IN THE EVENING TO BEGIN in 24 hours AFTER surgery       cloNIDine (Catapres) 0.3 MG tablet Take 1 tablet by mouth 2 (Two) Times a Day. 180 tablet 1     coenzyme Q10 100 MG capsule Take 1 capsule by mouth Daily.       esomeprazole (NexIUM) 40 MG capsule Take 1 capsule by mouth Every Morning Before Breakfast.       ferrous sulfate 325 (65 FE) MG tablet Take 1 tablet by mouth Daily With Breakfast.        folic acid (FOLVITE) 1 MG tablet Take 1 tablet by mouth Daily. 30 tablet 5     furosemide (LASIX) 40 MG tablet Take 1 tablet by mouth Daily. Take second tab at noon prn increased swelling (Patient taking differently: Take 1 tablet by mouth 2 (Two) Times a Day. Take second tab at noon prn increased swelling) 135 tablet 0     gabapentin (NEURONTIN) 100 MG capsule TAKE ONE CAPSULE BY MOUTH THREE TIMES DAILY (Patient taking differently: 1 capsule Every Night.) 90 capsule 0     ipratropium (ATROVENT) 0.06 % nasal spray 2 sprays into the nostril(s) as directed by provider 4 (Four) Times a Day. 15 mL 0     ipratropium-albuterol (DUO-NEB) 0.5-2.5 mg/mL nebulizer USE 1 VIAL IN NEBULIZER 2 TIMES DAILY. Generic: DUONEB 60 mL 10     lactulose (CHRONULAC) 10 GM/15ML solution TAKE 15 ML BY MOUTH THREE TIMES DAILY (Patient taking differently: 2 (Two) Times a Day. TAKE 15 ML BY MOUTH THREE TIMES DAILY) 1350 mL 2     levothyroxine (SYNTHROID, LEVOTHROID) 100 MCG tablet TAKE ONE TABLET BY MOUTH DAILY 90 tablet 0     lidocaine (XYLOCAINE) 5 % ointment Apply 1 application topically to the appropriate area as directed 3 (Three) Times a Day. 50 g 3     Magnesium 500 MG tablet Take  by mouth.       potassium chloride 10 MEQ CR tablet Take 1 tablet by mouth 2 (Two) Times a Day.       pramipexole (MIRAPEX) 1.5 MG tablet TAKE ONE TABLET BY MOUTH EVERY NIGHT AT BEDTIME 90 tablet 0     Stimulant Laxative 8.6-50 MG per tablet TAKE 2 TABLETS BY MOUTH TWICE DAILY 120 tablet 2     SYMBICORT 160-4.5 MCG/ACT inhaler        triamcinolone (KENALOG) 0.1 % cream Apply 1 application topically to the appropriate area as directed 2 (Two) Times a Day. 15 g 2     valsartan (DIOVAN) 320 MG tablet TAKE ONE TABLET BY MOUTH DAILY 90 tablet 0     vitamin B-12 (CYANOCOBALAMIN) 1000 MCG tablet Take 1 tablet by mouth Daily.       Zinc 50 MG tablet Take 1 tablet by mouth.       cyclobenzaprine (FLEXERIL) 10 MG tablet Take 1 tablet by mouth 2 (Two) Times a Day As  Needed for Muscle Spasms. 14 tablet 0        Allergies:  Allergies   Allergen Reactions    Doxycycline Anaphylaxis     Facial swelling, shortness of air, dizzines       Past Social History:  Social History     Socioeconomic History    Marital status:    Tobacco Use    Smoking status: Never    Smokeless tobacco: Never   Vaping Use    Vaping Use: Never used   Substance and Sexual Activity    Alcohol use: No    Drug use: Never    Sexual activity: Not Currently       Past Family History:  Family History   Problem Relation Age of Onset    Heart attack Mother     Coronary artery disease Mother     Hypertension Mother     Kidney disease Mother     Heart attack Sister     Colon cancer Neg Hx     Colon polyps Neg Hx     Esophageal cancer Neg Hx     Breast cancer Neg Hx        Review of Systems:   All systems reviewed. Pertinent positives identified in HPI. All other systems are negative.    Objective:   Temp:  [98 øF (36.7 øC)-98.6 øF (37 øC)] 98 øF (36.7 øC)  Heart Rate:  [64-82] 70  Resp:  [18] 18  BP: (104-144)/(43-78) 104/51    Intake/Output Summary (Last 24 hours) at 8/7/2023 0739  Last data filed at 8/7/2023 0701  Gross per 24 hour   Intake 240 ml   Output 400 ml   Net -160 ml     Body mass index is 34.07 kg/mý.      08/06/23  1544 08/06/23  1916 08/07/23  0701   Weight: 98.9 kg (218 lb) 97.2 kg (214 lb 4.8 oz) 95.8 kg (211 lb 1.6 oz)     Weight change:     Physical Exam:   General Appearance:    Alert, cooperative, in no acute distress   Head:    Normocephalic, without obvious abnormality, atraumatic   Eyes:            Lids and lashes normal, conjunctivae and sclerae normal, no   icterus, no pallor, corneas clear, PERRLA   Ears:    Ears appear intact with no abnormalities noted   Neck:   No adenopathy, supple, trachea midline, no thyromegaly, no   carotid bruit, no JVD   Lungs:     Clear to auscultation,respirations regular, even and unlabored    Heart:    Regular rhythm and normal rate, normal S1 and S2, no  murmur, no gallop, no rub, no click   Chest Wall:    No abnormalities observed   Abdomen:     Normal bowel sounds, no masses, no organomegaly, soft        non-tender, non-distended, no guarding, no rebound  tenderness   Extremities:   Moves all extremities well, no edema, no cyanosis, no redness   Pulses:   Pulses palpable and equal bilaterally. Normal radial, carotid, femoral, dorsalis pedis and posterior tibial pulses bilaterally. Normal abdominal aorta   Skin:  Psychiatric:   No bleeding, bruising or rash    Alert and oriented x 3, normal mood and affect         Lab Review:   Results from last 7 days   Lab Units 08/07/23  0327 08/06/23  2231 08/06/23  1551   SODIUM mmol/L 144 142 143   POTASSIUM mmol/L 4.0 3.8 4.0   CHLORIDE mmol/L 105 103 105   CO2 mmol/L 27.9 25.4 26.4   BUN mg/dL 25* 24* 26*   CREATININE mg/dL 1.50* 1.48* 1.42*   GLUCOSE mg/dL 99 126* 120*   CALCIUM mg/dL 9.0 9.0 9.4   AST (SGOT) U/L  --   --  13   ALT (SGPT) U/L  --   --  13     Results from last 7 days   Lab Units 08/06/23  2231 08/06/23  1810 08/06/23  1551   HSTROP T ng/L 1,001* 1,096* 792*     Results from last 7 days   Lab Units 08/07/23  0327 08/06/23  1551   WBC 10*3/mm3 7.11 8.65   HEMOGLOBIN g/dL 10.8* 11.6*   HEMATOCRIT % 32.8* 36.0   PLATELETS 10*3/mm3 167 191     Results from last 7 days   Lab Units 08/06/23  1551   INR  1.26*   APTT seconds 32.5               Invalid input(s): LDLCALC  Results from last 7 days   Lab Units 08/06/23  1551   PROBNP pg/mL 454.0       Imaging:  Imaging Results (Most Recent)       Procedure Component Value Units Date/Time    CT Angiogram Chest [[id number]] Collected: 08/06/23 1740     Updated: 08/06/23 1740    Narrative:      CT ANGIOGRAM OF THE CHEST WITH CONTRAST INCLUDING RECONSTRUCTION IMAGES  08/06/2023     HISTORY: Possible pulmonary embolus.     Following the intravenous contrast injection, CT angiography was  performed through the chest. Sagittal, coronal and 3-D reconstruction  images were  reviewed.     The pulmonary arterial system is well opacified with no evidence of  pulmonary embolus. There is some aortic calcification. Prominent left  internal jugular vein is again seen, similar to the previous study of  04/20/2021.     There is an approximately 2.5 cm right pretracheal lymph node also seen  on the 04/20/2021 study, but may be slightly larger. There is some  minimal atelectasis, chronic parenchymal change or inflammatory  infiltrates in the bilateral lower lobes which appears slightly more  prominent than on the 04/20/2021 study on the right. No suspicious  appearing lung masses are seen.       Impression:      No evidence of pulmonary embolus.     Radiation dose reduction techniques were utilized, including automated  exposure control and exposure modulation based on body size.                I personally viewed and interpreted the patient's EKG    Assessment/Plan:     1.  Non-STEMI.  Pain improved overall.  EKG does not show any acute changes noted.  As of last night it appeared that her troponins were starting to trend down.  2.  Reported paroxysmal atrial fibrillation.  The patient is unaware of this diagnosis.  Regardless she is on chronic anticoagulation with apixaban for history of DVT.  3.  History of DVT.  Reportedly provoked after hip fracture.  She remains on anticoagulation.  4.  Hypertension.  Well-controlled on her current regimen medications.  5.  Chronic kidney disease stage III.  Her creatinine appears to be around her baseline of around 1.4-1.5.  However she did receive a dose of IV contrast with the CT yesterday.  6.  COPD  7.  History of lung cancer.  Status post left lower lobectomy.  8.  Obstructive sleep apnea on CPAP  9.  Hypothyroidism.    - Based on the patient's presentation recommended proceeding with cardiac catheterization.  Explained the procedure, risk, and benefits at length with the patient and she agrees to proceed.  - Continue to hold apixaban.  We will resume  postprocedure depending on the findings.  - Hold valsartan and furosemide in anticipation of repeat contrast exposure  - Start IV fluids  - Monitor renal function closely in light of contrast exposure with CT scan and repeat contrast exposure with cath today.  Consider nephrology consult if he wanted any issues.  - Monitor blood pressures off of torsemide and valsartan.  -Check echocardiogram.    Mireya Park MD  08/07/23  07:39 EDT

## 2023-08-07 NOTE — PLAN OF CARE
Goal Outcome Evaluation:   Pt alert and oriented, S/P cath today , rt radial CDI no bleeding or hematoma noted. Sbp from 80's to 110's . Pt given po tylenol for back pain will cont to monitor

## 2023-08-08 LAB
ANION GAP SERPL CALCULATED.3IONS-SCNC: 11.1 MMOL/L (ref 5–15)
BUN SERPL-MCNC: 23 MG/DL (ref 8–23)
BUN/CREAT SERPL: 14.8 (ref 7–25)
CALCIUM SPEC-SCNC: 8.3 MG/DL (ref 8.6–10.5)
CHLORIDE SERPL-SCNC: 105 MMOL/L (ref 98–107)
CO2 SERPL-SCNC: 24.9 MMOL/L (ref 22–29)
CREAT SERPL-MCNC: 1.55 MG/DL (ref 0.57–1)
DEPRECATED RDW RBC AUTO: 42.1 FL (ref 37–54)
EGFRCR SERPLBLD CKD-EPI 2021: 33.1 ML/MIN/1.73
ERYTHROCYTE [DISTWIDTH] IN BLOOD BY AUTOMATED COUNT: 12.6 % (ref 12.3–15.4)
GLUCOSE SERPL-MCNC: 96 MG/DL (ref 65–99)
HCT VFR BLD AUTO: 33.7 % (ref 34–46.6)
HGB BLD-MCNC: 10.9 G/DL (ref 12–15.9)
MCH RBC QN AUTO: 29.3 PG (ref 26.6–33)
MCHC RBC AUTO-ENTMCNC: 32.3 G/DL (ref 31.5–35.7)
MCV RBC AUTO: 90.6 FL (ref 79–97)
PLATELET # BLD AUTO: 148 10*3/MM3 (ref 140–450)
PMV BLD AUTO: 10.5 FL (ref 6–12)
POTASSIUM SERPL-SCNC: 4 MMOL/L (ref 3.5–5.2)
QT INTERVAL: 496 MS
RBC # BLD AUTO: 3.72 10*6/MM3 (ref 3.77–5.28)
SODIUM SERPL-SCNC: 141 MMOL/L (ref 136–145)
WBC NRBC COR # BLD: 6.4 10*3/MM3 (ref 3.4–10.8)

## 2023-08-08 PROCEDURE — 97116 GAIT TRAINING THERAPY: CPT

## 2023-08-08 PROCEDURE — 85027 COMPLETE CBC AUTOMATED: CPT | Performed by: INTERNAL MEDICINE

## 2023-08-08 PROCEDURE — 93005 ELECTROCARDIOGRAM TRACING: CPT | Performed by: INTERNAL MEDICINE

## 2023-08-08 PROCEDURE — 97161 PT EVAL LOW COMPLEX 20 MIN: CPT

## 2023-08-08 PROCEDURE — 80048 BASIC METABOLIC PNL TOTAL CA: CPT | Performed by: INTERNAL MEDICINE

## 2023-08-08 PROCEDURE — 93010 ELECTROCARDIOGRAM REPORT: CPT | Performed by: INTERNAL MEDICINE

## 2023-08-08 PROCEDURE — 94799 UNLISTED PULMONARY SVC/PX: CPT

## 2023-08-08 PROCEDURE — 99232 SBSQ HOSP IP/OBS MODERATE 35: CPT | Performed by: INTERNAL MEDICINE

## 2023-08-08 PROCEDURE — 94664 DEMO&/EVAL PT USE INHALER: CPT

## 2023-08-08 PROCEDURE — 94761 N-INVAS EAR/PLS OXIMETRY MLT: CPT

## 2023-08-08 RX ORDER — CARVEDILOL 6.25 MG/1
6.25 TABLET ORAL 2 TIMES DAILY WITH MEALS
Status: DISCONTINUED | OUTPATIENT
Start: 2023-08-08 | End: 2023-08-10

## 2023-08-08 RX ADMIN — LACTULOSE 10 G: 10 SOLUTION ORAL at 23:46

## 2023-08-08 RX ADMIN — CARVEDILOL 6.25 MG: 6.25 TABLET, FILM COATED ORAL at 21:12

## 2023-08-08 RX ADMIN — Medication 10 ML: at 13:05

## 2023-08-08 RX ADMIN — BUDESONIDE AND FORMOTEROL FUMARATE DIHYDRATE 2 PUFF: 160; 4.5 AEROSOL RESPIRATORY (INHALATION) at 20:34

## 2023-08-08 RX ADMIN — ACETAMINOPHEN 650 MG: 325 TABLET, FILM COATED ORAL at 13:05

## 2023-08-08 RX ADMIN — ACETAMINOPHEN 650 MG: 325 TABLET, FILM COATED ORAL at 21:17

## 2023-08-08 RX ADMIN — Medication 10 ML: at 21:12

## 2023-08-08 RX ADMIN — SENNOSIDES AND DOCUSATE SODIUM 2 TABLET: 50; 8.6 TABLET ORAL at 21:12

## 2023-08-08 RX ADMIN — FOLIC ACID 1 MG: 1 TABLET ORAL at 08:28

## 2023-08-08 RX ADMIN — ASPIRIN 81 MG: 81 TABLET, COATED ORAL at 08:28

## 2023-08-08 RX ADMIN — APIXABAN 2.5 MG: 2.5 TABLET, FILM COATED ORAL at 21:12

## 2023-08-08 RX ADMIN — BUDESONIDE AND FORMOTEROL FUMARATE DIHYDRATE 2 PUFF: 160; 4.5 AEROSOL RESPIRATORY (INHALATION) at 07:17

## 2023-08-08 RX ADMIN — GABAPENTIN 100 MG: 100 CAPSULE ORAL at 21:12

## 2023-08-08 RX ADMIN — SENNOSIDES AND DOCUSATE SODIUM 2 TABLET: 50; 8.6 TABLET ORAL at 08:28

## 2023-08-08 RX ADMIN — PANTOPRAZOLE SODIUM 40 MG: 40 TABLET, DELAYED RELEASE ORAL at 05:25

## 2023-08-08 RX ADMIN — LEVOTHYROXINE SODIUM 100 MCG: 0.1 TABLET ORAL at 05:25

## 2023-08-08 RX ADMIN — FERROUS SULFATE TAB 325 MG (65 MG ELEMENTAL FE) 325 MG: 325 (65 FE) TAB at 08:28

## 2023-08-08 NOTE — PLAN OF CARE
Goal Outcome Evaluation:      Pt alert and oriented, S/P heart cath yesterday. Pt ambulated with PT today sat in recliner chair most of the days. C/o back pain and po tylenol given with relief. Vitals stable will cont to monitor

## 2023-08-08 NOTE — PROGRESS NOTES
[facility] Follow Up    Chief Complaint: Follow up Takotsubo cardiomyopathy    Interval History: She reports she had recurrent back pain yesterday but this has resolved.  She has some cramping in her legs overnight but this is better this morning.  Denies any further chest pain.  Denies any shortness of breath out of the ordinary.  Placed on oxygen overnight due to her history of obstructive sleep apnea.  Blood pressures improved this morning.    Objective:     Objective:  Temp:  [97.8 øF (36.6 øC)-98.5 øF (36.9 øC)] 97.8 øF (36.6 øC)  Heart Rate:  [61-83] 77  Resp:  [12-18] 18  BP: ()/(45-65) 132/59     Intake/Output Summary (Last 24 hours) at 8/8/2023 0729  Last data filed at 8/8/2023 0237  Gross per 24 hour   Intake 660 ml   Output 400 ml   Net 260 ml     Body mass index is 34.9 kg/mý.      08/07/23  0701 08/07/23  1802 08/08/23  0440   Weight: 95.8 kg (211 lb 1.6 oz) 95.7 kg (211 lb) 98.1 kg (216 lb 3.2 oz)     Weight change: -3.13 kg (-6 lb 14.4 oz)      Physical Exam:   General : Alert, cooperative, in no acute distress.  Neuro: Alert,cooperative and oriented.  Lungs: CTAB. Normal respiratory effort and rate.  CV: Regular rate and rhythm, normal S1 and S2, no murmurs, gallops or rubs.  ABD: Soft, nontender, nondistended. Positive bowel sounds.  Extr: No edema or cyanosis, moves all extremities.    Lab Review:   Results from last 7 days   Lab Units 08/08/23  0318 08/07/23  0327 08/06/23  2231 08/06/23  1551   SODIUM mmol/L 141 144   < > 143   POTASSIUM mmol/L 4.0 4.0   < > 4.0   CHLORIDE mmol/L 105 105   < > 105   CO2 mmol/L 24.9 27.9   < > 26.4   BUN mg/dL 23 25*   < > 26*   CREATININE mg/dL 1.55* 1.50*   < > 1.42*   GLUCOSE mg/dL 96 99   < > 120*   CALCIUM mg/dL 8.3* 9.0   < > 9.4   AST (SGOT) U/L  --   --   --  13   ALT (SGPT) U/L  --   --   --  13    < > = values in this interval not displayed.     Results from last 7 days   Lab Units 08/07/23  0327 08/06/23  2231 08/06/23  1816  08/06/23  1551   HSTROP T ng/L 716* 1,001* 1,096* 792*     Results from last 7 days   Lab Units 08/08/23  0318 08/07/23  0327   WBC 10*3/mm3 6.40 7.11   HEMOGLOBIN g/dL 10.9* 10.8*   HEMATOCRIT % 33.7* 32.8*   PLATELETS 10*3/mm3 148 167     Results from last 7 days   Lab Units 08/06/23  1551   INR  1.26*   APTT seconds 32.5               Invalid input(s): LDLCALC  Results from last 7 days   Lab Units 08/06/23  1551   PROBNP pg/mL 454.0         I reviewed the patient's new clinical results.  I personally viewed and interpreted the patient's EKG  Current Medications:   Scheduled Meds:aspirin, 81 mg, Oral, Daily  budesonide-formoterol, 2 puff, Inhalation, BID - RT  ferrous sulfate, 325 mg, Oral, Daily With Breakfast  folic acid, 1 mg, Oral, Daily  gabapentin, 100 mg, Oral, Nightly  iopamidol, 100 mL, Intravenous, Once in imaging  lactulose, 10 g, Oral, BID  levothyroxine, 100 mcg, Oral, Q AM  pantoprazole, 40 mg, Oral, Q AM  senna-docusate sodium, 2 tablet, Oral, BID  sodium chloride, 10 mL, Intravenous, Q12H      Continuous Infusions:sodium chloride, 75 mL/hr, Last Rate: 75 mL/hr (08/07/23 1614)        Allergies:  Allergies   Allergen Reactions    Doxycycline Anaphylaxis     Facial swelling, shortness of air, dizzines       Assessment/Plan:     1.  Takotsubo cardiomyopathy.  EF of 40 to 45%.  No evidence of volume overload this morning despite IV fluid administration yesterday and overnight.  2.  Non-STEMI.  Due to #1.  Pain improved overall.  EKG with expected evolving changes.    3.  History of hypertension.  Blood pressures were relatively low yesterday.  Improved this morning.  All of her blood pressure medications have been on hold.  4.  Chronic kidney disease.  Baseline creatinine appears to be around 1.4-1.5.  Creatinine fairly stable this morning despite contrast exposure and relative hypotension yesterday.  5.  Reported paroxysmal atrial fibrillation.  The patient is unaware of this diagnosis.  Regardless she  is on chronic anticoagulation with apixaban for history of DVT.  6.  History of DVT.  Reportedly provoked after hip fracture.  She remains on anticoagulation.  7.  COPD  8.  History of lung cancer.  Status post left lower lobectomy.  9.  Obstructive sleep apnea on CPAP  10.  Hypothyroidism.    -Now that her blood pressures have improved we will start guideline directed management of cardiomyopathy.  - We will start with carvedilol 3.125 mg twice a day.  - Hold off on resuming valsartan (she was on 320 mg daily at home) until sure that her renal function is stable.  If renal function stable will resume valsartan at a low dose and titrate as tolerates.  - Hold off on resuming diuretics today.  -Resume apixaban today.    Mireya Park MD  08/08/23  07:29 EDT

## 2023-08-08 NOTE — THERAPY EVALUATION
Patient Name: Latanya Olsen  : 1940    MRN: [id number]                              Today's Date: 2023       Admit Date: 2023    Visit Dx:     ICD-10-CM ICD-9-CM   1. Non-STEMI (non-ST elevated myocardial infarction)  I21.4 410.70   2. NSTEMI (non-ST elevated myocardial infarction)  I21.4 410.70     Patient Active Problem List   Diagnosis    Carcinoid tumor of lung    Diverticulosis of intestine    Obstructive sleep apnea syndrome    Weight loss    Vitamin D deficiency    Overweight (BMI 25.0-29.9)    Spinal stenosis of lumbar region without neurogenic claudication    Osteoarthritis of knee    Obesity (BMI 30-39.9)    Chronic lower back pain    Knee pain    Insomnia    Hypothyroidism    Hypokalemia    Hypertension    Hyperlipidemia    Generalized osteoarthritis    Gastroparesis    Foot pain    Chronic obstructive pulmonary disease    Chronic constipation    Anemia    Weight gain    Pain of left breast    Elevated serum alkaline phosphatase level    Neck pain    Volume overload    Status post total hip replacement, right    Generalized weakness    Constipation    Idiopathic peripheral neuropathy    Lymphedema    Lumbar degenerative disc disease    GERD (gastroesophageal reflux disease)    Lung cancer    Non-STEMI (non-ST elevated myocardial infarction)     Past Medical History:   Diagnosis Date    Anemia     Arthritis     Atrial fibrillation     CKD (chronic kidney disease)     Stage 3    Constipation     COPD (chronic obstructive pulmonary disease)     DDD (degenerative disc disease), lumbar     Depression     Diverticulosis     GERD (gastroesophageal reflux disease)     Hx of degenerative disc disease     Hyperlipidemia     Hypertension     Hypokalemia     Hypothyroidism     Knee swelling     Low back pain Yes    Lung cancer     history    Obesity 218    Osteopenia Yes    Renal disorder     Restless leg syndrome     Scoliosis Yes    Sleep apnea with use of continuous positive airway pressure  (CPAP)      Past Surgical History:   Procedure Laterality Date    ADENOIDECTOMY  2000    BUNIONECTOMY Bilateral     CARDIAC CATHETERIZATION N/A 8/7/2023    Procedure: Left Heart Cath;  Surgeon: Mireya Park MD;  Location: [facility] CATH INVASIVE LOCATION;  Service: Cardiology;  Laterality: N/A;    CARDIAC CATHETERIZATION N/A 8/7/2023    Procedure: Coronary angiography;  Surgeon: Mireya Park MD;  Location: [facility] CATH INVASIVE LOCATION;  Service: Cardiology;  Laterality: N/A;    CARDIAC CATHETERIZATION N/A 8/7/2023    Procedure: Left ventriculography;  Surgeon: Mireya Park MD;  Location: [facility] CATH INVASIVE LOCATION;  Service: Cardiology;  Laterality: N/A;    CATARACT EXTRACTION      CHOLECYSTECTOMY      COLONOSCOPY      ENDOSCOPY N/A 06/24/2016    Procedure: ESOPHAGOGASTRODUODENOSCOPY  with biopsies;  Surgeon: Von Hernández MD;  Location: [facility] OR;  Service:     LUNG LOBECTOMY Left     lower lobe    LYTIC THROMBIN THERAPY Left 03/26/2021    Procedure: left leg clot treiver possible venous stent *supine*;  Surgeon: Rogerio Vega MD;  Location: [facility] OR 18/19;  Service: Vascular;  Laterality: Left;    REPLACEMENT TOTAL KNEE Left     THYROID BIOPSY      TOTAL HIP ARTHROPLASTY Right 06/01/2019    Procedure: BIPOLAR HIP CEMENTED POSTERIOR;  Surgeon: Ashley Crenshaw MD;  Location: [facility] OR;  Service: Orthopedics    TUBAL ABDOMINAL LIGATION  50 yrs ago      General Information       Row Name 08/08/23 0166          Physical Therapy Time and Intention    Document Type evaluation  -     Mode of Treatment individual therapy;physical therapy  -       Row Name 08/08/23 9788          General Information    Patient Profile Reviewed yes  -     Prior Level of Function independent:;gait;transfer;bed mobility  -     Existing Precautions/Restrictions no known precautions/restrictions  -     Barriers to Rehab none identified  -       Row Name 08/08/23 0368          Living  Environment    People in Home alone  Caregiver assist 2-3 hours/day  -       Row Name 08/08/23 1656          Home Main Entrance    Number of Stairs, Main Entrance three  Also has a ramp entrance  -       Row Name 08/08/23 1656          Stairs Within Home, Primary    Number of Stairs, Within Home, Primary none  -       Row Name 08/08/23 1656          Cognition    Orientation Status (Cognition) oriented x 4  -       Row Name 08/08/23 1656          Safety Issues, Functional Mobility    Impairments Affecting Function (Mobility) balance;endurance/activity tolerance;strength  -               User Key  (r) = Recorded By, (t) = Taken By, (c) = Cosigned By      Initials Name Provider Type     Katherine Estrada PT Physical Therapist                   Mobility       Row Name 08/08/23 1717          Bed Mobility    Comment, (Bed Mobility) NT - UIC  -       Row Name 08/08/23 1717          Sit-Stand Transfer    Sit-Stand [place] (Transfers) standby assist;verbal cues  -     Assistive Device (Sit-Stand Transfers) walker, front-wheeled  -       Row Name 08/08/23 1717          Gait/Stairs (Locomotion)    [place] Level (Gait) standby assist;verbal cues  -     Assistive Device (Gait) walker, front-wheeled  -     Distance in Feet (Gait) 60ft x2  -     Deviations/Abnormal Patterns (Gait) robbie decreased;gait speed decreased;stride length decreased;antalgic;weight shifting decreased  -     Bilateral Gait Deviations forward flexed posture  -     Comment, (Gait/Stairs) Poor foot clearance, long sitting rest break 2/2 SOA  -               User Key  (r) = Recorded By, (t) = Taken By, (c) = Cosigned By      Initials Name Provider Type     Katherine Estrada PT Physical Therapist                   Obj/Interventions       Row Name 08/08/23 1717          Range of Motion Comprehensive    General Range of Motion bilateral lower extremity ROM WFL  -       Row Name 08/08/23 1717          Strength  Comprehensive (MMT)    General Manual Muscle Testing (MMT) Assessment lower extremity strength deficits identified  -     Comment, General Manual Muscle Testing (MMT) Assessment Generalized weakness, LLE grossly 4/5, RLE grossly 3+/5  -[facility] Name 08/08/23 1717          Balance    Balance Assessment sitting static balance;sitting dynamic balance;standing static balance;standing dynamic balance  -     Static Sitting Balance supervision  -     Dynamic Sitting Balance supervision  -     Position, Sitting Balance unsupported;sitting in chair  -     Static Standing Balance standby assist  -     Dynamic Standing Balance standby assist  -     Position/Device Used, Standing Balance supported;walker, front-wheeled  -     Balance Interventions sitting;standing;sit to stand;supported;static;dynamic  -[facility] Name 08/08/23 1717          Sensory Assessment (Somatosensory)    Sensory Assessment (Somatosensory) LE sensation intact  -               User Key  (r) = Recorded By, (t) = Taken By, (c) = Cosigned By      Initials Name Provider Type     Katherine Estrada, PT Physical Therapist                   Goals/Plan       [facility] Name 08/08/23 1724          Bed Mobility Goal 1 (PT)    Activity/Assistive Device (Bed Mobility Goal 1, PT) bed mobility activities, all  -     [place] Level/Cues Needed (Bed Mobility Goal 1, PT) standby assist  -     Time Frame (Bed Mobility Goal 1, PT) 1 week  -[facility] Name 08/08/23 1724          Transfer Goal 1 (PT)    Activity/Assistive Device (Transfer Goal 1, PT) transfers, all  -     [place] Level/Cues Needed (Transfer Goal 1, PT) modified independence  -     Time Frame (Transfer Goal 1, PT) 1 week  -[facility] Name 08/08/23 1724          Gait Training Goal 1 (PT)    Activity/Assistive Device (Gait Training Goal 1, PT) gait (walking locomotion)  -     [place] Level (Gait Training Goal 1, PT) modified independence  -     Distance (Gait  Training Goal 1, PT) 150ft  -     Time Frame (Gait Training Goal 1, PT) 1 week  -       Row Name 08/08/23 8820          Therapy Assessment/Plan (PT)    Planned Therapy Interventions (PT) balance training;bed mobility training;gait training;home exercise program;patient/family education;strengthening;transfer training  -               User Key  (r) = Recorded By, (t) = Taken By, (c) = Cosigned By      Initials Name Provider Type     Katherine Estrada, PT Physical Therapist                   Clinical Impression       Row Name 08/08/23 1718          Pain    Pretreatment Pain Rating 6/10  [facility]     Pain Location - back  -     Pain Intervention(s) Repositioned;Ambulation/increased activity;Rest  -       Row Name 08/08/23 1718          Plan of Care Review    Plan of Care Reviewed With patient;caregiver  -     Outcome Evaluation Pt is an 83 yo F admitted from home with c/o chest pain and back pain - work-up revealed NSTEMI. Pt lives alone and reports independence at BL with a rwx. Pt has a caregiver that comes 2-3 hours/day 5-7 days/week. Pt has 3 EMELY, also has a ramp entrance if needed and no steps inside. Pt presents to PT with impaired strength, endurance, and SOA limiting overall mobility. Pt sitting UIC on arrival and stood with SBA, ambulated 60ft with rwx and SBA. Pt needing long sitting rest break 2/2 SOA before returning to room. Pt reports she's had a lot of change in her life recently and is requiring a little more help with daily tasks. Interested in rehab but may be safe to DC home with HHPT and continued caregiver assist pending progress. PT will continue to follow to progress mobility as tolerated.  -       Row Name 08/08/23 1718          Therapy Assessment/Plan (PT)    Patient/Family Therapy Goals Statement (PT) Return to [facility]  -     Rehab Potential (PT) good, to achieve stated therapy goals  -     Criteria for Skilled Interventions Met (PT) yes  -     Therapy Frequency (PT) 5 times/wk   -       Row Name 08/08/23 1718          Vital Signs    O2 Delivery Pre Treatment room air  -     O2 Delivery Intra Treatment room air  -     O2 Delivery Post Treatment room air  -       Row Name 08/08/23 1718          Positioning and Restraints    Pre-Treatment Position sitting in chair/recliner  -     Post Treatment Position chair  -     In Chair reclined;call light within reach;encouraged to call for assist;exit alarm on;with family/caregiver  -               User Key  (r) = Recorded By, (t) = Taken By, (c) = Cosigned By      Initials Name Provider Type    Katherine Flores PT Physical Therapist                   Outcome Measures       Row Name 08/08/23 1724 08/08/23 0820       How much help from another person do you currently need...    Turning from your back to your side while in flat bed without using bedrails? 3  - 3  -JS    Moving from lying on back to sitting on the side of a flat bed without bedrails? 3  - 3  -JS    Moving to and from a bed to a chair (including a wheelchair)? 3  - 3  -JS    Standing up from a chair using your arms (e.g., wheelchair, bedside chair)? 3  - 3  -JS    Climbing 3-5 steps with a railing? 3  - 3  -JS    To walk in hospital room? 3  - 3  -JS    AM-PAC 6 Clicks Score (PT) 18  - 18  -JS    Highest level of mobility 6 --> Walked 10 steps or more  - 6 --> Walked 10 steps or more  -      Row Name 08/08/23 1724          Functional Assessment    Outcome Measure Options AM-PAC 6 Clicks Basic Mobility (PT)  -               User Key  (r) = Recorded By, (t) = Taken By, (c) = Cosigned By      Initials Name Provider Type    Malissa Padilla, RN Registered Nurse    Katherine Flores PT Physical Therapist                                 Physical Therapy Education       Title: PT OT SLP Therapies (Done)       Topic: Physical Therapy (Done)       Point: Mobility training (Done)       Learning Progress Summary             Patient Acceptance, E,TB,D, VU,NR  by  at 8/8/2023 1725                         Point: Home exercise program (Done)       Learning Progress Summary             Patient Acceptance, E,TB,D, VU,NR by  at 8/8/2023 1725                         Point: Body mechanics (Done)       Learning Progress Summary             Patient Acceptance, E,TB,D, VU,NR by  at 8/8/2023 1725                         Point: Precautions (Done)       Learning Progress Summary             Patient Acceptance, E,TB,D, VU,NR by  at 8/8/2023 1725                                         User Key       Initials Effective Dates Name Provider Type Discipline     04/08/22 -  Katherine Estrada, PT Physical Therapist PT                  PT Recommendation and Plan  Planned Therapy Interventions (PT): balance training, bed mobility training, gait training, home exercise program, patient/family education, strengthening, transfer training  Plan of Care Reviewed With: patient, caregiver  Outcome Evaluation: Pt is an 83 yo F admitted from home with c/o chest pain and back pain - work-up revealed NSTEMI. Pt lives alone and reports independence at BL with a rwx. Pt has a caregiver that comes 2-3 hours/day 5-7 days/week. Pt has 3 EMELY, also has a ramp entrance if needed and no steps inside. Pt presents to PT with impaired strength, endurance, and SOA limiting overall mobility. Pt sitting UIC on arrival and stood with SBA, ambulated 60ft with rwx and SBA. Pt needing long sitting rest break 2/2 SOA before returning to room. Pt reports she's had a lot of change in her life recently and is requiring a little more help with daily tasks. Interested in rehab but may be safe to DC home with HHPT and continued caregiver assist pending progress. PT will continue to follow to progress mobility as tolerated.     Time Calculation:         PT Charges       Row Name 08/08/23 1725             Time Calculation    Start Time 1436  -      Stop Time 1455  -      Time Calculation (min) 19 min  -      PT  Received On 08/08/23  -      PT - Next Appointment 08/09/23  -      PT Goal Re-Cert Due Date 08/15/23  -         Time Calculation- PT    Total Timed Code Minutes- PT 15 minute(s)  -         Timed Charges    07929 - Gait Training Minutes  8  -      23131 - PT Therapeutic Activity Minutes 7  -         Untimed Charges    PT Eval/Re-eval Minutes 5  -         Total Minutes    Timed Charges Total Minutes 15  -      Untimed Charges Total Minutes 5  -       Total Minutes 20  -BH                User Key  (r) = Recorded By, (t) = Taken By, (c) = Cosigned By      Initials Name Provider Type     Katherine Estrada, PT Physical Therapist                  Therapy Charges for Today       Code Description Service Date Service Provider Modifiers Qty    06999226103 HC GAIT TRAINING EA 15 MIN 8/8/2023 Katherine Estrada, PT GP 1    83592258255 HC PT EVAL LOW COMPLEXITY 3 8/8/2023 Katherine Estrada, PT GP 1            PT G-Codes  Outcome Measure Options: AM-PAC 6 Clicks Basic Mobility (PT)  AM-PAC 6 Clicks Score (PT): 18  PT Discharge Summary  Anticipated Discharge Disposition (PT): home with assist, home with home health    Katherine Estrada, PT  8/8/2023

## 2023-08-08 NOTE — PLAN OF CARE
Goal Outcome Evaluation:  Plan of Care Reviewed With: patient, caregiver           Outcome Evaluation: Pt is an 83 yo F admitted from home with c/o chest pain and back pain - work-up revealed NSTEMI. Pt lives alone and reports independence at BL with a rwx. Pt has a caregiver that comes 2-3 hours/day 5-7 days/week. Pt has 3 EMELY, also has a ramp entrance if needed and no steps inside. Pt presents to PT with impaired strength, endurance, and SOA limiting overall mobility. Pt sitting UIC on arrival and stood with SBA, ambulated 60ft with rwx and SBA. Pt needing long sitting rest break 2/2 SOA before returning to room. Pt reports she's had a lot of change in her life recently and is requiring a little more help with daily tasks. Interested in rehab but may be safe to DC home with HHPT and continued caregiver assist pending progress. PT will continue to follow to progress mobility as tolerated.      Anticipated Discharge Disposition (PT): home with assist, home with home health

## 2023-08-09 LAB
ANION GAP SERPL CALCULATED.3IONS-SCNC: 10.3 MMOL/L (ref 5–15)
BUN SERPL-MCNC: 16 MG/DL (ref 8–23)
BUN/CREAT SERPL: 15.7 (ref 7–25)
CALCIUM SPEC-SCNC: 9.2 MG/DL (ref 8.6–10.5)
CHLORIDE SERPL-SCNC: 105 MMOL/L (ref 98–107)
CHOLEST SERPL-MCNC: 193 MG/DL (ref 0–200)
CO2 SERPL-SCNC: 22.7 MMOL/L (ref 22–29)
CREAT SERPL-MCNC: 1.02 MG/DL (ref 0.57–1)
EGFRCR SERPLBLD CKD-EPI 2021: 54.7 ML/MIN/1.73
GLUCOSE SERPL-MCNC: 94 MG/DL (ref 65–99)
HDLC SERPL-MCNC: 62 MG/DL (ref 40–60)
LDLC SERPL CALC-MCNC: 107 MG/DL (ref 0–100)
LDLC/HDLC SERPL: 1.67 {RATIO}
POTASSIUM SERPL-SCNC: 4.6 MMOL/L (ref 3.5–5.2)
SODIUM SERPL-SCNC: 138 MMOL/L (ref 136–145)
TRIGL SERPL-MCNC: 137 MG/DL (ref 0–150)
VLDLC SERPL-MCNC: 24 MG/DL (ref 5–40)

## 2023-08-09 PROCEDURE — 94761 N-INVAS EAR/PLS OXIMETRY MLT: CPT

## 2023-08-09 PROCEDURE — 80061 LIPID PANEL: CPT | Performed by: INTERNAL MEDICINE

## 2023-08-09 PROCEDURE — 80048 BASIC METABOLIC PNL TOTAL CA: CPT | Performed by: INTERNAL MEDICINE

## 2023-08-09 PROCEDURE — 94799 UNLISTED PULMONARY SVC/PX: CPT

## 2023-08-09 PROCEDURE — 97530 THERAPEUTIC ACTIVITIES: CPT

## 2023-08-09 PROCEDURE — 94664 DEMO&/EVAL PT USE INHALER: CPT

## 2023-08-09 PROCEDURE — 99232 SBSQ HOSP IP/OBS MODERATE 35: CPT | Performed by: INTERNAL MEDICINE

## 2023-08-09 RX ORDER — VALSARTAN 320 MG/1
320 TABLET ORAL
Status: DISCONTINUED | OUTPATIENT
Start: 2023-08-09 | End: 2023-08-10 | Stop reason: HOSPADM

## 2023-08-09 RX ORDER — FUROSEMIDE 40 MG/1
40 TABLET ORAL DAILY
Status: DISCONTINUED | OUTPATIENT
Start: 2023-08-09 | End: 2023-08-10 | Stop reason: HOSPADM

## 2023-08-09 RX ORDER — ATORVASTATIN CALCIUM 20 MG/1
40 TABLET, FILM COATED ORAL NIGHTLY
Status: DISCONTINUED | OUTPATIENT
Start: 2023-08-09 | End: 2023-08-10 | Stop reason: HOSPADM

## 2023-08-09 RX ADMIN — GABAPENTIN 100 MG: 100 CAPSULE ORAL at 21:31

## 2023-08-09 RX ADMIN — SENNOSIDES AND DOCUSATE SODIUM 2 TABLET: 50; 8.6 TABLET ORAL at 09:03

## 2023-08-09 RX ADMIN — APIXABAN 5 MG: 5 TABLET, FILM COATED ORAL at 21:31

## 2023-08-09 RX ADMIN — LEVOTHYROXINE SODIUM 100 MCG: 0.1 TABLET ORAL at 06:22

## 2023-08-09 RX ADMIN — ATORVASTATIN CALCIUM 40 MG: 20 TABLET, FILM COATED ORAL at 21:31

## 2023-08-09 RX ADMIN — Medication 10 ML: at 09:04

## 2023-08-09 RX ADMIN — SENNOSIDES AND DOCUSATE SODIUM 2 TABLET: 50; 8.6 TABLET ORAL at 21:31

## 2023-08-09 RX ADMIN — CARVEDILOL 6.25 MG: 6.25 TABLET, FILM COATED ORAL at 09:03

## 2023-08-09 RX ADMIN — FERROUS SULFATE TAB 325 MG (65 MG ELEMENTAL FE) 325 MG: 325 (65 FE) TAB at 09:03

## 2023-08-09 RX ADMIN — CARVEDILOL 6.25 MG: 6.25 TABLET, FILM COATED ORAL at 16:47

## 2023-08-09 RX ADMIN — Medication 10 ML: at 21:33

## 2023-08-09 RX ADMIN — FUROSEMIDE 40 MG: 40 TABLET ORAL at 13:29

## 2023-08-09 RX ADMIN — ACETAMINOPHEN 650 MG: 325 TABLET, FILM COATED ORAL at 21:31

## 2023-08-09 RX ADMIN — BUDESONIDE AND FORMOTEROL FUMARATE DIHYDRATE 2 PUFF: 160; 4.5 AEROSOL RESPIRATORY (INHALATION) at 19:36

## 2023-08-09 RX ADMIN — LACTULOSE 10 G: 10 SOLUTION ORAL at 09:03

## 2023-08-09 RX ADMIN — BUDESONIDE AND FORMOTEROL FUMARATE DIHYDRATE 2 PUFF: 160; 4.5 AEROSOL RESPIRATORY (INHALATION) at 07:53

## 2023-08-09 RX ADMIN — PANTOPRAZOLE SODIUM 40 MG: 40 TABLET, DELAYED RELEASE ORAL at 06:22

## 2023-08-09 RX ADMIN — ASPIRIN 81 MG: 81 TABLET, COATED ORAL at 09:03

## 2023-08-09 RX ADMIN — APIXABAN 2.5 MG: 2.5 TABLET, FILM COATED ORAL at 09:03

## 2023-08-09 RX ADMIN — FOLIC ACID 1 MG: 1 TABLET ORAL at 09:03

## 2023-08-09 RX ADMIN — LACTULOSE 10 G: 10 SOLUTION ORAL at 21:31

## 2023-08-09 NOTE — DISCHARGE PLACEMENT REQUEST
"Latanya Olsen (82 y.o. Female)       Date of Birth   1940    Social Security Number       Address   [address] [place] [address]    Home Phone   [phone]    MRN   [id number]       [facility]    Marital Status                               Admission Date   8/6/23    Admission Type   Emergency    Admitting Provider   Mireya Park MD    Attending Provider   Mireya Park MD    Department, Room/Bed   [facility], 3118/1       Discharge Date       Discharge Disposition       Discharge Destination                                 Attending Provider: Mireya Park MD    Allergies: Doxycycline    Isolation: None   Infection: None   Code Status: CPR    Ht: 167.6 cm (66\")   Wt: 97.2 kg (214 lb 4.8 oz)    Admission Cmt: None   Principal Problem: Non-STEMI (non-ST elevated myocardial infarction) [I21.4]                   Active Insurance as of 8/6/2023       Primary Coverage       Payor Plan Insurance Group Employer/Plan Group    MEDICARE MEDICARE A & B        Payor Plan Address Payor Plan Phone Number Payor Plan Fax Number Effective Dates    [address] [phone]  9/1/2004 - None Entered    [facility] [id number]         Subscriber Name Subscriber Birth Date Member ID       LATANYA OLSEN 1940 [id number]               Secondary Coverage       Payor Plan Insurance Group Employer/Plan Group    [employer] C[employer] OPTIONS        Payor Plan Address Payor Plan Phone Number Payor Plan Fax Number Effective Dates    [facility] [phone]  1/1/2023 - None Entered    [address]       [facility] [id number]         Subscriber Name Subscriber Birth Date Member ID       LATANYA OLSEN 1940 [id number]                     Emergency Contacts        (Rel.) Home Phone Work Phone Mobile Phone    Terri Lucero (Friend) [phone] -- --    Alfred Le (niece) (Relative) [phone] -- [phone]              "

## 2023-08-09 NOTE — PLAN OF CARE
Goal Outcome Evaluation:  Pt refused to resume Diovan d/t causes legs to swell. BP down after am Coreg; back up at lunch and then better after evening coreg. Will monitor.

## 2023-08-09 NOTE — PLAN OF CARE
Goal Outcome Evaluation:              Outcome Evaluation: Patient was agreeable to working with PT and expressed concerns with returning home alone. She completed bed mobility and STS with SBA. She required assistance to don house shoes. She ambulated 50' with SBA and rwx. She completed a toilet transfer SBA. Patient had some complaint of fatigue and ehibited SOA at the end of session. Session ended with patient UIC and all needs met witin reach. Patient will benefit from continued skilled PT addressing limitations in functional mobility. She will benefit from rehab to improve safety and functional endurance prior to d/c home.      Anticipated Discharge Disposition (PT): skilled nursing facility

## 2023-08-09 NOTE — CONSULTS
Met with patient, discussed benefits of cardiac rehab. Provided phase II information along with the contact information for cardiac rehab here at [facility]. Pt reports that she will be doing inpatient rehab for at least two week and then would be interested in attending cardiac. Requested for referral to be sent. dereje

## 2023-08-09 NOTE — CASE MANAGEMENT/SOCIAL WORK
Discharge Planning Assessment  [facility]     Patient Name: Latanya Olsen  MRN: [id number]  Today's Date: 8/9/2023    Admit Date: 8/6/2023    Plan: SNF referral's pending.   Discharge Needs Assessment       Row Name 08/09/23 1442       Living Environment    People in Home alone    Current Living Arrangements home    Potentially Unsafe Housing Conditions none    Primary Care Provided by self  Has a caregiver/friend that assist few days a week.    Provides Primary Care For no one    Family Caregiver if Needed friend(s);other relative(s)    Family Caregiver Names Niece/Alfred & Friend/Terri    Quality of Family Relationships helpful;involved;supportive    Able to Return to Prior Arrangements yes       Resource/Environmental Concerns    Resource/Environmental Concerns none    Transportation Concerns none       Food Insecurity    Within the past 12 months, you worried that your food would run out before you got the money to buy more. Never true    Within the past 12 months, the food you bought just didn't last and you didn't have money to get more. Never true       Transition Planning    Patient/Family Anticipated Services at Transition skilled nursing    Transportation Anticipated family or friend will provide       Discharge Needs Assessment    Readmission Within the Last 30 Days no previous admission in last 30 days    Current Outpatient/Agency/Support Group skilled nursing facility    Equipment Currently Used at Home walker, rolling;cpap;oxygen;scales;grab bar;bath bench    Concerns to be Addressed discharge planning    Anticipated Changes Related to Illness none    Outpatient/Agency/Support Group Needs skilled nursing facility    Provided Post Acute Provider Quality & Resource List? Yes    Post Acute Provider Quality and Resource List Nursing Home    Delivered To Patient    Method of Delivery In person    Patient's Choice of Community Agency(s) Pt first choice is [facility], second choice is  [facility] & third is [place].    Current Discharge Risk lives alone                   Discharge Plan       Row Name 08/09/23 1444       Plan    Plan SNF referral's pending.    Plan Comments CCP spoke with pleasant Pt at bedside to complete her discharge screening assessment.  CCP introduced self and role.  Pt confirmed information on her face sheet.  Pt stated she is IADL's, retired and still drives.  Pt lives alone in a single-story home with three entrance stair steps.  Pt reports PCP is, Meredith Lea Kehrer.  Pt confirmed pharmacy is, [facility] in [place].  Pt has been to [facility] & [facility] for past sub-acute rehab.  Pt has the following DME- rolling walker, grab bars, built-in bath bench, scale, oxygen at night and CPAP ([employer]).  Pt is requesting to go to rehab at [facility] upon discharge.  Voicemail left for Laury Farrell/[facility] ([phone]) at 2:39 PM.  Pt second choice is [place] and 3rd choice is [place].  Pt has used [facility]  in the past.  Pt niece/Alfred Le is her emergency contact ([phone]).  Pt reports she has a friend/caregiver-Terri that assist her a few days a week.  CCP will continue to follow...Xiomara WARE /Vimal Wells.                  Continued Care and Services - Admitted Since 8/6/2023       Destination       Service Provider Request Status Selected Services Address Phone Fax Patient Preferred    [facility] Pending - Request Sent N/A [address] [facility] [address] [phone] --    [facility] Pending - Request Sent N/A [address] [place] [address] [phone] --                  Expected Discharge Date and Time       Expected Discharge Date Expected Discharge Time    Aug 10, 2023            Demographic Summary       Row Name 08/09/23 1447       General Information    Admission Type inpatient    Arrived From emergency department    Required Notices  Provided Important Message from Medicare    Referral Source admission list;patient    Reason for Consult discharge planning    Preferred Language English                   Functional Status       Row Name 08/09/23 1441       Functional Status    Usual Activity Tolerance moderate    Current Activity Tolerance fair       Assessment of Health Literacy    Health Literacy Good       Functional Status, IADL    Medications independent    Meal Preparation independent    Housekeeping independent    Laundry assistive equipment    Shopping assistive equipment       Mental Status    General Appearance WDL WDL       Mental Status Summary    Recent Changes in Mental Status/Cognitive Functioning no changes       Employment/    Employment Status retired                   Psychosocial    No documentation.                  Abuse/Neglect    No documentation.                  Legal    No documentation.                  Substance Abuse    No documentation.                  Patient Forms    No documentation.                     Xiomara Beal RN

## 2023-08-09 NOTE — PROGRESS NOTES
[facility] Follow Up    Chief Complaint: Follow up Takotsubo cardiomyopathy    Interval History: Still with intermittent back pain that improves with Tylenol.  No chest pain or shortness of breath.  Still concerned about going home where she lives alone.  She expresses interest in going to rehab first.    Objective:     Objective:  Temp:  [97.9 øF (36.6 øC)-98.8 øF (37.1 øC)] 97.9 øF (36.6 øC)  Heart Rate:  [63-88] 83  Resp:  [16-18] 18  BP: (132-166)/(55-79) 132/65     Intake/Output Summary (Last 24 hours) at 8/9/2023 0806  Last data filed at 8/9/2023 0653  Gross per 24 hour   Intake 480 ml   Output 1400 ml   Net -920 ml     Body mass index is 34.59 kg/mý.      08/07/23  1802 08/08/23  0440 08/09/23  0552   Weight: 95.7 kg (211 lb) 98.1 kg (216 lb 3.2 oz) 97.2 kg (214 lb 4.8 oz)     Weight change: 1.452 kg (3 lb 3.2 oz)      Physical Exam:   General : Alert, cooperative, in no acute distress.  Neuro: Alert,cooperative and oriented.  Lungs: CTAB. Normal respiratory effort and rate.  CV: Regular rate and rhythm, normal S1 and S2, no murmurs, gallops or rubs.  ABD: Soft, nontender, nondistended. Positive bowel sounds.  Extr: No edema or cyanosis, moves all extremities.    Lab Review:   Results from last 7 days   Lab Units 08/08/23  0318 08/07/23  0327 08/06/23  2231 08/06/23  1551   SODIUM mmol/L 141 144   < > 143   POTASSIUM mmol/L 4.0 4.0   < > 4.0   CHLORIDE mmol/L 105 105   < > 105   CO2 mmol/L 24.9 27.9   < > 26.4   BUN mg/dL 23 25*   < > 26*   CREATININE mg/dL 1.55* 1.50*   < > 1.42*   GLUCOSE mg/dL 96 99   < > 120*   CALCIUM mg/dL 8.3* 9.0   < > 9.4   AST (SGOT) U/L  --   --   --  13   ALT (SGPT) U/L  --   --   --  13    < > = values in this interval not displayed.     Results from last 7 days   Lab Units 08/07/23  0327 08/06/23  2231 08/06/23  1810 08/06/23  1551   HSTROP T ng/L 716* 1,001* 1,096* 792*     Results from last 7 days   Lab Units 08/08/23  0318 08/07/23  0327   WBC 10*3/mm3  6.40 7.11   HEMOGLOBIN g/dL 10.9* 10.8*   HEMATOCRIT % 33.7* 32.8*   PLATELETS 10*3/mm3 148 167     Results from last 7 days   Lab Units 08/06/23  1551   INR  1.26*   APTT seconds 32.5               Invalid input(s): LDLCALC  Results from last 7 days   Lab Units 08/06/23  1551   PROBNP pg/mL 454.0         I reviewed the patient's new clinical results.  I personally viewed and interpreted the patient's EKG  Current Medications:   Scheduled Meds:apixaban, 2.5 mg, Oral, Q12H  aspirin, 81 mg, Oral, Daily  budesonide-formoterol, 2 puff, Inhalation, BID - RT  carvedilol, 6.25 mg, Oral, BID With Meals  ferrous sulfate, 325 mg, Oral, Daily With Breakfast  folic acid, 1 mg, Oral, Daily  gabapentin, 100 mg, Oral, Nightly  iopamidol, 100 mL, Intravenous, Once in imaging  lactulose, 10 g, Oral, BID  levothyroxine, 100 mcg, Oral, Q AM  pantoprazole, 40 mg, Oral, Q AM  senna-docusate sodium, 2 tablet, Oral, BID  sodium chloride, 10 mL, Intravenous, Q12H      Continuous Infusions:sodium chloride, 75 mL/hr, Last Rate: Stopped (08/08/23 0935)        Allergies:  Allergies   Allergen Reactions    Doxycycline Anaphylaxis     Facial swelling, shortness of air, dizzines       Assessment/Plan:     1.  Takotsubo cardiomyopathy.  EF of 40 to 45%.  No evidence of volume overload this morning despite IV fluid administration following cardiac catheterization.    2.  Non-STEMI.  Due to #1.  Pain improved overall.  EKG with expected evolving changes.    3.  Hypertension.  Blood pressures relatively low following cardiac catheterization.  Have since rebounded.    4.  Chronic kidney disease.  Baseline creatinine appears to be around 1.4-1.5.  Fattening improved to 1.05 this morning.    5.  Reported paroxysmal atrial fibrillation.  The patient is unaware of this diagnosis.  Regardless she is on chronic anticoagulation with apixaban for history of DVT.  6.  History of DVT.  Reportedly provoked after hip fracture.  She remains on anticoagulation.  7.   COPD  8.  History of lung cancer.  Status post left lower lobectomy.  9.  Obstructive sleep apnea on CPAP  10.  Hypothyroidism.    -Continue carvedilol at current dose.  - Resume valsartan and furosemide at home dose.  - Continue apixaban but increase dosage back to 5 mg since her creatinine has improved.  - Patient expresses interest in going to rehab before going home.  She is not comfortable with the idea of going home where she lives by herself at this time.  Discussed with the patient's nurse who will discuss this further with CCP and PT.  -Discharge once on stable regimen of medications and discharge disposition determined.      Mireya Park MD  08/09/23  08:06 EDT

## 2023-08-09 NOTE — PLAN OF CARE
Goal Outcome Evaluation:  Plan of Care Reviewed With: patient           Outcome Evaluation: Patient s/p cath without intervention. Patient c/o back pain which improves with Tylenol. Patient b/p 140-160's/60-70's HR 70-80's and remains SR. Patient started on Coreg this pm with no issues. Will continue to monitor and await discharge plans.

## 2023-08-09 NOTE — THERAPY TREATMENT NOTE
Patient Name: Latanya Olsen  : 1940    MRN: [id number]                              Today's Date: 2023       Admit Date: 2023    Visit Dx:     ICD-10-CM ICD-9-CM   1. Non-STEMI (non-ST elevated myocardial infarction)  I21.4 410.70   2. NSTEMI (non-ST elevated myocardial infarction)  I21.4 410.70     Patient Active Problem List   Diagnosis    Carcinoid tumor of lung    Diverticulosis of intestine    Obstructive sleep apnea syndrome    Weight loss    Vitamin D deficiency    Overweight (BMI 25.0-29.9)    Spinal stenosis of lumbar region without neurogenic claudication    Osteoarthritis of knee    Obesity (BMI 30-39.9)    Chronic lower back pain    Knee pain    Insomnia    Hypothyroidism    Hypokalemia    Hypertension    Hyperlipidemia    Generalized osteoarthritis    Gastroparesis    Foot pain    Chronic obstructive pulmonary disease    Chronic constipation    Anemia    Weight gain    Pain of left breast    Elevated serum alkaline phosphatase level    Neck pain    Volume overload    Status post total hip replacement, right    Generalized weakness    Constipation    Idiopathic peripheral neuropathy    Lymphedema    Lumbar degenerative disc disease    GERD (gastroesophageal reflux disease)    Lung cancer    Non-STEMI (non-ST elevated myocardial infarction)     Past Medical History:   Diagnosis Date    Anemia     Arthritis     Atrial fibrillation     CKD (chronic kidney disease)     Stage 3    Constipation     COPD (chronic obstructive pulmonary disease)     DDD (degenerative disc disease), lumbar     Depression     Diverticulosis     GERD (gastroesophageal reflux disease)     Hx of degenerative disc disease     Hyperlipidemia     Hypertension     Hypokalemia     Hypothyroidism     Knee swelling     Low back pain Yes    Lung cancer     history    Obesity 218    Osteopenia Yes    Renal disorder     Restless leg syndrome     Scoliosis Yes    Sleep apnea with use of continuous positive airway pressure  (CPAP)      Past Surgical History:   Procedure Laterality Date    ADENOIDECTOMY  2000    BUNIONECTOMY Bilateral     CARDIAC CATHETERIZATION N/A 8/7/2023    Procedure: Left Heart Cath;  Surgeon: Mireya Park MD;  Location: [facility] CATH INVASIVE LOCATION;  Service: Cardiology;  Laterality: N/A;    CARDIAC CATHETERIZATION N/A 8/7/2023    Procedure: Coronary angiography;  Surgeon: Mireya Park MD;  Location: [facility] CATH INVASIVE LOCATION;  Service: Cardiology;  Laterality: N/A;    CARDIAC CATHETERIZATION N/A 8/7/2023    Procedure: Left ventriculography;  Surgeon: Mireya Park MD;  Location: [facility] CATH INVASIVE LOCATION;  Service: Cardiology;  Laterality: N/A;    CATARACT EXTRACTION      CHOLECYSTECTOMY      COLONOSCOPY      ENDOSCOPY N/A 06/24/2016    Procedure: ESOPHAGOGASTRODUODENOSCOPY  with biopsies;  Surgeon: Von Hernández MD;  Location: [facility] OR;  Service:     LUNG LOBECTOMY Left     lower lobe    LYTIC THROMBIN THERAPY Left 03/26/2021    Procedure: left leg clot treiver possible venous stent *supine*;  Surgeon: Rogerio Vega MD;  [facility]  Service: Vascular;  Laterality: Left;    REPLACEMENT TOTAL KNEE Left     THYROID BIOPSY      TOTAL HIP ARTHROPLASTY Right 06/01/2019    Procedure: BIPOLAR HIP CEMENTED POSTERIOR;  Surgeon: Ashley Crenshaw MD;  Location: [facility];  Service: Orthopedics    TUBAL ABDOMINAL LIGATION  50 yrs ago      General Information       Row Name 08/09/23 1055          Physical Therapy Time and Intention    Document Type therapy note (daily note)  -LW     Mode of Treatment individual therapy;physical therapy  -LW       Row Name 08/09/23 1055          General Information    Patient Profile Reviewed yes  -LW     Existing Precautions/Restrictions fall  -LW       Row Name 08/09/23 1055          Cognition    Orientation Status (Cognition) oriented x 4  -LW       Row Name 08/09/23 1055          Safety Issues, Functional Mobility     Impairments Affecting Function (Mobility) balance;endurance/activity tolerance;strength  -LW               User Key  (r) = Recorded By, (t) = Taken By, (c) = Cosigned By      Initials Name Provider Type    Fara Hwang PT Physical Therapist                   Mobility       Row Name 08/09/23 1056          Sit-Stand Transfer    Sit-Stand [place] (Transfers) standby assist;verbal cues  -LW     Assistive Device (Sit-Stand Transfers) walker, front-wheeled  -LW       Row Name 08/09/23 1056          Gait/Stairs (Locomotion)    [place] Level (Gait) verbal cues;standby assist  -LW     Assistive Device (Gait) walker, front-wheeled  -LW     Distance in Feet (Gait) 50'  -LW     Deviations/Abnormal Patterns (Gait) robbie decreased;gait speed decreased;stride length decreased;antalgic;weight shifting decreased  -LW     Bilateral Gait Deviations forward flexed posture  -LW               User Key  (r) = Recorded By, (t) = Taken By, (c) = Cosigned By      Initials Name Provider Type    Fara Hwang PT Physical Therapist                   Obj/Interventions       Row Name 08/09/23 1133          Balance    Static Sitting Balance standby assist  -LW     Position, Sitting Balance sitting edge of bed;sitting in chair  -LW     Static Standing Balance standby assist  -LW     Dynamic Standing Balance standby assist  -LW     Position/Device Used, Standing Balance walker, front-wheeled;supported  -LW     Balance Interventions sitting;standing;sit to stand  -LW               User Key  (r) = Recorded By, (t) = Taken By, (c) = Cosigned By      Initials Name Provider Type    Fara Hwang PT Physical Therapist                   Goals/Plan    No documentation.                  Clinical Impression       Row Name 08/09/23 1126          Pain    Pretreatment Pain Rating 0/10 - no pain  -LW     Posttreatment Pain Rating 0/10 - no pain  -LW       Row Name 08/09/23 1126          Plan of Care Review    Outcome Evaluation Patient was  agreeable to working with PT and expressed concerns with returning home alone. She completed bed mobility and STS with SBA. She required assistance to don house shoes. She ambulated 50' with SBA and rwx. She completed a toilet transfer SBA. Patient had some complaint of fatigue and ehibited SOA at the end of session. Session ended with patient UIC and all needs met witin reach. Patient will benefit from continued skilled PT addressing limitations in functional mobility. She will benefit from rehab to improve safety and functional endurance prior to d/c home.  -LW       Row Name 08/09/23 1126          Positioning and Restraints    Pre-Treatment Position in bed  -LW     Post Treatment Position chair  -LW     In Chair reclined;call light within reach;encouraged to call for assist;exit alarm on  -LW               User Key  (r) = Recorded By, (t) = Taken By, (c) = Cosigned By      Initials Name Provider Type    Fara Hwang, PT Physical Therapist                   Outcome Measures       Row Name 08/09/23 1134 08/09/23 0800       How much help from another person do you currently need...    Turning from your back to your side while in flat bed without using bedrails? 3  -LW 3  -SS    Moving from lying on back to sitting on the side of a flat bed without bedrails? 3  -LW 3  -SS    Moving to and from a bed to a chair (including a wheelchair)? 3  -LW 3  -SS    Standing up from a chair using your arms (e.g., wheelchair, bedside chair)? 3  -LW 3  -SS    Climbing 3-5 steps with a railing? 3  -LW 3  -SS    To walk in hospital room? 3  -LW 3  -SS    AM-PAC 6 Clicks Score (PT) 18  -LW 18  -SS    Highest level of mobility 6 --> Walked 10 steps or more  -LW 6 --> Walked 10 steps or more  -SS      Row Name 08/09/23 1134          Functional Assessment    Outcome Measure Options AM-PAC 6 Clicks Basic Mobility (PT)  -LW               User Key  (r) = Recorded By, (t) = Taken By, (c) = Cosigned By      Initials Name Provider Type    SS  Tiffanie Briscoe, RN Registered Nurse    Fara Hwang, PT Physical Therapist                                 Physical Therapy Education       Title: PT OT SLP Therapies (Done)       Topic: Physical Therapy (Done)       Point: Mobility training (Done)       Learning Progress Summary             Patient Acceptance, E, VU,NR by  at 8/9/2023 1135    Acceptance, E,TB,D, VU,NR by  at 8/8/2023 1725                         Point: Home exercise program (Done)       Learning Progress Summary             Patient Acceptance, E, VU,NR by  at 8/9/2023 1135    Acceptance, E,TB,D, VU,NR by  at 8/8/2023 1725                         Point: Body mechanics (Done)       Learning Progress Summary             Patient Acceptance, E, VU,NR by  at 8/9/2023 1135    Acceptance, E,TB,D, VU,NR by  at 8/8/2023 1725                         Point: Precautions (Done)       Learning Progress Summary             Patient Acceptance, E, VU,NR by  at 8/9/2023 1135    Acceptance, E,TB,D, VU,NR by  at 8/8/2023 1725                                         User Key       Initials Effective Dates Name Provider Type Discipline     04/08/22 -  Katherine Estrada, PT Physical Therapist PT     05/08/23 -  Fara Jefferson, TAMMI Physical Therapist PT                  PT Recommendation and Plan     Outcome Evaluation: Patient was agreeable to working with PT and expressed concerns with returning home alone. She completed bed mobility and STS with SBA. She required assistance to don house shoes. She ambulated 50' with SBA and rwx. She completed a toilet transfer SBA. Patient had some complaint of fatigue and ehibited SOA at the end of session. Session ended with patient UIC and all needs met witin reach. Patient will benefit from continued skilled PT addressing limitations in functional mobility. She will benefit from rehab to improve safety and functional endurance prior to d/c home.     Time Calculation:         PT Charges       Row Name 08/09/23 1136              Time Calculation    Start Time 1051  -LW      Stop Time 1105  -LW      Time Calculation (min) 14 min  -LW      PT Received On 08/09/23  -LW      PT - Next Appointment 08/10/23  -LW         Time Calculation- PT    Total Timed Code Minutes- PT 14 minute(s)  -LW         Timed Charges    68060 - PT Therapeutic Activity Minutes 14  -LW         Total Minutes    Timed Charges Total Minutes 14  -LW       Total Minutes 14  -LW                User Key  (r) = Recorded By, (t) = Taken By, (c) = Cosigned By      Initials Name Provider Type    Fara Hwang PT Physical Therapist                  Therapy Charges for Today       Code Description Service Date Service Provider Modifiers Qty    98149804154 HC PT THERAPEUTIC ACT EA 15 MIN 8/9/2023 Fara Jefferson, TAMMI GP 1            PT G-Codes  Outcome Measure Options: AM-PAC 6 Clicks Basic Mobility (PT)  AM-PAC 6 Clicks Score (PT): 18  PT Discharge Summary  Anticipated Discharge Disposition (PT): skilled nursing facility    Fara Jefferson PT  8/9/2023

## 2023-08-10 VITALS
HEIGHT: 66 IN | DIASTOLIC BLOOD PRESSURE: 71 MMHG | HEART RATE: 77 BPM | SYSTOLIC BLOOD PRESSURE: 132 MMHG | TEMPERATURE: 97.9 F | WEIGHT: 216.05 LBS | BODY MASS INDEX: 34.72 KG/M2 | RESPIRATION RATE: 20 BRPM | OXYGEN SATURATION: 100 %

## 2023-08-10 PROBLEM — I5A NONISCHEMIC NONTRAUMATIC MYOCARDIAL INJURY: Status: ACTIVE | Noted: 2023-08-10

## 2023-08-10 LAB
ANION GAP SERPL CALCULATED.3IONS-SCNC: 11.2 MMOL/L (ref 5–15)
BUN SERPL-MCNC: 18 MG/DL (ref 8–23)
BUN/CREAT SERPL: 15.5 (ref 7–25)
CALCIUM SPEC-SCNC: 9.3 MG/DL (ref 8.6–10.5)
CHLORIDE SERPL-SCNC: 107 MMOL/L (ref 98–107)
CO2 SERPL-SCNC: 21.8 MMOL/L (ref 22–29)
CREAT SERPL-MCNC: 1.16 MG/DL (ref 0.57–1)
EGFRCR SERPLBLD CKD-EPI 2021: 46.9 ML/MIN/1.73
GLUCOSE SERPL-MCNC: 97 MG/DL (ref 65–99)
POTASSIUM SERPL-SCNC: 3.9 MMOL/L (ref 3.5–5.2)
SODIUM SERPL-SCNC: 140 MMOL/L (ref 136–145)

## 2023-08-10 PROCEDURE — 94664 DEMO&/EVAL PT USE INHALER: CPT

## 2023-08-10 PROCEDURE — 99239 HOSP IP/OBS DSCHRG MGMT >30: CPT | Performed by: INTERNAL MEDICINE

## 2023-08-10 PROCEDURE — 94761 N-INVAS EAR/PLS OXIMETRY MLT: CPT

## 2023-08-10 PROCEDURE — 94799 UNLISTED PULMONARY SVC/PX: CPT

## 2023-08-10 PROCEDURE — 80048 BASIC METABOLIC PNL TOTAL CA: CPT | Performed by: INTERNAL MEDICINE

## 2023-08-10 PROCEDURE — 97530 THERAPEUTIC ACTIVITIES: CPT

## 2023-08-10 PROCEDURE — 25010000002 ONDANSETRON PER 1 MG: Performed by: INTERNAL MEDICINE

## 2023-08-10 RX ORDER — CARVEDILOL 12.5 MG/1
12.5 TABLET ORAL 2 TIMES DAILY WITH MEALS
Qty: 60 TABLET | Refills: 5 | Status: SHIPPED | OUTPATIENT
Start: 2023-08-10

## 2023-08-10 RX ORDER — CARVEDILOL 12.5 MG/1
12.5 TABLET ORAL 2 TIMES DAILY WITH MEALS
Status: DISCONTINUED | OUTPATIENT
Start: 2023-08-10 | End: 2023-08-10 | Stop reason: HOSPADM

## 2023-08-10 RX ADMIN — BUDESONIDE AND FORMOTEROL FUMARATE DIHYDRATE 2 PUFF: 160; 4.5 AEROSOL RESPIRATORY (INHALATION) at 07:05

## 2023-08-10 RX ADMIN — FERROUS SULFATE TAB 325 MG (65 MG ELEMENTAL FE) 325 MG: 325 (65 FE) TAB at 09:42

## 2023-08-10 RX ADMIN — FUROSEMIDE 40 MG: 40 TABLET ORAL at 09:41

## 2023-08-10 RX ADMIN — APIXABAN 5 MG: 5 TABLET, FILM COATED ORAL at 09:44

## 2023-08-10 RX ADMIN — LEVOTHYROXINE SODIUM 100 MCG: 0.1 TABLET ORAL at 06:15

## 2023-08-10 RX ADMIN — LACTULOSE 10 G: 10 SOLUTION ORAL at 09:41

## 2023-08-10 RX ADMIN — SENNOSIDES AND DOCUSATE SODIUM 2 TABLET: 50; 8.6 TABLET ORAL at 09:41

## 2023-08-10 RX ADMIN — FOLIC ACID 1 MG: 1 TABLET ORAL at 09:42

## 2023-08-10 RX ADMIN — CARVEDILOL 12.5 MG: 6.25 TABLET, FILM COATED ORAL at 09:41

## 2023-08-10 RX ADMIN — ONDANSETRON 4 MG: 2 INJECTION INTRAMUSCULAR; INTRAVENOUS at 08:19

## 2023-08-10 RX ADMIN — ASPIRIN 81 MG: 81 TABLET, COATED ORAL at 09:41

## 2023-08-10 RX ADMIN — Medication 10 ML: at 09:44

## 2023-08-10 RX ADMIN — PANTOPRAZOLE SODIUM 40 MG: 40 TABLET, DELAYED RELEASE ORAL at 06:16

## 2023-08-10 NOTE — THERAPY TREATMENT NOTE
Patient Name: Latanya Olsen  : 1940    MRN: [id number]                              Today's Date: 8/10/2023       Admit Date: 2023    Visit Dx:     ICD-10-CM ICD-9-CM   1. Non-STEMI (non-ST elevated myocardial infarction)  I21.4 410.70   2. NSTEMI (non-ST elevated myocardial infarction)  I21.4 410.70     Patient Active Problem List   Diagnosis    Carcinoid tumor of lung    Diverticulosis of intestine    Obstructive sleep apnea syndrome    Weight loss    Vitamin D deficiency    Overweight (BMI 25.0-29.9)    Spinal stenosis of lumbar region without neurogenic claudication    Osteoarthritis of knee    Obesity (BMI 30-39.9)    Chronic lower back pain    Knee pain    Insomnia    Hypothyroidism    Hypokalemia    Hypertension    Hyperlipidemia    Generalized osteoarthritis    Gastroparesis    Foot pain    Chronic obstructive pulmonary disease    Chronic constipation    Anemia    Weight gain    Pain of left breast    Elevated serum alkaline phosphatase level    Neck pain    Volume overload    Status post total hip replacement, right    Generalized weakness    Constipation    Idiopathic peripheral neuropathy    Lymphedema    Lumbar degenerative disc disease    GERD (gastroesophageal reflux disease)    Lung cancer    Non-STEMI (non-ST elevated myocardial infarction)     Past Medical History:   Diagnosis Date    Anemia     Arthritis     Atrial fibrillation     CKD (chronic kidney disease)     Stage 3    Constipation     COPD (chronic obstructive pulmonary disease)     DDD (degenerative disc disease), lumbar     Depression     Diverticulosis     GERD (gastroesophageal reflux disease)     Hx of degenerative disc disease     Hyperlipidemia     Hypertension     Hypokalemia     Hypothyroidism     Knee swelling     Low back pain Yes    Lung cancer     history    Obesity 218    Osteopenia Yes    Renal disorder     Restless leg syndrome     Scoliosis Yes    Sleep apnea with use of continuous positive airway pressure  (CPAP)      Past Surgical History:   Procedure Laterality Date    ADENOIDECTOMY  2000    BUNIONECTOMY Bilateral     CARDIAC CATHETERIZATION N/A 8/7/2023    Procedure: Left Heart Cath;  Surgeon: Mireya Park MD;  Location: [facility] CATH INVASIVE LOCATION;  Service: Cardiology;  Laterality: N/A;    CARDIAC CATHETERIZATION N/A 8/7/2023    Procedure: Coronary angiography;  Surgeon: Mireya Park MD;  Location: [facility] CATH INVASIVE LOCATION;  Service: Cardiology;  Laterality: N/A;    CARDIAC CATHETERIZATION N/A 8/7/2023    Procedure: Left ventriculography;  Surgeon: Mireya Park MD;  Location: [facility] CATH INVASIVE LOCATION;  Service: Cardiology;  Laterality: N/A;    CATARACT EXTRACTION      CHOLECYSTECTOMY      COLONOSCOPY      ENDOSCOPY N/A 06/24/2016    Procedure: ESOPHAGOGASTRODUODENOSCOPY  with biopsies;  Surgeon: Von Hernández MD;  Location: [facility] OR;  Service:     LUNG LOBECTOMY Left     lower lobe    LYTIC THROMBIN THERAPY Left 03/26/2021    Procedure: left leg clot treiver possible venous stent *supine*;  Surgeon: Rogerio Vega MD;  Location: [facility] OR 18/19;  Service: Vascular;  Laterality: Left;    REPLACEMENT TOTAL KNEE Left     THYROID BIOPSY      TOTAL HIP ARTHROPLASTY Right 06/01/2019    Procedure: BIPOLAR HIP CEMENTED POSTERIOR;  Surgeon: Ashley Crenshaw MD;  Location: [facility];  Service: Orthopedics    TUBAL ABDOMINAL LIGATION  50 yrs ago      General Information       Row Name 08/10/23 0927          Physical Therapy Time and Intention    Document Type therapy note (daily note)  -LW     Mode of Treatment individual therapy;physical therapy  -LW       Row Name 08/10/23 0927          General Information    Patient Profile Reviewed yes  -LW     Existing Precautions/Restrictions fall  -LW       Row Name 08/10/23 0927          Cognition    Orientation Status (Cognition) oriented x 4  -LW       Row Name 08/10/23 0927          Safety Issues, Functional Mobility     Impairments Affecting Function (Mobility) balance;endurance/activity tolerance;strength  -LW               User Key  (r) = Recorded By, (t) = Taken By, (c) = Cosigned By      Initials Name Provider Type    Fara Hwang PT Physical Therapist                   Mobility       Row Name 08/10/23 0927          Bed Mobility    Bed Mobility supine-sit  -LW     Supine-Sit [place] (Bed Mobility) standby assist  -LW     Assistive Device (Bed Mobility) bed rails  -LW       Row Name 08/10/23 0927          Sit-Stand Transfer    Sit-Stand [place] (Transfers) standby assist;verbal cues  -LW     Assistive Device (Sit-Stand Transfers) walker, front-wheeled  -LW       Row Name 08/10/23 0927          Gait/Stairs (Locomotion)    [place] Level (Gait) verbal cues;standby assist  -LW     Assistive Device (Gait) walker, front-wheeled  -LW     Distance in Feet (Gait) 50'  -LW     Deviations/Abnormal Patterns (Gait) robbie decreased;gait speed decreased;stride length decreased;antalgic;weight shifting decreased  -LW     Bilateral Gait Deviations forward flexed posture  -LW               User Key  (r) = Recorded By, (t) = Taken By, (c) = Cosigned By      Initials Name Provider Type    Fara Hwang PT Physical Therapist                   Obj/Interventions       Row Name 08/10/23 0928          Motor Skills    Therapeutic Exercise other (see comments)  AP, LAQ, seated marching x10  -LW       Row Name 08/10/23 0928          Balance    Static Sitting Balance standby assist  -LW     Dynamic Sitting Balance standby assist  -LW     Position, Sitting Balance sitting edge of bed  -LW     Static Standing Balance standby assist  -LW     Balance Interventions sitting;standing;sit to stand  -LW               User Key  (r) = Recorded By, (t) = Taken By, (c) = Cosigned By      Initials Name Provider Type    Fara Hwang PT Physical Therapist                   Goals/Plan    No documentation.                  Clinical Impression        Row Name 08/10/23 0929          Pain    Pretreatment Pain Rating 1/10  -LW     Posttreatment Pain Rating 1/10  -LW     Pain Location generalized  -LW     Pain Location - epigastrium  -LW     Pain Intervention(s) Ambulation/increased activity;Rest;Repositioned  -LW       Row Name 08/10/23 0929          Plan of Care Review    Plan of Care Reviewed With patient  -LW     Progress improving  -LW     Outcome Evaluation Patient was agreeable to working with PT. She ambulated 50' with rwx and SBA. She completed 10 reps AP, LAQ, and seated marching. Patient was educated on completed exercises and mobilizing throughout the day to improve activity tolerance and strength. Patient continued to demonstrate decreased tolerance to activity and would benfit from continued skilled PT addressing limitations in functional mobility to improve safety prior to d/c.  -       Row Name 08/10/23 0929          Positioning and Restraints    Pre-Treatment Position in bed  -LW     Post Treatment Position bed  -LW     In Bed supine;call light within reach;encouraged to call for assist  -LW               User Key  (r) = Recorded By, (t) = Taken By, (c) = Cosigned By      Initials Name Provider Type    Fara Hwang, PT Physical Therapist                   Outcome Measures       Row Name 08/10/23 0937 08/10/23 0801       How much help from another person do you currently need...    Turning from your back to your side while in flat bed without using bedrails? 4  -LW 3  -SS    Moving from lying on back to sitting on the side of a flat bed without bedrails? 4  -LW 3  -SS    Moving to and from a bed to a chair (including a wheelchair)? 3  -LW 3  -SS    Standing up from a chair using your arms (e.g., wheelchair, bedside chair)? 3  -LW 3  -SS    Climbing 3-5 steps with a railing? 3  -LW 3  -SS    To walk in hospital room? 3  -LW 3  -SS    AM-PAC 6 Clicks Score (PT) 20  -LW 18  -SS    Highest level of mobility 6 --> Walked 10 steps or more  -LW 6  --> Walked 10 steps or more  -      Row Name 08/10/23 0937          Functional Assessment    Outcome Measure Options AM-PAC 6 Clicks Basic Mobility (PT)  -               User Key  (r) = Recorded By, (t) = Taken By, (c) = Cosigned By      Initials Name Provider Type     Tiffanie Briscoe, RN Registered Nurse    Fara Hwang, PT Physical Therapist                                 Physical Therapy Education       Title: PT OT SLP Therapies (Done)       Topic: Physical Therapy (Done)       Point: Mobility training (Done)       Learning Progress Summary             Patient Acceptance, E, VU,NR by  at 8/10/2023 0938    Acceptance, E, VU,NR by  at 8/9/2023 1135    Acceptance, E,TB,D, VU,NR by  at 8/8/2023 1725                         Point: Home exercise program (Done)       Learning Progress Summary             Patient Acceptance, E, VU,NR by  at 8/10/2023 0938    Acceptance, E, VU,NR by  at 8/9/2023 1135    Acceptance, E,TB,D, VU,NR by  at 8/8/2023 1725                         Point: Body mechanics (Done)       Learning Progress Summary             Patient Acceptance, E, VU,NR by  at 8/10/2023 0938    Acceptance, E, VU,NR by  at 8/9/2023 1135    Acceptance, E,TB,D, VU,NR by  at 8/8/2023 1725                         Point: Precautions (Done)       Learning Progress Summary             Patient Acceptance, E, VU,NR by  at 8/10/2023 0938    Acceptance, E, VU,NR by  at 8/9/2023 1135    Acceptance, E,TB,D, VU,NR by  at 8/8/2023 1725                                         User Key       Initials Effective Dates Name Provider Type Discipline     04/08/22 -  Katherine Estrada PT Physical Therapist PT     05/08/23 -  Fara Jefferson PT Physical Therapist PT                  PT Recommendation and Plan     Plan of Care Reviewed With: patient  Progress: improving  Outcome Evaluation: Patient was agreeable to working with PT. She ambulated 50' with rwx and SBA. She completed 10 reps AP, LAQ, and seated  marching. Patient was educated on completed exercises and mobilizing throughout the day to improve activity tolerance and strength. Patient continued to demonstrate decreased tolerance to activity and would benfit from continued skilled PT addressing limitations in functional mobility to improve safety prior to d/c.     Time Calculation:         PT Charges       Row Name 08/10/23 0939             Time Calculation    Start Time 0854  -LW      Stop Time 0908  -LW      Time Calculation (min) 14 min  -LW      PT Received On 08/10/23  -LW      PT - Next Appointment 08/11/23  -LW         Time Calculation- PT    Total Timed Code Minutes- PT 14 minute(s)  -LW         Timed Charges    38459 - PT Therapeutic Exercise Minutes 5  -LW      38899 - PT Therapeutic Activity Minutes 9  -LW         Total Minutes    Timed Charges Total Minutes 14  -LW       Total Minutes 14  -LW                User Key  (r) = Recorded By, (t) = Taken By, (c) = Cosigned By      Initials Name Provider Type    LW Fara Jefferson, PT Physical Therapist                  Therapy Charges for Today       Code Description Service Date Service Provider Modifiers Qty    43531668735 HC PT THERAPEUTIC ACT EA 15 MIN 8/9/2023 Fara Jefferson, PT GP 1    71045540311 HC PT THERAPEUTIC ACT EA 15 MIN 8/10/2023 Fara Jefferson, PT GP 1            PT G-Codes  Outcome Measure Options: AM-PAC 6 Clicks Basic Mobility (PT)  AM-PAC 6 Clicks Score (PT): 20  PT Discharge Summary  Anticipated Discharge Disposition (PT): skilled nursing facility    Fara Jefferson PT  8/10/2023

## 2023-08-10 NOTE — DISCHARGE SUMMARY
[facility] Discharge    Patient Name: Latanya Olsen  Age/Sex: 82 y.o. female  : 1940  MRN: [id number]    Encounter Provider: Mireya Park MD  Referring Provider: Jeremiah Bustos MD  Place of Service: [facility] CARDIOLOGY  Patient Care Team:  Kehrer, Meredith Lea, MD as PCP - General (Family Medicine)  Chris Bear MD as Referring Physician (Hospitalist)  Delfino Scruggs MD as Consulting Physician (Hematology and Oncology)         Date of Discharge:  8/10/2023     Date of Admit: 2023    Discharge Condition: Stable    Discharge Diagnosis:  1.  Takotsubo cardiomyopathy, EF of 40 to 45%  2.  Hypertension  3.  Chronic kidney disease, stable  4.  COPD  5.  Obstructive sleep apnea  6.  Hypothyroidism      Hospital Course:   Latanya Olsen is a 82 y.o. female  with reported paroxysmal atrial fibrillation, chronic kidney disease stage III, COPD, lung cancer status post left lower lobectomy in , hypertension, hypothyroidism, obstructive sleep apnea on CPAP, who was admitted to the hospital with a non-STEMI on 2023.    Patient reported that she was in her usual state of health when she had a sudden onset of chest tightness and pain radiating to her back on the day of admission.  She reports that she went to the [employer] as usual yesterday and even cook some food.  She reports she had to give a speech at [employer] that she was nervous about but ultimately she enjoyed giving it.  After [employer] she went on home and waited for her friends to bring the dishes that she had taken discharge.  While she was waiting for them she was sitting in a recliner when she had a sudden onset of chest tightness and pain across her chest and then radiating to her back.  This is associated with shortness of breath.  Due to the shortness of breath she ended up wearing her CPAP until her friends arrived.  She reported her symptoms to them and EMS was  subsequently called.  She was treated with sublingual nitroglycerin with some improvement in her symptoms although her symptoms were still present on arrival to the emergency room.     Following arrival to the emergency room she was treated further with IV morphine with further improvement in her symptoms.  EKG was unremarkable.  Her initial troponin was elevated at 792.  Hemoglobin was mildly decreased but around her baseline of 11.6.  proBNP was normal.  Creatinine was 1.42 on admission which appeared to be around her most recent baseline.  As part of her work-up she did undergo a CT angiogram of the chest which showed no evidence of pulmonary embolism.  Heparin infusion was not started due to chronic anticoagulation with apixaban with her last dose being the morning of admission.  Her symptoms resolved overnight.  Her troponins peaked at 1096 and started to decline.      Based on her presentation I recommended proceeding with a cardiac catheterization which was performed on 8/7/2023.  This showed normal coronary arteries and wall motion abnormalities consistent with Takotsubo cardiomyopathy.  Echocardiogram confirmed these findings and showed an EF of 40 to 45%, grade 1 diastolic dysfunction, normal right ventricular systolic function and wall motion, and a normal right ventricular systolic pressure.    Her troponin initially remained about 1.5-1.55.  She had some issues with hypotension following her cardiac catheterization.  She was continued on IV fluids for about 12 hours.  Fortunately her creatinine stabilized and actually improved from her usual baseline of 1.4-1.5.  Carvedilol was initiated and titrated up to 12.5 mg twice a day.  Valsartan and furosemide were initially held but resumed once her creatinine had stabilized.  Aspirin and atorvastatin were not started since she had a normal coronary arteries.    Patient was concerned about returning home where she lives alone.  She was evaluated physical  therapy and was felt to be appropriate for rehab at a skilled nursing facility before returning home.  She was discharged on 8/10/2023 to the [facility] in [place].    Will arrange for follow-up in 1 week with DEVORA Menjivar and myself in 1 month.    Objective:  Temp:  [97.2 øF (36.2 øC)-98.6 øF (37 øC)] 97.9 øF (36.6 øC)  Heart Rate:  [72-87] 77  Resp:  [18-20] 20  BP: (132-170)/(64-82) 132/71    Intake/Output Summary (Last 24 hours) at 8/10/2023 1232  Last data filed at 8/10/2023 1157  Gross per 24 hour   Intake 960 ml   Output 2250 ml   Net -1290 ml     Body mass index is 34.87 kg/mý.      08/08/23  0440 08/09/23  0552 08/10/23  0621   Weight: 98.1 kg (216 lb 3.2 oz) 97.2 kg (214 lb 4.8 oz) 98 kg (216 lb 0.8 oz)     Weight change: 0.794 kg (1 lb 12 oz)      Procedures Performed  Procedure(s):  Left Heart Cath  Coronary angiography  Left ventriculography       Consults:  Consults       Date and Time Order Name Status Description    8/6/2023  5:00 PM Interventional Cardiology (on-call MD unless specified)              Pertinent Test Results:  Results from last 7 days   Lab Units 08/10/23  0342 08/09/23  0819 08/08/23  0318 08/07/23  0327 08/06/23  2231 08/06/23  1551   SODIUM mmol/L 140 138 141 144 142 143   POTASSIUM mmol/L 3.9 4.6 4.0 4.0 3.8 4.0   CHLORIDE mmol/L 107 105 105 105 103 105   CO2 mmol/L 21.8* 22.7 24.9 27.9 25.4 26.4   BUN mg/dL 18 16 23 25* 24* 26*   CREATININE mg/dL 1.16* 1.02* 1.55* 1.50* 1.48* 1.42*   GLUCOSE mg/dL 97 94 96 99 126* 120*   CALCIUM mg/dL 9.3 9.2 8.3* 9.0 9.0 9.4   AST (SGOT) U/L  --   --   --   --   --  13   ALT (SGPT) U/L  --   --   --   --   --  13     Results from last 7 days   Lab Units 08/07/23  0327 08/06/23  2231 08/06/23  1810 08/06/23  1551   HSTROP T ng/L 716* 1,001* 1,096* 792*     Results from last 7 days   Lab Units 08/08/23 0318 08/07/23 0327 08/06/23  1551   WBC 10*3/mm3 6.40 7.11 8.65   HEMOGLOBIN g/dL 10.9* 10.8* 11.6*   HEMATOCRIT % 33.7* 32.8* 36.0    PLATELETS 10*3/mm3 148 167 191     Results from last 7 days   Lab Units 08/06/23  1551   INR  1.26*   APTT seconds 32.5         Results from last 7 days   Lab Units 08/09/23  0819   CHOLESTEROL mg/dL 193   TRIGLYCERIDES mg/dL 137   HDL CHOL mg/dL 62*     Results from last 7 days   Lab Units 08/06/23  1551   PROBNP pg/mL 454.0           Discharge Medications     Discharge Medications        New Medications        Instructions Start Date   carvedilol 12.5 MG tablet  Commonly known as: COREG   12.5 mg, Oral, 2 Times Daily With Meals             Changes to Medications        Instructions Start Date   furosemide 40 MG tablet  Commonly known as: LASIX  What changed: when to take this   40 mg, Oral, Daily, Take second tab at noon prn increased swelling      gabapentin 100 MG capsule  Commonly known as: NEURONTIN  What changed:   how to take this  when to take this   TAKE ONE CAPSULE BY MOUTH THREE TIMES DAILY      lactulose 10 GM/15ML solution  Commonly known as: CHRONULAC  What changed: when to take this   TAKE 15 ML BY MOUTH THREE TIMES DAILY             Continue These Medications        Instructions Start Date   acetaminophen 325 MG tablet  Commonly known as: TYLENOL   650 mg, Oral, Every 4 Hours PRN      alendronate 70 MG tablet  Commonly known as: Fosamax   70 mg, Oral, Every 7 Days      apixaban 5 MG tablet tablet  Commonly known as: Eliquis   5 mg, Oral, Every 12 Hours Scheduled      chlorhexidine 0.12 % solution  Commonly known as: PERIDEX   APPLY 15 ML AS DIRECTED TWICE DAILY SWISH FOR 30 seconds AND expectorate, EVERY MORNING AND IN THE EVENING TO BEGIN in 24 hours AFTER surgery      coenzyme Q10 100 MG capsule   100 mg, Oral, Daily      cyclobenzaprine 10 MG tablet  Commonly known as: FLEXERIL   10 mg, Oral, 2 Times Daily PRN      esomeprazole 40 MG capsule  Commonly known as: nexIUM   40 mg, Oral, Every Morning Before Breakfast      ferrous sulfate 325 (65 FE) MG tablet   325 mg, Oral, Daily With Breakfast       folic acid 1 MG tablet  Commonly known as: FOLVITE   1 mg, Oral, Daily      ipratropium 0.06 % nasal spray  Commonly known as: ATROVENT   2 sprays, Nasal, 4 Times Daily      ipratropium-albuterol 0.5-2.5 mg/3 ml nebulizer  Commonly known as: DUO-NEB   USE 1 VIAL IN NEBULIZER 2 TIMES DAILY. Generic: DUONEB      levothyroxine 100 MCG tablet  Commonly known as: SYNTHROID, LEVOTHROID   TAKE ONE TABLET BY MOUTH DAILY      lidocaine 5 % ointment  Commonly known as: XYLOCAINE   1 application , Topical, 3 Times Daily      Magnesium 500 MG tablet   Oral      potassium chloride 10 MEQ CR tablet   10 mEq, Oral, 2 Times Daily      pramipexole 1.5 MG tablet  Commonly known as: MIRAPEX   TAKE ONE TABLET BY MOUTH EVERY NIGHT AT BEDTIME      Stimulant Laxative 8.6-50 MG per tablet  Generic drug: sennosides-docusate   TAKE 2 TABLETS BY MOUTH TWICE DAILY      Symbicort 160-4.5 MCG/ACT inhaler  Generic drug: budesonide-formoterol   No dose, route, or frequency recorded.      triamcinolone 0.1 % cream  Commonly known as: KENALOG   1 application , Topical, 2 Times Daily      valsartan 320 MG tablet  Commonly known as: DIOVAN   TAKE ONE TABLET BY MOUTH DAILY      vitamin B-12 1000 MCG tablet  Commonly known as: CYANOCOBALAMIN   1,000 mcg, Oral, Daily      Zinc 50 MG tablet   1 tablet, Oral             Stop These Medications      cloNIDine 0.3 MG tablet  Commonly known as: Catapres              Discharge Diet:    Dietary Orders (From admission, onward)       Start     Ordered    08/07/23 1020  Diet: Cardiac Diets; Healthy Heart (2-3 Na+); Texture: Regular Texture (IDDSI 7); Fluid Consistency: Thin (IDDSI 0)  Diet Effective Now        References:    Diet Order Crosswalk   Question Answer Comment   Diets: Cardiac Diets    Cardiac Diet: Healthy Heart (2-3 Na+)    Texture: Regular Texture (IDDSI 7)    Fluid Consistency: Thin (IDDSI 0)        08/07/23 1019                    Activity at Discharge:   As instructed    Discharge disposition:  SNF     Discharge Instructions and Follow ups:  No future appointments.  Additional Instructions for the Follow-ups that You Need to Schedule       Ambulatory Referral to Cardiac Rehab   As directed      Discharge Follow-up with Specified Provider: DEVORA Menjivar in 1 week and Dr. Park in 4 weeks.   As directed      To: DEVORA Menjivar in 1 week and Dr. Park in 4 weeks.   Follow Up Details: office will call to schedule appointments               Contact information for follow-up providers       [facility] CARD REHAB .    Specialty: Cardiac Rehabilitation  Contact information:  [address] [facility] [address] [phone]             Kehrer, Meredith Lea, MD .    Specialty: Family Medicine  Contact information:  [address]  [facility] [address]  [facility] [address] [phone]                       Contact information for after-discharge care       Destination       [facility] .    Service: Skilled Nursing  Contact information:  [address]  [facility] [address] [phone]                                      Mireya Park MD  08/10/23  12:32 EDT    Time: Discharge 40 min

## 2023-08-10 NOTE — CASE MANAGEMENT/SOCIAL WORK
Continued Stay Note  [facility]     Patient Name: Latanya Olsen  MRN: [id number]  Today's Date: 8/10/2023    Admit Date: 8/6/2023    Plan: [facility] when medically ready   Discharge Plan       Row Name 08/10/23 1123       Plan    Plan [facility] when medically ready    Plan Comments [facility] spoke with Laury Farrell ([phone]) and she advised Pt is approved for skilled rehab bed.  Packet on [facility] desk.  Pharmacy updated in [employer].  [facility] following.........Xiomara WARE/KAITLIN CM                   Discharge Codes    No documentation.                 Expected Discharge Date and Time       Expected Discharge Date Expected Discharge Time    Aug 10, 2023               Xiomara Beal RN

## 2023-08-10 NOTE — PROGRESS NOTES
[facility] Follow Up    Chief Complaint: Follow up Takotsubo cardiomyopathy    Interval History: Still with intermittent back pain that resolves with acetaminophen.  Denies any chest pain or shortness of breath.  She reports some nausea and stomach cramping this morning.    Objective:     Objective:  Temp:  [97.2 øF (36.2 øC)-98.6 øF (37 øC)] 97.2 øF (36.2 øC)  Heart Rate:  [72-87] 74  Resp:  [18-20] 20  BP: (145-170)/(64-82) 145/70     Intake/Output Summary (Last 24 hours) at 8/10/2023 0733  Last data filed at 8/10/2023 0258  Gross per 24 hour   Intake 600 ml   Output 2750 ml   Net -2150 ml     Body mass index is 34.87 kg/mý.      08/08/23  0440 08/09/23  0552 08/10/23  0621   Weight: 98.1 kg (216 lb 3.2 oz) 97.2 kg (214 lb 4.8 oz) 98 kg (216 lb 0.8 oz)     Weight change: 0.794 kg (1 lb 12 oz)      Physical Exam:   General : Alert, cooperative, in no acute distress.  Neuro: Alert,cooperative and oriented.  Lungs: CTAB. Normal respiratory effort and rate.  CV: Regular rate and rhythm, normal S1 and S2, no murmurs, gallops or rubs.  ABD: Soft, nontender, nondistended. Positive bowel sounds.  Extr: No edema or cyanosis, moves all extremities.    Lab Review:   Results from last 7 days   Lab Units 08/10/23  0342 08/09/23  0819 08/06/23  2231 08/06/23  1551   SODIUM mmol/L 140 138   < > 143   POTASSIUM mmol/L 3.9 4.6   < > 4.0   CHLORIDE mmol/L 107 105   < > 105   CO2 mmol/L 21.8* 22.7   < > 26.4   BUN mg/dL 18 16   < > 26*   CREATININE mg/dL 1.16* 1.02*   < > 1.42*   GLUCOSE mg/dL 97 94   < > 120*   CALCIUM mg/dL 9.3 9.2   < > 9.4   AST (SGOT) U/L  --   --   --  13   ALT (SGPT) U/L  --   --   --  13    < > = values in this interval not displayed.     Results from last 7 days   Lab Units 08/07/23  0327 08/06/23  2231 08/06/23  1810 08/06/23  1551   HSTROP T ng/L 716* 1,001* 1,096* 792*     Results from last 7 days   Lab Units 08/08/23  0318 08/07/23  0327   WBC 10*3/mm3 6.40 7.11   HEMOGLOBIN g/dL  10.9* 10.8*   HEMATOCRIT % 33.7* 32.8*   PLATELETS 10*3/mm3 148 167     Results from last 7 days   Lab Units 08/06/23  1551   INR  1.26*   APTT seconds 32.5         Results from last 7 days   Lab Units 08/09/23  0819   CHOLESTEROL mg/dL 193   TRIGLYCERIDES mg/dL 137   HDL CHOL mg/dL 62*     Results from last 7 days   Lab Units 08/06/23  1551   PROBNP pg/mL 454.0         I reviewed the patient's new clinical results.  I personally viewed and interpreted the patient's EKG  Current Medications:   Scheduled Meds:apixaban, 5 mg, Oral, Q12H  aspirin, 81 mg, Oral, Daily  atorvastatin, 40 mg, Oral, Nightly  budesonide-formoterol, 2 puff, Inhalation, BID - RT  carvedilol, 6.25 mg, Oral, BID With Meals  ferrous sulfate, 325 mg, Oral, Daily With Breakfast  folic acid, 1 mg, Oral, Daily  furosemide, 40 mg, Oral, Daily  gabapentin, 100 mg, Oral, Nightly  iopamidol, 100 mL, Intravenous, Once in imaging  lactulose, 10 g, Oral, BID  levothyroxine, 100 mcg, Oral, Q AM  pantoprazole, 40 mg, Oral, Q AM  senna-docusate sodium, 2 tablet, Oral, BID  sodium chloride, 10 mL, Intravenous, Q12H  valsartan, 320 mg, Oral, Q24H      Continuous Infusions:sodium chloride, 75 mL/hr, Last Rate: Stopped (08/08/23 0935)        Allergies:  Allergies   Allergen Reactions    Doxycycline Anaphylaxis     Facial swelling, shortness of air, dizzines       Assessment/Plan:     1.  Takotsubo cardiomyopathy.  EF of 40 to 45%.  On guideline directed management carvedilol and valsartan.2.  Non-STEMI.  Due to #1.  Pain improved overall.  EKG with expected evolving changes.    3.  Hypertension.  Blood pressures remain mildly elevated.    4.  Chronic kidney disease.  Baseline creatinine appears to be around 1.4-1.5.  Currently her creatinine is below her usual baseline.  5.  Reported paroxysmal atrial fibrillation.  The patient is unaware of this diagnosis.  Regardless she is on chronic anticoagulation with apixaban for history of DVT.  6.  History of DVT.   Reportedly provoked after hip fracture.  She remains on anticoagulation.  7.  COPD  8.  History of lung cancer.  Status post left lower lobectomy.  9.  Obstructive sleep apnea on CPAP  10.  Hypothyroidism.    -Increase carvedilol dosage this morning.  - Continue current dosage of valsartan and furosemide.  - We will monitor her symptoms of nausea for now and consider further workup if it persists.  - Otherwise discharge once disposition determined.    Mireya Park MD  08/10/23  07:33 EDT

## 2023-08-10 NOTE — CASE MANAGEMENT/SOCIAL WORK
Case Management Discharge Note      Final Note: Pt discharged to [facility] via private auto..........Xiomara WARE/KAITLIN KEY    Provided Post Acute Provider Quality & Resource List?: Yes  Post Acute Provider Quality and Resource List: Nursing Home  Delivered To: Patient  Method of Delivery: In person    Selected Continued Care - Discharged on 8/10/2023 Admission date: 8/6/2023 - Discharge disposition: Skilled Nursing Facility (DC - External)      Destination Coordination complete.      Service Provider Selected Services Address Phone Fax Patient Preferred    [facility] [address] [address] [phone] --              Durable Medical Equipment    No services have been selected for the patient.                Dialysis/Infusion    No services have been selected for the patient.                Home Medical Care    No services have been selected for the patient.                Therapy    No services have been selected for the patient.                Community Resources    No services have been selected for the patient.                Community & DME    No services have been selected for the patient.                         Final Discharge Disposition Code: 03 - skilled nursing facility (SNF)

## 2023-08-10 NOTE — PLAN OF CARE
Goal Outcome Evaluation:  Plan of Care Reviewed With: patient        Progress: improving  Outcome Evaluation: Patient with c/o back pain which improves with Tylenol. Patient denies any issues with chest pain or shortness of air. Patient ambulates with walker x1 assist. Patient b/p 150's/60's HR 60's-80's. Will continue to monitor and await discharge plans. Patient hopeful to be discharged later today.

## 2023-08-25 ENCOUNTER — HOSPITAL ENCOUNTER (OUTPATIENT)
Dept: CARDIOLOGY | Facility: HOSPITAL | Age: 83
Discharge: HOME | End: 2023-08-25
Payer: MEDICARE

## 2023-08-25 ENCOUNTER — OFFICE VISIT (OUTPATIENT)
Dept: CARDIOLOGY | Facility: CLINIC | Age: 83
End: 2023-08-25
Payer: MEDICARE

## 2023-08-25 VITALS
HEIGHT: 66 IN | OXYGEN SATURATION: 95 % | HEART RATE: 64 BPM | SYSTOLIC BLOOD PRESSURE: 128 MMHG | DIASTOLIC BLOOD PRESSURE: 60 MMHG | WEIGHT: 224 LBS | BODY MASS INDEX: 36 KG/M2

## 2023-08-25 DIAGNOSIS — I21.4 NON-STEMI (NON-ST ELEVATED MYOCARDIAL INFARCTION): ICD-10-CM

## 2023-08-25 DIAGNOSIS — I10 PRIMARY HYPERTENSION: Primary | ICD-10-CM

## 2023-08-25 DIAGNOSIS — I50.23 ACUTE ON CHRONIC HFREF (HEART FAILURE WITH REDUCED EJECTION FRACTION): ICD-10-CM

## 2023-08-25 DIAGNOSIS — E78.5 HYPERLIPIDEMIA, UNSPECIFIED HYPERLIPIDEMIA TYPE: ICD-10-CM

## 2023-08-25 LAB
ANION GAP SERPL CALCULATED.3IONS-SCNC: 10 MMOL/L (ref 5–15)
BUN SERPL-MCNC: 25 MG/DL (ref 8–23)
BUN/CREAT SERPL: 16 (ref 7–25)
CALCIUM SPEC-SCNC: 8.6 MG/DL (ref 8.6–10.5)
CHLORIDE SERPL-SCNC: 106 MMOL/L (ref 98–107)
CO2 SERPL-SCNC: 24 MMOL/L (ref 22–29)
CREAT SERPL-MCNC: 1.56 MG/DL (ref 0.57–1)
EGFRCR SERPLBLD CKD-EPI 2021: 32.9 ML/MIN/1.73
GLUCOSE SERPL-MCNC: 134 MG/DL (ref 65–99)
NT-PROBNP SERPL-MCNC: 3640 PG/ML (ref 0–1800)
POTASSIUM SERPL-SCNC: 4.3 MMOL/L (ref 3.5–5.2)
SODIUM SERPL-SCNC: 140 MMOL/L (ref 136–145)

## 2023-08-25 PROCEDURE — 36415 COLL VENOUS BLD VENIPUNCTURE: CPT

## 2023-08-25 PROCEDURE — 80048 BASIC METABOLIC PNL TOTAL CA: CPT | Performed by: NURSE PRACTITIONER

## 2023-08-25 PROCEDURE — 83880 ASSAY OF NATRIURETIC PEPTIDE: CPT | Performed by: NURSE PRACTITIONER

## 2023-08-25 RX ORDER — BENZONATATE 100 MG/1
CAPSULE ORAL
COMMUNITY
Start: 2023-08-22

## 2023-08-25 RX ORDER — ONDANSETRON 4 MG/1
TABLET, FILM COATED ORAL
COMMUNITY
Start: 2023-08-11

## 2023-08-25 RX ORDER — LACTULOSE 10 G/15ML
SOLUTION ORAL; RECTAL
COMMUNITY
Start: 2023-08-20

## 2023-08-25 RX ORDER — SERTRALINE HYDROCHLORIDE 25 MG/1
TABLET, FILM COATED ORAL
COMMUNITY
Start: 2023-08-22

## 2023-08-25 NOTE — PROGRESS NOTES
"    Subjective:     Encounter Date:08/25/2023      Patient ID: Latanya Olsen is a 82 y.o. female.    Chief Complaint:follow up Takotsubo CM, HTN  History of Present Illness  This is a 83 y/o female who follows with Dr. Park and is new to me today. She has a pmhx of paroxysmal atrial fibrillation, chronic kidney disease stage III, COPD, lung cancer s/p left lower lobectomy in 2006, hypertension, hypothyroidism, and SIERRA on CPAP. She was admitted to the hospital with a non-STEMI on 8/6/23. She had sudden onset of chest tightness and pain radiating to her back along with SOA. She ended up wearing her CPAP until her friends arrived. EMS was then called. She was given sublingual nitro with some improvement in her symptoms.     In the ED EKG was unremarkable. Troponin was elevated at 792. Hgb was 11.6 and proBNP was normal. Creatinine was 1.42 and this was around her baseline. She underwent a cardiac catheterization that showed normal coronary arteries, no wall motion abnormalities, consistent with Takotsubo cardiomyopathy. Echocardiogram showed an EF of 40-45%, grade 1 diastolic dysfunction, normal RVSP. She was discharged on GDMT with carvedilol, valsartan and furosemide.     She is here today for a follow up visit. She went to the [facility] at discharge and returned home yesterday. She tells me she is feeling ok but has noticed some worsening shortness of breath and orthopnea. She had a chest xray at [facility] on Wednesday that showed \"fluid on her chest\". I'm not able to see the xray. She denies any chest pain, palpitations, dizziness or syncope. Her lower extremity swelling is at its baseline. She is unsure if she has gained weight.    I have reviewed and updated as appropriate allergies, current medications, past family history, past medical history, past surgical history and problem list.    Review of Systems   Constitutional: Positive for malaise/fatigue. Negative for fever, weight gain and weight loss. "   HENT:  Negative for congestion, hoarse voice and sore throat.    Eyes:  Negative for blurred vision and double vision.   Cardiovascular:  Positive for dyspnea on exertion, leg swelling and orthopnea. Negative for chest pain, palpitations and syncope.   Respiratory:  Negative for cough, shortness of breath and wheezing.    Gastrointestinal:  Negative for abdominal pain, hematemesis, hematochezia and melena.   Genitourinary:  Negative for dysuria and hematuria.   Neurological:  Negative for dizziness, headaches, light-headedness and numbness.   Psychiatric/Behavioral:  Negative for depression. The patient is not nervous/anxious.        Current Outpatient Medications:     acetaminophen (TYLENOL) 325 MG tablet, Take 2 tablets by mouth Every 4 (Four) Hours As Needed for Mild Pain ., Disp: , Rfl:     alendronate (Fosamax) 70 MG tablet, Take 1 tablet by mouth Every 7 (Seven) Days., Disp: 13 tablet, Rfl: 3    apixaban (Eliquis) 5 MG tablet tablet, Take 1 tablet by mouth Every 12 (Twelve) Hours., Disp: 60 tablet, Rfl: 2    benzonatate (TESSALON) 100 MG capsule, , Disp: , Rfl:     carvedilol (COREG) 12.5 MG tablet, Take 1 tablet by mouth 2 (Two) Times a Day With Meals., Disp: 60 tablet, Rfl: 5    chlorhexidine (PERIDEX) 0.12 % solution, APPLY 15 ML AS DIRECTED TWICE DAILY SWISH FOR 30 seconds AND expectorate, EVERY MORNING AND IN THE EVENING TO BEGIN in 24 hours AFTER surgery, Disp: , Rfl:     coenzyme Q10 100 MG capsule, Take 1 capsule by mouth Daily., Disp: , Rfl:     cyclobenzaprine (FLEXERIL) 10 MG tablet, Take 1 tablet by mouth 2 (Two) Times a Day As Needed for Muscle Spasms., Disp: 14 tablet, Rfl: 0    Enulose 10 GM/15ML solution solution (encephalopathy), , Disp: , Rfl:     esomeprazole (NexIUM) 40 MG capsule, Take 1 capsule by mouth Every Morning Before Breakfast., Disp: , Rfl:     ferrous sulfate 325 (65 FE) MG tablet, Take 1 tablet by mouth Daily With Breakfast., Disp: , Rfl:     folic acid (FOLVITE) 1 MG tablet,  Take 1 tablet by mouth Daily., Disp: 30 tablet, Rfl: 5    furosemide (LASIX) 40 MG tablet, Take 1 tablet by mouth Daily. Take second tab at noon prn increased swelling, Disp: 135 tablet, Rfl: 0    gabapentin (NEURONTIN) 100 MG capsule, TAKE ONE CAPSULE BY MOUTH THREE TIMES DAILY (Patient taking differently: 1 capsule Every Night.), Disp: 90 capsule, Rfl: 0    ipratropium (ATROVENT) 0.06 % nasal spray, 2 sprays into the nostril(s) as directed by provider 4 (Four) Times a Day., Disp: 15 mL, Rfl: 0    ipratropium-albuterol (DUO-NEB) 0.5-2.5 mg/mL nebulizer, USE 1 VIAL IN NEBULIZER 2 TIMES DAILY. Generic: DUONEB, Disp: 60 mL, Rfl: 10    lactulose (CHRONULAC) 10 GM/15ML solution, TAKE 15 ML BY MOUTH THREE TIMES DAILY (Patient taking differently: 2 (Two) Times a Day. TAKE 15 ML BY MOUTH THREE TIMES DAILY), Disp: 1350 mL, Rfl: 2    levothyroxine (SYNTHROID, LEVOTHROID) 100 MCG tablet, TAKE ONE TABLET BY MOUTH DAILY, Disp: 90 tablet, Rfl: 0    lidocaine (XYLOCAINE) 5 % ointment, Apply 1 application topically to the appropriate area as directed 3 (Three) Times a Day., Disp: 50 g, Rfl: 3    Magnesium 500 MG tablet, Take  by mouth., Disp: , Rfl:     ondansetron (ZOFRAN) 4 MG tablet, , Disp: , Rfl:     potassium chloride 10 MEQ CR tablet, Take 1 tablet by mouth 2 (Two) Times a Day., Disp: , Rfl:     pramipexole (MIRAPEX) 1.5 MG tablet, TAKE ONE TABLET BY MOUTH EVERY NIGHT AT BEDTIME, Disp: 90 tablet, Rfl: 0    sertraline (ZOLOFT) 25 MG tablet, , Disp: , Rfl:     Stimulant Laxative 8.6-50 MG per tablet, TAKE 2 TABLETS BY MOUTH TWICE DAILY, Disp: 120 tablet, Rfl: 2    SYMBICORT 160-4.5 MCG/ACT inhaler, , Disp: , Rfl:     triamcinolone (KENALOG) 0.1 % cream, Apply 1 application topically to the appropriate area as directed 2 (Two) Times a Day., Disp: 15 g, Rfl: 2    valsartan (DIOVAN) 320 MG tablet, TAKE ONE TABLET BY MOUTH DAILY, Disp: 90 tablet, Rfl: 0    vitamin B-12 (CYANOCOBALAMIN) 1000 MCG tablet, Take 1 tablet by mouth  Daily., Disp: , Rfl:     Zinc 50 MG tablet, Take 1 tablet by mouth., Disp: , Rfl:     Past Medical History:   Diagnosis Date    Anemia     Arthritis     Atrial fibrillation     CKD (chronic kidney disease)     Stage 3    Constipation     COPD (chronic obstructive pulmonary disease)     DDD (degenerative disc disease), lumbar     Depression     Diverticulosis     GERD (gastroesophageal reflux disease)     Hx of degenerative disc disease     Hyperlipidemia     Hypertension     Hypokalemia     Hypothyroidism     Knee swelling     Low back pain Yes    Lung cancer     history    Obesity 218    Osteopenia Yes    Renal disorder     Restless leg syndrome     Scoliosis Yes    Sleep apnea with use of continuous positive airway pressure (CPAP)        Past Surgical History:   Procedure Laterality Date    ADENOIDECTOMY  2000    BUNIONECTOMY Bilateral     CARDIAC CATHETERIZATION N/A 8/7/2023    Procedure: Left Heart Cath;  Surgeon: Mireya Park MD;  Location: [facility] CATH INVASIVE LOCATION;  Service: Cardiology;  Laterality: N/A;    CARDIAC CATHETERIZATION N/A 8/7/2023    Procedure: Coronary angiography;  Surgeon: Mireya Park MD;  Location: [facility] CATH INVASIVE LOCATION;  Service: Cardiology;  Laterality: N/A;    CARDIAC CATHETERIZATION N/A 8/7/2023    Procedure: Left ventriculography;  Surgeon: Mireya Park MD;  Location: [facility] CATH INVASIVE LOCATION;  Service: Cardiology;  Laterality: N/A;    CATARACT EXTRACTION      CHOLECYSTECTOMY      COLONOSCOPY      ENDOSCOPY N/A 06/24/2016    Procedure: ESOPHAGOGASTRODUODENOSCOPY  with biopsies;  Surgeon: Von Hernández MD;  Location: [facility] OR;  Service:     LUNG LOBECTOMY Left     lower lobe    LYTIC THROMBIN THERAPY Left 03/26/2021    Procedure: left leg clot treiver possible venous stent *supine*;  Surgeon: Rogerio Vega MD;  Location: [facility] OR 18/19;  Service: Vascular;  Laterality: Left;    REPLACEMENT TOTAL KNEE Left     THYROID BIOPSY      TOTAL  "HIP ARTHROPLASTY Right 2019    Procedure: BIPOLAR HIP CEMENTED POSTERIOR;  Surgeon: Ashley Crenshaw MD;  Location: [facility];  Service: Orthopedics    TUBAL ABDOMINAL LIGATION  50 yrs ago       Family History   Problem Relation Age of Onset    Heart attack Mother     Coronary artery disease Mother     Hypertension Mother     Kidney disease Mother     Heart attack Sister     Colon cancer Neg Hx     Colon polyps Neg Hx     Esophageal cancer Neg Hx     Breast cancer Neg Hx        Social History     Tobacco Use    Smoking status: Never    Smokeless tobacco: Never   Vaping Use    Vaping Use: Never used   Substance Use Topics    Alcohol use: No    Drug use: Never         ECG 12 Lead    Date/Time: 2023 3:34 PM  Performed by: Shefali Raymond APRN  Authorized by: Shefali Raymond APRN   Comparison: compared with previous ECG from 2023  Similar to previous ECG  Rhythm: sinus rhythm  Conduction: right bundle branch block  Other findings: left ventricular hypertrophy with strain  Comments: No significant change from previous EKG           Objective:     Visit Vitals  /60   Pulse 64   Ht 167.6 cm (66\")   Wt 102 kg (224 lb)   SpO2 95%   BMI 36.15 kg/mý             Physical Exam  Constitutional:       Appearance: Normal appearance. She is normal weight.   HENT:      Head: Normocephalic.   Neck:      Vascular: No carotid bruit.   Cardiovascular:      Rate and Rhythm: Normal rate and regular rhythm.      Chest Wall: PMI is not displaced.      Pulses: Normal pulses.           Radial pulses are 2+ on the right side and 2+ on the left side.      Heart sounds: Normal heart sounds. No murmur heard.    No friction rub. No gallop.   Pulmonary:      Effort: Pulmonary effort is normal.      Breath sounds: Normal breath sounds.   Abdominal:      General: Bowel sounds are normal. There is no distension.      Palpations: Abdomen is soft.   Musculoskeletal:      Right lower le+ Edema present.      Left lower " le+ Edema present.   Skin:     General: Skin is warm and dry.      Capillary Refill: Capillary refill takes less than 2 seconds.   Neurological:      Mental Status: She is alert and oriented to person, place, and time.   Psychiatric:         Mood and Affect: Mood normal.         Behavior: Behavior normal.         Thought Content: Thought content normal.        Lab Review:   Lipid Panel          2022    11:17 2023    08:19   Lipid Panel   Total Cholesterol  193    Total Cholesterol 217     Triglycerides 130  137    HDL Cholesterol 67  62    VLDL Cholesterol 23  24    LDL Cholesterol  127  107    LDL/HDL Ratio  1.67          Cardiac Procedures:       Assessment:         Diagnoses and all orders for this visit:    1. Primary hypertension (Primary)    2. Hyperlipidemia, unspecified hyperlipidemia type    3. Non-STEMI (non-ST elevated myocardial infarction)    4. Acute on chronic HFrEF (heart failure with reduced ejection fraction)  -     proBNP; Future  -     Basic Metabolic Panel; Future    Other orders  -     ECG 12 Lead            Plan:       Acute on chronic HFrEF: appears to be a little volume overloaded today and does endorse orthopnea and dyspnea on exertion. She is taking furosemide 40 mg daily and can take an extra dose PRN. She has not taken any extra doses. Chest xray reportedly shows what sounds like CHF. I am going to obtain a proBNP and BMP. Will determine if extra furosemide is needed based on those results.  Takotsubo CM: EF 40-45% on echo and normal coronaries per cath. On GDMT with valsartan and carvedilol. Will repeat echo in about 3 months.  HTN: blood pressure is well controlled. No changes  HLD     Thank you for allowing me to participate in this patient's care. Please call with any questions or concerns. Ms. Olsen will follow up with Dr Park in 1 month.          Your medication list            Accurate as of 2023  3:35 PM. If you have any questions, ask your nurse or  doctor.                CHANGE how you take these medications        Instructions Last Dose Given Next Dose Due   gabapentin 100 MG capsule  Commonly known as: NEURONTIN  What changed:   how to take this  when to take this      TAKE ONE CAPSULE BY MOUTH THREE TIMES DAILY       lactulose 10 GM/15ML solution  Commonly known as: CHRONULAC  What changed: when to take this      TAKE 15 ML BY MOUTH THREE TIMES DAILY              CONTINUE taking these medications        Instructions Last Dose Given Next Dose Due   acetaminophen 325 MG tablet  Commonly known as: TYLENOL      Take 2 tablets by mouth Every 4 (Four) Hours As Needed for Mild Pain .       alendronate 70 MG tablet  Commonly known as: Fosamax      Take 1 tablet by mouth Every 7 (Seven) Days.       apixaban 5 MG tablet tablet  Commonly known as: Eliquis      Take 1 tablet by mouth Every 12 (Twelve) Hours.       benzonatate 100 MG capsule  Commonly known as: TESSALON           carvedilol 12.5 MG tablet  Commonly known as: COREG      Take 1 tablet by mouth 2 (Two) Times a Day With Meals.       chlorhexidine 0.12 % solution  Commonly known as: PERIDEX      APPLY 15 ML AS DIRECTED TWICE DAILY SWISH FOR 30 seconds AND expectorate, EVERY MORNING AND IN THE EVENING TO BEGIN in 24 hours AFTER surgery       coenzyme Q10 100 MG capsule      Take 1 capsule by mouth Daily.       cyclobenzaprine 10 MG tablet  Commonly known as: FLEXERIL      Take 1 tablet by mouth 2 (Two) Times a Day As Needed for Muscle Spasms.       Enulose 10 GM/15ML solution solution (encephalopathy)  Generic drug: lactulose           esomeprazole 40 MG capsule  Commonly known as: nexIUM      Take 1 capsule by mouth Every Morning Before Breakfast.       ferrous sulfate 325 (65 FE) MG tablet      Take 1 tablet by mouth Daily With Breakfast.       folic acid 1 MG tablet  Commonly known as: FOLVITE      Take 1 tablet by mouth Daily.       furosemide 40 MG tablet  Commonly known as: LASIX      Take 1 tablet by  mouth Daily. Take second tab at noon prn increased swelling       ipratropium 0.06 % nasal spray  Commonly known as: ATROVENT      2 sprays into the nostril(s) as directed by provider 4 (Four) Times a Day.       ipratropium-albuterol 0.5-2.5 mg/3 ml nebulizer  Commonly known as: DUO-NEB      USE 1 VIAL IN NEBULIZER 2 TIMES DAILY. Generic: DUONEB       levothyroxine 100 MCG tablet  Commonly known as: SYNTHROID, LEVOTHROID      TAKE ONE TABLET BY MOUTH DAILY       lidocaine 5 % ointment  Commonly known as: XYLOCAINE      Apply 1 application topically to the appropriate area as directed 3 (Three) Times a Day.       Magnesium 500 MG tablet      Take  by mouth.       ondansetron 4 MG tablet  Commonly known as: ZOFRAN           potassium chloride 10 MEQ CR tablet      Take 1 tablet by mouth 2 (Two) Times a Day.       pramipexole 1.5 MG tablet  Commonly known as: MIRAPEX      TAKE ONE TABLET BY MOUTH EVERY NIGHT AT BEDTIME       sertraline 25 MG tablet  Commonly known as: ZOLOFT           Stimulant Laxative 8.6-50 MG per tablet  Generic drug: sennosides-docusate      TAKE 2 TABLETS BY MOUTH TWICE DAILY       Symbicort 160-4.5 MCG/ACT inhaler  Generic drug: budesonide-formoterol           triamcinolone 0.1 % cream  Commonly known as: KENALOG      Apply 1 application topically to the appropriate area as directed 2 (Two) Times a Day.       valsartan 320 MG tablet  Commonly known as: DIOVAN      TAKE ONE TABLET BY MOUTH DAILY       vitamin B-12 1000 MCG tablet  Commonly known as: CYANOCOBALAMIN      Take 1 tablet by mouth Daily.       Zinc 50 MG tablet      Take 1 tablet by mouth.                  Shefali Raymond, DEVORA  08/25/23  11:59 AM EDT

## 2023-08-27 ENCOUNTER — APPOINTMENT (OUTPATIENT)
Dept: GENERAL RADIOLOGY | Facility: HOSPITAL | Age: 83
End: 2023-08-27
Payer: MEDICARE

## 2023-08-27 ENCOUNTER — HOSPITAL ENCOUNTER (OUTPATIENT)
Facility: HOSPITAL | Age: 83
Setting detail: OBSERVATION
Discharge: HOME | End: 2023-08-28
Attending: EMERGENCY MEDICINE | Admitting: EMERGENCY MEDICINE
Payer: MEDICARE

## 2023-08-27 DIAGNOSIS — I50.9 ACUTE ON CHRONIC CONGESTIVE HEART FAILURE, UNSPECIFIED HEART FAILURE TYPE: Primary | ICD-10-CM

## 2023-08-27 DIAGNOSIS — I89.0 LYMPHEDEMA: ICD-10-CM

## 2023-08-27 DIAGNOSIS — E78.5 HYPERLIPIDEMIA, UNSPECIFIED HYPERLIPIDEMIA TYPE: ICD-10-CM

## 2023-08-27 DIAGNOSIS — I51.81 TAKOTSUBO CARDIOMYOPATHY: ICD-10-CM

## 2023-08-27 LAB
ALBUMIN SERPL-MCNC: 4 G/DL (ref 3.5–5.2)
ALBUMIN/GLOB SERPL: 2 G/DL
ALP SERPL-CCNC: 82 U/L (ref 39–117)
ALT SERPL W P-5'-P-CCNC: 10 U/L (ref 1–33)
ANION GAP SERPL CALCULATED.3IONS-SCNC: 10 MMOL/L (ref 5–15)
AST SERPL-CCNC: 12 U/L (ref 1–32)
BASOPHILS # BLD AUTO: 0.03 10*3/MM3 (ref 0–0.2)
BASOPHILS NFR BLD AUTO: 0.4 % (ref 0–1.5)
BILIRUB SERPL-MCNC: 0.3 MG/DL (ref 0–1.2)
BUN SERPL-MCNC: 33 MG/DL (ref 8–23)
BUN/CREAT SERPL: 25 (ref 7–25)
CALCIUM SPEC-SCNC: 8.6 MG/DL (ref 8.6–10.5)
CHLORIDE SERPL-SCNC: 104 MMOL/L (ref 98–107)
CO2 SERPL-SCNC: 24 MMOL/L (ref 22–29)
CREAT SERPL-MCNC: 1.32 MG/DL (ref 0.57–1)
D DIMER PPP FEU-MCNC: 0.53 MCGFEU/ML (ref 0–0.83)
DEPRECATED RDW RBC AUTO: 41.7 FL (ref 37–54)
EGFRCR SERPLBLD CKD-EPI 2021: 40.1 ML/MIN/1.73
EOSINOPHIL # BLD AUTO: 0.16 10*3/MM3 (ref 0–0.4)
EOSINOPHIL NFR BLD AUTO: 2.4 % (ref 0.3–6.2)
ERYTHROCYTE [DISTWIDTH] IN BLOOD BY AUTOMATED COUNT: 12.5 % (ref 12.3–15.4)
GEN 5 2HR TROPONIN T REFLEX: 30 NG/L
GLOBULIN UR ELPH-MCNC: 2 GM/DL
GLUCOSE SERPL-MCNC: 101 MG/DL (ref 65–99)
HCT VFR BLD AUTO: 32.4 % (ref 34–46.6)
HGB BLD-MCNC: 10.7 G/DL (ref 12–15.9)
IMM GRANULOCYTES # BLD AUTO: 0.04 10*3/MM3 (ref 0–0.05)
IMM GRANULOCYTES NFR BLD AUTO: 0.6 % (ref 0–0.5)
INR PPP: 1.05 (ref 0.9–1.1)
LYMPHOCYTES # BLD AUTO: 1.88 10*3/MM3 (ref 0.7–3.1)
LYMPHOCYTES NFR BLD AUTO: 27.7 % (ref 19.6–45.3)
MCH RBC QN AUTO: 29.9 PG (ref 26.6–33)
MCHC RBC AUTO-ENTMCNC: 33 G/DL (ref 31.5–35.7)
MCV RBC AUTO: 90.5 FL (ref 79–97)
MONOCYTES # BLD AUTO: 0.55 10*3/MM3 (ref 0.1–0.9)
MONOCYTES NFR BLD AUTO: 8.1 % (ref 5–12)
NEUTROPHILS NFR BLD AUTO: 4.12 10*3/MM3 (ref 1.7–7)
NEUTROPHILS NFR BLD AUTO: 60.8 % (ref 42.7–76)
NRBC BLD AUTO-RTO: 0 /100 WBC (ref 0–0.2)
NT-PROBNP SERPL-MCNC: 2920 PG/ML (ref 0–1800)
PLATELET # BLD AUTO: 161 10*3/MM3 (ref 140–450)
PMV BLD AUTO: 10 FL (ref 6–12)
POTASSIUM SERPL-SCNC: 4.9 MMOL/L (ref 3.5–5.2)
PROT SERPL-MCNC: 6 G/DL (ref 6–8.5)
PROTHROMBIN TIME: 13.8 SECONDS (ref 11.7–14.2)
QT INTERVAL: 466 MS
QT INTERVAL: 507 MS
QTC INTERVAL: 500 MS
QTC INTERVAL: 503 MS
RBC # BLD AUTO: 3.58 10*6/MM3 (ref 3.77–5.28)
SODIUM SERPL-SCNC: 138 MMOL/L (ref 136–145)
TROPONIN T DELTA: 1 NG/L
TROPONIN T SERPL HS-MCNC: 29 NG/L
WBC NRBC COR # BLD: 6.78 10*3/MM3 (ref 3.4–10.8)

## 2023-08-27 PROCEDURE — G0378 HOSPITAL OBSERVATION PER HR: HCPCS

## 2023-08-27 PROCEDURE — 71045 X-RAY EXAM CHEST 1 VIEW: CPT

## 2023-08-27 PROCEDURE — 93010 ELECTROCARDIOGRAM REPORT: CPT | Performed by: INTERNAL MEDICINE

## 2023-08-27 PROCEDURE — 84484 ASSAY OF TROPONIN QUANT: CPT | Performed by: EMERGENCY MEDICINE

## 2023-08-27 PROCEDURE — 85610 PROTHROMBIN TIME: CPT | Performed by: EMERGENCY MEDICINE

## 2023-08-27 PROCEDURE — 85379 FIBRIN DEGRADATION QUANT: CPT | Performed by: EMERGENCY MEDICINE

## 2023-08-27 PROCEDURE — 25010000002 FUROSEMIDE PER 20 MG: Performed by: EMERGENCY MEDICINE

## 2023-08-27 PROCEDURE — 36415 COLL VENOUS BLD VENIPUNCTURE: CPT

## 2023-08-27 PROCEDURE — 94761 N-INVAS EAR/PLS OXIMETRY MLT: CPT

## 2023-08-27 PROCEDURE — 84443 ASSAY THYROID STIM HORMONE: CPT | Performed by: NURSE PRACTITIONER

## 2023-08-27 PROCEDURE — 99284 EMERGENCY DEPT VISIT MOD MDM: CPT

## 2023-08-27 PROCEDURE — 83880 ASSAY OF NATRIURETIC PEPTIDE: CPT | Performed by: EMERGENCY MEDICINE

## 2023-08-27 PROCEDURE — 96374 THER/PROPH/DIAG INJ IV PUSH: CPT

## 2023-08-27 PROCEDURE — 93005 ELECTROCARDIOGRAM TRACING: CPT | Performed by: PHYSICIAN ASSISTANT

## 2023-08-27 PROCEDURE — 94640 AIRWAY INHALATION TREATMENT: CPT

## 2023-08-27 PROCEDURE — 93005 ELECTROCARDIOGRAM TRACING: CPT | Performed by: EMERGENCY MEDICINE

## 2023-08-27 PROCEDURE — 94799 UNLISTED PULMONARY SVC/PX: CPT

## 2023-08-27 PROCEDURE — 80053 COMPREHEN METABOLIC PANEL: CPT | Performed by: EMERGENCY MEDICINE

## 2023-08-27 PROCEDURE — 85025 COMPLETE CBC W/AUTO DIFF WBC: CPT | Performed by: EMERGENCY MEDICINE

## 2023-08-27 RX ORDER — ONDANSETRON 2 MG/ML
4 INJECTION INTRAMUSCULAR; INTRAVENOUS EVERY 6 HOURS PRN
Status: DISCONTINUED | OUTPATIENT
Start: 2023-08-27 | End: 2023-08-28 | Stop reason: HOSPADM

## 2023-08-27 RX ORDER — FUROSEMIDE 10 MG/ML
80 INJECTION INTRAMUSCULAR; INTRAVENOUS ONCE
Status: COMPLETED | OUTPATIENT
Start: 2023-08-27 | End: 2023-08-27

## 2023-08-27 RX ORDER — CHOLECALCIFEROL (VITAMIN D3) 125 MCG
1000 CAPSULE ORAL DAILY
Status: DISCONTINUED | OUTPATIENT
Start: 2023-08-28 | End: 2023-08-28 | Stop reason: HOSPADM

## 2023-08-27 RX ORDER — BUDESONIDE AND FORMOTEROL FUMARATE DIHYDRATE 160; 4.5 UG/1; UG/1
2 AEROSOL RESPIRATORY (INHALATION)
Status: DISCONTINUED | OUTPATIENT
Start: 2023-08-27 | End: 2023-08-28 | Stop reason: HOSPADM

## 2023-08-27 RX ORDER — IPRATROPIUM BROMIDE 42 UG/1
2 SPRAY, METERED NASAL 4 TIMES DAILY
Status: DISCONTINUED | OUTPATIENT
Start: 2023-08-27 | End: 2023-08-28 | Stop reason: HOSPADM

## 2023-08-27 RX ORDER — CHOLECALCIFEROL (VITAMIN D3) 125 MCG
5 CAPSULE ORAL NIGHTLY PRN
Status: DISCONTINUED | OUTPATIENT
Start: 2023-08-27 | End: 2023-08-28 | Stop reason: HOSPADM

## 2023-08-27 RX ORDER — LACTULOSE 10 G/15ML
10 SOLUTION ORAL 3 TIMES DAILY
Status: DISCONTINUED | OUTPATIENT
Start: 2023-08-27 | End: 2023-08-28 | Stop reason: HOSPADM

## 2023-08-27 RX ORDER — VALSARTAN 320 MG/1
320 TABLET ORAL DAILY
Status: DISCONTINUED | OUTPATIENT
Start: 2023-08-28 | End: 2023-08-28 | Stop reason: HOSPADM

## 2023-08-27 RX ORDER — ACETAMINOPHEN 325 MG/1
650 TABLET ORAL EVERY 4 HOURS PRN
Status: DISCONTINUED | OUTPATIENT
Start: 2023-08-27 | End: 2023-08-28 | Stop reason: HOSPADM

## 2023-08-27 RX ORDER — CYCLOBENZAPRINE HCL 10 MG
10 TABLET ORAL 2 TIMES DAILY PRN
Status: DISCONTINUED | OUTPATIENT
Start: 2023-08-27 | End: 2023-08-28 | Stop reason: HOSPADM

## 2023-08-27 RX ORDER — SODIUM CHLORIDE 0.9 % (FLUSH) 0.9 %
10 SYRINGE (ML) INJECTION AS NEEDED
Status: DISCONTINUED | OUTPATIENT
Start: 2023-08-27 | End: 2023-08-28 | Stop reason: HOSPADM

## 2023-08-27 RX ORDER — PRAMIPEXOLE DIHYDROCHLORIDE 1.5 MG/1
1.5 TABLET ORAL NIGHTLY
Status: DISCONTINUED | OUTPATIENT
Start: 2023-08-27 | End: 2023-08-28 | Stop reason: HOSPADM

## 2023-08-27 RX ORDER — SODIUM CHLORIDE 0.9 % (FLUSH) 0.9 %
10 SYRINGE (ML) INJECTION EVERY 12 HOURS SCHEDULED
Status: DISCONTINUED | OUTPATIENT
Start: 2023-08-27 | End: 2023-08-28 | Stop reason: HOSPADM

## 2023-08-27 RX ORDER — NITROGLYCERIN 0.4 MG/1
0.4 TABLET SUBLINGUAL
Status: DISCONTINUED | OUTPATIENT
Start: 2023-08-27 | End: 2023-08-28 | Stop reason: HOSPADM

## 2023-08-27 RX ORDER — GABAPENTIN 100 MG/1
100 CAPSULE ORAL NIGHTLY
Status: DISCONTINUED | OUTPATIENT
Start: 2023-08-27 | End: 2023-08-27

## 2023-08-27 RX ORDER — FERROUS SULFATE 325(65) MG
325 TABLET ORAL
Status: DISCONTINUED | OUTPATIENT
Start: 2023-08-28 | End: 2023-08-28 | Stop reason: HOSPADM

## 2023-08-27 RX ORDER — PANTOPRAZOLE SODIUM 40 MG/1
40 TABLET, DELAYED RELEASE ORAL
Status: DISCONTINUED | OUTPATIENT
Start: 2023-08-28 | End: 2023-08-28 | Stop reason: HOSPADM

## 2023-08-27 RX ORDER — CARVEDILOL 12.5 MG/1
12.5 TABLET ORAL 2 TIMES DAILY WITH MEALS
Status: DISCONTINUED | OUTPATIENT
Start: 2023-08-27 | End: 2023-08-28 | Stop reason: HOSPADM

## 2023-08-27 RX ORDER — FUROSEMIDE 40 MG/1
40 TABLET ORAL DAILY
Status: DISCONTINUED | OUTPATIENT
Start: 2023-08-28 | End: 2023-08-28

## 2023-08-27 RX ORDER — LEVOTHYROXINE SODIUM 0.05 MG/1
100 TABLET ORAL DAILY
Status: DISCONTINUED | OUTPATIENT
Start: 2023-08-28 | End: 2023-08-28 | Stop reason: HOSPADM

## 2023-08-27 RX ORDER — SODIUM CHLORIDE 9 MG/ML
40 INJECTION, SOLUTION INTRAVENOUS AS NEEDED
Status: DISCONTINUED | OUTPATIENT
Start: 2023-08-27 | End: 2023-08-28 | Stop reason: HOSPADM

## 2023-08-27 RX ORDER — GABAPENTIN 100 MG/1
100 CAPSULE ORAL NIGHTLY
Status: DISCONTINUED | OUTPATIENT
Start: 2023-08-27 | End: 2023-08-28 | Stop reason: HOSPADM

## 2023-08-27 RX ORDER — IPRATROPIUM BROMIDE AND ALBUTEROL SULFATE 2.5; .5 MG/3ML; MG/3ML
3 SOLUTION RESPIRATORY (INHALATION) EVERY 4 HOURS PRN
Status: DISCONTINUED | OUTPATIENT
Start: 2023-08-27 | End: 2023-08-28 | Stop reason: HOSPADM

## 2023-08-27 RX ORDER — FOLIC ACID 1 MG/1
1 TABLET ORAL DAILY
Status: DISCONTINUED | OUTPATIENT
Start: 2023-08-28 | End: 2023-08-28 | Stop reason: HOSPADM

## 2023-08-27 RX ORDER — ONDANSETRON 4 MG/1
4 TABLET, FILM COATED ORAL EVERY 6 HOURS PRN
Status: DISCONTINUED | OUTPATIENT
Start: 2023-08-27 | End: 2023-08-28 | Stop reason: HOSPADM

## 2023-08-27 RX ADMIN — CARVEDILOL 12.5 MG: 12.5 TABLET, FILM COATED ORAL at 18:04

## 2023-08-27 RX ADMIN — Medication 10 ML: at 20:34

## 2023-08-27 RX ADMIN — IPRATROPIUM BROMIDE 2 SPRAY: 42 SPRAY, METERED NASAL at 20:30

## 2023-08-27 RX ADMIN — PRAMIPEXOLE DIHYDROCHLORIDE 1.5 MG: 1.5 TABLET ORAL at 20:29

## 2023-08-27 RX ADMIN — ACETAMINOPHEN 650 MG: 325 TABLET ORAL at 22:06

## 2023-08-27 RX ADMIN — LACTULOSE 10 G: 10 SOLUTION ORAL at 20:29

## 2023-08-27 RX ADMIN — APIXABAN 5 MG: 5 TABLET, FILM COATED ORAL at 20:29

## 2023-08-27 RX ADMIN — FUROSEMIDE 80 MG: 20 INJECTION, SOLUTION INTRAMUSCULAR; INTRAVENOUS at 11:22

## 2023-08-27 RX ADMIN — Medication 10 ML: at 14:13

## 2023-08-27 RX ADMIN — BUDESONIDE AND FORMOTEROL FUMARATE DIHYDRATE 2 PUFF: 160; 4.5 AEROSOL RESPIRATORY (INHALATION) at 21:03

## 2023-08-27 RX ADMIN — GABAPENTIN 100 MG: 100 CAPSULE ORAL at 18:04

## 2023-08-27 NOTE — ED PROVIDER NOTES
EMERGENCY DEPARTMENT ENCOUNTER    Room Number:  40/40  Date seen:  8/27/2023  PCP: Kehrer, Meredith Lea, MD  Historian: Patient, EMS who brought patient      HPI:  Chief Complaint: Shortness of breath  A complete HPI/ROS/PMH/PSH/SH/FH are unobtainable due to:   Context: Latanya Olsen is a 82 y.o. female who presents to the ED c/o shortness of breath.  Patient states she has had worsening shortness of breath off and on over the last several days.  She was seen by cardiology clinic and asked to increase her Lasix.  She took an extra dose of 40 mg Lasix on Friday and Saturday.  Despite this she has remained shortness of breath.  She does have cough which is occasionally productive of clear sputum.  She does describe some vague mild to moderate chest discomfort.  She has chronic lower extremity edema.  She does not report significant increased weight but is not sure of her weight at discharge.      MEDICAL RECORD REVIEW (non ED)  I reviewed prior medical records note patient was hospitalized in early August with non-STEMI which was felt to be related to Takotsubo cardiomyopathy.  She had normal coronaries on cath.  She is found to have some congestive heart failure with an ejection fraction of 40 to 45%.    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    Diverticulosis of intestine 02/19/2016    Obstructive sleep apnea syndrome 02/19/2016    Weight loss 02/19/2016    Vitamin D deficiency 02/19/2016    Overweight (BMI 25.0-29.9) 02/19/2016    Spinal stenosis of lumbar region without neurogenic claudication 02/19/2016    Osteoarthritis of knee 02/19/2016    Obesity (BMI 30-39.9) 02/19/2016    Chronic lower back pain 02/19/2016    Knee pain 02/19/2016    Insomnia 02/19/2016    Hypothyroidism 02/19/2016    Hypokalemia 02/19/2016    Hypertension 02/19/2016    Hyperlipidemia 02/19/2016    Generalized osteoarthritis 02/19/2016    Gastroparesis 02/19/2016     Foot pain 02/19/2016    Chronic obstructive pulmonary disease 02/19/2016    Chronic constipation 02/19/2016    Anemia 02/19/2016    Weight gain 02/19/2016    Pain of left breast 02/22/2016    Elevated serum alkaline phosphatase level 02/22/2016    Neck pain 11/28/2017    Volume overload 03/19/2019    Status post total hip replacement, 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    Generalized weakness 03/06/2021    Constipation 03/10/2021    Idiopathic peripheral neuropathy 06/24/2022    Lymphedema 08/01/2022    Lumbar degenerative disc disease 01/31/2023    GERD (gastroesophageal reflux disease) 02/16/2023    Lung cancer 02/16/2023    Non-STEMI (non-ST elevated myocardial infarction) 08/06/2023    Nonischemic nontraumatic myocardial injury 08/10/2023     Resolved Ambulatory Problems     Diagnosis Date Noted    Finger injury 02/19/2016    Upper respiratory tract infection 02/19/2016    Contusion of upper arm 02/19/2016    Tingling of skin 02/19/2016    Rash 02/19/2016    Candidiasis of mouth 02/19/2016    Neutropenia 02/19/2016    Spasm 02/19/2016    Low back pain 02/19/2016    Heartburn 02/19/2016    Diarrhea 02/19/2016    Bloating symptom 02/19/2016    Gastroesophageal reflux disease with esophagitis 02/19/2016    Drug-induced photosensitivity 02/19/2016    Cramp of limb 02/19/2016    Contact dermatitis 02/19/2016    Chronic kidney disease, stage II (mild) 02/19/2016    Ankle joint pain 02/19/2016    Atopic rhinitis 02/19/2016    Allergic reaction to drug 02/19/2016    Acute sinusitis 02/19/2016    Generalized abdominal pain 02/19/2016    Closed fracture of neck of right femur 05/31/2019    ARF (acute renal failure) 03/07/2021    Acute deep vein thrombosis (DVT) of iliac vein of left lower extremity 03/24/2021    Acute deep vein thrombosis (DVT) of tibial vein of left lower extremity 04/20/2021    Atrial fibrillation 04/20/2021    Hyperhomocysteinemia 05/14/2021     Past Medical History:   Diagnosis Date    Acute exacerbation of CHF  (congestive heart failure) 8/27/2023    Arthritis     CKD (chronic kidney disease)     COPD (chronic obstructive pulmonary disease)     DDD (degenerative disc disease), lumbar     Depression     Diverticulosis     Hx of degenerative disc disease     Knee swelling     Obesity 218    Osteopenia Yes    Renal disorder     Restless leg syndrome     Scoliosis Yes    Sleep apnea with use of continuous positive airway pressure (CPAP)          PAST SURGICAL HISTORY  Past Surgical History:   Procedure Laterality Date    ADENOIDECTOMY  2000    BUNIONECTOMY Bilateral     CARDIAC CATHETERIZATION N/A 8/7/2023    Procedure: Left Heart Cath;  Surgeon: Mireya Park MD;  Location: [facility] CATH INVASIVE LOCATION;  Service: Cardiology;  Laterality: N/A;    CARDIAC CATHETERIZATION N/A 8/7/2023    Procedure: Coronary angiography;  Surgeon: Mireya Pakr MD;  Location: [facility] CATH INVASIVE LOCATION;  Service: Cardiology;  Laterality: N/A;    CARDIAC CATHETERIZATION N/A 8/7/2023    Procedure: Left ventriculography;  Surgeon: Mireya Park MD;  Location: [facility] CATH INVASIVE LOCATION;  Service: Cardiology;  Laterality: N/A;    CATARACT EXTRACTION      CHOLECYSTECTOMY      COLONOSCOPY      ENDOSCOPY N/A 06/24/2016    Procedure: ESOPHAGOGASTRODUODENOSCOPY  with biopsies;  Surgeon: Von Hernández MD;  Location: [facility] OR;  Service:     LUNG LOBECTOMY Left     lower lobe    LYTIC THROMBIN THERAPY Left 03/26/2021    Procedure: left leg clot treiver possible venous stent *supine*;  Surgeon: Rogerio Vega MD;  Location: [facility] OR 18/19;  Service: Vascular;  Laterality: Left;    REPLACEMENT TOTAL KNEE Left     THYROID BIOPSY      TOTAL HIP ARTHROPLASTY Right 06/01/2019    Procedure: BIPOLAR HIP CEMENTED POSTERIOR;  Surgeon: Ashley Crenshaw MD;  Location: [facility] MAIN OR;  Service: Orthopedics    TUBAL ABDOMINAL LIGATION  50 yrs ago         FAMILY HISTORY  Family History   Problem Relation Age of Onset    Heart  attack Mother     Coronary artery disease Mother     Hypertension Mother     Kidney disease Mother     Heart attack Sister     Colon cancer Neg Hx     Colon polyps Neg Hx     Esophageal cancer Neg Hx     Breast cancer Neg Hx          SOCIAL HISTORY  Social History     Socioeconomic History    Marital status:    Tobacco Use    Smoking status: Never    Smokeless tobacco: Never   Vaping Use    Vaping Use: Never used   Substance and Sexual Activity    Alcohol use: No    Drug use: Never    Sexual activity: Not Currently         ALLERGIES  Doxycycline        REVIEW OF SYSTEMS  Review of Systems   Constitutional:  Positive for fatigue. Negative for fever.   Respiratory:  Positive for cough and shortness of breath.    Cardiovascular:  Positive for chest pain and leg swelling (Chronic).   All other systems reviewed and are negative.         PHYSICAL EXAM  ED Triage Vitals [08/27/23 1031]   Temp Heart Rate Resp BP SpO2   98.1 °F (36.7 °C) 70 16 (!) 160/113 97 %      Temp src Heart Rate Source Patient Position BP Location FiO2 (%)   Oral Monitor -- -- --       Physical Exam    GENERAL: Alert female no obvious distress.  Triage vitals reviewed notable for blood pressure 160/113.  Temperature, heart rate and O2 sats are benign.  HENT: nares patent  EYES: no scleral icterus  CV: regular rhythm, regular rate-no murmur  RESPIRATORY: normal effort, clear to auscultation bilaterally  ABDOMEN: soft, obese-nontender  MUSCULOSKELETAL: Chronic appearing swelling of both legs with compression stockings in place  NEURO: Strength sensation and coordination are grossly intact.  Speech and mentation are unremarkable  SKIN: warm, dry      Vital signs and nursing notes reviewed.          LAB RESULTS  Recent Results (from the past 24 hour(s))   ECG 12 Lead Dyspnea    Collection Time: 08/27/23 10:56 AM   Result Value Ref Range    QT Interval 466 ms    QTC Interval 500 ms   Comprehensive Metabolic Panel    Collection Time: 08/27/23 11:17 AM     Specimen: Blood   Result Value Ref Range    Glucose 101 (H) 65 - 99 mg/dL    BUN 33 (H) 8 - 23 mg/dL    Creatinine 1.32 (H) 0.57 - 1.00 mg/dL    Sodium 138 136 - 145 mmol/L    Potassium 4.9 3.5 - 5.2 mmol/L    Chloride 104 98 - 107 mmol/L    CO2 24.0 22.0 - 29.0 mmol/L    Calcium 8.6 8.6 - 10.5 mg/dL    Total Protein 6.0 6.0 - 8.5 g/dL    Albumin 4.0 3.5 - 5.2 g/dL    ALT (SGPT) 10 1 - 33 U/L    AST (SGOT) 12 1 - 32 U/L    Alkaline Phosphatase 82 39 - 117 U/L    Total Bilirubin 0.3 0.0 - 1.2 mg/dL    Globulin 2.0 gm/dL    A/G Ratio 2.0 g/dL    BUN/Creatinine Ratio 25.0 7.0 - 25.0    Anion Gap 10.0 5.0 - 15.0 mmol/L    eGFR 40.1 (L) >60.0 mL/min/1.73   Protime-INR    Collection Time: 08/27/23 11:17 AM    Specimen: Blood   Result Value Ref Range    Protime 13.8 11.7 - 14.2 Seconds    INR 1.05 0.90 - 1.10   BNP    Collection Time: 08/27/23 11:17 AM    Specimen: Blood   Result Value Ref Range    proBNP 2,920.0 (H) 0.0 - 1,800.0 pg/mL   D-dimer, Quantitative    Collection Time: 08/27/23 11:17 AM    Specimen: Blood   Result Value Ref Range    D-Dimer, Quantitative 0.53 0.00 - 0.83 MCGFEU/mL   High Sensitivity Troponin T    Collection Time: 08/27/23 11:17 AM    Specimen: Blood   Result Value Ref Range    HS Troponin T 29 (H) <10 ng/L   CBC Auto Differential    Collection Time: 08/27/23 11:17 AM    Specimen: Blood   Result Value Ref Range    WBC 6.78 3.40 - 10.80 10*3/mm3    RBC 3.58 (L) 3.77 - 5.28 10*6/mm3    Hemoglobin 10.7 (L) 12.0 - 15.9 g/dL    Hematocrit 32.4 (L) 34.0 - 46.6 %    MCV 90.5 79.0 - 97.0 fL    MCH 29.9 26.6 - 33.0 pg    MCHC 33.0 31.5 - 35.7 g/dL    RDW 12.5 12.3 - 15.4 %    RDW-SD 41.7 37.0 - 54.0 fl    MPV 10.0 6.0 - 12.0 fL    Platelets 161 140 - 450 10*3/mm3    Neutrophil % 60.8 42.7 - 76.0 %    Lymphocyte % 27.7 19.6 - 45.3 %    Monocyte % 8.1 5.0 - 12.0 %    Eosinophil % 2.4 0.3 - 6.2 %    Basophil % 0.4 0.0 - 1.5 %    Immature Grans % 0.6 (H) 0.0 - 0.5 %    Neutrophils, Absolute 4.12 1.70 -  7.00 10*3/mm3    Lymphocytes, Absolute 1.88 0.70 - 3.10 10*3/mm3    Monocytes, Absolute 0.55 0.10 - 0.90 10*3/mm3    Eosinophils, Absolute 0.16 0.00 - 0.40 10*3/mm3    Basophils, Absolute 0.03 0.00 - 0.20 10*3/mm3    Immature Grans, Absolute 0.04 0.00 - 0.05 10*3/mm3    nRBC 0.0 0.0 - 0.2 /100 WBC       Ordered the above labs and independently interpreted results. My findings will be discussed in the medical decision making section below        RADIOLOGY  XR Chest 1 View    Result Date: 8/27/2023  XR CHEST 1 VW-  HISTORY: Female who is 82 years-old, short of breath  TECHNIQUE: Frontal view of the chest  COMPARISON: 10/26/2022  FINDINGS: The heart size is borderline. Aorta is calcified. Pulmonary vasculature is unremarkable. No focal pulmonary consolidation, pleural effusion, or pneumothorax. No acute osseous process.      No focal pulmonary consolidation. Borderline heart size. Follow-up as clinical indications persist.  This report was finalized on 8/27/2023 11:42 AM by Dr. Gregory Baptiste M.D.       I ordered and independently reviewed the above noted radiographic studies.      I viewed images of chest x-ray which showed cardiomegaly, no obvious infiltrate per my independent interpretation.    See radiologist's dictation for official interpretation.             PROCEDURES  Procedures          MEDICATIONS GIVEN IN ER  Medications   furosemide (LASIX) injection 80 mg (80 mg Intravenous Given 8/27/23 1122)               MEDICAL DECISION MAKING, PROGRESS, and CONSULTS    All labs have been independently reviewed by me.  All radiology studies have been reviewed by me and I have also reviewed the radiology report.   EKG's independently viewed and interpreted by me.  Discussion below represents my analysis of pertinent findings related to patient's condition, differential diagnosis, treatment plan and final disposition.      Additional sources:  - Discussed/ obtained information from independent historians: EMS who  brought patient    - External (non-ED) record review: Please see documented above    - Chronic or social conditions impacting care: Heart failure, toxic cardiomyopathy    - Shared decision making: I discussed ED evaluation and treatment plan with patient who is in agreement.  Patient presents with shortness of breath despite increasing diuretics over the last 2 days.    On exam she did appear somewhat volume overloaded.    Labs notable for elevated BNP of 2920 consistent with likely CHF.  Patient treated with IV diuretics in the ER (80 mg Lasix).  She has had pretty good diuresis so far but given her age and comorbidities will admit the [facility] unit for overnight observation and cardiology consultation.      Orders placed during this visit:  Orders Placed This Encounter   Procedures    XR Chest 1 View    Comprehensive Metabolic Panel    Protime-INR    BNP    D-dimer, Quantitative    High Sensitivity Troponin T    CBC Auto Differential    High Sensitivity Troponin T 2Hr    ECG 12 Lead Dyspnea    Initiate ED Observation Status    CBC & Differential           Differential diagnosis:    Please see as documented below in ED course      Independent interpretation of labs, radiology studies, and discussions with consultants:  ED Course as of 08/27/23 1306   Sun Aug 27, 2023   [age] [id number] female with a history of Takotsubo cardiomyopathy and congestive heart failure presents with worsening shortness of breath.    On exam she is fairly well-appearing and does have chronic lower extremity edema.  She was somewhat hypertensive at triage    MDM-would be concern for worsening of heart failure and think the patient may be somewhat volume overloaded.  Recent catheterization was benign with Takotsubo cardiomyopathy and ejection fraction of 40 to 45%   [DB]   1102 EKG independently interpreted by me    Time 10:56 AM    Sinus 69  Normal P waves and NH interval  IVCD, unremarkable axis  ST, T waves-inverted T waves anteriorly  and inferiorly    Not significant change when compared to 8/8/2023 [DB]   1134 Chest x-ray independently interpreted by me shows some cardiomegaly without obvious acute infiltrate. [DB]   1244 BNP of 3000 is slightly improved from 2 days ago when seen at cardiology clinic. [DB]   1255 Patient is already diuresed over 1 L in almost 2 with her IV Lasix.  She is feeling significantly better but is leery of going home considering that she lives alone at 82.    We will consider observation admission for repeat labs and cardiology consultation. [DB]      ED Course User Index  [DB] Tima Nevarez MD               DIAGNOSIS  Final diagnoses:   Acute on chronic congestive heart failure, unspecified heart failure type   Takotsubo cardiomyopathy   Hyperlipidemia, unspecified hyperlipidemia type   Lymphedema         DISPOSITION  Admission            Latest Documented Vital Signs:  As of 13:06 EDT  BP- 142/68 HR- 64 Temp- 98.1 °F (36.7 °C) (Oral) O2 sat- 98%              --    Please note that portions of this were completed with a voice recognition program.       Note Disclaimer: At [facility], we believe that sharing information builds trust and better relationships. You are receiving this note because you are receiving care at [facility] or recently visited. It is possible you will see health information before a provider has talked with you about it. This kind of information can be easy to misunderstand. To help you fully understand what it means for your health, we urge you to discuss this note with your provider.             Tima Nevarez MD  08/27/23 2426

## 2023-08-27 NOTE — ED NOTES
She has been soa since noon yesterday.  She saw in [facility] that she is having an exacerbation of chf.  She has noted increased swelling in legs and has had to use her cpap.

## 2023-08-27 NOTE — PLAN OF CARE
Goal Outcome Evaluation:   Pt admitted for CHF exacerbation. Trop 29 and 30. Pt denies ever having chest pain. IV lasix given in the ER. Pt states she feels much better after that. VSS. Pt is on room air. Morales on monitor. Hx of afib. Cards consulted

## 2023-08-27 NOTE — H&P
[facility]   HISTORY AND PHYSICAL    Patient Name: Latanya Olsen  : 1940  MRN: [id number]  Primary Care Physician:  Kehrer, Meredith Lea, MD  Date of admission: 2023    Subjective   Subjective     Chief Complaint:   Chief Complaint   Patient presents with    Shortness of Breath    Leg Swelling         HPI:    Latanya Olsen is a 82 y.o. female with history of paroxysmal atrial fibrillation anticoagulated on Eliquis, CKD, COPD, lung cancer status post left lower lobectomy in , hypertension, hypothyroidism, SIERRA on CPAP, Takotsubo cardiomyopathy, and congestive heart failure, who presented to the emergency department today complaining of shortness of breath.  Patient was seen by cardiology in office on 2023.  Her proBNP was elevated over 3000 and she was instructed to take an additional dose of Lasix on Friday and Saturday.  Therefore, patient had 80 mg of Lasix on Friday and Saturday.  Last night patient states she was so short of breath she could not sleep and therefore she came to the emergency department this morning.  Patient states that she has chronic lower extremity swelling but left leg appeared more swollen over the past few days.  She also states she does have a cough which is occasionally productive of clear sputum.    ED evaluation reviewed, high-sensitivity troponin 29, 30, proBNP 2920, creatinine 1.32, chest x-ray shows borderline heart size, EKG shows sinus rhythm with rate of 69 bpm, abnormal T wave in anterolateral leads.  Patient received 80 mg of IV Lasix in ED and has been diuresing very well.  She states that she is already feeling significantly improved.    Review of Systems   All systems were reviewed and negative except for: What is discussed in the HPI    Personal History     Past Medical History:   Diagnosis Date    Acute exacerbation of CHF (congestive heart failure) 2023    Anemia     Arthritis     Atrial fibrillation     CKD (chronic kidney disease)      Stage 3    Constipation     COPD (chronic obstructive pulmonary disease)     DDD (degenerative disc disease), lumbar     Depression     Diverticulosis     GERD (gastroesophageal reflux disease)     Hx of degenerative disc disease     Hyperlipidemia     Hypertension     Hypokalemia     Hypothyroidism     Knee swelling     Low back pain Yes    Lung cancer     history    Obesity 218    Osteopenia Yes    Renal disorder     Restless leg syndrome     Scoliosis Yes    Sleep apnea with use of continuous positive airway pressure (CPAP)        Past Surgical History:   Procedure Laterality Date    ADENOIDECTOMY  2000    BUNIONECTOMY Bilateral     CARDIAC CATHETERIZATION N/A 8/7/2023    Procedure: Left Heart Cath;  Surgeon: Mireya Park MD;  Location: [facility] CATH INVASIVE LOCATION;  Service: Cardiology;  Laterality: N/A;    CARDIAC CATHETERIZATION N/A 8/7/2023    Procedure: Coronary angiography;  Surgeon: Mireya Park MD;  Location: [facility] CATH INVASIVE LOCATION;  Service: Cardiology;  Laterality: N/A;    CARDIAC CATHETERIZATION N/A 8/7/2023    Procedure: Left ventriculography;  Surgeon: Mireya Park MD;  Location: [facility] CATH INVASIVE LOCATION;  Service: Cardiology;  Laterality: N/A;    CATARACT EXTRACTION      CHOLECYSTECTOMY      COLONOSCOPY      ENDOSCOPY N/A 06/24/2016    Procedure: ESOPHAGOGASTRODUODENOSCOPY  with biopsies;  Surgeon: Von Hernández MD;  Location: [facility] OR;  Service:     LUNG LOBECTOMY Left     lower lobe    LYTIC THROMBIN THERAPY Left 03/26/2021    Procedure: left leg clot treiver possible venous stent *supine*;  Surgeon: Rogerio Vega MD;  Location: [facility] OR 18/19;  Service: Vascular;  Laterality: Left;    REPLACEMENT TOTAL KNEE Left     THYROID BIOPSY      TOTAL HIP ARTHROPLASTY Right 06/01/2019    Procedure: BIPOLAR HIP CEMENTED POSTERIOR;  Surgeon: Ashley Crenshaw MD;  Location: [facility] MAIN OR;  Service: Orthopedics    TUBAL ABDOMINAL LIGATION  50 yrs  ago       Family History: family history includes Coronary artery disease in her mother; Heart attack in her mother and sister; Hypertension in her mother; Kidney disease in her mother. Otherwise pertinent FHx was reviewed and not pertinent to current issue.    Social History:  reports that she has never smoked. She has never used smokeless tobacco. She reports that she does not drink alcohol and does not use drugs.    Home Medications:  Magnesium, Zinc, acetaminophen, alendronate, apixaban, benzonatate, budesonide-formoterol, carvedilol, chlorhexidine, coenzyme Q10, cyclobenzaprine, esomeprazole, ferrous sulfate, folic acid, furosemide, gabapentin, ipratropium, ipratropium-albuterol, lactulose, levothyroxine, lidocaine, ondansetron, potassium chloride, pramipexole, sennosides-docusate, sertraline, triamcinolone, valsartan, and vitamin B-12    Allergies:  Allergies   Allergen Reactions    Doxycycline Anaphylaxis     Facial swelling, shortness of air, dizzines       Objective   Objective     Vitals:   Temp:  [98 °F (36.7 °C)-98.1 °F (36.7 °C)] 98 °F (36.7 °C)  Heart Rate:  [64-70] 68  Resp:  [16-18] 18  BP: (128-160)/() 128/62  Flow (L/min):  [2] 2  Physical Exam     GENERAL: 82 y.o. YO female a/o x 4, NAD  SKIN: Warm pink and dry   HEENT:  PERRL, EOM intact, conjunctivae normal, sclerae clear  NECK: supple, no JVD  LUNGS: Clear to auscultation bilaterally without wheezes, rales or rhonchi.  No accessory muscle use and no nasal flaring.  CARDIAC:  Regular rate and rhythm, S1-S2.  No murmurs, rubs or gallops.  +peripheral edema.  Equal pulses bilaterally.  ABDOMEN: Soft, nontender, nondistended.  No guarding or rebound tenderness.  Normal bowel sounds.  MUSCULOSKELETAL: Moves all extremities well.  No deformity.  NEURO: Cranial nerves II through XII grossly intact.  No gross focal deficits.  Alert.  Normal speech and motor.  PSYCH: Normal mood and affect      Result Review    Result Review:  I have personally  reviewed the results from the time of this admission to 8/27/2023 17:11 EDT and agree with these findings:  [x]  Laboratory list / accordion  []  Microbiology  [x]  Radiology  []  EKG/Telemetry   []  Cardiology/Vascular   []  Pathology  []  Old records  []  Other:  Most notable findings include:     XR Chest 1 View    Result Date: 8/27/2023  No focal pulmonary consolidation. Borderline heart size. Follow-up as clinical indications persist.  This report was finalized on 8/27/2023 11:42 AM by Dr. Gregory Baptiste M.D.             Assessment & Plan   Assessment / Plan     Brief Patient Summary:  Latanya Olsen is a 82 y.o. female who is being admitted observation unit for further evaluation of shortness of breath, CHF exacerbation    Active Hospital Problems:  Active Hospital Problems    Diagnosis     **Acute exacerbation of CHF (congestive heart failure)      Plan:     Acute on chronic congestive heart failure  Takotsubo cardiomyopathy  -proBNP 2920  -Patient received 80 mg Lasix IV in ED and is diuresing well  -Her initial EKG has some T wave abnormalities in anterolateral leads, repeat now and in a.m.  -Troponin 29, 30  -Consult cardiology  -Telemetry monitoring    Paroxysmal atrial fibrillation  -Continue home medication as prescribed    Hypothyroidism  -Continue levothyroxine    COPD  -Continue breathing treatments, inhalers as prescribed    GERD  -Continue PPI    Hypertension  -Continue valsartan, carvedilol    DVT prophylaxis:  Mechanical DVT prophylaxis orders are present.    CODE STATUS:    Level Of Support Discussed With: Patient  Code Status (Patient has no pulse and is not breathing): CPR (Attempt to Resuscitate)  Medical Interventions (Patient has pulse or is breathing): Full Support    Admission Status:  I believe this patient meets observation status.     78 minutes has been spent by [facility] providers in the care of this patient while under observation status    I  wore an appropriate PPE during this patient encounter.  Hand hygeine performed before and after seeing the patient.    Electronically signed by DAVID Talbot, 08/27/23, 5:11 PM EDT.

## 2023-08-27 NOTE — ED NOTES
"Nursing report ED to floor  Latanya Olsen  82 y.o.  female    HPI :   Chief Complaint   Patient presents with    Shortness of Breath    Leg Swelling       Admitting doctor:   Brooklyn Johnson MD    Admitting diagnosis:   The primary encounter diagnosis was Acute on chronic congestive heart failure, unspecified heart failure type. Diagnoses of Takotsubo cardiomyopathy, Hyperlipidemia, unspecified hyperlipidemia type, and Lymphedema were also pertinent to this visit.    Code status:   Current Code Status       Date Active Code Status Order ID Comments User Context       Prior            Allergies:   Doxycycline    Isolation:   No active isolations    Intake and Output  No intake or output data in the 24 hours ending 08/27/23 1310    Weight:       08/27/23  1031   Weight: 102 kg (224 lb)       Most recent vitals:   Vitals:    08/27/23 1031 08/27/23 1122 08/27/23 1201   BP: (!) 160/113 137/68 142/68   Pulse: 70 66 64   Resp: 16     Temp: 98.1 °F (36.7 °C)     TempSrc: Oral     SpO2: 97%  98%   Weight: 102 kg (224 lb)     Height: 167.6 cm (66\")         Active LDAs/IV Access:   Lines, Drains & Airways       Active LDAs       Name Placement date Placement time Site Days    Peripheral IV 08/27/23 1032 Left Forearm 08/27/23  1032  Forearm  less than 1    Peripheral IV 08/27/23 1118 Right Antecubital 08/27/23  1118  Antecubital  less than 1                    Labs (abnormal labs have a star):   Labs Reviewed   COMPREHENSIVE METABOLIC PANEL - Abnormal; Notable for the following components:       Result Value    Glucose 101 (*)     BUN 33 (*)     Creatinine 1.32 (*)     eGFR 40.1 (*)     All other components within normal limits    Narrative:     GFR Normal >60  Chronic Kidney Disease <60  Kidney Failure <15    The GFR formula is only valid for adults with stable renal function between ages 18 and 70.   BNP (IN-HOUSE) - Abnormal; Notable for the following components:    proBNP 2,920.0 (*)     All other components within normal " "limits    Narrative:     Among patients with dyspnea, NT-proBNP is highly sensitive for the detection of acute congestive heart failure. In addition NT-proBNP of <300 pg/ml effectively rules out acute congestive heart failure with 99% negative predictive value.     TROPONIN - Abnormal; Notable for the following components:    HS Troponin T 29 (*)     All other components within normal limits    Narrative:     High Sensitive Troponin T Reference Range:  <10.0 ng/L- Negative Female for AMI  <15.0 ng/L- Negative Male for AMI  >=10 - Abnormal Female indicating possible myocardial injury.  >=15 - Abnormal Male indicating possible myocardial injury.   Clinicians would have to utilize clinical acumen, EKG, Troponin, and serial changes to determine if it is an Acute Myocardial Infarction or myocardial injury due to an underlying chronic condition.        CBC WITH AUTO DIFFERENTIAL - Abnormal; Notable for the following components:    RBC 3.58 (*)     Hemoglobin 10.7 (*)     Hematocrit 32.4 (*)     Immature Grans % 0.6 (*)     All other components within normal limits   PROTIME-INR - Normal   D-DIMER, QUANTITATIVE - Normal    Narrative:     According to the assay 's published package insert, a normal (<0.50 MCGFEU/mL) D-dimer result in conjunction with a non-high clinical probability assessment, excludes deep vein thrombosis (DVT) and pulmonary embolism (PE) with high sensitivity.    D-dimer values increase with age and this can make VTE exclusion of an older population difficult. To address this, the [employer], based on best available evidence and recent guidelines, recommends that clinicians use age-adjusted D-dimer thresholds in patients greater than 50 years of age with: a) a low probability of PE who do not meet all Pulmonary Embolism Rule Out Criteria, or b) in those with intermediate probability of PE.   The formula for an age-adjusted D-dimer cut-off is \"age/100\".  For example, a 60 " year old patient would have an age-adjusted cut-off of 0.60 MCGFEU/mL and an 80 year old 0.80 MCGFEU/mL.   HIGH SENSITIVITIY TROPONIN T 2HR   CBC AND DIFFERENTIAL    Narrative:     The following orders were created for panel order CBC & Differential.  Procedure                               Abnormality         Status                     ---------                               -----------         ------                     CBC Auto Differential[[id number]]        Abnormal            Final result                 Please view results for these tests on the individual orders.       EKG:   ECG 12 Lead Dyspnea   Preliminary Result   HEART RATE= 69  bpm   RR Interval= 870  ms   UT Interval= 191  ms   P Horizontal Axis= 10  deg   P Front Axis= 74  deg   QRSD Interval= 117  ms   QT Interval= 466  ms   QTcB= 500  ms   QRS Axis= -2  deg   T Wave Axis= 130  deg   - ABNORMAL ECG -   Sinus rhythm   Incomplete right bundle branch block   Abnrm T, consider ischemia, anterolateral lds   Electronically Signed By:    Date and Time of Study: 2023-08-27 10:56:28          Meds given in ED:   Medications   furosemide (LASIX) injection 80 mg (80 mg Intravenous Given 8/27/23 1122)       Imaging results:  XR Chest 1 View    Result Date: 8/27/2023  No focal pulmonary consolidation. Borderline heart size. Follow-up as clinical indications persist.  This report was finalized on 8/27/2023 11:42 AM by Dr. Gregory Baptiste M.D.       Ambulatory status:   - with assist    Social issues:   Social History     Socioeconomic History    Marital status:    Tobacco Use    Smoking status: Never    Smokeless tobacco: Never   Vaping Use    Vaping Use: Never used   Substance and Sexual Activity    Alcohol use: No    Drug use: Never    Sexual activity: Not Currently       NIH Stroke Scale:       Alda Jiménez RN  08/27/23 13:10 EDT

## 2023-08-28 ENCOUNTER — READMISSION MANAGEMENT (OUTPATIENT)
Dept: CALL CENTER | Facility: HOSPITAL | Age: 83
End: 2023-08-28
Payer: MEDICARE

## 2023-08-28 VITALS
TEMPERATURE: 99.5 F | WEIGHT: 214.5 LBS | HEIGHT: 66 IN | BODY MASS INDEX: 34.47 KG/M2 | HEART RATE: 62 BPM | SYSTOLIC BLOOD PRESSURE: 130 MMHG | RESPIRATION RATE: 18 BRPM | DIASTOLIC BLOOD PRESSURE: 51 MMHG | OXYGEN SATURATION: 97 %

## 2023-08-28 PROBLEM — I50.22 CHRONIC SYSTOLIC HEART FAILURE: Status: ACTIVE | Noted: 2023-08-28

## 2023-08-28 PROBLEM — I50.21 ACUTE SYSTOLIC HEART FAILURE: Status: ACTIVE | Noted: 2023-08-28

## 2023-08-28 PROBLEM — I50.23 ACUTE ON CHRONIC SYSTOLIC HEART FAILURE: Status: ACTIVE | Noted: 2023-08-28

## 2023-08-28 PROBLEM — I50.22 CHRONIC SYSTOLIC HEART FAILURE: Status: RESOLVED | Noted: 2023-08-28 | Resolved: 2023-08-28

## 2023-08-28 PROBLEM — I50.21 ACUTE SYSTOLIC HEART FAILURE: Status: RESOLVED | Noted: 2023-08-28 | Resolved: 2023-08-28

## 2023-08-28 LAB
ANION GAP SERPL CALCULATED.3IONS-SCNC: 10.1 MMOL/L (ref 5–15)
BUN SERPL-MCNC: 28 MG/DL (ref 8–23)
BUN/CREAT SERPL: 20.4 (ref 7–25)
CALCIUM SPEC-SCNC: 8.7 MG/DL (ref 8.6–10.5)
CHLORIDE SERPL-SCNC: 102 MMOL/L (ref 98–107)
CO2 SERPL-SCNC: 26.9 MMOL/L (ref 22–29)
CREAT SERPL-MCNC: 1.37 MG/DL (ref 0.57–1)
DEPRECATED RDW RBC AUTO: 41.2 FL (ref 37–54)
EGFRCR SERPLBLD CKD-EPI 2021: 38.4 ML/MIN/1.73
ERYTHROCYTE [DISTWIDTH] IN BLOOD BY AUTOMATED COUNT: 12.4 % (ref 12.3–15.4)
GLUCOSE SERPL-MCNC: 89 MG/DL (ref 65–99)
HCT VFR BLD AUTO: 32.6 % (ref 34–46.6)
HGB BLD-MCNC: 10.7 G/DL (ref 12–15.9)
MCH RBC QN AUTO: 29.5 PG (ref 26.6–33)
MCHC RBC AUTO-ENTMCNC: 32.8 G/DL (ref 31.5–35.7)
MCV RBC AUTO: 89.8 FL (ref 79–97)
PLATELET # BLD AUTO: 163 10*3/MM3 (ref 140–450)
PMV BLD AUTO: 10.5 FL (ref 6–12)
POTASSIUM SERPL-SCNC: 3.9 MMOL/L (ref 3.5–5.2)
QT INTERVAL: 528 MS
QTC INTERVAL: 510 MS
RBC # BLD AUTO: 3.63 10*6/MM3 (ref 3.77–5.28)
SODIUM SERPL-SCNC: 139 MMOL/L (ref 136–145)
TROPONIN T SERPL HS-MCNC: 30 NG/L
TSH SERPL DL<=0.05 MIU/L-ACNC: 2.25 UIU/ML (ref 0.27–4.2)
WBC NRBC COR # BLD: 5.7 10*3/MM3 (ref 3.4–10.8)

## 2023-08-28 PROCEDURE — 85027 COMPLETE CBC AUTOMATED: CPT | Performed by: PHYSICIAN ASSISTANT

## 2023-08-28 PROCEDURE — 94799 UNLISTED PULMONARY SVC/PX: CPT

## 2023-08-28 PROCEDURE — 94664 DEMO&/EVAL PT USE INHALER: CPT

## 2023-08-28 PROCEDURE — 99214 OFFICE O/P EST MOD 30 MIN: CPT | Performed by: INTERNAL MEDICINE

## 2023-08-28 PROCEDURE — 80048 BASIC METABOLIC PNL TOTAL CA: CPT | Performed by: PHYSICIAN ASSISTANT

## 2023-08-28 PROCEDURE — 97161 PT EVAL LOW COMPLEX 20 MIN: CPT

## 2023-08-28 PROCEDURE — 94761 N-INVAS EAR/PLS OXIMETRY MLT: CPT

## 2023-08-28 PROCEDURE — 93005 ELECTROCARDIOGRAM TRACING: CPT | Performed by: PHYSICIAN ASSISTANT

## 2023-08-28 PROCEDURE — 93010 ELECTROCARDIOGRAM REPORT: CPT | Performed by: INTERNAL MEDICINE

## 2023-08-28 PROCEDURE — G0378 HOSPITAL OBSERVATION PER HR: HCPCS

## 2023-08-28 PROCEDURE — 84484 ASSAY OF TROPONIN QUANT: CPT | Performed by: PHYSICIAN ASSISTANT

## 2023-08-28 RX ORDER — FUROSEMIDE 40 MG/1
40 TABLET ORAL
Status: DISCONTINUED | OUTPATIENT
Start: 2023-08-28 | End: 2023-08-28 | Stop reason: HOSPADM

## 2023-08-28 RX ORDER — ENOXAPARIN SODIUM 100 MG/ML
1 INJECTION SUBCUTANEOUS ONCE
Status: DISCONTINUED | OUTPATIENT
Start: 2023-08-28 | End: 2023-08-28

## 2023-08-28 RX ORDER — FUROSEMIDE 40 MG/1
40 TABLET ORAL 2 TIMES DAILY
Qty: 60 TABLET | Refills: 0
Start: 2023-08-28

## 2023-08-28 RX ADMIN — FUROSEMIDE 40 MG: 40 TABLET ORAL at 09:54

## 2023-08-28 RX ADMIN — Medication 1000 MCG: at 09:55

## 2023-08-28 RX ADMIN — BUDESONIDE AND FORMOTEROL FUMARATE DIHYDRATE 2 PUFF: 160; 4.5 AEROSOL RESPIRATORY (INHALATION) at 08:06

## 2023-08-28 RX ADMIN — FERROUS SULFATE TAB 325 MG (65 MG ELEMENTAL FE) 325 MG: 325 (65 FE) TAB at 09:55

## 2023-08-28 RX ADMIN — Medication 10 ML: at 09:55

## 2023-08-28 RX ADMIN — LEVOTHYROXINE SODIUM 100 MCG: 50 TABLET ORAL at 09:54

## 2023-08-28 RX ADMIN — APIXABAN 5 MG: 5 TABLET, FILM COATED ORAL at 09:55

## 2023-08-28 RX ADMIN — CARVEDILOL 12.5 MG: 12.5 TABLET, FILM COATED ORAL at 09:55

## 2023-08-28 RX ADMIN — FOLIC ACID 1 MG: 1 TABLET ORAL at 09:54

## 2023-08-28 RX ADMIN — PANTOPRAZOLE SODIUM 40 MG: 40 TABLET, DELAYED RELEASE ORAL at 05:52

## 2023-08-28 NOTE — THERAPY EVALUATION
Patient Name: Latanya Olsen  : 1940    MRN: [id number]                              Today's Date: 2023       Admit Date: 2023    Visit Dx:     ICD-10-CM ICD-9-CM   1. Acute on chronic congestive heart failure, unspecified heart failure type  I50.9 428.0   2. Takotsubo cardiomyopathy  I51.81 429.83   3. Hyperlipidemia, unspecified hyperlipidemia type  E78.5 272.4   4. Lymphedema  I89.0 457.1     Patient Active Problem List   Diagnosis    Carcinoid tumor of lung    Diverticulosis of intestine    Obstructive sleep apnea syndrome    Weight loss    Vitamin D deficiency    Overweight (BMI 25.0-29.9)    Spinal stenosis of lumbar region without neurogenic claudication    Osteoarthritis of knee    Obesity (BMI 30-39.9)    Chronic lower back pain    Knee pain    Insomnia    Hypothyroidism    Hypokalemia    Hypertension    Hyperlipidemia    Generalized osteoarthritis    Gastroparesis    Foot pain    Chronic obstructive pulmonary disease    Chronic constipation    Anemia    Weight gain    Pain of left breast    Elevated serum alkaline phosphatase level    Neck pain    Volume overload    Status post total hip replacement, right    Generalized weakness    Constipation    Idiopathic peripheral neuropathy    Lymphedema    Lumbar degenerative disc disease    GERD (gastroesophageal reflux disease)    Lung cancer    Non-STEMI (non-ST elevated myocardial infarction)    Nonischemic nontraumatic myocardial injury    Acute exacerbation of CHF (congestive heart failure)    Acute on chronic systolic heart failure     Past Medical History:   Diagnosis Date    Acute exacerbation of CHF (congestive heart failure) 2023    Anemia     Arthritis     Atrial fibrillation     CKD (chronic kidney disease)     Stage 3    Constipation     COPD (chronic obstructive pulmonary disease)     DDD (degenerative disc disease), lumbar     Depression     Diverticulosis     GERD (gastroesophageal reflux disease)     Hx of degenerative  disc disease     Hyperlipidemia     Hypertension     Hypokalemia     Hypothyroidism     Knee swelling     Low back pain Yes    Lung cancer     history    Obesity 218    Osteopenia Yes    Renal disorder     Restless leg syndrome     Scoliosis Yes    Sleep apnea with use of continuous positive airway pressure (CPAP)      Past Surgical History:   Procedure Laterality Date    ADENOIDECTOMY  2000    BUNIONECTOMY Bilateral     CARDIAC CATHETERIZATION N/A 8/7/2023    Procedure: Left Heart Cath;  Surgeon: Mireya Park MD;  Location: [facility] CATH INVASIVE LOCATION;  Service: Cardiology;  Laterality: N/A;    CARDIAC CATHETERIZATION N/A 8/7/2023    Procedure: Coronary angiography;  Surgeon: Mireya Park MD;  Location: [facility] CATH INVASIVE LOCATION;  Service: Cardiology;  Laterality: N/A;    CARDIAC CATHETERIZATION N/A 8/7/2023    Procedure: Left ventriculography;  Surgeon: Mireya Park MD;  Location: [facility] CATH INVASIVE LOCATION;  Service: Cardiology;  Laterality: N/A;    CATARACT EXTRACTION      CHOLECYSTECTOMY      COLONOSCOPY      ENDOSCOPY N/A 06/24/2016    Procedure: ESOPHAGOGASTRODUODENOSCOPY  with biopsies;  Surgeon: Von Hernández MD;  Location: [facility] OR;  Service:     LUNG LOBECTOMY Left     lower lobe    LYTIC THROMBIN THERAPY Left 03/26/2021    Procedure: left leg clot treiver possible venous stent *supine*;  Surgeon: Rogerio Vega MD;  Location: [facility] OR 18/19;  Service: Vascular;  Laterality: Left;    REPLACEMENT TOTAL KNEE Left     THYROID BIOPSY      TOTAL HIP ARTHROPLASTY Right 06/01/2019    Procedure: BIPOLAR HIP CEMENTED POSTERIOR;  Surgeon: Ashley Crenshaw MD;  Location: [facility];  Service: Orthopedics    TUBAL ABDOMINAL LIGATION  50 yrs ago      General Information       Row Name 08/28/23 1133          Physical Therapy Time and Intention    Document Type evaluation  -CH     Mode of Treatment individual therapy;physical therapy  -CH       Row Name 08/28/23  1133          General Information    Prior Level of Function independent:;gait;transfer;bed mobility  walks with walker at baseline, pt reports she was recent DCed home from SNF  -     Existing Precautions/Restrictions fall  -     Barriers to Rehab medically complex  -       Row Name 08/28/23 1133          Living Environment    People in Home alone  -       Row Name 08/28/23 1133          Home Main Entrance    Number of Stairs, Main Entrance none  ramp  -       Row Name 08/28/23 1133          Cognition    Orientation Status (Cognition) oriented x 3  -       Row Name 08/28/23 1133          Safety Issues, Functional Mobility    Impairments Affecting Function (Mobility) endurance/activity tolerance;strength  -               User Key  (r) = Recorded By, (t) = Taken By, (c) = Cosigned By      Initials Name Provider Type     Mony White, PT Physical Therapist                   Mobility       Row Name 08/28/23 1135          Bed Mobility    Bed Mobility supine-sit;sit-supine  -     Supine-Sit R[place] (Bed Mobility) not tested  -     Sit-Supine [place] (Bed Mobility) not tested  -     Comment, (Bed Mobility) sitting EOB  -       Row Name 08/28/23 1135          Sit-Stand Transfer    Sit-Stand [place] (Transfers) verbal cues;standby assist  -     Assistive Device (Sit-Stand Transfers) walker, front-wheeled  -       Row Name 08/28/23 1135          Gait/Stairs (Locomotion)    [place] Level (Gait) verbal cues;standby assist  -     Assistive Device (Gait) walker, front-wheeled  -     Distance in Feet (Gait) 80  -     Deviations/Abnormal Patterns (Gait) robbie decreased;gait speed decreased;stride length decreased  -     Bilateral Gait Deviations forward flexed posture  -     Comment, (Gait/Stairs) gait is slow but steady, no SOA noted during ambulation  -               User Key  (r) = Recorded By, (t) = Taken By, (c) = Cosigned By      Initials Name Provider Type      Mony White, PT Physical Therapist                   Obj/Interventions       Row Name 08/28/23 1136          Range of Motion Comprehensive    General Range of Motion no range of motion deficits identified  -       Row Name 08/28/23 1136          Strength Comprehensive (MMT)    Comment, General Manual Muscle Testing (MMT) Assessment B LE grossly 4+/5  -       Row Name 08/28/23 1136          Balance    Balance Assessment standing static balance;standing dynamic balance  -     Static Standing Balance standby assist  -     Dynamic Standing Balance standby assist  -     Position/Device Used, Standing Balance walker, rolling  -               User Key  (r) = Recorded By, (t) = Taken By, (c) = Cosigned By      Initials Name Provider Type     Mony White, PT Physical Therapist                   Goals/Plan    No documentation.                  Clinical Impression       Row Name 08/28/23 1138          Pain    Pretreatment Pain Rating 0/10 - no pain  -     Posttreatment Pain Rating 0/10 - no pain  -       Row Name 08/28/23 1138          Plan of Care Review    Plan of Care Reviewed With patient  -     Outcome Evaluation Pt is a 81 yo F who was admitted with SOA and was found to have Acute on Chronic CHF. Pt also with Takotsubo cardiomyopathy. Pt reports she lives alone, uses a walker, and is scheduled to start cardiac rehab next week. Pt was able to ambulate 80 ft in the cook with SBA when using a walker. Pt reports she feels near her baseline and plans to return home and follow up with her cardiac rehab. PT educated pt on gentle LE exercises and energy conservation techniques. Pt is scheduled to discharge home today.  -       Row Name 08/28/23 1138          Therapy Assessment/Plan (PT)    Criteria for Skilled Interventions Met (PT) no problems identified which require skilled intervention  -     Therapy Frequency (PT) evaluation only  -       Row Name 08/28/23 1138           Positioning and Restraints    Pre-Treatment Position in bed  -CH     Post Treatment Position bed  -CH     In Bed sitting EOB;call light within reach;encouraged to call for assist  -               User Key  (r) = Recorded By, (t) = Taken By, (c) = Cosigned By      Initials Name Provider Type     Mony White, PT Physical Therapist                   Outcome Measures       Row Name 08/28/23 1143 08/28/23 0900       How much help from another person do you currently need...    Turning from your back to your side while in flat bed without using bedrails? 4  -CH 3  -RM    Moving from lying on back to sitting on the side of a flat bed without bedrails? 4  -CH 3  -RM    Moving to and from a bed to a chair (including a wheelchair)? 4  -CH 3  -RM    Standing up from a chair using your arms (e.g., wheelchair, bedside chair)? 4  -CH 3  -RM    Climbing 3-5 steps with a railing? 3  -CH 3  -RM    To walk in hospital room? 3  -CH 3  -RM    AM-PAC 6 Clicks Score (PT) 22  -CH 18  -RM    Highest level of mobility 7 --> Walked 25 feet or more  -CH 6 --> Walked 10 steps or more  -RM      Row Name 08/28/23 1143          Functional Assessment    Outcome Measure Options AM-PAC 6 Clicks Basic Mobility (PT)  -               User Key  (r) = Recorded By, (t) = Taken By, (c) = Cosigned By      Initials Name Provider Type     Mony White, PT Physical Therapist    Jihan Musa, RN Registered Nurse                                 Physical Therapy Education       Title: PT OT SLP Therapies (Done)       Topic: Physical Therapy (Done)       Point: Mobility training (Done)       Learning Progress Summary             Patient Acceptance, E,TB,D, VU,DU by  at 8/28/2023 1144                         Point: Home exercise program (Done)       Learning Progress Summary             Patient Acceptance, E,TB,D, VU,DU by  at 8/28/2023 1144                         Point: Body mechanics (Done)       Learning Progress Summary              Patient Acceptance, E,TB,D, VU,DU by  at 8/28/2023 1144                         Point: Precautions (Done)       Learning Progress Summary             Patient Acceptance, E,TB,D, VU,DU by  at 8/28/2023 1144                                         User Key       Initials Effective Dates Name Provider Type [facility] 06/16/21 -  Mony White PT Physical Therapist PT                  PT Recommendation and Plan     Plan of Care Reviewed With: patient  Outcome Evaluation: Pt is a 81 yo F who was admitted with SOA and was found to have Acute on Chronic CHF. Pt also with Takotsubo cardiomyopathy. Pt reports she lives alone, uses a walker, and is scheduled to start cardiac rehab next week. Pt was able to ambulate 80 ft in the cook with SBA when using a walker. Pt reports she feels near her baseline and plans to return home and follow up with her cardiac rehab. PT educated pt on gentle LE exercises and energy conservation techniques. Pt is scheduled to discharge home today.     Time Calculation:         PT Charges       Row Name 08/28/23 1144             Time Calculation    Start Time 0917  -      Stop Time 0927  -      Time Calculation (min) 10 min  -      PT Received On 08/28/23  -                User Key  (r) = Recorded By, (t) = Taken By, (c) = Cosigned By      Initials Name Provider Type     Mony White PT Physical Therapist                  Therapy Charges for Today       Code Description Service Date Service Provider Modifiers Qty    [id number]  PT EVAL LOW COMPLEXITY 2 8/28/2023 Mony White PT GP 1            PT G-Codes  Outcome Measure Options: AM-PAC 6 Clicks Basic Mobility (PT)  AM-PAC 6 Clicks Score (PT): 22  PT Discharge Summary  Anticipated Discharge Disposition (PT): home, home with outpatient therapy services    Mony White PT  8/28/2023

## 2023-08-28 NOTE — CONSULTS
[facility] Consult    Patient Name: Latanya Olsen  Age/Sex: 82 y.o. female  : 1940  MRN: [id number]    Date of Admission: 2023  Date of Encounter Visit: 23  Encounter Provider: Mireya Park MD  Referring Provider: Tima Nevarez MD  Place of Service: [facility] CARDIOLOGY  Patient Care Team:  Kehrer, Meredith Lea, MD as PCP - General (Family Medicine)  Chris Bear MD as Referring Physician (Hospitalist)  Delfino Scruggs MD as Consulting Physician (Hematology and Oncology)    Subjective:     Consulted for: CHF    Chief Complaint: Shortness of breath    History of Present Illness:  Latanya Olsen is a 82 y.o. female with reported paroxysmal atrial fibrillation, chronic kidney disease stage III, COPD, lung cancer status post left lower lobectomy in , hypertension, hypothyroidism, obstructive sleep apnea on CPAP, Takotsubo cardiomyopathy with an EF of 40 to 45%, who presented to the emergency room with complaints of worsening shortness of breath.    I saw the patient when she was hospitalized earlier this month on 2023 with a non-STEMI.  The patient had a sudden onset of chest tightness and pain radiating to her back.  Due to her symptoms she was brought to the emergency room where she was noted to have elevated troponins.  EKG was unremarkable.  CT angiogram of the chest showed no evidence of pulmonary embolism.  She underwent a cardiac catheterization on 2023 which showed normal coronary arteries and wall motion abnormalities consistent with Takotsubo cardiomyopathy.  The echocardiogram confirmed these findings and also showed an EF of 40 to 45%, grade 1 diastolic dysfunction, and no significant valvular disease.  She did develop some issues with acute kidney injury that improved with IV fluids and holding her valsartan and furosemide initially.  The patient was sent to rehab at a skilled nursing facility before  returning home.    She was seen in follow-up by DEVORA Menjivar on 8/25/2023.  She had just returned from the nursing home the day prior.  She reported that about 2 days prior she had noted some increasing shortness of breath.  She alerted the staff at the nursing home and underwent a chest x-ray on the night before her discharge.  This apparently she did show evidence of volume overload although this was per the patient report since no report or images were available for review.  At the time of her office visit she underwent lab work that showed evidence of an elevated BNP of 3640 and a creatinine of 1.56.  She was advised to increase her furosemide to 40 mg twice a day.      She started that that night and did it again the next day.  However the night before presentation emergency room she had significant issues with orthopnea.  She ended up coming to the emergency room on 8/27/2023 for worsening symptoms.  Her lab work was significant for mildly elevated but flat troponin of 29-30.  D-dimer was normal.  proBNP was mildly elevated at 2920.  Creatinine was 1.32.  EKG showed diffuse T wave inversions with a mildly prolonged QT interval.    She was diuresed with IV furosemide in the emergency room.  With that she had significant diuresis.  This morning she is feeling much better.  She reports that her dyspnea is back to her baseline.  She no longer has any lower extremity swelling.    Past Medical History:  Past Medical History:   Diagnosis Date    Acute exacerbation of CHF (congestive heart failure) 8/27/2023    Anemia     Arthritis     Atrial fibrillation     CKD (chronic kidney disease)     Stage 3    Constipation     COPD (chronic obstructive pulmonary disease)     DDD (degenerative disc disease), lumbar     Depression     Diverticulosis     GERD (gastroesophageal reflux disease)     Hx of degenerative disc disease     Hyperlipidemia     Hypertension     Hypokalemia     Hypothyroidism     Knee swelling      Low back pain Yes    Lung cancer     history    Obesity 218    Osteopenia Yes    Renal disorder     Restless leg syndrome     Scoliosis Yes    Sleep apnea with use of continuous positive airway pressure (CPAP)        Past Surgical History:   Procedure Laterality Date    ADENOIDECTOMY  2000    BUNIONECTOMY Bilateral     CARDIAC CATHETERIZATION N/A 8/7/2023    Procedure: Left Heart Cath;  Surgeon: Mireya Park MD;  Location: [facility] CATH INVASIVE LOCATION;  Service: Cardiology;  Laterality: N/A;    CARDIAC CATHETERIZATION N/A 8/7/2023    Procedure: Coronary angiography;  Surgeon: Mireya Park MD;  Location: [facility] CATH INVASIVE LOCATION;  Service: Cardiology;  Laterality: N/A;    CARDIAC CATHETERIZATION N/A 8/7/2023    Procedure: Left ventriculography;  Surgeon: Mireya Park MD;  Location: [facility] CATH INVASIVE LOCATION;  Service: Cardiology;  Laterality: N/A;    CATARACT EXTRACTION      CHOLECYSTECTOMY      COLONOSCOPY      ENDOSCOPY N/A 06/24/2016    Procedure: ESOPHAGOGASTRODUODENOSCOPY  with biopsies;  Surgeon: Von Hernández MD;  Location: [facility] OR;  Service:     LUNG LOBECTOMY Left     lower lobe    LYTIC THROMBIN THERAPY Left 03/26/2021    Procedure: left leg clot treiver possible venous stent *supine*;  Surgeon: Rogerio Vega MD;  Location: [facility] OR 18/19;  Service: Vascular;  Laterality: Left;    REPLACEMENT TOTAL KNEE Left     THYROID BIOPSY      TOTAL HIP ARTHROPLASTY Right 06/01/2019    Procedure: BIPOLAR HIP CEMENTED POSTERIOR;  Surgeon: Ashley Crenshaw MD;  Location: [facility] OR;  Service: Orthopedics    TUBAL ABDOMINAL LIGATION  50 yrs ago       Home Medications:   Medications Prior to Admission   Medication Sig Dispense Refill Last Dose    acetaminophen (TYLENOL) 325 MG tablet Take 2 tablets by mouth Every 4 (Four) Hours As Needed for Mild Pain .   8/26/2023    alendronate (Fosamax) 70 MG tablet Take 1 tablet by mouth Every 7 (Seven) Days. 13 tablet 3  8/27/2023    apixaban (Eliquis) 5 MG tablet tablet Take 1 tablet by mouth Every 12 (Twelve) Hours. 60 tablet 2 8/27/2023    benzonatate (TESSALON) 100 MG capsule    8/26/2023    carvedilol (COREG) 12.5 MG tablet Take 1 tablet by mouth 2 (Two) Times a Day With Meals. 60 tablet 5 8/27/2023    chlorhexidine (PERIDEX) 0.12 % solution APPLY 15 ML AS DIRECTED TWICE DAILY SWISH FOR 30 seconds AND expectorate, EVERY MORNING AND IN THE EVENING TO BEGIN in 24 hours AFTER surgery   Past Week    coenzyme Q10 100 MG capsule Take 1 capsule by mouth Daily.   8/27/2023    cyclobenzaprine (FLEXERIL) 10 MG tablet Take 1 tablet by mouth 2 (Two) Times a Day As Needed for Muscle Spasms. 14 tablet 0 8/26/2023    Enulose 10 GM/15ML solution solution (encephalopathy)    Past Week    esomeprazole (NexIUM) 40 MG capsule Take 1 capsule by mouth Every Morning Before Breakfast.   8/27/2023    ferrous sulfate 325 (65 FE) MG tablet Take 1 tablet by mouth Daily With Breakfast.   8/27/2023    folic acid (FOLVITE) 1 MG tablet Take 1 tablet by mouth Daily. 30 tablet 5 8/27/2023    furosemide (LASIX) 40 MG tablet Take 1 tablet by mouth Daily. Take second tab at noon prn increased swelling 135 tablet 0 8/27/2023    gabapentin (NEURONTIN) 100 MG capsule TAKE ONE CAPSULE BY MOUTH THREE TIMES DAILY (Patient taking differently: 1 capsule Every Night.) 90 capsule 0 8/26/2023    ipratropium (ATROVENT) 0.06 % nasal spray 2 sprays into the nostril(s) as directed by provider 4 (Four) Times a Day. 15 mL 0 8/27/2023    ipratropium-albuterol (DUO-NEB) 0.5-2.5 mg/mL nebulizer USE 1 VIAL IN NEBULIZER 2 TIMES DAILY. Generic: DUONEB 60 mL 10 8/27/2023    lactulose (CHRONULAC) 10 GM/15ML solution TAKE 15 ML BY MOUTH THREE TIMES DAILY (Patient taking differently: 2 (Two) Times a Day. TAKE 15 ML BY MOUTH THREE TIMES DAILY) 1350 mL 2 8/27/2023    levothyroxine (SYNTHROID, LEVOTHROID) 100 MCG tablet TAKE ONE TABLET BY MOUTH DAILY 90 tablet 0 8/27/2023    lidocaine  (XYLOCAINE) 5 % ointment Apply 1 application topically to the appropriate area as directed 3 (Three) Times a Day. 50 g 3 Past Month    Magnesium 500 MG tablet Take  by mouth.   8/27/2023    ondansetron (ZOFRAN) 4 MG tablet    Past Week    potassium chloride 10 MEQ CR tablet Take 1 tablet by mouth 2 (Two) Times a Day.   8/27/2023    pramipexole (MIRAPEX) 1.5 MG tablet TAKE ONE TABLET BY MOUTH EVERY NIGHT AT BEDTIME 90 tablet 0 8/26/2023    sertraline (ZOLOFT) 25 MG tablet    8/27/2023    Stimulant Laxative 8.6-50 MG per tablet TAKE 2 TABLETS BY MOUTH TWICE DAILY 120 tablet 2 8/27/2023    SYMBICORT 160-4.5 MCG/ACT inhaler    8/27/2023    valsartan (DIOVAN) 320 MG tablet TAKE ONE TABLET BY MOUTH DAILY 90 tablet 0 8/27/2023    vitamin B-12 (CYANOCOBALAMIN) 1000 MCG tablet Take 1 tablet by mouth Daily.   8/27/2023    Zinc 50 MG tablet Take 1 tablet by mouth.   8/27/2023    triamcinolone (KENALOG) 0.1 % cream Apply 1 application topically to the appropriate area as directed 2 (Two) Times a Day. 15 g 2 More than a month       Allergies:  Allergies   Allergen Reactions    Doxycycline Anaphylaxis     Facial swelling, shortness of air, dizzines       Past Social History:  Social History     Socioeconomic History    Marital status:    Tobacco Use    Smoking status: Never    Smokeless tobacco: Never   Vaping Use    Vaping Use: Never used   Substance and Sexual Activity    Alcohol use: No    Drug use: Never    Sexual activity: Not Currently       Past Family History:  Family History   Problem Relation Age of Onset    Heart attack Mother     Coronary artery disease Mother     Hypertension Mother     Kidney disease Mother     Heart attack Sister     Colon cancer Neg Hx     Colon polyps Neg Hx     Esophageal cancer Neg Hx     Breast cancer Neg Hx        Review of Systems:   All systems reviewed. Pertinent positives identified in HPI. All other systems are negative.    Objective:   Temp:  [97.5 °F (36.4 °C)-98.1 °F (36.7 °C)]  97.7 °F (36.5 °C)  Heart Rate:  [56-70] 56  Resp:  [16-18] 18  BP: (116-160)/() 118/65     Intake/Output Summary (Last 24 hours) at 8/28/2023 0728  Last data filed at 8/27/2023 2236  Gross per 24 hour   Intake --   Output 2000 ml   Net -2000 ml     Body mass index is 34.62 kg/m².      08/27/23  1031 08/27/23  1314 08/27/23 2236   Weight: 102 kg (224 lb) 99.1 kg (218 lb 6.4 oz) 97.3 kg (214 lb 8 oz)     Weight change:     Physical Exam:   General Appearance:    Alert, cooperative, in no acute distress   Head:    Normocephalic, without obvious abnormality, atraumatic   Eyes:            Lids and lashes normal, conjunctivae and sclerae normal, no   icterus, no pallor, corneas clear, PERRLA   Ears:    Ears appear intact with no abnormalities noted   Neck:   No adenopathy, supple, trachea midline, no thyromegaly, no   carotid bruit, no JVD   Lungs:     Clear to auscultation,respirations regular, even and unlabored    Heart:    Regular rhythm and normal rate, normal S1 and S2, no murmur, no gallop, no rub, no click   Chest Wall:    No abnormalities observed   Abdomen:     Normal bowel sounds, no masses, no organomegaly, soft        non-tender, non-distended, no guarding, no rebound  tenderness   Extremities:   Moves all extremities well, no edema, no cyanosis, no redness   Pulses:   Pulses palpable and equal bilaterally. Normal radial, carotid, femoral, dorsalis pedis and posterior tibial pulses bilaterally. Normal abdominal aorta   Skin:  Psychiatric:   No bleeding, bruising or rash    Alert and oriented x 3, normal mood and affect       Lab Review:   Results from last 7 days   Lab Units 08/28/23  0551 08/27/23  1117 08/25/23  1410   SODIUM mmol/L 139 138 140   POTASSIUM mmol/L 3.9 4.9 4.3   CHLORIDE mmol/L 102 104 106   CO2 mmol/L 26.9 24.0 24.0   BUN mg/dL 28* 33* 25*   CREATININE mg/dL 1.37* 1.32* 1.56*   GLUCOSE mg/dL 89 101* 134*   CALCIUM mg/dL 8.7 8.6 8.6   AST (SGOT) U/L  --  12  --    ALT (SGPT) U/L  --   10  --      Results from last 7 days   Lab Units 08/28/23  0551 08/27/23  1320 08/27/23  1117   HSTROP T ng/L 30* 30* 29*     Results from last 7 days   Lab Units 08/28/23  0551 08/27/23  1117   WBC 10*3/mm3 5.70 6.78   HEMOGLOBIN g/dL 10.7* 10.7*   HEMATOCRIT % 32.6* 32.4*   PLATELETS 10*3/mm3 163 161     Results from last 7 days   Lab Units 08/27/23  1117   INR  1.05               Invalid input(s): LDLCALC  Results from last 7 days   Lab Units 08/27/23  1117 08/25/23  1410   PROBNP pg/mL 2,920.0* 3,640.0*     Results from last 7 days   Lab Units 08/27/23  1320   TSH uIU/mL 2.250             Imaging:  Imaging Results (Most Recent)       Procedure Component Value Units Date/Time    XR Chest 1 View [[id number]] Collected: 08/27/23 1138     Updated: 08/27/23 1146    Narrative:      XR CHEST 1 VW-     HISTORY: Female who is 82 years-old, short of breath     TECHNIQUE: Frontal view of the chest     COMPARISON: 10/26/2022     FINDINGS: The heart size is borderline. Aorta is calcified. Pulmonary  vasculature is unremarkable. No focal pulmonary consolidation, pleural  effusion, or pneumothorax. No acute osseous process.       Impression:      No focal pulmonary consolidation. Borderline heart size.  Follow-up as clinical indications persist.     This report was finalized on 8/27/2023 11:42 AM by Dr. Gregory Baptiste M.D.               I personally viewed and interpreted the patient's EKG    Assessment/Plan:     Acute on chronic systolic congestive heart failure.  Improved with IV diuresis.  Takotsubo cardiomyopathy. EF of 40-45%.  On guideline directed management with carvedilol and valsartan.  Abnormal EKG.  Serial EKGs show expected and typical evolutionary changes seen with Takotsubo cardiomyopathy.   CKD.  Fairly stable.  Hypertension.  Well-controlled.  Paroxysmal atrial fibrillation.  Reported history.  On chronic anticoagulation with apixaban.  SIERRA  COPD  Hypothyroidism  Lung cancer status post left lower  lobectomy    -Agree with switching back to oral furosemide.  Will increase dosage to 40 mg twice a day.  - EKG findings are typical and expected in the setting of recent Takotsubo cardiomyopathy diagnosis.  -We will wean off of oxygen and have the patient ambulate today.  If she does well with this hopefully discharge home later today.  We will arrange for 1 week follow-up in the office again.      Thank you for allowing me to participate in the care of Latanya Olsen. Feel free to contact me directly with any further questions or concerns.    Mireya Park MD  [facility]  08/28/23  07:28 EDT

## 2023-08-28 NOTE — PLAN OF CARE
Goal Outcome Evaluation:  Plan of Care Reviewed With: patient           Outcome Evaluation: Pt is a 83 yo F who was admitted with SOA and was found to have Acute on Chronic CHF. Pt also with Takotsubo cardiomyopathy. Pt reports she lives alone, uses a walker, and is scheduled to start cardiac rehab next week. Pt was able to ambulate 80 ft in the cook with SBA when using a walker. Pt reports she feels near her baseline and plans to return home and follow up with her cardiac rehab. PT educated pt on gentle LE exercises and energy conservation techniques. Pt is scheduled to discharge home today.      Anticipated Discharge Disposition (PT): home, home with outpatient therapy services

## 2023-08-28 NOTE — DISCHARGE SUMMARY
ED OBSERVATION PROGRESS/DISCHARGE SUMMARY    Date of Admission: 8/27/2023   LOS: 0 days   PCP: Kehrer, Meredith Lea, MD    Subjective:   Patient reports she is feeling well today.  Denies any shortness of breath.  Denies chest pain or chest discomfort.  Denies abdominal pain and nausea.    Hospital Outcome:   82-year-old female admitted to the ED observation unit for shortness of breath and an exacerbation of her congestive heart failure.    She was admitted to the cardiology service 8/6/2023 for chest pain and underwent cardiac catheterization which showed normal coronary arteries and wall motion abnormalities consistent with Takotsubo cardiomyopathy.  Echocardiogram was obtained which showed an ejection fraction of 40 to 45% with grade 1 diastolic function, normal right ventricular systolic function and wall motion and normal right ventricular pressure.  She had an acute kidney at that time injury which improved prior to discharge.    She presented to the emergency department on 8/27 for worsening shortness of breath.  Reports that she has been taking increasing dosages of her oral Lasix per the recommendations of her cardiologist.  She was given a dose of 80 mg IV Lasix at 11 AM 8/27, she has had greater than 2000 mL urinary output since.      Cardiology saw and evaluated the patient noting improved with IV diuresis, recommended for patient to started on oral furosemide 40 mg twice a day at home.  Patient will follow-up in the cardiology office in 1 week.  Patient ambulated in the hallway for 5 minutes on room air with O2 remaining above 92%, PT also saw and evaluated the patient with no overt loss of balance.    ROS:  General: no fevers, chills  Respiratory: no cough, dyspnea  Cardiovascular: no chest pain, palpitations  Abdomen: No abdominal pain, nausea, vomiting, or diarrhea  Neurologic: No focal weakness    Objective   Physical Exam:  I have reviewed the vital signs.  Temp:  [97.5 °F (36.4 °C)-99.5 °F (37.5  °C)] 99.5 °F (37.5 °C)  Heart Rate:  [56-70] 62  Resp:  [16-18] 18  BP: (116-160)/() 130/51  General Appearance: 82-year-old female in no acute distress on room air  Head:    Normocephalic, atraumatic  Eyes:    Sclerae anicteric  Neck:   Supple, no mass  Lungs: Clear to auscultation bilaterally, respirations unlabored  Heart: Regular rate and rhythm, S1 and S2 normal, no murmur, rub or gallop  Abdomen:  Soft, non-tender, bowel sounds active, nondistended  Extremities: No clubbing, cyanosis, or edema to lower extremities  Pulses:  2+ and symmetric in distal lower extremities  Skin: No rashes   Neurologic: Oriented x3, Normal strength to extremities    Results Review:    I have reviewed the labs, radiology results and diagnostic studies.    Results from last 7 days   Lab Units 08/28/23  0551   WBC 10*3/mm3 5.70   HEMOGLOBIN g/dL 10.7*   HEMATOCRIT % 32.6*   PLATELETS 10*3/mm3 163     Results from last 7 days   Lab Units 08/28/23  0551 08/27/23  1117 08/25/23  1410   SODIUM mmol/L 139 138 140   POTASSIUM mmol/L 3.9 4.9 4.3   CHLORIDE mmol/L 102 104 106   CO2 mmol/L 26.9 24.0 24.0   BUN mg/dL 28* 33* 25*   CREATININE mg/dL 1.37* 1.32* 1.56*   CALCIUM mg/dL 8.7 8.6 8.6   BILIRUBIN mg/dL  --  0.3  --    ALK PHOS U/L  --  82  --    ALT (SGPT) U/L  --  10  --    AST (SGOT) U/L  --  12  --    GLUCOSE mg/dL 89 101* 134*     Imaging Results (Last 24 Hours)       Procedure Component Value Units Date/Time    XR Chest 1 View [[id number]] Collected: 08/27/23 1138     Updated: 08/27/23 1146    Narrative:      XR CHEST 1 VW-     HISTORY: Female who is 82 years-old, short of breath     TECHNIQUE: Frontal view of the chest     COMPARISON: 10/26/2022     FINDINGS: The heart size is borderline. Aorta is calcified. Pulmonary  vasculature is unremarkable. No focal pulmonary consolidation, pleural  effusion, or pneumothorax. No acute osseous process.       Impression:      No focal pulmonary consolidation. Borderline heart  size.  Follow-up as clinical indications persist.     This report was finalized on 8/27/2023 11:42 AM by Dr. Gregory Baptiste M.D.               I have reviewed the medications.  ---------------------------------------------------------------------------------------------  Assessment & Plan   Assessment/Problem List    Acute exacerbation of CHF (congestive heart failure)      Plan:    Acute on chronic congestive heart failure  Takotsubo cardiomyopathy  -proBNP 2,920  -D-dimer 0.53, negative  -Her initial EKG has some T wave abnormalities in anterolateral leads,, no acute ischemia, expected and typical evolutionary changes seen with Takotsubo cardiomyopathy  -Troponin 29, 30, 30  -Patient received 80 mg Lasix IV in ED and, 2.5 L output since yesterday  -Consult cardiology, Lasix increased to 40 mg twice daily  -Follow-up with cardiology in the office in 1 week  -Telemetry monitoring     Paroxysmal atrial fibrillation  -Continue Eliquis 2.5 twice daily given recent normal coronary arteries on cardiac cath     Hypothyroidism  -Continue levothyroxine  -TSH 2.25, normal     COPD  -Continue breathing treatments, inhalers as prescribed     GERD  -Continue PPI     Hypertension  -Continue valsartan, carvedilol     DVT prophylaxis:  Mechanical DVT prophylaxis orders are present.    Disposition: Home    Follow-up after Discharge: Cardiology, PCP      Opal Pendleton PA-C 08/28/23 10:04 EDT    I have worn appropriate PPE during this patient encounter, sanitized my hands both with entering and exiting patient's room.      45 minutes has been spent by [facility] providers in the care of this patient while under observation status

## 2023-08-28 NOTE — DISCHARGE INSTRUCTIONS
Start taking Lasix 40 mg twice daily.  Continue all other home medication as prescribed.  You have a follow-up appointment with cardiology in 1 week, the office will contact you regarding this appointment time.  Follow-up with your primary care doctor in 1 to 2 weeks.    Return to the emergency department with worsening symptoms, uncontrolled pain, inability to tolerate oral liquids, fever greater than 101°F not controlled by Tylenol or as needed with emergent concerns.

## 2023-08-28 NOTE — NURSING NOTE
Walking 02 completed. Pt ambulated in hallway for 5 mins on room air and tolerated well. No complaints of pain. Pt 02 remained above 92% while ambulating. Pt states that is her normal.

## 2023-08-28 NOTE — PLAN OF CARE
Goal Outcome Evaluation:      Pt admitted to obs for CHF exacerbation and shortness of breath. Cardiology consulted. Pt reports no pain/shortness of breath at this time. Morning labs collected. Pt alert and oriented, sinus bradycardia, on 2L O2 nasal cannula while sleeping.       Progress: improving

## 2023-08-28 NOTE — OUTREACH NOTE
Prep Survey      Flowsheet Row Responses   [facility] facility patient discharged from? [facility]   Is LACE score < 7 ? No   Eligibility [facility]   Date of Admission 08/27/23   Date of Discharge 08/28/23   Discharge Disposition Home or Self Care   Discharge diagnosis Acute exacerbation of CHF   Does the patient have one of the following disease processes/diagnoses(primary or secondary)? CHF   Does the patient have Home health ordered? No   Is there a DME ordered? No   Prep survey completed? Yes            JOSE PÉREZ - Registered Nurse

## 2023-08-28 NOTE — PLAN OF CARE
Goal Outcome Evaluation:   Pt d/c via private vehicle. IV removed. Belongings returned. Follow up instructions given. No further questions/complaints at this time .

## 2023-08-29 ENCOUNTER — TRANSITIONAL CARE MANAGEMENT TELEPHONE ENCOUNTER (OUTPATIENT)
Dept: CALL CENTER | Facility: HOSPITAL | Age: 83
End: 2023-08-29
Payer: MEDICARE

## 2023-08-29 ENCOUNTER — OFFICE VISIT (OUTPATIENT)
Dept: FAMILY MEDICINE CLINIC | Facility: CLINIC | Age: 83
End: 2023-08-29
Payer: MEDICARE

## 2023-08-29 VITALS
HEIGHT: 66 IN | BODY MASS INDEX: 34.42 KG/M2 | TEMPERATURE: 98.2 F | OXYGEN SATURATION: 97 % | SYSTOLIC BLOOD PRESSURE: 132 MMHG | WEIGHT: 214.2 LBS | DIASTOLIC BLOOD PRESSURE: 68 MMHG | HEART RATE: 75 BPM

## 2023-08-29 DIAGNOSIS — G62.9 PERIPHERAL POLYNEUROPATHY: ICD-10-CM

## 2023-08-29 DIAGNOSIS — I10 ESSENTIAL HYPERTENSION: ICD-10-CM

## 2023-08-29 DIAGNOSIS — Z09 HOSPITAL DISCHARGE FOLLOW-UP: Primary | ICD-10-CM

## 2023-08-29 DIAGNOSIS — N18.31 STAGE 3A CHRONIC KIDNEY DISEASE: ICD-10-CM

## 2023-08-29 DIAGNOSIS — E03.9 HYPOTHYROIDISM, UNSPECIFIED TYPE: ICD-10-CM

## 2023-08-29 DIAGNOSIS — I50.23 ACUTE ON CHRONIC SYSTOLIC HEART FAILURE: ICD-10-CM

## 2023-08-29 DIAGNOSIS — M54.6 ACUTE MIDLINE THORACIC BACK PAIN: ICD-10-CM

## 2023-08-29 DIAGNOSIS — D50.9 IRON DEFICIENCY ANEMIA, UNSPECIFIED IRON DEFICIENCY ANEMIA TYPE: ICD-10-CM

## 2023-08-29 NOTE — OUTREACH NOTE
Call Center TCM Note      Flowsheet Row Responses   [facility], [place], operated by [facility] patient discharged from? [place]   Does the patient have one of the following disease processes/diagnoses(primary or secondary)? CHF   TCM attempt successful? Yes   Call start time 0918   Call end time 0932   Discharge diagnosis Acute exacerbation of CHF   Person spoke with today (if not patient) and relationship pt   Meds reviewed with patient/caregiver? Yes   Is the patient having any side effects they believe may be caused by any medication additions or changes? No   Does the patient have all medications ordered at discharge? Yes   Is the patient taking all medications as directed (includes completed medication regime)? Yes   Comments Cardiology 9/6/2023 12:30 PM   Does the patient have an appointment with their PCP within 7-14 days of discharge? Yes  [8/29/2023 at 2:00 PM]   Psychosocial issues? No   Did the patient receive a copy of their discharge instructions? Yes   Nursing interventions Reviewed instructions with patient   What is the patient's perception of their health status since discharge? Improving   Nursing interventions Nurse provided patient education   Is the patient able to teach back signs and symptoms of worsening condition? (i.e. weight gain, shortness of air, etc.) Yes   If the patient is a current smoker, are they able to teach back resources for cessation? Not a smoker   Is the patient/caregiver able to teach back the hierarchy of who to call/visit for symptoms/problems? PCP, Specialist, Home health nurse, Urgent Care, ED, 911 Yes   Notified Case Management Education issues   Is the patient able to teach back Heart Failure Zones? Yes   CHF Zone this Call Green Zone   Green Zone Patient reports doing well, No new or worsening shortness of breath, Physical activity level is normal for you, No new swelling -  feet, ankles and legs look normal for you, Weight check stable   Green Zone Interventions Daily weight check, Meds  as directed, Low sodium diet, Follow up visits planned   TCM call completed? Yes   Wrap up additional comments Pt denies any SOB, but complains of worsening back pain since last night. Pt further acknowledges that back pain radiates down left arm. Pt advised to call 911. Pt wanted to wait to see PCP today, and pt was advised if back pain that radiates down left arm had worsened to please call 911. Pt verbalized understanding.   Call end time 0932   Would this patient benefit from a Referral to [facility] Social Work? No   Is the patient interested in additional calls from an ambulatory ? No             Denisse Dixon RN    8/29/2023, 09:36 EDT

## 2023-08-29 NOTE — PROGRESS NOTES
Transitional Care Follow Up Visit  Subjective     Latanya Olsen is a 82 y.o. female who presents for a transitional care management visit.    Within 48 business hours after discharge our office contacted her via telephone to coordinate her care and needs.      I reviewed and discussed the details of that call along with the discharge summary, hospital problems, inpatient lab results, inpatient diagnostic studies, and consultation reports with Latanya.     Current outpatient and discharge medications have been reconciled for the patient.  Reviewed by: Meredith Lea Kehrer, MD          8/28/2023     7:06 PM   Date of TCM Phone Call   [facility]   Date of Admission 8/27/2023   Date of Discharge 8/28/2023   Discharge Disposition Home or Self Care     Risk for Readmission (LACE) Score: 8 (8/28/2023  6:00 AM)  Discharge Summary by Mireya Park MD (08/10/2023 12:31)     ED to Hosp-Admission (Discharged) with Brooklyn Johnson MD; Tima Nevarez MD (08/27/2023)   High Sensitivity Troponin T (08/28/2023 05:51)  Basic Metabolic Panel (08/28/2023 05:51)  CBC (No Diff) (08/28/2023 05:51)  History of Present Illness   Course During Hospital Stay: Patient admitted on 8 4 initially for CHF exacerbation.  She was discharged on the 10th of [facility].  She left [facility] on the 25th and ended up back in the ER on 827.  She was kept overnight for diuresis with IV Lasix.  Her weight was down almost 10 pounds after that.  She saw cardiology on Friday for follow-up and was told that her BNP was high.  Patient is still feeling better since the second discharge.  She did have some pain that she woke up this morning in her back and was concerned because of her recent MI.  The pain is worse with movement.  It is better now than when she first got up.  She denies any injuries.  Goes into her left arm some but not the same pain.     The following portions of the patient's history were reviewed and updated as appropriate: allergies,  current medications, past family history, past medical history, past social history, past surgical history, and problem list.    Review of Systems    Objective   Physical Exam  Vitals and nursing note reviewed.   Constitutional:       General: She is not in acute distress.     Appearance: Normal appearance. She is well-developed.   HENT:      Head: Normocephalic and atraumatic.      Right Ear: External ear normal.      Left Ear: External ear normal.      Nose: Nose normal.      Mouth/Throat:      Mouth: Mucous membranes are moist.      Pharynx: Oropharynx is clear. No oropharyngeal exudate or posterior oropharyngeal erythema.   Eyes:      Conjunctiva/sclera: Conjunctivae normal.      Pupils: Pupils are equal, round, and reactive to light.   Neck:      Thyroid: No thyromegaly.   Cardiovascular:      Rate and Rhythm: Normal rate and regular rhythm.      Heart sounds: No murmur heard.  Pulmonary:      Effort: Pulmonary effort is normal.      Breath sounds: Normal breath sounds. No wheezing.   Abdominal:      General: Abdomen is flat. Bowel sounds are normal. There is no distension.      Palpations: Abdomen is soft. There is no mass.      Tenderness: There is no abdominal tenderness.      Hernia: No hernia is present.   Musculoskeletal:         General: Tenderness present. No swelling. Normal range of motion.      Cervical back: Normal range of motion and neck supple.      Right lower leg: Edema present.      Left lower leg: Edema present.      Comments: Minimal edema bilateral ankles, hose in place    Tender through upper thoracic spine and into muscles   Lymphadenopathy:      Cervical: No cervical adenopathy.   Skin:     General: Skin is warm and dry.      Capillary Refill: Capillary refill takes less than 2 seconds.      Findings: No rash.   Neurological:      General: No focal deficit present.      Mental Status: She is alert and oriented to person, place, and time.      Cranial Nerves: No cranial nerve deficit.       Gait: Gait abnormal.      Comments: Gait with walker   Psychiatric:         Mood and Affect: Mood normal.         Behavior: Behavior normal.       ECG 12 Lead    Date/Time: 8/29/2023 5:13 PM  Performed by: Kehrer, Meredith Lea, MD  Authorized by: Kehrer, Meredith Lea, MD   Comparison: compared with previous ECG from 8/28/2023  Similar to previous ECG  Rhythm: sinus rhythm  Rate: normal  BPM: 62  Conduction: right bundle branch block  QRS axis: normal  Other findings: T wave abnormality    Clinical impression: abnormal EKG  Comments: Persistent lateral lead changes       Assessment & Plan   Diagnoses and all orders for this visit:    1. Hospital discharge follow-up (Primary)    2. Acute on chronic systolic heart failure  -     BNP    3. Essential hypertension  -     CBC & Differential  -     Comprehensive Metabolic Panel    4. Hypothyroidism, unspecified type    5. Peripheral polyneuropathy    6. Stage 3a chronic kidney disease    7. Iron deficiency anemia, unspecified iron deficiency anemia type  -     CBC & Differential    8. Acute midline thoracic back pain    Other orders  -     ECG 12 Lead      Hospital discharge follow-up from heart failure and rehab stay with overnight stay over the weekend-patient seems doing she is doing very well today.  Her weight is still down from her hospital discharge.  We will check additional labs today and decide on her diuresis in the future-close follow-up with cardiology already scheduled.  Reassured that a lot of her pain is from some thoracic arthritis, patient is to try heating pad and ice when she gets home.

## 2023-08-30 LAB
ALBUMIN SERPL-MCNC: 4.4 G/DL (ref 3.7–4.7)
ALBUMIN/GLOB SERPL: 2.4 {RATIO} (ref 1.2–2.2)
ALP SERPL-CCNC: 84 IU/L (ref 44–121)
ALT SERPL-CCNC: 12 IU/L (ref 0–32)
AST SERPL-CCNC: 14 IU/L (ref 0–40)
BASOPHILS # BLD AUTO: 0 X10E3/UL (ref 0–0.2)
BASOPHILS NFR BLD AUTO: 1 %
BILIRUB SERPL-MCNC: 0.3 MG/DL (ref 0–1.2)
BNP SERPL-MCNC: 102.7 PG/ML (ref 0–100)
BUN SERPL-MCNC: 29 MG/DL (ref 8–27)
BUN/CREAT SERPL: 21 (ref 12–28)
CALCIUM SERPL-MCNC: 8.9 MG/DL (ref 8.7–10.3)
CHLORIDE SERPL-SCNC: 97 MMOL/L (ref 96–106)
CO2 SERPL-SCNC: 25 MMOL/L (ref 20–29)
CREAT SERPL-MCNC: 1.35 MG/DL (ref 0.57–1)
EGFRCR SERPLBLD CKD-EPI 2021: 39 ML/MIN/1.73
EOSINOPHIL # BLD AUTO: 0.2 X10E3/UL (ref 0–0.4)
EOSINOPHIL NFR BLD AUTO: 3 %
ERYTHROCYTE [DISTWIDTH] IN BLOOD BY AUTOMATED COUNT: 12.6 % (ref 11.7–15.4)
GLOBULIN SER CALC-MCNC: 1.8 G/DL (ref 1.5–4.5)
GLUCOSE SERPL-MCNC: 100 MG/DL (ref 70–99)
HCT VFR BLD AUTO: 38.1 % (ref 34–46.6)
HGB BLD-MCNC: 12.6 G/DL (ref 11.1–15.9)
IMM GRANULOCYTES # BLD AUTO: 0 X10E3/UL (ref 0–0.1)
IMM GRANULOCYTES NFR BLD AUTO: 0 %
LYMPHOCYTES # BLD AUTO: 2.5 X10E3/UL (ref 0.7–3.1)
LYMPHOCYTES NFR BLD AUTO: 38 %
MCH RBC QN AUTO: 29.8 PG (ref 26.6–33)
MCHC RBC AUTO-ENTMCNC: 33.1 G/DL (ref 31.5–35.7)
MCV RBC AUTO: 90 FL (ref 79–97)
MONOCYTES # BLD AUTO: 0.8 X10E3/UL (ref 0.1–0.9)
MONOCYTES NFR BLD AUTO: 11 %
NEUTROPHILS # BLD AUTO: 3.2 X10E3/UL (ref 1.4–7)
NEUTROPHILS NFR BLD AUTO: 47 %
PLATELET # BLD AUTO: 188 X10E3/UL (ref 150–450)
POTASSIUM SERPL-SCNC: 4.9 MMOL/L (ref 3.5–5.2)
PROT SERPL-MCNC: 6.2 G/DL (ref 6–8.5)
RBC # BLD AUTO: 4.23 X10E6/UL (ref 3.77–5.28)
SODIUM SERPL-SCNC: 138 MMOL/L (ref 134–144)
WBC # BLD AUTO: 6.7 X10E3/UL (ref 3.4–10.8)

## 2023-08-31 NOTE — PROGRESS NOTES
SPOKE TO PATIENT AWARE OF RESULTS. PATIENT STATED SHE LOST ONE POUND AND SHE IS FEELING FINE NO WORSENING SYMPTOMS

## 2023-09-01 DIAGNOSIS — G62.9 PERIPHERAL POLYNEUROPATHY: ICD-10-CM

## 2023-09-01 DIAGNOSIS — K59.00 CONSTIPATION, UNSPECIFIED CONSTIPATION TYPE: ICD-10-CM

## 2023-09-01 RX ORDER — ASPIRIN 81 MG
TABLET, DELAYED RELEASE (ENTERIC COATED) ORAL
Qty: 120 TABLET | Refills: 0 | Status: SHIPPED | OUTPATIENT
Start: 2023-09-01

## 2023-09-01 RX ORDER — GABAPENTIN 100 MG/1
CAPSULE ORAL
Qty: 90 CAPSULE | Refills: 0 | Status: SHIPPED | OUTPATIENT
Start: 2023-09-01

## 2023-09-07 ENCOUNTER — READMISSION MANAGEMENT (OUTPATIENT)
Dept: CALL CENTER | Facility: HOSPITAL | Age: 83
End: 2023-09-07
Payer: MEDICARE

## 2023-09-07 ENCOUNTER — OFFICE VISIT (OUTPATIENT)
Dept: CARDIOLOGY | Facility: CLINIC | Age: 83
End: 2023-09-07
Payer: MEDICARE

## 2023-09-07 VITALS
HEART RATE: 63 BPM | WEIGHT: 212 LBS | HEIGHT: 64 IN | SYSTOLIC BLOOD PRESSURE: 130 MMHG | BODY MASS INDEX: 36.19 KG/M2 | DIASTOLIC BLOOD PRESSURE: 74 MMHG

## 2023-09-07 DIAGNOSIS — I21.4 NON-STEMI (NON-ST ELEVATED MYOCARDIAL INFARCTION): ICD-10-CM

## 2023-09-07 DIAGNOSIS — I10 PRIMARY HYPERTENSION: Primary | ICD-10-CM

## 2023-09-07 DIAGNOSIS — I50.22 CHRONIC SYSTOLIC HEART FAILURE: ICD-10-CM

## 2023-09-07 DIAGNOSIS — E78.5 HYPERLIPIDEMIA, UNSPECIFIED HYPERLIPIDEMIA TYPE: ICD-10-CM

## 2023-09-07 NOTE — OUTREACH NOTE
CHF Week 2 Survey      Flowsheet Row Responses   [facility] patient discharged from? [place]   Does the patient have one of the following disease processes/diagnoses(primary or secondary)? CHF   Week 2 attempt successful? Yes   Call start time 0851   Call end time 0856   Discharge diagnosis Acute exacerbation of CHF   Person spoke with today (if not patient) and relationship pt   Meds reviewed with patient/caregiver? Yes   Is the patient having any side effects they believe may be caused by any medication additions or changes? No   Does the patient have all medications ordered at discharge? Yes   Is the patient taking all medications as directed (includes completed medication regime)? Yes   Does the patient have a primary care provider?  Yes   Does the patient have an appointment with their PCP within 7 days of discharge? Yes   Has the patient kept scheduled appointments due by today? Yes   Psychosocial issues? No   Did the patient receive a copy of their discharge instructions? Yes   Nursing interventions Reviewed instructions with patient   What is the patient's perception of their health status since discharge? Improving   Nursing interventions Nurse provided patient education   Is the patient able to teach back signs and symptoms of worsening condition? (i.e. weight gain, shortness of air, etc.) Yes   If the patient is a current smoker, are they able to teach back resources for cessation? Not a smoker   Is the patient/caregiver able to teach back the hierarchy of who to call/visit for symptoms/problems? PCP, Specialist, Home health nurse, Urgent Care, ED, 911 Yes   Is the patient able to teach back Heart Failure Zones? Yes   CHF Nursing Interventions Education provided on various zones   CHF Zone this Call Green Zone   Green Zone Patient reports doing well, No new or worsening shortness of breath, Physical activity level is normal for you, No new swelling -  feet, ankles and legs look normal for you,  Weight check stable, No chest pain   Green Zone Interventions Daily weight check, Meds as directed, Low sodium diet, Follow up visits planned   CHF Week 2 call completed? Yes   Is the patient interested in additional calls from an ambulatory ? No   Would this patient benefit from a Referral to [facility] Social Work? No   Wrap up additional comments Pt is very engaged in her health care, and interested in a heart healthy diet. Pt is weighing daily, and lost a pound. No medication issues. Pt is going to cardiac rehab, and has cardiology fu appt today.   Call end time 0835            Denisse LAYTON - Registered Nurse

## 2023-09-07 NOTE — PROGRESS NOTES
Subjective:     Encounter Date:09/07/2023      Patient ID: Latanya Olsen is a 82 y.o. female.    Chief Complaint:follow up Takotsubo CM, HTN  History of Present Illness  This is a 81 y/o female who follows with Dr. Park and is known to me. She has a pmhx of paroxysmal atrial fibrillation, chronic kidney disease stage III, COPD, lung cancer s/p left lower lobectomy in 2006, hypertension, hypothyroidism, and SIERRA on CPAP. She was admitted to the hospital with a non-STEMI on 8/6/23. She had sudden onset of chest tightness and pain radiating to her back along with SOA. She ended up wearing her CPAP until her friends arrived. EMS was then called. She was given sublingual nitro with some improvement in her symptoms.     In the ED EKG was unremarkable. Troponin was elevated at 792. Hgb was 11.6 and proBNP was normal. Creatinine was 1.42 and this was around her baseline. She underwent a cardiac catheterization that showed normal coronary arteries, no wall motion abnormalities, consistent with Takotsubo cardiomyopathy. Echocardiogram showed an EF of 40-45%, grade 1 diastolic dysfunction, normal RVSP. She was discharged on GDMT with carvedilol, valsartan and furosemide.     I saw her in follow up on 8/25 and she was feeling pretty poorly. She had just been discharged from the [facility] but prior to her discharge, she had a chest xray that showed fluid on her chest. She appeared dyspneic to me. I obtained a BMP and proBNP at that visit. When I received the results later that night, I contacted her with instructions to double her dose of furosemide and see if that would help with her symptoms. Unfortunately, she ended up having to go to the ED on 8/27 as her symptoms progressed. She did receive IV diuresis with excellent results and was discharged the following day.     She is here today for a follow up visit. She has continued taking furosemide 40 mg BID and is doing very well with this. She reports she feels so  much better now. She denies any chest pain. She feels her shortness of breath is at its baseline with her COPD. She denies any palpitations, dizziness or syncope. The swelling in her lower extremities has improved as well. She will be starting cardiac rehab on Monday.    I have reviewed and updated as appropriate allergies, current medications, past family history, past medical history, past surgical history and problem list.    Review of Systems   Constitutional: Positive for malaise/fatigue. Negative for fever, weight gain and weight loss.   HENT:  Negative for congestion, hoarse voice and sore throat.    Eyes:  Negative for blurred vision and double vision.   Cardiovascular:  Positive for dyspnea on exertion and leg swelling. Negative for chest pain, orthopnea, palpitations and syncope.   Respiratory:  Negative for cough, shortness of breath and wheezing.    Gastrointestinal:  Negative for abdominal pain, hematemesis, hematochezia and melena.   Genitourinary:  Negative for dysuria and hematuria.   Neurological:  Negative for dizziness, headaches, light-headedness and numbness.   Psychiatric/Behavioral:  Negative for depression. The patient is not nervous/anxious.        Current Outpatient Medications:     acetaminophen (TYLENOL) 325 MG tablet, Take 2 tablets by mouth Every 4 (Four) Hours As Needed for Mild Pain ., Disp: , Rfl:     alendronate (Fosamax) 70 MG tablet, Take 1 tablet by mouth Every 7 (Seven) Days., Disp: 13 tablet, Rfl: 3    apixaban (Eliquis) 5 MG tablet tablet, Take 1 tablet by mouth Every 12 (Twelve) Hours., Disp: 60 tablet, Rfl: 2    benzonatate (TESSALON) 100 MG capsule, , Disp: , Rfl:     carvedilol (COREG) 12.5 MG tablet, Take 1 tablet by mouth 2 (Two) Times a Day With Meals., Disp: 60 tablet, Rfl: 5    chlorhexidine (PERIDEX) 0.12 % solution, APPLY 15 ML AS DIRECTED TWICE DAILY SWISH FOR 30 seconds AND expectorate, EVERY MORNING AND IN THE EVENING TO BEGIN in 24 hours AFTER surgery, Disp: ,  Rfl:     coenzyme Q10 100 MG capsule, Take 1 capsule by mouth Daily., Disp: , Rfl:     cyclobenzaprine (FLEXERIL) 10 MG tablet, Take 1 tablet by mouth 2 (Two) Times a Day As Needed for Muscle Spasms., Disp: 14 tablet, Rfl: 0    Enulose 10 GM/15ML solution solution (encephalopathy), , Disp: , Rfl:     esomeprazole (NexIUM) 40 MG capsule, Take 1 capsule by mouth Every Morning Before Breakfast., Disp: , Rfl:     ferrous sulfate 325 (65 FE) MG tablet, Take 1 tablet by mouth Daily With Breakfast., Disp: , Rfl:     folic acid (FOLVITE) 1 MG tablet, Take 1 tablet by mouth Daily., Disp: 30 tablet, Rfl: 5    furosemide (LASIX) 40 MG tablet, Take 1 tablet by mouth 2 (Two) Times a Day., Disp: 60 tablet, Rfl: 0    gabapentin (NEURONTIN) 100 MG capsule, TAKE ONE CAPSULE BY MOUTH THREE TIMES DAILY, Disp: 90 capsule, Rfl: 0    ipratropium (ATROVENT) 0.06 % nasal spray, 2 sprays into the nostril(s) as directed by provider 4 (Four) Times a Day., Disp: 15 mL, Rfl: 0    ipratropium-albuterol (DUO-NEB) 0.5-2.5 mg/mL nebulizer, USE 1 VIAL IN NEBULIZER 2 TIMES DAILY. Generic: DUONEB, Disp: 60 mL, Rfl: 10    lactulose (CHRONULAC) 10 GM/15ML solution, TAKE 15 ML BY MOUTH THREE TIMES DAILY (Patient taking differently: 2 (Two) Times a Day. TAKE 15 ML BY MOUTH THREE TIMES DAILY), Disp: 1350 mL, Rfl: 2    levothyroxine (SYNTHROID, LEVOTHROID) 100 MCG tablet, TAKE ONE TABLET BY MOUTH DAILY, Disp: 90 tablet, Rfl: 0    lidocaine (XYLOCAINE) 5 % ointment, Apply 1 application topically to the appropriate area as directed 3 (Three) Times a Day., Disp: 50 g, Rfl: 3    Magnesium 500 MG tablet, Take  by mouth., Disp: , Rfl:     ondansetron (ZOFRAN) 4 MG tablet, , Disp: , Rfl:     potassium chloride 10 MEQ CR tablet, Take 1 tablet by mouth 2 (Two) Times a Day., Disp: , Rfl:     pramipexole (MIRAPEX) 1.5 MG tablet, TAKE ONE TABLET BY MOUTH EVERY NIGHT AT BEDTIME, Disp: 90 tablet, Rfl: 0    Senna Plus 8.6-50 MG per tablet, TAKE TWO TABLETS BY MOUTH TWICE  DAILY, Disp: 120 tablet, Rfl: 0    sertraline (ZOLOFT) 25 MG tablet, , Disp: , Rfl:     SYMBICORT 160-4.5 MCG/ACT inhaler, , Disp: , Rfl:     triamcinolone (KENALOG) 0.1 % cream, Apply 1 application topically to the appropriate area as directed 2 (Two) Times a Day., Disp: 15 g, Rfl: 2    valsartan (DIOVAN) 320 MG tablet, TAKE ONE TABLET BY MOUTH DAILY, Disp: 90 tablet, Rfl: 0    vitamin B-12 (CYANOCOBALAMIN) 1000 MCG tablet, Take 1 tablet by mouth Daily., Disp: , Rfl:     Zinc 50 MG tablet, Take 1 tablet by mouth., Disp: , Rfl:     Past Medical History:   Diagnosis Date    Acute exacerbation of CHF (congestive heart failure) 8/27/2023    Anemia     Arthritis     Atrial fibrillation     CKD (chronic kidney disease)     Stage 3    Constipation     COPD (chronic obstructive pulmonary disease)     DDD (degenerative disc disease), lumbar     Depression     Diverticulosis     GERD (gastroesophageal reflux disease)     Hx of degenerative disc disease     Hyperlipidemia     Hypertension     Hypokalemia     Hypothyroidism     Knee swelling     Low back pain Yes    Lung cancer     history    Obesity 218    Osteopenia Yes    Renal disorder     Restless leg syndrome     Scoliosis Yes    Sleep apnea with use of continuous positive airway pressure (CPAP)        Past Surgical History:   Procedure Laterality Date    ADENOIDECTOMY  2000    BUNIONECTOMY Bilateral     CARDIAC CATHETERIZATION N/A 8/7/2023    Procedure: Left Heart Cath;  Surgeon: Mireya Park MD;  Location:  [facility];  Service: Cardiology;  Laterality: N/A;    CARDIAC CATHETERIZATION N/A 8/7/2023    Procedure: Coronary angiography;  Surgeon: Mireya Park MD;  Location:  [facility];  Service: Cardiology;  Laterality: N/A;    CARDIAC CATHETERIZATION N/A 8/7/2023    Procedure: Left ventriculography;  Surgeon: Mireya Park MD;  Location:  [facility];  Service: Cardiology;  Laterality: N/A;    CATARACT  "EXTRACTION      CHOLECYSTECTOMY      COLONOSCOPY      ENDOSCOPY N/A 06/24/2016    Procedure: ESOPHAGOGASTRODUODENOSCOPY  with biopsies;  Surgeon: Von Hernández MD;  Location: [facility] OR;  Service:     LUNG LOBECTOMY Left     lower lobe    LYTIC THROMBIN THERAPY Left 03/26/2021    Procedure: left leg clot treiver possible venous stent *supine*;  Surgeon: Rogerio Vega MD;  Location: [facility] OR 18/19;  Service: Vascular;  Laterality: Left;    REPLACEMENT TOTAL KNEE Left     THYROID BIOPSY      TOTAL HIP ARTHROPLASTY Right 06/01/2019    Procedure: BIPOLAR HIP CEMENTED POSTERIOR;  Surgeon: Ashley Crenshaw MD;  Location: [facility] MAIN OR;  Service: Orthopedics    TUBAL ABDOMINAL LIGATION  50 yrs ago       Family History   Problem Relation Age of Onset    Heart attack Mother     Coronary artery disease Mother     Hypertension Mother     Kidney disease Mother     Heart attack Sister     Colon cancer Neg Hx     Colon polyps Neg Hx     Esophageal cancer Neg Hx     Breast cancer Neg Hx        Social History     Tobacco Use    Smoking status: Never    Smokeless tobacco: Never   Vaping Use    Vaping Use: Never used   Substance Use Topics    Alcohol use: No    Drug use: Never         ECG 12 Lead    Date/Time: 9/8/2023 11:06 AM  Performed by: Shefali Raymond APRN  Authorized by: Shefali Raymond APRN   Comparison: compared with previous ECG from 8/29/2023  Similar to previous ECG  Rhythm: sinus rhythm  Conduction: incomplete right bundle branch block           Objective:     Visit Vitals  /74   Pulse 63   Ht 162.6 cm (64\")   Wt 96.2 kg (212 lb)   BMI 36.39 kg/m²             Physical Exam  Constitutional:       Appearance: Normal appearance. She is normal weight.   HENT:      Head: Normocephalic.   Neck:      Vascular: No carotid bruit.   Cardiovascular:      Rate and Rhythm: Normal rate and regular rhythm.      Chest Wall: PMI is not displaced.      Pulses: Normal pulses.           Radial pulses " are 2+ on the right side and 2+ on the left side.        Posterior tibial pulses are 2+ on the right side and 2+ on the left side.      Heart sounds: Normal heart sounds. No murmur heard.    No friction rub. No gallop.   Pulmonary:      Effort: Pulmonary effort is normal.      Breath sounds: Normal breath sounds.   Abdominal:      General: Bowel sounds are normal. There is no distension.      Palpations: Abdomen is soft.   Musculoskeletal:      Right lower leg: No edema.      Left lower leg: No edema.   Skin:     General: Skin is warm and dry.      Capillary Refill: Capillary refill takes less than 2 seconds.   Neurological:      Mental Status: She is alert and oriented to person, place, and time.   Psychiatric:         Mood and Affect: Mood normal.         Behavior: Behavior normal.         Thought Content: Thought content normal.        Lab Review:   Lipid Panel          11/16/2022    11:17 8/9/2023    08:19   Lipid Panel   Total Cholesterol  193    Total Cholesterol 217     Triglycerides 130  137    HDL Cholesterol 67  62    VLDL Cholesterol 23  24    LDL Cholesterol  127  107    LDL/HDL Ratio  1.67        Cardiac Procedures:       Assessment:         Diagnoses and all orders for this visit:    1. Primary hypertension (Primary)    2. Hyperlipidemia, unspecified hyperlipidemia type    3. Non-STEMI (non-ST elevated myocardial infarction)    4. Chronic systolic heart failure  -     Basic Metabolic Panel; Future  -     proBNP    Other orders  -     ECG 12 Lead            Plan:       Chronic HFrEF: appears compensated on exam today. No reported orthopnea or dyspnea on exertion. Swelling is at a minimum now as well. Will continue with furosemide BID. Will obtain BMP to make sure her renal function is remaining stable on this dose.   Takotsubo CM: EF 40-45% on echo and normal coronaries per cath. On GDMT with valsartan and carvedilol. Will repeat echo in about 3 months.  HTN: blood pressure is well controlled. No  changes  HLD     Thank you for allowing me to participate in this patient's care. Please call with any questions or concerns. Ms. Olsen will follow up with Dr Park in 1 month.          Your medication list            Accurate as of September 7, 2023 11:59 PM. If you have any questions, ask your nurse or doctor.                CHANGE how you take these medications        Instructions Last Dose Given Next Dose Due   lactulose 10 GM/15ML solution  Commonly known as: CHRONULAC  What changed: when to take this      TAKE 15 ML BY MOUTH THREE TIMES DAILY              CONTINUE taking these medications        Instructions Last Dose Given Next Dose Due   acetaminophen 325 MG tablet  Commonly known as: TYLENOL      Take 2 tablets by mouth Every 4 (Four) Hours As Needed for Mild Pain .       alendronate 70 MG tablet  Commonly known as: Fosamax      Take 1 tablet by mouth Every 7 (Seven) Days.       apixaban 5 MG tablet tablet  Commonly known as: Eliquis      Take 1 tablet by mouth Every 12 (Twelve) Hours.       benzonatate 100 MG capsule  Commonly known as: TESSALON           carvedilol 12.5 MG tablet  Commonly known as: COREG      Take 1 tablet by mouth 2 (Two) Times a Day With Meals.       chlorhexidine 0.12 % solution  Commonly known as: PERIDEX      APPLY 15 ML AS DIRECTED TWICE DAILY SWISH FOR 30 seconds AND expectorate, EVERY MORNING AND IN THE EVENING TO BEGIN in 24 hours AFTER surgery       coenzyme Q10 100 MG capsule      Take 1 capsule by mouth Daily.       cyclobenzaprine 10 MG tablet  Commonly known as: FLEXERIL      Take 1 tablet by mouth 2 (Two) Times a Day As Needed for Muscle Spasms.       Enulose 10 GM/15ML solution solution (encephalopathy)  Generic drug: lactulose           esomeprazole 40 MG capsule  Commonly known as: nexIUM      Take 1 capsule by mouth Every Morning Before Breakfast.       ferrous sulfate 325 (65 FE) MG tablet      Take 1 tablet by mouth Daily With Breakfast.       folic acid 1 MG  tablet  Commonly known as: FOLVITE      Take 1 tablet by mouth Daily.       furosemide 40 MG tablet  Commonly known as: LASIX      Take 1 tablet by mouth 2 (Two) Times a Day.       gabapentin 100 MG capsule  Commonly known as: NEURONTIN      TAKE ONE CAPSULE BY MOUTH THREE TIMES DAILY       ipratropium 0.06 % nasal spray  Commonly known as: ATROVENT      2 sprays into the nostril(s) as directed by provider 4 (Four) Times a Day.       ipratropium-albuterol 0.5-2.5 mg/3 ml nebulizer  Commonly known as: DUO-NEB      USE 1 VIAL IN NEBULIZER 2 TIMES DAILY. Generic: DUONEB       levothyroxine 100 MCG tablet  Commonly known as: SYNTHROID, LEVOTHROID      TAKE ONE TABLET BY MOUTH DAILY       lidocaine 5 % ointment  Commonly known as: XYLOCAINE      Apply 1 application topically to the appropriate area as directed 3 (Three) Times a Day.       Magnesium 500 MG tablet      Take  by mouth.       ondansetron 4 MG tablet  Commonly known as: ZOFRAN           potassium chloride 10 MEQ CR tablet      Take 1 tablet by mouth 2 (Two) Times a Day.       pramipexole 1.5 MG tablet  Commonly known as: MIRAPEX      TAKE ONE TABLET BY MOUTH EVERY NIGHT AT BEDTIME       Senna Plus 8.6-50 MG per tablet  Generic drug: sennosides-docusate      TAKE TWO TABLETS BY MOUTH TWICE DAILY       sertraline 25 MG tablet  Commonly known as: ZOLOFT           Symbicort 160-4.5 MCG/ACT inhaler  Generic drug: budesonide-formoterol           triamcinolone 0.1 % cream  Commonly known as: KENALOG      Apply 1 application topically to the appropriate area as directed 2 (Two) Times a Day.       valsartan 320 MG tablet  Commonly known as: DIOVAN      TAKE ONE TABLET BY MOUTH DAILY       vitamin B-12 1000 MCG tablet  Commonly known as: CYANOCOBALAMIN      Take 1 tablet by mouth Daily.       Zinc 50 MG tablet      Take 1 tablet by mouth.                  Shefali Raymond, DEVORA  09/08/23  11:59 AM EDT

## 2023-09-15 ENCOUNTER — READMISSION MANAGEMENT (OUTPATIENT)
Dept: CALL CENTER | Facility: HOSPITAL | Age: 83
End: 2023-09-15
Payer: MEDICARE

## 2023-09-15 NOTE — OUTREACH NOTE
CHF Week 3 Survey      Flowsheet Row Responses   [facility] patient discharged from? [facility]   Does the patient have one of the following disease processes/diagnoses(primary or secondary)? CHF   Week 3 attempt successful? Yes   Call start time 1213   Call end time 1217   Discharge diagnosis Acute exacerbation of CHF   Meds reviewed with patient/caregiver? Yes   Is the patient having any side effects they believe may be caused by any medication additions or changes? No   Is the patient taking all medications as directed (includes completed medication regime)? Yes   Does the patient have a primary care provider?  Yes   Does the patient have an appointment with their PCP within 7 days of discharge? Yes   Has the patient kept scheduled appointments due by today? Yes   Psychosocial issues? No   Did the patient receive a copy of their discharge instructions? Yes   Nursing interventions Reviewed instructions with patient   What is the patient's perception of their health status since discharge? Improving   Nursing interventions Nurse provided patient education   Is the patient able to teach back signs and symptoms of worsening condition? (i.e. weight gain, shortness of air, etc.) Yes   If the patient is a current smoker, are they able to teach back resources for cessation? Not a smoker   Is the patient/caregiver able to teach back the hierarchy of who to call/visit for symptoms/problems? PCP, Specialist, Home health nurse, Urgent Care, ED, 911 Yes   Additional teach back comments pt on way to cardiac rehab   Is the patient able to teach back Heart Failure Zones? Yes   CHF Nursing Interventions Education provided on various zones   CHF Zone this Call Green Zone   Green Zone Patient reports doing well, Weight check stable, No new swelling -  feet, ankles and legs look normal for you, Physical activity level is normal for you, No new or worsening shortness of breath, No chest pain   Green Zone Interventions Daily  weight check, Meds as directed, Follow up visits planned, Low sodium diet   CHF Week 3 call completed? Yes   Graduated Yes   Is the patient interested in additional calls from an ambulatory ? No   Would this patient benefit from a Referral to Amb Social Work? No   Wrap up additional comments pt doing well, on way to cardiac rehab   Call end time 1217            LORI KUNZ - Registered Nurse

## 2023-10-09 DIAGNOSIS — K59.00 CONSTIPATION, UNSPECIFIED CONSTIPATION TYPE: ICD-10-CM

## 2023-10-09 RX ORDER — ASPIRIN 81 MG
TABLET, DELAYED RELEASE (ENTERIC COATED) ORAL
Qty: 60 TABLET | Refills: 0 | Status: SHIPPED | OUTPATIENT
Start: 2023-10-09

## 2023-10-11 ENCOUNTER — OFFICE VISIT (OUTPATIENT)
Dept: ORTHOPEDIC SURGERY | Facility: CLINIC | Age: 83
End: 2023-10-11
Payer: MEDICARE

## 2023-10-11 VITALS — WEIGHT: 212 LBS | HEIGHT: 64 IN | BODY MASS INDEX: 36.19 KG/M2

## 2023-10-11 DIAGNOSIS — M17.11 PRIMARY OSTEOARTHRITIS OF RIGHT KNEE: Primary | ICD-10-CM

## 2023-10-11 RX ADMIN — LIDOCAINE HYDROCHLORIDE 8 ML: 10 INJECTION, SOLUTION INFILTRATION; PERINEURAL at 11:49

## 2023-10-11 RX ADMIN — TRIAMCINOLONE ACETONIDE 80 MG: 40 INJECTION, SUSPENSION INTRA-ARTICULAR; INTRAMUSCULAR at 11:49

## 2023-10-11 NOTE — PROGRESS NOTES
Subjective:     Patient ID: Latanya Olsen is a 83 y.o. female.    Chief Complaint:  Right knee pain - new issue to examiner  History of Present Illness  Latanya Olsen Presents to clinic for evaluation of her right knee.  She is currently in cardiac rehab completing biking and other activities which is exacerbated pain at her right knee.  She believes she has been seen several years ago for the left knee denies any recent x-ray, MRI, CT of the right knee maximal tenderness present across the anterior, medial and lateral joint line and posterior aspect of the knee increased pain noted with ambulating long distances standing for extended periods of time biking and pain even present now at rest.  She is currently utilizing a walker with all ambulatory activities.      Social History     Occupational History     Employer: RETIRED   Tobacco Use    Smoking status: Never     Passive exposure: Never    Smokeless tobacco: Never    Tobacco comments:     Never   Vaping Use    Vaping Use: Never used   Substance and Sexual Activity    Alcohol use: No    Drug use: Never    Sexual activity: Not Currently      Past Medical History:   Diagnosis Date    Acute exacerbation of CHF (congestive heart failure) 08/27/2023    Anemia     Arthritis     Arthritis of back     Arthritis of neck     Atrial fibrillation     CKD (chronic kidney disease)     Stage 3    Constipation     COPD (chronic obstructive pulmonary disease)     DDD (degenerative disc disease), lumbar     Depression     Diverticulosis     Fracture of hip     GERD (gastroesophageal reflux disease)     Hip arthrosis     History of heart attack     7/2023    Hx of degenerative disc disease     Hyperlipidemia     Hypertension     Hypokalemia     Hypothyroidism     Knee swelling     Low back pain Yes    Lung cancer     history    Obesity 218    Osteopenia Yes    Renal disorder     Restless leg syndrome     Scoliosis Yes    Sleep apnea with use of continuous positive airway  pressure (CPAP)      Past Surgical History:   Procedure Laterality Date    ADENOIDECTOMY  2000    BUNIONECTOMY Bilateral     CARDIAC CATHETERIZATION N/A 08/07/2023    Procedure: Left Heart Cath;  Surgeon: Mireya Park MD;  Location: [facility] CATH INVASIVE LOCATION;  Service: Cardiology;  Laterality: N/A;    CARDIAC CATHETERIZATION N/A 08/07/2023    Procedure: Coronary angiography;  Surgeon: Mireya Park MD;  Location: [facility] CATH INVASIVE LOCATION;  Service: Cardiology;  Laterality: N/A;    CARDIAC CATHETERIZATION N/A 08/07/2023    Procedure: Left ventriculography;  Surgeon: Mireya Park MD;  Location: [facility] CATH INVASIVE LOCATION;  Service: Cardiology;  Laterality: N/A;    CATARACT EXTRACTION      CHOLECYSTECTOMY      COLONOSCOPY      ENDOSCOPY N/A 06/24/2016    Procedure: ESOPHAGOGASTRODUODENOSCOPY  with biopsies;  Surgeon: Von Hernández MD;  Location: [facility] OR;  Service:     JOINT REPLACEMENT  Yes    LUNG LOBECTOMY Left     lower lobe    LYTIC THROMBIN THERAPY Left 03/26/2021    Procedure: left leg clot treiver possible venous stent *supine*;  Surgeon: Rogerio Vega MD;  Location: [facility] OR 18/19;  Service: Vascular;  Laterality: Left;    REPLACEMENT TOTAL KNEE Left     THYROID BIOPSY      TOTAL HIP ARTHROPLASTY Right 06/01/2019    Procedure: BIPOLAR HIP CEMENTED POSTERIOR;  Surgeon: Ashley Crenshaw MD;  Location: [facility] OR;  Service: Orthopedics    TRIGGER POINT INJECTION      TUBAL ABDOMINAL LIGATION  50 yrs ago       Family History   Problem Relation Age of Onset    Heart attack Mother     Coronary artery disease Mother     Hypertension Mother     Kidney disease Mother     Heart attack Sister     Colon cancer Neg Hx     Colon polyps Neg Hx     Esophageal cancer Neg Hx     Breast cancer Neg Hx                Objective:  Physical Exam    Vital signs reviewed.   General: No acute distress.  Eyes: conjunctiva clear; pupils equally round and reactive  ENT: external  "ears and nose atraumatic; oropharynx clear  CV: no peripheral edema  Resp: normal respiratory effort  Skin: no rashes or wounds; normal turgor  Psych: mood and affect appropriate; recent and remote memory intact    Vitals:    10/11/23 1119   Weight: 96.2 kg (212 lb)   Height: 162.6 cm (64\")         10/11/23  1119   Weight: 96.2 kg (212 lb)     Body mass index is 36.39 kg/mý.      Right Knee Exam     Tenderness   The patient is experiencing tenderness in the medial joint line and patella.    Range of Motion   Right knee extension: 3.   Flexion:  120     Tests   Pepe:  Medial - positive Lateral - negative  Varus: negative Valgus: negative  Lachman:  Anterior - 1+      Patellar apprehension: positive    Other   Erythema: absent  Sensation: normal  Pulse: present  Swelling: severe    Comments:  Positive crepitus throughout arc of motion   Positive active patellar compression test              Imaging:  Right Knee X-Ray  Indication: Pain    AP, Lateral, and Zalma views    Findings:  No fracture  No bony lesion  Normal soft tissues  Advanced tricompartmental osteoarthritis with near bone-on-bone articulation in all three compartments, reactive osteophytes in all three compartments    No prior studies were available for comparison.    Assessment:        1. Primary osteoarthritis of right knee           Plan:  - Large Joint Arthrocentesis: R knee on 10/11/2023 11:49 AM  Indications: pain  Details: 22 G needle, superolateral approach  Medications: 80 mg triamcinolone acetonide 40 MG/ML; 8 mL lidocaine 1 %  Outcome: tolerated well, no immediate complications  Procedure, treatment alternatives, risks and benefits explained, specific risks discussed. Consent was given by the patient. Immediately prior to procedure a time out was called to verify the correct patient, procedure, equipment, support staff and site/side marked as required. Patient was prepped and draped in the usual sterile fashion.         1.  Discussed plan " of care with patient.  We did discuss proceeding with corticosteroid injection right knee which she does wish to proceed with.  We did discuss that these can be repeated once every 3 months if needed.  We also discussed epidural injections which she has received in the past when she is ready to proceed with her next epidural injection we will proceed with referral down to [facility] for ambulatory pain management epidural injection.  Encouraged to call we will place order and call if she has any other questions or concerns I will gladly see her at the time.  All questions answered.  Orders:  Orders Placed This Encounter   Procedures    - Large Joint Arthrocentesis: R knee    XR Knee 3+ View With [place] Right     No orders of the defined types were placed in this encounter.          Henna dictation utilized

## 2023-10-15 RX ORDER — LIDOCAINE HYDROCHLORIDE 10 MG/ML
8 INJECTION, SOLUTION INFILTRATION; PERINEURAL
Status: COMPLETED | OUTPATIENT
Start: 2023-10-11 | End: 2023-10-11

## 2023-10-15 RX ORDER — TRIAMCINOLONE ACETONIDE 40 MG/ML
80 INJECTION, SUSPENSION INTRA-ARTICULAR; INTRAMUSCULAR
Status: COMPLETED | OUTPATIENT
Start: 2023-10-11 | End: 2023-10-11

## 2023-10-25 RX ORDER — VALSARTAN 320 MG/1
TABLET ORAL
Qty: 90 TABLET | Refills: 0 | Status: SHIPPED | OUTPATIENT
Start: 2023-10-25

## 2023-10-25 RX ORDER — PRAMIPEXOLE DIHYDROCHLORIDE 1.5 MG/1
TABLET ORAL
Qty: 90 TABLET | Refills: 0 | Status: SHIPPED | OUTPATIENT
Start: 2023-10-25

## 2023-10-31 ENCOUNTER — TRANSCRIBE ORDERS (OUTPATIENT)
Dept: ADMINISTRATIVE | Facility: HOSPITAL | Age: 83
End: 2023-10-31
Payer: MEDICARE

## 2023-10-31 DIAGNOSIS — Z12.31 ENCOUNTER FOR SCREENING MAMMOGRAM FOR BREAST CANCER: Primary | ICD-10-CM

## 2023-11-01 DIAGNOSIS — K59.00 CONSTIPATION, UNSPECIFIED CONSTIPATION TYPE: ICD-10-CM

## 2023-11-01 RX ORDER — ASPIRIN 81 MG
TABLET, DELAYED RELEASE (ENTERIC COATED) ORAL
Qty: 60 TABLET | Refills: 0 | Status: SHIPPED | OUTPATIENT
Start: 2023-11-01

## 2023-11-07 ENCOUNTER — TELEPHONE (OUTPATIENT)
Dept: FAMILY MEDICINE CLINIC | Facility: CLINIC | Age: 83
End: 2023-11-07
Payer: MEDICARE

## 2023-11-07 NOTE — TELEPHONE ENCOUNTER
HUB TO RELAY      CALLED THE PATIENT AND LEFT A VOICEMAIL. PATIENT HAS PAPERWORK THAT IS READY FOR THEM TO COME IN AND .

## 2023-11-21 DIAGNOSIS — G62.9 PERIPHERAL POLYNEUROPATHY: ICD-10-CM

## 2023-11-21 DIAGNOSIS — K59.00 CONSTIPATION, UNSPECIFIED CONSTIPATION TYPE: ICD-10-CM

## 2023-11-21 DIAGNOSIS — R60.0 LOCALIZED EDEMA: ICD-10-CM

## 2023-11-21 DIAGNOSIS — I10 ESSENTIAL (PRIMARY) HYPERTENSION: ICD-10-CM

## 2023-11-21 RX ORDER — FUROSEMIDE 40 MG/1
TABLET ORAL
Qty: 135 TABLET | Refills: 0 | Status: SHIPPED | OUTPATIENT
Start: 2023-11-21

## 2023-11-21 RX ORDER — ASPIRIN 81 MG
TABLET, DELAYED RELEASE (ENTERIC COATED) ORAL
Qty: 60 TABLET | Refills: 0 | Status: SHIPPED | OUTPATIENT
Start: 2023-11-21

## 2023-11-21 RX ORDER — GABAPENTIN 100 MG/1
CAPSULE ORAL
Qty: 90 CAPSULE | Refills: 0 | Status: SHIPPED | OUTPATIENT
Start: 2023-11-21

## 2023-11-27 RX ORDER — LEVOTHYROXINE SODIUM 0.1 MG/1
TABLET ORAL
Qty: 90 TABLET | Refills: 0 | Status: SHIPPED | OUTPATIENT
Start: 2023-11-27

## 2023-11-30 ENCOUNTER — OFFICE VISIT (OUTPATIENT)
Dept: FAMILY MEDICINE CLINIC | Facility: CLINIC | Age: 83
End: 2023-11-30
Payer: MEDICARE

## 2023-11-30 ENCOUNTER — TELEPHONE (OUTPATIENT)
Dept: FAMILY MEDICINE CLINIC | Facility: CLINIC | Age: 83
End: 2023-11-30

## 2023-11-30 ENCOUNTER — TELEPHONE (OUTPATIENT)
Dept: CARDIOLOGY | Facility: CLINIC | Age: 83
End: 2023-11-30

## 2023-11-30 VITALS
HEART RATE: 69 BPM | OXYGEN SATURATION: 95 % | WEIGHT: 211.4 LBS | DIASTOLIC BLOOD PRESSURE: 70 MMHG | HEIGHT: 64 IN | SYSTOLIC BLOOD PRESSURE: 124 MMHG | BODY MASS INDEX: 36.09 KG/M2 | TEMPERATURE: 98.4 F

## 2023-11-30 DIAGNOSIS — I50.22 CHRONIC SYSTOLIC HEART FAILURE: ICD-10-CM

## 2023-11-30 DIAGNOSIS — Z00.00 MEDICARE ANNUAL WELLNESS VISIT, SUBSEQUENT: Primary | ICD-10-CM

## 2023-11-30 DIAGNOSIS — J06.9 URI, ACUTE: ICD-10-CM

## 2023-11-30 DIAGNOSIS — R60.0 LOCALIZED EDEMA: Primary | ICD-10-CM

## 2023-11-30 DIAGNOSIS — D50.9 IRON DEFICIENCY ANEMIA, UNSPECIFIED IRON DEFICIENCY ANEMIA TYPE: ICD-10-CM

## 2023-11-30 DIAGNOSIS — Z91.81 AT MODERATE RISK FOR FALL: ICD-10-CM

## 2023-11-30 DIAGNOSIS — F43.21 ADJUSTMENT DISORDER WITH DEPRESSED MOOD: ICD-10-CM

## 2023-11-30 DIAGNOSIS — E03.9 HYPOTHYROIDISM, UNSPECIFIED TYPE: ICD-10-CM

## 2023-11-30 DIAGNOSIS — I10 ESSENTIAL HYPERTENSION: ICD-10-CM

## 2023-11-30 DIAGNOSIS — R79.89 LOW VITAMIN B12 LEVEL: ICD-10-CM

## 2023-11-30 DIAGNOSIS — G62.9 PERIPHERAL POLYNEUROPATHY: ICD-10-CM

## 2023-11-30 DIAGNOSIS — K59.09 CHRONIC CONSTIPATION: ICD-10-CM

## 2023-11-30 DIAGNOSIS — N18.31 STAGE 3A CHRONIC KIDNEY DISEASE: ICD-10-CM

## 2023-11-30 RX ORDER — IPRATROPIUM BROMIDE 42 UG/1
2 SPRAY, METERED NASAL 4 TIMES DAILY PRN
Qty: 15 ML | Refills: 0 | Status: SHIPPED | OUTPATIENT
Start: 2023-11-30

## 2023-11-30 NOTE — TELEPHONE ENCOUNTER
----- Message from Ghazala Hassan MA sent at 11/30/2023  3:22 PM EST -----   left message with Physical therapy [place] to call our office back with info on how to measure her for new compression hose.   ----- Message -----  From: Kehrer, Meredith Lea, MD  Sent: 11/30/2023   2:22 PM EST  To: Ghazala Hassan MA    Patient needs to get new compression hose.  She called physical therapy at [facility] and they told her that they can order them without measuring her.  She is concerned because she has lost some weight.  Please call the physical therapy department and let them know I feel that she needs to be measured.  Please find out what I need to do for that.  Do I need to put in a referral?

## 2023-11-30 NOTE — PATIENT INSTRUCTIONS
Fall Prevention in the Home, Adult  Falls can cause injuries and affect people of all ages. There are many simple things that you can do to make your home safe and to help prevent falls. Ask for help when making these changes, if needed.  What actions can I take to prevent falls?  General instructions  Use good lighting in all rooms. Replace any light bulbs that burn out, turn on lights if it is dark, and use night-lights.  Place frequently used items in easy-to-reach places. Lower the shelves around your home if necessary.  Set up furniture so that there are clear paths around it. Avoid moving your furniture around.  Remove throw rugs and other tripping hazards from the floor.  Avoid walking on wet floors.  Fix any uneven floor surfaces.  Add color or contrast paint or tape to grab bars and handrails in your home. Place contrasting color strips on the first and last steps of staircases.  When you use a stepladder, make sure that it is completely opened and that the sides and supports are firmly locked. Have someone hold the ladder while you are using it. Do not climb a closed stepladder.  Know where your pets are when moving through your home.  What can I do in the bathroom?         Keep the floor dry. Immediately clean up any water that is on the floor.  Remove soap buildup in the tub or shower regularly.  Use nonskid mats or decals on the floor of the tub or shower.  Attach bath mats securely with double-sided, nonslip rug tape.  If you need to sit down while you are in the shower, use a plastic, nonslip stool.  Install grab bars by the toilet and in the tub and shower. Do not use towel bars as grab bars.  What can I do in the bedroom?  Make sure that a bedside light is easy to reach.  Do not use oversized bedding that reaches the floor.  Have a firm chair that has side arms to use for getting dressed.  What can I do in the kitchen?  Clean up any spills right away.  If you need to reach for something above you,  use a sturdy step stool that has a grab bar.  Keep electrical cables out of the way.  Do not use floor polish or wax that makes floors slippery. If you must use wax, make sure that it is non-skid floor wax.  What can I do with my stairs?  Do not leave any items on the stairs.  Make sure that you have a light switch at the top and the bottom of the stairs. Have them installed if you do not have them.  Make sure that there are handrails on both sides of the stairs. Fix handrails that are broken or loose. Make sure that handrails are as long as the staircases.  Install non-slip stair treads on all stairs in your home.  Avoid having throw rugs at the top or bottom of stairs, or secure the rugs with carpet tape to prevent them from moving.  Choose a carpet design that does not hide the edge of steps on the stairs.  Check any carpeting to make sure that it is firmly attached to the stairs. Fix any carpet that is loose or worn.  What can I do on the outside of my home?  Use bright outdoor lighting.  Regularly repair the edges of walkways and driveways and fix any cracks.  Remove high doorway thresholds.  Trim any shrubbery on the main path into your home.  Regularly check that handrails are securely fastened and in good repair. Both sides of all steps should have handrails.  Install guardrails along the edges of any raised decks or porches.  Clear walkways of debris and clutter, including tools and rocks.  Have leaves, snow, and ice cleared regularly.  Use sand or salt on walkways during winter months.  In the garage, clean up any spills right away, including grease or oil spills.  What other actions can I take?  Wear closed-toe shoes that fit well and support your feet. Wear shoes that have rubber soles or low heels.  Use mobility aids as needed, such as canes, walkers, scooters, and crutches.  Review your medicines with your health care provider. Some medicines can cause dizziness or changes in blood pressure, which  increase your risk of falling.  Talk with your health care provider about other ways that you can decrease your risk of falls. This may include working with a physical therapist or  to improve your strength, balance, and endurance.  Where to find more information  Centers for Disease Control and PreventionBESSIE: www.cdc.gov  [employer] [place] on Aging: www.mariam.nih.gov  Contact a health care provider if:  You are afraid of falling at home.  You feel weak, drowsy, or dizzy at home.  You fall at home.  Summary  There are many simple things that you can do to make your home safe and to help prevent falls.  Ways to make your home safe include removing tripping hazards and installing grab bars in the bathroom.  Ask for help when making these changes in your home.  This information is not intended to replace advice given to you by your health care provider. Make sure you discuss any questions you have with your health care provider.  Document Revised: 09/19/2022 Document Reviewed: 07/21/2021  Elsevier Patient Education © 2023 [employer]

## 2023-11-30 NOTE — TELEPHONE ENCOUNTER
Caller: JJ TRONCOSO    Relationship:     Best call back number: [phone]    Who is your current provider:DR TUBBS    Is your current provider offboarding? NO    Who would you like your new provider to be: DR SILVERIO    What are your reasons for transferring care: PATIENT WOULD LIKE TO BE SEEN IN [facility]

## 2023-11-30 NOTE — PROGRESS NOTES
The ABCs of the Annual Wellness Visit  Subsequent Medicare Wellness Visit    Subjective    Latanya Olsen is a 83 y.o. female who presents for a Subsequent Medicare Wellness Visit.    The following portions of the patient's history were reviewed and   updated as appropriate: allergies, current medications, past family history, past medical history, past social history, past surgical history, and problem list.    Compared to one year ago, the patient feels her physical   health is the same.    Compared to one year ago, the patient feels her mental   health is the same.    Recent Hospitalizations:  This patient has had a [facility] admission record on file within the last 365 days.    Current Medical Providers:  Patient Care Team:  Kehrer, Meredith Lea, MD as PCP - General (Family Medicine)  Chris Bear MD as Referring Physician (Hospitalist)  Delfino Scruggs MD as Consulting Physician (Hematology and Oncology)    Outpatient Medications Prior to Visit   Medication Sig Dispense Refill    acetaminophen (TYLENOL) 325 MG tablet Take 2 tablets by mouth Every 4 (Four) Hours As Needed for Mild Pain .      alendronate (Fosamax) 70 MG tablet Take 1 tablet by mouth Every 7 (Seven) Days. 13 tablet 3    apixaban (Eliquis) 5 MG tablet tablet Take 1 tablet by mouth Every 12 (Twelve) Hours. 180 tablet 3    benzonatate (TESSALON) 100 MG capsule       carvedilol (COREG) 12.5 MG tablet Take 1 tablet by mouth 2 (Two) Times a Day With Meals. 60 tablet 5    chlorhexidine (PERIDEX) 0.12 % solution APPLY 15 ML AS DIRECTED TWICE DAILY SWISH FOR 30 seconds AND expectorate, EVERY MORNING AND IN THE EVENING TO BEGIN in 24 hours AFTER surgery      coenzyme Q10 100 MG capsule Take 1 capsule by mouth Daily.      cyclobenzaprine (FLEXERIL) 10 MG tablet Take 1 tablet by mouth 2 (Two) Times a Day As Needed for Muscle Spasms. 14 tablet 0    Enulose 10 GM/15ML solution solution (encephalopathy)       esomeprazole (NexIUM) 40 MG  capsule Take 1 capsule by mouth Every Morning Before Breakfast.      ferrous sulfate 325 (65 FE) MG tablet Take 1 tablet by mouth Daily With Breakfast.      folic acid (FOLVITE) 1 MG tablet Take 1 tablet by mouth Daily. 30 tablet 5    furosemide (LASIX) 40 MG tablet TAKE ONE TABLET BY MOUTH DAILY. TAKE SECOND TABLET AT NOON AS NEEDED FOR INCREASED SWELLING 135 tablet 0    gabapentin (NEURONTIN) 100 MG capsule TAKE ONE CAPSULE BY MOUTH THREE TIMES DAILY 90 capsule 0    lactulose (CHRONULAC) 10 GM/15ML solution TAKE 15 ML BY MOUTH THREE TIMES DAILY (Patient taking differently: 2 (Two) Times a Day. TAKE 15 ML BY MOUTH THREE TIMES DAILY) 1350 mL 2    levothyroxine (SYNTHROID, LEVOTHROID) 100 MCG tablet TAKE ONE TABLET BY MOUTH DAILY 90 tablet 0    lidocaine (XYLOCAINE) 5 % ointment Apply 1 application topically to the appropriate area as directed 3 (Three) Times a Day. 50 g 3    Magnesium 500 MG tablet Take  by mouth.      ondansetron (ZOFRAN) 4 MG tablet       potassium chloride 10 MEQ CR tablet Take 1 tablet by mouth 2 (Two) Times a Day.      pramipexole (MIRAPEX) 1.5 MG tablet TAKE ONE TABLET BY MOUTH EVERY NIGHT AT BEDTIME 90 tablet 0    Senna Plus 8.6-50 MG per tablet TAKE TWO TABLETS BY MOUTH TWICE DAILY 60 tablet 0    sertraline (ZOLOFT) 25 MG tablet       SYMBICORT 160-4.5 MCG/ACT inhaler       triamcinolone (KENALOG) 0.1 % cream Apply 1 application topically to the appropriate area as directed 2 (Two) Times a Day. 15 g 2    valsartan (DIOVAN) 320 MG tablet TAKE ONE TABLET BY MOUTH DAILY 90 tablet 0    vitamin B-12 (CYANOCOBALAMIN) 1000 MCG tablet Take 1 tablet by mouth Daily.      Zinc 50 MG tablet Take 1 tablet by mouth.      ipratropium (ATROVENT) 0.06 % nasal spray 2 sprays into the nostril(s) as directed by provider 4 (Four) Times a Day. 15 mL 0    ipratropium-albuterol (DUO-NEB) 0.5-2.5 mg/mL nebulizer USE 1 VIAL IN NEBULIZER 2 TIMES DAILY. Generic: DUONEB 60 mL 10     No facility-administered medications  prior to visit.       No opioid medication identified on active medication list. I have reviewed chart for other potential  high risk medication/s and harmful drug interactions in the elderly.        Aspirin is not on active medication list.  Aspirin use is not indicated based on review of current medical condition/s. Risk of harm outweighs potential benefits.  .    Patient Active Problem List   Diagnosis    Carcinoid tumor of lung    Diverticulosis of intestine    Obstructive sleep apnea syndrome    Weight loss    Vitamin D deficiency    Overweight (BMI 25.0-29.9)    Spinal stenosis of lumbar region without neurogenic claudication    Osteoarthritis of knee    Obesity (BMI 30-39.9)    Chronic lower back pain    Knee pain    Insomnia    Hypothyroidism    Hypokalemia    Essential hypertension    Hyperlipidemia    Generalized osteoarthritis    Gastroparesis    Foot pain    Chronic obstructive pulmonary disease    Chronic constipation    Anemia    Weight gain    Pain of left breast    Elevated serum alkaline phosphatase level    Neck pain    Volume overload    Status post total hip replacement, right    Generalized weakness    Constipation    Idiopathic peripheral neuropathy    Lymphedema    Lumbar degenerative disc disease    GERD (gastroesophageal reflux disease)    Lung cancer    Non-STEMI (non-ST elevated myocardial infarction)    Nonischemic nontraumatic myocardial injury    Acute exacerbation of CHF (congestive heart failure)    Chronic systolic heart failure    Stage 3a chronic kidney disease    Adjustment disorder with depressed mood    Peripheral polyneuropathy     Advance Care Planning   Advance Care Planning     Advance Directive is not on file.  ACP discussion was held with the patient during this visit. Patient has an advance directive (not in EMR), copy requested.     Objective    Vitals:    11/30/23 1407   BP: 124/70   Pulse: 69   Temp: 98.4 °F (36.9 °C)   SpO2: 95%   Weight: 95.9 kg (211 lb 6.4 oz)  "  Height: 162.6 cm (64\")     Estimated body mass index is 36.29 kg/m² as calculated from the following:    Height as of this encounter: 162.6 cm (64\").    Weight as of this encounter: 95.9 kg (211 lb 6.4 oz).    Class 2 Severe Obesity (BMI >=35 and <=39.9). Obesity-related health conditions include the following: hypertension, coronary heart disease, and dyslipidemias. Obesity is improving with treatment. BMI is is above average; BMI management plan is completed. We discussed portion control and increasing exercise.      Does the patient have evidence of cognitive impairment? No    Lab Results   Component Value Date    CHLPL 226 (H) 2023    TRIG 134 2023    HDL 59 2023     (H) 2023    VLDL 24 2023        HEALTH RISK ASSESSMENT    Smoking Status:  Social History     Tobacco Use   Smoking Status Never    Passive exposure: Never   Smokeless Tobacco Never   Tobacco Comments    Never     Alcohol Consumption:  Social History     Substance and Sexual Activity   Alcohol Use No     Fall Risk Screen:    STEADI Fall Risk Assessment was completed, and patient is at MODERATE risk for falls. Assessment completed on:2023    Depression Screenin/30/2023     2:05 PM   PHQ-2/PHQ-9 Depression Screening   Little Interest or Pleasure in Doing Things 1-->several days   Feeling Down, Depressed or Hopeless 1-->several days   Trouble Falling or Staying Asleep, or Sleeping Too Much 0-->not at all   Feeling Tired or Having Little Energy 1-->several days   Poor Appetite or Overeating 1-->several days   Feeling Bad about Yourself - or that You are a Failure or Have Let Yourself or Your Family Down 0-->not at all   Trouble Concentrating on Things, Such as Reading the Newspaper or Watching Television 0-->not at all   Moving or Speaking So Slowly that Other People Could Have Noticed? Or the Opposite - Being So Fidgety 0-->not at all   Thoughts that You Would be Better Off Dead or of Hurting " Yourself in Some Way 0-->not at all   PHQ-9: Brief Depression Severity Measure Score 4       Health Habits and Functional and Cognitive Screenin/30/2023     2:06 PM   Functional & Cognitive Status   Do you have difficulty preparing food and eating? No   Do you have difficulty bathing yourself, getting dressed or grooming yourself? No   Do you have difficulty using the toilet? No   Do you have difficulty moving around from place to place? No   Do you have trouble with steps or getting out of a bed or a chair? No   Current Diet Other   Dental Exam Up to date   Eye Exam Up to date   Exercise (times per week) 3 times per week   Current Exercises Include Home Exercise Program (TV, Computer, Etc.);Cardiovascular Workout   Do you need help using the phone?  No   Are you deaf or do you have serious difficulty hearing?  No   Do you need help to go to places out of walking distance? Yes   Do you need help shopping? No   Do you need help preparing meals?  No   Do you need help with housework?  No   Do you need help with laundry? No   Do you need help taking your medications? No   Do you need help managing money? No   Do you ever drive or ride in a car without wearing a seat belt? No       Age-appropriate Screening Schedule:  Refer to the list below for future screening recommendations based on patient's age, sex and/or medical conditions. Orders for these recommended tests are listed in the plan section. The patient has been provided with a written plan.    Health Maintenance   Topic Date Due    COVID-19 Vaccine (2023- season) 2023    ANNUAL WELLNESS VISIT  2024    LIPID PANEL  2024    BMI FOLLOWUP  2024    DXA SCAN  2024    TDAP/TD VACCINES (3 - Td or Tdap) 2029    INFLUENZA VACCINE  Completed    Pneumococcal Vaccine 65+  Completed    ZOSTER VACCINE  Completed                  CMS Preventative Services Quick Reference  Risk Factors Identified During Encounter  Fall Risk-High  "or Moderate: Discussed Fall Prevention in the home  The above risks/problems have been discussed with the patient.  Pertinent information has been shared with the patient in the After Visit Summary.  An After Visit Summary and PPPS were made available to the patient.    Follow Up:   Next Medicare Wellness visit to be scheduled in 1 year.       Additional E&M Note during same encounter follows:  Patient has multiple medical problems which are significant and separately identifiable that require additional work above and beyond the Medicare Wellness Visit.      Chief Complaint  Medicare Wellness-subsequent, Hypertension, Hyperlipidemia, and Hypothyroidism    Subjective        HPI  Latanya Olsen is also being seen today for follow-up on multiple medical problems including hypertension, hyperlipidemia, coronary artery disease and chronic edema.  Needs new stockings.  Wants to change cardiology to [place] with Dr. Marsh.  Has sinus headache for 1 week.  Had sneezing at first.  Has been taking some tylenol without relief.        Objective   Vital Signs:  /70   Pulse 69   Temp 98.4 °F (36.9 °C)   Ht 162.6 cm (64\")   Wt 95.9 kg (211 lb 6.4 oz)   SpO2 95%   BMI 36.29 kg/m²     Physical Exam  Vitals and nursing note reviewed.   Constitutional:       General: She is not in acute distress.     Appearance: Normal appearance. She is well-developed. She is obese.   HENT:      Head: Normocephalic and atraumatic.      Right Ear: Tympanic membrane, ear canal and external ear normal.      Left Ear: Tympanic membrane, ear canal and external ear normal.      Nose: Nose normal.      Mouth/Throat:      Mouth: Mucous membranes are moist.      Pharynx: Oropharynx is clear. No oropharyngeal exudate or posterior oropharyngeal erythema.   Eyes:      Conjunctiva/sclera: Conjunctivae normal.      Pupils: Pupils are equal, round, and reactive to light.   Neck:      Thyroid: No thyromegaly.   Cardiovascular:      Rate and " Rhythm: Normal rate and regular rhythm.      Heart sounds: No murmur heard.  Pulmonary:      Effort: Pulmonary effort is normal.      Breath sounds: Normal breath sounds. No wheezing.   Abdominal:      General: Abdomen is flat. Bowel sounds are normal. There is no distension.      Palpations: Abdomen is soft. There is no mass.      Tenderness: There is no abdominal tenderness.      Hernia: No hernia is present.   Musculoskeletal:         General: No swelling. Normal range of motion.      Cervical back: Normal range of motion and neck supple.      Right lower leg: Edema present.      Left lower leg: Edema present.      Comments: Hose in place   Lymphadenopathy:      Cervical: No cervical adenopathy.   Skin:     General: Skin is warm and dry.      Capillary Refill: Capillary refill takes less than 2 seconds.      Findings: No rash.   Neurological:      General: No focal deficit present.      Mental Status: She is alert and oriented to person, place, and time.      Cranial Nerves: No cranial nerve deficit.   Psychiatric:         Mood and Affect: Mood normal.         Behavior: Behavior normal.                         Assessment and Plan   Diagnoses and all orders for this visit:    1. Medicare annual wellness visit, subsequent (Primary)    2. Essential hypertension  -     CBC & Differential  -     Comprehensive Metabolic Panel  -     Lipid Panel  -     TSH    3. Hypothyroidism, unspecified type  -     TSH    4. Stage 3a chronic kidney disease  -     Comprehensive Metabolic Panel    5. Iron deficiency anemia, unspecified iron deficiency anemia type  -     CBC & Differential    6. Adjustment disorder with depressed mood    7. Peripheral polyneuropathy    8. Chronic constipation    9. At moderate risk for fall    10. Low vitamin B12 level  -     Vitamin B12    11. URI, acute  -     ipratropium (ATROVENT) 0.06 % nasal spray; 2 sprays into the nostril(s) as directed by provider 4 (Four) Times a Day As Needed for Rhinitis  (congestion).  Dispense: 15 mL; Refill: 0    Hypertension-controlled, continue current medication check labs  Hypothyroidism-due for TSH  CKD-recheck today  Deficiency anemia-we will monitor CBC  For her chronic edema and CHF, will get her new hose         Follow Up   Return in about 3 months (around 2/29/2024) for Recheck.  Patient was given instructions and counseling regarding her condition or for health maintenance advice. Please see specific information pulled into the AVS if appropriate.

## 2023-12-01 LAB
ALBUMIN SERPL-MCNC: 4 G/DL (ref 3.7–4.7)
ALBUMIN/GLOB SERPL: 2.4 {RATIO} (ref 1.2–2.2)
ALP SERPL-CCNC: 91 IU/L (ref 44–121)
ALT SERPL-CCNC: 10 IU/L (ref 0–32)
AST SERPL-CCNC: 14 IU/L (ref 0–40)
BASOPHILS # BLD AUTO: 0 X10E3/UL (ref 0–0.2)
BASOPHILS NFR BLD AUTO: 0 %
BILIRUB SERPL-MCNC: <0.2 MG/DL (ref 0–1.2)
BUN SERPL-MCNC: 37 MG/DL (ref 8–27)
BUN/CREAT SERPL: 23 (ref 12–28)
CALCIUM SERPL-MCNC: 8.6 MG/DL (ref 8.7–10.3)
CHLORIDE SERPL-SCNC: 106 MMOL/L (ref 96–106)
CHOLEST SERPL-MCNC: 226 MG/DL (ref 100–199)
CO2 SERPL-SCNC: 21 MMOL/L (ref 20–29)
CREAT SERPL-MCNC: 1.58 MG/DL (ref 0.57–1)
EGFRCR SERPLBLD CKD-EPI 2021: 32 ML/MIN/1.73
EOSINOPHIL # BLD AUTO: 0.2 X10E3/UL (ref 0–0.4)
EOSINOPHIL NFR BLD AUTO: 2 %
ERYTHROCYTE [DISTWIDTH] IN BLOOD BY AUTOMATED COUNT: 14.3 % (ref 11.7–15.4)
GLOBULIN SER CALC-MCNC: 1.7 G/DL (ref 1.5–4.5)
GLUCOSE SERPL-MCNC: 101 MG/DL (ref 70–99)
HCT VFR BLD AUTO: 33.7 % (ref 34–46.6)
HDLC SERPL-MCNC: 59 MG/DL
HGB BLD-MCNC: 11.4 G/DL (ref 11.1–15.9)
IMM GRANULOCYTES # BLD AUTO: 0 X10E3/UL (ref 0–0.1)
IMM GRANULOCYTES NFR BLD AUTO: 0 %
LDLC SERPL CALC-MCNC: 143 MG/DL (ref 0–99)
LYMPHOCYTES # BLD AUTO: 2.7 X10E3/UL (ref 0.7–3.1)
LYMPHOCYTES NFR BLD AUTO: 38 %
MCH RBC QN AUTO: 30.3 PG (ref 26.6–33)
MCHC RBC AUTO-ENTMCNC: 33.8 G/DL (ref 31.5–35.7)
MCV RBC AUTO: 90 FL (ref 79–97)
MONOCYTES # BLD AUTO: 0.7 X10E3/UL (ref 0.1–0.9)
MONOCYTES NFR BLD AUTO: 9 %
NEUTROPHILS # BLD AUTO: 3.6 X10E3/UL (ref 1.4–7)
NEUTROPHILS NFR BLD AUTO: 51 %
PLATELET # BLD AUTO: 184 X10E3/UL (ref 150–450)
POTASSIUM SERPL-SCNC: 4.3 MMOL/L (ref 3.5–5.2)
PROT SERPL-MCNC: 5.7 G/DL (ref 6–8.5)
RBC # BLD AUTO: 3.76 X10E6/UL (ref 3.77–5.28)
SODIUM SERPL-SCNC: 142 MMOL/L (ref 134–144)
TRIGL SERPL-MCNC: 134 MG/DL (ref 0–149)
TSH SERPL DL<=0.005 MIU/L-ACNC: 0.75 UIU/ML (ref 0.45–4.5)
VIT B12 SERPL-MCNC: 313 PG/ML (ref 232–1245)
VLDLC SERPL CALC-MCNC: 24 MG/DL (ref 5–40)
WBC # BLD AUTO: 7.1 X10E3/UL (ref 3.4–10.8)

## 2023-12-01 RX ORDER — IPRATROPIUM BROMIDE AND ALBUTEROL SULFATE 2.5; .5 MG/3ML; MG/3ML
SOLUTION RESPIRATORY (INHALATION)
Qty: 60 ML | Refills: 0 | Status: SHIPPED | OUTPATIENT
Start: 2023-12-01

## 2023-12-01 NOTE — TELEPHONE ENCOUNTER
12.01.23    Sent message to providers to approve the patient transfer request. Will schedule once approval has been given.     Thanks  Kenneth

## 2023-12-05 NOTE — PROGRESS NOTES
RM:________     PCP: Kehrer, Meredith Lea, MD    : 1940  AGE: 83 y.o.  EST PATIENT     REASON FOR VISIT/  CC:        BP Readings from Last 3 Encounters:   23 124/70   23 130/74   23 132/68      Wt Readings from Last 3 Encounters:   23 95.9 kg (211 lb 6.4 oz)   10/11/23 96.2 kg (212 lb)   23 96.2 kg (212 lb)        WT: ____________ BP: __________L __________R HR______    CHEST PAIN: _____________    SOA: _____________PALPS: _______________     LIGHTHEADED: ___________FATIGUE: ________________ EDEMA __________    ALLERGIES:Doxycycline SMOKING HISTORY:  Social History     Tobacco Use    Smoking status: Never     Passive exposure: Never    Smokeless tobacco: Never    Tobacco comments:     Never   Vaping Use    Vaping Use: Never used   Substance Use Topics    Alcohol use: No    Drug use: Never     CAFFEINE USE_________________  ALCOHOL ______________________

## 2023-12-07 ENCOUNTER — HOSPITAL ENCOUNTER (OUTPATIENT)
Dept: OCCUPATIONAL THERAPY | Facility: HOSPITAL | Age: 83
Setting detail: THERAPIES SERIES
Discharge: HOME | End: 2023-12-07
Payer: MEDICARE

## 2023-12-07 DIAGNOSIS — I89.0 LYMPHEDEMA: Primary | ICD-10-CM

## 2023-12-07 PROCEDURE — 97165 OT EVAL LOW COMPLEX 30 MIN: CPT

## 2023-12-08 NOTE — THERAPY EVALUATION
Outpatient Occupational Therapy Lymphedema Initial Evaluation  ANNAMAIRE Grimes     Patient Name: Latanya Olsen  : 1940  MRN: [id number]  Today's Date: 2023      Visit Date: 2023    Patient Active Problem List   Diagnosis    Carcinoid tumor of lung    Diverticulosis of intestine    Obstructive sleep apnea syndrome    Weight loss    Vitamin D deficiency    Overweight (BMI 25.0-29.9)    Spinal stenosis of lumbar region without neurogenic claudication    Osteoarthritis of knee    Obesity (BMI 30-39.9)    Chronic lower back pain    Knee pain    Insomnia    Hypothyroidism    Hypokalemia    Essential hypertension    Hyperlipidemia    Generalized osteoarthritis    Gastroparesis    Foot pain    Chronic obstructive pulmonary disease    Chronic constipation    Anemia    Weight gain    Pain of left breast    Elevated serum alkaline phosphatase level    Neck pain    Volume overload    Status post total hip replacement, right    Generalized weakness    Constipation    Idiopathic peripheral neuropathy    Lymphedema    Lumbar degenerative disc disease    GERD (gastroesophageal reflux disease)    Lung cancer    Non-STEMI (non-ST elevated myocardial infarction)    Nonischemic nontraumatic myocardial injury    Acute exacerbation of CHF (congestive heart failure)    Chronic systolic heart failure    Stage 3a chronic kidney disease    Adjustment disorder with depressed mood    Peripheral polyneuropathy        Past Medical History:   Diagnosis Date    Acute exacerbation of CHF (congestive heart failure) 2023    Anemia     Anxiety 4-2-23    Arthritis     Arthritis of back     Arthritis of neck     Asthma 2006    Atrial fibrillation     Cataract     CKD (chronic kidney disease)     Stage 3    Constipation     COPD (chronic obstructive pulmonary disease)     DDD (degenerative disc disease), lumbar     Deep vein thrombosis     Depression     Diverticulosis     Fracture of hip     GERD  (gastroesophageal reflux disease)     Hip arthrosis     History of heart attack     7/2023    Hx of degenerative disc disease     Hyperlipidemia     Hyperlipidemia 02/19/2016    Hypertension     Hypokalemia     Hypothyroidism     Knee swelling     Low back pain Yes    Lung cancer     history    Myocardial infarction 8-4-23    Obesity 218    Osteopenia Yes    Pulmonary embolism 2019    Renal disorder     Renal insufficiency 2019    Restless leg syndrome     Scoliosis Yes    Sleep apnea with use of continuous positive airway pressure (CPAP)         Past Surgical History:   Procedure Laterality Date    ADENOIDECTOMY  2000    BUNIONECTOMY Bilateral     CARDIAC CATHETERIZATION N/A 08/07/2023    Procedure: Left Heart Cath;  Surgeon: Mireya Park MD;  Location: [facility] CATH INVASIVE LOCATION;  Service: Cardiology;  Laterality: N/A;    CARDIAC CATHETERIZATION N/A 08/07/2023    Procedure: Coronary angiography;  Surgeon: Mireya Park MD;  Location: [facility] CATH INVASIVE LOCATION;  Service: Cardiology;  Laterality: N/A;    CARDIAC CATHETERIZATION N/A 08/07/2023    Procedure: Left ventriculography;  Surgeon: Mireya Park MD;  Location: [facility] CATH INVASIVE LOCATION;  Service: Cardiology;  Laterality: N/A;    CATARACT EXTRACTION      CHOLECYSTECTOMY      COLONOSCOPY      ENDOSCOPY N/A 06/24/2016    Procedure: ESOPHAGOGASTRODUODENOSCOPY  with biopsies;  Surgeon: Von Hernández MD;  Location: [facility];  Service:     JOINT REPLACEMENT  Yes    LUNG LOBECTOMY Left     lower lobe    LYTIC THROMBIN THERAPY Left 03/26/2021    Procedure: left leg clot treiver possible venous stent *supine*;  Surgeon: Rogerio Vega MD;  Location: [facility] OR 18/19;  Service: Vascular;  Laterality: Left;    REPLACEMENT TOTAL KNEE Left     THYROID BIOPSY      TONSILLECTOMY  Yes    TOTAL HIP ARTHROPLASTY Right 06/01/2019    Procedure: BIPOLAR HIP CEMENTED POSTERIOR;  Surgeon: Ashley Crenshaw MD;  Location: [facility]  OR;  Service: Orthopedics    TRIGGER POINT INJECTION      TUBAL ABDOMINAL LIGATION  50 yrs ago         Visit Dx:     ICD-10-CM ICD-9-CM   1. Lymphedema  I89.0 457.1        Patient History       Row Name 12/07/23 1400             History    Chief Complaint Swelling;Tightness;Pain  -JJ      Type of Pain Lower Extremity / Leg;Back pain  -JJ      Date Current Problem(s) Began --  5-21  -JJ      Brief Description of Current Complaint pt presents to clinic today for reevaluation of lymphedema symptoms in B LE. pt states edema has been controlled with B LE compression garments however pt had MI in June 2023 and has been working with cardiac rehab and a nutritionist.  pt has subsequently lost weight and her compression garments are no longer fitting well. pt also with some co intermittent pain in L LE due to some persistent edema  -JJ      Previous treatment for THIS PROBLEM --  pt had previous CDT in fall 2022, pt compliant with all bandaging, HEP and compression garments  -JJ      Patient/Caregiver Goals Decrease swelling;Know what to do to help the symptoms;Relieve pain  -JJ      Hand Dominance right-handed  -JJ      Occupation/sports/leisure activities none  -JJ      Patient seeing anyone else for problem(s)? no  -JJ      How has patient tried to help current problem? elevation, compression garments, walking, HEP  -JJ      Are you or can you be pregnant No  -JJ         Pain     Pain Location Back;Leg  -JJ      Pain at Present 6  -JJ      Pain at Worst 6  -JJ      Pain Frequency Constant/continuous  -JJ      Pain Description Aching  -JJ      Pain Comments pt states lower back pain is constant, L LE pain is intermittent shooting pain  -JJ      Is your sleep disturbed? No  -JJ      Difficulties with ADL's? yes  -JJ         Fall Risk Assessment    Any falls in the past year: No  -JJ         Services    Prior Rehab/Home Health Experiences Yes  -JJ      When was the prior experience with Rehab/Home Health pt at [facility] for 21  days post NSTEMI in August 2023  -JJ      Are you currently receiving Home Health services No  -JJ         Daily Activities    Primary Language English  -JJ      Are you able to read Yes  -JJ      Are you able to write Yes  -JJ      How does patient learn best? Listening;Reading  -JJ      Teaching needs identified Home Exercise Program;Management of Condition  -JJ      Patient is concerned about/has problems with Climbing Stairs;Difficulty with self care (i.e. bathing, dressing, toileting:;Performing home management (household chores, shopping, care of dependents);Standing;Walking;Transfers (getting out of a chair, bed)  -JJ      Does patient have problems with the following? Anxiety  -JJ      Barriers to learning None  -JJ      Pt Participated in POC and Goals Yes  -JJ         Safety    Are you being hurt, hit, or frightened by anyone at home or in your life? No  -JJ      Are you being neglected by a caregiver No  -JJ      Have you had any of the following issues with Anxiety  -JJ                User Key  (r) = Recorded By, (t) = Taken By, (c) = Cosigned By      Initials Name Provider Type    Priscilla Fang, OTR Occupational Therapist                     Lymphedema       Row Name 12/07/23 1330 12/07/23 1300          Subjective Pain    Able to rate subjective pain? yes  -JJ --     Pre-Treatment Pain Level 6  -JJ --     Post-Treatment Pain Level 6  -JJ --        Subjective    Subjective Comments pt arrived to appt, lumbar back brace in place, mobility with RW and B LE compression garments in place  -JJ --        Lymphedema Assessment    Lymphedema Classification RLE:;LLE:;stage 2 (Spontaneously Irreversible);secondary  -JJ --        Lymphedema Measurements    Measurement Type(s) -- Circumferential  -JJ     Circumferential Areas -- Lower extremities  -JJ        BLE Circumferential (cm)    Measurement Location 1 -- 10 cm above  -JJ     Left 1 -- 53 cm  -JJ     Right 1 -- 52 cm  -JJ     Measurement Location 2  -- knee  -JJ     Left 2 -- 47 cm  -JJ     Right 2 -- 46 cm  -JJ     Measurement Location 3 -- 10 cm below  -JJ     Left 3 -- 45 cm  -JJ     Right 3 -- 44 cm  -JJ     Measurement Location 4 -- 20 cm below  -JJ     Left 4 -- 41 cm  -JJ     Right 4 -- 40 cm  -JJ     Measurement Location 5 -- 30 cm below  -JJ     Left 5 -- 30 cm  -JJ     Right 5 -- 32 cm  -JJ     Measurement Location 6 -- ankle  -JJ     Left 6 -- 25 cm  -JJ     Right 6 -- 24 cm  -JJ     Measurement Location 7 -- midfoot  -JJ     Left 7 -- 22 cm  -JJ     Right 7 -- 22 cm  -JJ     LLE Circumferential Total -- 263 cm  -JJ     RLE Circumferential Total -- 260 cm  -JJ        Compression/Skin Care    Compression/Skin Care Comments -- pts R compression garment is fitting fairly well, L LE garment is loose around thigh area. pt is currently participating in cardiac rehab and seeing a nutritionist and is losing weight casuing compression garments to not fit. pt states she has a few weeks left in cardiac rehab and is still losing weight. pt may benefit from remeasuring of compression garments once discharged from cardiac rehab.  -JJ               User Key  (r) = Recorded By, (t) = Taken By, (c) = Cosigned By      Initials Name Provider Type    Priscilla Fang, OTR Occupational Therapist                            Therapy Education  Education Details: pt educated on skin care, maintainance of garments and HEP  Given: HEP, Symptoms/condition management  Program: New, Reinforced  How Provided: Verbal, Demonstration  Provided to: Patient  Level of Understanding: Verbalized         OT Goals       Row Name 12/07/23 2080          OT Short Term Goals    STG Date to Achieve 01/04/24  -JJ     STG 1 pt will verbalize independence with B LE AROM HEP  -JJ     STG 1 Progress New  -     STG 2 pt will maintain care and management of B LE compression garments and independent with daily wear and monitoring skin for breakdown  -JJ     STG 2 Progress New  -     STG 3  Patient independent and compliant with  home exercise program focused on diaphragmatic breathing, range of motion, flexibility to decrease edema and improve lymphatic flow for decreased edema and decreased risk of infection.  -JJ     STG 3 Progress New  -        Long Term Goals    LTG Date to Achieve 02/01/24  -JJ     LTG 1 pt will demonstrate care and management and independence with new garments when weight fluctuations stabilize and new garments are ordered  -JJ     LTG 1 Progress New  -J        Time Calculation    OT Goal Re-Cert Due Date 02/01/24  -J               User Key  (r) = Recorded By, (t) = Taken By, (c) = Cosigned By      Initials Name Provider Type    Priscilla Fang, OTR Occupational Therapist                     OT Assessment/Plan       Row Name 12/08/23 1148          OT Assessment    Functional Limitations Decreased safety during functional activities;Impaired gait;Performance in self-care ADL;Limitation in home management  -J     Impairments Balance;Coordination;Endurance;Impaired lymphatic circulation;Pain;Joint mobility  -J     Assessment Comments pt presents to clinic for follow up with lymphedema symptoms. pt with NSTEMI in august of this year, had a stay in SNF for rehab and is currently completing a program with cardiac rehab and seeing a nutritionist. pt is losing weight with current program and B LE garments are not fitting properly. pt states she will graduate program in a few weeks and feels weight will stabilize once she is no longer participating in program.  -JJ     Please refer to paper survey for additional self-reported information Yes  -JJ     OT Diagnosis lymphedema  -JJ     OT Rehab Potential Excellent  -JJ     Patient/caregiver participated in establishment of treatment plan and goals Yes  -JJ     Patient would benefit from skilled therapy intervention Yes  -JJ        OT Plan    OT Frequency --  follow up visists x 2 to ensure proper fit and compliance with  zachariah  -GAVI     Predicted Duration of Therapy Intervention (OT) 2 months  -GAVI     Planned CPT's? OT EVAL LOW COMPLEXITY: 70043;OT SELF CARE/MGMT/TRAIN 15 MIN: 73254;OT THER ACT EA 15 MIN: [id number]  -GAVI     Planned Therapy Interventions (Optional Details) manual therapy techniques;patient/family education;ROM (Range of Motion)  -NEIL     OT Plan Comments plan for pt to return to clinic for measurements when rehab program is complete and weight stabilizes  -               User Key  (r) = Recorded By, (t) = Taken By, (c) = Cosigned By      Initials Name Provider Type    Priscilla Fang, RENATO Occupational Therapist                              Time Calculation:   OT Start Time: 1300  OT Stop Time: 1345  OT Time Calculation (min): 45 min     Therapy Charges for Today       Code Description Service Date Service Provider Modifiers Qty    [id number]  OT EVAL LOW COMPLEXITY 3 12/7/2023 Priscilla Gallagher OTR GO 1                      RENATO Murrieta  12/8/2023

## 2023-12-11 ENCOUNTER — TELEPHONE (OUTPATIENT)
Dept: ORTHOPEDIC SURGERY | Facility: CLINIC | Age: 83
End: 2023-12-11
Payer: MEDICARE

## 2023-12-11 DIAGNOSIS — M51.36 LUMBAR DEGENERATIVE DISC DISEASE: ICD-10-CM

## 2023-12-11 DIAGNOSIS — M48.061 SPINAL STENOSIS OF LUMBAR REGION WITHOUT NEUROGENIC CLAUDICATION: Primary | ICD-10-CM

## 2023-12-11 NOTE — TELEPHONE ENCOUNTER
Caller: Latanya Olsen    Relationship to patient: Self    Best call back number: [phone] (home)     Patient is needing: PATIENT STATES THAT SHE IS HAVING SIGNIFICANT LOWER BACK PAIN AND IS UNABLE TO DO PT AT NEEDED AND WANTS TO TALK TO TUNA SOFIA ON THE PHONE ABOUT HER PAIN AND GETTING AN EPIDURAL SCHEDULED.

## 2023-12-14 ENCOUNTER — HOSPITAL ENCOUNTER (OUTPATIENT)
Dept: MAMMOGRAPHY | Facility: HOSPITAL | Age: 83
Discharge: HOME | End: 2023-12-14
Admitting: FAMILY MEDICINE
Payer: MEDICARE

## 2023-12-14 DIAGNOSIS — Z12.31 ENCOUNTER FOR SCREENING MAMMOGRAM FOR BREAST CANCER: ICD-10-CM

## 2023-12-14 PROCEDURE — 77063 BREAST TOMOSYNTHESIS BI: CPT

## 2023-12-14 PROCEDURE — 77067 SCR MAMMO BI INCL CAD: CPT

## 2023-12-19 ENCOUNTER — OFFICE VISIT (OUTPATIENT)
Age: 83
End: 2023-12-19
Payer: MEDICARE

## 2023-12-19 VITALS
HEART RATE: 69 BPM | BODY MASS INDEX: 36.33 KG/M2 | SYSTOLIC BLOOD PRESSURE: 146 MMHG | DIASTOLIC BLOOD PRESSURE: 76 MMHG | WEIGHT: 212.8 LBS | HEIGHT: 64 IN

## 2023-12-19 DIAGNOSIS — I10 PRIMARY HYPERTENSION: Primary | ICD-10-CM

## 2023-12-19 DIAGNOSIS — E78.5 HYPERLIPIDEMIA, UNSPECIFIED HYPERLIPIDEMIA TYPE: ICD-10-CM

## 2023-12-19 DIAGNOSIS — I21.4 NON-STEMI (NON-ST ELEVATED MYOCARDIAL INFARCTION): ICD-10-CM

## 2023-12-19 PROCEDURE — 99214 OFFICE O/P EST MOD 30 MIN: CPT | Performed by: INTERNAL MEDICINE

## 2023-12-19 PROCEDURE — 3078F DIAST BP <80 MM HG: CPT | Performed by: INTERNAL MEDICINE

## 2023-12-19 PROCEDURE — 1159F MED LIST DOCD IN RCRD: CPT | Performed by: INTERNAL MEDICINE

## 2023-12-19 PROCEDURE — 3077F SYST BP >= 140 MM HG: CPT | Performed by: INTERNAL MEDICINE

## 2023-12-19 PROCEDURE — 93000 ELECTROCARDIOGRAM COMPLETE: CPT | Performed by: INTERNAL MEDICINE

## 2023-12-19 PROCEDURE — 1160F RVW MEDS BY RX/DR IN RCRD: CPT | Performed by: INTERNAL MEDICINE

## 2023-12-19 NOTE — PROGRESS NOTES
CARDIOLOGY    Anna Marsh MD    ENCOUNTER DATE:  12/19/2023    Latanya Olsen / 83 y.o. / female        CHIEF COMPLAINT / REASON FOR OFFICE VISIT     Hypertension      HISTORY OF PRESENT ILLNESS       HPI    Latanya Olsen is a 83 y.o. female     This is a patient who previously followed with Dr. Park but has switched so she can see me in the [place] office.  She has paroxysmal atrial fibrillation, chronic kidney disease, COPD status post lung cancer with left lower lobectomy in 2006, hypertension, hypothyroid, and obstructive sleep apnea on CPAP.  She had a non-ST elevation myocardial infarction in 08/2023 with sudden onset of chest tightness and pain radiating to her back with shortness of breath.  In the ED, her EKG was unremarkable.  Her troponin was elevated at 792 with hemoglobin of 11.6 and a normal pro-BNP.  Creatinine was 1.42, which was at baseline.  She had a heart catheterization which showed normal coronary arteries and wall motion abnormalities consistent with Takotsubo cardiomyopathy.  Echocardiogram 08/07/2023 put her ejection fraction at 40-45% with severe hypokinesis of the distal anterior wall, distal septum, and distal apical segments.      She was readmitted to the hospital in 08/2023 with some fluid overload and was diuresed and put back on all diuretics.      She was last seen by DEVORA Menjivar, in 09/2023, and was doing well      She just completed cardiac rehab.  They did not have any concerns about her blood pressure and in fact she said her blood pressure was generally running low.  She has chronic shortness of breath due to COPD.  She has chronic left greater than right lower extremity edema due to a DVT she had after her hip replacement.  She is not having palpitations or symptoms of heart failure.    REVIEW OF SYSTEMS     Review of Systems   Constitutional: Negative for chills, fever, weight gain and weight loss.   Cardiovascular:  Negative for leg swelling.  "  Respiratory:  Negative for cough, snoring and wheezing.    Hematologic/Lymphatic: Negative for bleeding problem. Does not bruise/bleed easily.   Skin:  Negative for color change.   Musculoskeletal:  Negative for falls, joint pain and myalgias.   Gastrointestinal:  Negative for melena.   Genitourinary:  Negative for hematuria.   Neurological:  Negative for excessive daytime sleepiness.   Psychiatric/Behavioral:  Negative for depression. The patient is not nervous/anxious.          VITAL SIGNS     Visit Vitals  /76 (BP Location: Right arm)   Pulse 69   Ht 162.6 cm (64\")   Wt 96.5 kg (212 lb 12.8 oz)   BMI 36.53 kg/m²         Wt Readings from Last 3 Encounters:   12/19/23 96.5 kg (212 lb 12.8 oz)   11/30/23 95.9 kg (211 lb 6.4 oz)   10/11/23 96.2 kg (212 lb)     Body mass index is 36.53 kg/m².      PHYSICAL EXAMINATION     Constitutional:       General: Not in acute distress.  Neck:      Vascular: No carotid bruit or JVD.   Pulmonary:      Effort: Pulmonary effort is normal.      Breath sounds: Normal breath sounds.   Cardiovascular:      Normal rate. Regular rhythm.      Murmurs: There is no murmur.   Psychiatric:         Mood and Affect: Mood and affect normal.           REVIEWED DATA       ECG 12 Lead    Date/Time: 12/19/2023 10:44 AM  Performed by: Anna Marsh MD    Authorized by: Anna Marsh MD  Comparison: compared with previous ECG from 9/7/2023  Comparison to previous ECG: Compared to the previous EKG she no longer has deep T wave inversion in 1, aVL and V2  Rhythm: sinus rhythm  BPM: 69  Conduction: conduction normal  Conduction: incomplete right bundle branch block  ST Segments: ST segments normal  T Waves: T waves normal    Clinical impression: normal ECG            Lipid Panel          8/9/2023    08:19 11/30/2023    14:35   Lipid Panel   Total Cholesterol 193     Total Cholesterol  226    Triglycerides 137  134    HDL Cholesterol 62  59    VLDL Cholesterol 24  24    LDL Cholesterol  107 "  143    LDL/HDL Ratio 1.67         Lab Results   Component Value Date    GLUCOSE 101 (H) 11/30/2023    BUN 37 (H) 11/30/2023    CREATININE 1.58 (H) 11/30/2023    EGFRRESULT 32 (L) 11/30/2023    EGFR 38.4 (L) 08/28/2023    BCR 23 11/30/2023    K 4.3 11/30/2023    CO2 21 11/30/2023    CALCIUM 8.6 (L) 11/30/2023    PROTENTOTREF 5.7 (L) 11/30/2023    ALBUMIN 4.0 11/30/2023    BILITOT <0.2 11/30/2023    AST 14 11/30/2023    ALT 10 11/30/2023       ASSESSMENT & PLAN      Diagnosis Plan   1. Primary hypertension  Adult Transthoracic Echo Complete W/ Cont if Necessary Per Protocol      2. Non-STEMI (non-ST elevated myocardial infarction)  Adult Transthoracic Echo Complete W/ Cont if Necessary Per Protocol      3. Hyperlipidemia, unspecified hyperlipidemia type  Adult Transthoracic Echo Complete W/ Cont if Necessary Per Protocol          Stress induced cardiomyopathy in 08/2023.  Heart catheterization showed normal coronary arteries.  Ejection fraction was about 40% at that time.  After she was discharged from the hospital, she did have to be readmitted with congestive heart failure.  Since that time, she has done well.  She completed cardiac rehab.  She has remained euvolemic.  I recommend a repeat echocardiogram to reassess her ejection fraction.  Hypertension.  Her blood pressure is a little high here today, but she said at cardiac rehab she was normal, if not low, so I am not going to adjust her medications.    History of DVT.  She is on Eliquis 5 mg b.i.d.    COPD.      One of my nurses or I will be in touch with the results of her echocardiogram.  She is going to have this done at [facility], it is easier for her to get to [place] than down into [place].  I will see her back here in the [place] office in 6 months.  I have instructed her to call me sooner if she has any change in her symptoms.            Orders Placed This Encounter   Procedures    ECG 12 Lead     This order was created via  procedure documentation     Order Specific Question:   Release to patient     Answer:   Routine Release [[id number]]    Adult Transthoracic Echo Complete W/ Cont if Necessary Per Protocol     Standing Status:   Future     Standing Expiration Date:   12/18/2024     Order Specific Question:   Reason for exam?     Answer:   Dyspnea     Order Specific Question:   Release to patient     Answer:   Routine Release [[id number]]           MEDICATIONS         Discharge Medications            Accurate as of December 19, 2023 10:47 AM. If you have any questions, ask your nurse or doctor.                Changes to Medications        Instructions Start Date   lactulose 10 GM/15ML solution  Commonly known as: CHRONULAC  What changed: when to take this   TAKE 15 ML BY MOUTH THREE TIMES DAILY             Continue These Medications        Instructions Start Date   acetaminophen 325 MG tablet  Commonly known as: TYLENOL   650 mg, Oral, Every 4 Hours PRN      alendronate 70 MG tablet  Commonly known as: Fosamax   70 mg, Oral, Every 7 Days      apixaban 5 MG tablet tablet  Commonly known as: Eliquis   5 mg, Oral, Every 12 Hours Scheduled      benzonatate 100 MG capsule  Commonly known as: TESSALON       carvedilol 12.5 MG tablet  Commonly known as: COREG   12.5 mg, Oral, 2 Times Daily With Meals      chlorhexidine 0.12 % solution  Commonly known as: PERIDEX   APPLY 15 ML AS DIRECTED TWICE DAILY SWISH FOR 30 seconds AND expectorate, EVERY MORNING AND IN THE EVENING TO BEGIN in 24 hours AFTER surgery      coenzyme Q10 100 MG capsule   100 mg, Oral, Daily      cyclobenzaprine 10 MG tablet  Commonly known as: FLEXERIL   10 mg, Oral, 2 Times Daily PRN      Enulose 10 GM/15ML solution solution (encephalopathy)  Generic drug: lactulose       esomeprazole 40 MG capsule  Commonly known as: nexIUM   40 mg, Oral, Every Morning Before Breakfast      ferrous sulfate 325 (65 FE) MG tablet   325 mg, Oral, Daily With Breakfast      folic acid 1 MG  tablet  Commonly known as: FOLVITE   1 mg, Oral, Daily      furosemide 40 MG tablet  Commonly known as: LASIX   TAKE ONE TABLET BY MOUTH DAILY. TAKE SECOND TABLET AT NOON AS NEEDED FOR INCREASED SWELLING      gabapentin 100 MG capsule  Commonly known as: NEURONTIN   TAKE ONE CAPSULE BY MOUTH THREE TIMES DAILY      ipratropium 0.06 % nasal spray  Commonly known as: ATROVENT   2 sprays, Nasal, 4 Times Daily PRN      ipratropium-albuterol 0.5-2.5 mg/3 ml nebulizer  Commonly known as: DUO-NEB   USE 1 VIAL IN NEBULIZER 2 TIMES DAILY. Generic: Duoneb      levothyroxine 100 MCG tablet  Commonly known as: SYNTHROID, LEVOTHROID   TAKE ONE TABLET BY MOUTH DAILY      lidocaine 5 % ointment  Commonly known as: XYLOCAINE   1 application , Topical, 3 Times Daily      Magnesium 500 MG tablet   Oral      ondansetron 4 MG tablet  Commonly known as: ZOFRAN       potassium chloride 10 MEQ CR tablet   10 mEq, Oral, 2 Times Daily      pramipexole 1.5 MG tablet  Commonly known as: MIRAPEX   TAKE ONE TABLET BY MOUTH EVERY NIGHT AT BEDTIME      Senna Plus 8.6-50 MG per tablet  Generic drug: sennosides-docusate   TAKE TWO TABLETS BY MOUTH TWICE DAILY      sertraline 25 MG tablet  Commonly known as: ZOLOFT       Symbicort 160-4.5 MCG/ACT inhaler  Generic drug: budesonide-formoterol   No dose, route, or frequency recorded.      triamcinolone 0.1 % cream  Commonly known as: KENALOG   1 application , Topical, 2 Times Daily      valsartan 320 MG tablet  Commonly known as: DIOVAN   TAKE ONE TABLET BY MOUTH DAILY      vitamin B-12 1000 MCG tablet  Commonly known as: CYANOCOBALAMIN   1,000 mcg, Oral, Daily      Zinc 50 MG tablet   1 tablet, Oral                 Anna Marsh MD  12/19/23  10:47 EST    Part of this note may be an electronic transcription/translation of spoken language to printed text using the Dragon dictation system.

## 2023-12-20 DIAGNOSIS — K59.00 CONSTIPATION, UNSPECIFIED CONSTIPATION TYPE: ICD-10-CM

## 2023-12-20 RX ORDER — ASPIRIN 81 MG
TABLET, DELAYED RELEASE (ENTERIC COATED) ORAL
Qty: 60 TABLET | Refills: 0 | Status: SHIPPED | OUTPATIENT
Start: 2023-12-20

## 2024-01-02 ENCOUNTER — HOSPITAL ENCOUNTER (OUTPATIENT)
Dept: CARDIOLOGY | Facility: HOSPITAL | Age: 84
Discharge: HOME | End: 2024-01-02
Admitting: INTERNAL MEDICINE
Payer: MEDICARE

## 2024-01-02 VITALS
WEIGHT: 211.64 LBS | HEIGHT: 64 IN | BODY MASS INDEX: 36.13 KG/M2 | HEART RATE: 78 BPM | DIASTOLIC BLOOD PRESSURE: 65 MMHG | SYSTOLIC BLOOD PRESSURE: 149 MMHG

## 2024-01-02 DIAGNOSIS — E78.5 HYPERLIPIDEMIA, UNSPECIFIED HYPERLIPIDEMIA TYPE: ICD-10-CM

## 2024-01-02 DIAGNOSIS — I10 PRIMARY HYPERTENSION: ICD-10-CM

## 2024-01-02 DIAGNOSIS — I21.4 NON-STEMI (NON-ST ELEVATED MYOCARDIAL INFARCTION): ICD-10-CM

## 2024-01-02 LAB
AORTIC DIMENSIONLESS INDEX: 0.9 (DI)
BH CV ECHO MEAS - AO MAX PG: 6.4 MMHG
BH CV ECHO MEAS - AO MEAN PG: 3 MMHG
BH CV ECHO MEAS - AO V2 MAX: 126 CM/SEC
BH CV ECHO MEAS - AO V2 VTI: 25.9 CM
BH CV ECHO MEAS - AVA(I,D): 2.41 CM2
BH CV ECHO MEAS - EDV(CUBED): 97.3 ML
BH CV ECHO MEAS - EDV(MOD-SP2): 96 ML
BH CV ECHO MEAS - EDV(MOD-SP4): 115 ML
BH CV ECHO MEAS - EF(MOD-BP): 67.4 %
BH CV ECHO MEAS - EF(MOD-SP2): 69.8 %
BH CV ECHO MEAS - EF(MOD-SP4): 64.3 %
BH CV ECHO MEAS - ESV(CUBED): 35 ML
BH CV ECHO MEAS - ESV(MOD-SP2): 29 ML
BH CV ECHO MEAS - ESV(MOD-SP4): 41 ML
BH CV ECHO MEAS - FS: 28.9 %
BH CV ECHO MEAS - IVS/LVPW: 1 CM
BH CV ECHO MEAS - IVSD: 1 CM
BH CV ECHO MEAS - LAT PEAK E' VEL: 12.3 CM/SEC
BH CV ECHO MEAS - LV DIASTOLIC VOL/BSA (35-75): 57.5 CM2
BH CV ECHO MEAS - LV MASS(C)D: 158.8 GRAMS
BH CV ECHO MEAS - LV MAX PG: 5.3 MMHG
BH CV ECHO MEAS - LV MEAN PG: 3 MMHG
BH CV ECHO MEAS - LV SYSTOLIC VOL/BSA (12-30): 20.5 CM2
BH CV ECHO MEAS - LV V1 MAX: 115 CM/SEC
BH CV ECHO MEAS - LV V1 VTI: 22.4 CM
BH CV ECHO MEAS - LVIDD: 4.6 CM
BH CV ECHO MEAS - LVIDS: 3.3 CM
BH CV ECHO MEAS - LVOT AREA: 2.8 CM2
BH CV ECHO MEAS - LVOT DIAM: 1.88 CM
BH CV ECHO MEAS - LVPWD: 1 CM
BH CV ECHO MEAS - MED PEAK E' VEL: 8.5 CM/SEC
BH CV ECHO MEAS - MV A MAX VEL: 107 CM/SEC
BH CV ECHO MEAS - MV DEC SLOPE: 420 CM/SEC2
BH CV ECHO MEAS - MV DEC TIME: 0.24 SEC
BH CV ECHO MEAS - MV E MAX VEL: 68.7 CM/SEC
BH CV ECHO MEAS - MV E/A: 0.64
BH CV ECHO MEAS - MV MAX PG: 5 MMHG
BH CV ECHO MEAS - MV MEAN PG: 1.7 MMHG
BH CV ECHO MEAS - MV P1/2T: 61.2 MSEC
BH CV ECHO MEAS - MV V2 VTI: 28.5 CM
BH CV ECHO MEAS - MVA(P1/2T): 3.6 CM2
BH CV ECHO MEAS - MVA(VTI): 2.18 CM2
BH CV ECHO MEAS - PA ACC TIME: 0.1 SEC
BH CV ECHO MEAS - PA V2 MAX: 94.4 CM/SEC
BH CV ECHO MEAS - QP/QS: 1.86
BH CV ECHO MEAS - RAP SYSTOLE: 3 MMHG
BH CV ECHO MEAS - RV MAX PG: 2.28 MMHG
BH CV ECHO MEAS - RV V1 MAX: 75.5 CM/SEC
BH CV ECHO MEAS - RV V1 VTI: 15.5 CM
BH CV ECHO MEAS - RVOT DIAM: 3.1 CM
BH CV ECHO MEAS - RVSP: 32 MMHG
BH CV ECHO MEAS - SI(MOD-SP2): 33.5 ML/M2
BH CV ECHO MEAS - SI(MOD-SP4): 37 ML/M2
BH CV ECHO MEAS - SV(LVOT): 62.4 ML
BH CV ECHO MEAS - SV(MOD-SP2): 67 ML
BH CV ECHO MEAS - SV(MOD-SP4): 74 ML
BH CV ECHO MEAS - SV(RVOT): 116.3 ML
BH CV ECHO MEAS - TR MAX PG: 29.1 MMHG
BH CV ECHO MEAS - TR MAX VEL: 269.5 CM/SEC
BH CV ECHO MEASUREMENTS AVERAGE E/E' RATIO: 6.61
BH CV XLRA - RV BASE: 2.8 CM
BH CV XLRA - RV LENGTH: 8.1 CM
BH CV XLRA - RV MID: 2.8 CM
BH CV XLRA - TDI S': 13.9 CM/SEC
LEFT ATRIUM VOLUME INDEX: 26.1 ML/M2
SINUS: 3.1 CM

## 2024-01-02 PROCEDURE — 93306 TTE W/DOPPLER COMPLETE: CPT

## 2024-01-02 PROCEDURE — 25510000001 PERFLUTREN (DEFINITY) 8.476 MG IN SODIUM CHLORIDE (PF) 0.9 % 10 ML INJECTION: Performed by: INTERNAL MEDICINE

## 2024-01-02 RX ADMIN — SODIUM CHLORIDE 2 ML: 9 INJECTION INTRAMUSCULAR; INTRAVENOUS; SUBCUTANEOUS at 14:38

## 2024-01-03 ENCOUNTER — TELEPHONE (OUTPATIENT)
Dept: CARDIOLOGY | Facility: CLINIC | Age: 84
End: 2024-01-03
Payer: MEDICARE

## 2024-01-03 NOTE — TELEPHONE ENCOUNTER
Please let her know that her echo shows that her heart function has returned to normal.  There is some age-related stiffening of the heart muscle.   She is supposed to have a 6-month follow-up with me but nothing is scheduled.  She wants to go to [place].

## 2024-01-04 NOTE — TELEPHONE ENCOUNTER
Reviewed results with Latanya Olsen and patient verbalized understanding of results.    Scheduled 6 month f/u with Dr. Marsh in [place] office as requested.    Thank you,  Priscilla CALLAWAY RN  Triage Nurse [facility]    08:56 EST

## 2024-01-05 ENCOUNTER — ANESTHESIA EVENT (OUTPATIENT)
Dept: PAIN MEDICINE | Facility: HOSPITAL | Age: 84
End: 2024-01-05
Payer: MEDICARE

## 2024-01-05 ENCOUNTER — TELEPHONE (OUTPATIENT)
Dept: FAMILY MEDICINE CLINIC | Facility: CLINIC | Age: 84
End: 2024-01-05

## 2024-01-05 NOTE — TELEPHONE ENCOUNTER
Caller: Karthik Olsen    Relationship: Self    Best call back number: [phone]     What is the best time to reach you: ANY    Who are you requesting to speak with (clinical staff, provider,  specific staff member): CLINICAL    Do you know the name of the person who called: KARTHIK    What was the call regarding: PATIENT CALLED STATING THAT SHE RECEIVED A CARD THAT SHE WAS IN A CLASS ACTION LAWSUIT AGAINST [employer]. PATIENT STATES SHE CURRENTLY TAKES THIS MEDICATION. PATIENT IS ASKING IF SHE SHOULD BE TAKING THIS MEDICATION OR IF SHE NEEDS TO CHANGE MEDICATIONS. PLEASE CALL AND ADVISE.    Is it okay if the provider responds through MyChart:

## 2024-01-08 ENCOUNTER — HOSPITAL ENCOUNTER (OUTPATIENT)
Dept: GENERAL RADIOLOGY | Facility: HOSPITAL | Age: 84
Discharge: HOME | End: 2024-01-08
Payer: MEDICARE

## 2024-01-08 ENCOUNTER — HOSPITAL ENCOUNTER (OUTPATIENT)
Dept: PAIN MEDICINE | Facility: HOSPITAL | Age: 84
Discharge: HOME | End: 2024-01-08
Payer: MEDICARE

## 2024-01-08 ENCOUNTER — ANESTHESIA (OUTPATIENT)
Dept: PAIN MEDICINE | Facility: HOSPITAL | Age: 84
End: 2024-01-08
Payer: MEDICARE

## 2024-01-08 VITALS
OXYGEN SATURATION: 96 % | TEMPERATURE: 98.5 F | HEIGHT: 66 IN | RESPIRATION RATE: 16 BRPM | SYSTOLIC BLOOD PRESSURE: 159 MMHG | HEART RATE: 65 BPM | WEIGHT: 211 LBS | DIASTOLIC BLOOD PRESSURE: 75 MMHG | BODY MASS INDEX: 33.91 KG/M2

## 2024-01-08 DIAGNOSIS — R52 PAIN: ICD-10-CM

## 2024-01-08 DIAGNOSIS — M48.061 SPINAL STENOSIS OF LUMBAR REGION WITHOUT NEUROGENIC CLAUDICATION: Primary | ICD-10-CM

## 2024-01-08 PROCEDURE — 77003 FLUOROGUIDE FOR SPINE INJECT: CPT

## 2024-01-08 PROCEDURE — 25010000002 METHYLPREDNISOLONE PER 80 MG: Performed by: ANESTHESIOLOGY

## 2024-01-08 RX ORDER — IOPAMIDOL 408 MG/ML
3 INJECTION, SOLUTION INTRATHECAL
Status: DISCONTINUED | OUTPATIENT
Start: 2024-01-08 | End: 2024-01-09 | Stop reason: HOSPADM

## 2024-01-08 RX ORDER — LIDOCAINE HYDROCHLORIDE 10 MG/ML
1 INJECTION, SOLUTION INFILTRATION; PERINEURAL ONCE
Status: DISCONTINUED | OUTPATIENT
Start: 2024-01-08 | End: 2024-01-09 | Stop reason: HOSPADM

## 2024-01-08 RX ORDER — MIDAZOLAM HYDROCHLORIDE 1 MG/ML
1 INJECTION INTRAMUSCULAR; INTRAVENOUS
Status: DISCONTINUED | OUTPATIENT
Start: 2024-01-08 | End: 2024-01-09 | Stop reason: HOSPADM

## 2024-01-08 RX ORDER — METHYLPREDNISOLONE ACETATE 80 MG/ML
80 INJECTION, SUSPENSION INTRA-ARTICULAR; INTRALESIONAL; INTRAMUSCULAR; SOFT TISSUE ONCE
Status: COMPLETED | OUTPATIENT
Start: 2024-01-08 | End: 2024-01-08

## 2024-01-08 RX ORDER — FENTANYL CITRATE 50 UG/ML
50 INJECTION, SOLUTION INTRAMUSCULAR; INTRAVENOUS AS NEEDED
Status: DISCONTINUED | OUTPATIENT
Start: 2024-01-08 | End: 2024-01-09 | Stop reason: HOSPADM

## 2024-01-08 RX ADMIN — METHYLPREDNISOLONE ACETATE 80 MG: 80 INJECTION, SUSPENSION INTRA-ARTICULAR; INTRALESIONAL; INTRAMUSCULAR; SOFT TISSUE at 10:00

## 2024-01-08 NOTE — ANESTHESIA PROCEDURE NOTES
PAIN Epidural block    Pre-sedation assessment completed: 1/8/2024 9:53 AM    Patient reassessed immediately prior to procedure    Patient location during procedure: pain clinic  Start Time: 1/8/2024 9:53 AM  Stop Time: 1/8/2024 10:13 AM  Indication:at surgeon's request and procedure for pain  Performed By  Anesthesiologist: Omar Tracy MD  Preanesthetic Checklist  Completed: patient identified, IV checked, site marked, risks and benefits discussed, surgical consent, monitors and equipment checked, pre-op evaluation and timeout performed  Additional Notes  Dx:  Post-Op Diagnosis Codes:     * Spinal stenosis of lumbar region with neurogenic claudication [M48.062]     * Lumbar radiculopathy [M54.16]  80 mg depomedrol in epid    Plan : return to clinic as needed  Prep:  Pt Position:prone (prone)  Sterile Tech:cap, gloves, mask and sterile barrier  Prep:chlorhexidine gluconate and isopropyl alcohol  Monitoring:blood pressure monitoring, EKG and continuous pulse oximetry  Procedure:Sedation: no     Approach:midline  Guidance: fluoroscopy and c arm pa and lat and loss of resistance  Location:lumbar  Level:L5-S1 (interlaminar)  Needle Type:Kaboo Cloud Camera  Needle Gauge:20  Aspiration:negative  Medications:  Preservative Free Saline:3mL  Comments:No dye - renal pathologyDepomedrol:80 mg  Post Assessment:  Pt Tolerance:patient tolerated the procedure well with no apparent complications  Complications:no

## 2024-01-08 NOTE — DISCHARGE INSTRUCTIONS

## 2024-01-08 NOTE — H&P
INTERVAL HISTORY:    The patient returns for another Lumbar epidural steroid injection 2 today.  They have received minimal% improvement since their last injection with a pain level of 8/10 at its worst recently.    Conservative measures tried in the interim. Daily activities are still affected by the pain.    Radiology reports and/or previous notes have been reviewed and are consistent with their diagnosis of Post-Op Diagnosis Codes:     * Spinal stenosis of lumbar region with neurogenic claudication [M48.062]     * Lumbar radiculopathy [M54.16]    Alert and oriented  MP - 2  Lungs - clear  CV - rrr    Diagnosis:  Post-Op Diagnosis Codes:     * Spinal stenosis of lumbar region with neurogenic claudication [M48.062]     * Lumbar radiculopathy [M54.16]      Plan:  Lumbar epidural steroid injection under fluoroscopic guidance        Target : L5S1    I have encouraged them to continue:  1.  Physical therapy exercises at home as prescribed by physical therapy or from the pain clinic handout (given to the patient).  Continuation of these exercises every day, or multiple times per week, even when the patient has good pain relief, was stressed to the patient as a preventative measure to decrease the frequency and severity of future pain episodes.  2.  Continue pain medicines as already prescribed.  If patient not currently taking any, it is recommended to begin Acetaminophen 1000 mg po q 8 hours.  If other medicines containing Acetaminophen are currently prescribed, maintain daily dose at 3000mg.    3.  If they can tolerate NSAIDS, it is recommended to take Ibuprofen 600 mg po q 6 hours for 7 days during pain exacerbations.   Alternatively, they may substitute an NSAID of their choice (e.g. Aleve)  4.  Heat and ice to the affected area as tolerated for pain control.  It was discussed that heating pads can cause burns.  5.  Low impact exercise such as walking or water exercise was recommended to maintain overall health and  aid in weight control.   6.  Follow up as needed for subsequent injections.  7.  Patient was counseled to abstain from tobacco products.    Time :  16  min

## 2024-01-18 DIAGNOSIS — K59.00 CONSTIPATION, UNSPECIFIED CONSTIPATION TYPE: ICD-10-CM

## 2024-01-18 RX ORDER — ASPIRIN 81 MG
TABLET, DELAYED RELEASE (ENTERIC COATED) ORAL
Qty: 60 TABLET | Refills: 0 | Status: SHIPPED | OUTPATIENT
Start: 2024-01-18

## 2024-01-24 RX ORDER — PRAMIPEXOLE DIHYDROCHLORIDE 1.5 MG/1
TABLET ORAL
Qty: 90 TABLET | Refills: 0 | Status: SHIPPED | OUTPATIENT
Start: 2024-01-24

## 2024-01-26 ENCOUNTER — TELEPHONE (OUTPATIENT)
Dept: CARDIOLOGY | Facility: CLINIC | Age: 84
End: 2024-01-26
Payer: MEDICARE

## 2024-01-26 NOTE — TELEPHONE ENCOUNTER
Patient calls because she has been having SOA with exertion for 2 days. She chronically has edema in her legs and she does not feel like this has worsened. She gained 1lb overnight. Her weight this AM is 213lbs. /66 HR 78. She states that she takes Lasix 40mg BID although it looks likes per her med list she should only be taking this once a day and then a second tablet if she is having increased swelling.     Jackie Faith RN  Triage [facility]

## 2024-01-26 NOTE — TELEPHONE ENCOUNTER
If she is already taking the Lasix twice a day she could take a third dose today so take the Lasix every 6 hours x 3.  If she continues to have issues, she will probably need to go to the emergency room over the weekend.  Otherwise, she can see a nurse practitioner in the office next week.

## 2024-01-26 NOTE — TELEPHONE ENCOUNTER
Notified patient of recommendations. Patient verbalized understanding. FU scheduled.     Jackie Faith RN  Triage [facility]

## 2024-01-29 ENCOUNTER — OFFICE VISIT (OUTPATIENT)
Age: 84
End: 2024-01-29
Payer: MEDICARE

## 2024-01-29 ENCOUNTER — HOSPITAL ENCOUNTER (OUTPATIENT)
Dept: CARDIOLOGY | Facility: HOSPITAL | Age: 84
Discharge: HOME | End: 2024-01-29
Admitting: NURSE PRACTITIONER
Payer: MEDICARE

## 2024-01-29 VITALS
HEIGHT: 66 IN | WEIGHT: 217 LBS | SYSTOLIC BLOOD PRESSURE: 130 MMHG | DIASTOLIC BLOOD PRESSURE: 80 MMHG | HEART RATE: 84 BPM | BODY MASS INDEX: 34.87 KG/M2 | OXYGEN SATURATION: 95 %

## 2024-01-29 DIAGNOSIS — E78.5 HYPERLIPIDEMIA, UNSPECIFIED HYPERLIPIDEMIA TYPE: ICD-10-CM

## 2024-01-29 DIAGNOSIS — I50.23 ACUTE ON CHRONIC HFREF (HEART FAILURE WITH REDUCED EJECTION FRACTION): Primary | ICD-10-CM

## 2024-01-29 DIAGNOSIS — I10 PRIMARY HYPERTENSION: ICD-10-CM

## 2024-01-29 DIAGNOSIS — I50.23 ACUTE ON CHRONIC HFREF (HEART FAILURE WITH REDUCED EJECTION FRACTION): ICD-10-CM

## 2024-01-29 DIAGNOSIS — N18.31 STAGE 3A CHRONIC KIDNEY DISEASE: ICD-10-CM

## 2024-01-29 LAB
ANION GAP SERPL CALCULATED.3IONS-SCNC: 11.9 MMOL/L (ref 5–15)
BASOPHILS # BLD AUTO: 0.02 10*3/MM3 (ref 0–0.2)
BASOPHILS NFR BLD AUTO: 0.4 % (ref 0–1.5)
BUN SERPL-MCNC: 35 MG/DL (ref 8–23)
BUN/CREAT SERPL: 26.7 (ref 7–25)
CALCIUM SPEC-SCNC: 8.6 MG/DL (ref 8.6–10.5)
CHLORIDE SERPL-SCNC: 108 MMOL/L (ref 98–107)
CO2 SERPL-SCNC: 23.1 MMOL/L (ref 22–29)
CREAT SERPL-MCNC: 1.31 MG/DL (ref 0.57–1)
DEPRECATED RDW RBC AUTO: 44.8 FL (ref 37–54)
EGFRCR SERPLBLD CKD-EPI 2021: 40.3 ML/MIN/1.73
EOSINOPHIL # BLD AUTO: 0.15 10*3/MM3 (ref 0–0.4)
EOSINOPHIL NFR BLD AUTO: 2.8 % (ref 0.3–6.2)
ERYTHROCYTE [DISTWIDTH] IN BLOOD BY AUTOMATED COUNT: 13.5 % (ref 12.3–15.4)
GLUCOSE SERPL-MCNC: 117 MG/DL (ref 65–99)
HCT VFR BLD AUTO: 31.6 % (ref 34–46.6)
HGB BLD-MCNC: 10.5 G/DL (ref 12–15.9)
LYMPHOCYTES # BLD AUTO: 1 10*3/MM3 (ref 0.7–3.1)
LYMPHOCYTES NFR BLD AUTO: 18.7 % (ref 19.6–45.3)
MCH RBC QN AUTO: 30.5 PG (ref 26.6–33)
MCHC RBC AUTO-ENTMCNC: 33.2 G/DL (ref 31.5–35.7)
MCV RBC AUTO: 91.9 FL (ref 79–97)
MONOCYTES # BLD AUTO: 0.45 10*3/MM3 (ref 0.1–0.9)
MONOCYTES NFR BLD AUTO: 8.4 % (ref 5–12)
NEUTROPHILS NFR BLD AUTO: 3.66 10*3/MM3 (ref 1.7–7)
NEUTROPHILS NFR BLD AUTO: 68.6 % (ref 42.7–76)
NT-PROBNP SERPL-MCNC: 946 PG/ML (ref 0–1800)
PLATELET # BLD AUTO: 144 10*3/MM3 (ref 140–450)
PMV BLD AUTO: 10.1 FL (ref 6–12)
POTASSIUM SERPL-SCNC: 3.9 MMOL/L (ref 3.5–5.2)
RBC # BLD AUTO: 3.44 10*6/MM3 (ref 3.77–5.28)
SODIUM SERPL-SCNC: 143 MMOL/L (ref 136–145)
WBC NRBC COR # BLD AUTO: 5.34 10*3/MM3 (ref 3.4–10.8)

## 2024-01-29 PROCEDURE — 85025 COMPLETE CBC W/AUTO DIFF WBC: CPT | Performed by: NURSE PRACTITIONER

## 2024-01-29 PROCEDURE — 3075F SYST BP GE 130 - 139MM HG: CPT | Performed by: NURSE PRACTITIONER

## 2024-01-29 PROCEDURE — 80048 BASIC METABOLIC PNL TOTAL CA: CPT | Performed by: NURSE PRACTITIONER

## 2024-01-29 PROCEDURE — 1159F MED LIST DOCD IN RCRD: CPT | Performed by: NURSE PRACTITIONER

## 2024-01-29 PROCEDURE — 99214 OFFICE O/P EST MOD 30 MIN: CPT | Performed by: NURSE PRACTITIONER

## 2024-01-29 PROCEDURE — 1160F RVW MEDS BY RX/DR IN RCRD: CPT | Performed by: NURSE PRACTITIONER

## 2024-01-29 PROCEDURE — 3079F DIAST BP 80-89 MM HG: CPT | Performed by: NURSE PRACTITIONER

## 2024-01-29 PROCEDURE — 36415 COLL VENOUS BLD VENIPUNCTURE: CPT

## 2024-01-29 PROCEDURE — 83880 ASSAY OF NATRIURETIC PEPTIDE: CPT | Performed by: NURSE PRACTITIONER

## 2024-01-29 NOTE — ASSESSMENT & PLAN NOTE
Patient presents today with increased shortness of breath, especially with exertion  Weight is up 4 pounds and patient notes some mild lower extremity edema  Symptoms improved since she has increased furosemide from twice daily to 3 times daily  Patient was sent to the [facility] where CBC, CMP, BNP were obtained  At time of dictation although studies have been reviewed.  Patient creatinine is improved from previous, proBNP within normal limits.  Recommended the patient continue with 3 times daily furosemide and reevaluate with me in 1 week

## 2024-01-29 NOTE — ASSESSMENT & PLAN NOTE
BP controlled in clinic at 130/80  Continue the current regimen:  Carvedilol 12.5 mg twice daily  Furosemide 40 mg 3 times daily  Valsartan 320 mg/day

## 2024-01-29 NOTE — PROGRESS NOTES
CARDIOLOGY        Patient Name: Latanya Olsen  :1940  Age: 83 y.o.  Primary Cardiologist: Anna Marsh MD  Encounter Provider:  DEVORA Leone    Date of Service: 24      CHIEF COMPLAINT / REASON FOR OFFICE VISIT     Follow-Up and Shortness of Breath      HISTORY OF PRESENT ILLNESS       HPI  Latanya Olsen is a 83 y.o. female who presents today for evaluation of shortness of breath.     Pt has a  history significant for atrial fibrillation, CKD, COPD with history of lung cancer with left lower lobectomy in , hypertension, hypothyroidism, SIERRA on CPAP.    Patient had a NSTEMI 2023 with sudden onset of chest tightness and pain radiating to the back with shortness of breath.  ECG was nonischemic.  Troponin elevated to 792 with hemoglobin of 11.6 and a normal proBNP.  Patient had cardiac catheterization which revealed normal coronary arteries, with wall motion abnormalities consistent with a Takotsubo cardiomyopathy.  Echocardiogram revealed LVEF 40-45% with severe hypokinesis of the distal anterior wall, distal septum and distal apical segments.    In 2023 patient was volume overloaded and was diuresed in hospital setting and placed back on diuretics.    Patient establish care with Dr. Marsh in 2023 so that she could follow in [place].  At that time a repeat echocardiogram was ordered which was done on 2024 and reveals preserved LVEF of 67% with grade 1 diastolic dysfunction.    Patient called the office on 2024 with complaints of increased shortness of breath, volume overload.  She had already increased her furosemide dosing.  She was instructed to take furosemide 40 mg 3 times daily x 3 doses and to follow-up in clinic.    Patient reports that she has had increased shortness of breath for the past week.  She reports that she also has some midsternal chest tightness secondary to shortness of breath.  She reports that she is only able to walk very  "distances.  She reports episodes of lightheadedness when she is laying flat.  Notes that her weight is up from her base weight by 4 pounds.  Patient has increased her furosemide use to 3 times daily and reports mild improvement of her symptoms.    The following portions of the patient's history were reviewed and updated as appropriate: allergies, current medications, past family history, past medical history, past social history, past surgical history and problem list.      VITAL SIGNS     Visit Vitals  /80 (BP Location: Right arm, Patient Position: Sitting)   Pulse 84   Ht 167.6 cm (66\")   Wt 98.4 kg (217 lb)   SpO2 95%   BMI 35.02 kg/m²         Wt Readings from Last 3 Encounters:   01/29/24 98.4 kg (217 lb)   01/08/24 95.7 kg (211 lb)   01/02/24 96 kg (211 lb 10.3 oz)     Body mass index is 35.02 kg/m².      REVIEW OF SYSTEMS   Review of Systems   Constitutional: Positive for malaise/fatigue. Negative for chills, fever, weight gain and weight loss.   Cardiovascular:  Positive for chest pain and dyspnea on exertion. Negative for leg swelling.   Respiratory:  Positive for shortness of breath. Negative for cough, snoring and wheezing.    Hematologic/Lymphatic: Negative for bleeding problem. Does not bruise/bleed easily.   Skin:  Negative for color change.   Musculoskeletal:  Negative for falls, joint pain and myalgias.   Gastrointestinal:  Negative for melena.   Genitourinary:  Negative for hematuria.   Neurological:  Positive for light-headedness. Negative for excessive daytime sleepiness.   Psychiatric/Behavioral:  Negative for depression. The patient is not nervous/anxious.            PHYSICAL EXAMINATION     Vitals and nursing note reviewed.   Constitutional:       Appearance: Normal appearance. Well-developed.   Eyes:      Conjunctiva/sclera: Conjunctivae normal.   Neck:      Vascular: No carotid bruit.   Pulmonary:      Breath sounds: Normal breath sounds.   Cardiovascular:      Normal rate. Regular " rhythm. Normal S1 with normal intensity. Normal S2 with normal intensity.       Murmurs: There is no murmur.      No gallop.  No click. No rub.   Edema:     Peripheral edema absent.   Musculoskeletal: Normal range of motion. Skin:     General: Skin is warm and dry.   Neurological:      Mental Status: Alert and oriented to person, place, and time.      GCS: GCS eye subscore is 4. GCS verbal subscore is 5. GCS motor subscore is 6.   Psychiatric:         Speech: Speech normal.         Behavior: Behavior normal.         Thought Content: Thought content normal.         Judgment: Judgment normal.           REVIEWED DATA     Procedures    Cardiac Procedures:  Echocardiogram 4/21/2021.  LVEF 60%.  Grade 1 diastolic dysfunction.  Cardiac catheterization 8/7/2023.  Takotsubo cardiomyopathy.  Normal coronary arteries.  Echocardiogram 8/7/2023.  LVEF 41-45%.  Grade 1 diastolic dysfunction.  Normal RV cavity size and systolic function.  Trace to mild tricuspid valve regurgitation.  Calculated RVSP 20 mmHg.  Echocardiogram 1/2/2024.  LVEF 67%.  All LV wall segments contract normally.  Grade 1 diastolic dysfunction.    Lipid Panel          8/9/2023    08:19 11/30/2023    14:35   Lipid Panel   Total Cholesterol 193     Total Cholesterol  226    Triglycerides 137  134    HDL Cholesterol 62  59    VLDL Cholesterol 24  24    LDL Cholesterol  107  143    LDL/HDL Ratio 1.67         Lab Results   Component Value Date     01/29/2024     11/30/2023    K 3.9 01/29/2024    K 4.3 11/30/2023     (H) 01/29/2024     11/30/2023    CO2 23.1 01/29/2024    CO2 21 11/30/2023    BUN 35 (H) 01/29/2024    BUN 37 (H) 11/30/2023    CREATININE 1.31 (H) 01/29/2024    CREATININE 1.58 (H) 11/30/2023    EGFRIFNONA 44 (L) 02/01/2022    EGFRIFNONA 39 (L) 11/01/2021    EGFRIFAFRI 51 (L) 02/01/2022    EGFRIFAFRI 45 (L) 11/01/2021    GLUCOSE 117 (H) 01/29/2024    GLUCOSE 101 (H) 11/30/2023    CALCIUM 8.6 01/29/2024    CALCIUM 8.6 (L)  11/30/2023    PROTENTOTREF 5.7 (L) 11/30/2023    PROTENTOTREF 6.2 08/29/2023    ALBUMIN 4.0 11/30/2023    ALBUMIN 4.4 08/29/2023    BILITOT <0.2 11/30/2023    BILITOT 0.3 08/29/2023    AST 14 11/30/2023    AST 14 08/29/2023    ALT 10 11/30/2023    ALT 12 08/29/2023     Lab Results   Component Value Date    WBC 5.34 01/29/2024    WBC 7.1 11/30/2023    HGB 10.5 (L) 01/29/2024    HGB 11.4 11/30/2023    HCT 31.6 (L) 01/29/2024    HCT 33.7 (L) 11/30/2023    MCV 91.9 01/29/2024    MCV 90 11/30/2023     01/29/2024     11/30/2023     Lab Results   Component Value Date    PROBNP 946.0 01/29/2024    PROBNP 2,920.0 (H) 08/27/2023    BNP 58 09/11/2023    .7 (H) 08/29/2023     Lab Results   Component Value Date    CKTOTAL 26 03/06/2021    TROPONINT 30 (H) 08/28/2023     Lab Results   Component Value Date    TSH 0.747 11/30/2023    TSH 2.250 08/27/2023             ASSESSMENT & PLAN     Diagnoses and all orders for this visit:    1. Acute on chronic HFrEF (heart failure with reduced ejection fraction) (Primary)  Assessment & Plan:  Patient presents today with increased shortness of breath, especially with exertion  Weight is up 4 pounds and patient notes some mild lower extremity edema  Symptoms improved since she has increased furosemide from twice daily to 3 times daily  Patient was sent to the [facility] where CBC, CMP, BNP were obtained  At time of dictation although studies have been reviewed.  Patient creatinine is improved from previous, proBNP within normal limits.  Recommended the patient continue with 3 times daily furosemide and reevaluate with me in 1 week    Orders:  -     Cancel: Basic Metabolic Panel; Future  -     Cancel: CBC & Differential; Future  -     Cancel: BNP; Future  -     Basic Metabolic Panel; Future  -     CBC & Differential; Future  -     BNP; Future    2. Primary hypertension  Assessment & Plan:  BP controlled in clinic at 130/80  Continue the current regimen:  Carvedilol 12.5 mg twice  daily  Furosemide 40 mg 3 times daily  Valsartan 320 mg/day      3. Hyperlipidemia, unspecified hyperlipidemia type  Assessment & Plan:  Most recent LDL elevated at 143  We have recommended statin therapy in the past, but since patient's cardiac cath revealed normal coronary anatomy, it was never started      4. Stage 3a chronic kidney disease        No follow-ups on file.    Future Appointments         Provider Department Center    2/29/2024 1:45 PM Kehrer, Meredith Lea, MD [facility] PRIMARY CARE [place]    3/5/2024 10:00 AM Anna Marsh MD [facility] CARDIOLOGY [place]    6/25/2024 9:20 AM Anna Marsh MD [facility] CARDIOLOGY [place]                    MEDICATIONS         Discharge Medications            Accurate as of January 29, 2024 12:53 PM. If you have any questions, ask your nurse or doctor.                Changes to Medications        Instructions Start Date   lactulose 10 GM/15ML solution  Commonly known as: CHRONULAC  What changed: when to take this   TAKE 15 ML BY MOUTH THREE TIMES DAILY             Continue These Medications        Instructions Start Date   acetaminophen 325 MG tablet  Commonly known as: TYLENOL   650 mg, Oral, Every 4 Hours PRN      alendronate 70 MG tablet  Commonly known as: Fosamax   70 mg, Oral, Every 7 Days      apixaban 5 MG tablet tablet  Commonly known as: Eliquis   5 mg, Oral, Every 12 Hours Scheduled      benzonatate 100 MG capsule  Commonly known as: TESSALON       carvedilol 12.5 MG tablet  Commonly known as: COREG   12.5 mg, Oral, 2 Times Daily With Meals      chlorhexidine 0.12 % solution  Commonly known as: PERIDEX   APPLY 15 ML AS DIRECTED TWICE DAILY SWISH FOR 30 seconds AND expectorate, EVERY MORNING AND IN THE EVENING TO BEGIN in 24 hours AFTER surgery      coenzyme Q10 100 MG capsule   100 mg, Oral, Daily      cyclobenzaprine 10 MG tablet  Commonly known as: FLEXERIL   10 mg, Oral, 2 Times Daily PRN      Enulose  10 GM/15ML solution solution (encephalopathy)  Generic drug: lactulose       esomeprazole 40 MG capsule  Commonly known as: nexIUM   40 mg, Oral, Every Morning Before Breakfast      ferrous sulfate 325 (65 FE) MG tablet   325 mg, Oral, Daily With Breakfast      folic acid 1 MG tablet  Commonly known as: FOLVITE   1 mg, Oral, Daily      furosemide 40 MG tablet  Commonly known as: LASIX   TAKE ONE TABLET BY MOUTH DAILY. TAKE SECOND TABLET AT NOON AS NEEDED FOR INCREASED SWELLING      gabapentin 100 MG capsule  Commonly known as: NEURONTIN   TAKE ONE CAPSULE BY MOUTH THREE TIMES DAILY      ipratropium 0.06 % nasal spray  Commonly known as: ATROVENT   2 sprays, Nasal, 4 Times Daily PRN      ipratropium-albuterol 0.5-2.5 mg/3 ml nebulizer  Commonly known as: DUO-NEB   USE 1 VIAL IN NEBULIZER 2 TIMES DAILY. Generic: Duoneb      levothyroxine 100 MCG tablet  Commonly known as: SYNTHROID, LEVOTHROID   TAKE ONE TABLET BY MOUTH DAILY      lidocaine 5 % ointment  Commonly known as: XYLOCAINE   1 application , Topical, 3 Times Daily      Magnesium 500 MG tablet   Oral      ondansetron 4 MG tablet  Commonly known as: ZOFRAN       potassium chloride 10 MEQ CR tablet   10 mEq, Oral, 2 Times Daily      pramipexole 1.5 MG tablet  Commonly known as: MIRAPEX   TAKE ONE TABLET BY MOUTH EVERY NIGHT AT BEDTIME      Senna Plus 8.6-50 MG per tablet  Generic drug: sennosides-docusate   TAKE TWO TABLETS BY MOUTH TWICE DAILY      sertraline 25 MG tablet  Commonly known as: ZOLOFT       Symbicort 160-4.5 MCG/ACT inhaler  Generic drug: budesonide-formoterol   No dose, route, or frequency recorded.      triamcinolone 0.1 % cream  Commonly known as: KENALOG   1 application , Topical, 2 Times Daily      valsartan 320 MG tablet  Commonly known as: DIOVAN   TAKE ONE TABLET BY MOUTH DAILY      vitamin B-12 1000 MCG tablet  Commonly known as: CYANOCOBALAMIN   1,000 mcg, Oral, Daily      Zinc 50 MG tablet   1 tablet, Oral                   **Dragon  Disclaimer:   Much of this encounter note is an electronic transcription/translation of spoken language to printed text. The electronic translation of spoken language may permit erroneous, or at times, nonsensical words or phrases to be inadvertently transcribed. Although I have reviewed the note for such errors, some may still exist.

## 2024-01-29 NOTE — ASSESSMENT & PLAN NOTE
Most recent LDL elevated at 143  We have recommended statin therapy in the past, but since patient's cardiac cath revealed normal coronary anatomy, it was never started   [employer]

## 2024-02-08 ENCOUNTER — TELEPHONE (OUTPATIENT)
Dept: FAMILY MEDICINE CLINIC | Facility: CLINIC | Age: 84
End: 2024-02-08

## 2024-02-08 NOTE — TELEPHONE ENCOUNTER
PHARMACY INFORMED - PER COVER MY MEDS This medication or product is on your plan's list of covered drugs. Prior authorization is not required at this time.

## 2024-02-08 NOTE — TELEPHONE ENCOUNTER
Caller: Latanya Olsen    Relationship to patient: Self    Best call back number: [phone]     Patient is needing: CALLING TO REPORT THAT VALSARTAN 320 MG IS REQUIRING A PRIOR AUTHORIZATION. [facility] SHOULD HAVE SENT INFORMATION OVER.    [facility] - [address] - [phone]  - [phone] FX

## 2024-02-21 ENCOUNTER — TELEPHONE (OUTPATIENT)
Dept: FAMILY MEDICINE CLINIC | Facility: CLINIC | Age: 84
End: 2024-02-21
Payer: MEDICARE

## 2024-02-21 DIAGNOSIS — K59.00 CONSTIPATION, UNSPECIFIED CONSTIPATION TYPE: ICD-10-CM

## 2024-02-21 RX ORDER — LEVOTHYROXINE SODIUM 0.1 MG/1
TABLET ORAL
Qty: 90 TABLET | Refills: 0 | Status: SHIPPED | OUTPATIENT
Start: 2024-02-21

## 2024-02-21 RX ORDER — ASPIRIN 81 MG
TABLET, DELAYED RELEASE (ENTERIC COATED) ORAL
Qty: 60 TABLET | Refills: 0 | Status: SHIPPED | OUTPATIENT
Start: 2024-02-21

## 2024-02-26 RX ORDER — IPRATROPIUM BROMIDE AND ALBUTEROL SULFATE 2.5; .5 MG/3ML; MG/3ML
SOLUTION RESPIRATORY (INHALATION)
Qty: 60 ML | Refills: 11 | Status: SHIPPED | OUTPATIENT
Start: 2024-02-26

## 2024-02-29 ENCOUNTER — OFFICE VISIT (OUTPATIENT)
Dept: FAMILY MEDICINE CLINIC | Facility: CLINIC | Age: 84
End: 2024-02-29
Payer: MEDICARE

## 2024-02-29 VITALS
DIASTOLIC BLOOD PRESSURE: 42 MMHG | SYSTOLIC BLOOD PRESSURE: 102 MMHG | OXYGEN SATURATION: 95 % | WEIGHT: 218 LBS | HEIGHT: 66 IN | BODY MASS INDEX: 35.03 KG/M2 | TEMPERATURE: 96.6 F | HEART RATE: 69 BPM

## 2024-02-29 DIAGNOSIS — R60.0 LOCALIZED EDEMA: ICD-10-CM

## 2024-02-29 DIAGNOSIS — E03.9 HYPOTHYROIDISM, UNSPECIFIED TYPE: ICD-10-CM

## 2024-02-29 DIAGNOSIS — D64.9 ANEMIA, UNSPECIFIED TYPE: ICD-10-CM

## 2024-02-29 DIAGNOSIS — G62.9 PERIPHERAL POLYNEUROPATHY: ICD-10-CM

## 2024-02-29 DIAGNOSIS — I50.22 CHRONIC SYSTOLIC HEART FAILURE: ICD-10-CM

## 2024-02-29 DIAGNOSIS — Z79.899 HIGH RISK MEDICATION USE: ICD-10-CM

## 2024-02-29 DIAGNOSIS — N18.31 STAGE 3A CHRONIC KIDNEY DISEASE: ICD-10-CM

## 2024-02-29 DIAGNOSIS — I10 ESSENTIAL HYPERTENSION: Primary | ICD-10-CM

## 2024-02-29 DIAGNOSIS — R79.89 LOW VITAMIN B12 LEVEL: ICD-10-CM

## 2024-02-29 RX ORDER — GABAPENTIN 100 MG/1
100 CAPSULE ORAL 3 TIMES DAILY
Qty: 90 CAPSULE | Refills: 0 | Status: SHIPPED | OUTPATIENT
Start: 2024-02-29

## 2024-02-29 RX ORDER — VALSARTAN 320 MG/1
320 TABLET ORAL DAILY
Qty: 90 TABLET | Refills: 3 | Status: SHIPPED | OUTPATIENT
Start: 2024-02-29

## 2024-02-29 NOTE — PROGRESS NOTES
"Chief Complaint  medication follow up  and Shortness of Breath    Subjective        Latanya Olsen presents to [facility] PRIMARY [facility]ARE  History of Present Illness  Patient presents for 3-month follow-up on her hypertension, hypothyroidism, CKD, chronic edema and peripheral neuropathy.  She is compliant with her medications.  Her chronic shortness of breath is stable.  She is up-to-date with cardiology suicide is not from her heart and with pulmonary who said it is not from her lungs.  She does have allergies and it has been worse in the past week.  The gabapentin still helps her neuropathy.  She has no acute concerns today.      Objective   Vital Signs:  /42   Pulse 69   Temp 96.6 °F (35.9 °C) (Temporal)   Ht 167.6 cm (65.98\")   Wt 98.9 kg (218 lb)   SpO2 95%   BMI 35.20 kg/m²   Estimated body mass index is 35.2 kg/m² as calculated from the following:    Height as of this encounter: 167.6 cm (65.98\").    Weight as of this encounter: 98.9 kg (218 lb).               Physical Exam  Constitutional:       General: She is not in acute distress.     Appearance: Normal appearance. She is well-developed.   HENT:      Head: Normocephalic and atraumatic.      Right Ear: Tympanic membrane, ear canal and external ear normal.      Left Ear: Tympanic membrane, ear canal and external ear normal.      Mouth/Throat:      Mouth: Mucous membranes are moist.      Pharynx: Oropharynx is clear.   Eyes:      Conjunctiva/sclera: Conjunctivae normal.      Pupils: Pupils are equal, round, and reactive to light.   Neck:      Thyroid: No thyromegaly.   Cardiovascular:      Rate and Rhythm: Normal rate and regular rhythm.      Heart sounds: No murmur heard.  Pulmonary:      Effort: Pulmonary effort is normal.      Breath sounds: Normal breath sounds. No wheezing.   Abdominal:      General: Bowel sounds are normal.      Palpations: Abdomen is soft.      Tenderness: There is no abdominal tenderness. "   Musculoskeletal:         General: Normal range of motion.      Cervical back: Neck supple.      Right lower leg: Edema present.      Left lower leg: Edema present.      Comments: Trace edema bilateral ankles, hose in place   Lymphadenopathy:      Cervical: No cervical adenopathy.   Skin:     General: Skin is warm and dry.   Neurological:      Mental Status: She is alert and oriented to person, place, and time.   Psychiatric:         Mood and Affect: Mood normal.         Behavior: Behavior normal.        Result Review :                     Assessment and Plan     Diagnoses and all orders for this visit:    1. Essential hypertension (Primary)  -     TSH  -     CBC & Differential  -     Comprehensive Metabolic Panel  -     valsartan (DIOVAN) 320 MG tablet; Take 1 tablet by mouth Daily.  Dispense: 90 tablet; Refill: 3    2. Hypothyroidism, unspecified type  -     TSH    3. Stage 3a chronic kidney disease  -     CBC & Differential  -     Comprehensive Metabolic Panel    4. Localized edema  -     TSH  -     CBC & Differential    5. Chronic systolic heart failure    6. Peripheral polyneuropathy  -     gabapentin (NEURONTIN) 100 MG capsule; Take 1 capsule by mouth 3 (Three) Times a Day.  Dispense: 90 capsule; Refill: 0    7. High risk medication use    8. Anemia, unspecified type  -     CBC & Differential  -     Ferritin  -     Iron  -     Vitamin B12  -     Folate    9. Low vitamin B12 level  -     Vitamin B12    Hypertension-controlled, continue current medication and check labs  Hypothyroidism-due for TSH  CKD-recheck today  Edema-continue support hose         Follow Up     Return in about 3 months (around 5/29/2024) for Recheck.  Patient was given instructions and counseling regarding her condition or for health maintenance advice. Please see specific information pulled into the AVS if appropriate.         Answers submitted by the patient for this visit:  Other (Submitted on 2/22/2024)  Please describe your symptoms.:  Medicine follow up  Have you had these symptoms before?: Yes  How long have you been having these symptoms?: Greater than 2 weeks  Primary Reason for Visit (Submitted on 2/22/2024)  What is the primary reason for your visit?: Other

## 2024-03-01 LAB
ALBUMIN SERPL-MCNC: 4 G/DL (ref 3.7–4.7)
ALBUMIN/GLOB SERPL: 1.9 {RATIO} (ref 1.2–2.2)
ALP SERPL-CCNC: 81 IU/L (ref 44–121)
ALT SERPL-CCNC: 11 IU/L (ref 0–32)
AST SERPL-CCNC: 14 IU/L (ref 0–40)
BASOPHILS # BLD AUTO: 0 X10E3/UL (ref 0–0.2)
BASOPHILS NFR BLD AUTO: 1 %
BILIRUB SERPL-MCNC: 0.3 MG/DL (ref 0–1.2)
BUN SERPL-MCNC: 57 MG/DL (ref 8–27)
BUN/CREAT SERPL: 30 (ref 12–28)
CALCIUM SERPL-MCNC: 9.4 MG/DL (ref 8.7–10.3)
CHLORIDE SERPL-SCNC: 100 MMOL/L (ref 96–106)
CO2 SERPL-SCNC: 23 MMOL/L (ref 20–29)
CREAT SERPL-MCNC: 1.93 MG/DL (ref 0.57–1)
EGFRCR SERPLBLD CKD-EPI 2021: 25 ML/MIN/1.73
EOSINOPHIL # BLD AUTO: 0.3 X10E3/UL (ref 0–0.4)
EOSINOPHIL NFR BLD AUTO: 5 %
ERYTHROCYTE [DISTWIDTH] IN BLOOD BY AUTOMATED COUNT: 13.3 % (ref 11.7–15.4)
FERRITIN SERPL-MCNC: 149 NG/ML (ref 15–150)
FOLATE SERPL-MCNC: >20 NG/ML
GLOBULIN SER CALC-MCNC: 2.1 G/DL (ref 1.5–4.5)
GLUCOSE SERPL-MCNC: 92 MG/DL (ref 70–99)
HCT VFR BLD AUTO: 32.3 % (ref 34–46.6)
HGB BLD-MCNC: 10.9 G/DL (ref 11.1–15.9)
IMM GRANULOCYTES # BLD AUTO: 0 X10E3/UL (ref 0–0.1)
IMM GRANULOCYTES NFR BLD AUTO: 1 %
IRON SERPL-MCNC: 60 UG/DL (ref 27–139)
LYMPHOCYTES # BLD AUTO: 2.2 X10E3/UL (ref 0.7–3.1)
LYMPHOCYTES NFR BLD AUTO: 36 %
MCH RBC QN AUTO: 30.7 PG (ref 26.6–33)
MCHC RBC AUTO-ENTMCNC: 33.7 G/DL (ref 31.5–35.7)
MCV RBC AUTO: 91 FL (ref 79–97)
MONOCYTES # BLD AUTO: 0.7 X10E3/UL (ref 0.1–0.9)
MONOCYTES NFR BLD AUTO: 12 %
NEUTROPHILS # BLD AUTO: 2.8 X10E3/UL (ref 1.4–7)
NEUTROPHILS NFR BLD AUTO: 45 %
PLATELET # BLD AUTO: 175 X10E3/UL (ref 150–450)
POTASSIUM SERPL-SCNC: 4.2 MMOL/L (ref 3.5–5.2)
PROT SERPL-MCNC: 6.1 G/DL (ref 6–8.5)
RBC # BLD AUTO: 3.55 X10E6/UL (ref 3.77–5.28)
SODIUM SERPL-SCNC: 140 MMOL/L (ref 134–144)
TSH SERPL DL<=0.005 MIU/L-ACNC: 1.36 UIU/ML (ref 0.45–4.5)
VIT B12 SERPL-MCNC: 775 PG/ML (ref 232–1245)
WBC # BLD AUTO: 6 X10E3/UL (ref 3.4–10.8)

## 2024-03-04 DIAGNOSIS — N18.31 STAGE 3A CHRONIC KIDNEY DISEASE: Primary | ICD-10-CM

## 2024-03-04 NOTE — PROGRESS NOTES
SPOKE WITH PATIENT AWARE OF RESULTS / PATIENT TOOK 1 EXTRA PILL PER CARDIOLOGY ADVICE / PATIENT IS SCHEDULED FOR 4/4/24 FOR LABS NEED ORDER PLEASE

## 2024-03-05 ENCOUNTER — OFFICE VISIT (OUTPATIENT)
Age: 84
End: 2024-03-05
Payer: MEDICARE

## 2024-03-05 VITALS
HEIGHT: 66 IN | BODY MASS INDEX: 35.2 KG/M2 | WEIGHT: 219 LBS | SYSTOLIC BLOOD PRESSURE: 157 MMHG | HEART RATE: 66 BPM | DIASTOLIC BLOOD PRESSURE: 75 MMHG

## 2024-03-05 DIAGNOSIS — E78.2 MIXED HYPERLIPIDEMIA: ICD-10-CM

## 2024-03-05 DIAGNOSIS — I48.0 PAROXYSMAL ATRIAL FIBRILLATION: ICD-10-CM

## 2024-03-05 DIAGNOSIS — I50.32 CHRONIC DIASTOLIC CONGESTIVE HEART FAILURE: ICD-10-CM

## 2024-03-05 DIAGNOSIS — R06.09 DOE (DYSPNEA ON EXERTION): Primary | ICD-10-CM

## 2024-03-05 DIAGNOSIS — I10 PRIMARY HYPERTENSION: ICD-10-CM

## 2024-03-05 PROBLEM — I50.23 ACUTE ON CHRONIC HFREF (HEART FAILURE WITH REDUCED EJECTION FRACTION): Status: RESOLVED | Noted: 2023-08-28 | Resolved: 2024-03-05

## 2024-03-05 PROBLEM — I50.9 ACUTE EXACERBATION OF CHF (CONGESTIVE HEART FAILURE): Status: RESOLVED | Noted: 2023-08-27 | Resolved: 2024-03-05

## 2024-03-05 PROCEDURE — 3077F SYST BP >= 140 MM HG: CPT | Performed by: INTERNAL MEDICINE

## 2024-03-05 PROCEDURE — 93000 ELECTROCARDIOGRAM COMPLETE: CPT | Performed by: INTERNAL MEDICINE

## 2024-03-05 PROCEDURE — 99214 OFFICE O/P EST MOD 30 MIN: CPT | Performed by: INTERNAL MEDICINE

## 2024-03-05 PROCEDURE — 3078F DIAST BP <80 MM HG: CPT | Performed by: INTERNAL MEDICINE

## 2024-03-05 NOTE — PROGRESS NOTES
RM:________     PCP: Kehrer, Meredith Lea, MD    : 1940  AGE: 83 y.o.  EST PATIENT     REASON FOR VISIT/  CC:        BP Readings from Last 3 Encounters:   24 102/42   24 130/80   24 159/75      Wt Readings from Last 3 Encounters:   24 98.9 kg (218 lb)   24 98.4 kg (217 lb)   24 95.7 kg (211 lb)        WT: ____________ BP: __________L __________R HR______    CHEST PAIN: _____________    SOA: _____________PALPS: _______________     LIGHTHEADED: ___________FATIGUE: ________________ EDEMA __________    ALLERGIES:Doxycycline SMOKING HISTORY:  Social History     Tobacco Use    Smoking status: Never     Passive exposure: Never    Smokeless tobacco: Never    Tobacco comments:     Never   Vaping Use    Vaping status: Never Used   Substance Use Topics    Alcohol use: No    Drug use: Never     CAFFEINE USE_________________  ALCOHOL ______________________

## 2024-03-05 NOTE — PROGRESS NOTES
CARDIOLOGY    Anna Marsh MD    ENCOUNTER DATE:  03/05/2024    Latanya Olsne / 83 y.o. / female        CHIEF COMPLAINT / REASON FOR OFFICE VISIT     Hypertension and Congestive Heart Failure      HISTORY OF PRESENT ILLNESS       HPI    Latanya Olsen is a 83 y.o. female     This is a patient who previously followed with Dr. Park but has switched so she can see me in the [place] office.  She has paroxysmal atrial fibrillation, chronic kidney disease, COPD status post lung cancer with left lower lobectomy in 2006, hypertension, hypothyroid, and obstructive sleep apnea on CPAP.  She had a non-ST elevation myocardial infarction in 08/2023 with sudden onset of chest tightness and pain radiating to her back with shortness of breath.  In the ED, her EKG was unremarkable.  Her troponin was elevated at 792 with hemoglobin of 11.6 and a normal pro-BNP.  Creatinine was 1.42, which was at baseline.  She had a heart catheterization which showed normal coronary arteries and wall motion abnormalities consistent with Takotsubo cardiomyopathy.  Echocardiogram 08/07/2023 put her ejection fraction at 40-45% with severe hypokinesis of the distal anterior wall, distal septum, and distal apical segments.       She was readmitted to the hospital in 08/2023 with some fluid overload and was diuresed and put back on all diuretics.      Repeat echo in January 2024 showed normal LV function with grade 1 diastolic dysfunction and no valve disease.    Later in January 2024 she called the office complaining of shortness of breath, chest tightness and weight gain.  She was seen by DEVORA Solomon in the office.  She had increased her Lasix for 3 days prior to seeing Amanda and was feeling better.  Amanda obtain blood work and her BNP was normal and down from where it had been in August 2023.  Renal function was at baseline at that time.    She continues to feel short of breath.  She feels that walking around in her  "house.  The edema has improved.  She wears her compression socks and tries to keep her legs elevated.  She stays active around the house and does all of her own cleaning.  She saw her lung doctor last week who did not think her lungs were an active issue.  She feels like she is wheezing in the left lower base.  I reviewed her recent blood work from her PCP the end of February and her BUN and creatinine were both elevated.  She has more blood work next week.    REVIEW OF SYSTEMS     Review of Systems   Constitutional: Negative for chills, fever, weight gain and weight loss.   Cardiovascular:  Negative for leg swelling.   Respiratory:  Negative for cough, snoring and wheezing.    Hematologic/Lymphatic: Negative for bleeding problem. Does not bruise/bleed easily.   Skin:  Negative for color change.   Musculoskeletal:  Negative for falls, joint pain and myalgias.   Gastrointestinal:  Negative for melena.   Genitourinary:  Negative for hematuria.   Neurological:  Negative for excessive daytime sleepiness.   Psychiatric/Behavioral:  Negative for depression. The patient is not nervous/anxious.          VITAL SIGNS     Visit Vitals  /75 (BP Location: Right arm)   Pulse 66   Ht 167.6 cm (65.98\")   Wt 99.3 kg (219 lb)   BMI 35.37 kg/m²         Wt Readings from Last 3 Encounters:   03/05/24 99.3 kg (219 lb)   02/29/24 98.9 kg (218 lb)   01/29/24 98.4 kg (217 lb)     Body mass index is 35.37 kg/m².      PHYSICAL EXAMINATION     Constitutional:       General: Not in acute distress.  Neck:      Vascular: No carotid bruit or JVD.   Pulmonary:      Effort: Pulmonary effort is normal.      Breath sounds: Normal breath sounds.   Cardiovascular:      Normal rate. Regular rhythm.      Murmurs: There is no murmur.   Psychiatric:         Mood and Affect: Mood and affect normal.           REVIEWED DATA       ECG 12 Lead    Date/Time: 3/5/2024 2:36 PM  Performed by: Anna Marsh MD    Authorized by: Anna Marsh MD  " Comparison: compared with previous ECG from 12/19/2023  Similar to previous ECG  Rhythm: sinus rhythm  BPM: 66  Other findings: poor R wave progression    Clinical impression: abnormal EKG            Lipid Panel          8/9/2023    08:19 11/30/2023    14:35   Lipid Panel   Total Cholesterol 193     Total Cholesterol  226    Triglycerides 137  134    HDL Cholesterol 62  59    VLDL Cholesterol 24  24    LDL Cholesterol  107  143    LDL/HDL Ratio 1.67         Lab Results   Component Value Date    GLUCOSE 92 02/29/2024    BUN 57 (H) 02/29/2024    CREATININE 1.93 (H) 02/29/2024    EGFRRESULT 25 (L) 02/29/2024    EGFR 40.3 (L) 01/29/2024    BCR 30 (H) 02/29/2024    K 4.2 02/29/2024    CO2 23 02/29/2024    CALCIUM 9.4 02/29/2024    PROTENTOTREF 6.1 02/29/2024    ALBUMIN 4.0 02/29/2024    BILITOT 0.3 02/29/2024    AST 14 02/29/2024    ALT 11 02/29/2024       ASSESSMENT & PLAN      Diagnosis Plan   1. MCKEON (dyspnea on exertion)  XR Chest 2 View      2. Primary hypertension        3. Mixed hyperlipidemia        4. Chronic diastolic congestive heart failure        5. Paroxysmal atrial fibrillation            Chronic heart failure with preserved ejection fraction.  She did have a stress induced cardiomyopathy with echocardiogram in 01/2024 showing her LV function had normalized.  She is on Lasix 40 mg bid.  She looks like she might be borderline dehydrated on her last lab, so I told her I did not want to increase her diuretic and I really did not think it would help her symptoms at all.  I think it is multifactorial in her weight, lung disease, and diastolic heart failure.    History of stress induced cardiomyopathy in 08/2023.  Hypertension.  Her blood pressure is a bit high here but when she was in cardiac rehab it was always normal.  She said when she checks it at home it is always normal.  I am not going to change her medication.  Hyperlipidemia.  I reviewed her most recent lipid panel.  She is on no treatment for her  cholesterol.    Chronic kidney disease.  History of paroxysmal atrial fibrillation, in sinus rhythm today.  Anticoagulated with Eliquis.  Not having any bleeding problems      Follow-up with me in June 2024.    Orders Placed This Encounter   Procedures    XR Chest 2 View     Standing Status:   Future     Standing Expiration Date:   3/5/2025     Order Specific Question:   Reason for Exam:     Answer:   SOA     Order Specific Question:   Release to patient     Answer:   Routine Release [[id number]]    ECG 12 Lead     This order was created via procedure documentation     Order Specific Question:   Release to patient     Answer:   Routine Release [[id number]]           MEDICATIONS         Discharge Medications            Accurate as of March 5, 2024  2:37 PM. If you have any questions, ask your nurse or doctor.                Changes to Medications        Instructions Start Date   lactulose 10 GM/15ML solution  Commonly known as: CHRONULAC  What changed: when to take this   TAKE 15 ML BY MOUTH THREE TIMES DAILY             Continue These Medications        Instructions Start Date   acetaminophen 325 MG tablet  Commonly known as: TYLENOL   650 mg, Oral, Every 4 Hours PRN      alendronate 70 MG tablet  Commonly known as: Fosamax   70 mg, Oral, Every 7 Days      apixaban 5 MG tablet tablet  Commonly known as: Eliquis   5 mg, Oral, Every 12 Hours Scheduled      carvedilol 12.5 MG tablet  Commonly known as: COREG   12.5 mg, Oral, 2 Times Daily With Meals      chlorhexidine 0.12 % solution  Commonly known as: PERIDEX   APPLY 15 ML AS DIRECTED TWICE DAILY SWISH FOR 30 seconds AND expectorate, EVERY MORNING AND IN THE EVENING TO BEGIN in 24 hours AFTER surgery      coenzyme Q10 100 MG capsule   100 mg, Oral, Daily      cyclobenzaprine 10 MG tablet  Commonly known as: FLEXERIL   10 mg, Oral, 2 Times Daily PRN      Enulose 10 GM/15ML solution solution (encephalopathy)  Generic drug: lactulose       esomeprazole 40 MG  capsule  Commonly known as: nexIUM   40 mg, Oral, Every Morning Before Breakfast      ferrous sulfate 325 (65 FE) MG tablet   325 mg, Oral, Daily With Breakfast      folic acid 1 MG tablet  Commonly known as: FOLVITE   1 mg, Oral, Daily      furosemide 40 MG tablet  Commonly known as: LASIX   TAKE ONE TABLET BY MOUTH DAILY. TAKE SECOND TABLET AT NOON AS NEEDED FOR INCREASED SWELLING      gabapentin 100 MG capsule  Commonly known as: NEURONTIN   100 mg, Oral, 3 Times Daily      ipratropium 0.06 % nasal spray  Commonly known as: ATROVENT   2 sprays, Nasal, 4 Times Daily PRN      ipratropium-albuterol 0.5-2.5 mg/3 ml nebulizer  Commonly known as: DUO-NEB   USE 1 VIAL IN NEBULIZER 2 TIMES DAILY. Generic: Duoneb      levothyroxine 100 MCG tablet  Commonly known as: SYNTHROID, LEVOTHROID   TAKE ONE TABLET BY MOUTH DAILY      lidocaine 5 % ointment  Commonly known as: XYLOCAINE   1 application , Topical, 3 Times Daily      Magnesium 500 MG tablet   Oral      ondansetron 4 MG tablet  Commonly known as: ZOFRAN       potassium chloride 10 MEQ CR tablet   10 mEq, Oral, Daily      pramipexole 1.5 MG tablet  Commonly known as: MIRAPEX   TAKE ONE TABLET BY MOUTH EVERY NIGHT AT BEDTIME      Senna Plus 8.6-50 MG per tablet  Generic drug: sennosides-docusate   TAKE TWO TABLETS BY MOUTH TWICE DAILY      sertraline 25 MG tablet  Commonly known as: ZOLOFT       Symbicort 160-4.5 MCG/ACT inhaler  Generic drug: budesonide-formoterol   No dose, route, or frequency recorded.      triamcinolone 0.1 % cream  Commonly known as: KENALOG   1 application , Topical, 2 Times Daily      valsartan 320 MG tablet  Commonly known as: DIOVAN   320 mg, Oral, Daily      vitamin B-12 1000 MCG tablet  Commonly known as: CYANOCOBALAMIN   1,000 mcg, Oral, Daily      Zinc 50 MG tablet   1 tablet, Oral             Stop These Medications      benzonatate 100 MG capsule  Commonly known as: TESSALON  Stopped by: MD Anna Rey  MD Salma  03/05/24  14:37 EST    Part of this note may be an electronic transcription/translation of spoken language to printed text using the Dragon dictation system.

## 2024-03-14 DIAGNOSIS — M48.061 SPINAL STENOSIS OF LUMBAR REGION WITHOUT NEUROGENIC CLAUDICATION: ICD-10-CM

## 2024-03-14 DIAGNOSIS — M51.36 LUMBAR DEGENERATIVE DISC DISEASE: Primary | ICD-10-CM

## 2024-03-19 DIAGNOSIS — K59.00 CONSTIPATION, UNSPECIFIED CONSTIPATION TYPE: ICD-10-CM

## 2024-03-19 RX ORDER — ASPIRIN 81 MG
TABLET, DELAYED RELEASE (ENTERIC COATED) ORAL
Qty: 60 TABLET | Refills: 0 | Status: SHIPPED | OUTPATIENT
Start: 2024-03-19

## 2024-04-22 RX ORDER — PRAMIPEXOLE DIHYDROCHLORIDE 1.5 MG/1
TABLET ORAL
Qty: 90 TABLET | Refills: 0 | Status: SHIPPED | OUTPATIENT
Start: 2024-04-22

## 2024-04-30 ENCOUNTER — APPOINTMENT (OUTPATIENT)
Dept: ULTRASOUND IMAGING | Facility: HOSPITAL | Age: 84
End: 2024-04-30
Payer: MEDICARE

## 2024-04-30 ENCOUNTER — HOSPITAL ENCOUNTER (EMERGENCY)
Facility: HOSPITAL | Age: 84
Discharge: HOME | End: 2024-04-30
Attending: STUDENT IN AN ORGANIZED HEALTH CARE EDUCATION/TRAINING PROGRAM
Payer: MEDICARE

## 2024-04-30 VITALS
OXYGEN SATURATION: 94 % | DIASTOLIC BLOOD PRESSURE: 74 MMHG | RESPIRATION RATE: 16 BRPM | SYSTOLIC BLOOD PRESSURE: 156 MMHG | WEIGHT: 215 LBS | BODY MASS INDEX: 34.55 KG/M2 | HEART RATE: 80 BPM | HEIGHT: 66 IN | TEMPERATURE: 98.1 F

## 2024-04-30 DIAGNOSIS — S80.12XA CONTUSION OF LEFT LEG, INITIAL ENCOUNTER: Primary | ICD-10-CM

## 2024-04-30 PROCEDURE — 99284 EMERGENCY DEPT VISIT MOD MDM: CPT

## 2024-04-30 PROCEDURE — 93971 EXTREMITY STUDY: CPT

## 2024-04-30 RX ORDER — ACETAMINOPHEN 500 MG
1000 TABLET ORAL ONCE
Status: COMPLETED | OUTPATIENT
Start: 2024-04-30 | End: 2024-04-30

## 2024-04-30 RX ADMIN — ACETAMINOPHEN 1000 MG: 500 TABLET ORAL at 16:25

## 2024-04-30 NOTE — ED PROVIDER NOTES
Subjective   History of Present Illness  Pt is a 83 y.o. female with PMH as listed who presents for   Chief Complaint   Patient presents with    Leg Pain     Car door hit back of left lower leg yesterday, bruise noted, pt ambulates with walker       Patient is an 83-year-old female presents for left leg pain and bruising.  Was sent by physical therapy for DVT rule out due to history of DVT.  Patient complaining of increased unilateral swelling left lower extremity over the past day as well.  Mildly painful in left leg.  Has no new complaints at this time    Review of Systems    Past Medical History:   Diagnosis Date    Abnormal ECG Aug 2023    Acute exacerbation of CHF (congestive heart failure) 08/27/2023    Anemia     Anxiety 4-2-23    Arthritis     Arthritis of back     Arthritis of neck     Asthma 1/1/2006    Atrial fibrillation     Cataract 1-1/2015    CKD (chronic kidney disease)     Stage 3    Congenital heart disease Aug 2023    Constipation     COPD (chronic obstructive pulmonary disease)     DDD (degenerative disc disease), lumbar     Deep vein thrombosis 2019    Depression     Diverticulosis     Fracture of hip     GERD (gastroesophageal reflux disease)     Hip arthrosis     History of heart attack     7/2023    Hx of degenerative disc disease     Hyperlipidemia     Hyperlipidemia 02/19/2016    Hypertension     Hypokalemia     Hypothyroidism     Knee swelling     Low back pain Yes    Lung cancer     history    Myocardial infarction 8-4-23    Obesity 218    Osteopenia Yes    Pulmonary embolism 2019    Renal disorder     Renal insufficiency 2019    Restless leg syndrome     Scoliosis Yes    Sleep apnea with use of continuous positive airway pressure (CPAP)        Allergies   Allergen Reactions    Doxycycline Anaphylaxis     Facial swelling, shortness of air, dizzines       Past Surgical History:   Procedure Laterality Date    ADENOIDECTOMY  2000    BUNIONECTOMY Bilateral     CARDIAC CATHETERIZATION N/A  08/07/2023    Procedure: Left Heart Cath;  Surgeon: Mireya Park MD;  Location: [facility] CATH INVASIVE LOCATION;  Service: Cardiology;  Laterality: N/A;    CARDIAC CATHETERIZATION N/A 08/07/2023    Procedure: Coronary angiography;  Surgeon: Mireya Park MD;  [facility];  Service: Cardiology;  Laterality: N/A;    CARDIAC CATHETERIZATION N/A 08/07/2023    Procedure: Left ventriculography;  Surgeon: iMreya Park MD;  Location: [facility] CATH INVASIVE LOCATION;  Service: Cardiology;  Laterality: N/A;    CATARACT EXTRACTION      CHOLECYSTECTOMY      COLONOSCOPY      ENDOSCOPY N/A 06/24/2016    Procedure: ESOPHAGOGASTRODUODENOSCOPY  with biopsies;  Surgeon: Von Hernández MD;  Location: [facility] OR;  Service:     JOINT REPLACEMENT  Yes    LUNG LOBECTOMY Left     lower lobe    LYTIC THROMBIN THERAPY Left 03/26/2021    Procedure: left leg clot treiver possible venous stent *supine*;  Surgeon: Rogerio Vega MD;  Location: [facility] OR 18/19;  Service: Vascular;  Laterality: Left;    REPLACEMENT TOTAL KNEE Left     THYROID BIOPSY      TONSILLECTOMY  Yes    TOTAL HIP ARTHROPLASTY Right 06/01/2019    Procedure: BIPOLAR HIP CEMENTED POSTERIOR;  Surgeon: Ashley Crenshaw MD;  Location: [facility] OR;  Service: Orthopedics    TRIGGER POINT INJECTION      TUBAL ABDOMINAL LIGATION  50 yrs ago       Family History   Problem Relation Age of Onset    Heart attack Mother     Coronary artery disease Mother     Hypertension Mother     Kidney disease Mother     Arthritis Mother     Heart disease Mother     Heart attack Sister     Colon cancer Neg Hx     Colon polyps Neg Hx     Esophageal cancer Neg Hx     Breast cancer Neg Hx        Social History     Socioeconomic History    Marital status:    Tobacco Use    Smoking status: Never     Passive exposure: Never    Smokeless tobacco: Never    Tobacco comments:     Never   Vaping Use    Vaping status: Never Used   Substance  and Sexual Activity    Alcohol use: No    Drug use: Never    Sexual activity: Not Currently     Birth control/protection: Tubal ligation           Objective   Physical Exam  Constitutional:       Appearance: Normal appearance.   HENT:      Head: Normocephalic and atraumatic.      Mouth/Throat:      Mouth: Mucous membranes are moist.      Pharynx: Oropharynx is clear.   Eyes:      Conjunctiva/sclera: Conjunctivae normal.   Cardiovascular:      Rate and Rhythm: Normal rate.   Pulmonary:      Effort: Pulmonary effort is normal.   Abdominal:      General: Abdomen is flat.   Musculoskeletal:      Cervical back: Neck supple.      Comments: Area, cyst posterior calf in the left with moderate swelling of left lower extremity on the left.  Does not look significantly more swollen compared to contralateral leg.  Mild diffuse tenderness to palpation.   Skin:     General: Skin is warm and dry.   Neurological:      Mental Status: She is alert.   Psychiatric:         Mood and Affect: Mood normal.         Procedures           ED Course  ED Course as of 04/30/24 1619   Tue Apr 30, 2024   1518 Left leg pain with area of ecchymosis.  Patient has history of DVT and states that her left leg is more swollen than the right however and was sent by physical therapy for DVT rule out.  Will obtain DVT ultrasound and treat pain with Tylenol. [JF]   1618 DVT ultrasound negative.  Discussed patient results of workup plan for discharge with PCP follow-up and to continue taking Tylenol as needed for pain control.  She understands and agrees to plan of care.  All questions answered. [JF]      ED Course User Index  [JF] Deandre Parks MD                                             Medical Decision Making  My diagnosis for lower extremity pain and injury includes but is not limited to hip fracture, femur fracture, hip dislocation, hip contusion, hip sprain, hip strain, pelvic fracture, ischio-tibial band pain, ischio-tibial band bursitis, knee  sprain, patella dislocation, knee dislocation, internal derangement of knee, fractures of the femur, tibia, fibula, ankle, foot and digits, ankle sprain, ankle dislocation, Lisfranc fracture, fracture dislocations of the digits, pulmonary embolism, claudication, peripheral vascular disease, gout, osteoarthritis, rheumatoid arthritis, bursitis, septic joint, poly-rheumatica, polyarthralgia and other inflammatory or infectious disease processes.      Problems Addressed:  Contusion of left leg, initial encounter: acute illness or injury    Amount and/or Complexity of Data Reviewed  Radiology: ordered.     Details: Ultrasound negative for acute DVT.    Risk  OTC drugs.        Final diagnoses:   Contusion of left leg, initial encounter       ED Disposition  ED Disposition       ED Disposition   Discharge    Condition   Stable    Comment   --               Kehrer, Meredith Lea, MD  [address] [place], [place] [id number] [phone]    Schedule an appointment as soon as possible for a visit in 2 days  For re-evaluation         Medication List        Changed      lactulose 10 GM/15ML solution  Commonly known as: CHRONULAC  TAKE 15 ML BY MOUTH THREE TIMES DAILY  What changed: when to take this                 Deandre Parks MD  04/30/24 0955

## 2024-05-13 ENCOUNTER — APPOINTMENT (OUTPATIENT)
Dept: GENERAL RADIOLOGY | Facility: HOSPITAL | Age: 84
End: 2024-05-13
Payer: MEDICARE

## 2024-05-13 ENCOUNTER — APPOINTMENT (OUTPATIENT)
Dept: CARDIOLOGY | Facility: HOSPITAL | Age: 84
End: 2024-05-13
Payer: MEDICARE

## 2024-05-13 ENCOUNTER — HOSPITAL ENCOUNTER (OUTPATIENT)
Facility: HOSPITAL | Age: 84
Setting detail: OBSERVATION
Discharge: HOME | End: 2024-05-14
Attending: STUDENT IN AN ORGANIZED HEALTH CARE EDUCATION/TRAINING PROGRAM | Admitting: EMERGENCY MEDICINE
Payer: MEDICARE

## 2024-05-13 DIAGNOSIS — R06.09 DYSPNEA ON EXERTION: ICD-10-CM

## 2024-05-13 DIAGNOSIS — I50.9 ACUTE ON CHRONIC CONGESTIVE HEART FAILURE, UNSPECIFIED HEART FAILURE TYPE: Primary | ICD-10-CM

## 2024-05-13 PROBLEM — R06.00 DYSPNEA: Status: ACTIVE | Noted: 2024-05-13

## 2024-05-13 LAB
ALBUMIN SERPL-MCNC: 4 G/DL (ref 3.5–5.2)
ALBUMIN/GLOB SERPL: 1.7 G/DL
ALP SERPL-CCNC: 93 U/L (ref 39–117)
ALT SERPL W P-5'-P-CCNC: 11 U/L (ref 1–33)
ANION GAP SERPL CALCULATED.3IONS-SCNC: 12.1 MMOL/L (ref 5–15)
AST SERPL-CCNC: 8 U/L (ref 1–32)
BASOPHILS # BLD AUTO: 0.02 10*3/MM3 (ref 0–0.2)
BASOPHILS NFR BLD AUTO: 0.3 % (ref 0–1.5)
BH CV LOW VAS LEFT MID FEMORAL SPONT: 1
BH CV LOW VAS LEFT POPLITEAL SPONT: 1
BH CV LOWER VASCULAR LEFT COMMON FEMORAL AUGMENT: NORMAL
BH CV LOWER VASCULAR LEFT COMMON FEMORAL COMPETENT: NORMAL
BH CV LOWER VASCULAR LEFT COMMON FEMORAL COMPRESS: NORMAL
BH CV LOWER VASCULAR LEFT COMMON FEMORAL PHASIC: NORMAL
BH CV LOWER VASCULAR LEFT COMMON FEMORAL SPONT: NORMAL
BH CV LOWER VASCULAR LEFT DISTAL FEMORAL COMPRESS: NORMAL
BH CV LOWER VASCULAR LEFT GASTRONEMIUS COMPRESS: NORMAL
BH CV LOWER VASCULAR LEFT GREATER SAPH AK COMPRESS: NORMAL
BH CV LOWER VASCULAR LEFT GREATER SAPH BK COMPRESS: NORMAL
BH CV LOWER VASCULAR LEFT LESSER SAPH COMPRESS: NORMAL
BH CV LOWER VASCULAR LEFT MID FEMORAL AUGMENT: NORMAL
BH CV LOWER VASCULAR LEFT MID FEMORAL COMPETENT: NORMAL
BH CV LOWER VASCULAR LEFT MID FEMORAL COMPRESS: NORMAL
BH CV LOWER VASCULAR LEFT MID FEMORAL PHASIC: NORMAL
BH CV LOWER VASCULAR LEFT MID FEMORAL SPONT: NORMAL
BH CV LOWER VASCULAR LEFT PERONEAL COMPRESS: NORMAL
BH CV LOWER VASCULAR LEFT POPLITEAL AUGMENT: NORMAL
BH CV LOWER VASCULAR LEFT POPLITEAL COMPETENT: NORMAL
BH CV LOWER VASCULAR LEFT POPLITEAL COMPRESS: NORMAL
BH CV LOWER VASCULAR LEFT POPLITEAL PHASIC: NORMAL
BH CV LOWER VASCULAR LEFT POPLITEAL SPONT: NORMAL
BH CV LOWER VASCULAR LEFT POSTERIOR TIBIAL COMPRESS: NORMAL
BH CV LOWER VASCULAR LEFT PROFUNDA FEMORAL COMPRESS: NORMAL
BH CV LOWER VASCULAR LEFT PROXIMAL FEMORAL COMPRESS: NORMAL
BH CV LOWER VASCULAR LEFT SAPHENOFEMORAL JUNCTION COMPRESS: NORMAL
BH CV LOWER VASCULAR RIGHT COMMON FEMORAL AUGMENT: NORMAL
BH CV LOWER VASCULAR RIGHT COMMON FEMORAL COMPETENT: NORMAL
BH CV LOWER VASCULAR RIGHT COMMON FEMORAL COMPRESS: NORMAL
BH CV LOWER VASCULAR RIGHT COMMON FEMORAL PHASIC: NORMAL
BH CV LOWER VASCULAR RIGHT COMMON FEMORAL SPONT: NORMAL
BH CV VAS PRELIMINARY FINDINGS SCRIPTING: 1
BILIRUB SERPL-MCNC: 0.3 MG/DL (ref 0–1.2)
BUN SERPL-MCNC: 36 MG/DL (ref 8–23)
BUN/CREAT SERPL: 23.8 (ref 7–25)
CALCIUM SPEC-SCNC: 9.4 MG/DL (ref 8.6–10.5)
CHLORIDE SERPL-SCNC: 102 MMOL/L (ref 98–107)
CO2 SERPL-SCNC: 25.9 MMOL/L (ref 22–29)
CREAT SERPL-MCNC: 1.51 MG/DL (ref 0.57–1)
D DIMER PPP FEU-MCNC: 0.51 MCGFEU/ML (ref 0–0.84)
DEPRECATED RDW RBC AUTO: 40.4 FL (ref 37–54)
EGFRCR SERPLBLD CKD-EPI 2021: 33.9 ML/MIN/1.73
EOSINOPHIL # BLD AUTO: 0.2 10*3/MM3 (ref 0–0.4)
EOSINOPHIL NFR BLD AUTO: 3.2 % (ref 0.3–6.2)
ERYTHROCYTE [DISTWIDTH] IN BLOOD BY AUTOMATED COUNT: 12.3 % (ref 12.3–15.4)
GLOBULIN UR ELPH-MCNC: 2.4 GM/DL
GLUCOSE SERPL-MCNC: 107 MG/DL (ref 65–99)
HCT VFR BLD AUTO: 36 % (ref 34–46.6)
HGB BLD-MCNC: 11.6 G/DL (ref 12–15.9)
HOLD SPECIMEN: NORMAL
HOLD SPECIMEN: NORMAL
IMM GRANULOCYTES # BLD AUTO: 0.03 10*3/MM3 (ref 0–0.05)
IMM GRANULOCYTES NFR BLD AUTO: 0.5 % (ref 0–0.5)
LYMPHOCYTES # BLD AUTO: 2.11 10*3/MM3 (ref 0.7–3.1)
LYMPHOCYTES NFR BLD AUTO: 34.3 % (ref 19.6–45.3)
MCH RBC QN AUTO: 29.2 PG (ref 26.6–33)
MCHC RBC AUTO-ENTMCNC: 32.2 G/DL (ref 31.5–35.7)
MCV RBC AUTO: 90.7 FL (ref 79–97)
MONOCYTES # BLD AUTO: 0.69 10*3/MM3 (ref 0.1–0.9)
MONOCYTES NFR BLD AUTO: 11.2 % (ref 5–12)
NEUTROPHILS NFR BLD AUTO: 3.11 10*3/MM3 (ref 1.7–7)
NEUTROPHILS NFR BLD AUTO: 50.5 % (ref 42.7–76)
NRBC BLD AUTO-RTO: 0 /100 WBC (ref 0–0.2)
NT-PROBNP SERPL-MCNC: 267 PG/ML (ref 0–1800)
PLATELET # BLD AUTO: 183 10*3/MM3 (ref 140–450)
PMV BLD AUTO: 10.1 FL (ref 6–12)
POTASSIUM SERPL-SCNC: 4.2 MMOL/L (ref 3.5–5.2)
PROT SERPL-MCNC: 6.4 G/DL (ref 6–8.5)
QT INTERVAL: 412 MS
QTC INTERVAL: 448 MS
RBC # BLD AUTO: 3.97 10*6/MM3 (ref 3.77–5.28)
SODIUM SERPL-SCNC: 140 MMOL/L (ref 136–145)
WBC NRBC COR # BLD AUTO: 6.16 10*3/MM3 (ref 3.4–10.8)
WHOLE BLOOD HOLD COAG: NORMAL
WHOLE BLOOD HOLD SPECIMEN: NORMAL

## 2024-05-13 PROCEDURE — 94761 N-INVAS EAR/PLS OXIMETRY MLT: CPT

## 2024-05-13 PROCEDURE — 63710000001 PREDNISONE PER 1 MG: Performed by: NURSE PRACTITIONER

## 2024-05-13 PROCEDURE — 71045 X-RAY EXAM CHEST 1 VIEW: CPT

## 2024-05-13 PROCEDURE — 99285 EMERGENCY DEPT VISIT HI MDM: CPT

## 2024-05-13 PROCEDURE — 94640 AIRWAY INHALATION TREATMENT: CPT

## 2024-05-13 PROCEDURE — 94664 DEMO&/EVAL PT USE INHALER: CPT

## 2024-05-13 PROCEDURE — 85379 FIBRIN DEGRADATION QUANT: CPT | Performed by: NURSE PRACTITIONER

## 2024-05-13 PROCEDURE — 25010000002 FUROSEMIDE PER 20 MG: Performed by: STUDENT IN AN ORGANIZED HEALTH CARE EDUCATION/TRAINING PROGRAM

## 2024-05-13 PROCEDURE — 94799 UNLISTED PULMONARY SVC/PX: CPT

## 2024-05-13 PROCEDURE — 36415 COLL VENOUS BLD VENIPUNCTURE: CPT

## 2024-05-13 PROCEDURE — G0378 HOSPITAL OBSERVATION PER HR: HCPCS

## 2024-05-13 PROCEDURE — 96374 THER/PROPH/DIAG INJ IV PUSH: CPT

## 2024-05-13 PROCEDURE — 85025 COMPLETE CBC W/AUTO DIFF WBC: CPT | Performed by: EMERGENCY MEDICINE

## 2024-05-13 PROCEDURE — 93971 EXTREMITY STUDY: CPT | Performed by: SURGERY

## 2024-05-13 PROCEDURE — 80053 COMPREHEN METABOLIC PANEL: CPT | Performed by: EMERGENCY MEDICINE

## 2024-05-13 PROCEDURE — 83880 ASSAY OF NATRIURETIC PEPTIDE: CPT | Performed by: STUDENT IN AN ORGANIZED HEALTH CARE EDUCATION/TRAINING PROGRAM

## 2024-05-13 PROCEDURE — 93005 ELECTROCARDIOGRAM TRACING: CPT | Performed by: NURSE PRACTITIONER

## 2024-05-13 PROCEDURE — 93971 EXTREMITY STUDY: CPT

## 2024-05-13 PROCEDURE — 93010 ELECTROCARDIOGRAM REPORT: CPT | Performed by: INTERNAL MEDICINE

## 2024-05-13 RX ORDER — CARVEDILOL 12.5 MG/1
12.5 TABLET ORAL 2 TIMES DAILY WITH MEALS
Status: DISCONTINUED | OUTPATIENT
Start: 2024-05-13 | End: 2024-05-14 | Stop reason: HOSPADM

## 2024-05-13 RX ORDER — SODIUM CHLORIDE 0.9 % (FLUSH) 0.9 %
10 SYRINGE (ML) INJECTION EVERY 12 HOURS SCHEDULED
Status: DISCONTINUED | OUTPATIENT
Start: 2024-05-13 | End: 2024-05-14 | Stop reason: HOSPADM

## 2024-05-13 RX ORDER — SODIUM CHLORIDE 9 MG/ML
40 INJECTION, SOLUTION INTRAVENOUS AS NEEDED
Status: DISCONTINUED | OUTPATIENT
Start: 2024-05-13 | End: 2024-05-14 | Stop reason: HOSPADM

## 2024-05-13 RX ORDER — FUROSEMIDE 40 MG/1
40 TABLET ORAL DAILY
Status: DISCONTINUED | OUTPATIENT
Start: 2024-05-14 | End: 2024-05-14

## 2024-05-13 RX ORDER — FUROSEMIDE 10 MG/ML
40 INJECTION INTRAMUSCULAR; INTRAVENOUS ONCE
Status: COMPLETED | OUTPATIENT
Start: 2024-05-13 | End: 2024-05-13

## 2024-05-13 RX ORDER — PREDNISONE 20 MG/1
20 TABLET ORAL
Status: DISCONTINUED | OUTPATIENT
Start: 2024-05-13 | End: 2024-05-14 | Stop reason: HOSPADM

## 2024-05-13 RX ORDER — CEPHALEXIN 500 MG/1
500 CAPSULE ORAL EVERY 8 HOURS SCHEDULED
Qty: 9 CAPSULE | Refills: 0 | Status: DISCONTINUED | OUTPATIENT
Start: 2024-05-13 | End: 2024-05-14 | Stop reason: HOSPADM

## 2024-05-13 RX ORDER — FOLIC ACID 1 MG/1
1 TABLET ORAL DAILY
Status: DISCONTINUED | OUTPATIENT
Start: 2024-05-14 | End: 2024-05-14 | Stop reason: HOSPADM

## 2024-05-13 RX ORDER — BUDESONIDE AND FORMOTEROL FUMARATE DIHYDRATE 160; 4.5 UG/1; UG/1
2 AEROSOL RESPIRATORY (INHALATION)
Status: DISCONTINUED | OUTPATIENT
Start: 2024-05-13 | End: 2024-05-14 | Stop reason: HOSPADM

## 2024-05-13 RX ORDER — PRAMIPEXOLE DIHYDROCHLORIDE 1.5 MG/1
1.5 TABLET ORAL NIGHTLY
Status: DISCONTINUED | OUTPATIENT
Start: 2024-05-13 | End: 2024-05-14 | Stop reason: HOSPADM

## 2024-05-13 RX ORDER — LEVOTHYROXINE SODIUM 0.05 MG/1
100 TABLET ORAL DAILY
Status: DISCONTINUED | OUTPATIENT
Start: 2024-05-14 | End: 2024-05-14 | Stop reason: HOSPADM

## 2024-05-13 RX ORDER — LACTULOSE 10 G/15ML
10 SOLUTION ORAL 2 TIMES DAILY
Status: DISCONTINUED | OUTPATIENT
Start: 2024-05-13 | End: 2024-05-14 | Stop reason: HOSPADM

## 2024-05-13 RX ORDER — VALSARTAN 320 MG/1
320 TABLET ORAL DAILY
Status: DISCONTINUED | OUTPATIENT
Start: 2024-05-14 | End: 2024-05-14 | Stop reason: HOSPADM

## 2024-05-13 RX ORDER — AMOXICILLIN 250 MG
2 CAPSULE ORAL 2 TIMES DAILY
Status: DISCONTINUED | OUTPATIENT
Start: 2024-05-13 | End: 2024-05-14 | Stop reason: HOSPADM

## 2024-05-13 RX ORDER — SODIUM CHLORIDE 0.9 % (FLUSH) 0.9 %
10 SYRINGE (ML) INJECTION AS NEEDED
Status: DISCONTINUED | OUTPATIENT
Start: 2024-05-13 | End: 2024-05-14 | Stop reason: HOSPADM

## 2024-05-13 RX ORDER — IPRATROPIUM BROMIDE AND ALBUTEROL SULFATE 2.5; .5 MG/3ML; MG/3ML
3 SOLUTION RESPIRATORY (INHALATION)
Status: DISCONTINUED | OUTPATIENT
Start: 2024-05-13 | End: 2024-05-14

## 2024-05-13 RX ORDER — POTASSIUM CHLORIDE 750 MG/1
10 TABLET, FILM COATED, EXTENDED RELEASE ORAL DAILY
Status: DISCONTINUED | OUTPATIENT
Start: 2024-05-14 | End: 2024-05-14 | Stop reason: HOSPADM

## 2024-05-13 RX ADMIN — SERTRALINE 25 MG: 50 TABLET, FILM COATED ORAL at 21:02

## 2024-05-13 RX ADMIN — PRAMIPEXOLE DIHYDROCHLORIDE 1.5 MG: 1.5 TABLET ORAL at 21:03

## 2024-05-13 RX ADMIN — APIXABAN 5 MG: 5 TABLET, FILM COATED ORAL at 21:03

## 2024-05-13 RX ADMIN — IPRATROPIUM BROMIDE AND ALBUTEROL SULFATE 3 ML: .5; 3 SOLUTION RESPIRATORY (INHALATION) at 16:57

## 2024-05-13 RX ADMIN — CEPHALEXIN 500 MG: 500 CAPSULE ORAL at 17:42

## 2024-05-13 RX ADMIN — PREDNISONE 20 MG: 20 TABLET ORAL at 17:42

## 2024-05-13 RX ADMIN — LACTULOSE 10 G: 10 SOLUTION ORAL at 21:03

## 2024-05-13 RX ADMIN — BUDESONIDE AND FORMOTEROL FUMARATE DIHYDRATE 2 PUFF: 160; 4.5 AEROSOL RESPIRATORY (INHALATION) at 19:11

## 2024-05-13 RX ADMIN — SENNOSIDES AND DOCUSATE SODIUM 2 TABLET: 50; 8.6 TABLET ORAL at 21:02

## 2024-05-13 RX ADMIN — CARVEDILOL 12.5 MG: 12.5 TABLET, FILM COATED ORAL at 21:02

## 2024-05-13 RX ADMIN — FUROSEMIDE 40 MG: 10 INJECTION, SOLUTION INTRAMUSCULAR; INTRAVENOUS at 16:06

## 2024-05-13 RX ADMIN — Medication 10 ML: at 21:04

## 2024-05-13 NOTE — PLAN OF CARE
Goal Outcome Evaluation: Admitted today with left leg swelling and shortness of breath.  Left calf red and tender with 2+ edema.  Breathing improved but still short of breath with exertion.  I's and O's ordered.  900mL output since arriving.  Vital signs stable.  A&Ox4.  Above 90% on room air.  PT and Pulm consulted for the morning.

## 2024-05-13 NOTE — H&P
. [facility]   HISTORY AND PHYSICAL    Patient Name: Latanya Olsen  : 1940  MRN: [id number]  Primary Care Physician:  Kehrer, Meredith Lea, MD  Date of admission: 2024    Subjective   Subjective     Chief Complaint: Exertional dyspnea    HPI:    Latanya Olsen is a 83 y.o. female with past medical history including but not limited to CHF, stage III CKD, COPD, hypertension, hyperlipidemia and DVT/PE presents to [facility] with exertional dyspnea for the past 3 days.  Denies shortness of breath at rest however states becomes short winded when she ambulated short distance around her home.  Patient denies associated chest pain or palpitation.  Denies fever, chills, or cough.  Denies orthopnea or PND.  Reports that she feels that her BLE edema has increased.  Denies abdominal pain, nausea, vomit, or diarrhea.  Denies dysuria, materia, or urinary frequency/urgency.    ED workup reviewed: Creatinine at baseline 1.51, glucose 107, , WBC 6.16, hemoglobin 11.6 and platelets 183.  Chest x-ray shows no evidence of vascular congestion or acute infiltration.    Review of Systems   All systems were reviewed and negative except for: That mentioned above in HPI    Personal History     Past Medical History:   Diagnosis Date    Abnormal ECG Aug 2023    Acute exacerbation of CHF (congestive heart failure) 2023    Anemia     Anxiety 23    Arthritis     Arthritis of back     Arthritis of neck     Asthma 2006    Atrial fibrillation     Cataract -2015    CKD (chronic kidney disease)     Stage 3    Congenital heart disease Aug 2023    Constipation     COPD (chronic obstructive pulmonary disease)     DDD (degenerative disc disease), lumbar     Deep vein thrombosis     Depression     Diverticulosis     Fracture of hip     GERD (gastroesophageal reflux disease)     Hip arthrosis     History of heart attack     2023    Hx of degenerative disc disease     Hyperlipidemia      Hyperlipidemia 02/19/2016    Hypertension     Hypokalemia     Hypothyroidism     Knee swelling     Low back pain Yes    Lung cancer     history    Myocardial infarction 8-4-23    Obesity 218    Osteopenia Yes    Pulmonary embolism 2019    Renal disorder     Renal insufficiency 2019    Restless leg syndrome     Scoliosis Yes    Sleep apnea with use of continuous positive airway pressure (CPAP)        Past Surgical History:   Procedure Laterality Date    ADENOIDECTOMY  2000    BUNIONECTOMY Bilateral     CARDIAC CATHETERIZATION N/A 08/07/2023    Procedure: Left Heart Cath;  Surgeon: Mireya Park MD;  Location: [facility] CATH INVASIVE LOCATION;  Service: Cardiology;  Laterality: N/A;    CARDIAC CATHETERIZATION N/A 08/07/2023    Procedure: Coronary angiography;  Surgeon: Mireya Park MD;  Location: [facility] CATH INVASIVE LOCATION;  Service: Cardiology;  Laterality: N/A;    CARDIAC CATHETERIZATION N/A 08/07/2023    Procedure: Left ventriculography;  Surgeon: Mireya Park MD;  Location: [facility] CATH INVASIVE LOCATION;  Service: Cardiology;  Laterality: N/A;    CATARACT EXTRACTION      CHOLECYSTECTOMY      COLONOSCOPY      ENDOSCOPY N/A 06/24/2016    Procedure: ESOPHAGOGASTRODUODENOSCOPY  with biopsies;  Surgeon: Von Hernández MD;  Location: [facility] OR;  Service:     JOINT REPLACEMENT  Yes    LUNG LOBECTOMY Left     lower lobe    LYTIC THROMBIN THERAPY Left 03/26/2021    Procedure: left leg clot treiver possible venous stent *supine*;  Surgeon: Rogerio Vega MD;  Location: [facility] OR 18/19;  Service: Vascular;  Laterality: Left;    REPLACEMENT TOTAL KNEE Left     THYROID BIOPSY      TONSILLECTOMY  Yes    TOTAL HIP ARTHROPLASTY Right 06/01/2019    Procedure: BIPOLAR HIP CEMENTED POSTERIOR;  Surgeon: Ashley Crenshaw MD;  Location: [facility] OR;  Service: Orthopedics    TRIGGER POINT INJECTION      TUBAL ABDOMINAL LIGATION  50 yrs ago       Family History: family history includes Arthritis  in her mother; Coronary artery disease in her mother; Heart attack in her mother and sister; Heart disease in her mother; Hypertension in her mother; Kidney disease in her mother. Otherwise pertinent FHx was reviewed and not pertinent to current issue.    Social History:  reports that she has never smoked. She has never been exposed to tobacco smoke. She has never used smokeless tobacco. She reports that she does not drink alcohol and does not use drugs.    Home Medications:  Magnesium, Zinc, acetaminophen, alendronate, apixaban, budesonide-formoterol, carvedilol, chlorhexidine, coenzyme Q10, cyclobenzaprine, esomeprazole, ferrous sulfate, folic acid, furosemide, gabapentin, ipratropium, ipratropium-albuterol, lactulose, levothyroxine, lidocaine, ondansetron, potassium chloride, pramipexole, sennosides-docusate, sertraline, triamcinolone, valsartan, and vitamin B-12    Allergies:  Allergies   Allergen Reactions    Doxycycline Anaphylaxis     Facial swelling, shortness of air, dizzines       Objective   Objective     Vitals:   Temp:  [97.3 °F (36.3 °C)] 97.3 °F (36.3 °C)  Heart Rate:  [64-76] 74  Resp:  [16] 16  BP: (152-165)/(61-84) 165/84  Physical Exam    Constitutional: Awake, alert   Eyes: PERRLA, sclerae anicteric, no conjunctival injection   HENT: NCAT, mucous membranes moist   Neck: Supple, no thyromegaly, no lymphadenopathy, trachea midline   Respiratory: Bibasilar expiratory wheezing   Cardiovascular: RRR, no murmurs, rubs, or gallops, palpable pedal pulses bilaterally   Gastrointestinal: Positive bowel sounds, soft, nontender, nondistended   Musculoskeletal: Do not appreciate pedal edema on exam   Psychiatric: Appropriate affect, cooperative   Neurologic: Oriented x 3, strength symmetric in all extremities, Cranial Nerves grossly intact to confrontation, speech clear   Skin: Posterior left calf erythema that spread into the anteromedial portion of LLE    Result Review    Result Review:  I have personally  reviewed the results from the time of this admission to 5/13/2024 16:54 EDT and agree with these findings:  [x]  Laboratory list / accordion  []  Microbiology  [x]  Radiology  [x]  EKG/Telemetry   []  Cardiology/Vascular   []  Pathology  []  Old records  []  Other:      Assessment & Plan   Assessment / Plan     Brief Patient Summary:  Latanya Olsen is a 83 y.o. female who is being admitted to the observation unit due to exertional dyspnea    Active Hospital Problems:  Active Hospital Problems    Diagnosis     **Dyspnea      Plan:     Dyspnea  -Chest x-ray shows no evidence of acute infiltration or vascular congestion  -  -DuoNeb 4 times daily  -P.o. prednisone  -Resume Symbicort  -Negative D-dimer   -Pulmonology consult    Cellulitis of left lower extremity  -Ultrasound of LLE shows no evidence of deep vein thrombosis  -Keflex 3 times daily    Paroxysmal A-fib  -Continue Eliquis  -Cardiac monitoring    Hypertension  CHF  -  -Received 40 mg of IV Lasix in the ED  -Continue carvedilol, furosemide, and valsartan  -Vital signs per nursing    Hypothyroidism  -Continue levothyroxine      DVT prophylaxis:  Mechanical DVT prophylaxis orders are present.    CODE STATUS:    Level Of Support Discussed With: Patient  Code Status (Patient has no pulse and is not breathing): CPR (Attempt to Resuscitate)  Medical Interventions (Patient has pulse or is breathing): Full Support    Admission Status:  I believe this patient meets observation status.    71 minutes has been spent by [facility] providers in the care of this patient while under observation status    During patient visit, I utilized appropriate personal protective equipment including gloves. Appropriate PPE was worn during the entire visit.  Hand hygiene was completed before and after    Electronically signed by DEVORA Miles, 05/13/24, 4:54 PM EDT.

## 2024-05-13 NOTE — ED PROVIDER NOTES
EMERGENCY DEPARTMENT ENCOUNTER    Room Number:  05/05  PCP: Kehrer, Meredith Lea, MD  History obtained from: Patient      HPI:  Chief Complaint: Shortness of breath, left leg swelling  A complete HPI/ROS/PMH/PSH/SH/FH are unobtainable due to: N/A  Context: Latanya Olsen is a 83 y.o. female who presents to the ED c/o shortness of breath, left leg swelling.  Hit her leg has had some swelling and redness around the site over her left lower calf.  No other recent illness, fever, chills.  Has been increasingly short of breath and feels like she is holding onto more fluid over the last several days.  No fever or cough.            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    Diverticulosis of intestine 02/19/2016    Obstructive sleep apnea syndrome 02/19/2016    Weight loss 02/19/2016    Vitamin D deficiency 02/19/2016    Overweight (BMI 25.0-29.9) 02/19/2016    Spinal stenosis of lumbar region without neurogenic claudication 02/19/2016    Osteoarthritis of knee 02/19/2016    Obesity (BMI 30-39.9) 02/19/2016    Chronic lower back pain 02/19/2016    Knee pain 02/19/2016    Insomnia 02/19/2016    Hypothyroidism 02/19/2016    Hypokalemia 02/19/2016    Primary hypertension 02/19/2016    Mixed hyperlipidemia 02/19/2016    Generalized osteoarthritis 02/19/2016    Gastroparesis 02/19/2016    Foot pain 02/19/2016    Chronic obstructive pulmonary disease 02/19/2016    Chronic constipation 02/19/2016    Anemia 02/19/2016    Weight gain 02/19/2016    Pain of left breast 02/22/2016    Elevated serum alkaline phosphatase level 02/22/2016    Neck pain 11/28/2017    Volume overload 03/19/2019    Status post total hip replacement, 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    Generalized weakness 03/06/2021    Constipation 03/10/2021    Paroxysmal atrial fibrillation 04/20/2021    Idiopathic peripheral neuropathy 06/24/2022    Lymphedema 08/01/2022    Lumbar degenerative disc disease  01/31/2023    GERD (gastroesophageal reflux disease) 02/16/2023    Lung cancer 02/16/2023    Non-STEMI (non-ST elevated myocardial infarction) 08/06/2023    Nonischemic nontraumatic myocardial injury 08/10/2023    Stage 3a chronic kidney disease 11/30/2023    Adjustment disorder with depressed mood 11/30/2023    Peripheral polyneuropathy 11/30/2023    Chronic diastolic congestive heart failure 03/05/2024    MCKEON (dyspnea on exertion) 03/05/2024     Resolved Ambulatory Problems     Diagnosis Date Noted    Finger injury 02/19/2016    Upper respiratory tract infection 02/19/2016    Contusion of upper arm 02/19/2016    Tingling of skin 02/19/2016    Rash 02/19/2016    Candidiasis of mouth 02/19/2016    Neutropenia 02/19/2016    Spasm 02/19/2016    Low back pain 02/19/2016    Heartburn 02/19/2016    Diarrhea 02/19/2016    Bloating symptom 02/19/2016    Gastroesophageal reflux disease with esophagitis 02/19/2016    Drug-induced photosensitivity 02/19/2016    Cramp of limb 02/19/2016    Contact dermatitis 02/19/2016    Chronic kidney disease, stage II (mild) 02/19/2016    Ankle joint pain 02/19/2016    Atopic rhinitis 02/19/2016    Allergic reaction to drug 02/19/2016    Acute sinusitis 02/19/2016    Generalized abdominal pain 02/19/2016    Closed fracture of neck of right femur 05/31/2019    ARF (acute renal failure) 03/07/2021    Acute deep vein thrombosis (DVT) of iliac vein of left lower extremity 03/24/2021    Acute deep vein thrombosis (DVT) of tibial vein of left lower extremity 04/20/2021    Hyperhomocysteinemia 05/14/2021    Acute exacerbation of CHF (congestive heart failure) 08/27/2023    Acute systolic heart failure 08/28/2023    Acute on chronic HFrEF (heart failure with reduced ejection fraction) 08/28/2023    Chronic systolic heart failure 08/28/2023     Past Medical History:   Diagnosis Date    Abnormal ECG Aug 2023    Anxiety 4-2-23    Arthritis     Arthritis of back     Arthritis of neck     Asthma  1/1/2006    Atrial fibrillation     Cataract 1-1/2015    CKD (chronic kidney disease)     Congenital heart disease Aug 2023    COPD (chronic obstructive pulmonary disease)     DDD (degenerative disc disease), lumbar     Deep vein thrombosis 2019    Depression     Diverticulosis     Fracture of hip     Hip arthrosis     History of heart attack     Hx of degenerative disc disease     Hyperlipidemia     Hyperlipidemia 02/19/2016    Hypertension     Knee swelling     Myocardial infarction 8-4-23    Obesity 218    Osteopenia Yes    Pulmonary embolism 2019    Renal disorder     Renal insufficiency 2019    Restless leg syndrome     Scoliosis Yes    Sleep apnea with use of continuous positive airway pressure (CPAP)          PAST SURGICAL HISTORY  Past Surgical History:   Procedure Laterality Date    ADENOIDECTOMY  2000    BUNIONECTOMY Bilateral     CARDIAC CATHETERIZATION N/A 08/07/2023    Procedure: Left Heart Cath;  Surgeon: Mireya Park MD;  Location: [facility] CATH INVASIVE LOCATION;  Service: Cardiology;  Laterality: N/A;    CARDIAC CATHETERIZATION N/A 08/07/2023    Procedure: Coronary angiography;  Surgeon: iMreya Park MD;  Location: [facility] CATH INVASIVE LOCATION;  Service: Cardiology;  Laterality: N/A;    CARDIAC CATHETERIZATION N/A 08/07/2023    Procedure: Left ventriculography;  Surgeon: Mireya Park MD;  Location: [facility] CATH INVASIVE LOCATION;  Service: Cardiology;  Laterality: N/A;    CATARACT EXTRACTION      CHOLECYSTECTOMY      COLONOSCOPY      ENDOSCOPY N/A 06/24/2016    Procedure: ESOPHAGOGASTRODUODENOSCOPY  with biopsies;  Surgeon: Von Hrenández MD;  Location: [facility] OR;  Service:     JOINT REPLACEMENT  Yes    LUNG LOBECTOMY Left     lower lobe    LYTIC THROMBIN THERAPY Left 03/26/2021    Procedure: left leg clot treiver possible venous stent *supine*;  Surgeon: Rogerio Vega MD;  Location: [facility] OR 18/19;  Service: Vascular;  Laterality: Left;    REPLACEMENT TOTAL KNEE  Left     THYROID BIOPSY      TONSILLECTOMY  Yes    TOTAL HIP ARTHROPLASTY Right 06/01/2019    Procedure: BIPOLAR HIP CEMENTED POSTERIOR;  Surgeon: Ashley Crenshaw MD;  Location: [facility];  Service: Orthopedics    TRIGGER POINT INJECTION      TUBAL ABDOMINAL LIGATION  50 yrs ago         FAMILY HISTORY  Family History   Problem Relation Age of Onset    Heart attack Mother     Coronary artery disease Mother     Hypertension Mother     Kidney disease Mother     Arthritis Mother     Heart disease Mother     Heart attack Sister     Colon cancer Neg Hx     Colon polyps Neg Hx     Esophageal cancer Neg Hx     Breast cancer Neg Hx          SOCIAL HISTORY  Social History     Socioeconomic History    Marital status:    Tobacco Use    Smoking status: Never     Passive exposure: Never    Smokeless tobacco: Never    Tobacco comments:     Never   Vaping Use    Vaping status: Never Used   Substance and Sexual Activity    Alcohol use: No    Drug use: Never    Sexual activity: Not Currently     Birth control/protection: Tubal ligation         ALLERGIES  Doxycycline        REVIEW OF SYSTEMS    As per HPI      PHYSICAL EXAM  ED Triage Vitals   Temp Heart Rate Resp BP SpO2   05/13/24 1210 05/13/24 1209 05/13/24 1209 05/13/24 1213 05/13/24 1209   97.3 °F (36.3 °C) 76 16 154/69 94 %      Temp src Heart Rate Source Patient Position BP Location FiO2 (%)   -- -- 05/13/24 1213 05/13/24 1213 --     Sitting Left arm        Physical Exam  Constitutional:       General: She is not in acute distress.  HENT:      Head: Normocephalic and atraumatic.   Cardiovascular:      Rate and Rhythm: Normal rate and regular rhythm.   Pulmonary:      Effort: Pulmonary effort is normal. No respiratory distress.   Abdominal:      General: There is no distension.      Palpations: Abdomen is soft.      Tenderness: There is no abdominal tenderness.   Musculoskeletal:         General: No swelling or deformity.      Right lower leg: Edema present.       Left lower leg: Edema present.      Comments: Erythema and contusion to the left calf with palpable hematoma and the beginning of skin blistering   Skin:     General: Skin is warm and dry.   Neurological:      Mental Status: She is alert. Mental status is at baseline.           Vital signs and nursing notes reviewed.          LAB RESULTS  Recent Results (from the past 24 hour(s))   Comprehensive Metabolic Panel    Collection Time: 05/13/24 12:23 PM    Specimen: Arm, Left; Blood   Result Value Ref Range    Glucose 107 (H) 65 - 99 mg/dL    BUN 36 (H) 8 - 23 mg/dL    Creatinine 1.51 (H) 0.57 - 1.00 mg/dL    Sodium 140 136 - 145 mmol/L    Potassium 4.2 3.5 - 5.2 mmol/L    Chloride 102 98 - 107 mmol/L    CO2 25.9 22.0 - 29.0 mmol/L    Calcium 9.4 8.6 - 10.5 mg/dL    Total Protein 6.4 6.0 - 8.5 g/dL    Albumin 4.0 3.5 - 5.2 g/dL    ALT (SGPT) 11 1 - 33 U/L    AST (SGOT) 8 1 - 32 U/L    Alkaline Phosphatase 93 39 - 117 U/L    Total Bilirubin 0.3 0.0 - 1.2 mg/dL    Globulin 2.4 gm/dL    A/G Ratio 1.7 g/dL    BUN/Creatinine Ratio 23.8 7.0 - 25.0    Anion Gap 12.1 5.0 - 15.0 mmol/L    eGFR 33.9 (L) >60.0 mL/min/1.73   Green Top (Gel)    Collection Time: 05/13/24 12:23 PM   Result Value Ref Range    Extra Tube Hold for add-ons.    Lavender Top    Collection Time: 05/13/24 12:23 PM   Result Value Ref Range    Extra Tube hold for add-on    Gold Top - SST    Collection Time: 05/13/24 12:23 PM   Result Value Ref Range    Extra Tube Hold for add-ons.    Light Blue Top    Collection Time: 05/13/24 12:23 PM   Result Value Ref Range    Extra Tube Hold for add-ons.    CBC Auto Differential    Collection Time: 05/13/24 12:23 PM    Specimen: Arm, Left; Blood   Result Value Ref Range    WBC 6.16 3.40 - 10.80 10*3/mm3    RBC 3.97 3.77 - 5.28 10*6/mm3    Hemoglobin 11.6 (L) 12.0 - 15.9 g/dL    Hematocrit 36.0 34.0 - 46.6 %    MCV 90.7 79.0 - 97.0 fL    MCH 29.2 26.6 - 33.0 pg    MCHC 32.2 31.5 - 35.7 g/dL    RDW 12.3 12.3 - 15.4 %     RDW-SD 40.4 37.0 - 54.0 fl    MPV 10.1 6.0 - 12.0 fL    Platelets 183 140 - 450 10*3/mm3    Neutrophil % 50.5 42.7 - 76.0 %    Lymphocyte % 34.3 19.6 - 45.3 %    Monocyte % 11.2 5.0 - 12.0 %    Eosinophil % 3.2 0.3 - 6.2 %    Basophil % 0.3 0.0 - 1.5 %    Immature Grans % 0.5 0.0 - 0.5 %    Neutrophils, Absolute 3.11 1.70 - 7.00 10*3/mm3    Lymphocytes, Absolute 2.11 0.70 - 3.10 10*3/mm3    Monocytes, Absolute 0.69 0.10 - 0.90 10*3/mm3    Eosinophils, Absolute 0.20 0.00 - 0.40 10*3/mm3    Basophils, Absolute 0.02 0.00 - 0.20 10*3/mm3    Immature Grans, Absolute 0.03 0.00 - 0.05 10*3/mm3    nRBC 0.0 0.0 - 0.2 /100 WBC   BNP    Collection Time: 05/13/24 12:23 PM    Specimen: Arm, Left; Blood   Result Value Ref Range    proBNP 267.0 0.0 - 1,800.0 pg/mL   Duplex Venous Lower Extremity LEFT    Collection Time: 05/13/24  2:21 PM   Result Value Ref Range    Right Common Femoral Spont Y     Right Common Femoral Competent Y     Right Common Femoral Phasic Y     Right Common Femoral Compress C     Right Common Femoral Augment Y     Left Common Femoral Spont Y     Left Common Femoral Competent Y     Left Common Femoral Phasic Y     Left Common Femoral Compress C     Left Common Femoral Augment Y     Left Saphenofemoral Junction Compress C     Left Profunda Femoral Compress C     Left Proximal Femoral Compress C     Left Mid Femoral Spont Y     Left Mid Femoral Competent N     Left Mid Femoral Phasic Y     Left Mid Femoral Compress C     Left Mid Femoral Augment Y     Left Distal Femoral Compress C     Left Popliteal Spont Y     Left Popliteal Competent N     Left Popliteal Phasic Y     Left Popliteal Compress C     Left Popliteal Augment Y     Left Posterior Tibial Compress C     Left Peroneal Compress C     Left Gastronemius Compress C     Left Greater Saph AK Compress C     Left Greater Saph BK Compress C     Left Lesser Saph Compress C     BH CV VAS PRELIMINARY FINDINGS SCRIPTING 1.0     Left Mid Femoral Spont 1.0     Left  Popliteal Spont 1.0        Ordered the above labs and reviewed the results.        RADIOLOGY  XR Chest 1 View    Result Date: 5/13/2024  XR CHEST 1 VW-  HISTORY: Shortness of breath.  COMPARISON: Chest radiograph 8/27/2023  FINDINGS: A single view of the chest was obtained.  Support Devices:  None. Cardiac Silhouette/Mediastinum/Katt: The cardiac silhouette is upper normal to mildly enlarged. There is calcific aortic atherosclerosis. There is surgical material projecting over the left hilar region. Lungs/Pleural Spaces: There is a trace left pleural effusion and or scarring with mild left basilar scarring. There is no definite new focal consolidation.. Chest Wall/Diaphragm/Upper Abdomen: There is degenerative disc disease.  This report was finalized on 5/13/2024 3:17 PM by Dr. Ashli Martini M.D on Workstation: [id number]      Duplex Venous Lower Extremity LEFT    Result Date: 5/13/2024    There was deep venous valvular incompetence noted in the left mid femoral and popliteal.   All other left sided veins appeared normal.   Heterogeneous fluid collection in the left proximal calf this could represent a possible hematoma in the right clinical context      Ordered the above noted radiological studies. Reviewed by me in PACS.                  MEDICATIONS GIVEN IN ER  Medications   furosemide (LASIX) injection 40 mg (40 mg Intravenous Given 5/13/24 1606)               MEDICAL DECISION MAKING, PROGRESS, and CONSULTS    MDM: Patient presented the emergency department with shortness of breath, left leg swelling.  Otherwise well-appearing, vitals otherwise stable.  Labs otherwise reassuring.  Imaging demonstrating small left-sided pleural effusion.  Ultrasound of the left lower extremity demonstrates no evidence of DVT or SVT, exam consistent with posttraumatic hematoma.  During ambulation in the ER patient desaturated to 88 to 90%, can be dyspneic.  Suspect mild heart failure exacerbation.  Given 1 dose of Lasix, admitted  to observation for additional management.    All labs have been independently reviewed by me.  All radiology studies have been reviewed by me and I have also reviewed the radiology report.   EKG's independently viewed and interpreted by me.  Discussion below represents my analysis of pertinent findings related to patient's condition, differential diagnosis, treatment plan and final disposition.      Additional sources:  - Discussed/ obtained information from independent historians:      - External (non-ED) record review:     - Chronic or social conditions impacting care: Renal insufficiency    - Shared decision making: Discussed plan for admission, patient agrees      Orders placed during this visit:  Orders Placed This Encounter   Procedures    XR Chest 1 View    [place] Draw    Comprehensive Metabolic Panel    CBC Auto Differential    BNP    NPO Diet NPO Type: Strict NPO    Undress & Gown    Initiate ED Observation Status    CBC & Differential    Green Top (Gel)    Lavender Top    Gold Top - SST    Light Blue Top         Additional orders considered but not ordered:  Considered CT PE however no signs of pulmonary embolism or other severe pulmonary disease at this time        Differential diagnosis includes but is not limited to:    Pulmonary edema, pleural effusion, heart failure exacerbation, renal failure, DVT/PE      Independent interpretation of labs, radiology studies, and discussions with consultants:           DIAGNOSIS  Final diagnoses:   Acute on chronic congestive heart failure, unspecified heart failure type   Dyspnea on exertion         DISPOSITION  Admit to observation        Latest Documented Vital Signs:  As of 16:22 EDT  BP- 165/84 HR- 64 Temp- 97.3 °F (36.3 °C) O2 sat- 95%              --    Please note that portions of this were completed with a voice recognition program.       Note Disclaimer: At [facility], we believe that sharing information builds trust and better relationships. You are  receiving this note because you are receiving care at [facility] or recently visited. It is possible you will see health information before a provider has talked with you about it. This kind of information can be easy to misunderstand. To help you fully understand what it means for your health, we urge you to discuss this note with your provider.             Al Velasco MD  05/13/24 [id number]

## 2024-05-13 NOTE — ED NOTES
"Nursing report ED to floor  Latanya Olsen  83 y.o.  female    HPI :  HPI (Adult)  Stated Reason for Visit: leg pain    Chief Complaint  Chief Complaint   Patient presents with    Leg Pain       Admitting doctor:   Mimi Lazcano MD    Admitting diagnosis:   The primary encounter diagnosis was Acute on chronic congestive heart failure, unspecified heart failure type. A diagnosis of Dyspnea on exertion was also pertinent to this visit.    Code status:   Current Code Status       Date Active Code Status Order ID Comments User Context       Prior            Allergies:   Doxycycline    Isolation:   No active isolations    Intake and Output  No intake or output data in the 24 hours ending 05/13/24 1624    Weight:       05/13/24  1446   Weight: 98.4 kg (217 lb)       Most recent vitals:   Vitals:    05/13/24 1213 05/13/24 1446 05/13/24 1447 05/13/24 1516   BP: 154/69  152/61 165/84   BP Location: Left arm      Patient Position: Sitting      Pulse:   71 64   Resp:       Temp:       SpO2:   95% 95%   Weight:  98.4 kg (217 lb)     Height:  167.6 cm (66\")         Active LDAs/IV Access:   Lines, Drains & Airways       Active LDAs       Name Placement date Placement time Site Days    Peripheral IV 05/13/24 1606 Right;Lateral Antecubital 05/13/24  1606  Antecubital  less than 1                    Labs (abnormal labs have a star):   Labs Reviewed   COMPREHENSIVE METABOLIC PANEL - Abnormal; Notable for the following components:       Result Value    Glucose 107 (*)     BUN 36 (*)     Creatinine 1.51 (*)     eGFR 33.9 (*)     All other components within normal limits    Narrative:     GFR Normal >60  Chronic Kidney Disease <60  Kidney Failure <15    The GFR formula is only valid for adults with stable renal function between ages 18 and 70.   CBC WITH AUTO DIFFERENTIAL - Abnormal; Notable for the following components:    Hemoglobin 11.6 (*)     All other components within normal limits   BNP (IN-HOUSE) - Normal    " Narrative:     This assay is used as an aid in the diagnosis of individuals suspected of having heart failure. It can be used as an aid in the diagnosis of acute decompensated heart failure (ADHF) in patients presenting with signs and symptoms of ADHF to the emergency department (ED). In addition, NT-proBNP of <300 pg/mL indicates ADHF is not likely.    Age Range Result Interpretation  NT-proBNP Concentration (pg/mL:      <50             Positive            >450                   Gray                 300-450                    Negative             <300    50-75           Positive            >900                  Gray                300-900                  Negative            <300      >75             Positive            >1800                  Gray                300-1800                  Negative            <300   RAINBOW DRAW    Narrative:     The following orders were created for panel order [place] Draw.  Procedure                               Abnormality         Status                     ---------                               -----------         ------                     Green Top (Gel)[[id number]]                                  Final result               Lavender Top[[id number]]                                     Final result               Gold Top - SST[[id number]]                                   Final result               Light Blue Top[[id number]]                                   Final result                 Please view results for these tests on the individual orders.   CBC AND DIFFERENTIAL    Narrative:     The following orders were created for panel order CBC & Differential.  Procedure                               Abnormality         Status                     ---------                               -----------         ------                     CBC Auto Differential[[id number]]        Abnormal            Final result                 Please view results for these tests on the individual orders.    GREEN TOP   LAVENDER TOP   GOLD TOP - SST   LIGHT BLUE TOP       EKG:   No orders to display       Meds given in ED:   Medications   furosemide (LASIX) injection 40 mg (40 mg Intravenous Given 5/13/24 1606)       Imaging results:  No radiology results for the last day    Ambulatory status:   -      Social issues:   Social History     Socioeconomic History    Marital status:    Tobacco Use    Smoking status: Never     Passive exposure: Never    Smokeless tobacco: Never    Tobacco comments:     Never   Vaping Use    Vaping status: Never Used   Substance and Sexual Activity    Alcohol use: No    Drug use: Never    Sexual activity: Not Currently     Birth control/protection: Tubal ligation       Peripheral Neurovascular  Peripheral Neurovascular (Adult)  Peripheral Neurovascular WDL: .WDL except, pulse assessment  Pulse Assessment: posterior tibial  Additional Documentation: Edema (Group)  Edema  Edema: leg, right, leg, left  Leg, Left Edema: 2+ (Mild)  Leg, Right Edema: 2+ (Mild)    Neuro Cognitive  Neuro Cognitive (Adult)  Cognitive/Neuro/Behavioral WDL: WDL    Learning       Respiratory  Respiratory WDL  Respiratory WDL: .WDL except, all  Rhythm/Pattern, Respiratory: shortness of breath    Abdominal Pain       Pain Assessments  Pain (Adult)  (0-10) Pain Rating: Rest: 5  (0-10) Pain Rating: Activity: 5    NIH Stroke Scale       Hodan Zuniga RN  05/13/24 16:24 EDT

## 2024-05-13 NOTE — ED NOTES
Patient to ER via car form home for L leg swelling x 1 week    Patient has hx of blood clots is on blood thinenrs

## 2024-05-14 ENCOUNTER — READMISSION MANAGEMENT (OUTPATIENT)
Dept: CALL CENTER | Facility: HOSPITAL | Age: 84
End: 2024-05-14
Payer: MEDICARE

## 2024-05-14 ENCOUNTER — APPOINTMENT (OUTPATIENT)
Dept: CT IMAGING | Facility: HOSPITAL | Age: 84
End: 2024-05-14
Payer: MEDICARE

## 2024-05-14 VITALS
HEART RATE: 74 BPM | SYSTOLIC BLOOD PRESSURE: 158 MMHG | BODY MASS INDEX: 34.23 KG/M2 | DIASTOLIC BLOOD PRESSURE: 62 MMHG | TEMPERATURE: 98.1 F | OXYGEN SATURATION: 94 % | HEIGHT: 66 IN | WEIGHT: 213 LBS | RESPIRATION RATE: 17 BRPM

## 2024-05-14 LAB
ANION GAP SERPL CALCULATED.3IONS-SCNC: 14 MMOL/L (ref 5–15)
BUN SERPL-MCNC: 35 MG/DL (ref 8–23)
BUN/CREAT SERPL: 25.7 (ref 7–25)
CALCIUM SPEC-SCNC: 9.4 MG/DL (ref 8.6–10.5)
CHLORIDE SERPL-SCNC: 102 MMOL/L (ref 98–107)
CO2 SERPL-SCNC: 25 MMOL/L (ref 22–29)
CREAT SERPL-MCNC: 1.36 MG/DL (ref 0.57–1)
DEPRECATED RDW RBC AUTO: 42.9 FL (ref 37–54)
EGFRCR SERPLBLD CKD-EPI 2021: 38.5 ML/MIN/1.73
ERYTHROCYTE [DISTWIDTH] IN BLOOD BY AUTOMATED COUNT: 12.7 % (ref 12.3–15.4)
GLUCOSE SERPL-MCNC: 132 MG/DL (ref 65–99)
HCT VFR BLD AUTO: 35.8 % (ref 34–46.6)
HGB BLD-MCNC: 11.4 G/DL (ref 12–15.9)
MCH RBC QN AUTO: 29.2 PG (ref 26.6–33)
MCHC RBC AUTO-ENTMCNC: 31.8 G/DL (ref 31.5–35.7)
MCV RBC AUTO: 91.6 FL (ref 79–97)
PLATELET # BLD AUTO: 182 10*3/MM3 (ref 140–450)
PMV BLD AUTO: 9.8 FL (ref 6–12)
POTASSIUM SERPL-SCNC: 4.3 MMOL/L (ref 3.5–5.2)
RBC # BLD AUTO: 3.91 10*6/MM3 (ref 3.77–5.28)
SODIUM SERPL-SCNC: 141 MMOL/L (ref 136–145)
WBC NRBC COR # BLD AUTO: 5.24 10*3/MM3 (ref 3.4–10.8)

## 2024-05-14 PROCEDURE — 94664 DEMO&/EVAL PT USE INHALER: CPT

## 2024-05-14 PROCEDURE — 97162 PT EVAL MOD COMPLEX 30 MIN: CPT

## 2024-05-14 PROCEDURE — 94799 UNLISTED PULMONARY SVC/PX: CPT

## 2024-05-14 PROCEDURE — 97530 THERAPEUTIC ACTIVITIES: CPT

## 2024-05-14 PROCEDURE — 96376 TX/PRO/DX INJ SAME DRUG ADON: CPT

## 2024-05-14 PROCEDURE — G0378 HOSPITAL OBSERVATION PER HR: HCPCS

## 2024-05-14 PROCEDURE — 85027 COMPLETE CBC AUTOMATED: CPT | Performed by: NURSE PRACTITIONER

## 2024-05-14 PROCEDURE — 71250 CT THORAX DX C-: CPT

## 2024-05-14 PROCEDURE — 80048 BASIC METABOLIC PNL TOTAL CA: CPT | Performed by: NURSE PRACTITIONER

## 2024-05-14 PROCEDURE — 25010000002 FUROSEMIDE PER 20 MG: Performed by: INTERNAL MEDICINE

## 2024-05-14 PROCEDURE — 63710000001 PREDNISONE PER 1 MG: Performed by: NURSE PRACTITIONER

## 2024-05-14 PROCEDURE — 94761 N-INVAS EAR/PLS OXIMETRY MLT: CPT

## 2024-05-14 PROCEDURE — 99214 OFFICE O/P EST MOD 30 MIN: CPT | Performed by: INTERNAL MEDICINE

## 2024-05-14 RX ORDER — IPRATROPIUM BROMIDE AND ALBUTEROL SULFATE 2.5; .5 MG/3ML; MG/3ML
3 SOLUTION RESPIRATORY (INHALATION) EVERY 6 HOURS PRN
Status: DISCONTINUED | OUTPATIENT
Start: 2024-05-14 | End: 2024-05-14 | Stop reason: HOSPADM

## 2024-05-14 RX ORDER — FUROSEMIDE 10 MG/ML
40 INJECTION INTRAMUSCULAR; INTRAVENOUS ONCE
Status: COMPLETED | OUTPATIENT
Start: 2024-05-14 | End: 2024-05-14

## 2024-05-14 RX ORDER — FUROSEMIDE 40 MG/1
40 TABLET ORAL
Status: DISCONTINUED | OUTPATIENT
Start: 2024-05-14 | End: 2024-05-14 | Stop reason: HOSPADM

## 2024-05-14 RX ORDER — FUROSEMIDE 40 MG/1
80 TABLET ORAL DAILY
Status: DISCONTINUED | OUTPATIENT
Start: 2024-05-14 | End: 2024-05-14

## 2024-05-14 RX ORDER — CEPHALEXIN 500 MG/1
500 CAPSULE ORAL 3 TIMES DAILY
Qty: 18 CAPSULE | Refills: 0 | Status: SHIPPED | OUTPATIENT
Start: 2024-05-14 | End: 2024-05-20

## 2024-05-14 RX ADMIN — APIXABAN 5 MG: 5 TABLET, FILM COATED ORAL at 08:07

## 2024-05-14 RX ADMIN — FOLIC ACID 1 MG: 1 TABLET ORAL at 08:07

## 2024-05-14 RX ADMIN — FUROSEMIDE 40 MG: 40 TABLET ORAL at 17:42

## 2024-05-14 RX ADMIN — BUDESONIDE AND FORMOTEROL FUMARATE DIHYDRATE 2 PUFF: 160; 4.5 AEROSOL RESPIRATORY (INHALATION) at 07:46

## 2024-05-14 RX ADMIN — PREDNISONE 20 MG: 20 TABLET ORAL at 08:07

## 2024-05-14 RX ADMIN — POTASSIUM CHLORIDE 10 MEQ: 750 TABLET, EXTENDED RELEASE ORAL at 08:07

## 2024-05-14 RX ADMIN — Medication 10 ML: at 08:08

## 2024-05-14 RX ADMIN — IPRATROPIUM BROMIDE AND ALBUTEROL SULFATE 3 ML: .5; 3 SOLUTION RESPIRATORY (INHALATION) at 00:51

## 2024-05-14 RX ADMIN — FUROSEMIDE 40 MG: 10 INJECTION, SOLUTION INTRAMUSCULAR; INTRAVENOUS at 10:47

## 2024-05-14 RX ADMIN — SENNOSIDES AND DOCUSATE SODIUM 2 TABLET: 50; 8.6 TABLET ORAL at 08:07

## 2024-05-14 RX ADMIN — LACTULOSE 10 G: 10 SOLUTION ORAL at 08:07

## 2024-05-14 RX ADMIN — CEPHALEXIN 500 MG: 500 CAPSULE ORAL at 05:00

## 2024-05-14 RX ADMIN — VALSARTAN 320 MG: 320 TABLET, FILM COATED ORAL at 08:07

## 2024-05-14 RX ADMIN — FUROSEMIDE 80 MG: 40 TABLET ORAL at 08:07

## 2024-05-14 RX ADMIN — CARVEDILOL 12.5 MG: 12.5 TABLET, FILM COATED ORAL at 17:42

## 2024-05-14 RX ADMIN — CEPHALEXIN 500 MG: 500 CAPSULE ORAL at 13:02

## 2024-05-14 RX ADMIN — LEVOTHYROXINE SODIUM 100 MCG: 50 TABLET ORAL at 06:00

## 2024-05-14 NOTE — CONSULTS
Patient Name: Latanya Olsen  :1940  83 y.o.    Date of Admission: 2024  Date of Consultation:  24  Encounter Provider: Jamel Pinto III, MD  Place of Service[facility]  Referring Provider: Al Velasco MD  Patient Care Team:  Kehrer, Meredith Lea, MD as PCP - General (Family Medicine)  Chris Bear MD as Referring Physician (Hospitalist)  Delfino Scruggs MD as Consulting Physician (Hematology and Oncology)      Chief complaint: shortness of breath and left leg swelling     History of Present Illness:     Latanya Olsen is a 83 year old pt of Dr. Marsh with a history of SIERRA on CPAP, CHF, CKD III, COPD s/p lung cancer with left lobectomy in , HTN, HLD, hypothyroidism, PAF on Eliquis,and DVT/PE. Pt was previously seen by Dr. Park but switched to Dr. Marsh in [place]. Pt had a NSTEMI in  with sudden onset of chest tightness and pain radiating to her back with shortness of breath. In ER, her EKG is unremarkable. Troponin 792, HGB 11.6, proBNP WNL, CR 1.42, A cath showed  normal coronary arteries and wall motion abnormalities consistent with Takotsubo cardiomyopathy.  Echocardiogram 2023 put her ejection fraction at 40-45% with severe hypokinesis of the distal anterior wall, distal septum, and distal apical segments.       Patient states that about 10 days ago she had injury to her left leg.  She was getting something out of the back of the car and the car door closed on her leg injuring it.  She says starting after that she started getting swelling in the left lower leg.  She also started having some wheezing.  She has been having some shortness of breath.  Says she gets short of breath when she is up and walking around.  No chest discomfort with that.  She has had a cough this been productive of clear phlegm.  No chills or fevers.  No recent illness that she is aware of.  She has felt congested if she lays down and has  noted the wheezing that is problematic.  She does wear self and says her weight is gone up a little bit at home although on our scale it really does not look any different from before.  In the emergency she had a chest x-ray that did not show any interstitial infiltrates, NT-proBNP was 267 (abnormal for age greater than 1800).    She had a chest CT performed at [facility] last November that showed some pleural thickening as well as linear scarring and groundglass opacities in the lungs:  IMPRESSION:     1. Stable areas of pleural thickening/scarring in the left chest as well as multifocal areas of linear atelectasis and scarring. New groundglass opacities and linear opacities involving the right upper lobe likely related to an infectious or inflammatory process. Previously seen nodules are not appreciated.     Because of her left lower extremity swelling she had a lower extremity venous Doppler performed.  This showed venous insufficiency in the left deep veins but no other abnormality was seen.    Pt admitted again in 8/23 with fluid overload and was diuresed and placed back on her diuretics.     An ECHO in 1/2024 showed  normal LV function with grade 1 diastolic dysfunction and no valve disease.  Left atrial pressure was normal, pulm arterial pressure was normal.     Later in January she was seen in the office by Amanda for shortness of breath, chest tightness and weight gain. Her Lasix was increased for 3 days prior to seeing Amanda and she was feeling better. Her labs were drawn and her BNP was normal and renal function was baseline. No changes were made.     Pt was last seen on 3/5/24 for follow up. She was continuing to feel short of breath and feels it with exertion. Her edema had improved She was wearing compression socks and was elevating her legs. She was seen by her lung doctor the week before and he felt like her lungs were not the reason for her shortness of breath. She had wheezing in the left lower  base. Her PCP lise blood in February and her BUN and CR were both elevated.      ECHO 1/2/24    Left ventricular systolic function is normal. Calculated left ventricular EF = 67.4% Normal left ventricular cavity size and wall thickness noted. All left ventricular wall segments contract normally. Left ventricular diastolic function is consistent with (grade I) impaired relaxation.     CATH 8/7/23  Conclusion    CONCLUSION:  1.  Takotsubo cardiomyopathy  2.  Normal coronary arteries     RECOMMENDATIONS:  Medical management of Takotsubo cardiomyopathy.   Hold all antihypertensive medications for now in light of relative hypotension.  We will try to get her on a beta-blocker once it appears her blood pressures will tolerate.  Resume valsartan pending on blood pressures and renal function.        Past Medical History:   Diagnosis Date    Abnormal ECG Aug 2023    Acute exacerbation of CHF (congestive heart failure) 08/27/2023    Anemia     Anxiety 4-2-23    Arthritis     Arthritis of back     Arthritis of neck     Asthma 1/1/2006    Atrial fibrillation     Cataract 1-1/2015    CKD (chronic kidney disease)     Stage 3    Congenital heart disease Aug 2023    Constipation     COPD (chronic obstructive pulmonary disease)     DDD (degenerative disc disease), lumbar     Deep vein thrombosis 2019    Depression     Diverticulosis     Fracture of hip     GERD (gastroesophageal reflux disease)     Hip arthrosis     History of heart attack     7/2023    Hx of degenerative disc disease     Hyperlipidemia     Hyperlipidemia 02/19/2016    Hypertension     Hypokalemia     Hypothyroidism     Knee swelling     Low back pain Yes    Lung cancer     history    Myocardial infarction 8-4-23    Obesity 218    Osteopenia Yes    Pulmonary embolism 2019    Renal disorder     Renal insufficiency 2019    Restless leg syndrome     Scoliosis Yes    Sleep apnea with use of continuous positive airway pressure (CPAP)        Past Surgical History:    Procedure Laterality Date    ADENOIDECTOMY  2000    BUNIONECTOMY Bilateral     CARDIAC CATHETERIZATION N/A 08/07/2023    Procedure: Left Heart Cath;  Surgeon: Mireya Park MD;  Location: [facility] CATH INVASIVE LOCATION;  Service: Cardiology;  Laterality: N/A;    CARDIAC CATHETERIZATION N/A 08/07/2023    Procedure: Coronary angiography;  Surgeon: Mireya Park MD;  [facility]ASIVE LOC[facility];  Service: Cardiology;  Laterality: N/A;    CARDIAC CATHETERIZATION N/A 08/07/2023    Procedure: Left ventriculography;  Surgeon: Mireya Park MD;  Location: [facility] CATH INVASIVE LOCATION;  Service: Cardiology;  Laterality: N/A;    CATARACT EXTRACTION      CHOLECYSTECTOMY      COLONOSCOPY      ENDOSCOPY N/A 06/24/2016    Procedure: ESOPHAGOGASTRODUODENOSCOPY  with biopsies;  Surgeon: Von Hernández MD;  Location: [facility] OR;  Service:     JOINT REPLACEMENT  Yes    LUNG LOBECTOMY Left     lower lobe    LYTIC THROMBIN THERAPY Left 03/26/2021    Procedure: left leg clot treiver possible venous stent *supine*;  Surgeon: Rogerio Vega MD;  Location: [facility] OR 18/19;  Service: Vascular;  Laterality: Left;    REPLACEMENT TOTAL KNEE Left     THYROID BIOPSY      TONSILLECTOMY  Yes    TOTAL HIP ARTHROPLASTY Right 06/01/2019    Procedure: BIPOLAR HIP CEMENTED POSTERIOR;  Surgeon: Ashley Crenshaw MD;  Location: [facility] MAIN OR;  Service: Orthopedics    TRIGGER POINT INJECTION      TUBAL ABDOMINAL LIGATION  50 yrs ago         Prior to Admission medications    Medication Sig Start Date End Date Taking? Authorizing Provider   apixaban (Eliquis) 5 MG tablet tablet Take 1 tablet by mouth Every 12 (Twelve) Hours. 11/7/23  Yes Kehrer, Meredith Lea, MD   carvedilol (COREG) 12.5 MG tablet Take 1 tablet by mouth 2 (Two) Times a Day With Meals. 8/10/23  Yes Mireya Park MD   coenzyme Q10 100 MG capsule Take 1 capsule by mouth Daily.   Yes Provider, MD Marly   cyclobenzaprine (FLEXERIL) 10  MG tablet Take 1 tablet by mouth 2 (Two) Times a Day As Needed for Muscle Spasms. 2/26/21  Yes Zaira Calvillo APRN   esomeprazole (NexIUM) 40 MG capsule Take 1 capsule by mouth Every Morning Before Breakfast.   Yes Marly Field MD   ferrous sulfate 325 (65 FE) MG tablet Take 1 tablet by mouth Daily With Breakfast.   Yes Marly Field MD   folic acid (FOLVITE) 1 MG tablet Take 1 tablet by mouth Daily. 5/14/21  Yes Delfino Scruggs MD   furosemide (LASIX) 40 MG tablet TAKE ONE TABLET BY MOUTH DAILY. TAKE SECOND TABLET AT NOON AS NEEDED FOR INCREASED SWELLING 11/21/23  Yes Kehrer, Meredith Lea, MD   gabapentin (NEURONTIN) 100 MG capsule Take 1 capsule by mouth 3 (Three) Times a Day. 2/29/24  Yes Kehrer, Meredith Lea, MD   ipratropium (ATROVENT) 0.06 % nasal spray 2 sprays into the nostril(s) as directed by provider 4 (Four) Times a Day As Needed for Rhinitis (congestion). 11/30/23  Yes Kehrer, Meredith Lea, MD   ipratropium-albuterol (DUO-NEB) 0.5-2.5 mg/3 ml nebulizer USE 1 VIAL IN NEBULIZER 2 TIMES DAILY. Generic: Duoneb 2/26/24  Yes Kehrer, Meredith Lea, MD   lactulose (CHRONULAC) 10 GM/15ML solution TAKE 15 ML BY MOUTH THREE TIMES DAILY  Patient taking differently: 2 (Two) Times a Day. TAKE 15 ML BY MOUTH THREE TIMES DAILY 2/16/23  Yes Kehrer, Meredith Lea, MD   levothyroxine (SYNTHROID, LEVOTHROID) 100 MCG tablet TAKE ONE TABLET BY MOUTH DAILY 2/21/24  Yes Kehrer, Meredith Lea, MD   lidocaine (XYLOCAINE) 5 % ointment Apply 1 application topically to the appropriate area as directed 3 (Three) Times a Day. 6/24/22  Yes Lennox Wilhelm MD   Magnesium 500 MG tablet Take  by mouth.   Yes Marly Field MD   potassium chloride 10 MEQ CR tablet Take 1 tablet by mouth Daily.   Yes Marly Field MD   pramipexole (MIRAPEX) 1.5 MG tablet TAKE ONE TABLET BY MOUTH EVERY NIGHT AT BEDTIME 4/22/24  Yes Kehrer, Meredith Lea, MD   sertraline (ZOLOFT) 25 MG tablet Take 1 tablet by mouth  Every Night. 8/22/23  Yes Marly Field MD   valsartan (DIOVAN) 320 MG tablet Take 1 tablet by mouth Daily. 2/29/24  Yes Kehrer, Meredith Lea, MD   vitamin B-12 (CYANOCOBALAMIN) 1000 MCG tablet Take 1 tablet by mouth Daily.   Yes Marly Field MD   Zinc 50 MG tablet Take 1 tablet by mouth.   Yes Marly Field MD   acetaminophen (TYLENOL) 325 MG tablet Take 2 tablets by mouth Every 4 (Four) Hours As Needed for Mild Pain . 4/1/21   José Herrera MD   alendronate (Fosamax) 70 MG tablet Take 1 tablet by mouth Every 7 (Seven) Days. 2/16/23   Kehrer, Meredith Lea, MD   chlorhexidine (PERIDEX) 0.12 % solution APPLY 15 ML AS DIRECTED TWICE DAILY SWISH FOR 30 seconds AND expectorate, EVERY MORNING AND IN THE EVENING TO BEGIN in 24 hours AFTER surgery 5/3/22   Marly Field MD   Enulose 10 GM/15ML solution solution (encephalopathy)  8/20/23   Marly Field MD   ondansetron (ZOFRAN) 4 MG tablet  8/11/23   Marly Field MD   Senna Plus 8.6-50 MG per tablet TAKE TWO TABLETS BY MOUTH TWICE DAILY 3/19/24   Kehrer, Meredith Lea, MD   SYMBICORT 160-4.5 MCG/ACT inhaler  9/12/19   Marly Field MD   triamcinolone (KENALOG) 0.1 % cream Apply 1 application topically to the appropriate area as directed 2 (Two) Times a Day. 2/16/23   Kehrer, Meredith Lea, MD       Allergies   Allergen Reactions    Doxycycline Anaphylaxis     Facial swelling, shortness of air, dizzines       Social History     Socioeconomic History    Marital status:    Tobacco Use    Smoking status: Never     Passive exposure: Never    Smokeless tobacco: Never    Tobacco comments:     Never   Vaping Use    Vaping status: Never Used   Substance and Sexual Activity    Alcohol use: No    Drug use: Never    Sexual activity: Not Currently     Birth control/protection: Tubal ligation       Family History   Problem Relation Age of Onset    Heart attack Mother     Coronary artery disease Mother     Hypertension  Mother     Kidney disease Mother     Arthritis Mother     Heart disease Mother     Heart attack Sister     Colon cancer Neg Hx     Colon polyps Neg Hx     Esophageal cancer Neg Hx     Breast cancer Neg Hx        REVIEW OF SYSTEMS:   All systems reviewed.  Pertinent positives identified in HPI.  All other systems are negative.      Objective:     Vitals:    05/14/24 0440 05/14/24 0744 05/14/24 0746 05/14/24 1125   BP: 134/61 140/59  132/49   BP Location: Right arm Left arm  Right arm   Patient Position: Sitting Lying  Lying   Pulse: 75 68  70   Resp: 18 17 16 17   Temp: 97.3 °F (36.3 °C) 97.5 °F (36.4 °C)  97.5 °F (36.4 °C)   TempSrc: Oral Oral  Oral   SpO2: 92% 94%  91%   Weight:       Height:         Body mass index is 34.81 kg/m².    Physical Exam:  General Appearance:    Alert, cooperative, in no acute distress   Head:    Normocephalic, without obvious abnormality   Eyes:            Lids and lashes normal, conjunctivae and sclerae normal, no icterus, no pallor, corneas clear   Ears:    Ears appear intact with no abnormalities noted   Throat:   No oral lesions, oral mucosa moist   Neck:   No adenopathy, supple, trachea midline, no thyromegaly, no carotid bruit, no JVD   Back:     No kyphosis present, no erythema or scars, no tenderness to palpation    Lungs:   Coarse breath sounds, mild end expiratory wheezing.,respirations regular, even and unlabored    Heart:    Regular rhythm and normal rate, normal S1 and S2, no murmur, no gallop, no rub, no click   Chest Wall:    No abnormalities observed   Abdomen:     Normal bowel sounds, no masses, no organomegaly, soft        non-tender, non-distended, no guarding   Extremities:   Moves all extremities well, + L>Redema, no cyanosis, no redness   Pulses:  Bilateral carotids brisk   Skin:  Psychiatric:   No bleeding or rash    Alert and oriented, normal mood and affect         Lab Review:     Results from last 7 days   Lab Units 05/14/24  0446 05/13/24  1223   SODIUM mmol/L  141 140   POTASSIUM mmol/L 4.3 4.2   CHLORIDE mmol/L 102 102   CO2 mmol/L 25.0 25.9   BUN mg/dL 35* 36*   CREATININE mg/dL 1.36* 1.51*   CALCIUM mg/dL 9.4 9.4   BILIRUBIN mg/dL  --  0.3   ALK PHOS U/L  --  93   ALT (SGPT) U/L  --  11   AST (SGOT) U/L  --  8   GLUCOSE mg/dL 132* 107*         Results from last 7 days   Lab Units 05/14/24  0446   WBC 10*3/mm3 5.24   HEMOGLOBIN g/dL 11.4*   HEMATOCRIT % 35.8   PLATELETS 10*3/mm3 182                                                       I personally viewed and interpreted the patient's EKG/Telemetry data.      Current Facility-Administered Medications:     apixaban (ELIQUIS) tablet 5 mg, 5 mg, Oral, Q12H, Qadah, Abdalhakim, APRN, 5 mg at 05/14/24 0807    budesonide-formoterol (SYMBICORT) 160-4.5 MCG/ACT inhaler 2 puff, 2 puff, Inhalation, BID - RT, Qadah, Abdalhakim, APRN, 2 puff at 05/14/24 0746    carvedilol (COREG) tablet 12.5 mg, 12.5 mg, Oral, BID With Meals, Qadah, Abdalhakim, APRN, 12.5 mg at 05/13/24 2102    cephalexin (KEFLEX) capsule 500 mg, 500 mg, Oral, Q8H, Qadah, Abdalhakim, APRN, 500 mg at 05/14/24 1302    folic acid (FOLVITE) tablet 1 mg, 1 mg, Oral, Daily, Qadah, Abdalhakim, APRN, 1 mg at 05/14/24 0807    furosemide (LASIX) tablet 40 mg, 40 mg, Oral, BID, Truong Hernandez MD    ipratropium-albuterol (DUO-NEB) nebulizer solution 3 mL, 3 mL, Nebulization, Q6H PRN, Lm Camacho, APRN, 3 mL at 05/14/24 0051    lactulose (CHRONULAC) 10 GM/15ML solution 10 g, 10 g, Oral, BID, Qadah, Abdalhakim, APRN, 10 g at 05/14/24 0807    levothyroxine (SYNTHROID, LEVOTHROID) tablet 100 mcg, 100 mcg, Oral, Daily, Qadah, Abdalhakim, APRN, 100 mcg at 05/14/24 0600    potassium chloride (K-DUR,KLOR-CON) ER tablet 10 mEq, 10 mEq, Oral, Daily, Qadamarcelina, Yawm, APRN, 10 mEq at 05/14/24 0807    pramipexole (MIRAPEX) tablet 1.5 mg, 1.5 mg, Oral, Nightly, Qadah, Abdalhakim, APRN, 1.5 mg at 05/13/24 2103    predniSONE (DELTASONE) tablet 20 mg, 20 mg, Oral, Daily With Breakfast,  Qadah, Abdalhakim, APRN, 20 mg at 05/14/24 0807    sennosides-docusate (PERICOLACE) 8.6-50 MG per tablet 2 tablet, 2 tablet, Oral, BID, Qadah, Abdalhakim, APRN, 2 tablet at 05/14/24 0807    sertraline (ZOLOFT) tablet 25 mg, 25 mg, Oral, Nightly, Qadah, Abdalhakim, APRN, 25 mg at 05/13/24 2102    sodium chloride 0.9 % flush 10 mL, 10 mL, Intravenous, Q12H, Qadah, Abdalhakim, APRN, 10 mL at 05/14/24 0808    sodium chloride 0.9 % flush 10 mL, 10 mL, Intravenous, PRN, Qadah, Abdalhakim, APRN    sodium chloride 0.9 % infusion 40 mL, 40 mL, Intravenous, PRN, Qadah, Abdalhakim, APRN    valsartan (DIOVAN) tablet 320 mg, 320 mg, Oral, Daily, Qadah, Abdalhakim, APRN, 320 mg at 05/14/24 0807    Assessment and Plan:       Active Hospital Problems    Diagnosis  POA    **Dyspnea [R06.00]  Yes      Resolved Hospital Problems   No resolved problems to display.     1.  Shortness of breath-evaluation underway.  It does not appear that we have defined yet the etiology of her dyspnea.  Prior cardiac evaluation, current x-ray NT-proBNP level argue against heart failure.  He does have wheezing and some dry crackles on exam, I see that they were reporting groundglass abnormalities and scarring in her lung tissue on the CT scan of her chest at [facility] last November, we will repeat a noncontrast chest CT.  2.  History of Takotsubo cardiomyopathy, with recovery of function  3.  Swelling left lower extremity-she reports trauma to the left leg 10 days ago, ultrasound demonstrated venous insufficiency of the left deep veins.  Patient received IV Lasix yesterday and today.  Now back on oral meds.  NT-proBNP level of 267 and an 83-year-old female has a very robust negative predictive value for the exclusion of CHF.  No infiltrate seen on the chest x-ray.  She received diuretics yesterday, I will have them weigh her today.  4.  Obstructive sleep apnea on CPAP  5.  COPD status post lung cancer with left lobectomy 2006  6.  PAF on  anticoagulation  7.  History of DVT/PE.    Jamel Pinto III, MD  05/14/24  14:52 EDT

## 2024-05-14 NOTE — OUTREACH NOTE
Prep Survey      Flowsheet Row Responses   [facility] facility patient discharged from? [place]   Is LACE score < 7 ? No   Eligibility [facility]   Date of Admission 05/13/24   Date of Discharge 05/14/24   Discharge Disposition Home or Self Care   Discharge diagnosis Dyspnea   Does the patient have one of the following disease processes/diagnoses(primary or secondary)? Other   Does the patient have Home health ordered? No   Is there a DME ordered? No   Prep survey completed? Yes            JULITA SERRA - Registered Nurse

## 2024-05-14 NOTE — PLAN OF CARE
Problem: Adult Inpatient Plan of Care  Goal: Plan of Care Review  Outcome: Ongoing, Progressing  Flowsheets (Taken 5/14/2024 0156)  Plan of Care Reviewed With: patient  Outcome Evaluation: Patient admitted with dysnpea and left leg swelling. IV lasix given. 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 Currently on 2 L when asleep. Will see pulmnology and nephrology in the AM  Goal: Patient-Specific Goal (Individualized)  Outcome: Ongoing, Progressing  Goal: Absence of Hospital-Acquired Illness or Injury  Outcome: Ongoing, Progressing  Intervention: Identify and Manage Fall Risk  Recent Flowsheet Documentation  Taken 5/14/2024 0000 by Johan Alexis RN  Safety Promotion/Fall Prevention:   safety round/check completed   activity supervised   clutter free environment maintained  Taken 5/13/2024 2200 by Johan Alexis RN  Safety Promotion/Fall Prevention:   activity supervised   clutter free environment maintained   fall prevention program maintained   nonskid shoes/slippers when out of bed   safety round/check completed   room organization consistent  Taken 5/13/2024 2000 by Johan Alexis RN  Safety Promotion/Fall Prevention: safety round/check completed  Intervention: Prevent Skin Injury  Recent Flowsheet Documentation  Taken 5/14/2024 0000 by Johan Alexis RN  Body Position: position changed independently  Taken 5/13/2024 2000 by Johan Alexis RN  Body Position: position changed independently  Intervention: Prevent and Manage VTE (Venous Thromboembolism) Risk  Recent Flowsheet Documentation  Taken 5/14/2024 0000 by Johan Alexis RN  Activity Management: activity minimized  Taken 5/13/2024 2000 by Johan Alexis RN  Activity Management: up to bedside commode  Intervention: Prevent Infection  Recent Flowsheet Documentation  Taken 5/14/2024 0000 by Johan Alexis RN  Infection Prevention:   rest/sleep promoted   single patient room provided  Taken 5/13/2024 2200 by Johan Alexis RN  Infection  Prevention:   rest/sleep promoted   hand hygiene promoted   single patient room provided   personal protective equipment utilized  Taken 5/13/2024 2000 by Johan Alexis RN  Infection Prevention:   rest/sleep promoted   single patient room provided  Goal: Optimal Comfort and Wellbeing  Outcome: Ongoing, Progressing  Intervention: Provide Person-Centered Care  Recent Flowsheet Documentation  Taken 5/13/2024 2000 by Johan Alexis RN  Trust Relationship/Rapport:   care explained   choices provided  Goal: Readiness for Transition of Care  Outcome: Ongoing, Progressing     Problem: Fall Injury Risk  Goal: Absence of Fall and Fall-Related Injury  Outcome: Ongoing, Progressing  Intervention: Identify and Manage Contributors  Recent Flowsheet Documentation  Taken 5/14/2024 0000 by Johan Alexis RN  Medication Review/Management: medications reviewed  Taken 5/13/2024 2200 by Johan Alexis RN  Medication Review/Management: medications reviewed  Taken 5/13/2024 2000 by Johan Alexis RN  Medication Review/Management: medications reviewed  Intervention: Promote Injury-Free Environment  Recent Flowsheet Documentation  Taken 5/14/2024 0000 by Johan Alexis RN  Safety Promotion/Fall Prevention:   safety round/check completed   activity supervised   clutter free environment maintained  Taken 5/13/2024 2200 by Johan Alexis RN  Safety Promotion/Fall Prevention:   activity supervised   clutter free environment maintained   fall prevention program maintained   nonskid shoes/slippers when out of bed   safety round/check completed   room organization consistent  Taken 5/13/2024 2000 by Johan Alexis RN  Safety Promotion/Fall Prevention: safety round/check completed     Problem: Skin Injury Risk Increased  Goal: Skin Health and Integrity  Outcome: Ongoing, Progressing  Intervention: Optimize Skin Protection  Recent Flowsheet Documentation  Taken 5/14/2024 0000 by Johan Alexis RN  Head of Bed (HOB) Positioning: HOB at 15 degrees  Taken 5/13/2024  2200 by Johan Alexis, RN  Head of Bed ([facility]) Positioning: HOB at 30-45 degrees  Taken 5/13/2024 2000 by Johan Alexis RN  Head of Bed ([facility]) Positioning: HOB at 30 degrees   Goal Outcome Evaluation:  Plan of Care Reviewed With: patient           Outcome Evaluation: Patient admitted with dysnpea and left leg swelling. IV lasix given. 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 Currently on 2 L when asleep. Will see pulmnology and nephrology in the AM

## 2024-05-14 NOTE — CONSULTS
Referring Provider: Dr. Gallagher  Reason for Consultation: Dyspnea    Patient Care Team:  Kehrer, Meredith Lea, MD as PCP - General (Family Medicine)  Chris Bear MD as Referring Physician (Hospitalist)  Delfino Scruggs MD as Consulting Physician (Hematology and Oncology)    Chief complaint:   Shortness of breath and leg swelling    History of present illness:    Subjective   This is an 83-year-old female patient, lifelong non-smoker (but extensive secondhand exposure to smoking), history of COPD, on Symbicort and DuoNeb at home.  History of SIERRA, on CPAP, CHF and CKD stage III.  History of lung cancer s/p left lobectomy in 2006 provide PAF on Eliquis and history of DVT/PE.  On reviewing outside records, it appears that she had PFT in 2023 reported as moderately reduced FVC but normal TLC and possible peripheral airway obstruction.  Patient follows with a pulmonologist at the [facility] (Dr. Hartley) and currently on Symbicort and albuterol inhaler/neb which she uses twice a day on average    Patient presented to the hospital yesterday for exertional dyspnea. This started few days ago and was worse than her baseline.  Dyspnea is alleviated by rest.  It is associated with swelling in the lower extremities.  She currently feels back to her baseline after she has been diuresed and had about 4 pounds off per her reports.  She was evaluated by cardiology and felt that it was not necessarily a cardiac issue.  Hence we were consulted for opinion.        Data: Echo 1/2/2024: EF 67%; grade 1 diastolic dysfunction; normal LA size    Review of Systems  Constitutional: No fever or chills.   ENMT: No sinus congestion  Cardiovascular: No chest pain, palpitation but legs swelling.    Respiratory: See above.  No cough.  Gastrointestinal: No constipation, diarrhea or abdominal pain   Neurology: No headache, weakness, numbness or dizziness.   Musculoskeletal: No joints pain, stiffness or  swelling.   Psychiatry: No depression.  Genitourinary: No dysuria or frequent urination  Endo: No weight changes. No cold or warm intolerance.  Lymphatic: No swollen glands.  Integumentary: No rash.    History  Past Medical History:   Diagnosis Date    Abnormal ECG Aug 2023    Acute exacerbation of CHF (congestive heart failure) 08/27/2023    Anemia     Anxiety 4-2-23    Arthritis     Arthritis of back     Arthritis of neck     Asthma 1/1/2006    Atrial fibrillation     Cataract 1-1/2015    CKD (chronic kidney disease)     Stage 3    Congenital heart disease Aug 2023    Constipation     COPD (chronic obstructive pulmonary disease)     DDD (degenerative disc disease), lumbar     Deep vein thrombosis 2019    Depression     Diverticulosis     Fracture of hip     GERD (gastroesophageal reflux disease)     Hip arthrosis     History of heart attack     7/2023    Hx of degenerative disc disease     Hyperlipidemia     Hyperlipidemia 02/19/2016    Hypertension     Hypokalemia     Hypothyroidism     Knee swelling     Low back pain Yes    Lung cancer     history    Myocardial infarction 8-4-23    Obesity 218    Osteopenia Yes    Pulmonary embolism 2019    Renal disorder     Renal insufficiency 2019    Restless leg syndrome     Scoliosis Yes    Sleep apnea with use of continuous positive airway pressure (CPAP)    ,   Past Surgical History:   Procedure Laterality Date    ADENOIDECTOMY  2000    BUNIONECTOMY Bilateral     CARDIAC CATHETERIZATION N/A 08/07/2023    Procedure: Left Heart Cath;  Surgeon: Mireya Park MD;  Location:  [facility];  Service: Cardiology;  Laterality: N/A;    CARDIAC CATHETERIZATION N/A 08/07/2023    Procedure: Coronary angiography;  Surgeon: Mireya Park MD;  Location:  [facility];  Service: Cardiology;  Laterality: N/A;    CARDIAC CATHETERIZATION N/A 08/07/2023    Procedure: Left ventriculography;  Surgeon: Mireya Park MD;  Location:  [facility];  Service: Cardiology;  Laterality: N/A;    CATARACT EXTRACTION      CHOLECYSTECTOMY      COLONOSCOPY      ENDOSCOPY N/A 06/24/2016    Procedure: ESOPHAGOGASTRODUODENOSCOPY  with biopsies;  Surgeon: Von Hernández MD;  Location: [facility] OR;  Service:     JOINT REPLACEMENT  Yes    LUNG LOBECTOMY Left     lower lobe    LYTIC THROMBIN THERAPY Left 03/26/2021    Procedure: left leg clot treiver possible venous stent *supine*;  Surgeon: Rogerio Vega MD;  Location: [facility] OR 18/19;  Service: Vascular;  Laterality: Left;    REPLACEMENT TOTAL KNEE Left     THYROID BIOPSY      TONSILLECTOMY  Yes    TOTAL HIP ARTHROPLASTY Right 06/01/2019    Procedure: BIPOLAR HIP CEMENTED POSTERIOR;  Surgeon: Ashley Crenshaw MD;  Location: [facility] OR;  Service: Orthopedics    TRIGGER POINT INJECTION      TUBAL ABDOMINAL LIGATION  50 yrs ago   ,   Family History   Problem Relation Age of Onset    Heart attack Mother     Coronary artery disease Mother     Hypertension Mother     Kidney disease Mother     Arthritis Mother     Heart disease Mother     Heart attack Sister     Colon cancer Neg Hx     Colon polyps Neg Hx     Esophageal cancer Neg Hx     Breast cancer Neg Hx    ,   Social History     Socioeconomic History    Marital status:    Tobacco Use    Smoking status: Never     Passive exposure: Never    Smokeless tobacco: Never    Tobacco comments:     Never   Vaping Use    Vaping status: Never Used   Substance and Sexual Activity    Alcohol use: No    Drug use: Never    Sexual activity: Not Currently     Birth control/protection: Tubal ligation     E-cigarette/Vaping    E-cigarette/Vaping Use Never User     Passive Exposure No     Counseling Given No      E-cigarette/Vaping Substances    Nicotine No     THC No     CBD No     Flavoring No      E-cigarette/Vaping Devices    Disposable No     Pre-filled or Refillable Cartridge No     Refillable Tank No     Pre-filled Pod No          ,    Medications Prior to Admission   Medication Sig Dispense Refill Last Dose    apixaban (Eliquis) 5 MG tablet tablet Take 1 tablet by mouth Every 12 (Twelve) Hours. 180 tablet 3 5/13/2024    carvedilol (COREG) 12.5 MG tablet Take 1 tablet by mouth 2 (Two) Times a Day With Meals. 60 tablet 5 5/13/2024    coenzyme Q10 100 MG capsule Take 1 capsule by mouth Daily.   5/13/2024    cyclobenzaprine (FLEXERIL) 10 MG tablet Take 1 tablet by mouth 2 (Two) Times a Day As Needed for Muscle Spasms. 14 tablet 0 Past Month    esomeprazole (NexIUM) 40 MG capsule Take 1 capsule by mouth Every Morning Before Breakfast.   5/13/2024    ferrous sulfate 325 (65 FE) MG tablet Take 1 tablet by mouth Daily With Breakfast.   5/13/2024    folic acid (FOLVITE) 1 MG tablet Take 1 tablet by mouth Daily. 30 tablet 5 5/13/2024    furosemide (LASIX) 40 MG tablet TAKE ONE TABLET BY MOUTH DAILY. TAKE SECOND TABLET AT NOON AS NEEDED FOR INCREASED SWELLING 135 tablet 0 5/13/2024    gabapentin (NEURONTIN) 100 MG capsule Take 1 capsule by mouth 3 (Three) Times a Day. 90 capsule 0 5/13/2024    ipratropium (ATROVENT) 0.06 % nasal spray 2 sprays into the nostril(s) as directed by provider 4 (Four) Times a Day As Needed for Rhinitis (congestion). 15 mL 0 5/13/2024    ipratropium-albuterol (DUO-NEB) 0.5-2.5 mg/3 ml nebulizer USE 1 VIAL IN NEBULIZER 2 TIMES DAILY. Generic: Duoneb 60 mL 11 5/13/2024    lactulose (CHRONULAC) 10 GM/15ML solution TAKE 15 ML BY MOUTH THREE TIMES DAILY (Patient taking differently: 2 (Two) Times a Day. TAKE 15 ML BY MOUTH THREE TIMES DAILY) 1350 mL 2 5/13/2024    levothyroxine (SYNTHROID, LEVOTHROID) 100 MCG tablet TAKE ONE TABLET BY MOUTH DAILY 90 tablet 0 5/13/2024    lidocaine (XYLOCAINE) 5 % ointment Apply 1 application topically to the appropriate area as directed 3 (Three) Times a Day. 50 g 3 Past Week    Magnesium 500 MG tablet Take  by mouth.   5/13/2024    potassium chloride 10 MEQ CR tablet Take 1 tablet by mouth Daily.    5/13/2024    pramipexole (MIRAPEX) 1.5 MG tablet TAKE ONE TABLET BY MOUTH EVERY NIGHT AT BEDTIME 90 tablet 0 5/12/2024    sertraline (ZOLOFT) 25 MG tablet Take 1 tablet by mouth Every Night.   5/12/2024    valsartan (DIOVAN) 320 MG tablet Take 1 tablet by mouth Daily. 90 tablet 3 5/13/2024    vitamin B-12 (CYANOCOBALAMIN) 1000 MCG tablet Take 1 tablet by mouth Daily.   5/13/2024    Zinc 50 MG tablet Take 1 tablet by mouth.   5/13/2024    acetaminophen (TYLENOL) 325 MG tablet Take 2 tablets by mouth Every 4 (Four) Hours As Needed for Mild Pain .       alendronate (Fosamax) 70 MG tablet Take 1 tablet by mouth Every 7 (Seven) Days. 13 tablet 3     chlorhexidine (PERIDEX) 0.12 % solution APPLY 15 ML AS DIRECTED TWICE DAILY SWISH FOR 30 seconds AND expectorate, EVERY MORNING AND IN THE EVENING TO BEGIN in 24 hours AFTER surgery       Enulose 10 GM/15ML solution solution (encephalopathy)        ondansetron (ZOFRAN) 4 MG tablet        Senna Plus 8.6-50 MG per tablet TAKE TWO TABLETS BY MOUTH TWICE DAILY 60 tablet 0     SYMBICORT 160-4.5 MCG/ACT inhaler        triamcinolone (KENALOG) 0.1 % cream Apply 1 application topically to the appropriate area as directed 2 (Two) Times a Day. 15 g 2    , Scheduled Meds:  apixaban, 5 mg, Oral, Q12H  budesonide-formoterol, 2 puff, Inhalation, BID - RT  carvedilol, 12.5 mg, Oral, BID With Meals  cephalexin, 500 mg, Oral, Q8H  folic acid, 1 mg, Oral, Daily  furosemide, 40 mg, Oral, BID  lactulose, 10 g, Oral, BID  levothyroxine, 100 mcg, Oral, Daily  potassium chloride, 10 mEq, Oral, Daily  pramipexole, 1.5 mg, Oral, Nightly  predniSONE, 20 mg, Oral, Daily With Breakfast  sennosides-docusate, 2 tablet, Oral, BID  sertraline, 25 mg, Oral, Nightly  sodium chloride, 10 mL, Intravenous, Q12H  valsartan, 320 mg, Oral, Daily   , Continuous Infusions:   , PRN Meds:    ipratropium-albuterol    sodium chloride    sodium chloride, and Allergies:  Doxycycline    Objective     Vital Signs   Temp:   [97.3 °F (36.3 °C)-98.1 °F (36.7 °C)] 98.1 °F (36.7 °C)  Heart Rate:  [63-75] 74  Resp:  [16-20] 17  BP: (132-158)/(49-68) 158/62    PPE used per hospital policy    Physical Exam:  Constitutional: Not in acute distress.  Eyes: Injected conjunctivae, EOMI. pupils equal reactive to light.  ENMT: Macias 3. No oral thrush.   Neck:  Trachea midline. No thyromegaly  Heart: RRR, no murmur  Lungs: Equal but slightly diminished air entry bilaterally.  Minimal right lower lobe basal crackles.  Non labored breathing.   Abdomen: Obese. Soft. No tenderness or dullness. No HSM.  Extremities: No cyanosis, clubbing but pitting edema.  Warm extremities and well-perfused.  Neuro: Conscious, alert, oriented x3.  Strength 5/5 in arms.  Psych: Appropriate mood and affect.    Integumentary: No rash.  Normal skin turgor  Lymphatic: No palpable cervical or supraclavicular lymph nodes.      Diagnostic imaging:  I personally and independently reviewed the following images:   CT chest 5/14/2024: Minimal pleural-parenchymal scarring bilaterally especially RLL.    Laboratory workup:    Results from last 7 days   Lab Units 05/14/24  0446 05/13/24  1223   SODIUM mmol/L 141 140   POTASSIUM mmol/L 4.3 4.2   CHLORIDE mmol/L 102 102   CO2 mmol/L 25.0 25.9   BUN mg/dL 35* 36*   CREATININE mg/dL 1.36* 1.51*   GLUCOSE mg/dL 132* 107*   CALCIUM mg/dL 9.4 9.4         Results from last 7 days   Lab Units 05/14/24  0446 05/13/24  1223   WBC 10*3/mm3 5.24 6.16   HEMOGLOBIN g/dL 11.4* 11.6*   HEMATOCRIT % 35.8 36.0   PLATELETS 10*3/mm3 182 183         Results from last 7 days   Lab Units 05/13/24  1223   PROBNP pg/mL 267.0       Assessment     MCKEON, secondary to baseline COPD, restrictive lung disease and fluid overload, anemia-improved.  COPD, no current exacerbation  CKD stage III  Anemia, secondary to CKD  Abnormal CT chest with scarring    Prior history of DVT/PE  Lung cancer s/p lobectomy in 2006    Recommendations:    Bronchodilators with Symbicort and  DuoNeb as needed.  Can resume home inhalers on discharge  .  Diuretic therapy.    No need for follow-up imaging regarding the pulmonary scarring.  However I did ask the patient to tell her pulmonologist that she already had a CT of the chest in this facility      No objection to discharge home from pulmonary perspective.      Segundo Wilhelm MD  05/14/24  16:30 EDT

## 2024-05-14 NOTE — PROGRESS NOTES
MD ATTESTATION NOTE    The MARK ANTHONY and I have discussed this patient's history, physical exam, and treatment plan.  I have reviewed the documentation and personally had a face to face interaction with the patient. I affirm the documentation and agree with the treatment and plan.  The attached note describes my personal findings.      I provided a substantive portion of the care of the patient.  I personally performed the physical exam in its entirety, and below are my findings.       Brief HPI: Patient admitted with exertional dyspnea.    PHYSICAL EXAM  ED Triage Vitals   Temp Heart Rate Resp BP SpO2   05/13/24 1210 05/13/24 1209 05/13/24 1209 05/13/24 1213 05/13/24 1209   97.3 °F (36.3 °C) 76 16 154/69 94 %      Temp src Heart Rate Source Patient Position BP Location FiO2 (%)   05/13/24 1729 05/13/24 1657 05/13/24 1213 05/13/24 1213 --   Oral Monitor Sitting Left arm          GENERAL: no acute distress  HENT: nares patent  EYES: no scleral icterus  CV: regular rhythm, normal rate  RESPIRATORY: normal effort  ABDOMEN: soft  MUSCULOSKELETAL: no deformity  NEURO: alert, moves all extremities, follows commands  PSYCH:  calm, cooperative  SKIN: warm, dry, left calf erythema no significant fluctuance    Vital signs and nursing notes reviewed.        Plan: Pulmonology nephrology consult.  .  Chest x-ray without consolidation with trace left pleural effusion.  D-dimer negative.  Treat cellulitis of left lower extremity.

## 2024-05-14 NOTE — CASE MANAGEMENT/SOCIAL WORK
Discharge Planning Assessment  [facility]     Patient Name: Latanya Olsen  MRN: [id number]  Today's Date: 5/14/2024    Admit Date: 5/13/2024    Plan: Plans to return home at d/Kleber Cordova RN   Discharge Needs Assessment       Row Name 05/14/24 1108       Living Environment    People in Home alone    Current Living Arrangements home    Potentially Unsafe Housing Conditions none    In the past 12 months has the electric, gas, oil, or water company threatened to shut off services in your home? No    Primary Care Provided by self    Provides Primary Care For no one    Family Caregiver if Needed other relative(s)    Quality of Family Relationships supportive    Able to Return to Prior Arrangements yes       Resource/Environmental Concerns    Resource/Environmental Concerns none    Transportation Concerns none       Transportation Needs    In the past 12 months, has lack of transportation kept you from medical appointments or from getting medications? no    In the past 12 months, has lack of transportation kept you from meetings, work, or from getting things needed for daily living? No       Food Insecurity    Within the past 12 months, you worried that your food would run out before you got the money to buy more. Never true    Within the past 12 months, the food you bought just didn't last and you didn't have money to get more. Never true       Transition Planning    Patient/Family Anticipates Transition to home    Patient/Family Anticipated Services at Transition none    Transportation Anticipated family or friend will provide       Discharge Needs Assessment    Equipment Currently Used at Home bp cuff;cpap;grab bar;bath bench;rollator;other (see comments)  First Alert    Concerns to be Addressed no discharge needs identified    Anticipated Changes Related to Illness none    Equipment Needed After Discharge none    Provided Post Acute Provider List? N/A    Provided Post Acute Provider Quality & Resource List?  N/A                   Discharge Plan       Row Name 05/14/24 1111       Plan    Plan Plans to return home at d/c-HAMLET Cordova RN    Patient/Family in Agreement with Plan yes    Provided Post Acute Provider List? N/A    Provided Post Acute Provider Quality & Resource List? N/A    Plan Comments Spoke w/ pt at bedside w/ her permission. Introduced self and explained role. All info on facesheet, incl PCP as M. Kehrer, verified. Lives alone in single story home w/ basement (does not go into basement) w/ ramp to enter home and is able to navigate around home w/o difficulty. Independent w/ ADLs. Uses B/P cuff, CPAP, grab bars, bath bench, First Alert and rollator at home. denes need for any DME or community resources at d/c. Uses [facility] in [place] and [facility] and is able to obtain and pay for meds. To return home at d/c; family will transport; agreeable w/ plan. CM will continue to follow-HAMLET Cordova RN                  Continued Care and Services - Admitted Since 5/13/2024    No active coordination exists for this encounter.          Demographic Summary       Row Name 05/14/24 1108       General Information    Admission Type observation    Arrived From emergency department    Required Notices Provided Observation Status Notice    Referral Source admission list    Reason for Consult discharge planning    Preferred Language English       Contact Information    Permission Granted to Share Info With                    Functional Status       Row Name 05/14/24 1108       Functional Status    Usual Activity Tolerance moderate    Current Activity Tolerance moderate       Functional Status, IADL    Medications independent    Meal Preparation independent    Housekeeping independent    Laundry independent    Shopping independent       Mental Status    General Appearance WDL WDL       Mental Status Summary    Recent Changes in Mental Status/Cognitive Functioning no changes                    Psychosocial       Row Name 05/14/24 1108       Behavior WDL    Behavior WDL WDL       Emotion Mood WDL    Emotion/Mood/Affect WDL WDL       Speech WDL    Speech WDL WDL       Perceptual State WDL    Perceptual State WDL WDL       Thought Process WDL    Thought Process WDL WDL       Intellectual Performance WDL    Intellectual Performance WDL WDL                   Abuse/Neglect    No documentation.                  Legal    No documentation.                  Substance Abuse    No documentation.                  Patient Forms    No documentation.                     Serena Cordova RN

## 2024-05-14 NOTE — DISCHARGE INSTRUCTIONS
You have had extensive workup including CT chest without that shows no evidence of fluid overload or pneumonia.

## 2024-05-14 NOTE — PROGRESS NOTES
ED OBSERVATION PROGRESS/DISCHARGE SUMMARY    Date of Admission: 5/13/2024   LOS: 0 days   PCP: Kehrer, Meredith Lea, MD        Subjective     Hospital Outcome:   Latanya Olsen is a 83 y.o. female with past medical history including but not limited to CHF, stage III CKD, COPD, hypertension, hyperlipidemia and DVT/PE presents to [facility] with exertional dyspnea for the past 3 days.  Denies shortness of breath at rest however states becomes short winded when she ambulated short distance around her home.  Patient denies associated chest pain or palpitation.  Denies fever, chills, or cough.  Denies orthopnea or PND.  Reports that she feels that her BLE edema has increased.  Denies abdominal pain, nausea, vomit, or diarrhea.  Denies dysuria, materia, or urinary frequency/urgency.     ED workup reviewed: Creatinine at baseline 1.51, glucose 107, , WBC 6.16, hemoglobin 11.6 and platelets 183.  Chest x-ray shows no evidence of vascular congestion or acute infiltration.  Patient received 40 mg of IV Lasix in the ED    5/14/2024 no acute event overnight patient remains afebrile.  Patient reports expiratory wheezing at rest.  Denies chest pain or palpitation.  Nephrology evaluated patient this morning and recommends continuing current treatment regiment for CHF and hypertension and to follow-up in office within 2 to 4 weeks for her CKD.  Either [place] with cardiology evaluated patient and has concluded that patient symptoms are not due to CHF exacerbation and ordered CT chest without which shows no evidence of acute infiltration or vascular congestion.  Patient to continue her current dose of 40 mg of Lasix.  Pulmonology evaluated patient and has signed off since then.    ROS:  Review of Systems   Constitutional:  No weight changes, fever, or chills. No night sweats, no fatigue, no malaise.    ENT/Mouth:  No hearing changes, no ear pain, no nasal congestion, no sinus pain, no hoarseness, no sore  throat, no rhinorrhea, no dysphagia.   Eyes:  No eye pain, swelling, or redness. No foreign body, discharge. No vision changes.    Cardiovascular:  No chest pain, no shortness of air, no dyspnea on exertion, no orthopnea, no palpitations, no edema.      Respiratory:  No cough, no smoke exposure. + Mild dyspnea with exertion   Gastrointestinal:  No nausea, vomiting, or diarrhea. No constipation, or GI discomfort. No reflux pain, no anorexia, no dysphagia. No hematochezia or melena.    Genitourinary:  No dysmenorrhea, no dyspareunia, no dysuria, no urinary frequency. No hematuria, no urinary incontinence. No flank pain or urinary flow changes.   Musculoskeletal:  No arthralgias, no myalgias, no joint swelling or stiffness. No back or neck pain, no recent injuries.    Skin:  No pruritus, no hair changes. +LLE Redness + tender.    Neuro:  No weakness, no numbness, no paresthesias, no loss of consciousness, no syncope, no dizziness, no headache.   Psych:  No anxiety/panic, no depression, no insomnia, no personality changes, no delusions.    Heme/Lymph:  No bruising, or bleeding. No transfusions history, no lymphadenopathy.   Endocrine:  No polyuria, polydipsia, no temperature intolerances.     Objective   Physical Exam:   Constitutional: Awake, alert. Well developed for age. Non-toxic appearing.   Eyes: PERRL, sclerae anicteric, no conjunctival injection. No EOM noted.   HENT: NCAT, mucous membranes moist,   Neck: Supple, no thyromegaly, no lymphadenopathy, trachea midline  Respiratory: Bilateral crackles and expiratory wheeze throughout.  Cardiovascular: RRR, no murmurs, rubs, or gallops, palpable pedal pulses bilaterally. No appreciable edema.   Gastrointestinal: Positive bowel sounds, soft, nontender, not distended.   Musculoskeletal: No bilateral ankle edema, no clubbing or cyanosis to extremities. No obvious deformities.   Psychiatric: Appropriate affect, cooperative. Converses appropriately for age.   Neurologic:  Oriented x 3, strength symmetric in all extremities, Cranial Nerves grossly intact to confrontation, speech clear  Skin: No rashes, skin intact.     Results Review:    I have reviewed the labs, radiology results and diagnostic studies.    Results from last 7 days   Lab Units 05/13/24  1223   WBC 10*3/mm3 6.16   HEMOGLOBIN g/dL 11.6*   HEMATOCRIT % 36.0   PLATELETS 10*3/mm3 183     Results from last 7 days   Lab Units 05/13/24  1223   SODIUM mmol/L 140   POTASSIUM mmol/L 4.2   CHLORIDE mmol/L 102   CO2 mmol/L 25.9   BUN mg/dL 36*   CREATININE mg/dL 1.51*   CALCIUM mg/dL 9.4   BILIRUBIN mg/dL 0.3   ALK PHOS U/L 93   ALT (SGPT) U/L 11   AST (SGOT) U/L 8   GLUCOSE mg/dL 107*     Imaging Results (Last 24 Hours)       Procedure Component Value Units Date/Time    XR Chest 1 View [[id number]] Collected: 05/13/24 1515     Updated: 05/13/24 1520    Narrative:      XR CHEST 1 VW-     HISTORY: Shortness of breath.     COMPARISON: Chest radiograph 8/27/2023     FINDINGS: A single view of the chest was obtained.     Support Devices:  None.  Cardiac Silhouette/Mediastinum/Katt: The cardiac silhouette is upper  normal to mildly enlarged. There is calcific aortic atherosclerosis.  There is surgical material projecting over the left hilar region.  Lungs/Pleural Spaces: There is a trace left pleural effusion and or  scarring with mild left basilar scarring. There is no definite new focal  consolidation..  Chest Wall/Diaphragm/Upper Abdomen: There is degenerative disc disease.     This report was finalized on 5/13/2024 3:17 PM by Dr. Ashli Martini M.D  on Workstation: [id number]               I have reviewed the medications.  ---------------------------------------------------------------------------------------------  Assessment & Plan   Assessment/Problem List    Dyspnea    Plan:  COPD exacerbation  Dyspnea  Expiratory wheezing  -Chest x-ray shows no evidence of acute infiltration or vascular congestion  -  -DuoNeb 4 times  daily as needed.  -P.o. prednisone  -Resume Symbicort  -Negative D-dimer   -Pulmonology consult     Cellulitis of left lower extremity  -Ultrasound of LLE shows no evidence of deep vein thrombosis  -Keflex 3 times daily     Paroxysmal A-fib  -Continue Eliquis  -Cardiac monitoring     Hypertension  CHF  -  -Extra dose of 40 mg Lasix in the morning  -Continue carvedilol, furosemide, and valsartan  -Vital signs per nursing     Hypothyroidism  -Continue levothyroxine    CKD III  -Does not have nephrologist  -Nephrology consult to establish care    I discussed at length with Ms. Olsen regarding progression of chronic kidney disease and the impact of hypertension and hyperglycemia on kidney function.  She is concerned regarding fluid overload due to decreased kidney function, she does not see a nephrologist.     DVT prophylaxis:  Mechanical DVT prophylaxis orders are present.    Disposition: Home    Follow-up after Discharge: PCP    This note will serve as a discharge summary    Lm Camacho, DEVORA 05/14/24 01:33 EDT    I have worn appropriate PPE during this patient encounter, sanitized my hands both with entering and exiting patient's room.      58 minutes has been spent by [facility] providers in the care of this patient while under observation status

## 2024-05-14 NOTE — CONSULTS
[facility]                                                                                             Nephrology Initial Consult Note    Patient Identification:  Name: Latanya Olsen MRN: [id number]  Age: 83 y.o. : 1940  Sex: female  Date:2024    Requesting Physician: As per consult order.  Reason for Consultation: CKD, diuretic management  Information from:patient/ family/ chart      History of Present Illness: This is a 83 y.o. year old female with a history of chronic kidney disease, previously followed in our office by Dr. Lucila Blackman.  She has not been back for follow-up since 2021.  Baseline creatinine at that time was 1.2.  Creatinine was 1.5 yesterday and improved and 1.3 today.  Her  was actually one of my CKD patients in [place].  Medical history includes CHF, COPD hypertension and prior history of DVT/PE.  She presented to the ER with increasing dyspnea and lower extremity edema, concern for DVT but her lower extremity Doppler was negative.  She did receive a one-time dose of Lasix and feels much better today.  No fevers or chills no cough or congestion.  Her edema is improved.  She denies any nausea or vomiting.  She feels well and is hopeful to go home today    The following medical history and medications personally reviewed by me:    Problem List:     Dyspnea      Past Medical History:  Past Medical History:   Diagnosis Date    Abnormal ECG Aug 2023    Acute exacerbation of CHF (congestive heart failure) 2023    Anemia     Anxiety 4--    Arthritis     Arthritis of back     Arthritis of neck     Asthma 2006    Atrial fibrillation     Cataract     CKD (chronic kidney disease)     Stage 3    Congenital heart disease Aug 2023    Constipation     COPD (chronic obstructive pulmonary disease)     DDD (degenerative disc disease), lumbar     Deep vein  thrombosis 2019    Depression     Diverticulosis     Fracture of hip     GERD (gastroesophageal reflux disease)     Hip arthrosis     History of heart attack     7/2023    Hx of degenerative disc disease     Hyperlipidemia     Hyperlipidemia 02/19/2016    Hypertension     Hypokalemia     Hypothyroidism     Knee swelling     Low back pain Yes    Lung cancer     history    Myocardial infarction 8-4-23    Obesity 218    Osteopenia Yes    Pulmonary embolism 2019    Renal disorder     Renal insufficiency 2019    Restless leg syndrome     Scoliosis Yes    Sleep apnea with use of continuous positive airway pressure (CPAP)        Past Surgical History:  Past Surgical History:   Procedure Laterality Date    ADENOIDECTOMY  2000    BUNIONECTOMY Bilateral     CARDIAC CATHETERIZATION N/A 08/07/2023    Procedure: Left Heart Cath;  Surgeon: Mireya Park MD;  Location: [facility] CATH INVASIVE LOCATION;  Service: Cardiology;  Laterality: N/A;    CARDIAC CATHETERIZATION N/A 08/07/2023    Procedure: Coronary angiography;  Surgeon: Mireya Park MD;  [facility];  Service: Cardiology;  Laterality: N/A;    CARDIAC CATHETERIZATION N/A 08/07/2023    Procedure: Left ventriculography;  Surgeon: Mireya Park MD;  Location: [facility] CATH INVASIVE LOCATION;  Service: Cardiology;  Laterality: N/A;    CATARACT EXTRACTION      CHOLECYSTECTOMY      COLONOSCOPY      ENDOSCOPY N/A 06/24/2016    Procedure: ESOPHAGOGASTRODUODENOSCOPY  with biopsies;  Surgeon: Von Hernández MD;  Location: [facility] OR;  Service:     JOINT REPLACEMENT  Yes    LUNG LOBECTOMY Left     lower lobe    LYTIC THROMBIN THERAPY Left 03/26/2021    Procedure: left leg clot treiver possible venous stent *supine*;  Surgeon: Rogerio Vega MD;  Location: [facility] OR 18/19;  Service: Vascular;  Laterality: Left;    REPLACEMENT TOTAL KNEE Left     THYROID BIOPSY      TONSILLECTOMY  Yes    TOTAL HIP ARTHROPLASTY Right 06/01/2019     Procedure: BIPOLAR HIP CEMENTED POSTERIOR;  Surgeon: Ashley Crenshaw MD;  Location: [facility] OR;  Service: Orthopedics    TRIGGER POINT INJECTION      TUBAL ABDOMINAL LIGATION  50 yrs ago        Home Meds:   Medications Prior to Admission   Medication Sig Dispense Refill Last Dose    apixaban (Eliquis) 5 MG tablet tablet Take 1 tablet by mouth Every 12 (Twelve) Hours. 180 tablet 3 5/13/2024    carvedilol (COREG) 12.5 MG tablet Take 1 tablet by mouth 2 (Two) Times a Day With Meals. 60 tablet 5 5/13/2024    coenzyme Q10 100 MG capsule Take 1 capsule by mouth Daily.   5/13/2024    cyclobenzaprine (FLEXERIL) 10 MG tablet Take 1 tablet by mouth 2 (Two) Times a Day As Needed for Muscle Spasms. 14 tablet 0 Past Month    esomeprazole (NexIUM) 40 MG capsule Take 1 capsule by mouth Every Morning Before Breakfast.   5/13/2024    ferrous sulfate 325 (65 FE) MG tablet Take 1 tablet by mouth Daily With Breakfast.   5/13/2024    folic acid (FOLVITE) 1 MG tablet Take 1 tablet by mouth Daily. 30 tablet 5 5/13/2024    furosemide (LASIX) 40 MG tablet TAKE ONE TABLET BY MOUTH DAILY. TAKE SECOND TABLET AT NOON AS NEEDED FOR INCREASED SWELLING 135 tablet 0 5/13/2024    gabapentin (NEURONTIN) 100 MG capsule Take 1 capsule by mouth 3 (Three) Times a Day. 90 capsule 0 5/13/2024    ipratropium (ATROVENT) 0.06 % nasal spray 2 sprays into the nostril(s) as directed by provider 4 (Four) Times a Day As Needed for Rhinitis (congestion). 15 mL 0 5/13/2024    ipratropium-albuterol (DUO-NEB) 0.5-2.5 mg/3 ml nebulizer USE 1 VIAL IN NEBULIZER 2 TIMES DAILY. Generic: Duoneb 60 mL 11 5/13/2024    lactulose (CHRONULAC) 10 GM/15ML solution TAKE 15 ML BY MOUTH THREE TIMES DAILY (Patient taking differently: 2 (Two) Times a Day. TAKE 15 ML BY MOUTH THREE TIMES DAILY) 1350 mL 2 5/13/2024    levothyroxine (SYNTHROID, LEVOTHROID) 100 MCG tablet TAKE ONE TABLET BY MOUTH DAILY 90 tablet 0 5/13/2024    lidocaine (XYLOCAINE) 5 % ointment Apply 1  application topically to the appropriate area as directed 3 (Three) Times a Day. 50 g 3 Past Week    Magnesium 500 MG tablet Take  by mouth.   5/13/2024    potassium chloride 10 MEQ CR tablet Take 1 tablet by mouth Daily.   5/13/2024    pramipexole (MIRAPEX) 1.5 MG tablet TAKE ONE TABLET BY MOUTH EVERY NIGHT AT BEDTIME 90 tablet 0 5/12/2024    sertraline (ZOLOFT) 25 MG tablet Take 1 tablet by mouth Every Night.   5/12/2024    valsartan (DIOVAN) 320 MG tablet Take 1 tablet by mouth Daily. 90 tablet 3 5/13/2024    vitamin B-12 (CYANOCOBALAMIN) 1000 MCG tablet Take 1 tablet by mouth Daily.   5/13/2024    Zinc 50 MG tablet Take 1 tablet by mouth.   5/13/2024    acetaminophen (TYLENOL) 325 MG tablet Take 2 tablets by mouth Every 4 (Four) Hours As Needed for Mild Pain .       alendronate (Fosamax) 70 MG tablet Take 1 tablet by mouth Every 7 (Seven) Days. 13 tablet 3     chlorhexidine (PERIDEX) 0.12 % solution APPLY 15 ML AS DIRECTED TWICE DAILY SWISH FOR 30 seconds AND expectorate, EVERY MORNING AND IN THE EVENING TO BEGIN in 24 hours AFTER surgery       Enulose 10 GM/15ML solution solution (encephalopathy)        ondansetron (ZOFRAN) 4 MG tablet        Senna Plus 8.6-50 MG per tablet TAKE TWO TABLETS BY MOUTH TWICE DAILY 60 tablet 0     SYMBICORT 160-4.5 MCG/ACT inhaler        triamcinolone (KENALOG) 0.1 % cream Apply 1 application topically to the appropriate area as directed 2 (Two) Times a Day. 15 g 2        Current Meds:   Current Facility-Administered Medications   Medication Dose Route Frequency Provider Last Rate Last Admin    apixaban (ELIQUIS) tablet 5 mg  5 mg Oral Q12H Ashly Summers APRN   5 mg at 05/14/24 0807    budesonide-formoterol (SYMBICORT) 160-4.5 MCG/ACT inhaler 2 puff  2 puff Inhalation BID - RT Ashly Sumemrs APRN   2 puff at 05/14/24 0746    carvedilol (COREG) tablet 12.5 mg  12.5 mg Oral BID With Meals Ashly Summers, DEVORA   12.5 mg at 05/13/24 2102    cephalexin (KEFLEX) capsule 500  mg  500 mg Oral Q8H Qadah, Nguyenalhakim, APRN   500 mg at 05/14/24 0500    folic acid (FOLVITE) tablet 1 mg  1 mg Oral Daily Qadah, Nguyenalhakim, APRN   1 mg at 05/14/24 0807    furosemide (LASIX) tablet 80 mg  80 mg Oral Daily Lm Camacho APRN   80 mg at 05/14/24 0807    ipratropium-albuterol (DUO-NEB) nebulizer solution 3 mL  3 mL Nebulization Q6H PRN Lm Camacho APRN   3 mL at 05/14/24 0051    lactulose (CHRONULAC) 10 GM/15ML solution 10 g  10 g Oral BID Qadah, Nguyenalhakim, APRN   10 g at 05/14/24 0807    levothyroxine (SYNTHROID, LEVOTHROID) tablet 100 mcg  100 mcg Oral Daily Qadah, Nguyenalhakim, APRN   100 mcg at 05/14/24 0600    potassium chloride (K-DUR,KLOR-CON) ER tablet 10 mEq  10 mEq Oral Daily Qadah, Nguyenalhakim, APRN   10 mEq at 05/14/24 0807    pramipexole (MIRAPEX) tablet 1.5 mg  1.5 mg Oral Nightly Qadah, Abdelrahmankiloni, APRN   1.5 mg at 05/13/24 2103    predniSONE (DELTASONE) tablet 20 mg  20 mg Oral Daily With Breakfast Qadah, Ashly, APRN   20 mg at 05/14/24 0807    sennosides-docusate (PERICOLACE) 8.6-50 MG per tablet 2 tablet  2 tablet Oral BID Qadah, Abdalhakim, APRN   2 tablet at 05/14/24 0807    sertraline (ZOLOFT) tablet 25 mg  25 mg Oral Nightly Qadah, Nguyenalhakim, APRN   25 mg at 05/13/24 2102    sodium chloride 0.9 % flush 10 mL  10 mL Intravenous Q12H Qadah, Nguyenalhakim, APRN   10 mL at 05/14/24 0808    sodium chloride 0.9 % flush 10 mL  10 mL Intravenous PRN Qadah, Yawm, APRN        sodium chloride 0.9 % infusion 40 mL  40 mL Intravenous PRN Ashly Summers APRN        valsartan (DIOVAN) tablet 320 mg  320 mg Oral Daily Ashly Summers APRN   320 mg at 05/14/24 0807       Allergies:  Allergies   Allergen Reactions    Doxycycline Anaphylaxis     Facial swelling, shortness of air, dizzines       Social History:   Social History     Socioeconomic History    Marital status:    Tobacco Use    Smoking status: Never     Passive exposure: Never    Smokeless tobacco: Never    Tobacco  "comments:     Never   Vaping Use    Vaping status: Never Used   Substance and Sexual Activity    Alcohol use: No    Drug use: Never    Sexual activity: Not Currently     Birth control/protection: Tubal ligation        Family History:  Family History   Problem Relation Age of Onset    Heart attack Mother     Coronary artery disease Mother     Hypertension Mother     Kidney disease Mother     Arthritis Mother     Heart disease Mother     Heart attack Sister     Colon cancer Neg Hx     Colon polyps Neg Hx     Esophageal cancer Neg Hx     Breast cancer Neg Hx         Review of Systems: as per HPI, in addition:    General:      + Weakness / fatigue,                       No fevers / chills                       no weight loss  HEENT:       no dysphagia / odynophagia  Neck:           normal range of motion, no swelling  Respiratory: no cough / congestion                      Improving shortness of air                       No wheezing  CV:              No chest pain                       No palpitations  Abdomen/GI: no nausea / vomiting                      No diarrhea / constipation                      No abdominal pain  :             no dysuria / urinary frequency                       No urgency, normal output  Endocrine:   no polyuria / polydipsia,                      No heat or cold intolerance  Skin:           no rashes or skin breakdown   Vascular:   + Edema                     No claudication  Psych:        no depression/ anxiety  Neuro:        no focal weakness, no seizures  Musculoskeletal: no joint pain or deformities      Physical Exam:  Vitals:   Temp (24hrs), Av.5 °F (36.4 °C), Min:97.3 °F (36.3 °C), Max:97.7 °F (36.5 °C)    /59 (BP Location: Left arm, Patient Position: Lying)   Pulse 68   Temp 97.5 °F (36.4 °C) (Oral)   Resp 16   Ht 167.6 cm (66\")   Wt 97.8 kg (215 lb 11.2 oz)   SpO2 94%   BMI 34.81 kg/m²   Intake/Output:     Intake/Output Summary (Last 24 hours) at 2024 1018  Last " data filed at 5/14/2024 0500  Gross per 24 hour   Intake 460 ml   Output 2150 ml   Net -1690 ml        Wt Readings from Last 1 Encounters:   05/13/24 1729 97.8 kg (215 lb 11.2 oz)   05/13/24 1446 98.4 kg (217 lb)       Exam:    General Appearance:  Awake, alert, oriented x3, no acute distress  Well-appearing   Head and Face:  Normocephalic, atraumatic, mucus membranes moist, oropharynx clear   Eyes:  No icterus, pupils equal round and reactive to light, extraocular movements intact    ENMT: Moist mucosa, tongue symmetric    Neck: Supple  no jugular venous distention  no thyromegaly   Pulmonary:  Respiratory effort: Normal  Auscultation of lungs: Clear bilaterally  No wheezes  No rhonchi  Good air movement, good expansion   Chest wall:  No tenderness or deformity   Cardiovascular:  Auscultation of the heart: Normal rhythm, no murmurs  1+ edema of bilateral lower extremities   Abdomen:  Abdomen: soft, non-tender, normal bowel sounds all four quadrants, no masses   Liver and spleen: no hepatosplenomegaly   Musculoskeletal: Digits and nails: normal  Normal range of motion  No joint swelling or gross deformities    Skin: Skin inspection: color normal, no visible rashes or lesions  Skin palpation: texture, turgor normal, no palpable lesions   Lymphatic:  no cervical lymphadenopathy    Psychiatric: Judgement and insight: normal  Orientation to person place and time: normal  Mood and affect: normal       DATA:  Radiology and Labs:  The following labs independently reviewed by me, additional AM labs ordered  Old records independently reviewed showing CKD 3, office notes and prior labs reviewed  The following radiologic studies independently viewed by me, findings lower extremity duplex did not show any evidence of a DVT.  Chest x-ray with trace pleural effusion no consolidation or mention of any vascular congestion.  X-ray image personally viewed/reviewed by me  Interval notes, chart personally reviewed by me.  I have  reviewed and summarized old records as detailed above  Plan of care discussed with patient herself at bedside  New problems include dyspnea/edema      Risk/ complexity of medical care/ medical decision making: Moderate risk, diuretic management with underlying CKD  Chronic illness with severe exacerbation or progression: CKD      Labs:   Recent Results (from the past 24 hour(s))   Comprehensive Metabolic Panel    Collection Time: 05/13/24 12:23 PM    Specimen: Arm, Left; Blood   Result Value Ref Range    Glucose 107 (H) 65 - 99 mg/dL    BUN 36 (H) 8 - 23 mg/dL    Creatinine 1.51 (H) 0.57 - 1.00 mg/dL    Sodium 140 136 - 145 mmol/L    Potassium 4.2 3.5 - 5.2 mmol/L    Chloride 102 98 - 107 mmol/L    CO2 25.9 22.0 - 29.0 mmol/L    Calcium 9.4 8.6 - 10.5 mg/dL    Total Protein 6.4 6.0 - 8.5 g/dL    Albumin 4.0 3.5 - 5.2 g/dL    ALT (SGPT) 11 1 - 33 U/L    AST (SGOT) 8 1 - 32 U/L    Alkaline Phosphatase 93 39 - 117 U/L    Total Bilirubin 0.3 0.0 - 1.2 mg/dL    Globulin 2.4 gm/dL    A/G Ratio 1.7 g/dL    BUN/Creatinine Ratio 23.8 7.0 - 25.0    Anion Gap 12.1 5.0 - 15.0 mmol/L    eGFR 33.9 (L) >60.0 mL/min/1.73   Green Top (Gel)    Collection Time: 05/13/24 12:23 PM   Result Value Ref Range    Extra Tube Hold for add-ons.    Lavender Top    Collection Time: 05/13/24 12:23 PM   Result Value Ref Range    Extra Tube hold for add-on    Gold Top - SST    Collection Time: 05/13/24 12:23 PM   Result Value Ref Range    Extra Tube Hold for add-ons.    Light Blue Top    Collection Time: 05/13/24 12:23 PM   Result Value Ref Range    Extra Tube Hold for add-ons.    CBC Auto Differential    Collection Time: 05/13/24 12:23 PM    Specimen: Arm, Left; Blood   Result Value Ref Range    WBC 6.16 3.40 - 10.80 10*3/mm3    RBC 3.97 3.77 - 5.28 10*6/mm3    Hemoglobin 11.6 (L) 12.0 - 15.9 g/dL    Hematocrit 36.0 34.0 - 46.6 %    MCV 90.7 79.0 - 97.0 fL    MCH 29.2 26.6 - 33.0 pg    MCHC 32.2 31.5 - 35.7 g/dL    RDW 12.3 12.3 - 15.4 %    RDW-SD  40.4 37.0 - 54.0 fl    MPV 10.1 6.0 - 12.0 fL    Platelets 183 140 - 450 10*3/mm3    Neutrophil % 50.5 42.7 - 76.0 %    Lymphocyte % 34.3 19.6 - 45.3 %    Monocyte % 11.2 5.0 - 12.0 %    Eosinophil % 3.2 0.3 - 6.2 %    Basophil % 0.3 0.0 - 1.5 %    Immature Grans % 0.5 0.0 - 0.5 %    Neutrophils, Absolute 3.11 1.70 - 7.00 10*3/mm3    Lymphocytes, Absolute 2.11 0.70 - 3.10 10*3/mm3    Monocytes, Absolute 0.69 0.10 - 0.90 10*3/mm3    Eosinophils, Absolute 0.20 0.00 - 0.40 10*3/mm3    Basophils, Absolute 0.02 0.00 - 0.20 10*3/mm3    Immature Grans, Absolute 0.03 0.00 - 0.05 10*3/mm3    nRBC 0.0 0.0 - 0.2 /100 WBC   BNP    Collection Time: 05/13/24 12:23 PM    Specimen: Arm, Left; Blood   Result Value Ref Range    proBNP 267.0 0.0 - 1,800.0 pg/mL   D-dimer, Quantitative    Collection Time: 05/13/24 12:23 PM    Specimen: Arm, Left; Blood   Result Value Ref Range    D-Dimer, Quantitative 0.51 0.00 - 0.84 MCGFEU/mL   Duplex Venous Lower Extremity LEFT    Collection Time: 05/13/24  2:21 PM   Result Value Ref Range    Right Common Femoral Spont Y     Right Common Femoral Competent Y     Right Common Femoral Phasic Y     Right Common Femoral Compress C     Right Common Femoral Augment Y     Left Common Femoral Spont Y     Left Common Femoral Competent Y     Left Common Femoral Phasic Y     Left Common Femoral Compress C     Left Common Femoral Augment Y     Left Saphenofemoral Junction Compress C     Left Profunda Femoral Compress C     Left Proximal Femoral Compress C     Left Mid Femoral Spont Y     Left Mid Femoral Competent N     Left Mid Femoral Phasic Y     Left Mid Femoral Compress C     Left Mid Femoral Augment Y     Left Distal Femoral Compress C     Left Popliteal Spont Y     Left Popliteal Competent N     Left Popliteal Phasic Y     Left Popliteal Compress C     Left Popliteal Augment Y     Left Posterior Tibial Compress C     Left Peroneal Compress C     Left Gastronemius Compress C     Left Greater Saph AK  Compress C     Left Greater Saph BK Compress C     Left Lesser Saph Compress C     BH CV VAS PRELIMINARY FINDINGS SCRIPTING 1.0     Left Mid Femoral Spont 1.0     Left Popliteal Spont 1.0    ECG 12 Lead Dyspnea    Collection Time: 05/13/24  5:45 PM   Result Value Ref Range    QT Interval 412 ms    QTC Interval 448 ms   CBC (No Diff)    Collection Time: 05/14/24  4:46 AM    Specimen: Blood   Result Value Ref Range    WBC 5.24 3.40 - 10.80 10*3/mm3    RBC 3.91 3.77 - 5.28 10*6/mm3    Hemoglobin 11.4 (L) 12.0 - 15.9 g/dL    Hematocrit 35.8 34.0 - 46.6 %    MCV 91.6 79.0 - 97.0 fL    MCH 29.2 26.6 - 33.0 pg    MCHC 31.8 31.5 - 35.7 g/dL    RDW 12.7 12.3 - 15.4 %    RDW-SD 42.9 37.0 - 54.0 fl    MPV 9.8 6.0 - 12.0 fL    Platelets 182 140 - 450 10*3/mm3   Basic Metabolic Panel    Collection Time: 05/14/24  4:46 AM    Specimen: Blood   Result Value Ref Range    Glucose 132 (H) 65 - 99 mg/dL    BUN 35 (H) 8 - 23 mg/dL    Creatinine 1.36 (H) 0.57 - 1.00 mg/dL    Sodium 141 136 - 145 mmol/L    Potassium 4.3 3.5 - 5.2 mmol/L    Chloride 102 98 - 107 mmol/L    CO2 25.0 22.0 - 29.0 mmol/L    Calcium 9.4 8.6 - 10.5 mg/dL    BUN/Creatinine Ratio 25.7 (H) 7.0 - 25.0    Anion Gap 14.0 5.0 - 15.0 mmol/L    eGFR 38.5 (L) >60.0 mL/min/1.73       Radiology:  Imaging Results (Last 24 Hours)       Procedure Component Value Units Date/Time    XR Chest 1 View [[id number]] Collected: 05/13/24 1515     Updated: 05/13/24 1520    Narrative:      XR CHEST 1 VW-     HISTORY: Shortness of breath.     COMPARISON: Chest radiograph 8/27/2023     FINDINGS: A single view of the chest was obtained.     Support Devices:  None.  Cardiac Silhouette/Mediastinum/Katt: The cardiac silhouette is upper  normal to mildly enlarged. There is calcific aortic atherosclerosis.  There is surgical material projecting over the left hilar region.  Lungs/Pleural Spaces: There is a trace left pleural effusion and or  scarring with mild left basilar scarring. There is no  definite new focal  consolidation..  Chest Wall/Diaphragm/Upper Abdomen: There is degenerative disc disease.     This report was finalized on 5/13/2024 3:17 PM by Dr. Ashli Martini M.D  on Workstation: [id number]                ASSESSMENT:   CKD stage IIIa, creatinine minimally increased from her last evaluation in our office in 2021 from 1.2, currently 1.36  Volume overload, improved with Lasix  Dyspnea, improved with diuresis  Mild anemia, likely related to CKD    Dyspnea  Prior DVT/PE, no DVT seen on lower extremity Doppler  Hypothyroidism      DISCUSSION/PLAN:   Her renal function has improved, stable today and near baseline  Volume overload improved  Will give 1 more dose of IV Lasix this morning and she can transition back to her home dose of Lasix later tonight  No objection to discharge home today from renal standpoint  She can follow-up with me at my [place] office in 2 to 4 weeks for follow-up of her CKD  Continue potassium and ARB at current dosage now and at discharge    Continue to monitor electrolytes and volume closely, avoid IV contrast and nephrotoxic medications     I appreciate the consult request.  Please send me a secure chat message with any nonurgent questions regarding patient care.  For any urgent patient care issues please call my office number below.      Truong Hernandez MD  [facility]  Office phone number: [phone]  Answering service phone number: [phone]      5/14/2024        Dictation via Dragon dictation software

## 2024-05-14 NOTE — PLAN OF CARE
Goal Outcome Evaluation:  Plan of Care Reviewed With: patient              Patient is an 83 y.o. female admitted to [facility] for dyspnea on exertion and acute on chronic CHF on 5/13/2024. PMHx includes COPD, aifb, HTN. Patient is ind at baseline with use of rwx and lives alone. Today, patient performed bed mobility with SBA, required SBA for transfers, and ambulated 125ft using rwx requiring SBA. VC to maintain safe distance to rwx but no overt LOB. Strength, SOA, activity tolerance deficits noted. Patient may benefit from skilled PT services to address functional deficits and improve level of independence prior to discharge. Anticipate home with assist upon DC. Pt denies need for HHPT as she just finished OP PT.       Anticipated Discharge Disposition (PT): home with assist

## 2024-05-14 NOTE — THERAPY EVALUATION
Patient Name: Latanya Olsen  : 1940    MRN: [id number]                              Today's Date: 2024       Admit Date: 2024    Visit Dx:     ICD-10-CM ICD-9-CM   1. Acute on chronic congestive heart failure, unspecified heart failure type  I50.9 428.0   2. Dyspnea on exertion  R06.09 786.09     Patient Active Problem List   Diagnosis    Carcinoid tumor of lung    Diverticulosis of intestine    Obstructive sleep apnea syndrome    Weight loss    Vitamin D deficiency    Overweight (BMI 25.0-29.9)    Spinal stenosis of lumbar region without neurogenic claudication    Osteoarthritis of knee    Obesity (BMI 30-39.9)    Chronic lower back pain    Knee pain    Insomnia    Hypothyroidism    Hypokalemia    Primary hypertension    Mixed hyperlipidemia    Generalized osteoarthritis    Gastroparesis    Foot pain    Chronic obstructive pulmonary disease    Chronic constipation    Anemia    Weight gain    Pain of left breast    Elevated serum alkaline phosphatase level    Neck pain    Volume overload    Status post total hip replacement, right    Generalized weakness    Constipation    Paroxysmal atrial fibrillation    Idiopathic peripheral neuropathy    Lymphedema    Lumbar degenerative disc disease    GERD (gastroesophageal reflux disease)    Lung cancer    Non-STEMI (non-ST elevated myocardial infarction)    Nonischemic nontraumatic myocardial injury    Stage 3a chronic kidney disease    Adjustment disorder with depressed mood    Peripheral polyneuropathy    Chronic diastolic congestive heart failure    MCKEON (dyspnea on exertion)    Dyspnea     Past Medical History:   Diagnosis Date    Abnormal ECG Aug 2023    Acute exacerbation of CHF (congestive heart failure) 2023    Anemia     Anxiety 4-2-23    Arthritis     Arthritis of back     Arthritis of neck     Asthma 2006    Atrial fibrillation     Cataract     CKD (chronic kidney disease)     Stage 3    Congenital heart disease Aug 2023     Constipation     COPD (chronic obstructive pulmonary disease)     DDD (degenerative disc disease), lumbar     Deep vein thrombosis 2019    Depression     Diverticulosis     Fracture of hip     GERD (gastroesophageal reflux disease)     Hip arthrosis     History of heart attack     7/2023    Hx of degenerative disc disease     Hyperlipidemia     Hyperlipidemia 02/19/2016    Hypertension     Hypokalemia     Hypothyroidism     Knee swelling     Low back pain Yes    Lung cancer     history    Myocardial infarction 8-4-23    Obesity 218    Osteopenia Yes    Pulmonary embolism 2019    Renal disorder     Renal insufficiency 2019    Restless leg syndrome     Scoliosis Yes    Sleep apnea with use of continuous positive airway pressure (CPAP)      Past Surgical History:   Procedure Laterality Date    ADENOIDECTOMY  2000    BUNIONECTOMY Bilateral     CARDIAC CATHETERIZATION N/A 08/07/2023    Procedure: Left Heart Cath;  Surgeon: Mireya Park MD;  Location: [facility] CATH INVASIVE LOCATION;  Service: Cardiology;  Laterality: N/A;    CARDIAC CATHETERIZATION N/A 08/07/2023    Procedure: Coronary angiography;  Surgeon: Mireya Park MD;  Location: [facility] CATH INVASIVE LOCATION;  Service: Cardiology;  Laterality: N/A;    CARDIAC CATHETERIZATION N/A 08/07/2023    Procedure: Left ventriculography;  Surgeon: Mireya Park MD;  Location: [facility] CATH INVASIVE LOCATION;  Service: Cardiology;  Laterality: N/A;    CATARACT EXTRACTION      CHOLECYSTECTOMY      COLONOSCOPY      ENDOSCOPY N/A 06/24/2016    Procedure: ESOPHAGOGASTRODUODENOSCOPY  with biopsies;  Surgeon: Von Hernández MD;  Location: [facility] OR;  Service:     JOINT REPLACEMENT  Yes    LUNG LOBECTOMY Left     lower lobe    LYTIC THROMBIN THERAPY Left 03/26/2021    Procedure: left leg clot treiver possible venous stent *supine*;  Surgeon: Rogerio Vega MD;  Location: [facility] OR 18/19;  Service: Vascular;  Laterality: Left;    REPLACEMENT TOTAL KNEE Left      THYROID BIOPSY      TONSILLECTOMY  Yes    TOTAL HIP ARTHROPLASTY Right 06/01/2019    Procedure: BIPOLAR HIP CEMENTED POSTERIOR;  Surgeon: Ashley Crenshaw MD;  Location: [facility] OR;  Service: Orthopedics    TRIGGER POINT INJECTION      TUBAL ABDOMINAL LIGATION  50 yrs ago      General Information       Row Name 05/14/24 0849          Physical Therapy Time and Intention    Document Type evaluation  -CB     Mode of Treatment individual therapy;physical therapy  -CB       Row Name 05/14/24 0849          General Information    Patient Profile Reviewed yes  -CB     Prior Level of Function independent:;gait;transfer;bed mobility  rwx  -CB     Existing Precautions/Restrictions fall  -CB     Barriers to Rehab none identified  -CB       Row Name 05/14/24 0849          Living Environment    People in Home alone  -CB       Row Name 05/14/24 0849          Home Main Entrance    Number of Stairs, Main Entrance none  ramp to enter home  -CB       Row Name 05/14/24 0849          Cognition    Orientation Status (Cognition) oriented x 3  -CB       Row Name 05/14/24 0849          Safety Issues, Functional Mobility    Impairments Affecting Function (Mobility) endurance/activity tolerance;shortness of breath;strength  -CB               User Key  (r) = Recorded By, (t) = Taken By, (c) = Cosigned By      Initials Name Provider Type    CB Danii Costello PT Physical Therapist                   Mobility       Row Name 05/14/24 0876          Bed Mobility    Bed Mobility supine-sit;sit-supine  -CB     Supine-Sit Wells (Bed Mobility) standby assist;verbal cues  -CB     Sit-Supine Wells (Bed Mobility) standby assist;verbal cues  -CB       Row Name 05/14/24 0810          Sit-Stand Transfer    Sit-Stand Wells (Transfers) standby assist;verbal cues  -CB     Assistive Device (Sit-Stand Transfers) walker, front-wheeled  -CB       Row Name 05/14/24 0875          Gait/Stairs (Locomotion)    Wells Level  (Gait) standby assist;verbal cues  -CB     Assistive Device (Gait) walker, front-wheeled  -CB     Distance in Feet (Gait) 125  -CB     Deviations/Abnormal Patterns (Gait) gait speed decreased;stride length decreased  -CB     Comment, (Gait/Stairs) VC to maintain safe distance to rwx  -CB               User Key  (r) = Recorded By, (t) = Taken By, (c) = Cosigned By      Initials Name Provider Type    CB Danii Costello, PT Physical Therapist                   Obj/Interventions       Row Name 05/14/24 0859          Range of Motion Comprehensive    General Range of Motion bilateral lower extremity ROM WFL  -CB       Row Name 05/14/24 0859          Strength Comprehensive (MMT)    General Manual Muscle Testing (MMT) Assessment lower extremity strength deficits identified  -CB       Row Name 05/14/24 0859          Balance    Balance Assessment standing static balance;standing dynamic balance  -CB     Static Standing Balance standby assist  -CB     Dynamic Standing Balance standby assist  -CB     Position/Device Used, Standing Balance supported;walker, front-wheeled  -CB       Row Name 05/14/24 0859          Sensory Assessment (Somatosensory)    Sensory Assessment (Somatosensory) sensation intact  -CB               User Key  (r) = Recorded By, (t) = Taken By, (c) = Cosigned By      Initials Name Provider Type    CB Danii Costello, PT Physical Therapist                   Goals/Plan    No documentation.                  Clinical Impression       Row Name 05/14/24 0901          Pain    Pre/Posttreatment Pain Comment chronic LBP  -CB     Pain Intervention(s) Repositioned;Rest;Ambulation/increased activity  -CB       Row Name 05/14/24 0901          Plan of Care Review    Plan of Care Reviewed With patient  -CB     Outcome Evaluation Patient is an 83 y.o. female admitted to [facility] for dyspnea on exertion and acute on chronic CHF on 5/13/2024. PMHx includes COPD, aifb, HTN. Patient is ind at baseline with use of rwx and lives alone.  Today, patient performed bed mobility with SBA, required SBA for transfers, and ambulated 125ft using rwx requiring SBA. VC to maintain safe distance to rwx but no overt LOB. Strength, SOA, activity tolerance deficits noted. Patient may benefit from skilled PT services to address functional deficits and improve level of independence prior to discharge. Anticipate home with assist upon DC. Pt denies need for HHPT as she just finished OP PT.  -CB       Row Name 05/14/24 0901          Therapy Assessment/Plan (PT)    Rehab Potential (PT) good, to achieve stated therapy goals  -CB     Criteria for Skilled Interventions Met (PT) yes  -CB     Therapy Frequency (PT) 5 times/wk  -CB       Row Name 05/14/24 0901          Vital Signs    O2 Delivery Pre Treatment supplemental O2  -CB     Intra SpO2 (%) 88  -CB     O2 Delivery Intra Treatment room air  -CB     Post SpO2 (%) 92  -CB     O2 Delivery Post Treatment room air  -CB     Recovery Time replaced supp O2  -CB       Row Name 05/14/24 0901          Positioning and Restraints    Pre-Treatment Position in bed  -CB     Post Treatment Position bed  -CB     In Bed notified nsg;fowlers;call light within reach;encouraged to call for assist;exit alarm on;side rails up x2  -CB               User Key  (r) = Recorded By, (t) = Taken By, (c) = Cosigned By      Initials Name Provider Type    Danii Vaughan, PT Physical Therapist                   Outcome Measures       Row Name 05/14/24 0911          How much help from another person do you currently need...    Turning from your back to your side while in flat bed without using bedrails? 4  -CB     Moving from lying on back to sitting on the side of a flat bed without bedrails? 3  -CB     Moving to and from a bed to a chair (including a wheelchair)? 3  -CB     Standing up from a chair using your arms (e.g., wheelchair, bedside chair)? 3  -CB     Climbing 3-5 steps with a railing? 3  -CB     To walk in hospital room? 3  -CB     AM-PAC  6 Clicks Score (PT) 19  -CB     Highest Level of Mobility Goal 6 --> Walk 10 steps or more  -CB       Row Name 05/14/24 0925          Functional Assessment    Outcome Measure Options AM-PAC 6 Clicks Basic Mobility (PT)  -CB               User Key  (r) = Recorded By, (t) = Taken By, (c) = Cosigned By      Initials Name Provider Type    CB Danii Costello, PT Physical Therapist                                 Physical Therapy Education       Title: PT OT SLP Therapies (In Progress)       Topic: Physical Therapy (In Progress)       Point: Mobility training (Done)       Learning Progress Summary             Patient Acceptance, E,TB, VU,NR by CB at 5/14/2024 0925                         Point: Home exercise program (Not Started)       Learner Progress:  Not documented in this visit.              Point: Body mechanics (Done)       Learning Progress Summary             Patient Acceptance, E,TB, VU,NR by CB at 5/14/2024 0925                         Point: Precautions (Done)       Learning Progress Summary             Patient Acceptance, E,TB, VU,NR by CB at 5/14/2024 0925                                         User Key       Initials Effective Dates Name Provider Type Discipline    CB 10/22/21 -  Danii Costello, TAMMI Physical Therapist PT                  PT Recommendation and Plan     Plan of Care Reviewed With: patient  Outcome Evaluation: Patient is an 83 y.o. female admitted to [facility] for dyspnea on exertion and acute on chronic CHF on 5/13/2024. PMHx includes COPD, aifb, HTN. Patient is ind at baseline with use of rwx and lives alone. Today, patient performed bed mobility with SBA, required SBA for transfers, and ambulated 125ft using rwx requiring SBA. VC to maintain safe distance to rwx but no overt LOB. Strength, SOA, activity tolerance deficits noted. Patient may benefit from skilled PT services to address functional deficits and improve level of independence prior to discharge. Anticipate home with assist upon DC. Pt  denies need for HHPT as she just finished OP PT.     Time Calculation:         PT Charges       Row Name 05/14/24 0926             Time Calculation    Start Time 0846  -CB      Stop Time 0903  -CB      Time Calculation (min) 17 min  -CB      PT Received On 05/14/24  -CB      PT - Next Appointment 05/15/24  -CB      PT Goal Re-Cert Due Date 05/21/24  -CB         Time Calculation- PT    Total Timed Code Minutes- PT 8 minute(s)  -CB         Timed Charges    94443 - PT Therapeutic Activity Minutes 8  -CB         Total Minutes    Timed Charges Total Minutes 8  -CB       Total Minutes 8  -CB                User Key  (r) = Recorded By, (t) = Taken By, (c) = Cosigned By      Initials Name Provider Type    CB Danii Costello, PT Physical Therapist                  Therapy Charges for Today       Code Description Service Date Service Provider Modifiers Qty    44250756251 HC PT THERAPEUTIC ACT EA 15 MIN 5/14/2024 Danii Costello, PT GP 1    40041130941 HC PT EVAL MOD COMPLEXITY 3 5/14/2024 Danii Costello, PT GP 1            PT G-Codes  Outcome Measure Options: AM-PAC 6 Clicks Basic Mobility (PT)  AM-PAC 6 Clicks Score (PT): 19  PT Discharge Summary  Anticipated Discharge Disposition (PT): home with assist    Danii Costello PT  5/14/2024

## 2024-05-15 ENCOUNTER — TRANSITIONAL CARE MANAGEMENT TELEPHONE ENCOUNTER (OUTPATIENT)
Dept: CALL CENTER | Facility: HOSPITAL | Age: 84
End: 2024-05-15
Payer: MEDICARE

## 2024-05-15 NOTE — OUTREACH NOTE
Call Center TCM Note      Flowsheet Row Responses   [facility] patient discharged from? [place]   Does the patient have one of the following disease processes/diagnoses(primary or secondary)? Other   TCM attempt successful? Yes   Call start time 0915   Call end time 0933   Is patient permission given to speak with other caregiver? Yes   Person spoke with today (if not patient) and relationship Patient and Alfred moran.   Meds reviewed with patient/caregiver? Yes   Is the patient having any side effects they believe may be caused by any medication additions or changes? No   Does the patient have all medications ordered at discharge? No   What is keeping the patient from filling the prescriptions? --  [States her pharmacy will deliver antibiotic today.]   Nursing Interventions No intervention needed   Is the patient taking all medications as directed (includes completed medication regime)? No   What is preventing the patient from taking all medications as directed? Other   Nursing Interventions Nurse provided patient education   Medication comments Patient states her pharmacy will deliver antibiotic today.   Comments Patient already had 15 minute office visit appt scheduled with PCP for 05/29/24, past TCM time frame. States wishes to keep this appt unless PCP feels earlier appt needed. Will route to PCP office for review.   Does the patient have an appointment with their PCP within 7-14 days of discharge? No   Nursing Interventions Confirmed date/time of appointment, Routed TCM call to PCP office   Has home health visited the patient within 72 hours of discharge? N/A   Psychosocial issues? No   Did the patient receive a copy of their discharge instructions? Yes   Nursing interventions Reviewed instructions with patient   What is the patient's perception of their health status since discharge? Same   Is the patient/caregiver able to teach back signs and symptoms related to disease process for when to call PCP?  Yes   Is the patient/caregiver able to teach back signs and symptoms related to disease process for when to call 911? Yes   Is the patient/caregiver able to teach back the hierarchy of who to call/visit for symptoms/problems? PCP, Specialist, Home health nurse, Urgent Care, ED, 911 Yes   If the patient is a current smoker, are they able to teach back resources for cessation? Not a smoker   Additional teach back comments Reviewed s/s of worsening fluid overload, DVT.   TCM call completed? Yes   Wrap up additional comments Patient states is about the same. States still has some SOA on exertion, productive cough with clear phlegm. Denies any fever or chest pain. States still has soreness at hematoma on left lower leg. Denies any needs today. Patient's niece is very supportive. TCM complete.   Call end time 0933   Would this patient benefit from a Referral to [facility] Social Work? No   Is the patient interested in additional calls from an ambulatory ? No            Pamela Nova RN    5/15/2024, 09:35 EDT

## 2024-05-23 ENCOUNTER — READMISSION MANAGEMENT (OUTPATIENT)
Dept: CALL CENTER | Facility: HOSPITAL | Age: 84
End: 2024-05-23
Payer: MEDICARE

## 2024-05-23 NOTE — OUTREACH NOTE
Medical Week 2 Survey      Flowsheet Row Responses   [facility] patient discharged from? [place]   Does the patient have one of the following disease processes/diagnoses(primary or secondary)? Other   Week 2 attempt successful? No   Unsuccessful attempts Attempt 1            Tere SANDERSON - Registered Nurse

## 2024-05-29 ENCOUNTER — OFFICE VISIT (OUTPATIENT)
Dept: FAMILY MEDICINE CLINIC | Facility: CLINIC | Age: 84
End: 2024-05-29
Payer: MEDICARE

## 2024-05-29 VITALS
SYSTOLIC BLOOD PRESSURE: 150 MMHG | HEART RATE: 64 BPM | HEIGHT: 66 IN | DIASTOLIC BLOOD PRESSURE: 78 MMHG | TEMPERATURE: 97.5 F | WEIGHT: 220 LBS | OXYGEN SATURATION: 94 % | BODY MASS INDEX: 35.36 KG/M2

## 2024-05-29 DIAGNOSIS — I50.22 CHRONIC SYSTOLIC HEART FAILURE: ICD-10-CM

## 2024-05-29 DIAGNOSIS — Z09 HOSPITAL DISCHARGE FOLLOW-UP: Primary | ICD-10-CM

## 2024-05-29 DIAGNOSIS — N18.31 STAGE 3A CHRONIC KIDNEY DISEASE: ICD-10-CM

## 2024-05-29 DIAGNOSIS — L03.116 CELLULITIS OF LEFT LEG: ICD-10-CM

## 2024-05-29 DIAGNOSIS — J44.9 CHRONIC OBSTRUCTIVE PULMONARY DISEASE, UNSPECIFIED COPD TYPE: ICD-10-CM

## 2024-05-29 DIAGNOSIS — G47.33 OSA ON CPAP: ICD-10-CM

## 2024-05-29 DIAGNOSIS — R06.09 DOE (DYSPNEA ON EXERTION): ICD-10-CM

## 2024-05-29 DIAGNOSIS — S80.12XS: ICD-10-CM

## 2024-05-29 PROCEDURE — 3077F SYST BP >= 140 MM HG: CPT | Performed by: FAMILY MEDICINE

## 2024-05-29 PROCEDURE — 1125F AMNT PAIN NOTED PAIN PRSNT: CPT | Performed by: FAMILY MEDICINE

## 2024-05-29 PROCEDURE — 99214 OFFICE O/P EST MOD 30 MIN: CPT | Performed by: FAMILY MEDICINE

## 2024-05-29 PROCEDURE — 1111F DSCHRG MED/CURRENT MED MERGE: CPT | Performed by: FAMILY MEDICINE

## 2024-05-29 PROCEDURE — 3078F DIAST BP <80 MM HG: CPT | Performed by: FAMILY MEDICINE

## 2024-05-29 NOTE — PROGRESS NOTES
Transitional Care Follow Up Visit  Subjective     Latanya Olsen is a 83 y.o. female who presents for a transitional care management visit.    Within 48 business hours after discharge our office contacted her via telephone to coordinate her care and needs.      I reviewed and discussed the details of that call along with the discharge summary, hospital problems, inpatient lab results, inpatient diagnostic studies, and consultation reports with Latanya.     Current outpatient and discharge medications have been reconciled for the patient.  Reviewed by: Meredith Lea Kehrer, MD          5/14/2024     6:39 PM   Date of TCM Phone Call   [facility]   Date of Admission 5/13/2024   Date of Discharge 5/14/2024   Discharge Disposition Home or Self Care     Risk for Readmission (LACE) Score: 8 (5/14/2024  6:00 AM)      History of Present Illness   Course During Hospital Stay:  below  History of Present Illness  The patient is an 83-year-old female who is here for hospital follow-up.    On 05/11/2024, the patient sustained an injury to her left leg due to a car door impacting her while attempting to remove packages from her vehicle. Following the incident, she developed a large bruise and a knot on her left leg. The following morning, she noticed erythema, warmth, and edema in her left leg. She sought emergency care on 04/30/2024, where she was informed that her condition was stable and that a blood clot did not form within 24 hours.  One time when she went to physical therapy, they recommended she go to the hospital ER which led to her hospital admission on 05/13/2024. On 05/13/2024, she returned to the hospital due to dyspnea, suspected to be a blood clot, and was admitted overnight. She was treated for cellulitis of the left leg with Keflex and underwent an evaluation due to her history of heart failure, pulmonary issues, and deep vein thrombosis (DVT). She was informed that her condition was stable with no  blood clots and her dyspnea was attributed to her chronic obstructive pulmonary disease (COPD). No alterations to her medication regimen were made and she was discharged with a 6-day course of antibiotics. She continues to experience soreness and a knot in her left leg, but does not believe there is anymore evidence of cellulitis.    The patient continues to experience dyspnea during physical activity. Her oxygen saturation decreases to 88 at rest, but increases to 94 at rest. She uses a CPAP machine without oxygen, which she occasionally uses during the day when her dyspnea becomes severe, which she finds beneficial. She was advised to consult with a pulmonologist, but she has not yet consulted with one. She has previously consulted with Dr. Truong Duggan, a nephrologist, and was referred to this facility for laboratory tests. She has an upcoming appointment with Dr. Duggan on 06/06/2024. She maintains adequate hydration and expectorates phlegm. She does not require any medication refills at this time.        The following portions of the patient's history were reviewed and updated as appropriate: allergies, current medications, past family history, past medical history, past social history, past surgical history, and problem list.    Review of Systems    Objective   Physical Exam  Physical Exam  The patient is alert, in no acute distress.  Pupils are equal. The oral mucosa is moist.  Lungs are chronically decreased but clear. No crackles.  Heart sounds are regular.  Compression hose are present on both legs up above the knee with chronic edema. There is a little bit of redness in the musculoskeletal system, but it is not warm. There is no warmth. The back of the left calf has a 4 cm round hard knot, which is a hematoma.       Results       Assessment & Plan   Diagnoses and all orders for this visit:    1. Hospital discharge follow-up (Primary)    2. MCKEON (dyspnea on exertion)  -     CBC & Differential  -      Comprehensive Metabolic Panel    3. Chronic systolic heart failure  -     CBC & Differential  -     Comprehensive Metabolic Panel    4. Chronic obstructive pulmonary disease, unspecified COPD type    5. SIERRA on CPAP    6. Contusion of left calf, sequela    7. Cellulitis of left leg  -     CBC & Differential    8. Stage 3a chronic kidney disease  -     Ambulatory Referral to Nephrology  -     Vitamin D,25-Hydroxy  -     CBC & Differential  -     Comprehensive Metabolic Panel  -     PTH, Intact             Assessment & Plan  1. Hematoma in the left leg.  The patient was informed that the hematoma is expected to resolve gradually over a period of 3 months.    2. Chronic obstructive pulmonary disease (COPD) and chronic congestive heart failure.  A comprehensive set of laboratory tests will be ordered for the patient.    Follow-up  The patient is scheduled for a Medicare wellness visit in 6 months.      Patient or patient representative verbalized consent for the use of Ambient Listening during the visit with  Meredith Lea Kehrer, MD for chart documentation. 5/29/2024  14:11 EDT  Answers submitted by the patient for this visit:  Primary Reason for Visit (Submitted on 5/27/2024)  What is the primary reason for your visit?: Shortness of Breath  Shortness of Breath Questionnaire (Submitted on 5/27/2024)  Chief Complaint: Shortness of breath  chest congestion: No

## 2024-05-30 LAB
25(OH)D3+25(OH)D2 SERPL-MCNC: 26.2 NG/ML (ref 30–100)
ALBUMIN SERPL-MCNC: 4.3 G/DL (ref 3.7–4.7)
ALBUMIN/GLOB SERPL: 2.5 {RATIO} (ref 1.2–2.2)
ALP SERPL-CCNC: 86 IU/L (ref 44–121)
ALT SERPL-CCNC: 11 IU/L (ref 0–32)
AST SERPL-CCNC: 12 IU/L (ref 0–40)
BASOPHILS # BLD AUTO: 0 X10E3/UL (ref 0–0.2)
BASOPHILS NFR BLD AUTO: 0 %
BILIRUB SERPL-MCNC: 0.3 MG/DL (ref 0–1.2)
BUN SERPL-MCNC: 28 MG/DL (ref 8–27)
BUN/CREAT SERPL: 19 (ref 12–28)
CALCIUM SERPL-MCNC: 9.3 MG/DL (ref 8.7–10.3)
CHLORIDE SERPL-SCNC: 101 MMOL/L (ref 96–106)
CO2 SERPL-SCNC: 27 MMOL/L (ref 20–29)
CREAT SERPL-MCNC: 1.46 MG/DL (ref 0.57–1)
EGFRCR SERPLBLD CKD-EPI 2021: 35 ML/MIN/1.73
EOSINOPHIL # BLD AUTO: 0.2 X10E3/UL (ref 0–0.4)
EOSINOPHIL NFR BLD AUTO: 4 %
ERYTHROCYTE [DISTWIDTH] IN BLOOD BY AUTOMATED COUNT: 12.7 % (ref 11.7–15.4)
GLOBULIN SER CALC-MCNC: 1.7 G/DL (ref 1.5–4.5)
GLUCOSE SERPL-MCNC: 96 MG/DL (ref 70–99)
HCT VFR BLD AUTO: 35.3 % (ref 34–46.6)
HGB BLD-MCNC: 11.7 G/DL (ref 11.1–15.9)
IMM GRANULOCYTES # BLD AUTO: 0 X10E3/UL (ref 0–0.1)
IMM GRANULOCYTES NFR BLD AUTO: 0 %
LYMPHOCYTES # BLD AUTO: 2.3 X10E3/UL (ref 0.7–3.1)
LYMPHOCYTES NFR BLD AUTO: 38 %
MCH RBC QN AUTO: 30 PG (ref 26.6–33)
MCHC RBC AUTO-ENTMCNC: 33.1 G/DL (ref 31.5–35.7)
MCV RBC AUTO: 91 FL (ref 79–97)
MONOCYTES # BLD AUTO: 0.6 X10E3/UL (ref 0.1–0.9)
MONOCYTES NFR BLD AUTO: 10 %
NEUTROPHILS # BLD AUTO: 2.9 X10E3/UL (ref 1.4–7)
NEUTROPHILS NFR BLD AUTO: 48 %
PLATELET # BLD AUTO: 166 X10E3/UL (ref 150–450)
POTASSIUM SERPL-SCNC: 4.3 MMOL/L (ref 3.5–5.2)
PROT SERPL-MCNC: 6 G/DL (ref 6–8.5)
PTH-INTACT SERPL-MCNC: 58 PG/ML (ref 15–65)
RBC # BLD AUTO: 3.9 X10E6/UL (ref 3.77–5.28)
SODIUM SERPL-SCNC: 139 MMOL/L (ref 134–144)
WBC # BLD AUTO: 6.1 X10E3/UL (ref 3.4–10.8)

## 2024-05-31 ENCOUNTER — READMISSION MANAGEMENT (OUTPATIENT)
Dept: CALL CENTER | Facility: HOSPITAL | Age: 84
End: 2024-05-31
Payer: MEDICARE

## 2024-05-31 NOTE — OUTREACH NOTE
Medical Week 3 Survey      Flowsheet Row Responses   [facility] patient discharged from? [place]   Does the patient have one of the following disease processes/diagnoses(primary or secondary)? Other   Week 3 attempt successful? Yes   Call start time 1318   Call end time 1319   Discharge diagnosis Dyspnea   Is patient permission given to speak with other caregiver? Yes   List who call center can speak with Dean Simon   Person spoke with today (if not patient) and relationship Dean Simon   Meds reviewed with patient/caregiver? Yes   Is the patient taking all medications as directed (includes completed medication regime)? Yes   Comments regarding appointments Cards apt on 6/18/24   Does the patient have a primary care provider?  Yes   Has the patient kept scheduled appointments due by today? Yes   What is the patient's perception of their health status since discharge? Improving   Week 3 Call Completed? Yes   Graduated Yes   Graduated/Revoked comments Dean rajput pt is improving-dean rajput pt has had her FU apts since hosp dc   Call end time 1319            Ayse MASSEY - Registered Nurse

## 2024-06-04 RX ORDER — LEVOTHYROXINE SODIUM 0.1 MG/1
TABLET ORAL
Qty: 90 TABLET | Refills: 0 | Status: SHIPPED | OUTPATIENT
Start: 2024-06-04

## 2024-06-05 ENCOUNTER — TELEPHONE (OUTPATIENT)
Dept: CARDIOLOGY | Facility: CLINIC | Age: 84
End: 2024-06-05

## 2024-06-05 NOTE — TELEPHONE ENCOUNTER
Caller: Latanya Olsen    Relationship to patient: Self    Best call back number: [phone]    Chief complaint:     Type of visit: FOLLOW UP     Requested date:  MONDAY, WEDNESDAY OR FRIDAY APPOINTMENTS WORK BETTER    If rescheduling, when is the original appointment: 06.18.24     Additional notes:PATIENT WAS CONTACTED TO RESCHEDULE APPOINTMENT ON 06.18.24 WITH DR. SILVERIO DUE TO PROVIDER BEING OUT OF OFFICE. HUB ATTEMPTED TO RESCHEDULE BUT SOMEONE WAS IN CHART AND WAS UNABLE. PATIENT WOULD LIKE AN APPOINTMENT ON A MONDAY, WEDNESDAY OR FRIDAY AND IT NEEDS TO BE AT THE [place] LOCATION.

## 2024-06-06 NOTE — TELEPHONE ENCOUNTER
06/06/24    Called and left a voicemail for patient to return call to reschedule this appointment.     Thanks  Kenneth

## 2024-06-10 ENCOUNTER — TELEPHONE (OUTPATIENT)
Dept: FAMILY MEDICINE CLINIC | Facility: CLINIC | Age: 84
End: 2024-06-10
Payer: MEDICARE

## 2024-06-10 DIAGNOSIS — I87.2 VENOUS INSUFFICIENCY OF BOTH LOWER EXTREMITIES: Primary | ICD-10-CM

## 2024-06-10 NOTE — TELEPHONE ENCOUNTER
SHE IS STATING THAT SHE WAS TOLD THAT WHEN SHE LEFT THE HOSPITAL.  IT IS DUE TO LEG PAIN AND SWELLING.

## 2024-06-11 PROBLEM — R60.0 EDEMA OF LEFT LOWER EXTREMITY: Status: ACTIVE | Noted: 2024-06-11

## 2024-06-11 NOTE — TELEPHONE ENCOUNTER
I put in that referral.  I am not sure he would do anything other than recommend she continue with the support hose.

## 2024-06-12 ENCOUNTER — OFFICE VISIT (OUTPATIENT)
Age: 84
End: 2024-06-12
Payer: MEDICARE

## 2024-06-12 DIAGNOSIS — N18.31 STAGE 3A CHRONIC KIDNEY DISEASE: Primary | ICD-10-CM

## 2024-06-12 DIAGNOSIS — I87.2 VENOUS (PERIPHERAL) INSUFFICIENCY: ICD-10-CM

## 2024-06-12 DIAGNOSIS — I89.0 LYMPHEDEMA: ICD-10-CM

## 2024-06-12 DIAGNOSIS — I82.422 ILIAC VEIN THROMBOSIS, LEFT: ICD-10-CM

## 2024-06-12 DIAGNOSIS — I80.212: ICD-10-CM

## 2024-06-12 DIAGNOSIS — T14.8XXA HEMATOMA: ICD-10-CM

## 2024-06-12 DIAGNOSIS — I87.8 VENOUS STASIS: ICD-10-CM

## 2024-06-12 PROCEDURE — 99204 OFFICE O/P NEW MOD 45 MIN: CPT | Performed by: SURGERY

## 2024-06-12 PROCEDURE — 1159F MED LIST DOCD IN RCRD: CPT | Performed by: SURGERY

## 2024-06-12 PROCEDURE — 1160F RVW MEDS BY RX/DR IN RCRD: CPT | Performed by: SURGERY

## 2024-06-12 RX ORDER — BUMETANIDE 1 MG/1
1 TABLET ORAL
COMMUNITY
Start: 2024-06-06

## 2024-06-12 NOTE — PROGRESS NOTES
Patient Name: Latanya Olsen    MRN: [id number] Encounter Date: 06/12/2024      Consulting Service: Vascular Surgery    Referring Provider: Meredith Lea Kehrer, MD       CHIEF COMPLAINT:  Chief Complaint   Patient presents with    Chronic Venous Insufficiency     Lower extremity venous insufficiency       Subjective    HPI: Latanya Olsen is a 83 y.o. female is being seen for evaluation/management of what appears to be combined venous and lymphedema.  She has deep vein insufficiency on duplex scan done a month ago at the hospital when she developed a hematoma on her posterior calf that is slowly but steadily improving.  At this point in time she is on Eliquis for prior arterial clots.  She has not been followed up very recently from that standpoint but I feel good palpable pulses at both feet.  She is aware she needs to stay on these blood thinners.  Her swelling has been persistent for some time and is gotten to the point now where she is having a lot of pain in her left leg that I am not clear is developed due to the swelling or other issues.    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    Cataract 1-1/2015    Chronic constipation 02/19/2016    Chronic deep vein thrombosis (DVT) of left femoral vein 08/12/2021    Chronic deep vein thrombosis (DVT) of left popliteal vein 08/12/2021    Chronic lower back pain 02/19/2016    Chronic obstructive pulmonary disease  02/19/2016    Chronic obstructive pulmonary disease, unspecified     CKD (chronic kidney disease)     Stage 3    Compression of vein 05/07/2021    Congenital heart disease Aug 2023    Constipation     Constipation 03/10/2021    COPD (chronic obstructive pulmonary disease)     DDD (degenerative disc disease), lumbar     Deep vein thrombosis 2019    Dependence on other enabling machines and devices     CPAP    Depression     Diverticulosis     Diverticulosis of intestine 02/19/2016    Elevated serum alkaline phosphatase level 02/22/2016    Essential (primary) hypertension     Foot pain 02/19/2016    Fracture of hip     Gastroparesis 02/19/2016    Generalized osteoarthritis 02/19/2016    Generalized weakness 03/06/2021    GERD (gastroesophageal reflux disease)     Hip arthrosis     History of heart attack     7/2023    Hx of degenerative disc disease     Hyperlipidemia     Hyperlipidemia 02/19/2016    Hyperlipidemia, unspecified     Hypertension     Hypertension 02/19/2016    Hypokalemia     Hypokalemia 02/19/2016    Hypothyroidism     Hypothyroidism 02/19/2016    Hypothyroidism, unspecified     Insomnia 02/19/2016    Knee pain 02/19/2016    Knee swelling     Low back pain Yes    Lung cancer     history    Myocardial infarction 8-4-23    Neck pain 11/28/2017    Neutropenia 02/19/2016    Neutropenia, unspecified     Obesity 218    Obesity (BMI 30-39.9) 02/19/2016    Obstructive sleep apnea (adult) (pediatric)     Obstructive sleep apnea syndrome 02/19/2016    Osteoarthritis of knee 02/19/2016    Osteopenia Yes    Overweight (BMI 25.0-29.9) 02/19/2016    Pain of left breast 02/22/2016    Pulmonary embolism 2019    Renal disorder     Renal insufficiency 2019    Restless leg syndrome     Scoliosis Yes    Sleep apnea with use of continuous positive airway pressure (CPAP)     Spinal stenosis of lumbar region without neurogenic claudication 02/19/2016    Spinal stenosis, lumbar region without neurogenic claudication  08/12/2021    Status post total hip replacement, 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    Unspecified atrial fibrillation     Vitamin D deficiency 02/19/2016    Volume overload 03/19/2019    Weakness 08/12/2021    Weight gain 02/19/2016    Weight loss 02/19/2016      PAST SURGICAL HISTORY:   Past Surgical History:   Procedure Laterality Date    ADENOIDECTOMY  2000    BUNIONECTOMY Bilateral     CARDIAC CATHETERIZATION N/A 08/07/2023    Procedure: Left Heart Cath;  Surgeon: Mireya Park MD;  Location: [facility] CATH INVASIVE LOCATION;  Service: Cardiology;  Laterality: N/A;    CARDIAC CATHETERIZATION N/A 08/07/2023    Procedure: Coronary angiography;  Surgeon: Mireya Park MD;  Location: [facility] CATH INVASIVE LOCATION;  Service: Cardiology;  Laterality: N/A;    CARDIAC CATHETERIZATION N/A 08/07/2023    Procedure: Left ventriculography;  Surgeon: Mireya Park MD;  Location: [facility] CATH INVASIVE LOCATION;  Service: Cardiology;  Laterality: N/A;    CATARACT EXTRACTION      CHOLECYSTECTOMY      COLONOSCOPY      ENDOSCOPY N/A 06/24/2016    Procedure: ESOPHAGOGASTRODUODENOSCOPY  with biopsies;  Surgeon: Von Hernández MD;  Location: [facility] OR;  Service:     JOINT REPLACEMENT  Yes    LUNG LOBECTOMY Left     lower lobe    LYTIC THROMBIN THERAPY Left 03/26/2021    Procedure: left leg clot treiver possible venous stent *supine*;  Surgeon: Rogerio Vega MD;  Location: [facility] OR 18/19;  Service: Vascular;  Laterality: Left;    REPLACEMENT TOTAL KNEE Left     THYROID BIOPSY      TONSILLECTOMY  Yes    TOTAL HIP ARTHROPLASTY Right 06/01/2019    Procedure: BIPOLAR HIP CEMENTED POSTERIOR;  Surgeon: Ashley Crenshaw MD;  Location: [facility] OR;  Service: Orthopedics    TRIGGER POINT INJECTION      TUBAL ABDOMINAL LIGATION  50 yrs ago      FAMILY HISTORY:   Family History   Problem Relation Age of Onset    Heart attack Mother     Coronary artery disease Mother     Hypertension Mother     Kidney disease Mother      Arthritis Mother     Heart disease Mother     Heart disease Father     Heart attack Sister     Aortic aneurysm Brother         Abdominal    Cancer Brother         no details    Colon cancer Neg Hx     Colon polyps Neg Hx     Esophageal cancer Neg Hx     Breast cancer Neg Hx       SOCIAL HISTORY:   Social History     Tobacco Use    Smoking status: Never     Passive exposure: Never    Smokeless tobacco: Never    Tobacco comments:     Never; Patient does not smoke   Vaping Use    Vaping status: Never Used   Substance Use Topics    Alcohol use: Never     Comment: Patient is non drinker.    Drug use: Never     Comment: Drug Abuse: none      MEDICATIONS:   Current Outpatient Medications on File Prior to Visit   Medication Sig Dispense Refill    bumetanide (BUMEX) 1 MG tablet Take 1 tablet by mouth.      acetaminophen (TYLENOL) 325 MG tablet Take 2 tablets by mouth Every 4 (Four) Hours As Needed for Mild Pain .      alendronate (Fosamax) 70 MG tablet Take 1 tablet by mouth Every 7 (Seven) Days. 13 tablet 3    apixaban (Eliquis) 5 MG tablet tablet Take 1 tablet by mouth Every 12 (Twelve) Hours. 180 tablet 3    carvedilol (COREG) 12.5 MG tablet Take 1 tablet by mouth 2 (Two) Times a Day With Meals. 60 tablet 5    chlorhexidine (PERIDEX) 0.12 % solution APPLY 15 ML AS DIRECTED TWICE DAILY SWISH FOR 30 seconds AND expectorate, EVERY MORNING AND IN THE EVENING TO BEGIN in 24 hours AFTER surgery      coenzyme Q10 100 MG capsule Take 1 capsule by mouth Daily.      cyclobenzaprine (FLEXERIL) 10 MG tablet Take 1 tablet by mouth 2 (Two) Times a Day As Needed for Muscle Spasms. 14 tablet 0    dilTIAZem CD (CARDIZEM CD) 120 MG 24 hr capsule Take 1 capsule by mouth Take As Directed.      Enulose 10 GM/15ML solution solution (encephalopathy)       esomeprazole (NexIUM) 40 MG capsule Take 1 capsule by mouth Every Morning Before Breakfast.      ferrous sulfate 325 (65 FE) MG tablet Take 1 tablet by mouth Daily With Breakfast.      folic  acid (FOLVITE) 1 MG tablet Take 1 tablet by mouth Daily. 30 tablet 5    gabapentin (NEURONTIN) 100 MG capsule Take 1 capsule by mouth 3 (Three) Times a Day. 90 capsule 0    ipratropium (ATROVENT) 0.06 % nasal spray 2 sprays into the nostril(s) as directed by provider 4 (Four) Times a Day As Needed for Rhinitis (congestion). 15 mL 0    ipratropium-albuterol (DUO-NEB) 0.5-2.5 mg/3 ml nebulizer USE 1 VIAL IN NEBULIZER 2 TIMES DAILY. Generic: Duoneb 60 mL 11    lactulose (CHRONULAC) 10 GM/15ML solution TAKE 15 ML BY MOUTH THREE TIMES DAILY (Patient taking differently: 2 (Two) Times a Day. TAKE 15 ML BY MOUTH THREE TIMES DAILY) 1350 mL 2    levothyroxine (SYNTHROID, LEVOTHROID) 100 MCG tablet TAKE ONE TABLET BY MOUTH DAILY 90 tablet 0    lidocaine (XYLOCAINE) 5 % ointment Apply 1 application topically to the appropriate area as directed 3 (Three) Times a Day. 50 g 3    linaclotide (LINZESS) 145 MCG capsule capsule Take 1 capsule by mouth Take As Directed.      Magnesium 500 MG tablet Take  by mouth.      melatonin 1 MG tablet Take  by mouth Take As Directed.      ondansetron (ZOFRAN) 4 MG tablet       potassium chloride 10 MEQ CR tablet Take 1 tablet by mouth Daily.      pramipexole (MIRAPEX) 1.5 MG tablet TAKE ONE TABLET BY MOUTH EVERY NIGHT AT BEDTIME 90 tablet 0    Senna Plus 8.6-50 MG per tablet TAKE TWO TABLETS BY MOUTH TWICE DAILY 60 tablet 0    sennosides-docusate (PERICOLACE) 8.6-50 MG per tablet Take 1 tablet by mouth Take As Directed.      sertraline (ZOLOFT) 25 MG tablet Take 1 tablet by mouth Every Night.      SYMBICORT 160-4.5 MCG/ACT inhaler       triamcinolone (KENALOG) 0.1 % cream Apply 1 application topically to the appropriate area as directed 2 (Two) Times a Day. 15 g 2    valsartan (DIOVAN) 320 MG tablet Take 1 tablet by mouth Daily. 90 tablet 3    vitamin B-12 (CYANOCOBALAMIN) 1000 MCG tablet Take 1 tablet by mouth Daily.      Zinc 50 MG tablet Take 1 tablet by mouth.      [DISCONTINUED] furosemide  (LASIX) 40 MG tablet TAKE ONE TABLET BY MOUTH DAILY. TAKE SECOND TABLET AT NOON AS NEEDED FOR INCREASED SWELLING 135 tablet 0     No current facility-administered medications on file prior to visit.       ALLERGIES: Doxycycline       Objective   There were no vitals filed for this visit.  There is no height or weight on file to calculate BMI.          PHYSICAL EXAM:   Physical Exam     Result Review   LABS:      Results Review:       I reviewed the patient's new clinical results.    The following radiologic or non-invasive studies have been reviewed by me: Prior studies done last month and a year ago.  Venous duplex scans.  CT Chest Without Contrast Diagnostic    Result Date: 5/14/2024  Chronic changes in the chest with areas of pleuroparenchymal scarring. No evidence for superimposed infiltrate or acute abnormality in the chest.  Radiation dose reduction techniques were utilized, including automated exposure control and exposure modulation based on body size.   This report was finalized on 5/14/2024 3:51 PM by Dr. Tom Morales M.D on Workstation: [id number]                   ASSESSMENT/PLAN:   Diagnoses and all orders for this visit:    1. Stage 3a chronic kidney disease (Primary)    2. Hematoma    3. Venous (peripheral) insufficiency  -     Duplex Aorta IVC Iliac Graft Limited CAR; Future    4. Venous stasis    5. Lymphedema  -     OT Lymphedema; Future  -     Ambulatory Referral to Lymphedema Clinic    6. Phlebitis and thrombophlebitis of left iliac vein  -     Duplex Aorta IVC Iliac Graft Limited CAR; Future    7. Iliac vein thrombosis, left       83 y.o. female with bilateral lower extremity edema and swelling left slightly worse than right with some stasis changes on the left.  She has had a hematoma that was related to trauma.  At this point in time is exacerbated by her Eliquis.  Given this picture I believe that this will continue to resolve.  Make an incision in her skin at this level is extremely  risky.  On the other side of the groin continuing compression is important she has been very faithful wearing her compression but they are hard for her to get on.  We discussed the possibility of using a nonelastic compression such as a CircAid stocking and she is going to look into this.  Will ask her to make a referral to the lymphedema therapist at [place] who she is seen in the past.    I discussed the plan with the patient and family who are agreeable to the plan of care at this point. Thank you for this consult.   Follow Up  Return in about 1 month (around 7/12/2024).    Rogerio Vega MD   06/12/24

## 2024-06-19 ENCOUNTER — TELEPHONE (OUTPATIENT)
Age: 84
End: 2024-06-19
Payer: MEDICARE

## 2024-06-19 DIAGNOSIS — K59.00 CONSTIPATION, UNSPECIFIED CONSTIPATION TYPE: ICD-10-CM

## 2024-06-19 DIAGNOSIS — I89.0 LYMPHEDEMA: Primary | ICD-10-CM

## 2024-06-19 RX ORDER — PRAMIPEXOLE DIHYDROCHLORIDE 1.5 MG/1
TABLET ORAL
Qty: 90 TABLET | Refills: 0 | Status: SHIPPED | OUTPATIENT
Start: 2024-06-19

## 2024-06-19 RX ORDER — ASPIRIN 81 MG
TABLET, DELAYED RELEASE (ENTERIC COATED) ORAL
Qty: 60 TABLET | Refills: 0 | Status: SHIPPED | OUTPATIENT
Start: 2024-06-19

## 2024-06-19 NOTE — TELEPHONE ENCOUNTER
Dr. Vega ordered the patient to keep her legs wrapped but the place she usually goes too takes months to get her in. She is wanting to know if she can get home health with [facility] to wrap her lymphedema. She already called and they do, do home health and wrapping.     [phone]

## 2024-06-21 NOTE — PROGRESS NOTES
RM:________     PCP: Kehrer, Meredith Lea, MD    : 1940  AGE: 83 y.o.  EST PATIENT     REASON FOR VISIT/  CC:        BP Readings from Last 3 Encounters:   24 150/78   24 158/62   24 156/74      Wt Readings from Last 3 Encounters:   24 99.8 kg (220 lb)   24 96.6 kg (213 lb)   24 97.5 kg (215 lb)        WT: ____________ BP: __________L __________R HR______    CHEST PAIN: _____________    SOA: _____________PALPS: _______________     LIGHTHEADED: ___________FATIGUE: ________________ EDEMA __________    ALLERGIES:Doxycycline SMOKING HISTORY:  Social History     Tobacco Use    Smoking status: Never     Passive exposure: Never    Smokeless tobacco: Never    Tobacco comments:     Never; Patient does not smoke   Vaping Use    Vaping status: Never Used   Substance Use Topics    Alcohol use: Never     Comment: Patient is non drinker.    Drug use: Never     Comment: Drug Abuse: none     CAFFEINE USE_________________  ALCOHOL ______________________

## 2024-06-26 ENCOUNTER — TELEPHONE (OUTPATIENT)
Age: 84
End: 2024-06-26
Payer: MEDICARE

## 2024-06-26 ENCOUNTER — OFFICE VISIT (OUTPATIENT)
Age: 84
End: 2024-06-26
Payer: MEDICARE

## 2024-06-26 VITALS
BODY MASS INDEX: 34.97 KG/M2 | DIASTOLIC BLOOD PRESSURE: 74 MMHG | WEIGHT: 217.6 LBS | HEART RATE: 62 BPM | HEIGHT: 66 IN | SYSTOLIC BLOOD PRESSURE: 154 MMHG

## 2024-06-26 DIAGNOSIS — G47.33 OBSTRUCTIVE SLEEP APNEA SYNDROME: ICD-10-CM

## 2024-06-26 DIAGNOSIS — I48.0 PAROXYSMAL ATRIAL FIBRILLATION: ICD-10-CM

## 2024-06-26 DIAGNOSIS — N18.31 STAGE 3A CHRONIC KIDNEY DISEASE: ICD-10-CM

## 2024-06-26 DIAGNOSIS — I50.32 CHRONIC DIASTOLIC CONGESTIVE HEART FAILURE: Primary | ICD-10-CM

## 2024-06-26 DIAGNOSIS — G62.9 PERIPHERAL POLYNEUROPATHY: ICD-10-CM

## 2024-06-26 DIAGNOSIS — E78.2 MIXED HYPERLIPIDEMIA: ICD-10-CM

## 2024-06-26 DIAGNOSIS — R06.09 DOE (DYSPNEA ON EXERTION): ICD-10-CM

## 2024-06-26 DIAGNOSIS — I87.2 VENOUS (PERIPHERAL) INSUFFICIENCY: ICD-10-CM

## 2024-06-26 DIAGNOSIS — I10 PRIMARY HYPERTENSION: ICD-10-CM

## 2024-06-26 PROCEDURE — 3077F SYST BP >= 140 MM HG: CPT | Performed by: INTERNAL MEDICINE

## 2024-06-26 PROCEDURE — 3078F DIAST BP <80 MM HG: CPT | Performed by: INTERNAL MEDICINE

## 2024-06-26 PROCEDURE — 99214 OFFICE O/P EST MOD 30 MIN: CPT | Performed by: INTERNAL MEDICINE

## 2024-06-26 PROCEDURE — 93000 ELECTROCARDIOGRAM COMPLETE: CPT | Performed by: INTERNAL MEDICINE

## 2024-06-26 PROCEDURE — 1160F RVW MEDS BY RX/DR IN RCRD: CPT | Performed by: INTERNAL MEDICINE

## 2024-06-26 PROCEDURE — 1159F MED LIST DOCD IN RCRD: CPT | Performed by: INTERNAL MEDICINE

## 2024-06-26 RX ORDER — HYDRALAZINE HYDROCHLORIDE 25 MG/1
25 TABLET, FILM COATED ORAL 2 TIMES DAILY
Qty: 180 TABLET | Refills: 3 | Status: SHIPPED | OUTPATIENT
Start: 2024-06-26

## 2024-06-26 RX ORDER — GABAPENTIN 100 MG/1
100 CAPSULE ORAL 3 TIMES DAILY
Qty: 90 CAPSULE | Refills: 0 | Status: SHIPPED | OUTPATIENT
Start: 2024-06-26

## 2024-06-26 NOTE — PROGRESS NOTES
CARDIOLOGY    Anna Marsh MD    ENCOUNTER DATE:  06/26/2024    Latanya Olsen / 83 y.o. / female        CHIEF COMPLAINT / REASON FOR OFFICE VISIT     Congestive Heart Failure      HISTORY OF PRESENT ILLNESS       HPI    Latanya Olsen is a 83 y.o. female     This is a patient who previously followed with Dr. Park but has switched so she can see me in the [place] office.  She has paroxysmal atrial fibrillation, chronic kidney disease, COPD status post lung cancer with left lower lobectomy in 2006, hypertension, hypothyroid, and obstructive sleep apnea on CPAP.  She had a non-ST elevation myocardial infarction in 08/2023 with sudden onset of chest tightness and pain radiating to her back with shortness of breath.  In the ED, her EKG was unremarkable.  Her troponin was elevated at 792 with hemoglobin of 11.6 and a normal pro-BNP.  Creatinine was 1.42, which was at baseline.  She had a heart catheterization which showed normal coronary arteries and wall motion abnormalities consistent with Takotsubo cardiomyopathy.  Echocardiogram 08/07/2023 put her ejection fraction at 40-45% with severe hypokinesis of the distal anterior wall, distal septum, and distal apical segments.       She was readmitted to the hospital in 08/2023 with some fluid overload and was diuresed and put back on all diuretics.       Repeat echo in January 2024 showed normal LV function with grade 1 diastolic dysfunction and no valve disease.     Later in January 2024 she called the office complaining of shortness of breath, chest tightness and weight gain.  She was seen by DEVORA Soloomn in the office.  She had increased her Lasix for 3 days prior to seeing Amanda and was feeling better.  Amanda obtain blood work and her BNP was normal and down from where it had been in August 2023.  Renal function was at baseline at that time.    She had a trip to the emergency room in 05/2024 with shortness of breath and left leg swelling  "after she hit her leg with her car door.  She had a negative left lower extremity Doppler for DVT.  Her pro-BNP was normal.  Since that time, she said Dr. Hernandez changed her Lasix to Bumex and her edema has improved.  She says that she saw her lung doctor yesterday and is starting home oxygen.      She thinks that her lower extremity edema is better than it has been.  She remains short of breath with some fatigue.  She has noticed that her blood pressure has been elevated, consistent with what we got today.           VITAL SIGNS     Visit Vitals  /74 (BP Location: Left arm)   Pulse 62   Ht 167.6 cm (65.98\")   Wt 98.7 kg (217 lb 9.6 oz)   BMI 35.14 kg/m²         Wt Readings from Last 3 Encounters:   06/26/24 98.7 kg (217 lb 9.6 oz)   05/29/24 99.8 kg (220 lb)   05/14/24 96.6 kg (213 lb)     Body mass index is 35.14 kg/m².      PHYSICAL EXAMINATION     Constitutional:       General: Not in acute distress.  Neck:      Vascular: No carotid bruit or JVD.   Pulmonary:      Effort: Pulmonary effort is normal.      Breath sounds: Normal breath sounds.   Cardiovascular:      Normal rate. Regular rhythm.      Murmurs: There is no murmur.   Psychiatric:         Mood and Affect: Mood and affect normal.           REVIEWED DATA       ECG 12 Lead    Date/Time: 6/26/2024 9:46 AM  Performed by: Anna Marsh MD    Authorized by: Anna Marsh MD  Comparison: compared with previous ECG from 5/13/2024  Similar to previous ECG  Rhythm: sinus rhythm  BPM: 62  Conduction: non-specific intraventricular conduction delay  Other findings: early repolarization    Clinical impression: abnormal EKG            Lipid Panel          8/9/2023    08:19 11/30/2023    14:35   Lipid Panel   Total Cholesterol 193     Total Cholesterol  226    Triglycerides 137  134    HDL Cholesterol 62  59    VLDL Cholesterol 24  24    LDL Cholesterol  107  143    LDL/HDL Ratio 1.67         Lab Results   Component Value Date    GLUCOSE 96 05/29/2024    " BUN 28 (H) 05/29/2024    CREATININE 1.46 (H) 05/29/2024    EGFRRESULT 35 (L) 05/29/2024    EGFR 38.5 (L) 05/14/2024    BCR 19 05/29/2024    K 4.3 05/29/2024    CO2 27 05/29/2024    CALCIUM 9.3 05/29/2024    PROTENTOTREF 6.0 05/29/2024    ALBUMIN 4.3 05/29/2024    BILITOT 0.3 05/29/2024    AST 12 05/29/2024    ALT 11 05/29/2024       ASSESSMENT & PLAN      Diagnosis Plan   1. Chronic diastolic congestive heart failure        2. Mixed hyperlipidemia        3. Paroxysmal atrial fibrillation        4. Primary hypertension        5. Venous (peripheral) insufficiency        6. MCKEON (dyspnea on exertion)        7. Obstructive sleep apnea syndrome        8. Stage 3a chronic kidney disease            Chronic heart failure with preserved ejection fraction.  She did have a stress induced cardiomyopathy with echocardiogram in 01/2024 showing her LV function had normalized.  Diuretics are managed by Dr. Hernandez.   History of stress induced cardiomyopathy in 08/2023.  Hypertension.  She is on the maximum dose of valsartan.  She is on carvedilol 12.5 twice daily.  I do not want to increase that because of borderline bradycardia.  I want to avoid calcium channel blockers given her lymphedema.  I am going to start with low-dose hydralazine 25 mg twice daily and monitor her blood pressure and uptitrate from there.  Hyperlipidemia.  Not currently on therapy for cholesterol.  Chronic kidney disease.  History of paroxysmal atrial fibrillation, in sinus rhythm today.  Anticoagulated with Eliquis.  Not having any bleeding problems  Chronic lymphedema.  This is better since she was put on Bumex.  She is supposed to have home health come out to the house starting today to wrap her legs.     Follow-up in 3 months for blood pressure management.      Orders Placed This Encounter   Procedures    ECG 12 Lead     This order was created via procedure documentation     Order Specific Question:   Release to patient     Answer:   Routine Release  [[id number]]           MEDICATIONS         Discharge Medications            Accurate as of June 26, 2024  9:49 AM. If you have any questions, ask your nurse or doctor.                New Medications        Instructions Start Date   hydrALAZINE 25 MG tablet  Commonly known as: APRESOLINE  Started by: Anna Marsh MD   25 mg, Oral, 2 Times Daily             Changes to Medications        Instructions Start Date   lactulose 10 GM/15ML solution  Commonly known as: CHRONULAC  What changed: when to take this   TAKE 15 ML BY MOUTH THREE TIMES DAILY             Continue These Medications        Instructions Start Date   acetaminophen 325 MG tablet  Commonly known as: TYLENOL   650 mg, Oral, Every 4 Hours PRN      alendronate 70 MG tablet  Commonly known as: Fosamax   70 mg, Oral, Every 7 Days      apixaban 5 MG tablet tablet  Commonly known as: Eliquis   5 mg, Oral, Every 12 Hours Scheduled      bumetanide 1 MG tablet  Commonly known as: BUMEX   1 mg, Oral, 2 Times Daily      carvedilol 12.5 MG tablet  Commonly known as: COREG   12.5 mg, Oral, 2 Times Daily With Meals      chlorhexidine 0.12 % solution  Commonly known as: PERIDEX   APPLY 15 ML AS DIRECTED TWICE DAILY SWISH FOR 30 seconds AND expectorate, EVERY MORNING AND IN THE EVENING TO BEGIN in 24 hours AFTER surgery      coenzyme Q10 100 MG capsule   100 mg, Oral, Daily      cyclobenzaprine 10 MG tablet  Commonly known as: FLEXERIL   10 mg, Oral, 2 Times Daily PRN      Enulose 10 GM/15ML solution solution (encephalopathy)  Generic drug: lactulose       esomeprazole 40 MG capsule  Commonly known as: nexIUM   40 mg, Oral, Every Morning Before Breakfast      ferrous sulfate 325 (65 FE) MG tablet   325 mg, Oral, Daily With Breakfast      folic acid 1 MG tablet  Commonly known as: FOLVITE   1 mg, Oral, Daily      gabapentin 100 MG capsule  Commonly known as: NEURONTIN   100 mg, Oral, 3 Times Daily      ipratropium 0.06 % nasal spray  Commonly known as: ATROVENT   2  sprays, Nasal, 4 Times Daily PRN      ipratropium-albuterol 0.5-2.5 mg/3 ml nebulizer  Commonly known as: DUO-NEB   USE 1 VIAL IN NEBULIZER 2 TIMES DAILY. Generic: Duoneb      levothyroxine 100 MCG tablet  Commonly known as: SYNTHROID, LEVOTHROID   TAKE ONE TABLET BY MOUTH DAILY      lidocaine 5 % ointment  Commonly known as: XYLOCAINE   1 application , Topical, 3 Times Daily      Magnesium 500 MG tablet   Oral      ondansetron 4 MG tablet  Commonly known as: ZOFRAN       potassium chloride 10 MEQ CR tablet   10 mEq, Oral, Daily      pramipexole 1.5 MG tablet  Commonly known as: MIRAPEX   TAKE ONE TABLET BY MOUTH EVERY NIGHT AT bettime      Senna Plus 8.6-50 MG per tablet  Generic drug: sennosides-docusate   TAKE TWO TABLETS BY MOUTH TWICE DAILY      sertraline 25 MG tablet  Commonly known as: ZOLOFT   Take 1 tablet by mouth Every Night.      Symbicort 160-4.5 MCG/ACT inhaler  Generic drug: budesonide-formoterol   No dose, route, or frequency recorded.      triamcinolone 0.1 % cream  Commonly known as: KENALOG   1 application , Topical, 2 Times Daily      valsartan 320 MG tablet  Commonly known as: DIOVAN   320 mg, Oral, Daily      vitamin B-12 1000 MCG tablet  Commonly known as: CYANOCOBALAMIN   1,000 mcg, Oral, Daily      Zinc 50 MG tablet   1 tablet, Oral                 Anna Marsh MD  06/26/24  09:49 EDT    Part of this note may be an electronic transcription/translation of spoken language to printed text using the Dragon dictation system.

## 2024-06-26 NOTE — TELEPHONE ENCOUNTER
Jenny from [facility] is requesting a verbal order for skilled nursing 2 times a week for 4 weeks and will also send a referral for lymphedema pumps.     Incoming call number was [phone]  Online number for facility is [phone]

## 2024-07-01 ENCOUNTER — TELEPHONE (OUTPATIENT)
Dept: FAMILY MEDICINE CLINIC | Facility: CLINIC | Age: 84
End: 2024-07-01

## 2024-07-01 NOTE — TELEPHONE ENCOUNTER
Caller: Latanya Olsen    Relationship to patient: Self    Best call back number:      Patient is needing: PATIENT IS ASKING FOR SOMEONE IN THE OFFICE TO PLEASE RETURN HER CALL TO LET HER KNOW IF THE OFFICE HAS ELIQUIS SAMPLES IN.    PLEASE ADVISE.

## 2024-07-19 ENCOUNTER — TELEPHONE (OUTPATIENT)
Age: 84
End: 2024-07-19
Payer: MEDICARE

## 2024-07-19 NOTE — TELEPHONE ENCOUNTER
Jenny called from [facility] wanting to get continuation of care for the next two weeks to make sure pt is in compliance. A lymphedema pump was ordered and it will be there next week. A verbal ok is needed.     Cb: [phone]

## 2024-07-22 NOTE — TELEPHONE ENCOUNTER
I Lvm with Jenny giving the ok for continuation of care for the next 2 weeks. Advised her to give us a call back if she needed anything further.

## 2024-08-14 ENCOUNTER — HOSPITAL ENCOUNTER (OUTPATIENT)
Facility: HOSPITAL | Age: 84
Discharge: HOME | End: 2024-08-14
Admitting: SURGERY
Payer: MEDICARE

## 2024-08-14 ENCOUNTER — OFFICE VISIT (OUTPATIENT)
Age: 84
End: 2024-08-14
Payer: MEDICARE

## 2024-08-14 VITALS
DIASTOLIC BLOOD PRESSURE: 74 MMHG | BODY MASS INDEX: 34.87 KG/M2 | HEIGHT: 66 IN | SYSTOLIC BLOOD PRESSURE: 118 MMHG | WEIGHT: 217 LBS

## 2024-08-14 DIAGNOSIS — I80.212: ICD-10-CM

## 2024-08-14 DIAGNOSIS — I87.2 VENOUS (PERIPHERAL) INSUFFICIENCY: ICD-10-CM

## 2024-08-14 DIAGNOSIS — I82.422 ILIAC VEIN THROMBOSIS, LEFT: ICD-10-CM

## 2024-08-14 DIAGNOSIS — S80.12XS LEG HEMATOMA, LEFT, SEQUELA: ICD-10-CM

## 2024-08-14 DIAGNOSIS — I87.2 VENOUS (PERIPHERAL) INSUFFICIENCY: Primary | ICD-10-CM

## 2024-08-14 DIAGNOSIS — Z95.828 PRESENCE OF INFERIOR VENA CAVA FILTER: ICD-10-CM

## 2024-08-14 LAB
BH CV VAS ABD AO IVC SPONTANEOUS SCRIPTING: NORMAL
BH CV VAS ABD AO LT COMMON FEM AUGMENT SCRIPTING: NORMAL
BH CV VAS ABD AO LT COMMON FEM PHASIC SCRIPTING: NORMAL
BH CV VAS ABD AO LT COMMON FEM SPONT SCRIPTING: NORMAL
BH CV VAS ABD AO LT COMMON ILIAC AUGMENT SCRIPTING: NORMAL
BH CV VAS ABD AO LT COMMON ILIAC PHASIC SCRIPTING: NORMAL
BH CV VAS ABD AO LT COMMON ILIAC SPONTANEOUS SCRIPTING: NORMAL
BH CV VAS ABD AO LT EXTERNAL ILIAC AUGMENT SCRIPTING: NORMAL
BH CV VAS ABD AO LT EXTERNAL ILIAC PHASIC SCRIPTING: NORMAL
BH CV VAS ABD AO LT EXTERNAL ILIAC SPONT SCRIPTING: NORMAL
BH CV VAS ABD AO RT COM ILIAC AUGMENT SCRIPTING: NORMAL
BH CV VAS ABD AO RT COM ILIAC PHASIC SCRIPTING: NORMAL
BH CV VAS ABD AO RT COM ILIAC SPONTANEOUS SCRIPTING: NORMAL
BH CV VAS ABD AO RT COMMON FEM AUGMENT SCRIPTING: NORMAL
BH CV VAS ABD AO RT COMMON FEM PHASIC SCRIPTING: NORMAL
BH CV VAS ABD AO RT COMMON FEM SPONTANEOUS SCRIPTING: NORMAL
BH CV VAS ABD AO RT EXT ILIAC AUGMENT SCRIPTING: NORMAL
BH CV VAS ABD AO RT EXT ILIAC PHASIC SCRIPTING: NORMAL
BH CV VAS ABD AO RT EXT ILIAC SPONTANEOUS SCRIPTING: NORMAL

## 2024-08-14 PROCEDURE — 93979 VASCULAR STUDY: CPT

## 2024-08-14 RX ORDER — FLUTICASONE PROPIONATE 50 MCG
SPRAY, SUSPENSION (ML) NASAL
COMMUNITY
Start: 2024-07-11

## 2024-08-14 NOTE — PROGRESS NOTES
Patient Name: Latanya Olsen    MRN: [id number] Encounter Date: 08/14/2024      Consulting Service: Vascular Surgery    Referring Provider: No ref. provider found       CHIEF COMPLAINT:  Chief Complaint   Patient presents with    Follow-up     1 mo follow up with [facility]        Subjective    HPI: Latanya Olsen is a 83 y.o. female is being seen for evaluation/management of  known left iliac venous stent placed for thrombosis and intervention.  It looks great on duplex.  IVC remains widely patent around filter.  At this point in time her whole scan looks great.  She has been doing well on her Eliquis 2.5 mg dosing and her recent hematoma does not appear to be directly related but was probably exacerbated by that.  Her hematoma is improving and is  but will likely get to the point where it is gone to the point of no pain but possibly some skin tethering or P gravel field.  She is aware.  At this point no intervention would be indicated.    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    Cataract 1-1/2015    Chronic constipation 02/19/2016    Chronic deep vein thrombosis (DVT) of left femoral vein 08/12/2021    Chronic deep vein thrombosis (DVT) of left popliteal vein 08/12/2021    Chronic lower back pain 02/19/2016    Chronic obstructive pulmonary disease 02/19/2016    Chronic obstructive pulmonary disease, unspecified     CKD (chronic kidney disease)      Stage 3    Compression of vein 05/07/2021    Congenital heart disease Aug 2023    Constipation     Constipation 03/10/2021    COPD (chronic obstructive pulmonary disease)     DDD (degenerative disc disease), lumbar     Deep vein thrombosis 2019    Dependence on other enabling machines and devices     CPAP    Depression     Diverticulosis     Diverticulosis of intestine 02/19/2016    Elevated serum alkaline phosphatase level 02/22/2016    Essential (primary) hypertension     Foot pain 02/19/2016    Fracture of hip     Gastroparesis 02/19/2016    Generalized osteoarthritis 02/19/2016    Generalized weakness 03/06/2021    GERD (gastroesophageal reflux disease)     Hip arthrosis     History of heart attack     7/2023    Hx of degenerative disc disease     Hyperlipidemia     Hyperlipidemia 02/19/2016    Hyperlipidemia, unspecified     Hypertension     Hypertension 02/19/2016    Hypokalemia     Hypokalemia 02/19/2016    Hypothyroidism     Hypothyroidism 02/19/2016    Hypothyroidism, unspecified     Insomnia 02/19/2016    Knee pain 02/19/2016    Knee swelling     Low back pain Yes    Lung cancer     history    Myocardial infarction 8-4-23    Neck pain 11/28/2017    Neutropenia 02/19/2016    Neutropenia, unspecified     Obesity 218    Obesity (BMI 30-39.9) 02/19/2016    Obstructive sleep apnea (adult) (pediatric)     Obstructive sleep apnea syndrome 02/19/2016    Osteoarthritis of knee 02/19/2016    Osteopenia Yes    Overweight (BMI 25.0-29.9) 02/19/2016    Pain of left breast 02/22/2016    Pulmonary embolism 2019    Renal disorder     Renal insufficiency 2019    Restless leg syndrome     Scoliosis Yes    Sleep apnea with use of continuous positive airway pressure (CPAP)     Spinal stenosis of lumbar region without neurogenic claudication 02/19/2016    Spinal stenosis, lumbar region without neurogenic claudication 08/12/2021    Status post total hip replacement, 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    Unspecified atrial fibrillation      Vitamin D deficiency 02/19/2016    Volume overload 03/19/2019    Weakness 08/12/2021    Weight gain 02/19/2016    Weight loss 02/19/2016      PAST SURGICAL HISTORY:   Past Surgical History:   Procedure Laterality Date    ADENOIDECTOMY  2000    BUNIONECTOMY Bilateral     CARDIAC CATHETERIZATION N/A 08/07/2023    Procedure: Left Heart Cath;  Surgeon: Mireya Park MD;  Location: [facility] CATH INVASIVE LOCATION;  Service: Cardiology;  Laterality: N/A;    CARDIAC CATHETERIZATION N/A 08/07/2023    Procedure: Coronary angiography;  Surgeon: Mireya Park MD;  Location: [facility] CATH INVASIVE LOCATION;  Service: Cardiology;  Laterality: N/A;    CARDIAC CATHETERIZATION N/A 08/07/2023    Procedure: Left ventriculography;  Surgeon: Mireya Park MD;  Location: [facility] CATH INVASIVE LOCATION;  Service: Cardiology;  Laterality: N/A;    CATARACT EXTRACTION      CHOLECYSTECTOMY      COLONOSCOPY      ENDOSCOPY N/A 06/24/2016    Procedure: ESOPHAGOGASTRODUODENOSCOPY  with biopsies;  Surgeon: Von Hernández MD;  Location: [facility] OR;  Service:     JOINT REPLACEMENT  Yes    LUNG LOBECTOMY Left     lower lobe    LYTIC THROMBIN THERAPY Left 03/26/2021    Procedure: left leg clot treiver possible venous stent *supine*;  Surgeon: Rogerio Vega MD;  [facility];  Service: Vascular;  Laterality: Left;    REPLACEMENT TOTAL KNEE Left     THYROID BIOPSY      TONSILLECTOMY  Yes    TOTAL HIP ARTHROPLASTY Right 06/01/2019    Procedure: BIPOLAR HIP CEMENTED POSTERIOR;  Surgeon: Ashley Crenshaw MD;  Location: [facility] OR;  Service: Orthopedics    TRIGGER POINT INJECTION      TUBAL ABDOMINAL LIGATION  50 yrs ago      FAMILY HISTORY:   Family History   Problem Relation Age of Onset    Heart attack Mother     Coronary artery disease Mother     Hypertension Mother     Kidney disease Mother     Arthritis Mother     Heart disease Mother     Heart disease Father     Heart attack Sister     Aortic  aneurysm Brother         Abdominal    Cancer Brother         no details    Colon cancer Neg Hx     Colon polyps Neg Hx     Esophageal cancer Neg Hx     Breast cancer Neg Hx       SOCIAL HISTORY:   Social History     Tobacco Use    Smoking status: Never     Passive exposure: Never    Smokeless tobacco: Never    Tobacco comments:     Never; Patient does not smoke   Vaping Use    Vaping status: Never Used   Substance Use Topics    Alcohol use: Never     Comment: Patient is non drinker.    Drug use: Never     Comment: Drug Abuse: none      MEDICATIONS:   Current Outpatient Medications on File Prior to Visit   Medication Sig Dispense Refill    acetaminophen (TYLENOL) 325 MG tablet Take 2 tablets by mouth Every 4 (Four) Hours As Needed for Mild Pain .      alendronate (Fosamax) 70 MG tablet Take 1 tablet by mouth Every 7 (Seven) Days. 13 tablet 3    apixaban (Eliquis) 5 MG tablet tablet Take 1 tablet by mouth Every 12 (Twelve) Hours. 14 tablet 0    bumetanide (BUMEX) 1 MG tablet Take 1 tablet by mouth 2 (Two) Times a Day.      carvedilol (COREG) 12.5 MG tablet Take 1 tablet by mouth 2 (Two) Times a Day With Meals. 60 tablet 5    chlorhexidine (PERIDEX) 0.12 % solution APPLY 15 ML AS DIRECTED TWICE DAILY SWISH FOR 30 seconds AND expectorate, EVERY MORNING AND IN THE EVENING TO BEGIN in 24 hours AFTER surgery      coenzyme Q10 100 MG capsule Take 1 capsule by mouth Daily.      cyclobenzaprine (FLEXERIL) 10 MG tablet Take 1 tablet by mouth 2 (Two) Times a Day As Needed for Muscle Spasms. 14 tablet 0    Enulose 10 GM/15ML solution solution (encephalopathy)       esomeprazole (NexIUM) 40 MG capsule Take 1 capsule by mouth Every Morning Before Breakfast.      ferrous sulfate 325 (65 FE) MG tablet Take 1 tablet by mouth Daily With Breakfast.      fluticasone (FLONASE) 50 MCG/ACT nasal spray USE 1 SPRAY INTO EACH NOSTRIL TWICE DAILY      folic acid (FOLVITE) 1 MG tablet Take 1 tablet by mouth Daily. 30 tablet 5    gabapentin  "(NEURONTIN) 100 MG capsule Take 1 capsule by mouth 3 (Three) Times a Day. 90 capsule 0    hydrALAZINE (APRESOLINE) 25 MG tablet Take 1 tablet by mouth 2 (Two) Times a Day. 180 tablet 3    ipratropium (ATROVENT) 0.06 % nasal spray 2 sprays into the nostril(s) as directed by provider 4 (Four) Times a Day As Needed for Rhinitis (congestion). 15 mL 0    ipratropium-albuterol (DUO-NEB) 0.5-2.5 mg/3 ml nebulizer USE 1 VIAL IN NEBULIZER 2 TIMES DAILY. Generic: Duoneb 60 mL 11    lactulose (CHRONULAC) 10 GM/15ML solution TAKE 15 ML BY MOUTH THREE TIMES DAILY (Patient taking differently: 2 (Two) Times a Day. TAKE 15 ML BY MOUTH THREE TIMES DAILY) 1350 mL 2    levothyroxine (SYNTHROID, LEVOTHROID) 100 MCG tablet TAKE ONE TABLET BY MOUTH DAILY 90 tablet 0    lidocaine (XYLOCAINE) 5 % ointment Apply 1 application topically to the appropriate area as directed 3 (Three) Times a Day. 50 g 3    Magnesium 500 MG tablet Take  by mouth.      ondansetron (ZOFRAN) 4 MG tablet       potassium chloride 10 MEQ CR tablet Take 1 tablet by mouth Daily.      pramipexole (MIRAPEX) 1.5 MG tablet TAKE ONE TABLET BY MOUTH EVERY NIGHT AT bettime 90 tablet 0    Senna Plus 8.6-50 MG per tablet TAKE TWO TABLETS BY MOUTH TWICE DAILY 60 tablet 0    sertraline (ZOLOFT) 25 MG tablet Take 1 tablet by mouth Every Night.      SYMBICORT 160-4.5 MCG/ACT inhaler       triamcinolone (KENALOG) 0.1 % cream Apply 1 application topically to the appropriate area as directed 2 (Two) Times a Day. 15 g 2    valsartan (DIOVAN) 320 MG tablet Take 1 tablet by mouth Daily. 90 tablet 3    vitamin B-12 (CYANOCOBALAMIN) 1000 MCG tablet Take 1 tablet by mouth Daily.      Zinc 50 MG tablet Take 1 tablet by mouth.       No current facility-administered medications on file prior to visit.       ALLERGIES: Doxycycline       Objective   Vitals:    08/14/24 0907   BP: 118/74   Weight: 98.4 kg (217 lb)   Height: 167.6 cm (65.98\")     Body mass index is 35.04 kg/m².          PHYSICAL " EXAM:   Physical Exam     Result Review   LABS:    CBC          5/13/2024    12:23 5/14/2024    04:46 5/29/2024    14:29   CBC   WBC 6.16  5.24  6.1    RBC 3.97  3.91  3.90    Hemoglobin 11.6  11.4  11.7    Hematocrit 36.0  35.8  35.3    MCV 90.7  91.6  91    MCH 29.2  29.2  30.0    MCHC 32.2  31.8  33.1    RDW 12.3  12.7  12.7    Platelets 183  182  166      BMP          5/14/2024    04:46 5/29/2024    14:29 8/9/2024    15:02   BMP   BUN 35  28     Creatinine 1.36  1.46  41.00       Sodium 141  139     Potassium 4.3  4.3     Chloride 102  101     CO2 25.0  27     Calcium 9.4  9.3        Details          This result is from an external source.             Lipid Panel          11/30/2023    14:35   Lipid Panel   Total Cholesterol 226    Triglycerides 134    HDL Cholesterol 59    VLDL Cholesterol 24    LDL Cholesterol  143       INR          8/27/2023    11:17   Common Labs   INR 1.05            Results Review:       I reviewed the patient's new clinical results.    The following radiologic or non-invasive studies have been reviewed by me: Duplex scan of the iliac IVC reviewed with images reviewed  Duplex Venous Lower Extremity LEFT 05/13/2024    Interpretation Summary    There was deep venous valvular incompetence noted in the left mid femoral and popliteal.    All other left sided veins appeared normal.    Heterogeneous fluid collection in the left proximal calf this could represent a possible hematoma in the right clinical context                     ASSESSMENT/PLAN:   Diagnoses and all orders for this visit:    1. Venous (peripheral) insufficiency (Primary)    2. Iliac vein thrombosis, left  -     Duplex Aorta IVC Iliac Graft Complete CAR; Future    3. Presence of inferior vena cava filter  -     Duplex Aorta IVC Iliac Graft Complete CAR; Future    4. Leg hematoma, left, sequela       83 y.o. female with stable left leg hematoma and widely patent left iliac vein stent and IVC.  At this point in time we discussed  the possibility of coming off the Eliquis but I do not believe her current leg hematoma is a true complication of the Eliquis and I think that staying on it is safer.  Because her studies are so good we will go every year.  She is aware to call if she thinks there is a problem.  Continues to be compliant with her compression stockings.    I discussed the plan with the patient and family who are agreeable to the plan of care at this point. Thank you for this consult.   Follow Up  Return in about 1 year (around 8/14/2025).    Rogerio Vega MD   08/14/24

## 2024-09-06 NOTE — TELEPHONE ENCOUNTER
Rx Refill Note  Requested Prescriptions     Pending Prescriptions Disp Refills    levothyroxine (SYNTHROID, LEVOTHROID) 100 MCG tablet [Pharmacy Med Name: levothyroxine 100 mcg tablet] 90 tablet 0     Sig: TAKE ONE TABLET BY MOUTH DAILY      Last office visit with prescribing clinician: Visit date not found   Last telemedicine visit with prescribing clinician: Visit date not found   Next office visit with prescribing clinician: Visit date not found                         Would you like a call back once the refill request has been completed: [] Yes [] No    If the office needs to give you a call back, can they leave a voicemail: [] Yes [] No    Luciana Dent MA  09/06/24, 09:17 EDT

## 2024-09-09 RX ORDER — LEVOTHYROXINE SODIUM 100 UG/1
TABLET ORAL
Qty: 90 TABLET | Refills: 0 | Status: SHIPPED | OUTPATIENT
Start: 2024-09-09

## 2024-09-17 ENCOUNTER — OFFICE VISIT (OUTPATIENT)
Dept: ORTHOPEDIC SURGERY | Facility: CLINIC | Age: 84
End: 2024-09-17
Payer: MEDICARE

## 2024-09-17 VITALS — WEIGHT: 217 LBS | BODY MASS INDEX: 34.87 KG/M2 | HEIGHT: 66 IN

## 2024-09-17 DIAGNOSIS — M48.061 SPINAL STENOSIS OF LUMBAR REGION WITHOUT NEUROGENIC CLAUDICATION: ICD-10-CM

## 2024-09-17 DIAGNOSIS — M51.36 LUMBAR DEGENERATIVE DISC DISEASE: ICD-10-CM

## 2024-09-17 DIAGNOSIS — M70.52 PES ANSERINUS BURSITIS OF LEFT KNEE: ICD-10-CM

## 2024-09-17 DIAGNOSIS — M17.11 PRIMARY OSTEOARTHRITIS OF RIGHT KNEE: Primary | ICD-10-CM

## 2024-09-17 RX ORDER — TRAMADOL HYDROCHLORIDE 50 MG/1
50 TABLET ORAL EVERY 4 HOURS PRN
Qty: 40 TABLET | Refills: 0 | Status: SHIPPED | OUTPATIENT
Start: 2024-09-17

## 2024-09-17 RX ORDER — PRAMIPEXOLE DIHYDROCHLORIDE 1.5 MG/1
1.5 TABLET ORAL
Qty: 90 TABLET | Refills: 0 | Status: SHIPPED | OUTPATIENT
Start: 2024-09-17

## 2024-09-17 RX ADMIN — TRIAMCINOLONE ACETONIDE 80 MG: 40 INJECTION, SUSPENSION INTRA-ARTICULAR; INTRAMUSCULAR at 13:42

## 2024-09-17 RX ADMIN — LIDOCAINE HYDROCHLORIDE 8 ML: 10 INJECTION, SOLUTION EPIDURAL; INFILTRATION; INTRACAUDAL; PERINEURAL at 13:42

## 2024-09-17 RX ADMIN — LIDOCAINE HYDROCHLORIDE 2 ML: 10 INJECTION, SOLUTION EPIDURAL; INFILTRATION; INTRACAUDAL; PERINEURAL at 14:01

## 2024-09-17 RX ADMIN — TRIAMCINOLONE ACETONIDE 40 MG: 40 INJECTION, SUSPENSION INTRA-ARTICULAR; INTRAMUSCULAR at 14:01

## 2024-09-20 RX ORDER — TRIAMCINOLONE ACETONIDE 40 MG/ML
80 INJECTION, SUSPENSION INTRA-ARTICULAR; INTRAMUSCULAR
Status: COMPLETED | OUTPATIENT
Start: 2024-09-17 | End: 2024-09-17

## 2024-09-20 RX ORDER — TRIAMCINOLONE ACETONIDE 40 MG/ML
40 INJECTION, SUSPENSION INTRA-ARTICULAR; INTRAMUSCULAR
Status: COMPLETED | OUTPATIENT
Start: 2024-09-17 | End: 2024-09-17

## 2024-09-20 RX ORDER — LIDOCAINE HYDROCHLORIDE 10 MG/ML
2 INJECTION, SOLUTION EPIDURAL; INFILTRATION; INTRACAUDAL; PERINEURAL
Status: COMPLETED | OUTPATIENT
Start: 2024-09-17 | End: 2024-09-17

## 2024-09-20 RX ORDER — LIDOCAINE HYDROCHLORIDE 10 MG/ML
8 INJECTION, SOLUTION EPIDURAL; INFILTRATION; INTRACAUDAL; PERINEURAL
Status: COMPLETED | OUTPATIENT
Start: 2024-09-17 | End: 2024-09-17

## 2024-09-30 ENCOUNTER — OFFICE VISIT (OUTPATIENT)
Dept: FAMILY MEDICINE CLINIC | Facility: CLINIC | Age: 84
End: 2024-09-30
Payer: MEDICARE

## 2024-09-30 VITALS
HEIGHT: 66 IN | OXYGEN SATURATION: 99 % | TEMPERATURE: 98.7 F | BODY MASS INDEX: 36 KG/M2 | SYSTOLIC BLOOD PRESSURE: 122 MMHG | WEIGHT: 224 LBS | HEART RATE: 72 BPM | DIASTOLIC BLOOD PRESSURE: 60 MMHG

## 2024-09-30 DIAGNOSIS — J01.00 ACUTE NON-RECURRENT MAXILLARY SINUSITIS: Primary | ICD-10-CM

## 2024-09-30 PROCEDURE — 1125F AMNT PAIN NOTED PAIN PRSNT: CPT | Performed by: FAMILY MEDICINE

## 2024-09-30 PROCEDURE — 3078F DIAST BP <80 MM HG: CPT | Performed by: FAMILY MEDICINE

## 2024-09-30 PROCEDURE — 3074F SYST BP LT 130 MM HG: CPT | Performed by: FAMILY MEDICINE

## 2024-09-30 PROCEDURE — 99213 OFFICE O/P EST LOW 20 MIN: CPT | Performed by: FAMILY MEDICINE

## 2024-09-30 RX ORDER — BENZONATATE 200 MG/1
200 CAPSULE ORAL 3 TIMES DAILY PRN
Qty: 30 CAPSULE | Refills: 0 | Status: SHIPPED | OUTPATIENT
Start: 2024-09-30

## 2024-09-30 RX ORDER — AMOXICILLIN 500 MG/1
1000 CAPSULE ORAL 2 TIMES DAILY
Qty: 20 CAPSULE | Refills: 0 | Status: SHIPPED | OUTPATIENT
Start: 2024-09-30 | End: 2024-10-05

## 2024-09-30 NOTE — PROGRESS NOTES
"Chief Complaint  Cough and Nasal Congestion    Subjective        Ltaanya Olsen presents to [facility]  History of Present Illness  History of Present Illness  The patient is an 84-year-old female who presents for cough and congestion.    She began experiencing a sore throat on Friday night, which was accompanied by a cough, runny nose, and headache the following morning. Despite using lozenges and Tylenol, her symptoms have remained consistent. She has not been in contact with anyone who is ill and reports no fever. This morning, she noticed hoarseness in her voice. She typically experiences allergies during this time of year but is not currently taking any allergy medication. She reports a bad taste in her mouth and significant coughing.    She has not received her influenza or COVID-19 vaccines this season.    Her swelling is doing well. She uses lymphedema pumps.    ALLERGIES  She is allergic to DOXYCYCLINE.       Objective   Vital Signs:  /60   Pulse 72   Temp 98.7 °F (37.1 °C)   Ht 167.6 cm (66\")   Wt 102 kg (224 lb)   SpO2 99%   BMI 36.15 kg/m²   Estimated body mass index is 36.15 kg/m² as calculated from the following:    Height as of this encounter: 167.6 cm (66\").    Weight as of this encounter: 102 kg (224 lb).               Physical Exam   Physical Exam  Oral mucosa is moist and clear. Throat is not red. Nares are pale and boggy. TMs are clear bilaterally. Maxillary sinuses are tender.  Lungs are clear.  Heart rhythm is regular.       Result Review :          Results                Assessment and Plan     Diagnoses and all orders for this visit:    1. Acute non-recurrent maxillary sinusitis (Primary)  -     benzonatate (TESSALON) 200 MG capsule; Take 1 capsule by mouth 3 (Three) Times a Day As Needed for Cough.  Dispense: 30 capsule; Refill: 0  -     amoxicillin (AMOXIL) 500 MG capsule; Take 2 capsules by mouth 2 (Two) Times a Day for 5 days.  Dispense: 20 " capsule; Refill: 0      Assessment & Plan  1. Acute sinusitis.  l spray and sugar-free throat lozenges. Benzonatate has also been prescribed for her cough. She is instructed to report any further issues.    2. Allergic Rhinitis.  The patient presents with symptoms consistent with allergic rhinitis, including a headache, congestion, and pale, boggy nasal passages. She is advised to use Flonase nasal spray, which she already has at home.    3. Health Maintenance.  She is advised to receive her influenza and COVID-19 boosters once her condition improves.              Follow Up     No follow-ups on file.  Patient was given instructions and counseling regarding her condition or for health maintenance advice. Please see specific information pulled into the AVS if appropriate.    Patient or patient representative verbalized consent for the use of Ambient Listening during the visit with  Meredith Lea Kehrer, MD for chart documentation. 9/30/2024  13:43 EDT

## 2024-10-17 ENCOUNTER — PATIENT MESSAGE (OUTPATIENT)
Dept: FAMILY MEDICINE CLINIC | Facility: CLINIC | Age: 84
End: 2024-10-17
Payer: MEDICARE

## 2024-11-01 ENCOUNTER — TRANSCRIBE ORDERS (OUTPATIENT)
Dept: ADMINISTRATIVE | Facility: HOSPITAL | Age: 84
End: 2024-11-01
Payer: MEDICARE

## 2024-11-01 DIAGNOSIS — Z12.31 VISIT FOR SCREENING MAMMOGRAM: Primary | ICD-10-CM

## 2024-11-01 DIAGNOSIS — G62.9 PERIPHERAL POLYNEUROPATHY: ICD-10-CM

## 2024-11-01 RX ORDER — GABAPENTIN 100 MG/1
100 CAPSULE ORAL 3 TIMES DAILY
Qty: 90 CAPSULE | Refills: 0 | Status: SHIPPED | OUTPATIENT
Start: 2024-11-01

## 2024-11-01 NOTE — TELEPHONE ENCOUNTER
Rx Refill Note  Requested Prescriptions     Pending Prescriptions Disp Refills    gabapentin (NEURONTIN) 100 MG capsule [Pharmacy Med Name: gabapentin 100 mg capsule] 90 capsule 0     Sig: TAKE 1 CAPSULE BY MOUTH THREE TIMES DAILY      Last office visit with prescribing clinician: 9/30/2024   Last telemedicine visit with prescribing clinician: Visit date not found   Next office visit with prescribing clinician: 12/6/2024                         Would you like a call back once the refill request has been completed: [] Yes [] No    If the office needs to give you a call back, can they leave a voicemail: [] Yes [] No    Tere Valadez MA  11/01/24, 09:03 EDT

## 2024-11-15 ENCOUNTER — TELEPHONE (OUTPATIENT)
Dept: CARDIOLOGY | Facility: CLINIC | Age: 84
End: 2024-11-15

## 2024-11-15 NOTE — TELEPHONE ENCOUNTER
Caller: Latanya Olsen    Relationship: Self    Best call back number: [phone]      PATIENT WAS SEEN IN JUNE 2024 AND NEEDED A 3 MONTH FOLLOW UP FOR SEPT  APPT WAS NEVER MADE, SHE WAS TOLD SOMEONE WOULD REACH OUT TO HER TO SCHEDULE.  PLEASE REACH OUT TO THE PATIENT TO SCHEDULE.

## 2024-11-18 NOTE — TELEPHONE ENCOUNTER
11/18/2024    Called and left a voicemail for this patient to return call to schedule this appointment.     Thanks  Kenneth

## 2024-11-21 NOTE — PROGRESS NOTES
RM:________     PCP: Kehrer, Meredith Lea, MD    : 1940  AGE: 84 y.o.  EST PATIENT     REASON FOR VISIT/  CC:        BP Readings from Last 3 Encounters:   10/07/24 167/75   24 122/60   24 118/74      Wt Readings from Last 3 Encounters:   10/07/24 99.8 kg (220 lb)   24 102 kg (224 lb)   24 98.4 kg (217 lb)        WT: ____________ BP: __________L __________R HR______    CHEST PAIN: _____________    SOA: _____________PALPS: _______________     LIGHTHEADED: ___________FATIGUE: ________________ EDEMA __________    ALLERGIES:Doxycycline SMOKING HISTORY:  Social History     Tobacco Use    Smoking status: Never     Passive exposure: Never    Smokeless tobacco: Never    Tobacco comments:     Never; Patient does not smoke   Vaping Use    Vaping status: Never Used   Substance Use Topics    Alcohol use: Never     Comment: Patient is non drinker.    Drug use: Never     Comment: Drug Abuse: none     CAFFEINE USE_________________  ALCOHOL ______________________

## 2024-11-26 ENCOUNTER — OFFICE VISIT (OUTPATIENT)
Age: 84
End: 2024-11-26
Payer: MEDICARE

## 2024-11-26 VITALS
DIASTOLIC BLOOD PRESSURE: 60 MMHG | BODY MASS INDEX: 36.99 KG/M2 | HEIGHT: 65 IN | HEART RATE: 68 BPM | WEIGHT: 222 LBS | SYSTOLIC BLOOD PRESSURE: 110 MMHG

## 2024-11-26 DIAGNOSIS — E78.2 MIXED HYPERLIPIDEMIA: ICD-10-CM

## 2024-11-26 DIAGNOSIS — G47.33 OBSTRUCTIVE SLEEP APNEA SYNDROME: ICD-10-CM

## 2024-11-26 DIAGNOSIS — I50.32 CHRONIC DIASTOLIC CONGESTIVE HEART FAILURE: Primary | ICD-10-CM

## 2024-11-26 DIAGNOSIS — I10 PRIMARY HYPERTENSION: ICD-10-CM

## 2024-11-26 DIAGNOSIS — I48.0 PAROXYSMAL ATRIAL FIBRILLATION: ICD-10-CM

## 2024-11-26 NOTE — PROGRESS NOTES
CARDIOLOGY    Anna Marsh MD    ENCOUNTER DATE:  11/26/2024    Latanya Olsen / 84 y.o. / female        CHIEF COMPLAINT / REASON FOR OFFICE VISIT     Congestive Heart Failure      HISTORY OF PRESENT ILLNESS       HPI    Latanya Olsen is a 84 y.o. female     This is a patient who previously followed with Dr. Park but has switched so she can see me in the [place] office.  She has paroxysmal atrial fibrillation, chronic kidney disease, COPD status post lung cancer with left lower lobectomy in 2006, hypertension, hypothyroid, and obstructive sleep apnea on CPAP.  She had a non-ST elevation myocardial infarction in 08/2023 with sudden onset of chest tightness and pain radiating to her back with shortness of breath.  In the ED, her EKG was unremarkable.  Her troponin was elevated at 792 with hemoglobin of 11.6 and a normal pro-BNP.  Creatinine was 1.42, which was at baseline.  She had a heart catheterization which showed normal coronary arteries and wall motion abnormalities consistent with Takotsubo cardiomyopathy.  Echocardiogram 08/07/2023 put her ejection fraction at 40-45% with severe hypokinesis of the distal anterior wall, distal septum, and distal apical segments.       She was readmitted to the hospital in 08/2023 with some fluid overload and was diuresed and put back on all diuretics.       Repeat echo in January 2024 showed normal LV function with grade 1 diastolic dysfunction and no valve disease.     Later in January 2024 she called the office complaining of shortness of breath, chest tightness and weight gain.  She was seen by DEVORA Solomon in the office.  She had increased her Lasix for 3 days prior to seeing Amanda and was feeling better.  Amanda obtain blood work and her BNP was normal and down from where it had been in August 2023.  Renal function was at baseline at that time.     She had a trip to the emergency room in 05/2024 with shortness of breath and left leg swelling  "after she hit her leg with her car door.  She had a negative left lower extremity Doppler for DVT.  Her pro-BNP was normal.  Since that time, she said Dr. Hernandez changed her Lasix to Bumex and her edema improved.  She was started on home oxygen.    She has been doing well and has stayed out of the hospital since before her last visit.  She has chronic lymphedema but no pitting edema.  She has oxygen to use as needed.    VITAL SIGNS     Visit Vitals  /60 (BP Location: Left arm)   Pulse 68   Ht 165.1 cm (65\")   Wt 101 kg (222 lb)   BMI 36.94 kg/m²         Wt Readings from Last 3 Encounters:   11/26/24 101 kg (222 lb)   10/07/24 99.8 kg (220 lb)   09/30/24 102 kg (224 lb)     Body mass index is 36.94 kg/m².      PHYSICAL EXAMINATION     Constitutional:       General: Not in acute distress.  Neck:      Vascular: No carotid bruit or JVD.   Pulmonary:      Effort: Pulmonary effort is normal.      Breath sounds: Normal breath sounds.   Cardiovascular:      Normal rate. Regular rhythm.      Murmurs: There is no murmur.   Edema:     Peripheral edema present.     Pretibial: bilateral non-pitting edema of the pretibial area.     Ankle: bilateral non-pitting edema of the ankle.  Psychiatric:         Mood and Affect: Mood and affect normal.           REVIEWED DATA       ECG 12 Lead    Date/Time: 11/26/2024 2:29 PM  Performed by: Anna Marsh MD    Authorized by: Anna Marsh MD  Comparison: compared with previous ECG from 6/26/2024  Similar to previous ECG  Rhythm: sinus rhythm  BPM: 68  Conduction: non-specific intraventricular conduction delay    Clinical impression: abnormal EKG          Lab Results   Component Value Date    GLUCOSE 96 05/29/2024    BUN 28 (H) 05/29/2024    CREATININE 41.00 08/09/2024     05/29/2024    K 4.3 05/29/2024     05/29/2024    CALCIUM 9.3 05/29/2024    PROTEINTOT 6.4 05/13/2024    ALBUMIN 4.3 05/29/2024    ALT 11 05/29/2024    AST 12 05/29/2024    ALKPHOS 86 05/29/2024    " BILITOT 0.3 05/29/2024    GLOB 2.4 05/13/2024    AGRATIO 1.7 05/13/2024    BCR 19 05/29/2024    ANIONGAP 14.0 05/14/2024    EGFR 38.5 (L) 05/14/2024       ASSESSMENT & PLAN      Diagnosis Plan   1. Chronic diastolic congestive heart failure        2. Mixed hyperlipidemia        3. Paroxysmal atrial fibrillation        4. Primary hypertension        5. Obstructive sleep apnea syndrome            Chronic heart failure with preserved ejection fraction.  She did have a stress induced cardiomyopathy with echocardiogram in 01/2024 showing her LV function had normalized.  Diuretics are managed by Dr. Hernandez.  She has some nonpitting edema in her legs consistent with lymphedema.  History of stress induced cardiomyopathy in 08/2023.  Hypertension.  Valsartan 320 mg a day, carvedilol 12.5 mg twice daily, Bumex 1 mg twice daily, hydralazine 25 mg twice daily.  Her blood pressure is well-controlled.  Continue current medications.    Hyperlipidemia.  Not currently on therapy for cholesterol.  Chronic kidney disease.  Followed by Dr. Hernandez.  History of paroxysmal atrial fibrillation, in sinus rhythm today.  Anticoagulated with Eliquis.  Not having any bleeding problems  Chronic lymphedema.      Follow-up with me in [place] in 6 months.    Orders Placed This Encounter   Procedures    ECG 12 Lead     This order was created via procedure documentation     Order Specific Question:   Release to patient     Answer:   Routine Release [[id number]]           MEDICATIONS         Discharge Medications            Accurate as of November 26, 2024  2:31 PM. If you have any questions, ask your nurse or doctor.                Changes to Medications        Instructions Start Date   lactulose 10 GM/15ML solution  Commonly known as: CHRONULAC  What changed: when to take this   TAKE 15 ML BY MOUTH THREE TIMES DAILY             Continue These Medications        Instructions Start Date   acetaminophen 325 MG tablet  Commonly known as: TYLENOL    650 mg, Oral, Every 4 Hours PRN      albuterol sulfate  (90 Base) MCG/ACT inhaler  Commonly known as: PROVENTIL HFA;VENTOLIN HFA;PROAIR HFA   2 puffs, Every 4 Hours PRN      alendronate 70 MG tablet  Commonly known as: Fosamax   70 mg, Oral, Every 7 Days      apixaban 5 MG tablet tablet  Commonly known as: Eliquis   5 mg, Oral, Every 12 Hours Scheduled      benzonatate 200 MG capsule  Commonly known as: TESSALON   200 mg, Oral, 3 Times Daily PRN      bumetanide 1 MG tablet  Commonly known as: BUMEX   1 mg, 2 Times Daily      carvedilol 12.5 MG tablet  Commonly known as: COREG   12.5 mg, Oral, 2 Times Daily With Meals      chlorhexidine 0.12 % solution  Commonly known as: PERIDEX   APPLY 15 ML AS DIRECTED TWICE DAILY SWISH FOR 30 seconds AND expectorate, EVERY MORNING AND IN THE EVENING TO BEGIN in 24 hours AFTER surgery      coenzyme Q10 100 MG capsule   100 mg, Daily      cyclobenzaprine 10 MG tablet  Commonly known as: FLEXERIL   10 mg, Oral, 2 Times Daily PRN      Enulose 10 GM/15ML solution solution (encephalopathy)  Generic drug: lactulose       esomeprazole 40 MG capsule  Commonly known as: nexIUM   40 mg, Every Morning Before Breakfast      ferrous sulfate 325 (65 FE) MG tablet   325 mg, Daily With Breakfast      fluticasone 50 MCG/ACT nasal spray  Commonly known as: FLONASE   USE 1 SPRAY INTO EACH NOSTRIL TWICE DAILY      folic acid 1 MG tablet  Commonly known as: FOLVITE   1 mg, Oral, Daily      gabapentin 100 MG capsule  Commonly known as: NEURONTIN   100 mg, Oral, 3 Times Daily      hydrALAZINE 25 MG tablet  Commonly known as: APRESOLINE   25 mg, Oral, 2 Times Daily      ipratropium 0.06 % nasal spray  Commonly known as: ATROVENT   2 sprays, Nasal, 4 Times Daily PRN      ipratropium-albuterol 0.5-2.5 mg/3 ml nebulizer  Commonly known as: DUO-NEB   USE 1 VIAL IN NEBULIZER 2 TIMES DAILY. Generic: Duoneb      levothyroxine 100 MCG tablet  Commonly known as: SYNTHROID, LEVOTHROID   TAKE ONE TABLET BY  MOUTH DAILY      lidocaine 5 % ointment  Commonly known as: XYLOCAINE   1 application , Topical, 3 Times Daily      Magnesium 500 MG tablet   Take  by mouth.      ondansetron 4 MG tablet  Commonly known as: ZOFRAN       potassium chloride 10 MEQ CR tablet   10 mEq, Daily      pramipexole 1.5 MG tablet  Commonly known as: MIRAPEX   1.5 mg, Oral, Every Night at Bedtime      promethazine-dextromethorphan 6.25-15 MG/5ML syrup  Commonly known as: PROMETHAZINE-DM   5 mL, Oral, 4 Times Daily PRN      Senna Plus 8.6-50 MG per tablet  Generic drug: sennosides-docusate   TAKE TWO TABLETS BY MOUTH TWICE DAILY      sertraline 25 MG tablet  Commonly known as: ZOLOFT   Take 1 tablet by mouth Every Night.      Symbicort 160-4.5 MCG/ACT inhaler  Generic drug: budesonide-formoterol   No dose, route, or frequency recorded.      traMADol 50 MG tablet  Commonly known as: ULTRAM   50 mg, Oral, Every 4 Hours PRN      triamcinolone 0.1 % cream  Commonly known as: KENALOG   1 application , Topical, 2 Times Daily      valsartan 320 MG tablet  Commonly known as: DIOVAN   320 mg, Oral, Daily      vitamin B-12 1000 MCG tablet  Commonly known as: CYANOCOBALAMIN   1,000 mcg, Daily      Zinc 50 MG tablet   1 tablet                 Anna Marsh MD  11/26/24  14:31 EST    Part of this note may be an electronic transcription/translation of spoken language to printed text using the Dragon dictation system.

## 2024-12-02 RX ORDER — LEVOTHYROXINE SODIUM 100 UG/1
100 TABLET ORAL DAILY
Qty: 90 TABLET | Refills: 0 | Status: SHIPPED | OUTPATIENT
Start: 2024-12-02

## 2024-12-02 NOTE — TELEPHONE ENCOUNTER
Rx Refill Note  Requested Prescriptions     Pending Prescriptions Disp Refills    levothyroxine (SYNTHROID, LEVOTHROID) 100 MCG tablet [Pharmacy Med Name: levothyroxine 100 mcg tablet] 90 tablet 0     Sig: TAKE ONE TABLET BY MOUTH EVERY DAY      Last office visit with prescribing clinician: 9/30/2024   Last telemedicine visit with prescribing clinician: Visit date not found   Next office visit with prescribing clinician: 12/6/2024                         Would you like a call back once the refill request has been completed: [] Yes [] No    If the office needs to give you a call back, can they leave a voicemail: [] Yes [] No    Tere Johnson MA  12/02/24, 08:52 EST

## 2024-12-06 ENCOUNTER — OFFICE VISIT (OUTPATIENT)
Dept: FAMILY MEDICINE CLINIC | Facility: CLINIC | Age: 84
End: 2024-12-06
Payer: MEDICARE

## 2024-12-06 VITALS
HEIGHT: 65 IN | HEART RATE: 71 BPM | WEIGHT: 227.8 LBS | TEMPERATURE: 98.5 F | DIASTOLIC BLOOD PRESSURE: 76 MMHG | BODY MASS INDEX: 37.95 KG/M2 | SYSTOLIC BLOOD PRESSURE: 130 MMHG | OXYGEN SATURATION: 96 %

## 2024-12-06 DIAGNOSIS — R09.02 HYPOXIA: ICD-10-CM

## 2024-12-06 DIAGNOSIS — G47.33 OSA ON CPAP: ICD-10-CM

## 2024-12-06 DIAGNOSIS — N18.31 STAGE 3A CHRONIC KIDNEY DISEASE: ICD-10-CM

## 2024-12-06 DIAGNOSIS — Z78.0 POSTMENOPAUSE: ICD-10-CM

## 2024-12-06 DIAGNOSIS — I10 ESSENTIAL HYPERTENSION: ICD-10-CM

## 2024-12-06 DIAGNOSIS — E03.9 HYPOTHYROIDISM, UNSPECIFIED TYPE: ICD-10-CM

## 2024-12-06 DIAGNOSIS — Z00.00 MEDICARE ANNUAL WELLNESS VISIT, SUBSEQUENT: Primary | ICD-10-CM

## 2024-12-06 DIAGNOSIS — Z91.81 AT HIGH RISK FOR FALLS: ICD-10-CM

## 2024-12-06 DIAGNOSIS — R60.0 LOCALIZED EDEMA: ICD-10-CM

## 2024-12-06 DIAGNOSIS — I50.22 CHRONIC SYSTOLIC HEART FAILURE: ICD-10-CM

## 2024-12-06 DIAGNOSIS — J44.9 CHRONIC OBSTRUCTIVE PULMONARY DISEASE, UNSPECIFIED COPD TYPE: ICD-10-CM

## 2024-12-06 DIAGNOSIS — D50.9 IRON DEFICIENCY ANEMIA, UNSPECIFIED IRON DEFICIENCY ANEMIA TYPE: ICD-10-CM

## 2024-12-06 DIAGNOSIS — M81.0 AGE-RELATED OSTEOPOROSIS WITHOUT CURRENT PATHOLOGICAL FRACTURE: ICD-10-CM

## 2024-12-06 PROCEDURE — 3078F DIAST BP <80 MM HG: CPT | Performed by: FAMILY MEDICINE

## 2024-12-06 PROCEDURE — 1125F AMNT PAIN NOTED PAIN PRSNT: CPT | Performed by: FAMILY MEDICINE

## 2024-12-06 PROCEDURE — 1160F RVW MEDS BY RX/DR IN RCRD: CPT | Performed by: FAMILY MEDICINE

## 2024-12-06 PROCEDURE — 99214 OFFICE O/P EST MOD 30 MIN: CPT | Performed by: FAMILY MEDICINE

## 2024-12-06 PROCEDURE — 3075F SYST BP GE 130 - 139MM HG: CPT | Performed by: FAMILY MEDICINE

## 2024-12-06 PROCEDURE — G0439 PPPS, SUBSEQ VISIT: HCPCS | Performed by: FAMILY MEDICINE

## 2024-12-06 PROCEDURE — 1170F FXNL STATUS ASSESSED: CPT | Performed by: FAMILY MEDICINE

## 2024-12-06 PROCEDURE — 1159F MED LIST DOCD IN RCRD: CPT | Performed by: FAMILY MEDICINE

## 2024-12-06 NOTE — PATIENT INSTRUCTIONS
Medicare Wellness  Personal Prevention Plan of Service     Date of Office Visit:    Encounter Provider:  Meredith Lea Kehrer, MD  Place of Service:  [facility]  Patient Name: Latanya Olsen  :  1940    As part of the Medicare Wellness portion of your visit today, we are providing you with this personalized preventive plan of services (PPPS). This plan is based upon recommendations of the [place] Preventive Services Task Force (USPSTF) and the Advisory Committee on Immunization Practices (ACIP).    This lists the preventive care services that should be considered, and provides dates of when you are due. Items listed as completed are up-to-date and do not require any further intervention.    Health Maintenance   Topic Date Due    COVID-19 Vaccine ( season) 2024    ANNUAL WELLNESS VISIT  2024    LIPID PANEL  2024    DXA SCAN  2024    BMI FOLLOWUP  2025    TDAP/TD VACCINES (3 - Td or Tdap) 2029    RSV Vaccine - Adults  Completed    INFLUENZA VACCINE  Completed    Pneumococcal Vaccine 65+  Completed    ZOSTER VACCINE  Completed    MAMMOGRAM  Discontinued       Orders Placed This Encounter   Procedures    DEXA Bone Density Axial     Standing Status:   Future     Standing Expiration Date:   2025     Order Specific Question:   Is patient taking or have taken long term Glucocorticoid (steroids)?     Answer:   No     Order Specific Question:   Does the patient have rheumatoid arthritis?     Answer:   No     Order Specific Question:   Does the patient have secondary osteoporosis?     Answer:   No     Order Specific Question:   Reason for Exam:     Answer:   follow up     Order Specific Question:   Does this patient have a diabetic monitoring/medication delivering device on?     Answer:   No     Order Specific Question:   Release to patient     Answer:   Routine Release [[id number]]    TSH     Order Specific Question:   Release  to patient     Answer:   Routine Release [[id number]]    Vitamin B12     Order Specific Question:   Release to patient     Answer:   Routine Release [[id number]]    Lipid Panel     Order Specific Question:   Release to patient     Answer:   Routine Release [[id number]]    Ferritin     Order Specific Question:   Release to patient     Answer:   Routine Release [[id number]]    Iron     Order Specific Question:   Release to patient     Answer:   Routine Release [[id number]]    Comprehensive Metabolic Panel     Order Specific Question:   Release to patient     Answer:   Routine Release [[id number]]    Folate     Order Specific Question:   Release to patient     Answer:   Routine Release [[id number]]    Magnesium     Order Specific Question:   Release to patient     Answer:   Routine Release [[id number]]    CBC & Differential     Order Specific Question:   Manual Differential     Answer:   No     Order Specific Question:   Release to patient     Answer:   Routine Release [[id number]]       Return in about 6 months (around 6/6/2025) for Recheck.        Fall Prevention in the Home, Adult  Falls can cause injuries and affect people of all ages. There are many simple things that you can do to make your home safe and to help prevent falls.  If you need it, ask for help making these changes.  What actions can I take to prevent falls?  General information  Use good lighting in all rooms. Make sure to:  Replace any light bulbs that burn out.  Turn on lights if it is dark and use night-lights.  Keep items that you use often in easy-to-reach places. Lower the shelves around your home if needed.  Move furniture so that there are clear paths around it.  Do not keep throw rugs or other things on the floor that can make you trip.  If any of your floors are uneven, fix them.  Add color or contrast paint or tape to clearly luke and help you see:  Grab bars or handrails.  First and last steps of staircases.  Where the edge of each step  is.  If you use a ladder or stepladder:  Make sure that it is fully opened. Do not climb a closed ladder.  Make sure the sides of the ladder are locked in place.  Have someone hold the ladder while you use it.  Know where your pets are as you move through your home.  What can I do in the bathroom?         Keep the floor dry. Clean up any water that is on the floor right away.  Remove soap buildup in the bathtub or shower. Buildup makes bathtubs and showers slippery.  Use non-skid mats or decals on the floor of the bathtub or shower.  Attach bath mats securely with double-sided, non-slip rug tape.  If you need to sit down while you are in the shower, use a non-slip stool.  Install grab bars by the toilet and in the bathtub and shower. Do not use towel bars as grab bars.  What can I do in the bedroom?  Make sure that you have a light by your bed that is easy to reach.  Do not use any sheets or blankets on your bed that hang to the floor.  Have a firm bench or chair with side arms that you can use for support when you get dressed.  What can I do in the kitchen?  Clean up any spills right away.  If you need to reach something above you, use a sturdy step stool that has a grab bar.  Keep electrical cables out of the way.  Do not use floor polish or wax that makes floors slippery.  What can I do with my stairs?  Do not leave anything on the stairs.  Make sure that you have a light switch at the top and the bottom of the stairs. Have them installed if you do not have them.  Make sure that there are handrails on both sides of the stairs. Fix handrails that are broken or loose. Make sure that handrails are as long as the staircases.  Install non-slip stair treads on all stairs in your home if they do not have carpet.  Avoid having throw rugs at the top or bottom of stairs, or secure the rugs with carpet tape to prevent them from moving.  Choose a carpet design that does not hide the edge of steps on the stairs. Make sure  that carpet is firmly attached to the stairs. Fix any carpet that is loose or worn.  What can I do on the outside of my home?  Use bright outdoor lighting.  Repair the edges of walkways and driveways and fix any cracks. Clear paths of anything that can make you trip, such as tools or rocks.  Add color or contrast paint or tape to clearly luke and help you see high doorway thresholds.  Trim any bushes or trees on the main path into your home.  Check that handrails are securely fastened and in good repair. Both sides of all steps should have handrails.  Install guardrails along the edges of any raised decks or porches.  Have leaves, snow, and ice cleared regularly. Use sand, salt, or ice melt on walkways during winter months if you live where there is ice and snow.  In the garage, clean up any spills right away, including grease or oil spills.  What other actions can I take?  Review your medicines with your health care provider. Some medicines can make you confused or feel dizzy. This can increase your chance of falling.  Wear closed-toe shoes that fit well and support your feet. Wear shoes that have rubber soles and low heels.  Use a cane, walker, scooter, or crutches that help you move around if needed.  Talk with your provider about other ways that you can decrease your risk of falls. This may include seeing a physical therapist to learn to do exercises to improve movement and strength.  Where to find more information  Centers for Disease Control and Prevention, [place]: cdc.gov  National [place] on Aging: mariam.nih.gov  National [place] on Aging: mariam.nih.gov  Contact a health care provider if:  You are afraid of falling at home.  You feel weak, drowsy, or dizzy at home.  You fall at home.  Get help right away if you:  Lose consciousness or have trouble moving after a fall.  Have a fall that causes a head injury.  These symptoms may be an emergency. Get help right away. Call 911.  Do not wait to see if the  symptoms will go away.  Do not drive yourself to the hospital.  This information is not intended to replace advice given to you by your health care provider. Make sure you discuss any questions you have with your health care provider.  Document Revised: 08/21/2023 Document Reviewed: 08/21/2023  Elsevier Patient Education © 2024 [employer]

## 2024-12-06 NOTE — PROGRESS NOTES
The ABCs of the Annual Wellness Visit  Subsequent Medicare Wellness Visit    Subjective    Latanya lOsen is a 84 y.o. female who presents for a Subsequent Medicare Wellness Visit.    The following portions of the patient's history were reviewed and   updated as appropriate: allergies, current medications, past family history, past medical history, past social history, past surgical history, and problem list.    Compared to one year ago, the patient feels her physical   health is the same.    Compared to one year ago, the patient feels her mental   health is the same.    Recent Hospitalizations:  This patient has had a [facility] admission record on file within the last 365 days.    Current Medical Providers:  Patient Care Team:  Kehrer, Meredith Lea, MD as PCP - General (Family Medicine)  Chris Bear MD as Referring Physician (Hospitalist)  Delfino Scruggs MD as Consulting Physician (Hematology and Oncology)    Outpatient Medications Prior to Visit   Medication Sig Dispense Refill    acetaminophen (TYLENOL) 325 MG tablet Take 2 tablets by mouth Every 4 (Four) Hours As Needed for Mild Pain .      albuterol sulfate  (90 Base) MCG/ACT inhaler Inhale 2 puffs Every 4 (Four) Hours As Needed for Wheezing.      apixaban (Eliquis) 5 MG tablet tablet Take 1 tablet by mouth Every 12 (Twelve) Hours. 14 tablet 0    bumetanide (BUMEX) 1 MG tablet Take 1 tablet by mouth 2 (Two) Times a Day.      carvedilol (COREG) 12.5 MG tablet Take 1 tablet by mouth 2 (Two) Times a Day With Meals. 60 tablet 5    coenzyme Q10 100 MG capsule Take 1 capsule by mouth Daily.      cyclobenzaprine (FLEXERIL) 10 MG tablet Take 1 tablet by mouth 2 (Two) Times a Day As Needed for Muscle Spasms. 14 tablet 0    esomeprazole (NexIUM) 40 MG capsule Take 1 capsule by mouth Every Morning Before Breakfast.      ferrous sulfate 325 (65 FE) MG tablet Take 1 tablet by mouth Daily With Breakfast.      fluticasone (FLONASE) 50 MCG/ACT  nasal spray USE 1 SPRAY INTO EACH NOSTRIL TWICE DAILY      folic acid (FOLVITE) 1 MG tablet Take 1 tablet by mouth Daily. 30 tablet 5    gabapentin (NEURONTIN) 100 MG capsule TAKE 1 CAPSULE BY MOUTH THREE TIMES DAILY 90 capsule 0    hydrALAZINE (APRESOLINE) 25 MG tablet Take 1 tablet by mouth 2 (Two) Times a Day. 180 tablet 3    ipratropium (ATROVENT) 0.06 % nasal spray 2 sprays into the nostril(s) as directed by provider 4 (Four) Times a Day As Needed for Rhinitis (congestion). 15 mL 0    ipratropium-albuterol (DUO-NEB) 0.5-2.5 mg/3 ml nebulizer USE 1 VIAL IN NEBULIZER 2 TIMES DAILY. Generic: Duoneb 60 mL 11    lactulose (CHRONULAC) 10 GM/15ML solution TAKE 15 ML BY MOUTH THREE TIMES DAILY 1350 mL 2    levothyroxine (SYNTHROID, LEVOTHROID) 100 MCG tablet TAKE ONE TABLET BY MOUTH EVERY DAY 90 tablet 0    lidocaine (XYLOCAINE) 5 % ointment Apply 1 application topically to the appropriate area as directed 3 (Three) Times a Day. 50 g 3    Magnesium 500 MG tablet Take  by mouth.      ondansetron (ZOFRAN) 4 MG tablet       potassium chloride 10 MEQ CR tablet Take 1 tablet by mouth Daily.      pramipexole (MIRAPEX) 1.5 MG tablet TAKE ONE TABLET BY MOUTH EVERY NIGHT AT BEDTIME 90 tablet 0    Senna Plus 8.6-50 MG per tablet TAKE TWO TABLETS BY MOUTH TWICE DAILY 60 tablet 0    SYMBICORT 160-4.5 MCG/ACT inhaler       traMADol (ULTRAM) 50 MG tablet Take 1 tablet by mouth Every 4 (Four) Hours As Needed for Moderate Pain. 40 tablet 0    triamcinolone (KENALOG) 0.1 % cream Apply 1 application topically to the appropriate area as directed 2 (Two) Times a Day. 15 g 2    valsartan (DIOVAN) 320 MG tablet Take 1 tablet by mouth Daily. 90 tablet 3    vitamin B-12 (CYANOCOBALAMIN) 1000 MCG tablet Take 1 tablet by mouth Daily.      Zinc 50 MG tablet Take 1 tablet by mouth.      alendronate (Fosamax) 70 MG tablet Take 1 tablet by mouth Every 7 (Seven) Days. 13 tablet 3    Enulose 10 GM/15ML solution solution (encephalopathy)        benzonatate (TESSALON) 200 MG capsule Take 1 capsule by mouth 3 (Three) Times a Day As Needed for Cough. (Patient not taking: Reported on 12/6/2024) 30 capsule 0    chlorhexidine (PERIDEX) 0.12 % solution APPLY 15 ML AS DIRECTED TWICE DAILY SWISH FOR 30 seconds AND expectorate, EVERY MORNING AND IN THE EVENING TO BEGIN in 24 hours AFTER surgery (Patient not taking: Reported on 12/6/2024)      promethazine-dextromethorphan (PROMETHAZINE-DM) 6.25-15 MG/5ML syrup Take 5 mL by mouth 4 (Four) Times a Day As Needed for Cough. (Patient not taking: Reported on 12/6/2024) 180 mL 0    sertraline (ZOLOFT) 25 MG tablet Take 1 tablet by mouth Every Night. (Patient not taking: Reported on 12/6/2024)       No facility-administered medications prior to visit.       Opioid medication/s are on active medication list.  and I have evaluated her active treatment plan and pain score trends (see table).  There were no vitals filed for this visit.  I have reviewed the chart for potential of high risk medication and harmful drug interactions in the elderly.          Aspirin is not on active medication list.  Aspirin use is not indicated based on review of current medical condition/s. Risk of harm outweighs potential benefits.  .    Patient Active Problem List   Diagnosis    Carcinoid tumor of lung    Diverticulosis of intestine    Obstructive sleep apnea syndrome    Weight loss    Vitamin D deficiency    Overweight (BMI 25.0-29.9)    Spinal stenosis of lumbar region without neurogenic claudication    Osteoarthritis of knee    Obesity (BMI 30-39.9)    Chronic lower back pain    Knee pain    Insomnia    Hypothyroidism    Hypokalemia    Primary hypertension    Mixed hyperlipidemia    Generalized osteoarthritis    Gastroparesis    Foot pain    Chronic obstructive pulmonary disease    Chronic constipation    Anemia    Weight gain    Pain of left breast    Elevated serum alkaline phosphatase level    Neck pain    Volume overload    Status post  "total hip replacement, right    Generalized weakness    Constipation    Paroxysmal atrial fibrillation    Idiopathic peripheral neuropathy    Lymphedema    Lumbar degenerative disc disease    GERD (gastroesophageal reflux disease)    Lung cancer    Non-STEMI (non-ST elevated myocardial infarction)    Nonischemic nontraumatic myocardial injury    Stage 3a chronic kidney disease    Adjustment disorder with depressed mood    Peripheral polyneuropathy    Chronic diastolic congestive heart failure    MCKEON (dyspnea on exertion)    Dyspnea    Edema of left lower extremity    Venous (peripheral) insufficiency    Venous stasis    Hematoma    Iliac vein thrombosis, left    Presence of inferior vena cava filter    Leg hematoma, left, sequela     Advance Care Planning   Advance Care Planning     Advance Directive is on file.  ACP discussion was held with the patient during this visit. Patient has an advance directive in EMR which is still valid.      Objective    Vitals:    24 1004   BP: 130/76   Pulse: 71   Temp: 98.5 °F (36.9 °C)   SpO2: 96%   Weight: 103 kg (227 lb 12.8 oz)   Height: 165.1 cm (65\")     Estimated body mass index is 37.91 kg/m² as calculated from the following:    Height as of this encounter: 165.1 cm (65\").    Weight as of this encounter: 103 kg (227 lb 12.8 oz).           Does the patient have evidence of cognitive impairment? No          HEALTH RISK ASSESSMENT    Smoking Status:  Social History     Tobacco Use   Smoking Status Never    Passive exposure: Never   Smokeless Tobacco Never   Tobacco Comments    Never; Patient does not smoke     Alcohol Consumption:  Social History     Substance and Sexual Activity   Alcohol Use Never    Comment: Patient is non drinker.     Fall Risk Screen:    STEADI Fall Risk Assessment was completed, and patient is at MODERATE risk for falls. Assessment completed on:2024    Depression Screenin/6/2024    10:03 AM   PHQ-2/PHQ-9 Depression Screening   Little " interest or pleasure in doing things Not at all   Feeling down, depressed, or hopeless Not at all       Health Habits and Functional and Cognitive Screenin/6/2024    10:03 AM   Functional & Cognitive Status   Do you have difficulty preparing food and eating? No   Do you have difficulty bathing yourself, getting dressed or grooming yourself? No   Do you have difficulty using the toilet? No   Do you have difficulty moving around from place to place? No   Do you have trouble with steps or getting out of a bed or a chair? No   Current Diet Other   Dental Exam Up to date   Eye Exam Up to date   Exercise (times per week) 0 times per week   Current Exercises Include No Regular Exercise   Do you need help using the phone?  No   Are you deaf or do you have serious difficulty hearing?  No   Do you need help to go to places out of walking distance? Yes   Do you need help shopping? Yes   Do you need help preparing meals?  No   Do you need help with housework?  No   Do you need help with laundry? No   Do you need help taking your medications? No   Do you need help managing money? No   Do you ever drive or ride in a car without wearing a seat belt? No   Have you felt unusual stress, anger or loneliness in the last month? Yes   Who do you live with? Alone   If you need help, do you have trouble finding someone available to you? No   Do you have difficulty concentrating, remembering or making decisions? No       Age-appropriate Screening Schedule:  Refer to the list below for future screening recommendations based on patient's age, sex and/or medical conditions. Orders for these recommended tests are listed in the plan section. The patient has been provided with a written plan.    Health Maintenance   Topic Date Due    COVID-19 Vaccine ( season) 2024    ANNUAL WELLNESS VISIT  2024    LIPID PANEL  2024    DXA SCAN  2024    BMI FOLLOWUP  2025    TDAP/TD VACCINES (3 - Td or Tdap)  04/17/2029    RSV Vaccine - Adults  Completed    INFLUENZA VACCINE  Completed    Pneumococcal Vaccine 65+  Completed    ZOSTER VACCINE  Completed    MAMMOGRAM  Discontinued                  CMS Preventative Services Quick Reference  Risk Factors Identified During Encounter  Fall Risk-High or Moderate: Discussed Fall Prevention in the home  The above risks/problems have been discussed with the patient.  Pertinent information has been shared with the patient in the After Visit Summary.  An After Visit Summary and PPPS were made available to the patient.    Follow Up:   Next Medicare Wellness visit to be scheduled in 1 year.       Additional E&M Note during same encounter follows:  Patient has multiple medical problems which are significant and separately identifiable that require additional work above and beyond the Medicare Wellness Visit.      Chief Complaint  Medicare Wellness-subsequent, Hypertension, Hypothyroidism, Hyperlipidemia, and Depression    Subjective        HPI  Latanya Olsen is also being seen today for follow up.  History of Present Illness  The patient is an 84-year-old female who presents for a Medicare wellness exam and follow-up on medical problems including hypertension, hypothyroidism, heart failure, COPD, and chronic edema.    She continues to use lactulose for bowel regulation and has temporarily discontinued Fosamax for a period of 3 months. Tramadol is used occasionally for pain management, and Gabapentin is taken nightly to alleviate foot pain. She has stopped taking sertraline for mood regulation but continues to take iron supplements. She reports improvement in restless legs syndrome.    She experiences shortness of breath during physical activity, such as getting up and moving around. She uses oxygen at home and carries a portable oxygen supply when going out. Her next appointment with the pulmonologist is scheduled for 01/2025. She is compliant with her CPAP therapy.    She reports  "no gastrointestinal or genitourinary symptoms such as nausea, vomiting, or changes in bowel or bladder function. She wears support hose daily and uses lymphedema pumps twice a day.    She is due for a mammogram next week. She is experiencing significant knee pain and has a long-standing history of back issues.    IMMUNIZATIONS  She had influenza and RSV vaccine last Friday.            Objective   Vital Signs:  /76   Pulse 71   Temp 98.5 °F (36.9 °C)   Ht 165.1 cm (65\")   Wt 103 kg (227 lb 12.8 oz)   SpO2 96%   BMI 37.91 kg/m²     Physical Exam  Vitals and nursing note reviewed.   Constitutional:       General: She is not in acute distress.     Appearance: Normal appearance. She is well-developed.   HENT:      Head: Normocephalic and atraumatic.      Right Ear: Tympanic membrane, ear canal and external ear normal.      Left Ear: Tympanic membrane, ear canal and external ear normal.      Nose: Nose normal.      Mouth/Throat:      Mouth: Mucous membranes are moist.      Pharynx: Oropharynx is clear. No oropharyngeal exudate or posterior oropharyngeal erythema.   Eyes:      Conjunctiva/sclera: Conjunctivae normal.      Pupils: Pupils are equal, round, and reactive to light.   Neck:      Thyroid: No thyromegaly.   Cardiovascular:      Rate and Rhythm: Normal rate and regular rhythm.      Heart sounds: No murmur heard.  Pulmonary:      Effort: Pulmonary effort is normal.      Breath sounds: Normal breath sounds. No wheezing.   Abdominal:      General: Abdomen is flat. Bowel sounds are normal. There is no distension.      Palpations: Abdomen is soft. There is no mass.      Tenderness: There is no abdominal tenderness.      Hernia: No hernia is present.   Musculoskeletal:         General: No swelling. Normal range of motion.      Cervical back: Normal range of motion and neck supple.      Right lower leg: Edema present.      Left lower leg: Edema present.      Comments: Hose in place   Lymphadenopathy:      " Cervical: No cervical adenopathy.   Skin:     General: Skin is warm and dry.      Capillary Refill: Capillary refill takes less than 2 seconds.      Findings: No rash.   Neurological:      General: No focal deficit present.      Mental Status: She is alert and oriented to person, place, and time.      Cranial Nerves: No cranial nerve deficit.      Gait: Gait abnormal.      Comments: Wheelchair-bound   Psychiatric:         Mood and Affect: Mood normal.         Behavior: Behavior normal.        Physical Exam                  Results            Assessment and Plan   Diagnoses and all orders for this visit:    1. Medicare annual wellness visit, subsequent (Primary)    2. Chronic systolic heart failure    3. Chronic obstructive pulmonary disease, unspecified COPD type    4. SIERRA on CPAP    5. Essential hypertension  -     TSH  -     Lipid Panel  -     CBC & Differential  -     Comprehensive Metabolic Panel  -     Magnesium    6. Hypothyroidism, unspecified type  -     TSH    7. Stage 3a chronic kidney disease  -     Comprehensive Metabolic Panel    8. Localized edema    9. At high risk for falls    10. Age-related osteoporosis without current pathological fracture    11. Hypoxia    12. Iron deficiency anemia, unspecified iron deficiency anemia type  -     Vitamin B12  -     Ferritin  -     Iron  -     CBC & Differential  -     Folate    13. Postmenopause  -     DEXA Bone Density Axial; Future      Assessment & Plan  1. Hypertension.  Her blood pressure readings are within the normal range. She is advised to continue her current medication regimen.    2. Hypothyroidism.  She is advised to continue her current medication regimen.    3. Heart Failure.  She reports shortness of breath with exercise and uses oxygen at home. She is advised to continue her current treatment and follow up with her pulmonologist in January.    4. Chronic Obstructive Pulmonary Disease (COPD).  She reports shortness of breath with exercise and uses  oxygen at home. She is advised to continue her current treatment and follow up with her pulmonologist in January.    5. Chronic Edema.  She is wearing support hose daily and using lymphedema pumps twice a day. She is advised to continue these treatments.    6. Osteoporosis.  She is currently on a break from alendronate (Fosamax) due to dental work. She will resume alendronate after completing her dental procedures.    7. Neuropathy.  She reports that her feet hurt a lot and she takes gabapentin every night. She is advised to continue her current medication regimen.    8. Mood Disorder.  She is no longer taking sertraline for her mood.    9. Iron Deficiency.  She is taking iron supplements. Her iron levels will be checked with the labs ordered today.    10. Knee Pain.  She reports significant trouble with her knees, which is being managed by [facility]. She is advised to continue her current treatment.    11. Back Pain.  She reports long-standing trouble with her back, which is being managed by Rad[facility]. She is advised to continue her current treatment.    12. Health Maintenance.  She received a flu shot and RSV vaccine last Friday at [facility]. A bone density scan has been ordered. She has a mammogram scheduled next week at [facility].              Follow Up   Return in about 6 months (around 6/6/2025) for Recheck.  Patient was given instructions and counseling regarding her condition or for health maintenance advice. Please see specific information pulled into the AVS if appropriate.     Patient or patient representative verbalized consent for the use of Ambient Listening during the visit with  Meredith Lea Kehrer, MD for chart documentation. 12/6/2024  10:26 EST

## 2024-12-07 LAB
ALBUMIN SERPL-MCNC: 3.8 G/DL (ref 3.5–5.2)
ALBUMIN/GLOB SERPL: 1.8 G/DL
ALP SERPL-CCNC: 87 U/L (ref 39–117)
ALT SERPL-CCNC: 11 U/L (ref 1–33)
AST SERPL-CCNC: 12 U/L (ref 1–32)
BASOPHILS # BLD AUTO: 0.02 10*3/MM3 (ref 0–0.2)
BASOPHILS NFR BLD AUTO: 0.4 % (ref 0–1.5)
BILIRUB SERPL-MCNC: 0.2 MG/DL (ref 0–1.2)
BUN SERPL-MCNC: 33 MG/DL (ref 8–23)
BUN/CREAT SERPL: 24.6 (ref 7–25)
CALCIUM SERPL-MCNC: 9 MG/DL (ref 8.6–10.5)
CHLORIDE SERPL-SCNC: 106 MMOL/L (ref 98–107)
CHOLEST SERPL-MCNC: 178 MG/DL (ref 0–200)
CO2 SERPL-SCNC: 26.3 MMOL/L (ref 22–29)
CREAT SERPL-MCNC: 1.34 MG/DL (ref 0.57–1)
EGFRCR SERPLBLD CKD-EPI 2021: 38.9 ML/MIN/1.73
EOSINOPHIL # BLD AUTO: 0.24 10*3/MM3 (ref 0–0.4)
EOSINOPHIL NFR BLD AUTO: 4.6 % (ref 0.3–6.2)
ERYTHROCYTE [DISTWIDTH] IN BLOOD BY AUTOMATED COUNT: 13 % (ref 12.3–15.4)
FERRITIN SERPL-MCNC: 146 NG/ML (ref 13–150)
FOLATE SERPL-MCNC: 11.5 NG/ML (ref 4.78–24.2)
GLOBULIN SER CALC-MCNC: 2.1 GM/DL
GLUCOSE SERPL-MCNC: 124 MG/DL (ref 65–99)
HCT VFR BLD AUTO: 32.3 % (ref 34–46.6)
HDLC SERPL-MCNC: 51 MG/DL (ref 40–60)
HGB BLD-MCNC: 10.7 G/DL (ref 12–15.9)
IMM GRANULOCYTES # BLD AUTO: 0.03 10*3/MM3 (ref 0–0.05)
IMM GRANULOCYTES NFR BLD AUTO: 0.6 % (ref 0–0.5)
IRON SERPL-MCNC: 56 MCG/DL (ref 37–145)
LDLC SERPL CALC-MCNC: 108 MG/DL (ref 0–100)
LYMPHOCYTES # BLD AUTO: 1.75 10*3/MM3 (ref 0.7–3.1)
LYMPHOCYTES NFR BLD AUTO: 33.5 % (ref 19.6–45.3)
MAGNESIUM SERPL-MCNC: 2.2 MG/DL (ref 1.6–2.4)
MCH RBC QN AUTO: 30.1 PG (ref 26.6–33)
MCHC RBC AUTO-ENTMCNC: 33.1 G/DL (ref 31.5–35.7)
MCV RBC AUTO: 91 FL (ref 79–97)
MONOCYTES # BLD AUTO: 0.55 10*3/MM3 (ref 0.1–0.9)
MONOCYTES NFR BLD AUTO: 10.5 % (ref 5–12)
NEUTROPHILS # BLD AUTO: 2.63 10*3/MM3 (ref 1.7–7)
NEUTROPHILS NFR BLD AUTO: 50.4 % (ref 42.7–76)
NRBC BLD AUTO-RTO: 0 /100 WBC (ref 0–0.2)
PLATELET # BLD AUTO: 188 10*3/MM3 (ref 140–450)
POTASSIUM SERPL-SCNC: 4.4 MMOL/L (ref 3.5–5.2)
PROT SERPL-MCNC: 5.9 G/DL (ref 6–8.5)
RBC # BLD AUTO: 3.55 10*6/MM3 (ref 3.77–5.28)
SODIUM SERPL-SCNC: 141 MMOL/L (ref 136–145)
TRIGL SERPL-MCNC: 102 MG/DL (ref 0–150)
TSH SERPL DL<=0.005 MIU/L-ACNC: 0.41 UIU/ML (ref 0.27–4.2)
VIT B12 SERPL-MCNC: 902 PG/ML (ref 211–946)
VLDLC SERPL CALC-MCNC: 19 MG/DL (ref 5–40)
WBC # BLD AUTO: 5.22 10*3/MM3 (ref 3.4–10.8)

## 2024-12-20 ENCOUNTER — APPOINTMENT (OUTPATIENT)
Dept: BONE DENSITY | Facility: HOSPITAL | Age: 84
End: 2024-12-20
Payer: MEDICARE

## 2024-12-20 DIAGNOSIS — Z78.0 POSTMENOPAUSE: ICD-10-CM

## 2024-12-20 PROCEDURE — 77080 DXA BONE DENSITY AXIAL: CPT

## 2024-12-23 DIAGNOSIS — I10 ESSENTIAL HYPERTENSION: ICD-10-CM

## 2024-12-23 RX ORDER — VALSARTAN 320 MG/1
320 TABLET ORAL DAILY
Qty: 90 TABLET | Refills: 0 | Status: SHIPPED | OUTPATIENT
Start: 2024-12-23

## 2024-12-23 RX ORDER — PRAMIPEXOLE DIHYDROCHLORIDE 1.5 MG/1
1.5 TABLET ORAL
Qty: 90 TABLET | Refills: 0 | Status: SHIPPED | OUTPATIENT
Start: 2024-12-23

## 2024-12-23 NOTE — TELEPHONE ENCOUNTER
Rx Refill Note  Requested Prescriptions     Pending Prescriptions Disp Refills    valsartan (DIOVAN) 320 MG tablet [Pharmacy Med Name: valsartan 320 mg tablet] 90 tablet 0     Sig: TAKE ONE TABLET BY MOUTH DAILY      Last office visit with prescribing clinician: 12/6/2024   Last telemedicine visit with prescribing clinician: Visit date not found   Next office visit with prescribing clinician: 6/6/2025                         Would you like a call back once the refill request has been completed: [] Yes [] No    If the office needs to give you a call back, can they leave a voicemail: [] Yes [] No    Tere Valadez MA  12/23/24, 10:47 EST

## 2025-01-08 ENCOUNTER — PATIENT MESSAGE (OUTPATIENT)
Dept: FAMILY MEDICINE CLINIC | Facility: CLINIC | Age: 85
End: 2025-01-08
Payer: MEDICARE

## 2025-01-13 ENCOUNTER — TELEPHONE (OUTPATIENT)
Age: 85
End: 2025-01-13
Payer: MEDICARE

## 2025-01-13 ENCOUNTER — TELEPHONE (OUTPATIENT)
Dept: FAMILY MEDICINE CLINIC | Facility: CLINIC | Age: 85
End: 2025-01-13

## 2025-01-13 DIAGNOSIS — I48.0 PAROXYSMAL ATRIAL FIBRILLATION: Primary | ICD-10-CM

## 2025-01-13 NOTE — TELEPHONE ENCOUNTER
Caller: KARTHIK KARYNA    Relationship:SELF    Callback number: [phone]    Is it ok to leave a message: [x] Yes [] No    Requested medication for samples: apixaban (Eliquis) 5 MG tablet tablet     How much medication does the patient currently have left: 4 DAYS    Who will be picking up the samples: PATIENT    Do you need information about patient financial assistance for this medication: [] Yes [] No    Additional details provided: PATIENT STATES THAT IF SHE IS NOT ABLE TO GET SAMPLES, SHE WILL NEED PRESCRIPTION SENT TO HER PHARMACY.      [facility] - [address] - [phone]  - [phone] FX

## 2025-01-13 NOTE — TELEPHONE ENCOUNTER
Pt called and advised she would need new measurements for stockings, pt will come to office and was advised to ask for Jihan and she will measure.

## 2025-01-13 NOTE — TELEPHONE ENCOUNTER
PATIENT HAS CALLED BEFORE AND IS REQUESTING RX BE SENT TO [facility] FOR SUPPORT HOSE.  FAX # [phone].

## 2025-01-16 ENCOUNTER — TELEPHONE (OUTPATIENT)
Dept: CARDIOLOGY | Facility: CLINIC | Age: 85
End: 2025-01-16

## 2025-01-16 NOTE — TELEPHONE ENCOUNTER
REQUEST FOR CARDIAC CLEARANCE    Caller name: Latanya Olsen     Phone Number: [phone]    Surgeon's name: DR. LANDY COTO     Type of planned surgery: TOOTH PULLED     Date of planned surgery: N/A    Type of anesthesia: NONE    Have you been experiencing chest pain or shortness of breath? SOB    Is your doctor requesting for you to stop any of your medications prior to your surgery? YES, ELIQUIS     Where should we fax the clearance to? [phone]

## 2025-01-16 NOTE — TELEPHONE ENCOUNTER
This patient is cleared to proceed with dental extraction.  She does not require antibiotic prophylaxis.  She may hold Eliquis 2 days prior and resume Eliquis as soon as possible postprocedure.      DEVORA Proctor  [facility]   [address] [place] [address] [address]  [place] [address]  Ph: [phone]  Fax: [phone]

## 2025-01-17 ENCOUNTER — DOCUMENTATION (OUTPATIENT)
Age: 85
End: 2025-01-17
Payer: MEDICARE

## 2025-01-17 NOTE — PROGRESS NOTES
Ms. Juliana Olsen is a patient of Dr Vega. Her last visit with him was 8/14/24 for a follow up of her EVELYNE edema and Iliac scan.    Dr. Vega noted in June, 2024 that Ms. Olsen is faithful with her thigh high compression wear, and to continue. At that appointment she was not measured again for stockings. She called the office to speak with Jihan about her current compression being too tight.  Jihan asked her to come in to the office to get an updated measurement.  See below for mot recent measurements:    Left:  Ankle 11.5in           Calf    19.5in           Thigh  23in    Right  Ankle:  11in             Calf      18.5              Thigh   26.25    Patient provided with XL OT TH Relief compression

## 2025-01-21 ENCOUNTER — OFFICE VISIT (OUTPATIENT)
Dept: ORTHOPEDIC SURGERY | Facility: CLINIC | Age: 85
End: 2025-01-21
Payer: MEDICARE

## 2025-01-21 VITALS — BODY MASS INDEX: 37.82 KG/M2 | HEIGHT: 65 IN | WEIGHT: 227 LBS

## 2025-01-21 DIAGNOSIS — M17.11 PRIMARY OSTEOARTHRITIS OF RIGHT KNEE: Primary | ICD-10-CM

## 2025-01-21 DIAGNOSIS — M48.061 SPINAL STENOSIS OF LUMBAR REGION WITHOUT NEUROGENIC CLAUDICATION: ICD-10-CM

## 2025-01-21 DIAGNOSIS — M70.52 PES ANSERINUS BURSITIS OF LEFT KNEE: ICD-10-CM

## 2025-01-21 DIAGNOSIS — G60.9 IDIOPATHIC PERIPHERAL NEUROPATHY: ICD-10-CM

## 2025-01-21 RX ORDER — LIDOCAINE 50 MG/G
1 OINTMENT TOPICAL 3 TIMES DAILY
Qty: 50 G | Refills: 3 | Status: SHIPPED | OUTPATIENT
Start: 2025-01-21

## 2025-01-21 RX ADMIN — TRIAMCINOLONE ACETONIDE 40 MG: 40 INJECTION, SUSPENSION INTRA-ARTICULAR; INTRAMUSCULAR at 14:22

## 2025-01-21 RX ADMIN — LIDOCAINE HYDROCHLORIDE 4 ML: 10 INJECTION, SOLUTION EPIDURAL; INFILTRATION; INTRACAUDAL; PERINEURAL at 14:22

## 2025-01-21 RX ADMIN — TRIAMCINOLONE ACETONIDE 80 MG: 40 INJECTION, SUSPENSION INTRA-ARTICULAR; INTRAMUSCULAR at 14:22

## 2025-01-21 RX ADMIN — LIDOCAINE HYDROCHLORIDE 2 ML: 10 INJECTION, SOLUTION EPIDURAL; INFILTRATION; INTRACAUDAL; PERINEURAL at 14:22

## 2025-01-21 NOTE — PROGRESS NOTES
Subjective:     Patient ID: Latanya Olsen is a 84 y.o. female.    Chief Complaint:  Follow-up pes bursitis left knee, history of total knee arthroplasty  Follow-up DJD right knee, right knee effusion  History of Present Illness  History of Present Illness  The patient is an 84-year-old female who presents to the clinic today for evaluation of bilateral knee pain. She is accompanied by her niece.    She continues to exhibit pain along the pes bursa at the left lower extremity. She has a history of total knee arthroplasty on the left side, which continues to function well, but she experiences pain along the pes bursa. She reports no further concerns. She has received steroid injections in the past, which significantly resolved her symptoms. She is utilizing compression stockings that are thigh high.    Additionally, she reports pain in her right knee, which has not undergone any surgical intervention, including total knee arthroplasty. She experiences pain during transitional activities, deep flexion, and even short-distance ambulation. Pain is also present along the posterior joint line during activities involving deep flexion. She has difficulty rising from a chair and with ambulatory activities. She has done fairly well with steroid injections in the past, with the most recent injection administered in 09/2024, resulting in mild symptom improvement.    MEDICATIONS  Current: topical lidocaine patches         Social History     Occupational History     Employer: RETIRED   Tobacco Use    Smoking status: Never     Passive exposure: Never    Smokeless tobacco: Never    Tobacco comments:     Never   Vaping Use    Vaping status: Never Used   Substance and Sexual Activity    Alcohol use: Never     Comment: Patient is non drinker.    Drug use: Never     Comment: Drug Abuse: none    Sexual activity: Not Currently     Birth control/protection: Coitus interruptus, Nexplanon, Tubal ligation      Past Medical History:    Diagnosis Date    Abnormal ECG Aug 2023    Acute deep vein thrombosis (DVT) of iliac vein of left lower extremity 05/07/2021    Acute deep vein thrombosis (DVT) of iliac vein of left lower extremity 03/24/2021    Acute embolism and thrombosis of left iliac vein     Acute exacerbation of CHF (congestive heart failure) 08/27/2023    Anemia     Anemia 02/19/2016    Anemia, unspecified 08/12/2021    Anxiety 4-2-23    Arthritis     Arthritis of back     Arthritis of neck     Asthma 1/1/2006    Atrial fibrillation     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    Cataract 1-1/2015    Cervical disc disorder     Chronic constipation 02/19/2016    Chronic deep vein thrombosis (DVT) of left femoral vein 08/12/2021    Chronic deep vein thrombosis (DVT) of left popliteal vein 08/12/2021    Chronic lower back pain 02/19/2016    Chronic obstructive pulmonary disease 02/19/2016    Chronic obstructive pulmonary disease, unspecified     CKD (chronic kidney disease)     Stage 3    Compression of vein 05/07/2021    Congenital heart disease Aug 2023    Constipation     Constipation 03/10/2021    COPD (chronic obstructive pulmonary disease)     DDD (degenerative disc disease), lumbar     Deep vein thrombosis 2019    Dependence on other enabling machines and devices     CPAP    Depression     Diverticulosis     Diverticulosis of intestine 02/19/2016    Elevated serum alkaline phosphatase level 02/22/2016    Essential (primary) hypertension     Foot pain 02/19/2016    Fracture of hip     Fracture, femur     Gastroparesis 02/19/2016    Generalized osteoarthritis 02/19/2016    Generalized weakness 03/06/2021    GERD (gastroesophageal reflux disease)     Hip arthrosis     History of heart attack     7/2023    Hx of degenerative disc disease     Hyperlipidemia     Hyperlipidemia 02/19/2016    Hyperlipidemia, unspecified     Hypertension     Hypertension 02/19/2016    Hypokalemia     Hypokalemia 02/19/2016    Hypothyroidism     Hypothyroidism  02/19/2016    Hypothyroidism, unspecified     Insomnia 02/19/2016    Knee pain 02/19/2016    Knee swelling     Low back pain Yes    Low back strain     Lung cancer     history    Myocardial infarction 8-4-23    Neck pain 11/28/2017    Neutropenia 02/19/2016    Neutropenia, unspecified     Obesity 218    Obesity (BMI 30-39.9) 02/19/2016    Obstructive sleep apnea (adult) (pediatric)     Obstructive sleep apnea syndrome 02/19/2016    Osteoarthritis of knee 02/19/2016    Osteopenia Yes    Overweight (BMI 25.0-29.9) 02/19/2016    Pain of left breast 02/22/2016    Periarthritis of shoulder     Pulmonary embolism 2019    Renal disorder     Renal insufficiency 2019    Restless leg syndrome     Rotator cuff syndrome     Scoliosis Yes    Sleep apnea with use of continuous positive airway pressure (CPAP)     Spinal stenosis of lumbar region without neurogenic claudication 02/19/2016    Spinal stenosis, lumbar region without neurogenic claudication 08/12/2021    Status post total hip replacement, 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    Unspecified atrial fibrillation     Vitamin D deficiency 02/19/2016    Volume overload 03/19/2019    Weakness 08/12/2021    Weight gain 02/19/2016    Weight loss 02/19/2016     Past Surgical History:   Procedure Laterality Date    ADENOIDECTOMY  2000    BUNIONECTOMY Bilateral     CARDIAC CATHETERIZATION N/A 08/07/2023    Procedure: Left Heart Cath;  Surgeon: Mireya Park MD;  Location:  [facility];  Service: Cardiology;  Laterality: N/A;    CARDIAC CATHETERIZATION N/A 08/07/2023    Procedure: Coronary angiography;  Surgeon: Mireya Park MD;  Location:  [facility];  Service: Cardiology;  Laterality: N/A;    CARDIAC CATHETERIZATION N/A 08/07/2023    Procedure: Left ventriculography;  Surgeon: Mireya Park MD;  Location:  [facility];  Service: Cardiology;  Laterality: N/A;    CATARACT EXTRACTION      CHOLECYSTECTOMY      COLONOSCOPY      ENDOSCOPY N/A  "06/24/2016    Procedure: ESOPHAGOGASTRODUODENOSCOPY  with biopsies;  Surgeon: Von Hernández MD;  Location: [facility] OR;  Service:     JOINT REPLACEMENT  Yes    LUNG LOBECTOMY Left     lower lobe    LYTIC THROMBIN THERAPY Left 03/26/2021    Procedure: left leg clot treiver possible venous stent *supine*;  Surgeon: Rogerio Vega MD;  Location: [facility] OR 18/19;  Service: Vascular;  Laterality: Left;    REPLACEMENT TOTAL KNEE Left     THYROID BIOPSY      TONSILLECTOMY  Yes    TOTAL HIP ARTHROPLASTY Right 06/01/2019    Procedure: BIPOLAR HIP CEMENTED POSTERIOR;  Surgeon: Ashley Crenshaw MD;  Location: [facility] MAIN OR;  Service: Orthopedics    TRIGGER POINT INJECTION      TUBAL ABDOMINAL LIGATION  50 yrs ago       Family History   Problem Relation Age of Onset    Heart attack Mother     Coronary artery disease Mother     Hypertension Mother     Kidney disease Mother     Arthritis Mother     Heart disease Mother     Heart disease Father     Anesthesia problems Father     Heart attack Sister     Aortic aneurysm Brother         Abdominal    Cancer Brother         no details    Colon cancer Neg Hx     Colon polyps Neg Hx     Esophageal cancer Neg Hx     Breast cancer Neg Hx                Objective:  Physical Exam  General: No acute distress.  Eyes: conjunctiva clear; pupils equally round and reactive  ENT: external ears and nose atraumatic; oropharynx clear  CV: no peripheral edema  Resp: normal respiratory effort  Skin: no rashes or wounds; normal turgor  Psych: mood and affect appropriate; recent and remote memory intact    Vitals:    01/21/25 1417   Weight: 103 kg (227 lb)   Height: 165.1 cm (65\")         01/21/25  1417   Weight: 103 kg (227 lb)     Body mass index is 37.77 kg/m².      Ortho Exam     Physical Exam  Right knee examined  Knee range of motion 0 degrees to 110 degrees  Palpable tenderness along the posterior joint line  Severe swelling, positive effusion  Positive crepitus " throughout arc of motion  Positive sensation light touch all distributions right lower extremity  Negative calf tenderness, negative Homans' sign    Left knee examined  No evidence of erythema no evidence of drainage  Moderate swelling, positive tenderness to palpate along the pes bursa medially    Assessment:        1. Primary osteoarthritis of right knee    2. Idiopathic peripheral neuropathy    3. Spinal stenosis of lumbar region without neurogenic claudication    4. Pes anserinus bursitis of left knee         Assessment & Plan  1. Pes bursitis, left knee.  A comprehensive discussion regarding the management plan was conducted with her and her family. The option of a repeat corticosteroid injection for the treatment of pes bursitis in the left knee was considered. She is advised to apply ice to the injection site this evening. A refill of the topical lidocaine patches will be provided, as these have significantly improved her lumbar spine symptoms. All queries were addressed.    2. Right knee pain.  An aspiration and corticosteroid injection will be administered to the right knee.    Follow-up  The patient will follow up in the clinic in 3 months or as needed.    PROCEDURE  Total knee arthroplasty on the left side.  Steroid injection in the right knee in 09/2024.        - Large Joint Arthrocentesis: R knee on 1/21/2025 2:22 PM  Indications: pain  Details: 18 G needle, superolateral approach  Medications: 80 mg triamcinolone acetonide 40 MG/ML; 4 mL lidocaine PF 1% 1 %  Aspirate: 8 mL serous  Outcome: tolerated well, no immediate complications  Procedure, treatment alternatives, risks and benefits explained, specific risks discussed. Consent was given by the patient. Immediately prior to procedure a time out was called to verify the correct patient, procedure, equipment, support staff and site/side marked as required. Patient was prepped and draped in the usual sterile fashion.       - Large Joint Arthrocentesis  (LEFT PES BURSA) on 1/21/2025 2:22 PM  Indications: pain  Details: 22 G needle, anterior approach  Medications: 40 mg triamcinolone acetonide 40 MG/ML; 2 mL lidocaine PF 1% 1 %  Outcome: tolerated well, no immediate complications  Procedure, treatment alternatives, risks and benefits explained, specific risks discussed. Consent was given by the patient. Immediately prior to procedure a time out was called to verify the correct patient, procedure, equipment, support staff and site/side marked as required. Patient was prepped and draped in the usual sterile fashion.         Orders:  Orders Placed This Encounter   Procedures    - Large Joint Arthrocentesis: R knee    - Large Joint Arthrocentesis     New Medications Ordered This Visit   Medications    lidocaine (XYLOCAINE) 5 % ointment     Sig: Apply 1 Application topically to the appropriate area as directed 3 (Three) Times a Day.     Dispense:  50 g     Refill:  3         Dragon dictation utilized          Patient or patient representative verbalized consent for the use of Ambient Listening during the visit with  DEVORA Goff for chart documentation. 1/23/2025  09:32 EST

## 2025-01-23 DIAGNOSIS — K59.00 CONSTIPATION, UNSPECIFIED CONSTIPATION TYPE: ICD-10-CM

## 2025-01-23 RX ORDER — TRIAMCINOLONE ACETONIDE 40 MG/ML
40 INJECTION, SUSPENSION INTRA-ARTICULAR; INTRAMUSCULAR
Status: COMPLETED | OUTPATIENT
Start: 2025-01-21 | End: 2025-01-21

## 2025-01-23 RX ORDER — ASPIRIN 81 MG
TABLET, DELAYED RELEASE (ENTERIC COATED) ORAL
Qty: 60 TABLET | Refills: 0 | Status: SHIPPED | OUTPATIENT
Start: 2025-01-23

## 2025-01-23 RX ORDER — LIDOCAINE HYDROCHLORIDE 10 MG/ML
2 INJECTION, SOLUTION EPIDURAL; INFILTRATION; INTRACAUDAL; PERINEURAL
Status: COMPLETED | OUTPATIENT
Start: 2025-01-21 | End: 2025-01-21

## 2025-01-23 RX ORDER — LIDOCAINE HYDROCHLORIDE 10 MG/ML
4 INJECTION, SOLUTION EPIDURAL; INFILTRATION; INTRACAUDAL; PERINEURAL
Status: COMPLETED | OUTPATIENT
Start: 2025-01-21 | End: 2025-01-21

## 2025-01-23 RX ORDER — TRIAMCINOLONE ACETONIDE 40 MG/ML
80 INJECTION, SUSPENSION INTRA-ARTICULAR; INTRAMUSCULAR
Status: COMPLETED | OUTPATIENT
Start: 2025-01-21 | End: 2025-01-21

## 2025-01-23 NOTE — TELEPHONE ENCOUNTER
Rx Refill Note  Requested Prescriptions     Pending Prescriptions Disp Refills    Senna Plus 8.6-50 MG per tablet [Pharmacy Med Name: Senna Plus 8.6 mg-50 mg tablet] 60 tablet 0     Sig: TAKE TWO TABLETS BY MOUTH TWICE DAILY      Last office visit with prescribing clinician: 12/6/2024   Last telemedicine visit with prescribing clinician: Visit date not found   Next office visit with prescribing clinician: 6/6/2025                         Would you like a call back once the refill request has been completed: [] Yes [] No    If the office needs to give you a call back, can they leave a voicemail: [] Yes [] No    Tere Valadez MA  01/23/25, 08:58 EST

## 2025-01-27 DIAGNOSIS — M70.52 PES ANSERINUS BURSITIS OF LEFT KNEE: ICD-10-CM

## 2025-01-27 DIAGNOSIS — M17.11 PRIMARY OSTEOARTHRITIS OF RIGHT KNEE: Primary | ICD-10-CM

## 2025-02-03 ENCOUNTER — TELEPHONE (OUTPATIENT)
Age: 85
End: 2025-02-03

## 2025-02-03 NOTE — TELEPHONE ENCOUNTER
Caller: Latanya Olsen    Relationship: Self    Best call back number: [phone]    What is the best time to reach you: ANY    Who are you requesting to speak with (clinical staff, provider,  specific staff member): ANGÉLICA REYES    Do you know the name of the person who called: PT    What was the call regarding: PT IS WANTING TO SPEAK TO ANGÉLICA. SHE WOULD NOT SPECIFY WHAT THE CALL IS REGARDING ONLY THAT SHE NEEDS TO SPEAK WITH ANGÉLICA DIRECTLY. THANK YOU    Is it okay if the provider responds through [employer]: NO

## 2025-02-04 ENCOUNTER — TELEPHONE (OUTPATIENT)
Age: 85
End: 2025-02-04
Payer: MEDICARE

## 2025-02-04 NOTE — TELEPHONE ENCOUNTER
Spoke with Active  (Alfred) who brought Ms. Pelaez to her last appt in the office. I spoke with Alfred to see if she was aware of any issues that Ms. Pelaez may be having since she asked to speak with me directly and I was unable to speak with her.  Alfred indicated that Latanya's compression stockings were too tight on her legs and cutting into her skin.  We have her most recent sizing on file, and we are happy to re-measure her if her stockings are too tight.  I asked Latanya to give me a call when she can. Alfred (active ) indicated that Latanya was likely in an scheduled meeting and would be available after 2pm.

## 2025-02-05 RX ORDER — IPRATROPIUM BROMIDE AND ALBUTEROL SULFATE 2.5; .5 MG/3ML; MG/3ML
3 SOLUTION RESPIRATORY (INHALATION) 4 TIMES DAILY PRN
Qty: 60 ML | Refills: 11 | Status: SHIPPED | OUTPATIENT
Start: 2025-02-05

## 2025-02-10 ENCOUNTER — TELEPHONE (OUTPATIENT)
Dept: FAMILY MEDICINE CLINIC | Facility: CLINIC | Age: 85
End: 2025-02-10
Payer: MEDICARE

## 2025-02-11 ENCOUNTER — PATIENT MESSAGE (OUTPATIENT)
Dept: FAMILY MEDICINE CLINIC | Facility: CLINIC | Age: 85
End: 2025-02-11
Payer: MEDICARE

## 2025-02-11 NOTE — CONSULTS
[facility] GROUP INITIAL INPATIENT CONSULTATION NOTE    REASON FOR CONSULTATION:    Previous extensive left lower extremity DVT which required clot retrieval and venous stent with vascular surgery in March 2021.  Now admitted with recurrent left lower extremity DVT in spite of Coumadin anticoagulation.    HISTORY OF PRESENT ILLNESS:  Latanya Olsen is a 80 y.o. female who we are asked to see today in consultation for the above-noted issues.  She had been discharged from the hospital 4/3/2021 after hospitalization for extensive left lower extremity DVT.  On her prior admission she had undergone thrombectomy and clot retrieval by vascular surgery and also had a venous stent placed.  She was transitioned from IV heparin to Coumadin and discharged to a skilled nursing facility.  She recently had been discharged from the skilled nursing facility but developed worsening pain in the leg and presented to the emergency room yesterday.    Repeat Doppler of the legs in the ER showed acute left lower extremity DVT in the posterior tibial vein and in a tributary of the left popliteal vein.  There was chronic left lower extremity superficial thrombophlebitis noted in the saphenofemoral junction.  All other veins were normal bilaterally.    Her INR on admission was 2.1 and the discharge INR from 4/3/2021 was 2.03.  Patient was started on 1 mg/kg subcu Lovenox every 12 hours with a total dose of 100 mg subcu every 12 hours.    Upon reviewing the records from her last hospitalization we do not see that she underwent a thrombophilic evaluation and in this setting I think it probably is reasonable to proceed with a complete hypercoagulable work-up prior to making recommendations on her further long-term anticoagulation.  She may be a good candidate for Eliquis instead of Coumadin.    Past medical history of lung cancer, anemia, a-fib, COPD, GERD, hyperlipidemia, HTN, sleep apnea with CPAP use, and restless leg syndrome.     Physical Therapy Daily Treatment Note      Patient: Pili Webster   : 1989  Referring practitioner: Osvaldo Madrid MD  Date of Initial Visit: Type: THERAPY  Episode: Thoracic  Today's Date: 2025  Patient seen for 5 sessions       Visit Diagnoses:    ICD-10-CM ICD-9-CM   1. Acute bilateral thoracic back pain  M54.6 724.1       Subjective Evaluation    History of Present Illness    Subjective comment: Patient mentions that her back is sore, making it hard to sleep at night.       Objective   See Exercise, Manual, and Modality Logs for complete treatment.       Assessment & Plan       Assessment  Assessment details: Treatment session consisted of the lila, followed by ice/ESTIM.  Exercises progressed to include increased repetitions and the addition of low rows with theraband.  Cues provided during sitting and standing exercises for improved posture.  She noted no change in pain post-tx.  Patient will continue to be progressed per her tolerance and POC.          Timed:         Manual Therapy:         mins  18309;     Therapeutic Exercise:    42     mins  09531;     Neuromuscular Vladimir:        mins  95028;    Therapeutic Activity:          mins  77686;     Gait Training:           mins  87507;     Ultrasound:          mins  64572;    Ionto                                   mins   10540  Self Care                            mins   77720      Un-Timed:  Electrical Stimulation:    10     mins  44752 ( );  Dry Needling          mins self-pay  Traction          mins 31612  Canalith Repos         mins 58780    Timed Treatment:   42   mins   Total Treatment:     52   mins    Pili Walker PT  [place] License: [id number]  Electronically signed by Pili Walker PT, 25, 10:57 AM EST.                           Past Medical History:   Diagnosis Date   • Anemia    • Arthritis    • Atrial fibrillation (CMS/HCC)    • Constipation    • COPD (chronic obstructive pulmonary disease) (CMS/HCC)    • Diverticulosis    • GERD (gastroesophageal reflux disease)    • Hx of degenerative disc disease    • Hyperlipidemia    • Hypertension    • Hypokalemia    • Hypothyroidism    • Knee swelling    • Lung cancer (CMS/HCC)     history   • Renal disorder    • Restless leg syndrome    • Sleep apnea with use of continuous positive airway pressure (CPAP)        Past Surgical History:   Procedure Laterality Date   • BUNIONECTOMY Bilateral    • CATARACT EXTRACTION     • CHOLECYSTECTOMY     • COLONOSCOPY     • ENDOSCOPY N/A 6/24/2016    Procedure: ESOPHAGOGASTRODUODENOSCOPY  with biopsies;  Surgeon: Von Hernández MD;  Location: [facility] OR;  Service:    • LUNG LOBECTOMY Left     lower lobe   • LYTIC THROMBIN THERAPY Left 3/26/2021    Procedure: left leg clot treiver possible venous stent *supine*;  Surgeon: Rogerio Vega MD;  Location: [facility] OR 18/19;  Service: Vascular;  Laterality: Left;   • REPLACEMENT TOTAL KNEE Left    • THYROID BIOPSY     • TOTAL HIP ARTHROPLASTY Right 6/1/2019    Procedure: BIPOLAR HIP CEMENTED POSTERIOR;  Surgeon: Ashley Crenshaw MD;  Location: [facility] OR;  Service: Orthopedics       SOCIAL HISTORY:   reports that she has never smoked. She has never used smokeless tobacco. Drug use questions deferred to the physician. She reports that she does not drink alcohol.    FAMILY HISTORY:  family history includes Coronary artery disease in her mother; Heart attack in her mother and sister; Hypertension in her mother.    ALLERGIES:  Allergies   Allergen Reactions   • Doxycycline Anaphylaxis     Facial swelling, shortness of air, dizzines       MEDICATIONS:  As listed in the electronic medical record.    Review of Systems   Constitutional: Negative for activity change, chills, fatigue and fever.  "  HENT: Negative for mouth sores, trouble swallowing and voice change.    Eyes: Negative for pain and visual disturbance.   Respiratory: Negative for cough, shortness of breath and wheezing.    Cardiovascular: Positive for leg swelling. Negative for chest pain and palpitations.   Gastrointestinal: Negative for abdominal pain, constipation, diarrhea, nausea and vomiting.   Genitourinary: Negative for difficulty urinating, frequency and urgency.   Musculoskeletal: Negative for arthralgias and joint swelling.        Dependent edema of the left lower extremity most likely due to venous insufficiency   Skin: Negative for rash.        Erythema of the skin on the left lower extremity most likely due to venous insufficiency   Neurological: Negative for dizziness, seizures, weakness and headaches.   Hematological: Negative for adenopathy. Does not bruise/bleed easily.   Psychiatric/Behavioral: Negative for behavioral problems and confusion. The patient is not nervous/anxious.        Vitals:    04/21/21 0648 04/21/21 0700 04/21/21 0911 04/21/21 0927   BP:  140/64 140/64    BP Location:  Left arm     Patient Position:  Lying     Pulse: 80 83 82 89   Resp: 16 16  16   Temp:  98.2 °F (36.8 °C)     TempSrc:  Oral     SpO2: 93% 92%  95%   Weight:   103 kg (227 lb)    Height:   167.6 cm (66\")        Physical Exam  Constitutional:       General: She is not in acute distress.     Appearance: She is well-developed.   HENT:      Head: Normocephalic.   Eyes:      General: No scleral icterus.     Conjunctiva/sclera: Conjunctivae normal.      Pupils: Pupils are equal, round, and reactive to light.   Neck:      Thyroid: No thyromegaly.      Vascular: No JVD.   Cardiovascular:      Rate and Rhythm: Normal rate and regular rhythm.      Heart sounds: No murmur heard.   No friction rub. No gallop.    Pulmonary:      Effort: Pulmonary effort is normal.      Breath sounds: Normal breath sounds. No wheezing or rales.      Comments: Left lobectomy " scar  Abdominal:      General: There is no distension.      Palpations: Abdomen is soft. There is no mass.      Tenderness: There is no abdominal tenderness.   Musculoskeletal:         General: Swelling present. No deformity. Normal range of motion.      Cervical back: Normal range of motion and neck supple.      Left lower leg: Edema present.   Lymphadenopathy:      Cervical: No cervical adenopathy.   Skin:     General: Skin is warm and dry.      Findings: Erythema present. No rash.   Neurological:      Mental Status: She is alert and oriented to person, place, and time.      Cranial Nerves: No cranial nerve deficit.      Deep Tendon Reflexes: Reflexes are normal and symmetric.   Psychiatric:         Behavior: Behavior normal.         Judgment: Judgment normal.         DIAGNOSTIC DATA:    Results from last 7 days   Lab Units 04/20/21  1700   WBC 10*3/mm3 6.60   HEMOGLOBIN g/dL 10.9*   HEMATOCRIT % 33.0*   PLATELETS 10*3/mm3 234      Results from last 7 days   Lab Units 04/20/21  1700   SODIUM mmol/L 144   POTASSIUM mmol/L 3.7   CHLORIDE mmol/L 107   CO2 mmol/L 25.6   BUN mg/dL 20   CREATININE mg/dL 1.00   CALCIUM mg/dL 9.5   BILIRUBIN mg/dL 0.2   ALK PHOS U/L 109   ALT (SGPT) U/L 13   AST (SGOT) U/L 13   GLUCOSE mg/dL 97          IMAGING:      Assessment/Plan   ASSESSMENT:  This is a 80 y.o. female with:  1.  History of extensive left lower extremity DVT in March 2021.  She required vascular surgery consult with clot retrieval thrombectomy and placement of a venous stent.  She now is admitted with recurrent DVT in the left lower extremity mainly in the calf veins.  We are not sure that she is a Coumadin failure based on the fact that the highest INR available currently for our review was 2.1 on admission.  2.  Anemia.  Certainly we will need to evaluate for occult blood loss or iron deficiency in the setting.  3.  CT angiogram negative for pulmonary emboli    RECOMMENDATIONS/PLAN:   1. Agree with Lovenox 1 mg/kg  subcu every 12 hours for now.  2. Complete hypercoagulable lab work-up looking for inherited or acquired thrombophilic defects.  3. The highest INR available in epic was 2.1 at time of her discharge on 4/3/2021.  It is conceivable that she may have been subtherapeutic at some point during her rehab stay.  4. We will continue to follow along and make a further recommendation on her long-term anticoagulation based on the results of her thrombophilia work-up.  5. Check stool for occult blood.  6. Iron panel and ferritin and reticulocyte count.    Discussed with Dr. Botello.  I do not see any reason that she would not be a potential candidate for Eliquis therapy.    Thanks for asked to see this nice patient in consultation.

## 2025-03-03 ENCOUNTER — HOSPITAL ENCOUNTER (OUTPATIENT)
Dept: MAMMOGRAPHY | Facility: HOSPITAL | Age: 85
Discharge: HOME | End: 2025-03-03
Admitting: FAMILY MEDICINE
Payer: MEDICARE

## 2025-03-03 DIAGNOSIS — G62.9 PERIPHERAL POLYNEUROPATHY: ICD-10-CM

## 2025-03-03 DIAGNOSIS — Z12.31 VISIT FOR SCREENING MAMMOGRAM: ICD-10-CM

## 2025-03-03 PROCEDURE — 77067 SCR MAMMO BI INCL CAD: CPT | Performed by: RADIOLOGY

## 2025-03-03 PROCEDURE — 77063 BREAST TOMOSYNTHESIS BI: CPT

## 2025-03-03 PROCEDURE — 77067 SCR MAMMO BI INCL CAD: CPT

## 2025-03-03 PROCEDURE — 77063 BREAST TOMOSYNTHESIS BI: CPT | Performed by: RADIOLOGY

## 2025-03-03 RX ORDER — LEVOTHYROXINE SODIUM 100 UG/1
100 TABLET ORAL DAILY
Qty: 90 TABLET | Refills: 0 | Status: SHIPPED | OUTPATIENT
Start: 2025-03-03

## 2025-03-03 RX ORDER — GABAPENTIN 100 MG/1
100 CAPSULE ORAL 3 TIMES DAILY
Qty: 90 CAPSULE | Refills: 0 | Status: SHIPPED | OUTPATIENT
Start: 2025-03-03

## 2025-03-05 ENCOUNTER — TELEPHONE (OUTPATIENT)
Dept: FAMILY MEDICINE CLINIC | Facility: CLINIC | Age: 85
End: 2025-03-05

## 2025-03-05 DIAGNOSIS — R92.8 ABNORMAL MAMMOGRAM OF RIGHT BREAST: Primary | ICD-10-CM

## 2025-03-05 NOTE — TELEPHONE ENCOUNTER
Caller: Latanya Olsen    Relationship: Self    Best call back number: [phone]     What was the call regarding: PATIENT STATES SHE IS REQUESTING TO SPEAK TO SOMEONE ABOUT THE MAMMOGRAM ORDER

## 2025-03-25 DIAGNOSIS — K59.00 CONSTIPATION, UNSPECIFIED CONSTIPATION TYPE: ICD-10-CM

## 2025-03-25 RX ORDER — ASPIRIN 81 MG
2 TABLET, DELAYED RELEASE (ENTERIC COATED) ORAL EVERY 12 HOURS SCHEDULED
Qty: 60 TABLET | Refills: 0 | Status: SHIPPED | OUTPATIENT
Start: 2025-03-25

## 2025-03-25 NOTE — TELEPHONE ENCOUNTER
Rx Refill Note  Requested Prescriptions     Pending Prescriptions Disp Refills    Senna Plus 8.6-50 MG per tablet [Pharmacy Med Name: Senna Plus 8.6 mg-50 mg tablet] 60 tablet 0     Sig: TAKE TWO TABLETS BY MOUTH TWICE DAILY      Last office visit with prescribing clinician: 12/6/2024   Last telemedicine visit with prescribing clinician: Visit date not found   Next office visit with prescribing clinician: 6/6/2025                         Would you like a call back once the refill request has been completed: [] Yes [] No    If the office needs to give you a call back, can they leave a voicemail: [] Yes [] No    Tere Valadez MA  03/25/25, 12:40 EDT

## 2025-03-27 ENCOUNTER — HOSPITAL ENCOUNTER (OUTPATIENT)
Dept: MAMMOGRAPHY | Facility: HOSPITAL | Age: 85
Discharge: HOME | End: 2025-03-27
Payer: MEDICARE

## 2025-03-27 ENCOUNTER — HOSPITAL ENCOUNTER (OUTPATIENT)
Dept: ULTRASOUND IMAGING | Facility: HOSPITAL | Age: 85
Discharge: HOME | End: 2025-03-27
Payer: MEDICARE

## 2025-03-27 DIAGNOSIS — R92.8 ABNORMAL MAMMOGRAM OF RIGHT BREAST: ICD-10-CM

## 2025-03-27 PROCEDURE — 77065 DX MAMMO INCL CAD UNI: CPT

## 2025-03-27 PROCEDURE — G0279 TOMOSYNTHESIS, MAMMO: HCPCS

## 2025-04-01 ENCOUNTER — TELEPHONE (OUTPATIENT)
Dept: FAMILY MEDICINE CLINIC | Facility: CLINIC | Age: 85
End: 2025-04-01

## 2025-04-01 NOTE — TELEPHONE ENCOUNTER
Fine with 2 weeks of the mood medication about the carvedilol should be twice a day unless his extended release and that 12.5 mg is normally not extended release.  This needs to be clarified.  That is why we do transitional care visits.

## 2025-04-01 NOTE — TELEPHONE ENCOUNTER
SPOKE TO PT AND SHE IS STATING THAT THE MEDICATIONS ARE FROM OVER A YEAR AGO WHEN SHE WAS IN THE [facility].  PT IS SCHEDULED FOR TOMORROW

## 2025-04-01 NOTE — TELEPHONE ENCOUNTER
FROM HOW I READ THE MESSAGE SHE IS GOING TO BE OUT.  I DID LEAVE A MESSAGE FOR HER TO CALL OFFICE BACK.  POSSIBLE 2 WEEK SCRIPT SENT IN UNTIL I CAN REACH PT??

## 2025-04-01 NOTE — TELEPHONE ENCOUNTER
Caller: Latanya Olsen    Relationship: Self    Best call back number: [phone]     What medication are you requesting: SERTRALINE 25 MG ONE TABLET ONCE A DAY AND CARVEDILOL 12.5 MG ONE TABLET ONCE A DAY    If a prescription is needed, what is your preferred pharmacy and phone number: [facility] - [facility] [address] [place] [address] 1 - [phone]  - [phone]      Additional notes: PATIENT STATED SHE WAS GIVEN THESE PRESCRIPTIONS BY A DR AILYN BURRIS WHILE IN A NURSING HOME.    PATIENT STATED SHE ASKED LEONELA TO REFILL THESE PRESCRIPTIONS, AND SHE HAS NOT BEEN ABLE TO GET THESE FROM THE DR.    PATIENT IS REQUESTING TO KNOW IF DR KEHRER MAY REFILL THESE PRESCRIPTIONS FOR HER.    PATIENT STATED THE SERTRALINE WAS PRESCRIBED FOR DEPRESSION, AND THE CARVEDILOL WAS PRESCRIBED FOR HIGH BLOOD PRESSURE.

## 2025-04-01 NOTE — TELEPHONE ENCOUNTER
It sounds as if she should have had a transitional care visit with me.  For me to assume medications that were given by another provider, that will require a visit and review of the records.  This cannot wait until her prescheduled appointment in June.  Does she have enough of them until she can get into see me?

## 2025-04-02 ENCOUNTER — OFFICE VISIT (OUTPATIENT)
Dept: FAMILY MEDICINE CLINIC | Facility: CLINIC | Age: 85
End: 2025-04-02
Payer: MEDICARE

## 2025-04-02 VITALS
HEIGHT: 65 IN | OXYGEN SATURATION: 96 % | DIASTOLIC BLOOD PRESSURE: 71 MMHG | BODY MASS INDEX: 37.05 KG/M2 | SYSTOLIC BLOOD PRESSURE: 139 MMHG | HEART RATE: 78 BPM | WEIGHT: 222.4 LBS

## 2025-04-02 DIAGNOSIS — F32.89 OTHER DEPRESSION: Primary | ICD-10-CM

## 2025-04-02 DIAGNOSIS — K59.09 CHRONIC CONSTIPATION: ICD-10-CM

## 2025-04-02 DIAGNOSIS — I10 ESSENTIAL HYPERTENSION: ICD-10-CM

## 2025-04-02 PROCEDURE — 3075F SYST BP GE 130 - 139MM HG: CPT | Performed by: FAMILY MEDICINE

## 2025-04-02 PROCEDURE — 1125F AMNT PAIN NOTED PAIN PRSNT: CPT | Performed by: FAMILY MEDICINE

## 2025-04-02 PROCEDURE — 99214 OFFICE O/P EST MOD 30 MIN: CPT | Performed by: FAMILY MEDICINE

## 2025-04-02 PROCEDURE — 3078F DIAST BP <80 MM HG: CPT | Performed by: FAMILY MEDICINE

## 2025-04-02 RX ORDER — SERTRALINE HYDROCHLORIDE 25 MG/1
25 TABLET, FILM COATED ORAL DAILY
Qty: 90 TABLET | Refills: 3 | Status: SHIPPED | OUTPATIENT
Start: 2025-04-02

## 2025-04-02 RX ORDER — LACTULOSE 10 G/15ML
SOLUTION ORAL
Qty: 1350 ML | Refills: 2 | Status: SHIPPED | OUTPATIENT
Start: 2025-04-02

## 2025-04-02 RX ORDER — SERTRALINE HYDROCHLORIDE 25 MG/1
1 TABLET, FILM COATED ORAL DAILY
COMMUNITY
Start: 2025-03-06 | End: 2025-04-02 | Stop reason: SDUPTHER

## 2025-04-02 RX ORDER — CARVEDILOL 12.5 MG/1
12.5 TABLET ORAL 2 TIMES DAILY WITH MEALS
Qty: 180 TABLET | Refills: 3 | Status: SHIPPED | OUTPATIENT
Start: 2025-04-02

## 2025-04-02 NOTE — PROGRESS NOTES
1550: Pt complaining to this writer about severe left leg pain that has not improved t/o shift.     1553: Paged Collette Marino MD to notify about uncontrolled pain, question if she wanted fluids running since nothing is going through G-tube and TF on hold.  Also questioned if she wanted G-tube to gravity.    1557:  Collette Marino MD paged this writer back and acknowledged concerns.  Ordered for NS at 100 ml/hr.  Also ordered for PRN oxycodone 5-10 mg Q 6 hrs, and stated no need to place G-tube to gravity at this time.    1613: Gave 5 mg of oxycodone.  Will continue to monitor.   Chief Complaint  medication refills from meds at discharge from [facility]        Latanya Olsen presents to [facility]    Pertinent negative symptoms include no abdominal pain, no anorexia, no joint pain, no change in stool, no chest pain, no chills, no congestion, no cough, no diaphoresis, no fatigue, no fever, no headaches, no joint swelling, no myalgias, no nausea, no neck pain, no numbness, no rash, no swollen glands, no dysuria, no vertigo, no visual change, no vomiting and no weakness.     History of Present Illness  The patient is an 84-year-old female who presents for a chronic disease follow-up and medication renewal.    She reports a significant improvement in her mood since starting sertraline, attributing this to the medication's effectiveness. She has been on sertraline since May in the nursing home last year.  The nurse practitioner there had still been filling it and I had not yet.  She has experienced the loss of her son and other personal issues but maintains a strong social network of friends. She does not report any adverse effects from the sertraline. She notes that she frequently falls asleep in her recliner, which she believes may be due to her age. Despite this, she continues to perform her household chores and takes brief 15-minute naps.    She is seeking refills for her carvedilol and lactulose prescriptions. She confirms that the lactulose is effective for her.    She has been experiencing issues with her compression stockings, which tend to slide down before lunchtime. She has sought refitting twice, with the initial attempt resulting in bruising on her leg. She has previously received home health care and physical therapy. She has undergone knee replacement surgery but has experienced fluid accumulation in the replaced knee, necessitating recent drainage.    Supplemental Information  She had a heart attack last August 2024 and went to the  [facility]. She is up to date with her cardiologist and nephrologist. She is seeing Dr. Ku for her history of lung cancer and has an appointment with him next week.    MEDICATIONS  Sertraline, carvedilol, lactulose       Objective   Vital Signs:  /71   Pulse 78   Ht 165.1 cm (65\")   Wt 101 kg (222 lb 6.4 oz)   SpO2 96%   BMI 37.01 kg/m²   Estimated body mass index is 37.01 kg/m² as calculated from the following:    Height as of this encounter: 165.1 cm (65\").    Weight as of this encounter: 101 kg (222 lb 6.4 oz).             Physical Exam   Physical Exam  Heart is regular.  Lungs were auscultated are clear.  Compression hose is in place on the lower extremities.  Affect pleasant and oriented       Result Review :          Results  Laboratory Studies  Hemoglobin was stable. Kidney function was normal.              Assessment and Plan     Diagnoses and all orders for this visit:    1. Other depression (Primary)  -     sertraline (ZOLOFT) 25 MG tablet; Take 1 tablet by mouth Daily.  Dispense: 90 tablet; Refill: 3    2. Chronic constipation  -     lactulose (CHRONULAC) 10 GM/15ML solution; TAKE 15 ML BY MOUTH THREE TIMES DAILY  Dispense: 1350 mL; Refill: 2    3. Essential hypertension  -     carvedilol (COREG) 12.5 MG tablet; Take 1 tablet by mouth 2 (Two) Times a Day With Meals.  Dispense: 180 tablet; Refill: 3      Assessment & Plan  1. Depression.  Her condition appears to be well-managed with sertraline, and she reports no side effects. The fatigue she experiences is likely multifactorial, possibly related to her age and other underlying conditions. No additional blood work is required at this time. She is advised to continue her current regimen of sertraline.    2. Anemia of chronic disease.  Her hemoglobin levels have remained stable, indicating a consistent state of anemia due to chronic disease. Her last lab work in December 2024 showed normal kidney function. She is advised to maintain a " low-sodium diet and continue wearing compression hose, ensuring they fit properly.    3. Medication management.  Prescriptions for carvedilol and lactulose have been renewed and sent to [facility]. Carvedilol is to be taken twice a day, and lactulose as needed.    4. Knee issues.  She has undergone knee replacement surgery but has experienced fluid accumulation in the replaced knee, necessitating recent drainage. Compression hose is in place on the lower extremities. She is advised to continue wearing compression hose, ensuring they fit properly.  Make sure to talk with them about it.    Follow-up  The patient is scheduled for a follow-up visit in June 2025.    PROCEDURE  The patient has undergone knee replacement surgery in the past.            Follow Up     No follow-ups on file.  Patient was given instructions and counseling regarding her condition or for health maintenance advice. Please see specific information pulled into the AVS if appropriate.    Patient or patient representative verbalized consent for the use of Ambient Listening during the visit with  Meredith Lea Kehrer, MD for chart documentation. 4/2/2025  13:15 EDT

## 2025-04-02 NOTE — TELEPHONE ENCOUNTER
Please find out when this discharge happens so we can make sure we have the records.  It is aware that the sertraline is not on her last home health orders.  Can we get a prescription fill history faxed over from her pharmacy?

## 2025-04-22 ENCOUNTER — OFFICE VISIT (OUTPATIENT)
Dept: ORTHOPEDIC SURGERY | Facility: CLINIC | Age: 85
End: 2025-04-22
Payer: MEDICARE

## 2025-04-22 DIAGNOSIS — M70.52 PES ANSERINUS BURSITIS OF LEFT KNEE: ICD-10-CM

## 2025-04-22 DIAGNOSIS — M17.11 PRIMARY OSTEOARTHRITIS OF RIGHT KNEE: Primary | ICD-10-CM

## 2025-04-22 RX ORDER — TRIAMCINOLONE ACETONIDE 40 MG/ML
40 INJECTION, SUSPENSION INTRA-ARTICULAR; INTRAMUSCULAR
Status: COMPLETED | OUTPATIENT
Start: 2025-04-22 | End: 2025-04-22

## 2025-04-22 RX ORDER — TRIAMCINOLONE ACETONIDE 40 MG/ML
80 INJECTION, SUSPENSION INTRA-ARTICULAR; INTRAMUSCULAR
Status: COMPLETED | OUTPATIENT
Start: 2025-04-22 | End: 2025-04-22

## 2025-04-22 RX ORDER — LIDOCAINE HYDROCHLORIDE 10 MG/ML
8 INJECTION, SOLUTION EPIDURAL; INFILTRATION; INTRACAUDAL; PERINEURAL
Status: COMPLETED | OUTPATIENT
Start: 2025-04-22 | End: 2025-04-22

## 2025-04-22 RX ORDER — LIDOCAINE HYDROCHLORIDE 10 MG/ML
2 INJECTION, SOLUTION EPIDURAL; INFILTRATION; INTRACAUDAL; PERINEURAL
Status: COMPLETED | OUTPATIENT
Start: 2025-04-22 | End: 2025-04-22

## 2025-04-22 RX ADMIN — TRIAMCINOLONE ACETONIDE 80 MG: 40 INJECTION, SUSPENSION INTRA-ARTICULAR; INTRAMUSCULAR at 13:27

## 2025-04-22 RX ADMIN — LIDOCAINE HYDROCHLORIDE 8 ML: 10 INJECTION, SOLUTION EPIDURAL; INFILTRATION; INTRACAUDAL; PERINEURAL at 13:27

## 2025-04-22 RX ADMIN — TRIAMCINOLONE ACETONIDE 40 MG: 40 INJECTION, SUSPENSION INTRA-ARTICULAR; INTRAMUSCULAR at 13:32

## 2025-04-22 RX ADMIN — LIDOCAINE HYDROCHLORIDE 2 ML: 10 INJECTION, SOLUTION EPIDURAL; INFILTRATION; INTRACAUDAL; PERINEURAL at 13:32

## 2025-04-22 NOTE — PROGRESS NOTES
Subjective:     Patient ID: Latanya Olsen is a 84 y.o. female.    Chief Complaint:  Follow-up history of total knee arthroplasty, left, pes bursitis  DJD right knee  History of Present Illness  History of Present Illness  The patient is an 84-year-old female who presents to the clinic today for evaluation of bilateral knees. She has received corticosteroid injections in the past for treatment of pes bursa at the left knee and has a history of total knee arthroplasty. She is also experiencing some degenerative changes in the right knee. She reports a recent fall but is unsure if it caused injury to the right knee. She has not had any recent imaging. Pain is present along the medial compartment as well as the anterior and posterior aspects of the right knee joint. She received a corticosteroid injection 3 months ago, which she tolerated well.       Social History     Occupational History     Employer: RETIRED   Tobacco Use    Smoking status: Never     Passive exposure: Never    Smokeless tobacco: Never    Tobacco comments:     Never   Vaping Use    Vaping status: Never Used   Substance and Sexual Activity    Alcohol use: Never     Comment: Patient is non drinker.    Drug use: Never     Comment: Drug Abuse: none    Sexual activity: Not Currently     Birth control/protection: Coitus interruptus, Nexplanon, Tubal ligation      Past Medical History:   Diagnosis Date    Abnormal ECG Aug 2023    Acute deep vein thrombosis (DVT) of iliac vein of left lower extremity 05/07/2021    Acute deep vein thrombosis (DVT) of iliac vein of left lower extremity 03/24/2021    Acute embolism and thrombosis of left iliac vein     Acute exacerbation of CHF (congestive heart failure) 08/27/2023    Anemia     Anemia 02/19/2016    Anemia, unspecified 08/12/2021    Anxiety 4-2-23    Arthritis     Arthritis of back     Arthritis of neck     Asthma 1/1/2006    Atrial fibrillation     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    Cataract 1-1/2015     Cervical disc disorder     Chronic constipation 02/19/2016    Chronic deep vein thrombosis (DVT) of left femoral vein 08/12/2021    Chronic deep vein thrombosis (DVT) of left popliteal vein 08/12/2021    Chronic lower back pain 02/19/2016    Chronic obstructive pulmonary disease 02/19/2016    Chronic obstructive pulmonary disease, unspecified     CKD (chronic kidney disease)     Stage 3    Compression of vein 05/07/2021    Congenital heart disease Aug 2023    Constipation     Constipation 03/10/2021    COPD (chronic obstructive pulmonary disease)     DDD (degenerative disc disease), lumbar     Deep vein thrombosis 2019    Dependence on other enabling machines and devices     CPAP    Depression     Diverticulosis     Diverticulosis of intestine 02/19/2016    Elevated serum alkaline phosphatase level 02/22/2016    Essential (primary) hypertension     Foot pain 02/19/2016    Fracture of hip     Fracture, femur     Gastroparesis 02/19/2016    Generalized osteoarthritis 02/19/2016    Generalized weakness 03/06/2021    GERD (gastroesophageal reflux disease)     Hip arthrosis     History of heart attack     7/2023    Hx of degenerative disc disease     Hyperlipidemia     Hyperlipidemia 02/19/2016    Hyperlipidemia, unspecified     Hypertension     Hypertension 02/19/2016    Hypokalemia     Hypokalemia 02/19/2016    Hypothyroidism     Hypothyroidism 02/19/2016    Hypothyroidism, unspecified     Insomnia 02/19/2016    Knee pain 02/19/2016    Knee swelling     Low back pain Yes    Low back strain     Lung cancer     history    Myocardial infarction 8-4-23    Neck pain 11/28/2017    Neutropenia 02/19/2016    Neutropenia, unspecified     Obesity 218    Obesity (BMI 30-39.9) 02/19/2016    Obstructive sleep apnea (adult) (pediatric)     Obstructive sleep apnea syndrome 02/19/2016    Osteoarthritis of knee 02/19/2016    Osteopenia Yes    Overweight (BMI 25.0-29.9) 02/19/2016    Pain of left breast 02/22/2016    Periarthritis of  shoulder     Pulmonary embolism 2019    Renal disorder     Renal insufficiency 2019    Restless leg syndrome     Rotator cuff syndrome     Scoliosis Yes    Sleep apnea with use of continuous positive airway pressure (CPAP)     Spinal stenosis of lumbar region without neurogenic claudication 02/19/2016    Spinal stenosis, lumbar region without neurogenic claudication 08/12/2021    Status post total hip replacement, 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    Unspecified atrial fibrillation     Vitamin D deficiency 02/19/2016    Volume overload 03/19/2019    Weakness 08/12/2021    Weight gain 02/19/2016    Weight loss 02/19/2016     Past Surgical History:   Procedure Laterality Date    ADENOIDECTOMY  2000    BUNIONECTOMY Bilateral     CARDIAC CATHETERIZATION N/A 08/07/2023    Procedure: Left Heart Cath;  Surgeon: Mireya Park MD;  Location: [facility] CATH INVASIVE LOCATION;  Service: Cardiology;  Laterality: N/A;    CARDIAC CATHETERIZATION N/A 08/07/2023    Procedure: Coronary angiography;  Surgeon: Mireya Park MD;  Location: [facility] CATH INVASIVE LOCATION;  Service: Cardiology;  Laterality: N/A;    CARDIAC CATHETERIZATION N/A 08/07/2023    Procedure: Left ventriculography;  Surgeon: Mireya Park MD;  Location: [facility] CATH INVASIVE LOCATION;  Service: Cardiology;  Laterality: N/A;    CATARACT EXTRACTION      CHOLECYSTECTOMY      COLONOSCOPY      ENDOSCOPY N/A 06/24/2016    Procedure: ESOPHAGOGASTRODUODENOSCOPY  with biopsies;  Surgeon: Von Hernández MD;  Location: [facility] OR;  Service:     JOINT REPLACEMENT  Yes    LUNG LOBECTOMY Left     lower lobe    LYTIC THROMBIN THERAPY Left 03/26/2021    Procedure: left leg clot treiver possible venous stent *supine*;  Surgeon: Rogerio Vega MD;  Location: [facility] OR 18/19;  Service: Vascular;  Laterality: Left;    REPLACEMENT TOTAL KNEE Left     THYROID BIOPSY      TONSILLECTOMY  Yes    TOTAL HIP ARTHROPLASTY Right 06/01/2019    Procedure: BIPOLAR HIP CEMENTED POSTERIOR;   Surgeon: Ashley Crenshaw MD;  Location: [facility] OR;  Service: Orthopedics    TRIGGER POINT INJECTION      TUBAL ABDOMINAL LIGATION  50 yrs ago       Family History   Problem Relation Age of Onset    Heart attack Mother     Coronary artery disease Mother     Hypertension Mother     Kidney disease Mother     Arthritis Mother     Heart disease Mother     Heart disease Father     Anesthesia problems Father     Heart attack Sister     Aortic aneurysm Brother         Abdominal    Cancer Brother         no details    Cancer Brother     Colon cancer Neg Hx     Colon polyps Neg Hx     Esophageal cancer Neg Hx     Breast cancer Neg Hx                Objective:  Physical Exam    General: No acute distress.  Eyes: conjunctiva clear; pupils equally round and reactive  ENT: external ears and nose atraumatic; oropharynx clear  CV: no peripheral edema  Resp: normal respiratory effort  Skin: no rashes or wounds; normal turgor  Psych: mood and affect appropriate; recent and remote memory intact    There were no vitals filed for this visit.  There were no vitals filed for this visit.  There is no height or weight on file to calculate BMI.      Right Knee Exam     Tenderness   The patient is experiencing tenderness in the medial joint line, patella and lateral joint line.    Range of Motion   Extension:  5   Flexion:  100     Tests   Pepe:  Medial - positive Lateral - negative  Varus: negative Valgus: negative  Lachman:  Anterior - 1+      Patellar apprehension: positive    Other   Erythema: absent  Sensation: normal  Pulse: present  Swelling: severe  Effusion: effusion present    Comments:  Positive crepitus or arc of motion  Positive active patellar compression test        Left Knee Exam     Tenderness   The patient is experiencing tenderness in the pes anserinus.    Range of Motion   Extension:  0   Left knee flexion: 120.     Other   Erythema: absent    Comments:  Incision clean dry intact, well-healed              Physical Exam      Imaging:  Right Knee X-Ray  Indication: Pain    AP, Lateral, and Krebs views    Findings:  No fracture  No bony lesion  Normal soft tissues  Tricompartmental arthritis with bone-on-bone articulation lateral compartment and medial compartment with reactive osteophytes lateral joint line, advanced osteoarthritis patellofemoral with reactive osteophytes    Prior studies were available for comparison.    Assessment:        1. Primary osteoarthritis of right knee    2. Pes anserinus bursitis of left knee         Assessment & Plan      Plan:  1.  Discussed plan of care with patient.  We will proceed with aspiration corticosteroid injection right knee, corticosteroid injection for treatment of pes bursitis left knee which has provided her symptom improvement in the past.  I do recommend ice injection site this evening.  Will plan to see her back in clinic in 3 months to reevaluate sooner if needed if symptoms persist.  All questions answered.  - Large Joint Arthrocentesis: R knee on 4/22/2025 1:27 PM  Indications: pain and joint swelling  Details: 18 G needle, superolateral approach  Medications: 8 mL lidocaine PF 1% 1 %; 80 mg triamcinolone acetonide 40 MG/ML  Aspirate: 1 mL bloody  Outcome: tolerated well, no immediate complications  Procedure, treatment alternatives, risks and benefits explained, specific risks discussed. Consent was given by the patient. Immediately prior to procedure a time out was called to verify the correct patient, procedure, equipment, support staff and site/side marked as required. Patient was prepped and draped in the usual sterile fashion.       - Large Joint Arthrocentesis: L anserine bursa on 4/22/2025 1:32 PM  Indications: pain  Details: 22 G needle (Anteromedial ) approach  Medications: 2 mL lidocaine PF 1% 1 %; 40 mg triamcinolone acetonide 40 MG/ML  Outcome: tolerated well, no immediate complications  Procedure, treatment alternatives, risks and benefits explained,  specific risks discussed. Immediately prior to procedure a time out was called to verify the correct patient, procedure, equipment, support staff and site/side marked as required. Patient was prepped and draped in the usual sterile fashion.         Orders:  Orders Placed This Encounter   Procedures    - Large Joint Arthrocentesis: R knee    - Large Joint Arthrocentesis: L anserine bursa    XR Knee 3+ View With [place] Right     No orders of the defined types were placed in this encounter.          Dragon dictation utilized  Class 2 Severe Obesity (BMI >=35 and <=39.9). Obesity-related health conditions include the following: osteoarthritis. Obesity is newly identified. BMI is is above average; BMI management plan is completed. We discussed portion control, increasing exercise, and Information on healthy weight added to patient's after visit summary.       Patient or patient representative verbalized consent for the use of Ambient Listening during the visit with  DEVORA Goff for chart documentation. 4/22/2025  16:38 EDT

## 2025-04-23 RX ORDER — PRAMIPEXOLE DIHYDROCHLORIDE 1.5 MG/1
1.5 TABLET ORAL
Qty: 90 TABLET | Refills: 0 | Status: SHIPPED | OUTPATIENT
Start: 2025-04-23

## 2025-04-23 NOTE — TELEPHONE ENCOUNTER
Rx Refill Note  Requested Prescriptions     Pending Prescriptions Disp Refills    pramipexole (MIRAPEX) 1.5 MG tablet [Pharmacy Med Name: pramipexole 1.5 mg tablet] 90 tablet 0     Sig: TAKE ONE TABLET BY MOUTH EVERY NIGHT AT BEDTIME      Last office visit with prescribing clinician: 4/2/25  Last telemedicine visit with prescribing clinician: Visit date not found   Next office visit with prescribing clinician: 6/6/25                        Would you like a call back once the refill request has been completed: [] Yes [] No    If the office needs to give you a call back, can they leave a voicemail: [] Yes [] No    Tere Valadez MA  04/23/25, 16:02 EDT

## 2025-04-24 ENCOUNTER — OFFICE VISIT (OUTPATIENT)
Dept: FAMILY MEDICINE CLINIC | Facility: CLINIC | Age: 85
End: 2025-04-24
Payer: MEDICARE

## 2025-04-24 VITALS
OXYGEN SATURATION: 97 % | TEMPERATURE: 98.8 F | WEIGHT: 226.6 LBS | HEART RATE: 72 BPM | SYSTOLIC BLOOD PRESSURE: 148 MMHG | RESPIRATION RATE: 28 BRPM | BODY MASS INDEX: 37.75 KG/M2 | DIASTOLIC BLOOD PRESSURE: 86 MMHG | HEIGHT: 65 IN

## 2025-04-24 DIAGNOSIS — R06.02 SHORTNESS OF BREATH: ICD-10-CM

## 2025-04-24 DIAGNOSIS — J44.1 COPD WITH EXACERBATION: Primary | ICD-10-CM

## 2025-04-24 DIAGNOSIS — S70.01XA CONTUSION OF RIGHT HIP, INITIAL ENCOUNTER: ICD-10-CM

## 2025-04-24 DIAGNOSIS — I50.22 CHRONIC SYSTOLIC HEART FAILURE: ICD-10-CM

## 2025-04-24 LAB
EXPIRATION DATE: NORMAL
EXPIRATION DATE: NORMAL
FLUAV AG NPH QL: NEGATIVE
FLUBV AG NPH QL: NEGATIVE
INTERNAL CONTROL: NORMAL
INTERNAL CONTROL: NORMAL
Lab: NORMAL
Lab: NORMAL
SARS-COV-2 AG UPPER RESP QL IA.RAPID: NOT DETECTED

## 2025-04-24 RX ORDER — SPIRONOLACTONE 50 MG/1
50 TABLET, FILM COATED ORAL DAILY
Qty: 5 TABLET | Refills: 0 | Status: SHIPPED | OUTPATIENT
Start: 2025-04-24 | End: 2025-04-29

## 2025-04-24 RX ORDER — PREDNISONE 20 MG/1
TABLET ORAL
Qty: 12 TABLET | Refills: 0 | Status: SHIPPED | OUTPATIENT
Start: 2025-04-24 | End: 2025-04-27 | Stop reason: HOSPADM

## 2025-04-24 RX ORDER — CEFDINIR 300 MG/1
300 CAPSULE ORAL 2 TIMES DAILY
Qty: 14 CAPSULE | Refills: 0 | Status: SHIPPED | OUTPATIENT
Start: 2025-04-24 | End: 2025-04-27 | Stop reason: HOSPADM

## 2025-04-24 NOTE — PROGRESS NOTES
"Chief Complaint  Cough, Shortness of Breath, Nasal Congestion, Headache, and Fall (4 days ago /Right hip bruising )    Subjective        Latanya Olsen presents to [facility]  History of Present Illness  History of Present Illness  The patient is an 84-year-old female who presents for acute concerns.    Symptoms began approximately 3 to 4 days ago, initially presenting with a headache. Her symptoms have since progressed to include coughing, sneezing, and wheezing. No known exposure to sick individuals is reported. Nebulizer treatment has been adhered to every 4 hours, maintaining an oxygen level of 2 L. The cough is productive, yielding dark yellow sputum. Chest pain and a runny nose are reported, but there is no sore throat. The last nebulizer treatment was at 9:00 AM today, which was reported as ineffective. Shortness of breath was experienced while crossing the parking lot, but she was able to drive herself to the appointment. Sufficient nebulizer solution is available, and Tessalon Perles has been used for the cough.    Increased leg swelling is reported, attributed to excessive salt intake during the Easter holiday. Bumex 1 mg is taken twice daily.    A bruise on the hip is reported, which has hardware from a previous total hip replacement. An ice pack has been applied to the bruise.    PAST SURGICAL HISTORY:  Total hip replacement on the right side.       Objective   Vital Signs:  /86   Pulse 72   Temp 98.8 °F (37.1 °C)   Resp 28   Ht 165.1 cm (65\")   Wt 103 kg (226 lb 9.6 oz)   SpO2 97%   BMI 37.71 kg/m²   Estimated body mass index is 37.71 kg/m² as calculated from the following:    Height as of this encounter: 165.1 cm (65\").    Weight as of this encounter: 103 kg (226 lb 9.6 oz).               Physical Exam   Physical Exam  Ears: External ear canals and tympanic membranes intact  Mouth/Throat: Mucous membranes moist, mild erythema, no exudate  Respiratory: " Congestion in both lungs, expiratory wheezing  Cardiovascular: Regular rate and rhythm, no murmurs, rubs, or gallops  Musculoskeletal: Large bruise on proximal right hip anterior to hip replacement scar, strength equal with hip flexion on both sides       Result Review :          Results  Labs   - Influenza Test: Negative   - COVID-19 Test: Negative              Assessment and Plan     Diagnoses and all orders for this visit:    1. COPD with exacerbation (Primary)  -     POCT Influenza A/B  -     POCT SARS-CoV-2 Antigen BETH  -     predniSONE (DELTASONE) 20 MG tablet; Take 3 tablets by mouth Daily for 2 days, THEN 2 tablets Daily for 3 days.  Dispense: 12 tablet; Refill: 0  -     cefdinir (OMNICEF) 300 MG capsule; Take 1 capsule by mouth 2 (Two) Times a Day for 7 days.  Dispense: 14 capsule; Refill: 0    2. Chronic systolic heart failure  -     spironolactone (Aldactone) 50 MG tablet; Take 1 tablet by mouth Daily for 5 days.  Dispense: 5 tablet; Refill: 0  -     CBC & Differential  -     Comprehensive Metabolic Panel  -     BNP  -     TSH    3. Shortness of breath  -     BNP    4. Contusion of right hip, initial encounter      Assessment & Plan  1. Chronic obstructive pulmonary disease exacerbation.  - Symptoms include headache, productive cough with dark yellow sputum, chest pain, and runny nose.  - Physical examination reveals congestion in both lungs and mild expiratory wheezing. Influenza and COVID-19 tests were negative.  - Laboratory tests, including BNP measurement, will be conducted to rule out heart failure.  - Prednisone 60 mg for the first 2 days, followed by 40 mg daily, will be initiated. Antibiotics will also be prescribed. If symptoms worsen, the patient should go to the emergency room.    2. Heart failure.  - Increased leg swelling reported, potentially due to high salt intake.  - Currently taking Bumex 1 mg twice a day.  - Spironolactone will be prescribed for a few days to manage potential heart  failure.  - Advised to monitor for any worsening symptoms and seek immediate medical attention if necessary.    3. Hip contusion.  - Large bruise on the proximal right hip anterior to the hip replacement scar.  - Strength is equal with hip flexion on both sides, indicating no periprosthetic fracture.  - Applying ice packs to the area.  - No x-rays are deemed necessary at this time.    Follow-up  - The patient will follow up on 04/28/2025.            Follow Up     Return in about 4 days (around 4/28/2025) for Recheck lungs.  Patient was given instructions and counseling regarding her condition or for health maintenance advice. Please see specific information pulled into the AVS if appropriate.    Patient or patient representative verbalized consent for the use of Ambient Listening during the visit with  Meredith Lea Kehrer, MD for chart documentation. 4/24/2025  11:32 EDT

## 2025-04-25 ENCOUNTER — APPOINTMENT (OUTPATIENT)
Dept: GENERAL RADIOLOGY | Facility: HOSPITAL | Age: 85
DRG: 191 | End: 2025-04-25
Payer: MEDICARE

## 2025-04-25 ENCOUNTER — HOSPITAL ENCOUNTER (INPATIENT)
Facility: HOSPITAL | Age: 85
LOS: 2 days | Discharge: HOME HEALTH | DRG: 191 | End: 2025-04-27
Attending: EMERGENCY MEDICINE | Admitting: HOSPITALIST
Payer: MEDICARE

## 2025-04-25 ENCOUNTER — TELEPHONE (OUTPATIENT)
Dept: FAMILY MEDICINE CLINIC | Facility: CLINIC | Age: 85
End: 2025-04-25

## 2025-04-25 DIAGNOSIS — J44.9 CHRONIC OBSTRUCTIVE PULMONARY DISEASE, UNSPECIFIED COPD TYPE: ICD-10-CM

## 2025-04-25 DIAGNOSIS — R54 AGE-RELATED PHYSICAL DEBILITY: ICD-10-CM

## 2025-04-25 DIAGNOSIS — J18.9 PNEUMONIA OF BOTH LUNGS DUE TO INFECTIOUS ORGANISM, UNSPECIFIED PART OF LUNG: Primary | ICD-10-CM

## 2025-04-25 DIAGNOSIS — R60.0 PEDAL EDEMA: ICD-10-CM

## 2025-04-25 PROBLEM — R06.02 SHORTNESS OF BREATH: Status: ACTIVE | Noted: 2024-05-13

## 2025-04-25 LAB
ALBUMIN SERPL-MCNC: 3.9 G/DL (ref 3.5–5.2)
ALBUMIN SERPL-MCNC: 4 G/DL (ref 3.7–4.7)
ALBUMIN/GLOB SERPL: 1.6 G/DL
ALP SERPL-CCNC: 96 IU/L (ref 44–121)
ALP SERPL-CCNC: 99 U/L (ref 39–117)
ALT SERPL W P-5'-P-CCNC: 7 U/L (ref 1–33)
ALT SERPL-CCNC: 9 IU/L (ref 0–32)
ANION GAP SERPL CALCULATED.3IONS-SCNC: 12 MMOL/L (ref 5–15)
AST SERPL-CCNC: 14 IU/L (ref 0–40)
AST SERPL-CCNC: 8 U/L (ref 1–32)
B PARAPERT DNA SPEC QL NAA+PROBE: NOT DETECTED
B PERT DNA SPEC QL NAA+PROBE: NOT DETECTED
BASOPHILS # BLD AUTO: 0 X10E3/UL (ref 0–0.2)
BASOPHILS # BLD AUTO: 0.01 10*3/MM3 (ref 0–0.2)
BASOPHILS NFR BLD AUTO: 0 %
BASOPHILS NFR BLD AUTO: 0.1 % (ref 0–1.5)
BILIRUB SERPL-MCNC: 0.3 MG/DL (ref 0–1.2)
BILIRUB SERPL-MCNC: 0.3 MG/DL (ref 0–1.2)
BNP SERPL-MCNC: 210.1 PG/ML (ref 0–100)
BUN SERPL-MCNC: 31 MG/DL (ref 8–27)
BUN SERPL-MCNC: 37 MG/DL (ref 8–23)
BUN/CREAT SERPL: 20 (ref 12–28)
BUN/CREAT SERPL: 25.7 (ref 7–25)
C PNEUM DNA NPH QL NAA+NON-PROBE: NOT DETECTED
CALCIUM SERPL-MCNC: 9.5 MG/DL (ref 8.7–10.3)
CALCIUM SPEC-SCNC: 8.9 MG/DL (ref 8.6–10.5)
CHLORIDE SERPL-SCNC: 104 MMOL/L (ref 96–106)
CHLORIDE SERPL-SCNC: 106 MMOL/L (ref 98–107)
CO2 SERPL-SCNC: 22 MMOL/L (ref 20–29)
CO2 SERPL-SCNC: 26 MMOL/L (ref 22–29)
CREAT SERPL-MCNC: 1.44 MG/DL (ref 0.57–1)
CREAT SERPL-MCNC: 1.53 MG/DL (ref 0.57–1)
DEPRECATED RDW RBC AUTO: 45.4 FL (ref 37–54)
EGFRCR SERPLBLD CKD-EPI 2021: 33 ML/MIN/1.73
EGFRCR SERPLBLD CKD-EPI 2021: 35.7 ML/MIN/1.73
EOSINOPHIL # BLD AUTO: 0 10*3/MM3 (ref 0–0.4)
EOSINOPHIL # BLD AUTO: 0 X10E3/UL (ref 0–0.4)
EOSINOPHIL NFR BLD AUTO: 0 %
EOSINOPHIL NFR BLD AUTO: 0 % (ref 0.3–6.2)
ERYTHROCYTE [DISTWIDTH] IN BLOOD BY AUTOMATED COUNT: 13.8 % (ref 11.7–15.4)
ERYTHROCYTE [DISTWIDTH] IN BLOOD BY AUTOMATED COUNT: 13.8 % (ref 12.3–15.4)
FLUAV SUBTYP SPEC NAA+PROBE: NOT DETECTED
FLUBV RNA ISLT QL NAA+PROBE: NOT DETECTED
GEN 5 1HR TROPONIN T REFLEX: 18 NG/L
GLOBULIN SER CALC-MCNC: 2 G/DL (ref 1.5–4.5)
GLOBULIN UR ELPH-MCNC: 2.5 GM/DL
GLUCOSE BLDC GLUCOMTR-MCNC: 131 MG/DL (ref 70–130)
GLUCOSE BLDC GLUCOMTR-MCNC: 171 MG/DL (ref 70–130)
GLUCOSE SERPL-MCNC: 104 MG/DL (ref 70–99)
GLUCOSE SERPL-MCNC: 181 MG/DL (ref 65–99)
HADV DNA SPEC NAA+PROBE: NOT DETECTED
HCOV 229E RNA SPEC QL NAA+PROBE: NOT DETECTED
HCOV HKU1 RNA SPEC QL NAA+PROBE: NOT DETECTED
HCOV NL63 RNA SPEC QL NAA+PROBE: NOT DETECTED
HCOV OC43 RNA SPEC QL NAA+PROBE: NOT DETECTED
HCT VFR BLD AUTO: 31.9 % (ref 34–46.6)
HCT VFR BLD AUTO: 33 % (ref 34–46.6)
HGB BLD-MCNC: 10.4 G/DL (ref 12–15.9)
HGB BLD-MCNC: 10.5 G/DL (ref 11.1–15.9)
HMPV RNA NPH QL NAA+NON-PROBE: NOT DETECTED
HOLD SPECIMEN: NORMAL
HOLD SPECIMEN: NORMAL
HPIV1 RNA ISLT QL NAA+PROBE: NOT DETECTED
HPIV2 RNA SPEC QL NAA+PROBE: NOT DETECTED
HPIV3 RNA NPH QL NAA+PROBE: NOT DETECTED
HPIV4 P GENE NPH QL NAA+PROBE: NOT DETECTED
IMM GRANULOCYTES # BLD AUTO: 0.1 X10E3/UL (ref 0–0.1)
IMM GRANULOCYTES # BLD AUTO: 0.27 10*3/MM3 (ref 0–0.05)
IMM GRANULOCYTES NFR BLD AUTO: 1 %
IMM GRANULOCYTES NFR BLD AUTO: 2.5 % (ref 0–0.5)
LYMPHOCYTES # BLD AUTO: 1.14 10*3/MM3 (ref 0.7–3.1)
LYMPHOCYTES # BLD AUTO: 1.8 X10E3/UL (ref 0.7–3.1)
LYMPHOCYTES NFR BLD AUTO: 10.5 % (ref 19.6–45.3)
LYMPHOCYTES NFR BLD AUTO: 14 %
M PNEUMO IGG SER IA-ACNC: NOT DETECTED
MCH RBC QN AUTO: 29 PG (ref 26.6–33)
MCH RBC QN AUTO: 29.5 PG (ref 26.6–33)
MCHC RBC AUTO-ENTMCNC: 31.8 G/DL (ref 31.5–35.7)
MCHC RBC AUTO-ENTMCNC: 32.6 G/DL (ref 31.5–35.7)
MCV RBC AUTO: 90.4 FL (ref 79–97)
MCV RBC AUTO: 91 FL (ref 79–97)
MONOCYTES # BLD AUTO: 0.45 10*3/MM3 (ref 0.1–0.9)
MONOCYTES # BLD AUTO: 0.9 X10E3/UL (ref 0.1–0.9)
MONOCYTES NFR BLD AUTO: 4.2 % (ref 5–12)
MONOCYTES NFR BLD AUTO: 7 %
NEUTROPHILS # BLD AUTO: 9.5 X10E3/UL (ref 1.4–7)
NEUTROPHILS NFR BLD AUTO: 78 %
NEUTROPHILS NFR BLD AUTO: 8.95 10*3/MM3 (ref 1.7–7)
NEUTROPHILS NFR BLD AUTO: 82.7 % (ref 42.7–76)
NRBC BLD AUTO-RTO: 0 /100 WBC (ref 0–0.2)
NT-PROBNP SERPL-MCNC: 1674 PG/ML (ref 0–1800)
PLATELET # BLD AUTO: 198 10*3/MM3 (ref 140–450)
PLATELET # BLD AUTO: 217 X10E3/UL (ref 150–450)
PMV BLD AUTO: 9.2 FL (ref 6–12)
POTASSIUM SERPL-SCNC: 4.4 MMOL/L (ref 3.5–5.2)
POTASSIUM SERPL-SCNC: 4.4 MMOL/L (ref 3.5–5.2)
PROCALCITONIN SERPL-MCNC: 0.04 NG/ML (ref 0–0.25)
PROT SERPL-MCNC: 6 G/DL (ref 6–8.5)
PROT SERPL-MCNC: 6.4 G/DL (ref 6–8.5)
QT INTERVAL: 381 MS
QTC INTERVAL: 428 MS
RBC # BLD AUTO: 3.53 10*6/MM3 (ref 3.77–5.28)
RBC # BLD AUTO: 3.62 X10E6/UL (ref 3.77–5.28)
RHINOVIRUS RNA SPEC NAA+PROBE: NOT DETECTED
RSV RNA NPH QL NAA+NON-PROBE: NOT DETECTED
SARS-COV-2 RNA NPH QL NAA+NON-PROBE: NOT DETECTED
SODIUM SERPL-SCNC: 144 MMOL/L (ref 134–144)
SODIUM SERPL-SCNC: 144 MMOL/L (ref 136–145)
TROPONIN T % DELTA: 0
TROPONIN T NUMERIC DELTA: 0 NG/L
TROPONIN T SERPL HS-MCNC: 18 NG/L
TSH SERPL DL<=0.005 MIU/L-ACNC: 0.98 UIU/ML (ref 0.45–4.5)
WBC # BLD AUTO: 12.3 X10E3/UL (ref 3.4–10.8)
WBC NRBC COR # BLD AUTO: 10.82 10*3/MM3 (ref 3.4–10.8)
WHOLE BLOOD HOLD COAG: NORMAL
WHOLE BLOOD HOLD SPECIMEN: NORMAL

## 2025-04-25 PROCEDURE — 87040 BLOOD CULTURE FOR BACTERIA: CPT | Performed by: EMERGENCY MEDICINE

## 2025-04-25 PROCEDURE — 25010000002 FUROSEMIDE PER 20 MG: Performed by: EMERGENCY MEDICINE

## 2025-04-25 PROCEDURE — 99285 EMERGENCY DEPT VISIT HI MDM: CPT

## 2025-04-25 PROCEDURE — 84145 PROCALCITONIN (PCT): CPT | Performed by: HOSPITALIST

## 2025-04-25 PROCEDURE — 94640 AIRWAY INHALATION TREATMENT: CPT

## 2025-04-25 PROCEDURE — 25010000002 METHYLPREDNISOLONE PER 40 MG: Performed by: HOSPITALIST

## 2025-04-25 PROCEDURE — 85025 COMPLETE CBC W/AUTO DIFF WBC: CPT

## 2025-04-25 PROCEDURE — 36415 COLL VENOUS BLD VENIPUNCTURE: CPT

## 2025-04-25 PROCEDURE — 63710000001 INSULIN LISPRO (HUMAN) PER 5 UNITS: Performed by: HOSPITALIST

## 2025-04-25 PROCEDURE — 84484 ASSAY OF TROPONIN QUANT: CPT

## 2025-04-25 PROCEDURE — 93005 ELECTROCARDIOGRAM TRACING: CPT

## 2025-04-25 PROCEDURE — 71045 X-RAY EXAM CHEST 1 VIEW: CPT

## 2025-04-25 PROCEDURE — 93005 ELECTROCARDIOGRAM TRACING: CPT | Performed by: EMERGENCY MEDICINE

## 2025-04-25 PROCEDURE — 83880 ASSAY OF NATRIURETIC PEPTIDE: CPT

## 2025-04-25 PROCEDURE — 0202U NFCT DS 22 TRGT SARS-COV-2: CPT | Performed by: EMERGENCY MEDICINE

## 2025-04-25 PROCEDURE — 82948 REAGENT STRIP/BLOOD GLUCOSE: CPT

## 2025-04-25 PROCEDURE — 80053 COMPREHEN METABOLIC PANEL: CPT

## 2025-04-25 PROCEDURE — 84484 ASSAY OF TROPONIN QUANT: CPT | Performed by: EMERGENCY MEDICINE

## 2025-04-25 PROCEDURE — 25010000002 CEFTRIAXONE PER 250 MG: Performed by: EMERGENCY MEDICINE

## 2025-04-25 PROCEDURE — 93010 ELECTROCARDIOGRAM REPORT: CPT | Performed by: INTERNAL MEDICINE

## 2025-04-25 RX ORDER — LACTULOSE 10 G/15ML
10 SOLUTION ORAL 3 TIMES DAILY
Status: DISCONTINUED | OUTPATIENT
Start: 2025-04-25 | End: 2025-04-27 | Stop reason: HOSPADM

## 2025-04-25 RX ORDER — GABAPENTIN 100 MG/1
100 CAPSULE ORAL 3 TIMES DAILY
Status: DISCONTINUED | OUTPATIENT
Start: 2025-04-25 | End: 2025-04-27 | Stop reason: HOSPADM

## 2025-04-25 RX ORDER — CARVEDILOL 12.5 MG/1
12.5 TABLET ORAL 2 TIMES DAILY WITH MEALS
Status: DISCONTINUED | OUTPATIENT
Start: 2025-04-25 | End: 2025-04-27 | Stop reason: HOSPADM

## 2025-04-25 RX ORDER — PANTOPRAZOLE SODIUM 40 MG/1
40 TABLET, DELAYED RELEASE ORAL
Status: DISCONTINUED | OUTPATIENT
Start: 2025-04-26 | End: 2025-04-27 | Stop reason: HOSPADM

## 2025-04-25 RX ORDER — NICOTINE POLACRILEX 4 MG
15 LOZENGE BUCCAL
Status: DISCONTINUED | OUTPATIENT
Start: 2025-04-25 | End: 2025-04-27 | Stop reason: HOSPADM

## 2025-04-25 RX ORDER — LEVOTHYROXINE SODIUM 100 UG/1
100 TABLET ORAL
Status: DISCONTINUED | OUTPATIENT
Start: 2025-04-26 | End: 2025-04-27 | Stop reason: HOSPADM

## 2025-04-25 RX ORDER — SODIUM CHLORIDE 0.9 % (FLUSH) 0.9 %
10 SYRINGE (ML) INJECTION EVERY 12 HOURS SCHEDULED
Status: DISCONTINUED | OUTPATIENT
Start: 2025-04-25 | End: 2025-04-27 | Stop reason: HOSPADM

## 2025-04-25 RX ORDER — BISACODYL 10 MG
10 SUPPOSITORY, RECTAL RECTAL DAILY PRN
Status: DISCONTINUED | OUTPATIENT
Start: 2025-04-25 | End: 2025-04-27 | Stop reason: HOSPADM

## 2025-04-25 RX ORDER — AMOXICILLIN 250 MG
2 CAPSULE ORAL 2 TIMES DAILY PRN
Status: DISCONTINUED | OUTPATIENT
Start: 2025-04-25 | End: 2025-04-27 | Stop reason: HOSPADM

## 2025-04-25 RX ORDER — METHYLPREDNISOLONE SODIUM SUCCINATE 40 MG/ML
40 INJECTION, POWDER, LYOPHILIZED, FOR SOLUTION INTRAMUSCULAR; INTRAVENOUS EVERY 12 HOURS
Status: DISCONTINUED | OUTPATIENT
Start: 2025-04-25 | End: 2025-04-27

## 2025-04-25 RX ORDER — POLYETHYLENE GLYCOL 3350 17 G/17G
17 POWDER, FOR SOLUTION ORAL DAILY PRN
Status: DISCONTINUED | OUTPATIENT
Start: 2025-04-25 | End: 2025-04-27 | Stop reason: HOSPADM

## 2025-04-25 RX ORDER — CYCLOBENZAPRINE HCL 10 MG
10 TABLET ORAL 2 TIMES DAILY PRN
Status: DISCONTINUED | OUTPATIENT
Start: 2025-04-25 | End: 2025-04-27 | Stop reason: HOSPADM

## 2025-04-25 RX ORDER — ACETAMINOPHEN 650 MG/1
650 SUPPOSITORY RECTAL EVERY 4 HOURS PRN
Status: DISCONTINUED | OUTPATIENT
Start: 2025-04-25 | End: 2025-04-27 | Stop reason: HOSPADM

## 2025-04-25 RX ORDER — IBUPROFEN 600 MG/1
1 TABLET ORAL
Status: DISCONTINUED | OUTPATIENT
Start: 2025-04-25 | End: 2025-04-27 | Stop reason: HOSPADM

## 2025-04-25 RX ORDER — SODIUM CHLORIDE 9 MG/ML
40 INJECTION, SOLUTION INTRAVENOUS AS NEEDED
Status: DISCONTINUED | OUTPATIENT
Start: 2025-04-25 | End: 2025-04-27 | Stop reason: HOSPADM

## 2025-04-25 RX ORDER — VALSARTAN 320 MG/1
320 TABLET ORAL DAILY
Status: DISCONTINUED | OUTPATIENT
Start: 2025-04-25 | End: 2025-04-27 | Stop reason: HOSPADM

## 2025-04-25 RX ORDER — SODIUM CHLORIDE 0.9 % (FLUSH) 0.9 %
10 SYRINGE (ML) INJECTION AS NEEDED
Status: DISCONTINUED | OUTPATIENT
Start: 2025-04-25 | End: 2025-04-27 | Stop reason: HOSPADM

## 2025-04-25 RX ORDER — FERROUS SULFATE 325(65) MG
325 TABLET ORAL
Status: DISCONTINUED | OUTPATIENT
Start: 2025-04-26 | End: 2025-04-27 | Stop reason: HOSPADM

## 2025-04-25 RX ORDER — ALBUTEROL SULFATE 0.63 MG/3ML
0.63 SOLUTION RESPIRATORY (INHALATION) EVERY 6 HOURS PRN
Status: DISCONTINUED | OUTPATIENT
Start: 2025-04-25 | End: 2025-04-27 | Stop reason: HOSPADM

## 2025-04-25 RX ORDER — ONDANSETRON 2 MG/ML
4 INJECTION INTRAMUSCULAR; INTRAVENOUS EVERY 6 HOURS PRN
Status: DISCONTINUED | OUTPATIENT
Start: 2025-04-25 | End: 2025-04-27 | Stop reason: HOSPADM

## 2025-04-25 RX ORDER — AZITHROMYCIN 250 MG/1
500 TABLET, FILM COATED ORAL DAILY
Status: COMPLETED | OUTPATIENT
Start: 2025-04-25 | End: 2025-04-25

## 2025-04-25 RX ORDER — BUMETANIDE 1 MG/1
1 TABLET ORAL 2 TIMES DAILY
Status: DISCONTINUED | OUTPATIENT
Start: 2025-04-25 | End: 2025-04-27 | Stop reason: HOSPADM

## 2025-04-25 RX ORDER — ACETAMINOPHEN 160 MG/5ML
650 SOLUTION ORAL EVERY 4 HOURS PRN
Status: DISCONTINUED | OUTPATIENT
Start: 2025-04-25 | End: 2025-04-27 | Stop reason: HOSPADM

## 2025-04-25 RX ORDER — POTASSIUM CHLORIDE 750 MG/1
10 TABLET, FILM COATED, EXTENDED RELEASE ORAL DAILY
Status: DISCONTINUED | OUTPATIENT
Start: 2025-04-25 | End: 2025-04-27 | Stop reason: HOSPADM

## 2025-04-25 RX ORDER — BUDESONIDE AND FORMOTEROL FUMARATE DIHYDRATE 160; 4.5 UG/1; UG/1
2 AEROSOL RESPIRATORY (INHALATION)
Status: DISCONTINUED | OUTPATIENT
Start: 2025-04-25 | End: 2025-04-27 | Stop reason: HOSPADM

## 2025-04-25 RX ORDER — INSULIN LISPRO 100 [IU]/ML
2-9 INJECTION, SOLUTION INTRAVENOUS; SUBCUTANEOUS
Status: DISCONTINUED | OUTPATIENT
Start: 2025-04-25 | End: 2025-04-27 | Stop reason: HOSPADM

## 2025-04-25 RX ORDER — ONDANSETRON 4 MG/1
4 TABLET, ORALLY DISINTEGRATING ORAL EVERY 6 HOURS PRN
Status: DISCONTINUED | OUTPATIENT
Start: 2025-04-25 | End: 2025-04-27 | Stop reason: HOSPADM

## 2025-04-25 RX ORDER — PRAMIPEXOLE DIHYDROCHLORIDE 1.5 MG/1
1.5 TABLET ORAL NIGHTLY
Status: DISCONTINUED | OUTPATIENT
Start: 2025-04-25 | End: 2025-04-27 | Stop reason: HOSPADM

## 2025-04-25 RX ORDER — ACETAMINOPHEN 325 MG/1
650 TABLET ORAL EVERY 4 HOURS PRN
Status: DISCONTINUED | OUTPATIENT
Start: 2025-04-25 | End: 2025-04-27 | Stop reason: HOSPADM

## 2025-04-25 RX ORDER — DEXTROSE MONOHYDRATE 25 G/50ML
25 INJECTION, SOLUTION INTRAVENOUS
Status: DISCONTINUED | OUTPATIENT
Start: 2025-04-25 | End: 2025-04-27 | Stop reason: HOSPADM

## 2025-04-25 RX ORDER — SPIRONOLACTONE 25 MG/1
50 TABLET ORAL DAILY
Status: DISCONTINUED | OUTPATIENT
Start: 2025-04-25 | End: 2025-04-27 | Stop reason: HOSPADM

## 2025-04-25 RX ORDER — NITROGLYCERIN 0.4 MG/1
0.4 TABLET SUBLINGUAL
Status: DISCONTINUED | OUTPATIENT
Start: 2025-04-25 | End: 2025-04-27 | Stop reason: HOSPADM

## 2025-04-25 RX ORDER — MULTIVITAMIN WITH IRON
1000 TABLET ORAL DAILY
Status: DISCONTINUED | OUTPATIENT
Start: 2025-04-25 | End: 2025-04-27 | Stop reason: HOSPADM

## 2025-04-25 RX ORDER — IPRATROPIUM BROMIDE AND ALBUTEROL SULFATE 2.5; .5 MG/3ML; MG/3ML
3 SOLUTION RESPIRATORY (INHALATION)
Status: DISCONTINUED | OUTPATIENT
Start: 2025-04-25 | End: 2025-04-27 | Stop reason: HOSPADM

## 2025-04-25 RX ORDER — FUROSEMIDE 10 MG/ML
40 INJECTION INTRAMUSCULAR; INTRAVENOUS ONCE
Status: COMPLETED | OUTPATIENT
Start: 2025-04-25 | End: 2025-04-25

## 2025-04-25 RX ORDER — SERTRALINE HYDROCHLORIDE 25 MG/1
25 TABLET, FILM COATED ORAL DAILY
Status: DISCONTINUED | OUTPATIENT
Start: 2025-04-25 | End: 2025-04-27 | Stop reason: HOSPADM

## 2025-04-25 RX ORDER — AZITHROMYCIN 250 MG/1
250 TABLET, FILM COATED ORAL DAILY
Status: DISCONTINUED | OUTPATIENT
Start: 2025-04-26 | End: 2025-04-27 | Stop reason: HOSPADM

## 2025-04-25 RX ORDER — FOLIC ACID 1 MG/1
1 TABLET ORAL DAILY
Status: DISCONTINUED | OUTPATIENT
Start: 2025-04-25 | End: 2025-04-27 | Stop reason: HOSPADM

## 2025-04-25 RX ORDER — HYDRALAZINE HYDROCHLORIDE 25 MG/1
25 TABLET, FILM COATED ORAL 2 TIMES DAILY
Status: DISCONTINUED | OUTPATIENT
Start: 2025-04-25 | End: 2025-04-27 | Stop reason: HOSPADM

## 2025-04-25 RX ORDER — BISACODYL 5 MG/1
5 TABLET, DELAYED RELEASE ORAL DAILY PRN
Status: DISCONTINUED | OUTPATIENT
Start: 2025-04-25 | End: 2025-04-27 | Stop reason: HOSPADM

## 2025-04-25 RX ORDER — AMOXICILLIN 250 MG
2 CAPSULE ORAL EVERY 12 HOURS SCHEDULED
Status: DISCONTINUED | OUTPATIENT
Start: 2025-04-25 | End: 2025-04-27 | Stop reason: HOSPADM

## 2025-04-25 RX ADMIN — GABAPENTIN 100 MG: 100 CAPSULE ORAL at 18:21

## 2025-04-25 RX ADMIN — LACTULOSE 10 G: 10 SOLUTION ORAL at 18:22

## 2025-04-25 RX ADMIN — APIXABAN 5 MG: 5 TABLET, FILM COATED ORAL at 20:43

## 2025-04-25 RX ADMIN — SENNOSIDES AND DOCUSATE SODIUM 2 TABLET: 50; 8.6 TABLET ORAL at 20:43

## 2025-04-25 RX ADMIN — LACTULOSE 10 G: 10 SOLUTION ORAL at 20:43

## 2025-04-25 RX ADMIN — GABAPENTIN 100 MG: 100 CAPSULE ORAL at 20:43

## 2025-04-25 RX ADMIN — BUDESONIDE AND FORMOTEROL FUMARATE DIHYDRATE 2 PUFF: 160; 4.5 AEROSOL RESPIRATORY (INHALATION) at 19:32

## 2025-04-25 RX ADMIN — PRAMIPEXOLE DIHYDROCHLORIDE 1.5 MG: 1.5 TABLET ORAL at 20:43

## 2025-04-25 RX ADMIN — METHYLPREDNISOLONE SODIUM SUCCINATE 40 MG: 40 INJECTION, POWDER, FOR SOLUTION INTRAMUSCULAR; INTRAVENOUS at 18:21

## 2025-04-25 RX ADMIN — CEFTRIAXONE 2000 MG: 2 INJECTION, POWDER, FOR SOLUTION INTRAMUSCULAR; INTRAVENOUS at 14:50

## 2025-04-25 RX ADMIN — BUMETANIDE 1 MG: 1 TABLET ORAL at 20:43

## 2025-04-25 RX ADMIN — IPRATROPIUM BROMIDE AND ALBUTEROL SULFATE 3 ML: .5; 3 SOLUTION RESPIRATORY (INHALATION) at 19:30

## 2025-04-25 RX ADMIN — FUROSEMIDE 40 MG: 10 INJECTION, SOLUTION INTRAMUSCULAR; INTRAVENOUS at 14:46

## 2025-04-25 RX ADMIN — AZITHROMYCIN 500 MG: 250 TABLET, FILM COATED ORAL at 18:22

## 2025-04-25 RX ADMIN — CARVEDILOL 12.5 MG: 12.5 TABLET, FILM COATED ORAL at 18:22

## 2025-04-25 RX ADMIN — Medication 10 ML: at 22:35

## 2025-04-25 RX ADMIN — INSULIN LISPRO 2 UNITS: 100 INJECTION, SOLUTION INTRAVENOUS; SUBCUTANEOUS at 22:36

## 2025-04-25 RX ADMIN — POTASSIUM CHLORIDE 10 MEQ: 750 TABLET, EXTENDED RELEASE ORAL at 18:21

## 2025-04-25 RX ADMIN — HYDRALAZINE HYDROCHLORIDE 25 MG: 25 TABLET ORAL at 20:43

## 2025-04-25 NOTE — H&P
HISTORY AND PHYSICAL   [facility]        Patient Identification:  Name: Latanya Olsen  Age: 84 y.o.  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  She has a history of COPD and is O2 dependent at home.  She presents complaining of shortness of breath that she is noting on exertion starting 4 days ago.  She states she developed a cough 4 days ago which is occasionally productive of a small amount of yellow sputum.  No fever sweats or chills.  She presented to her PCP and was started on a steroid and antibiotic.  When she perceived that she was not improving she presented to the emergency room for further evaluation and treatment.  Still no fever sweats or chills.  No orthopnea.  No chest pain.  She has chronic lower extremity edema which she states has not changed.  She lives alone.      Past Medical History:  Past Medical History:   Diagnosis Date    Abnormal ECG Aug 2023    Acute deep vein thrombosis (DVT) of iliac vein of left lower extremity 2021    Acute deep vein thrombosis (DVT) of iliac vein of left lower extremity 2021    Acute embolism and thrombosis of left iliac vein     Acute exacerbation of CHF (congestive heart failure) 2023    Anemia     Anemia 2016    Anemia, unspecified 2021    Anxiety 4-2-23    Arthritis     Arthritis of back     Arthritis of neck     Asthma 2006    Atrial fibrillation     Carcinoid tumor of lung 2016    Cataract     Cervical disc disorder     Chronic constipation 2016    Chronic deep vein thrombosis (DVT) of left femoral vein 2021    Chronic deep vein thrombosis (DVT) of left popliteal vein 2021    Chronic lower back pain 2016    Chronic  obstructive pulmonary disease 02/19/2016    Chronic obstructive pulmonary disease, unspecified     CKD (chronic kidney disease)     Stage 3    Compression of vein 05/07/2021    Congenital heart disease Aug 2023    Constipation     Constipation 03/10/2021    COPD (chronic obstructive pulmonary disease)     DDD (degenerative disc disease), lumbar     Deep vein thrombosis 2019    Dependence on other enabling machines and devices     CPAP    Depression     Diverticulosis     Diverticulosis of intestine 02/19/2016    Elevated serum alkaline phosphatase level 02/22/2016    Essential (primary) hypertension     Foot pain 02/19/2016    Fracture of hip     Fracture, femur     Gastroparesis 02/19/2016    Generalized osteoarthritis 02/19/2016    Generalized weakness 03/06/2021    GERD (gastroesophageal reflux disease)     Hip arthrosis     History of heart attack     7/2023    Hx of degenerative disc disease     Hyperlipidemia     Hyperlipidemia 02/19/2016    Hyperlipidemia, unspecified     Hypertension     Hypertension 02/19/2016    Hypokalemia     Hypokalemia 02/19/2016    Hypothyroidism     Hypothyroidism 02/19/2016    Hypothyroidism, unspecified     Insomnia 02/19/2016    Knee pain 02/19/2016    Knee swelling     Low back pain Yes    Low back strain     Lung cancer     history    Myocardial infarction 8-4-23    Neck pain 11/28/2017    Neutropenia 02/19/2016    Neutropenia, unspecified     Obesity 218    Obesity (BMI 30-39.9) 02/19/2016    Obstructive sleep apnea (adult) (pediatric)     Obstructive sleep apnea syndrome 02/19/2016    Osteoarthritis of knee 02/19/2016    Osteopenia Yes    Overweight (BMI 25.0-29.9) 02/19/2016    Pain of left breast 02/22/2016    Periarthritis of shoulder     Pulmonary embolism 2019    Renal disorder     Renal insufficiency 2019    Restless leg syndrome     Rotator cuff syndrome     Scoliosis Yes    Sleep apnea with use of continuous positive airway pressure (CPAP)     Spinal stenosis of lumbar  region without neurogenic claudication 02/19/2016    Spinal stenosis, lumbar region without neurogenic claudication 08/12/2021    Status post total hip replacement, 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    Unspecified atrial fibrillation     Vitamin D deficiency 02/19/2016    Volume overload 03/19/2019    Weakness 08/12/2021    Weight gain 02/19/2016    Weight loss 02/19/2016     Past Surgical History:  Past Surgical History:   Procedure Laterality Date    ADENOIDECTOMY  2000    BUNIONECTOMY Bilateral     CARDIAC CATHETERIZATION N/A 08/07/2023    Procedure: Left Heart Cath;  Surgeon: Mireya Park MD;  Location: [facility] CATH INVASIVE LOCATION;  Service: Cardiology;  Laterality: N/A;    CARDIAC CATHETERIZATION N/A 08/07/2023    Procedure: Coronary angiography;  Surgeon: Mireya Park MD;  Location: [facility] CATH INVASIVE LOCATION;  Service: Cardiology;  Laterality: N/A;    CARDIAC CATHETERIZATION N/A 08/07/2023    Procedure: Left ventriculography;  Surgeon: Mireya Park MD;  Location: [facility] CATH INVASIVE LOCATION;  Service: Cardiology;  Laterality: N/A;    CATARACT EXTRACTION      CHOLECYSTECTOMY      COLONOSCOPY      ENDOSCOPY N/A 06/24/2016    Procedure: ESOPHAGOGASTRODUODENOSCOPY  with biopsies;  Surgeon: Von Hernández MD;  Location: [facility] OR;  Service:     JOINT REPLACEMENT  Yes    LUNG LOBECTOMY Left     lower lobe    LYTIC THROMBIN THERAPY Left 03/26/2021    Procedure: left leg clot treiver possible venous stent *supine*;  Surgeon: Rogerio Vega MD;  Location: [facility] beds at the [facility] OR 18/19;  Service: Vascular;  Laterality: Left;    REPLACEMENT TOTAL KNEE Left     THYROID BIOPSY      TONSILLECTOMY  Yes    TOTAL HIP ARTHROPLASTY Right 06/01/2019    Procedure: BIPOLAR HIP CEMENTED POSTERIOR;  Surgeon: Ashley Crenshaw MD;  Location: [facility] MAIN OR;  Service: Orthopedics    TRIGGER POINT INJECTION      TUBAL ABDOMINAL LIGATION  50 yrs ago      Home Meds:  (Not in a hospital  admission)      Allergies:  Allergies   Allergen Reactions    Doxycycline Anaphylaxis     Facial swelling, shortness of air, dizzines     Immunizations:  Immunization History   Administered Date(s) Administered    COVID-19 (PFIZER) Purple Cap Monovalent 02/02/2021, 02/23/2021, 09/10/2021    COVID-19 (UNSPECIFIED) 10/03/2022    Flu Vaccine Split Quad 09/01/2020, 01/06/2021    Fluad Quad 65+ 09/01/2020, 01/06/2021, 11/22/2023    Fluzone High-Dose 65+YRS 10/20/2016, 10/10/2017, 10/19/2018, 09/24/2019    Fluzone High-Dose 65+yrs 10/03/2022, 11/29/2024    Hepatitis A 05/02/2018, 10/24/2018, 11/10/2018    Pneumococcal Conjugate 13-Valent (PCV13) 10/17/2017    Pneumococcal Polysaccharide (PPSV23) 11/07/2018    RSV Vaccine, Unspecified 11/29/2024    Shingrix 10/03/2022, 01/10/2023    Td (TDVAX) 08/25/2017    Tdap 04/17/2019     Social History:   Social History     Social History Narrative    Not on file     Social History     Tobacco Use    Smoking status: Never     Passive exposure: Never    Smokeless tobacco: Never    Tobacco comments:     Never   Substance Use Topics    Alcohol use: Never     Comment: Patient is non drinker.     Family History:  Family History   Problem Relation Age of Onset    Heart attack Mother     Coronary artery disease Mother     Hypertension Mother     Kidney disease Mother     Arthritis Mother     Heart disease Mother     Heart disease Father     Anesthesia problems Father     Heart attack Sister     Aortic aneurysm Brother         Abdominal    Cancer Brother         no details    Cancer Brother     Colon cancer Neg Hx     Colon polyps Neg Hx     Esophageal cancer Neg Hx     Breast cancer Neg Hx         Review of Systems  Review of Systems   Constitutional: Negative.    HENT: Negative.     Eyes: Negative.    Respiratory:          As per history of present illness.   Cardiovascular: Negative.    Gastrointestinal: Negative.    Endocrine: Negative.    Genitourinary: Negative.    Musculoskeletal:  Negative.    Skin: Negative.    Allergic/Immunologic: Negative.    Neurological: Negative.    Hematological: Negative.    Psychiatric/Behavioral: Negative.         Objective:  T Max 24 hrs: Temp (24hrs), Av.9 °F (37.2 °C), Min:98.9 °F (37.2 °C), Max:98.9 °F (37.2 °C)    Vitals Ranges:   Temp:  [98.9 °F (37.2 °C)] 98.9 °F (37.2 °C)  Heart Rate:  [79-81] 79  Resp:  [24] 24  BP: (154-157)/(68-81) 157/81      Exam:  Physical Exam  Constitutional:       General: She is not in acute distress.     Appearance: Normal appearance. She is obese. She is not ill-appearing, toxic-appearing or diaphoretic.   HENT:      Head: Normocephalic and atraumatic.      Right Ear: External ear normal.      Nose: Nose normal.      Mouth/Throat:      Mouth: Mucous membranes are moist.   Eyes:      General: No scleral icterus.        Right eye: No discharge.         Left eye: No discharge.      Extraocular Movements: Extraocular movements intact.      Conjunctiva/sclera: Conjunctivae normal.   Cardiovascular:      Rate and Rhythm: Normal rate and regular rhythm.      Heart sounds:      No friction rub. No gallop.   Pulmonary:      Comments: She appears comfortable and breathing normally during interview.  When she does start to cough she gets briefly short of breath.  Cough is nonproductive at present.  On auscultation there is significant volitional vocal cord adventitial sounds.  Although her speech and breathing are normal when not being auscultated I could not convince her to relax her vocal cords.  Difficult to assess underlying pulmonary sounds but there does appear to be rhonchi present.  There is a moderate increase in expiratory phase but much of this may be due to her vocal cord tightening which again is only noted when auscultating and immediately resolves when I remove the stethoscope.  Abdominal:      General: Abdomen is flat. Bowel sounds are normal. There is no distension.      Palpations: Abdomen is soft. There is no mass.       Tenderness: There is no abdominal tenderness. There is no guarding or rebound.   Musculoskeletal:      Cervical back: Neck supple.      Right lower leg: Edema present.      Left lower leg: Edema present.      Comments: 2-3+ partially pitting edema below the knees bilaterally.  On questioning the patient states this is her baseline.   Skin:     General: Skin is warm and dry.   Neurological:      General: No focal deficit present.      Mental Status: She is alert and oriented to person, place, and time.   Psychiatric:         Mood and Affect: Mood normal.         Behavior: Behavior normal.         Thought Content: Thought content normal.         Judgment: Judgment normal.         Data Review:  All labs and radiology reviewed.    Assessment:  COPD exacerbation: Suspect secondary to upper respiratory tract infection.  Procalcitonin well within normal limits.  Reviewed chest x-ray.  No fever sweats or chills.  Respiratory panel negative.  May benefit from oral Zithromax but presently avoid broad-spectrum IV antibiotics.  Continue bronchodilators.  Tapering dose of steroids.  Short of breath/dyspnea on exertion: Secondary to above.  O2 sats are in a good range on her baseline 2 L nasal cannula.  Hypertension: Continue home meds.  Hypothyroid: Recent TSH normal.  Continue levothyroxine.  SIERRA: May use home CPAP.  Obesity class II:  Paroxysmal atrial fibrillation: Continue rate control and anticoagulation.  CKD 3: Monitor.  Avoid nephrotoxin.  Adjustment disorder: Continue home meds.  May be exacerbating her presenting symptoms.  Anemia: Stable.  Previous workup consistent with anemia of chronic disease.  Hyperglycemia: Previously IGT.  Suspect she has crossed the line into diabetes 2 by this time.  Check A1c.  SSI as patient is presently on steroids.  Chronic lymphedema: Stable.  Continue leg elevation and compression hose.  Chronic hypoxic respiratory failure:  Polypharmacy:  Chronic constipation: Continue home  meds.    Plan:  Please see above.  Discussed with patient.  Discussed with ER provider.    José Herrera MD  4/25/2025  15:38 EDT    EMR Dragon/Transcription disclaimer:   Much of this encounter note is an electronic transcription/translation of spoken language to printed text. The electronic translation of spoken language may permit erroneous, or at times, nonsensical words or phrases to be inadvertently transcribed; Although I have reviewed the note for such errors, some may still exist.

## 2025-04-25 NOTE — ED NOTES
..Nursing report ED to floor  Latanya Olsen  84 y.o.  female    HPI :  HPI  Stated Reason for Visit: cough/ soa    Chief Complaint  Chief Complaint   Patient presents with    Shortness of Breath    Cough       Admitting doctor:   José Herrera MD    Admitting diagnosis:   The primary encounter diagnosis was Pneumonia of both lungs due to infectious organism, unspecified part of lung. Diagnoses of Pedal edema and Chronic obstructive pulmonary disease, unspecified COPD type were also pertinent to this visit.    Code status:   Current Code Status       Date Active Code Status Order ID Comments User Context       Prior            Allergies:   Doxycycline    Isolation:   No active isolations    Intake and Output  No intake or output data in the 24 hours ending 04/25/25 1446    Weight:   There were no vitals filed for this visit.    Most recent vitals:   Vitals:    04/25/25 1039 04/25/25 1200 04/25/25 1300 04/25/25 1344   BP: 154/73 154/68 156/78 157/81   Patient Position: Sitting      Pulse:    79   Resp:       Temp:       TempSrc:       SpO2:    98%       Active LDAs/IV Access:   Lines, Drains & Airways       Active LDAs       Name Placement date Placement time Site Days    Peripheral IV 04/25/25 1210 20 G Right Antecubital 04/25/25  1210  Antecubital  less than 1                    Labs (abnormal labs have a star):   Labs Reviewed   COMPREHENSIVE METABOLIC PANEL - Abnormal; Notable for the following components:       Result Value    Glucose 181 (*)     BUN 37 (*)     Creatinine 1.44 (*)     BUN/Creatinine Ratio 25.7 (*)     eGFR 35.7 (*)     All other components within normal limits    Narrative:     GFR Categories in Chronic Kidney Disease (CKD)      GFR Category          GFR (mL/min/1.73)    Interpretation  G1                     90 or greater         Normal or high (1)  G2                      60-89                Mild decrease (1)  G3a                   45-59                Mild to moderate decrease  G3b                    30-44                Moderate to severe decrease  G4                    15-29                Severe decrease  G5                    14 or less           Kidney failure          (1)In the absence of evidence of kidney disease, neither GFR category G1 or G2 fulfill the criteria for CKD.    eGFR calculation 2021 CKD-EPI creatinine equation, which does not include race as a factor   TROPONIN - Abnormal; Notable for the following components:    HS Troponin T 18 (*)     All other components within normal limits    Narrative:     High Sensitive Troponin T Reference Range:  <14.0 ng/L- Negative Female for AMI  <22.0 ng/L- Negative Male for AMI  >=14 - Abnormal Female indicating possible myocardial injury.  >=22 - Abnormal Male indicating possible myocardial injury.   Clinicians would have to utilize clinical acumen, EKG, Troponin, and serial changes to determine if it is an Acute Myocardial Infarction or myocardial injury due to an underlying chronic condition.        CBC WITH AUTO DIFFERENTIAL - Abnormal; Notable for the following components:    WBC 10.82 (*)     RBC 3.53 (*)     Hemoglobin 10.4 (*)     Hematocrit 31.9 (*)     Neutrophil % 82.7 (*)     Lymphocyte % 10.5 (*)     Monocyte % 4.2 (*)     Eosinophil % 0.0 (*)     Immature Grans % 2.5 (*)     Neutrophils, Absolute 8.95 (*)     Immature Grans, Absolute 0.27 (*)     All other components within normal limits   HIGH SENSITIVITIY TROPONIN T 1HR - Abnormal; Notable for the following components:    HS Troponin T 18 (*)     All other components within normal limits    Narrative:     High Sensitive Troponin T Reference Range:  <14.0 ng/L- Negative Female for AMI  <22.0 ng/L- Negative Male for AMI  >=14 - Abnormal Female indicating possible myocardial injury.  >=22 - Abnormal Male indicating possible myocardial injury.   Clinicians would have to utilize clinical acumen, EKG, Troponin, and serial changes to determine if it is an Acute Myocardial Infarction  or myocardial injury due to an underlying chronic condition.        RESPIRATORY PANEL PCR W/ COVID-19 (SARS-COV-2), NP SWAB IN UTM/VTP, 2 HR TAT - Normal    Narrative:     In the setting of a positive respiratory panel with a viral infection PLUS a negative procalcitonin without other underlying concern for bacterial infection, consider observing off antibiotics or discontinuation of antibiotics and continue supportive care. If the respiratory panel is positive for atypical bacterial infection (Bordetella pertussis, Chlamydophila pneumoniae, or Mycoplasma pneumoniae), consider antibiotic de-escalation to target atypical bacterial infection.   BNP (IN-HOUSE) - Normal    Narrative:     This assay is used as an aid in the diagnosis of individuals suspected of having heart failure. It can be used as an aid in the diagnosis of acute decompensated heart failure (ADHF) in patients presenting with signs and symptoms of ADHF to the emergency department (ED). In addition, NT-proBNP of <300 pg/mL indicates ADHF is not likely.    Age Range Result Interpretation  NT-proBNP Concentration (pg/mL:      <50             Positive            >450                   Gray                 300-450                    Negative             <300    50-75           Positive            >900                  Gray                300-900                  Negative            <300      >75             Positive            >1800                  Gray                300-1800                  Negative            <300   BLOOD CULTURE   BLOOD CULTURE   RAINBOW DRAW    Narrative:     The following orders were created for panel order [place] Draw.  Procedure                               Abnormality         Status                     ---------                               -----------         ------                     Green Top (Gel)[[id number]]                                  Final result               Lavender Top[[id number]]                                      Final result               Gold Top - SST[[id number]]                                   Final result               Light Blue Top[[id number]]                                   Final result                 Please view results for these tests on the individual orders.   CBC AND DIFFERENTIAL    Narrative:     The following orders were created for panel order CBC & Differential.  Procedure                               Abnormality         Status                     ---------                               -----------         ------                     CBC Auto Differential[[id number]]        Abnormal            Final result                 Please view results for these tests on the individual orders.   GREEN TOP   LAVENDER TOP   GOLD TOP - SST   LIGHT BLUE TOP       EKG:   ECG 12 Lead ED Triage Standing Order; SOA   Final Result   HEART RATE=76  bpm   RR Esqflvxp=502  ms   IL Kbnohaso=183  ms   P Horizontal Axis=-6  deg   P Front Axis=71  deg   QRSD Viwlkdwe=477  ms   QT Qpwkizqq=815  ms   XPrW=549  ms   QRS Axis=-26  deg   T Wave Axis=-4  deg   - BORDERLINE ECG -   Sinus rhythm   Borderline  left axis deviation   Low voltage, precordial leads   Abnormal R-wave progression, early transition   Borderline  T abnormalities, inferior leads   No change from prior tracing   Electronically Signed By: Anna Marsh ([facility]) 2025-04-25 14:07:43   Date and Time of Study:2025-04-25 11:08:34          Meds given in ED:   Medications   sodium chloride 0.9 % flush 10 mL (has no administration in time range)   cefTRIAXone (ROCEPHIN) 2,000 mg in sodium chloride 0.9 % 100 mL MBP (has no administration in time range)   furosemide (LASIX) injection 40 mg (has no administration in time range)       Imaging results:  XR Chest 1 View  Result Date: 4/25/2025  As described.  This report was finalized on 4/25/2025 11:57 AM by Dr. Gregory Baptiste M.D on Workstation: [id number]        Ambulatory status:   - ambulatory with walker    Social issues:    Social History     Socioeconomic History    Marital status:    Tobacco Use    Smoking status: Never     Passive exposure: Never    Smokeless tobacco: Never    Tobacco comments:     Never   Vaping Use    Vaping status: Never Used   Substance and Sexual Activity    Alcohol use: Never     Comment: Patient is non drinker.    Drug use: Never     Comment: Drug Abuse: none    Sexual activity: Not Currently     Birth control/protection: Coitus interruptus, Nexplanon, Tubal ligation       Peripheral Neurovascular  Peripheral Neurovascular (Adult)  Peripheral Neurovascular WDL: .WDL except  Additional Documentation: Edema (Group)  Edema  Edema: ankle, right, ankle, left, foot, right, foot, left  Ankle, Left Edema: 1+ (Trace)  Ankle, Right Edema: 1+ (Trace)  Foot, Left Edema: 1+ (Trace)  Foot, Right Edema: 1+ (Trace)    Neuro Cognitive  Neuro Cognitive (Adult)  Cognitive/Neuro/Behavioral WDL: WDL    Learning  Learning Assessment  Learning Readiness and Ability: no barriers identified    Respiratory  Respiratory  Airway WDL: WDL  Respiratory WDL  Respiratory WDL: .WDL except, cough  Cough Frequency: infrequent  Cough Type: productive    Abdominal Pain       Pain Assessments  Pain (Adult)  (0-10) Pain Rating: Rest: 0  (0-10) Pain Rating: Activity: 0    NIH Stroke Scale       Brigid Gonzalez RN  04/25/25 14:46 EDT

## 2025-04-25 NOTE — TELEPHONE ENCOUNTER
Caller: Latanya Olsen    Relationship to patient: Self    Best call back number: [phone]     Chief complaint: SHORTNESS OF BREATHING, TROUBLE BREATHING    Patient directed to call 911 or go to their nearest emergency room.     Patient verbalized understanding: [x] Yes  [] No  If no, why?    Additional notes:PATIENT IS GOING TO BE SEEN AT [facility].

## 2025-04-25 NOTE — PLAN OF CARE
Goal Outcome Evaluation:  Plan of Care Reviewed With: patient           Outcome Evaluation: admitted from ER.  Pleasant, alert and oriented.  No SOA at rest.  O2 2LNC.  Productive cough.  IV steroids.  Diuresing well

## 2025-04-25 NOTE — ED PROVIDER NOTES
EMERGENCY DEPARTMENT ENCOUNTER    Room Number:  S04/04  PCP: Kehrer, Meredith Lea, MD  Historian: Patient      HPI:  Chief Complaint: Shortness of breath  A complete HPI/ROS/PMH/PSH/SH/FH are unobtainable due to: None  Context: Latanya Olsen is a 84 y.o. female who presents to the ED c/o sudden onset of shortness of breath that has been significantly worsening over the last several days.  She has a history of COPD and is on oxygen at home for the most part.  She reports that she was diagnosed yesterday with an upper respiratory infection placed on prednisone as well as cefdinir.  She states that this morning her symptoms really have not improved at all and could be even worse.  She reports that shortness of breath is significantly worse with any type of exertion.  Is at least moderate in intensity.  She denies chest pain, fever/chills, nausea/vomiting, or known sick contacts.            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    Diverticulosis of intestine 02/19/2016    Obstructive sleep apnea syndrome 02/19/2016    Weight loss 02/19/2016    Vitamin D deficiency 02/19/2016    Overweight (BMI 25.0-29.9) 02/19/2016    Spinal stenosis of lumbar region without neurogenic claudication 02/19/2016    Osteoarthritis of knee 02/19/2016    Obesity (BMI 30-39.9) 02/19/2016    Chronic lower back pain 02/19/2016    Knee pain 02/19/2016    Insomnia 02/19/2016    Hypothyroidism 02/19/2016    Hypokalemia 02/19/2016    Primary hypertension 02/19/2016    Mixed hyperlipidemia 02/19/2016    Generalized osteoarthritis 02/19/2016    Gastroparesis 02/19/2016    Foot pain 02/19/2016    Chronic obstructive pulmonary disease 02/19/2016    Chronic constipation 02/19/2016    Anemia 02/19/2016    Weight gain 02/19/2016    Pain of left breast 02/22/2016    Elevated serum alkaline phosphatase level 02/22/2016    Neck pain 11/28/2017    Volume overload 03/19/2019     Status post total hip replacement, 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    Generalized weakness 03/06/2021    Constipation 03/10/2021    Paroxysmal atrial fibrillation 04/20/2021    Idiopathic peripheral neuropathy 06/24/2022    Lymphedema 08/01/2022    Lumbar degenerative disc disease 01/31/2023    GERD (gastroesophageal reflux disease) 02/16/2023    Lung cancer 02/16/2023    Non-STEMI (non-ST elevated myocardial infarction) 08/06/2023    Nonischemic nontraumatic myocardial injury 08/10/2023    Stage 3a chronic kidney disease 11/30/2023    Adjustment disorder with depressed mood 11/30/2023    Peripheral polyneuropathy 11/30/2023    Chronic diastolic congestive heart failure 03/05/2024    MCKEON (dyspnea on exertion) 03/05/2024    Dyspnea 05/13/2024    Edema of left lower extremity 06/11/2024    Venous (peripheral) insufficiency 06/12/2024    Venous stasis 06/12/2024    Hematoma 06/12/2024    Iliac vein thrombosis, 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    Presence of inferior vena cava filter 08/14/2024    Leg hematoma, left, sequela 08/14/2024     Resolved Ambulatory Problems     Diagnosis Date Noted    Finger injury 02/19/2016    Upper respiratory tract infection 02/19/2016    Contusion of upper arm 02/19/2016    Tingling of skin 02/19/2016    Rash 02/19/2016    Candidiasis of mouth 02/19/2016    Neutropenia 02/19/2016    Spasm 02/19/2016    Low back pain 02/19/2016    Heartburn 02/19/2016    Diarrhea 02/19/2016    Bloating symptom 02/19/2016    Gastroesophageal reflux disease with esophagitis 02/19/2016    Drug-induced photosensitivity 02/19/2016    Cramp of limb 02/19/2016    Contact dermatitis 02/19/2016    Chronic kidney disease, stage II (mild) 02/19/2016    Ankle joint pain 02/19/2016    Atopic rhinitis 02/19/2016    Allergic reaction to drug 02/19/2016    Acute sinusitis 02/19/2016    Generalized abdominal pain 02/19/2016    Closed fracture of neck of right femur 05/31/2019    ARF (acute renal failure) 03/07/2021    Acute deep vein thrombosis (DVT) of  iliac vein of left lower extremity 03/24/2021    Acute deep vein thrombosis (DVT) of tibial vein of left lower extremity 04/20/2021    Hyperhomocysteinemia 05/14/2021    Acute exacerbation of CHF (congestive heart failure) 08/27/2023    Acute systolic heart failure 08/28/2023    Acute on chronic HFrEF (heart failure with reduced ejection fraction) 08/28/2023    Chronic systolic heart failure 08/28/2023     Past Medical History:   Diagnosis Date    Abnormal ECG Aug 2023    Acute embolism and thrombosis of left iliac vein     Anemia, unspecified 08/12/2021    Anxiety 4-2-23    Arthritis     Arthritis of back     Arthritis of neck     Asthma 1/1/2006    Atrial fibrillation     Cataract 1-1/2015    Cervical disc disorder     Chronic deep vein thrombosis (DVT) of left femoral vein 08/12/2021    Chronic deep vein thrombosis (DVT) of left popliteal vein 08/12/2021    Chronic obstructive pulmonary disease, unspecified     CKD (chronic kidney disease)     Compression of vein 05/07/2021    Congenital heart disease Aug 2023    COPD (chronic obstructive pulmonary disease)     DDD (degenerative disc disease), lumbar     Deep vein thrombosis 2019    Dependence on other enabling machines and devices     Depression     Diverticulosis     Essential (primary) hypertension     Fracture of hip     Fracture, femur     Hip arthrosis     History of heart attack     Hx of degenerative disc disease     Hyperlipidemia     Hyperlipidemia 02/19/2016    Hyperlipidemia, unspecified     Hypertension     Hypertension 02/19/2016    Hypothyroidism, unspecified     Knee swelling     Low back strain     Myocardial infarction 8-4-23    Neutropenia, unspecified     Obesity 218    Obstructive sleep apnea (adult) (pediatric)     Osteopenia Yes    Periarthritis of shoulder     Pulmonary embolism 2019    Renal disorder     Renal insufficiency 2019    Restless leg syndrome     Rotator cuff syndrome     Scoliosis Yes    Sleep apnea with use of continuous  positive airway pressure (CPAP)     Spinal stenosis, lumbar region without neurogenic claudication 08/12/2021    Unspecified atrial fibrillation     Weakness 08/12/2021         PAST SURGICAL HISTORY  Past Surgical History:   Procedure Laterality Date    ADENOIDECTOMY  2000    BUNIONECTOMY Bilateral     CARDIAC CATHETERIZATION N/A 08/07/2023    Procedure: Left Heart Cath;  Surgeon: Mireya Park MD;  Location: [facility] CATH INVASIVE LOCATION;  Service: Cardiology;  Laterality: N/A;    CARDIAC CATHETERIZATION N/A 08/07/2023    Procedure: Coronary angiography;  Surgeon: Mireya Park MD;  Location: [facility] CATH INVASIVE LOCATION;  Service: Cardiology;  Laterality: N/A;    CARDIAC CATHETERIZATION N/A 08/07/2023    Procedure: Left ventriculography;  Surgeon: Mireya Park MD;  Location: [facility] CATH INVASIVE LOCATION;  Service: Cardiology;  Laterality: N/A;    CATARACT EXTRACTION      CHOLECYSTECTOMY      COLONOSCOPY      ENDOSCOPY N/A 06/24/2016    Procedure: ESOPHAGOGASTRODUODENOSCOPY  with biopsies;  Surgeon: Von Hernández MD;  Location: [facility] OR;  Service:     JOINT REPLACEMENT  Yes    LUNG LOBECTOMY Left     lower lobe    LYTIC THROMBIN THERAPY Left 03/26/2021    Procedure: left leg clot treiver possible venous stent *supine*;  Surgeon: Rogerio Vega MD;  Location: [facility] OR 18/19;  Service: Vascular;  Laterality: Left;    REPLACEMENT TOTAL KNEE Left     THYROID BIOPSY      TONSILLECTOMY  Yes    TOTAL HIP ARTHROPLASTY Right 06/01/2019    Procedure: BIPOLAR HIP CEMENTED POSTERIOR;  Surgeon: Ashley Crenshaw MD;  Location: [facility] OR;  Service: Orthopedics    TRIGGER POINT INJECTION      TUBAL ABDOMINAL LIGATION  50 yrs ago         FAMILY HISTORY  Family History   Problem Relation Age of Onset    Heart attack Mother     Coronary artery disease Mother     Hypertension Mother     Kidney disease Mother     Arthritis Mother     Heart disease Mother     Heart disease Father      Anesthesia problems Father     Heart attack Sister     Aortic aneurysm Brother         Abdominal    Cancer Brother         no details    Cancer Brother     Colon cancer Neg Hx     Colon polyps Neg Hx     Esophageal cancer Neg Hx     Breast cancer Neg Hx          SOCIAL HISTORY  Social History     Socioeconomic History    Marital status:    Tobacco Use    Smoking status: Never     Passive exposure: Never    Smokeless tobacco: Never    Tobacco comments:     Never   Vaping Use    Vaping status: Never Used   Substance and Sexual Activity    Alcohol use: Never     Comment: Patient is non drinker.    Drug use: Never     Comment: Drug Abuse: none    Sexual activity: Not Currently     Birth control/protection: Coitus interruptus, Nexplanon, Tubal ligation         ALLERGIES  Doxycycline        REVIEW OF SYSTEMS  Review of Systems   Constitutional:  Negative for fever.   HENT:  Negative for sore throat.    Eyes: Negative.    Respiratory:  Positive for cough and shortness of breath.    Cardiovascular:  Positive for leg swelling. Negative for chest pain.   Gastrointestinal:  Negative for abdominal pain, diarrhea and vomiting.   Genitourinary:  Negative for dysuria.   Musculoskeletal:  Negative for neck pain.   Skin:  Negative for rash.   Allergic/Immunologic: Negative.    Neurological:  Negative for weakness, numbness and headaches.   Hematological: Negative.    Psychiatric/Behavioral: Negative.     All other systems reviewed and are negative.         PHYSICAL EXAM  ED Triage Vitals   Temp Heart Rate Resp BP SpO2   04/25/25 1034 04/25/25 1034 04/25/25 1034 04/25/25 1039 04/25/25 1034   98.9 °F (37.2 °C) 81 24 154/73 94 %      Temp src Heart Rate Source Patient Position BP Location FiO2 (%)   04/25/25 1034 -- 04/25/25 1039 -- --   Tympanic  Sitting         Physical Exam  Constitutional:       General: She is not in acute distress.     Appearance: Normal appearance. She is not ill-appearing or toxic-appearing.   HENT:       Head: Normocephalic and atraumatic.   Eyes:      Extraocular Movements: Extraocular movements intact.      Pupils: Pupils are equal, round, and reactive to light.   Cardiovascular:      Rate and Rhythm: Normal rate and regular rhythm.      Heart sounds: No murmur heard.     No friction rub. No gallop.   Pulmonary:      Effort: Tachypnea present.      Breath sounds: Normal breath sounds.   Abdominal:      General: Abdomen is flat. There is no distension.      Palpations: Abdomen is soft.      Tenderness: There is no abdominal tenderness.   Musculoskeletal:         General: No swelling or tenderness. Normal range of motion.      Cervical back: Normal range of motion and neck supple.      Right lower leg: Edema present.      Left lower leg: Edema present.   Skin:     General: Skin is warm and dry.   Neurological:      General: No focal deficit present.      Mental Status: She is alert and oriented to person, place, and time.      Sensory: No sensory deficit.      Motor: No weakness.   Psychiatric:         Mood and Affect: Mood normal.         Behavior: Behavior normal.         Vital signs and nursing notes reviewed.          LAB RESULTS  Recent Results (from the past 24 hours)   Comprehensive Metabolic Panel    Collection Time: 04/25/25 10:53 AM    Specimen: Arm, Right; Blood   Result Value Ref Range    Glucose 181 (H) 65 - 99 mg/dL    BUN 37 (H) 8 - 23 mg/dL    Creatinine 1.44 (H) 0.57 - 1.00 mg/dL    Sodium 144 136 - 145 mmol/L    Potassium 4.4 3.5 - 5.2 mmol/L    Chloride 106 98 - 107 mmol/L    CO2 26.0 22.0 - 29.0 mmol/L    Calcium 8.9 8.6 - 10.5 mg/dL    Total Protein 6.4 6.0 - 8.5 g/dL    Albumin 3.9 3.5 - 5.2 g/dL    ALT (SGPT) 7 1 - 33 U/L    AST (SGOT) 8 1 - 32 U/L    Alkaline Phosphatase 99 39 - 117 U/L    Total Bilirubin 0.3 0.0 - 1.2 mg/dL    Globulin 2.5 gm/dL    A/G Ratio 1.6 g/dL    BUN/Creatinine Ratio 25.7 (H) 7.0 - 25.0    Anion Gap 12.0 5.0 - 15.0 mmol/L    eGFR 35.7 (L) >60.0 mL/min/1.73   BNP     Collection Time: 04/25/25 10:53 AM    Specimen: Arm, Right; Blood   Result Value Ref Range    proBNP 1,674.0 0.0 - 1,800.0 pg/mL   High Sensitivity Troponin T    Collection Time: 04/25/25 10:53 AM    Specimen: Arm, Right; Blood   Result Value Ref Range    HS Troponin T 18 (H) <14 ng/L   Green Top (Gel)    Collection Time: 04/25/25 10:53 AM   Result Value Ref Range    Extra Tube Hold for add-ons.    Lavender Top    Collection Time: 04/25/25 10:53 AM   Result Value Ref Range    Extra Tube hold for add-on    Gold Top - SST    Collection Time: 04/25/25 10:53 AM   Result Value Ref Range    Extra Tube Hold for add-ons.    Light Blue Top    Collection Time: 04/25/25 10:53 AM   Result Value Ref Range    Extra Tube Hold for add-ons.    CBC Auto Differential    Collection Time: 04/25/25 10:53 AM    Specimen: Arm, Right; Blood   Result Value Ref Range    WBC 10.82 (H) 3.40 - 10.80 10*3/mm3    RBC 3.53 (L) 3.77 - 5.28 10*6/mm3    Hemoglobin 10.4 (L) 12.0 - 15.9 g/dL    Hematocrit 31.9 (L) 34.0 - 46.6 %    MCV 90.4 79.0 - 97.0 fL    MCH 29.5 26.6 - 33.0 pg    MCHC 32.6 31.5 - 35.7 g/dL    RDW 13.8 12.3 - 15.4 %    RDW-SD 45.4 37.0 - 54.0 fl    MPV 9.2 6.0 - 12.0 fL    Platelets 198 140 - 450 10*3/mm3    Neutrophil % 82.7 (H) 42.7 - 76.0 %    Lymphocyte % 10.5 (L) 19.6 - 45.3 %    Monocyte % 4.2 (L) 5.0 - 12.0 %    Eosinophil % 0.0 (L) 0.3 - 6.2 %    Basophil % 0.1 0.0 - 1.5 %    Immature Grans % 2.5 (H) 0.0 - 0.5 %    Neutrophils, Absolute 8.95 (H) 1.70 - 7.00 10*3/mm3    Lymphocytes, Absolute 1.14 0.70 - 3.10 10*3/mm3    Monocytes, Absolute 0.45 0.10 - 0.90 10*3/mm3    Eosinophils, Absolute 0.00 0.00 - 0.40 10*3/mm3    Basophils, Absolute 0.01 0.00 - 0.20 10*3/mm3    Immature Grans, Absolute 0.27 (H) 0.00 - 0.05 10*3/mm3    nRBC 0.0 0.0 - 0.2 /100 WBC   ECG 12 Lead ED Triage Standing Order; SOA    Collection Time: 04/25/25 11:08 AM   Result Value Ref Range    QT Interval 381 ms    QTC Interval 428 ms   Respiratory Panel PCR  w/COVID-19(SARS-CoV-2) ADDISON/LILLIAN/MARIA M/PAD/COR/SOLO In-House, NP Swab in UTM/VTM, 2 HR TAT - Swab, Nasopharynx    Collection Time: 04/25/25 11:55 AM    Specimen: Nasopharynx; Swab   Result Value Ref Range    ADENOVIRUS, PCR Not Detected Not Detected    Coronavirus 229E Not Detected Not Detected    Coronavirus HKU1 Not Detected Not Detected    Coronavirus NL63 Not Detected Not Detected    Coronavirus OC43 Not Detected Not Detected    COVID19 Not Detected Not Detected - Ref. Range    Human Metapneumovirus Not Detected Not Detected    Human Rhinovirus/Enterovirus Not Detected Not Detected    Influenza A PCR Not Detected Not Detected    Influenza B PCR Not Detected Not Detected    Parainfluenza Virus 1 Not Detected Not Detected    Parainfluenza Virus 2 Not Detected Not Detected    Parainfluenza Virus 3 Not Detected Not Detected    Parainfluenza Virus 4 Not Detected Not Detected    RSV, PCR Not Detected Not Detected    Bordetella pertussis pcr Not Detected Not Detected    Bordetella parapertussis PCR Not Detected Not Detected    Chlamydophila pneumoniae PCR Not Detected Not Detected    Mycoplasma pneumo by PCR Not Detected Not Detected   High Sensitivity Troponin T 1Hr    Collection Time: 04/25/25 12:09 PM    Specimen: Blood   Result Value Ref Range    HS Troponin T 18 (H) <14 ng/L    Troponin T Numeric Delta 0 ng/L    Troponin T % Delta 0 Abnormal if >/= 20%       Ordered the above labs and reviewed the results.        RADIOLOGY  XR Chest 1 View  Result Date: 4/25/2025  XR CHEST 1 VW-  HISTORY: Female who is 84 years-old, short of breath  TECHNIQUE: Frontal view of the chest  COMPARISON: 10/7/2024  FINDINGS: The heart size is borderline. Aorta is calcified. Pulmonary vasculature is unremarkable. Small patchy density at the right mid to upper lung and left base could be areas of developing infiltrate, follow-up advised. No pleural effusion, or pneumothorax. No acute osseous process.      As described.  This report was  finalized on 4/25/2025 11:57 AM by Dr. Gregory Baptiste M.D on Workstation: [id number]        Ordered the above noted radiological studies. Reviewed by me in PACS.            PROCEDURES  Procedures    EKG independently interpreted by myself as follows:    EKG          EKG time: 1108  Rhythm/Rate: NSR, 76  P waves and NM: nml  QRS, axis: nml, nml   ST and T waves: nml     Interpreted Contemporaneously by me, independently viewed  unchanged compared to prior 5/13/24            MEDICATIONS GIVEN IN ER  Medications   sodium chloride 0.9 % flush 10 mL (has no administration in time range)   cefTRIAXone (ROCEPHIN) 2,000 mg in sodium chloride 0.9 % 100 mL MBP (has no administration in time range)   furosemide (LASIX) injection 40 mg (has no administration in time range)                   MEDICAL DECISION MAKING, PROGRESS, and CONSULTS    All labs have been independently reviewed by me.  All radiology studies have been reviewed by me and I have also reviewed the radiology report.   EKGs independently viewed and interpreted by me.  Discussion below represents my analysis of pertinent findings related to patient's condition, differential diagnosis, treatment plan and final disposition.      Additional sources:  - Discussed/ obtained information from independent historians: History obtained from the patient as well as the patient's family at bedside.    - External (non-ED) record review: Upon medical records review, the patient was last seen and evaluated in the outpatient office of her family medicine provider yesterday, 4/24/2025, for evaluation of cough with shortness of breath with known COPD as well as CHF.    - Chronic or social conditions impacting care: COPD, CHF    - Shared decision making: Admission decision based on short conversations have between myself, the patient and family at bedside, as well as Dr. Herrera with [facility].      Orders placed during this visit:  Orders Placed This Encounter   Procedures     Respiratory Panel PCR w/COVID-19(SARS-CoV-2) ADDISON/LILLIAN/MARIA M/PAD/COR/SOLO In-House, NP Swab in UTM/VTM, 2 HR TAT - Swab, Nasopharynx    Blood Culture - Blood,    Blood Culture - Blood,    XR Chest 1 View    [place] Draw    Comprehensive Metabolic Panel    BNP    High Sensitivity Troponin T    CBC Auto Differential    High Sensitivity Troponin T 1Hr    NPO Diet NPO Type: Strict NPO    Undress & Gown    Continuous Pulse Oximetry    Vital Signs    [facility] (on-call MD unless specified) Details    Oxygen Therapy- Nasal Cannula; Titrate 1-6 LPM Per SpO2; 90 - 95%    ECG 12 Lead ED Triage Standing Order; SOA    Insert Peripheral IV    Inpatient Admission    CBC & Differential    Green Top (Gel)    Lavender Top    Gold Top - SST    Light Blue Top             Differential diagnosis includes but is not limited to:    COPD with acute exacerbation, hypoxemic respiratory failure, hypercapnia, pneumonia, pneumothorax, pulmonary embolism, acute bronchitis, CHF with acute exacerbation, COVID-19, or viral upper respiratory infection      Independent interpretation of labs, radiology studies, and discussions with consultants:    Chest x-ray independently interpreted by myself with my interpretation showing no cardiomegaly nor obvious edema or infiltrate.      ED Course as of 04/25/25 1402   Fri Apr 25, 2025   1348 On reevaluation, the patient is resting comfortably and without any further complaints.  I informed her that her lab work is fairly unremarkable however x-ray is more consistent with a potential small developing infiltrate.  Given her ongoing symptoms as well as COPD, would like to begin antibiotics and admit her to the hospital today for further management and treatment.  She does have some lower extremity edema as well though no obvious edematous changes on x-ray.  Will treat with a dose of IV Lasix as well.  She agrees with the plan and all questions answered. [BM]   9716 The patient's presentation, workup, as well as diagnosis  and treatment plan was discussed at length with Dr. Herrera of [facility].  He agrees to admit the patient to the hospital today for further management and treatment. [BM]      ED Course User Index  [BM] Benjamin Clemente MD           DIAGNOSIS  Final diagnoses:   Pneumonia of both lungs due to infectious organism, unspecified part of lung   Pedal edema   Chronic obstructive pulmonary disease, unspecified COPD type         DISPOSITION  ADMISSION    Discussed treatment plan and reason for admission with pt/family and admitting physician.  Pt/family voiced understanding of the plan for admission for further testing/treatment as needed.               Latest Documented Vital Signs:  As of 14:02 EDT  BP- 157/81 HR- 79 Temp- 98.9 °F (37.2 °C) (Tympanic) O2 sat- 98%              --    Please note that portions of this were completed with a voice recognition program.       Note Disclaimer: At [facility], we believe that sharing information builds trust and better relationships. You are receiving this note because you are receiving care at [facility] or recently visited. It is possible you will see health information before a provider has talked with you about it. This kind of information can be easy to misunderstand. To help you fully understand what it means for your health, we urge you to discuss this note with your provider.             Benjamin Clemente MD  04/25/25 6720

## 2025-04-26 ENCOUNTER — APPOINTMENT (OUTPATIENT)
Dept: GENERAL RADIOLOGY | Facility: HOSPITAL | Age: 85
DRG: 191 | End: 2025-04-26
Payer: MEDICARE

## 2025-04-26 PROBLEM — N18.30 CKD (CHRONIC KIDNEY DISEASE) STAGE 3, GFR 30-59 ML/MIN: Status: ACTIVE | Noted: 2023-11-30

## 2025-04-26 LAB
ANION GAP SERPL CALCULATED.3IONS-SCNC: 10.5 MMOL/L (ref 5–15)
BUN SERPL-MCNC: 34 MG/DL (ref 8–23)
BUN/CREAT SERPL: 27 (ref 7–25)
CALCIUM SPEC-SCNC: 8.9 MG/DL (ref 8.6–10.5)
CHLORIDE SERPL-SCNC: 103 MMOL/L (ref 98–107)
CO2 SERPL-SCNC: 27.5 MMOL/L (ref 22–29)
CREAT SERPL-MCNC: 1.26 MG/DL (ref 0.57–1)
EGFRCR SERPLBLD CKD-EPI 2021: 41.9 ML/MIN/1.73
GLUCOSE BLDC GLUCOMTR-MCNC: 116 MG/DL (ref 70–130)
GLUCOSE BLDC GLUCOMTR-MCNC: 117 MG/DL (ref 70–130)
GLUCOSE BLDC GLUCOMTR-MCNC: 117 MG/DL (ref 70–130)
GLUCOSE BLDC GLUCOMTR-MCNC: 144 MG/DL (ref 70–130)
GLUCOSE SERPL-MCNC: 107 MG/DL (ref 65–99)
HBA1C MFR BLD: 5.8 % (ref 4.8–5.6)
MAGNESIUM SERPL-MCNC: 2.3 MG/DL (ref 1.6–2.4)
POTASSIUM SERPL-SCNC: 4.2 MMOL/L (ref 3.5–5.2)
SODIUM SERPL-SCNC: 141 MMOL/L (ref 136–145)

## 2025-04-26 PROCEDURE — 83735 ASSAY OF MAGNESIUM: CPT | Performed by: HOSPITALIST

## 2025-04-26 PROCEDURE — 83036 HEMOGLOBIN GLYCOSYLATED A1C: CPT | Performed by: HOSPITALIST

## 2025-04-26 PROCEDURE — 87070 CULTURE OTHR SPECIMN AEROBIC: CPT | Performed by: HOSPITALIST

## 2025-04-26 PROCEDURE — 97530 THERAPEUTIC ACTIVITIES: CPT

## 2025-04-26 PROCEDURE — 94664 DEMO&/EVAL PT USE INHALER: CPT

## 2025-04-26 PROCEDURE — 25010000002 METHYLPREDNISOLONE PER 40 MG: Performed by: HOSPITALIST

## 2025-04-26 PROCEDURE — 97162 PT EVAL MOD COMPLEX 30 MIN: CPT

## 2025-04-26 PROCEDURE — 94761 N-INVAS EAR/PLS OXIMETRY MLT: CPT

## 2025-04-26 PROCEDURE — 97535 SELF CARE MNGMENT TRAINING: CPT

## 2025-04-26 PROCEDURE — 80048 BASIC METABOLIC PNL TOTAL CA: CPT | Performed by: HOSPITALIST

## 2025-04-26 PROCEDURE — 94760 N-INVAS EAR/PLS OXIMETRY 1: CPT

## 2025-04-26 PROCEDURE — 94799 UNLISTED PULMONARY SVC/PX: CPT

## 2025-04-26 PROCEDURE — 87205 SMEAR GRAM STAIN: CPT | Performed by: HOSPITALIST

## 2025-04-26 PROCEDURE — 82948 REAGENT STRIP/BLOOD GLUCOSE: CPT

## 2025-04-26 PROCEDURE — 73502 X-RAY EXAM HIP UNI 2-3 VIEWS: CPT

## 2025-04-26 PROCEDURE — 97166 OT EVAL MOD COMPLEX 45 MIN: CPT

## 2025-04-26 RX ORDER — GUAIFENESIN 600 MG/1
600 TABLET, EXTENDED RELEASE ORAL EVERY 12 HOURS SCHEDULED
Status: DISCONTINUED | OUTPATIENT
Start: 2025-04-26 | End: 2025-04-27 | Stop reason: HOSPADM

## 2025-04-26 RX ADMIN — AZITHROMYCIN 250 MG: 250 TABLET, FILM COATED ORAL at 08:40

## 2025-04-26 RX ADMIN — PANTOPRAZOLE SODIUM 40 MG: 40 TABLET, DELAYED RELEASE ORAL at 06:29

## 2025-04-26 RX ADMIN — GABAPENTIN 100 MG: 100 CAPSULE ORAL at 08:39

## 2025-04-26 RX ADMIN — GUAIFENESIN 600 MG: 600 TABLET, EXTENDED RELEASE ORAL at 20:37

## 2025-04-26 RX ADMIN — LACTULOSE 10 G: 10 SOLUTION ORAL at 17:00

## 2025-04-26 RX ADMIN — Medication 1000 MCG: at 08:41

## 2025-04-26 RX ADMIN — METHYLPREDNISOLONE SODIUM SUCCINATE 40 MG: 40 INJECTION, POWDER, FOR SOLUTION INTRAMUSCULAR; INTRAVENOUS at 18:42

## 2025-04-26 RX ADMIN — APIXABAN 5 MG: 5 TABLET, FILM COATED ORAL at 08:41

## 2025-04-26 RX ADMIN — PRAMIPEXOLE DIHYDROCHLORIDE 1.5 MG: 1.5 TABLET ORAL at 20:36

## 2025-04-26 RX ADMIN — APIXABAN 5 MG: 5 TABLET, FILM COATED ORAL at 20:36

## 2025-04-26 RX ADMIN — LACTULOSE 10 G: 10 SOLUTION ORAL at 20:37

## 2025-04-26 RX ADMIN — Medication 10 ML: at 08:41

## 2025-04-26 RX ADMIN — GABAPENTIN 100 MG: 100 CAPSULE ORAL at 17:00

## 2025-04-26 RX ADMIN — VALSARTAN 320 MG: 320 TABLET, FILM COATED ORAL at 08:39

## 2025-04-26 RX ADMIN — FOLIC ACID 1 MG: 1 TABLET ORAL at 08:39

## 2025-04-26 RX ADMIN — CARVEDILOL 12.5 MG: 12.5 TABLET, FILM COATED ORAL at 17:00

## 2025-04-26 RX ADMIN — IPRATROPIUM BROMIDE AND ALBUTEROL SULFATE 3 ML: .5; 3 SOLUTION RESPIRATORY (INHALATION) at 11:44

## 2025-04-26 RX ADMIN — GABAPENTIN 100 MG: 100 CAPSULE ORAL at 20:36

## 2025-04-26 RX ADMIN — POTASSIUM CHLORIDE 10 MEQ: 750 TABLET, EXTENDED RELEASE ORAL at 08:39

## 2025-04-26 RX ADMIN — GUAIFENESIN 600 MG: 600 TABLET, EXTENDED RELEASE ORAL at 12:05

## 2025-04-26 RX ADMIN — LACTULOSE 10 G: 10 SOLUTION ORAL at 08:41

## 2025-04-26 RX ADMIN — FERROUS SULFATE TAB 325 MG (65 MG ELEMENTAL FE) 325 MG: 325 (65 FE) TAB at 08:39

## 2025-04-26 RX ADMIN — BUDESONIDE AND FORMOTEROL FUMARATE DIHYDRATE 2 PUFF: 160; 4.5 AEROSOL RESPIRATORY (INHALATION) at 19:23

## 2025-04-26 RX ADMIN — METHYLPREDNISOLONE SODIUM SUCCINATE 40 MG: 40 INJECTION, POWDER, FOR SOLUTION INTRAMUSCULAR; INTRAVENOUS at 06:29

## 2025-04-26 RX ADMIN — SPIRONOLACTONE 50 MG: 25 TABLET ORAL at 08:39

## 2025-04-26 RX ADMIN — ACETAMINOPHEN 650 MG: 325 TABLET, FILM COATED ORAL at 08:45

## 2025-04-26 RX ADMIN — Medication 10 ML: at 21:57

## 2025-04-26 RX ADMIN — SERTRALINE HYDROCHLORIDE 25 MG: 25 TABLET, FILM COATED ORAL at 08:41

## 2025-04-26 RX ADMIN — HYDRALAZINE HYDROCHLORIDE 25 MG: 25 TABLET ORAL at 08:41

## 2025-04-26 RX ADMIN — IPRATROPIUM BROMIDE AND ALBUTEROL SULFATE 3 ML: .5; 3 SOLUTION RESPIRATORY (INHALATION) at 07:10

## 2025-04-26 RX ADMIN — SENNOSIDES AND DOCUSATE SODIUM 2 TABLET: 50; 8.6 TABLET ORAL at 08:41

## 2025-04-26 RX ADMIN — LEVOTHYROXINE SODIUM 100 MCG: 100 TABLET ORAL at 06:28

## 2025-04-26 RX ADMIN — BUMETANIDE 1 MG: 1 TABLET ORAL at 08:40

## 2025-04-26 RX ADMIN — BUDESONIDE AND FORMOTEROL FUMARATE DIHYDRATE 2 PUFF: 160; 4.5 AEROSOL RESPIRATORY (INHALATION) at 07:10

## 2025-04-26 RX ADMIN — HYDRALAZINE HYDROCHLORIDE 25 MG: 25 TABLET ORAL at 20:37

## 2025-04-26 RX ADMIN — CARVEDILOL 12.5 MG: 12.5 TABLET, FILM COATED ORAL at 08:41

## 2025-04-26 RX ADMIN — IPRATROPIUM BROMIDE AND ALBUTEROL SULFATE 3 ML: .5; 3 SOLUTION RESPIRATORY (INHALATION) at 15:18

## 2025-04-26 RX ADMIN — IPRATROPIUM BROMIDE AND ALBUTEROL SULFATE 3 ML: .5; 3 SOLUTION RESPIRATORY (INHALATION) at 19:20

## 2025-04-26 RX ADMIN — BUMETANIDE 1 MG: 1 TABLET ORAL at 20:37

## 2025-04-26 RX ADMIN — SENNOSIDES AND DOCUSATE SODIUM 2 TABLET: 50; 8.6 TABLET ORAL at 20:37

## 2025-04-26 RX ADMIN — MAGNESIUM OXIDE TAB 400 MG (240 MG ELEMENTAL MG) 400 MG: 400 (240 MG) TAB at 08:41

## 2025-04-26 NOTE — THERAPY EVALUATION
Patient Name: Latanya Olsen  : 1940    MRN: [id number]                              Today's Date: 2025       Admit Date: 2025    Visit Dx:     ICD-10-CM ICD-9-CM   1. Pneumonia of both lungs due to infectious organism, unspecified part of lung  J18.9 483.8   2. Pedal edema  R60.0 782.3   3. Chronic obstructive pulmonary disease, unspecified COPD type  J44.9 496     Patient Active Problem List   Diagnosis    Carcinoid tumor of lung    Diverticulosis of intestine    Obstructive sleep apnea syndrome    Weight loss    Vitamin D deficiency    Overweight (BMI 25.0-29.9)    Spinal stenosis of lumbar region without neurogenic claudication    Osteoarthritis of knee    Obesity (BMI 30-39.9)    Chronic lower back pain    Knee pain    Insomnia    Hypothyroidism    Hypokalemia    Primary hypertension    Mixed hyperlipidemia    Generalized osteoarthritis    Gastroparesis    Foot pain    Chronic obstructive pulmonary disease    Chronic constipation    Anemia    Weight gain    Pain of left breast    Elevated serum alkaline phosphatase level    Neck pain    Volume overload    Status post total hip replacement, right    Generalized weakness    Constipation    Paroxysmal atrial fibrillation    Idiopathic peripheral neuropathy    Lymphedema    Lumbar degenerative disc disease    GERD (gastroesophageal reflux disease)    Lung cancer    Non-STEMI (non-ST elevated myocardial infarction)    Nonischemic nontraumatic myocardial injury    CKD (chronic kidney disease) stage 3, GFR 30-59 ml/min    Adjustment disorder with depressed mood    Peripheral polyneuropathy    Chronic diastolic congestive heart failure    MCKEON (dyspnea on exertion)    Shortness of breath    Edema of left lower extremity    Venous (peripheral) insufficiency    Venous stasis    Hematoma    Iliac vein thrombosis, left    Presence of inferior vena cava filter    Leg hematoma, left, sequela     Past Medical History:   Diagnosis Date    Abnormal ECG Aug  2023    Acute deep vein thrombosis (DVT) of iliac vein of left lower extremity 05/07/2021    Acute deep vein thrombosis (DVT) of iliac vein of left lower extremity 03/24/2021    Acute embolism and thrombosis of left iliac vein     Acute exacerbation of CHF (congestive heart failure) 08/27/2023    Anemia     Anemia 02/19/2016    Anemia, unspecified 08/12/2021    Anxiety 4-2-23    Arthritis     Arthritis of back     Arthritis of neck     Asthma 1/1/2006    Atrial fibrillation     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    Cataract 1-1/2015    Cervical disc disorder     Chronic constipation 02/19/2016    Chronic deep vein thrombosis (DVT) of left femoral vein 08/12/2021    Chronic deep vein thrombosis (DVT) of left popliteal vein 08/12/2021    Chronic lower back pain 02/19/2016    Chronic obstructive pulmonary disease 02/19/2016    Chronic obstructive pulmonary disease, unspecified     CKD (chronic kidney disease)     Stage 3    Compression of vein 05/07/2021    Congenital heart disease Aug 2023    Constipation     Constipation 03/10/2021    COPD (chronic obstructive pulmonary disease)     DDD (degenerative disc disease), lumbar     Deep vein thrombosis 2019    Dependence on other enabling machines and devices     CPAP    Depression     Diverticulosis     Diverticulosis of intestine 02/19/2016    Elevated serum alkaline phosphatase level 02/22/2016    Essential (primary) hypertension     Foot pain 02/19/2016    Fracture of hip     Fracture, femur     Gastroparesis 02/19/2016    Generalized osteoarthritis 02/19/2016    Generalized weakness 03/06/2021    GERD (gastroesophageal reflux disease)     Hip arthrosis     History of heart attack     7/2023    Hx of degenerative disc disease     Hyperlipidemia     Hyperlipidemia 02/19/2016    Hyperlipidemia, unspecified     Hypertension     Hypertension 02/19/2016    Hypokalemia     Hypokalemia 02/19/2016    Hypothyroidism     Hypothyroidism 02/19/2016    Hypothyroidism, unspecified      Insomnia 02/19/2016    Knee pain 02/19/2016    Knee swelling     Low back pain Yes    Low back strain     Lung cancer     history    Myocardial infarction 8-4-23    Neck pain 11/28/2017    Neutropenia 02/19/2016    Neutropenia, unspecified     Obesity 218    Obesity (BMI 30-39.9) 02/19/2016    Obstructive sleep apnea (adult) (pediatric)     Obstructive sleep apnea syndrome 02/19/2016    Osteoarthritis of knee 02/19/2016    Osteopenia Yes    Overweight (BMI 25.0-29.9) 02/19/2016    Pain of left breast 02/22/2016    Periarthritis of shoulder     Pulmonary embolism 2019    Renal disorder     Renal insufficiency 2019    Restless leg syndrome     Rotator cuff syndrome     Scoliosis Yes    Sleep apnea with use of continuous positive airway pressure (CPAP)     Spinal stenosis of lumbar region without neurogenic claudication 02/19/2016    Spinal stenosis, lumbar region without neurogenic claudication 08/12/2021    Status post total hip replacement, 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    Unspecified atrial fibrillation     Vitamin D deficiency 02/19/2016    Volume overload 03/19/2019    Weakness 08/12/2021    Weight gain 02/19/2016    Weight loss 02/19/2016     Past Surgical History:   Procedure Laterality Date    ADENOIDECTOMY  2000    BUNIONECTOMY Bilateral     CARDIAC CATHETERIZATION N/A 08/07/2023    Procedure: Left Heart Cath;  Surgeon: Mireya Park MD;  Location:  [facility];  Service: Cardiology;  Laterality: N/A;    CARDIAC CATHETERIZATION N/A 08/07/2023    Procedure: Coronary angiography;  Surgeon: Mireya Park MD;  Location:  [facility];  Service: Cardiology;  Laterality: N/A;    CARDIAC CATHETERIZATION N/A 08/07/2023    Procedure: Left ventriculography;  Surgeon: Mireya Park MD;  Location:  [facility];  Service: Cardiology;  Laterality: N/A;    CATARACT EXTRACTION      CHOLECYSTECTOMY      COLONOSCOPY      ENDOSCOPY N/A 06/24/2016    Procedure:  ESOPHAGOGASTRODUODENOSCOPY  with biopsies;  Surgeon: Von Hernández MD;  Location: [facility] OR;  Service:     JOINT REPLACEMENT  Yes    LUNG LOBECTOMY Left     lower lobe    LYTIC THROMBIN THERAPY Left 03/26/2021    Procedure: left leg clot treiver possible venous stent *supine*;  Surgeon: Rogerio Vega MD;  Location: [facility] OR 18/19;  Service: Vascular;  Laterality: Left;    REPLACEMENT TOTAL KNEE Left     THYROID BIOPSY      TONSILLECTOMY  Yes    TOTAL HIP ARTHROPLASTY Right 06/01/2019    Procedure: BIPOLAR HIP CEMENTED POSTERIOR;  Surgeon: Ashley Crenshaw MD;  Location: [facility] MAIN OR;  Service: Orthopedics    TRIGGER POINT INJECTION      TUBAL ABDOMINAL LIGATION  50 yrs ago      General Information       Row Name 04/26/25 1334          OT Time and Intention    Document Type evaluation  -RD     Mode of Treatment occupational therapy  -RD     Patient Effort excellent  -RD       Row Name 04/26/25 1334          General Information    Patient Profile Reviewed yes  -RD     Prior Level of Function independent:;ADL's  -RD     Existing Precautions/Restrictions fall  -RD       Row Name 04/26/25 1334          Living Environment    Current Living Arrangements home  -RD     People in Home alone  -RD       Row Name 04/26/25 1334          Cognition    Orientation Status (Cognition) oriented x 4  -RD       Row Name 04/26/25 1334          Safety Issues/Impairments Affecting Functional Mobility    Impairments Affecting Function (Mobility) balance;strength;endurance/activity tolerance  -RD               User Key  (r) = Recorded By, (t) = Taken By, (c) = Cosigned By      Initials Name Provider Type    RD Jihan Lopez, OT Occupational Therapist                     Mobility/ADL's       Row Name 04/26/25 1335          Bed Mobility    Comment, (Bed Mobility) NT- up in chair  -RD       Row Name 04/26/25 1335          Transfers    Transfers sit-stand transfer  -RD       Row Name 04/26/25 1337           Sit-Stand Transfer    Sit-Stand [place] (Transfers) contact guard;verbal cues  -RD     Assistive Device (Sit-Stand Transfers) walker, front-wheeled  -RD       Row Name 04/26/25 1335          Functional Mobility    Functional Mobility- Ind. Level contact guard assist;verbal cues required  -RD     Functional Mobility- Device walker, front-wheeled  -RD     Functional Mobility- Comment pt performs functional ambulation from chair <> sink using RW w/ CGA  -RD       Row Name 04/26/25 1335          Activities of Daily Living    BADL Assessment/Intervention grooming;lower body dressing  -RD       Row Name 04/26/25 1335          Grooming Assessment/Training    [place] Level (Grooming) grooming skills;hair care, combing/brushing;oral care regimen;contact guard assist  -RD     Position (Grooming) sink side;supported standing  -RD     Comment, (Grooming) pt stood at sink using RW w/ CGA to complete grooming tasks  -RD       Row Name 04/26/25 1335          Lower Body Dressing Assessment/Training    [place] Level (Lower Body Dressing) lower body dressing skills;doff;don;socks;standby assist  -RD     Position (Lower Body Dressing) supported sitting  -RD               User Key  (r) = Recorded By, (t) = Taken By, (c) = Cosigned By      Initials Name Provider Type    RD Jihan Lopez, OT Occupational Therapist                   Obj/Interventions       Row Name 04/26/25 1338          Sensory Assessment (Somatosensory)    Sensory Assessment (Somatosensory) UE sensation intact  -RD       Row Name 04/26/25 1338          Range of Motion Comprehensive    General Range of Motion bilateral upper extremity ROM WFL  -RD       Row Name 04/26/25 1338          Strength Comprehensive (MMT)    Comment, General Manual Muscle Testing (MMT) Assessment B UE MMT: grossly approx. 4-/5  -RD       Row Name 04/26/25 1338          Motor Skills    Motor Skills functional endurance  -RD     Functional Endurance fair  -RD               User  Key  (r) = Recorded By, (t) = Taken By, (c) = Cosigned By      Initials Name Provider Type    Jihan Hernandez, OT Occupational Therapist                   Goals/Plan       Row Name 04/26/25 1339          Transfer Goal 1 (OT)    Activity/Assistive Device (Transfer Goal 1, OT) toilet  -RD     [place] Level/Cues Needed (Transfer Goal 1, OT) modified independence  -RD     Time Frame (Transfer Goal 1, OT) long term goal (LTG)  -RD     Progress/Outcome (Transfer Goal 1, OT) goal ongoing  -RD       Row Name 04/26/25 1339          Dressing Goal 1 (OT)    Activity/Device (Dressing Goal 1, OT) dressing skills, all  -RD     [place]/Cues Needed (Dressing Goal 1, OT) modified independence  -RD     Time Frame (Dressing Goal 1, OT) long term goal (LTG)  -RD     Progress/Outcome (Dressing Goal 1, OT) goal ongoing  -RD       Row Name 04/26/25 1339          Toileting Goal 1 (OT)    Activity/Device (Toileting Goal 1, OT) toileting skills, all  -RD     [place] Level/Cues Needed (Toileting Goal 1, OT) modified independence  -RD     Time Frame (Toileting Goal 1, OT) long term goal (LTG)  -RD     Progress/Outcome (Toileting Goal 1, OT) goal ongoing  -RD       Row Name 04/26/25 1339          Problem Specific Goal 1 (OT)    Problem Specific Goal 1 (OT) Pt will increase functional endurance to approx. fair + in order to increase indep and safety w/ ADLs.  -RD     Time Frame (Problem Specific Goal 1, OT) long term goal (LTG)  -RD     Progress/Outcome (Problem Specific Goal 1, OT) goal ongoing  -RD       Row Name 04/26/25 1339          Therapy Assessment/Plan (OT)    Planned Therapy Interventions (OT) activity tolerance training;BADL retraining;patient/caregiver education/training;ROM/therapeutic exercise;strengthening exercise;functional balance retraining  -RD               User Key  (r) = Recorded By, (t) = Taken By, (c) = Cosigned By      Initials Name Provider Type    Jihan Hernandez, OT Occupational Therapist                    Clinical Impression       Row Name 04/26/25 1340          Pain Assessment    Pain Side/Orientation generalized  -RD     Pre/Posttreatment Pain Comment states her LBP is better right now after taking medicine  -RD       Row Name 04/26/25 1340          Plan of Care Review    Plan of Care Reviewed With patient  -RD     Progress improving  -RD     Outcome Evaluation Pt is an 84 year old female admitted d/t SOA. 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 Pt lives alone and reports indep w/ ADLs at baseline. Pt presents to OT eval today w/ decreased activity tolerance, generalized weakness, decreased balance, all impacting indep and safety w/ ADLs and functional mobility. Pt able to complete STS transfer w/ CGA and pt performs functional ambulation from chair <> sink using RW w/ CGA. Pt stands at sink using RW for approx. 8-10 minutes while completing grooming tasks w/ CGA for standing balance. Pt very fatigued once back in chair and reported dizziness whicih quickly resolved w/ rest. Pt may benefit from skilled OT services to address deficits and maximize indep and safety w/ ADLs and functional mobility. Currently anticipate pt will be able to return home at discharge w/ HH pending her progress while inpatient.  -RD       Row Name 04/26/25 1340          Therapy Assessment/Plan (OT)    Rehab Potential (OT) good  -RD     Criteria for Skilled Therapeutic Interventions Met (OT) yes;meets criteria;skilled treatment is necessary  -RD     Therapy Frequency (OT) 5 times/wk  -RD       Row Name 04/26/25 1340          Therapy Plan Review/Discharge Plan (OT)    Anticipated Discharge Disposition (OT) home;home with home health  -RD       Row Name 04/26/25 1340          Vital Signs    O2 Delivery Pre Treatment room air  -RD     O2 Delivery Intra Treatment room air  -RD     O2 Delivery Post Treatment room air  -RD       Row Name 04/26/25 1340          Positioning and Restraints     Pre-Treatment Position sitting in chair/recliner  -RD     Post Treatment Position chair  -RD     In Chair reclined;call light within reach;encouraged to call for assist;exit alarm on  -RD               User Key  (r) = Recorded By, (t) = Taken By, (c) = Cosigned By      Initials Name Provider Type    Jihan Hernandez OT Occupational Therapist                   Outcome Measures       Row Name 04/26/25 1338          How much help from another is currently needed...    Putting on and taking off regular lower body clothing? 3  -RD     Bathing (including washing, rinsing, and drying) 3  -RD     Toileting (which includes using toilet bed pan or urinal) 3  -RD     Putting on and taking off regular upper body clothing 3  -RD     Taking care of personal grooming (such as brushing teeth) 3  -RD     Eating meals 3  -RD     AM-PAC 6 Clicks Score (OT) 18  -RD       Row Name 04/26/25 0845          How much help from another person do you currently need...    Turning from your back to your side while in flat bed without using bedrails? 3  -CC     Moving from lying on back to sitting on the side of a flat bed without bedrails? 3  -CC     Moving to and from a bed to a chair (including a wheelchair)? 3  -CC     Standing up from a chair using your arms (e.g., wheelchair, bedside chair)? 3  -CC     Climbing 3-5 steps with a railing? 2  -CC     To walk in hospital room? 3  -CC     AM-PAC 6 Clicks Score (PT) 17  -CC       Row Name 04/26/25 1338          Functional Assessment    Outcome Measure Options AM-PAC 6 Clicks Daily Activity (OT)  -RD               User Key  (r) = Recorded By, (t) = Taken By, (c) = Cosigned By      Initials Name Provider Type    Jihan Hernandez OT Occupational Therapist    CC Jarod Strickland, RN Registered Nurse                    Occupational Therapy Education       Title: PT OT SLP Therapies (In Progress)       Topic: Occupational Therapy (In Progress)       Point: ADL training (Done)       Learning  Progress Summary            Patient Acceptance, E,D, VU by RD at 4/26/2025 1809    Comment: OT educ on OT role in therapeutic process and pt's POC.                                      User Key       Initials Effective Dates Name Provider Type Discipline    SNEHA 06/16/21 -  Jihan Lopez, OT Occupational Therapist OT                  OT Recommendation and Plan  Planned Therapy Interventions (OT): activity tolerance training, BADL retraining, patient/caregiver education/training, ROM/therapeutic exercise, strengthening exercise, functional balance retraining  Therapy Frequency (OT): 5 times/wk  Plan of Care Review  Plan of Care Reviewed With: patient  Progress: improving  Outcome Evaluation: Pt is an 84 year old female admitted d/t SOA. 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 Pt lives alone and reports indep w/ ADLs at baseline. Pt presents to OT eval today w/ decreased activity tolerance, generalized weakness, decreased balance, all impacting indep and safety w/ ADLs and functional mobility. Pt able to complete STS transfer w/ CGA and pt performs functional ambulation from chair <> sink using RW w/ CGA. Pt stands at sink using RW for approx. 8-10 minutes while completing grooming tasks w/ CGA for standing balance. Pt very fatigued once back in chair and reported dizziness whicih quickly resolved w/ rest. Pt may benefit from skilled OT services to address deficits and maximize indep and safety w/ ADLs and functional mobility. Currently anticipate pt will be able to return home at discharge w/ HH pending her progress while inpatient.     Time Calculation:   Evaluation Complexity (OT)  Review Occupational Profile/Medical/Therapy History Complexity: expanded/moderate complexity  Assessment, Occupational Performance/Identification of Deficit Complexity: 3-5 performance deficits  Clinical Decision Making Complexity (OT): detailed assessment/moderate complexity  Overall Complexity  of Evaluation (OT): moderate complexity     Time Calculation- OT       Row Name 04/26/25 1345             Time Calculation- OT    OT Start Time 1305  -RD      OT Stop Time 1326  -RD      OT Time Calculation (min) 21 min  -RD      Total Timed Code Minutes- OT 16 minute(s)  -RD      OT Received On 04/26/25  -RD      OT - Next Appointment 04/28/25  -RD      OT Goal Re-Cert Due Date 05/09/25  -RD         Timed Charges    55581 - OT Self Care/Mgmt Minutes 16  -RD         Untimed Charges    OT Eval/Re-eval Minutes 5  -RD         Total Minutes    Timed Charges Total Minutes 16  -RD      Untimed Charges Total Minutes 5  -RD       Total Minutes 21  -RD                User Key  (r) = Recorded By, (t) = Taken By, (c) = Cosigned By      Initials Name Provider Type    RD Jihan Lopez, KASSIE Occupational Therapist                  Therapy Charges for Today       Code Description Service Date Service Provider Modifiers Qty    40280022347 HC OT SELF CARE/MGMT/TRAIN EA 15 MIN 4/26/2025 Jihan Lopez OT GO 1    72808973603 HC OT EVAL MOD COMPLEXITY 3 4/26/2025 Jihan Lopez, OT GO 1                 Jihan Lopez OT  4/26/2025

## 2025-04-26 NOTE — PLAN OF CARE
Problem: Adult Inpatient Plan of Care  Goal: Plan of Care Review  Flowsheets (Taken 4/26/2025 0206)  Progress: improving  Outcome Evaluation: No changes. Makes needs known. No c/o pain or discomfort. O2 in use @ 2L via NC. IV steroids. Purewick in place. Call light within reach.  Plan of Care Reviewed With: patient  Goal: Absence of Hospital-Acquired Illness or Injury  Intervention: Identify and Manage Fall Risk  Flowsheets  Taken 4/26/2025 0201  Safety Promotion/Fall Prevention:   activity supervised   assistive device/personal items within reach   clutter free environment maintained   fall prevention program maintained   lighting adjusted   nonskid shoes/slippers when out of bed   room organization consistent   safety round/check completed  Taken 4/26/2025 0013  Safety Promotion/Fall Prevention:   activity supervised   assistive device/personal items within reach   clutter free environment maintained   fall prevention program maintained   lighting adjusted   nonskid shoes/slippers when out of bed   room organization consistent   safety round/check completed  Taken 4/25/2025 2210  Safety Promotion/Fall Prevention:   activity supervised   assistive device/personal items within reach   clutter free environment maintained   fall prevention program maintained   lighting adjusted   nonskid shoes/slippers when out of bed   room organization consistent   safety round/check completed  Taken 4/25/2025 2004  Safety Promotion/Fall Prevention:   activity supervised   assistive device/personal items within reach   clutter free environment maintained   fall prevention program maintained   lighting adjusted   nonskid shoes/slippers when out of bed   room organization consistent   safety round/check completed  Intervention: Prevent Skin Injury  Flowsheets  Taken 4/26/2025 0201  Body Position: position changed independently  Taken 4/26/2025 0013  Body Position: position changed independently  Taken 4/25/2025 2210  Body Position:  position changed independently  Taken 4/25/2025 2004  Body Position: position changed independently  Goal: Optimal Comfort and Wellbeing  Intervention: Monitor Pain and Promote Comfort  Flowsheets (Taken 4/25/2025 1822 by Lizet Cornejo RN)  Pain Management Interventions:   pillow support provided   medication offered but refused   quiet environment facilitated   no interventions per patient request  Intervention: Provide Person-Centered Care  Flowsheets (Taken 4/25/2025 2004)  Trust Relationship/Rapport:   care explained   choices provided   emotional support provided   empathic listening provided   questions answered   questions encouraged   reassurance provided   thoughts/feelings acknowledged  Goal: Readiness for Transition of Care  Intervention: Mutually Develop Transition Plan  Flowsheets  Taken 4/25/2025 1713 by Lizet Cornejo, RN  Transportation Anticipated: family or friend will provide  Patient/Family Anticipated Services at Transition:      home health care  Patient/Family Anticipates Transition to: home  Taken 4/25/2025 1710 by Lizet Cornejo, RN  Equipment Currently Used at Home:   walker, standard   oxygen   nebulizer   respiratory supplies   shower chair   grab bar   ramp   noninvasive ventilator     Problem: Comorbidity Management  Goal: Maintenance of Osteoarthritis Symptom Control  Intervention: Maintain Osteoarthritis Symptom Control  Flowsheets  Taken 4/26/2025 0201  Activity Management: activity encouraged  Taken 4/26/2025 0013  Activity Management: activity encouraged  Taken 4/25/2025 2210  Activity Management: activity encouraged  Taken 4/25/2025 2004  Activity Management: activity encouraged  Goal: Maintenance of Seizure Control  Intervention: Maintain Seizure Symptom Control  Flowsheets (Taken 4/25/2025 2004)  Sensory Stimulation Regulation:   care clustered   lighting decreased   Goal Outcome Evaluation:  Plan of Care Reviewed With: patient        Progress:  improving  Outcome Evaluation: No changes. Makes needs known. No c/o pain or discomfort. O2 in use @ 2L via NC. IV steroids. Purewick in place. Call light within reach.

## 2025-04-26 NOTE — PROGRESS NOTES
Name: Latanya Olsen ADMIT: 2025   : 1940  PCP: Kehrer, Meredith Lea, MD    MRN: [id number] LOS: 1 days   AGE/SEX: 84 y.o. female  ROOM: [facility]     Subjective   Subjective     She reports that she's feeling much better. Still has fairly significant wheezing and cough on exam       Objective   Objective   Vital Signs  Temp:  [98 °F (36.7 °C)-98.9 °F (37.2 °C)] 98.6 °F (37 °C)  Heart Rate:  [63-81] 63  Resp:  [18-24] 18  BP: (141-157)/(64-81) 141/68  SpO2:  [92 %-99 %] 92 %  on  Flow (L/min) (Oxygen Therapy):  [2] 2;   Device (Oxygen Therapy): nasal cannula  Body mass index is 37.82 kg/m².  Physical Exam  Constitutional:       General: She is not in acute distress.     Appearance: She is obese. She is not toxic-appearing.   Cardiovascular:      Rate and Rhythm: Normal rate and regular rhythm.      Heart sounds: Normal heart sounds.   Pulmonary:      Effort: Pulmonary effort is normal. No respiratory distress.      Breath sounds: Wheezing and rhonchi present.   Abdominal:      General: Bowel sounds are normal.      Palpations: Abdomen is soft.   Musculoskeletal:         General: No tenderness.      Right lower leg: Edema present.      Left lower leg: Edema present.   Neurological:      Mental Status: She is alert.   Psychiatric:         Mood and Affect: Mood normal.         Behavior: Behavior normal.         Results Review     I reviewed the patient's new clinical results.  Results from last 7 days   Lab Units 25  1053 25  1157   WBC 10*3/mm3 10.82* 12.3*   HEMOGLOBIN g/dL 10.4* 10.5*   PLATELETS 10*3/mm3 198 217     Results from last 7 days   Lab Units 25  0725 25  1053 25  1157   SODIUM mmol/L 141 144 144   POTASSIUM mmol/L 4.2 4.4 4.4   CHLORIDE mmol/L 103 106 104   CO2 mmol/L 27.5 26.0 22   BUN mg/dL 34* 37* 31*   CREATININE mg/dL 1.26* 1.44* 1.53*   GLUCOSE mg/dL 107* 181* 104*   Estimated Creatinine Clearance: 38.9 mL/min (A) (by C-G formula based on SCr of 1.26  mg/dL (H)).  Results from last 7 days   Lab Units 04/25/25  1053 04/24/25  1157   ALBUMIN g/dL 3.9 4.0   BILIRUBIN mg/dL 0.3 0.3   ALK PHOS U/L 99 96   AST (SGOT) U/L 8 14   ALT (SGPT) U/L 7 9     Results from last 7 days   Lab Units 04/26/25  0725 04/25/25  1053 04/24/25  1157   CALCIUM mg/dL 8.9 8.9 9.5   ALBUMIN g/dL  --  3.9 4.0   MAGNESIUM mg/dL 2.3  --   --      Results from last 7 days   Lab Units 04/25/25  1209   PROCALCITONIN ng/mL 0.04     COVID19   Date Value Ref Range Status   04/25/2025 Not Detected Not Detected - Ref. Range Final   04/20/2021 Not Detected Not Detected - Ref. Range Final   03/24/2021 Not Detected Not Detected - Ref. Range Final     SARS-CoV-2, PIPO   Date Value Ref Range Status   02/01/2022 Detected (A) Not Detected Final     Comment:     Patients who have a positive COVID-19 test result may now have  treatment options. Treatment options are available for patients  with mild to moderate symptoms and for hospitalized patients.  Visit our website at [email or web address] for  resources and information.  This nucleic acid amplification test was developed and its performance  characteristics determined by [employer]. Nucleic acid  amplification tests include RT-PCR and TMA. This test has not been  FDA cleared or approved. This test has been authorized by FDA under  an Emergency Use Authorization (EUA). This test is only authorized  for the duration of time the declaration that circumstances exist  justifying the authorization of the emergency use of in vitro  diagnostic tests for detection of SARS-CoV-2 virus and/or diagnosis  of COVID-19 infection under section 564(b)(1) of the Act, 21 U.S.C.  360bbb-3(b) (1), unless the authorization is terminated or revoked  sooner.  When diagnostic testing is negative, the possibility of a false  negative result should be considered in the context of a patient's  recent exposures and the presence of clinical signs and  symptoms  consistent with COVID-19. An individual without symptoms of COVID-19  and who is not shedding SARS-CoV-2 virus would expect to have a  negative (not detected) result in this assay.     09/20/2021 Not Detected Not Detected Final     Comment:     This nucleic acid amplification test was developed and its performance  characteristics determined by [employer]. Nucleic acid  amplification tests include RT-PCR and TMA. This test has not been  FDA cleared or approved. This test has been authorized by FDA under  an Emergency Use Authorization (EUA). This test is only authorized  for the duration of time the declaration that circumstances exist  justifying the authorization of the emergency use of in vitro  diagnostic tests for detection of SARS-CoV-2 virus and/or diagnosis  of COVID-19 infection under section 564(b)(1) of the Act, 21 U.S.C.  360bbb-3(b) (1), unless the authorization is terminated or revoked  sooner.  When diagnostic testing is negative, the possibility of a false  negative result should be considered in the context of a patient's  recent exposures and the presence of clinical signs and symptoms  consistent with COVID-19. An individual without symptoms of COVID-19  and who is not shedding SARS-CoV-2 virus would expect to have a  negative (not detected) result in this assay.       Hemoglobin A1C   Date/Time Value Ref Range Status   04/26/2025 0725 5.80 (H) 4.80 - 5.60 % Final     Glucose   Date/Time Value Ref Range Status   04/26/2025 0613 116 70 - 130 mg/dL Final   04/25/2025 2122 171 (H) 70 - 130 mg/dL Final   04/25/2025 1619 131 (H) 70 - 130 mg/dL Final       XR Chest 1 View  Narrative: XR CHEST 1 VW-     HISTORY: Female who is 84 years-old, short of breath     TECHNIQUE: Frontal view of the chest     COMPARISON: 10/7/2024     FINDINGS: The heart size is borderline. Aorta is calcified. Pulmonary  vasculature is unremarkable. Small patchy density at the right mid to  upper lung and left  base could be areas of developing infiltrate,  follow-up advised. No pleural effusion, or pneumothorax. No acute  osseous process.     Impression: As described.     This report was finalized on 4/25/2025 11:57 AM by Dr. Gregory Baptiste M.D on Workstation: [id number]       Scheduled Medications  apixaban, 5 mg, Oral, Q12H  azithromycin, 250 mg, Oral, Daily  budesonide-formoterol, 2 puff, Inhalation, BID - RT  bumetanide, 1 mg, Oral, BID  carvedilol, 12.5 mg, Oral, BID With Meals  ferrous sulfate, 325 mg, Oral, Daily With Breakfast  folic acid, 1 mg, Oral, Daily  gabapentin, 100 mg, Oral, TID  guaiFENesin, 600 mg, Oral, Q12H  hydrALAZINE, 25 mg, Oral, BID  insulin lispro, 2-9 Units, Subcutaneous, 4x Daily AC & at Bedtime  ipratropium-albuterol, 3 mL, Nebulization, 4x Daily - RT  lactulose, 10 g, Oral, TID  levothyroxine, 100 mcg, Oral, Q AM  magnesium oxide, 400 mg, Oral, Daily  methylPREDNISolone sodium succinate, 40 mg, Intravenous, Q12H  pantoprazole, 40 mg, Oral, Q AM  potassium chloride, 10 mEq, Oral, Daily  pramipexole, 1.5 mg, Oral, Nightly  sennosides-docusate, 2 tablet, Oral, Q12H  sertraline, 25 mg, Oral, Daily  sodium chloride, 10 mL, Intravenous, Q12H  spironolactone, 50 mg, Oral, Daily  valsartan, 320 mg, Oral, Daily  vitamin B-12, 1,000 mcg, Oral, Daily    Infusions   Diet  Diet: Regular/House, Cardiac, Diabetic; Healthy Heart (2-3 Na+); Consistent Carbohydrate; Fluid Consistency: Thin (IDDSI 0)       Assessment/Plan     Active Hospital Problems    Diagnosis  POA    Shortness of breath [R06.02]  Yes    Chronic diastolic congestive heart failure [I50.32]  Yes    CKD (chronic kidney disease) stage 3, GFR 30-59 ml/min [N18.30]  Yes    GERD (gastroesophageal reflux disease) [K21.9]  Yes    Lymphedema [I89.0]  Yes    Paroxysmal atrial fibrillation [I48.0]  Yes    Carcinoid tumor of lung [D3A.090]  Yes    Obstructive sleep apnea syndrome [G47.33]  Yes    Obesity (BMI 30-39.9) [E66.9]  Yes    Hypothyroidism  [E03.9]  Yes    Primary hypertension [I10]  Yes    Chronic obstructive pulmonary disease [J44.9]  Yes      Resolved Hospital Problems   No resolved problems to display.       84 y.o. female admitted with <principal problem not specified>.    84 year old woman who presents with shortness of breath and productive cough consistent with an acute exacerbation of COPD which was not improving with outpatient treatment     Acute exacerbation of COPD with acute bronchitis and chronic respiratory failure with hypoxia on 2L home O2-continue IV steroids, scheduled and prn nebulizers, azithromycin, mucolytics, symbicort. Need to add LAMA at discharge  CKD 3b-creatinine is stable and bit better than baseline  Hyperglycemia-A1c 5.8.   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  Full code.  Discussed with patient.  Anticipate discharge home in 2-3 days.      Juan Pablo Reynolds MD  [facility]  04/26/25  09:36 EDT

## 2025-04-26 NOTE — PLAN OF CARE
Goal Outcome Evaluation:              Outcome Evaluation: Pt A&Ox4. Pt resting in chair at this time with alarm on and call light within reach.

## 2025-04-26 NOTE — THERAPY EVALUATION
Patient Name: Latanya Olsen  : 1940    MRN: [id number]                              Today's Date: 2025       Admit Date: 2025    Visit Dx:     ICD-10-CM ICD-9-CM   1. Pneumonia of both lungs due to infectious organism, unspecified part of lung  J18.9 483.8   2. Pedal edema  R60.0 782.3   3. Chronic obstructive pulmonary disease, unspecified COPD type  J44.9 496     Patient Active Problem List   Diagnosis    Carcinoid tumor of lung    Diverticulosis of intestine    Obstructive sleep apnea syndrome    Weight loss    Vitamin D deficiency    Overweight (BMI 25.0-29.9)    Spinal stenosis of lumbar region without neurogenic claudication    Osteoarthritis of knee    Obesity (BMI 30-39.9)    Chronic lower back pain    Knee pain    Insomnia    Hypothyroidism    Hypokalemia    Primary hypertension    Mixed hyperlipidemia    Generalized osteoarthritis    Gastroparesis    Foot pain    Chronic obstructive pulmonary disease    Chronic constipation    Anemia    Weight gain    Pain of left breast    Elevated serum alkaline phosphatase level    Neck pain    Volume overload    Status post total hip replacement, right    Generalized weakness    Constipation    Paroxysmal atrial fibrillation    Idiopathic peripheral neuropathy    Lymphedema    Lumbar degenerative disc disease    GERD (gastroesophageal reflux disease)    Lung cancer    Non-STEMI (non-ST elevated myocardial infarction)    Nonischemic nontraumatic myocardial injury    CKD (chronic kidney disease) stage 3, GFR 30-59 ml/min    Adjustment disorder with depressed mood    Peripheral polyneuropathy    Chronic diastolic congestive heart failure    MCKEON (dyspnea on exertion)    Shortness of breath    Edema of left lower extremity    Venous (peripheral) insufficiency    Venous stasis    Hematoma    Iliac vein thrombosis, left    Presence of inferior vena cava filter    Leg hematoma, left, sequela     Past Medical History:   Diagnosis Date    Abnormal ECG Aug  2023    Acute deep vein thrombosis (DVT) of iliac vein of left lower extremity 05/07/2021    Acute deep vein thrombosis (DVT) of iliac vein of left lower extremity 03/24/2021    Acute embolism and thrombosis of left iliac vein     Acute exacerbation of CHF (congestive heart failure) 08/27/2023    Anemia     Anemia 02/19/2016    Anemia, unspecified 08/12/2021    Anxiety 4-2-23    Arthritis     Arthritis of back     Arthritis of neck     Asthma 1/1/2006    Atrial fibrillation     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    Cataract 1-1/2015    Cervical disc disorder     Chronic constipation 02/19/2016    Chronic deep vein thrombosis (DVT) of left femoral vein 08/12/2021    Chronic deep vein thrombosis (DVT) of left popliteal vein 08/12/2021    Chronic lower back pain 02/19/2016    Chronic obstructive pulmonary disease 02/19/2016    Chronic obstructive pulmonary disease, unspecified     CKD (chronic kidney disease)     Stage 3    Compression of vein 05/07/2021    Congenital heart disease Aug 2023    Constipation     Constipation 03/10/2021    COPD (chronic obstructive pulmonary disease)     DDD (degenerative disc disease), lumbar     Deep vein thrombosis 2019    Dependence on other enabling machines and devices     CPAP    Depression     Diverticulosis     Diverticulosis of intestine 02/19/2016    Elevated serum alkaline phosphatase level 02/22/2016    Essential (primary) hypertension     Foot pain 02/19/2016    Fracture of hip     Fracture, femur     Gastroparesis 02/19/2016    Generalized osteoarthritis 02/19/2016    Generalized weakness 03/06/2021    GERD (gastroesophageal reflux disease)     Hip arthrosis     History of heart attack     7/2023    Hx of degenerative disc disease     Hyperlipidemia     Hyperlipidemia 02/19/2016    Hyperlipidemia, unspecified     Hypertension     Hypertension 02/19/2016    Hypokalemia     Hypokalemia 02/19/2016    Hypothyroidism     Hypothyroidism 02/19/2016    Hypothyroidism, unspecified      Insomnia 02/19/2016    Knee pain 02/19/2016    Knee swelling     Low back pain Yes    Low back strain     Lung cancer     history    Myocardial infarction 8-4-23    Neck pain 11/28/2017    Neutropenia 02/19/2016    Neutropenia, unspecified     Obesity 218    Obesity (BMI 30-39.9) 02/19/2016    Obstructive sleep apnea (adult) (pediatric)     Obstructive sleep apnea syndrome 02/19/2016    Osteoarthritis of knee 02/19/2016    Osteopenia Yes    Overweight (BMI 25.0-29.9) 02/19/2016    Pain of left breast 02/22/2016    Periarthritis of shoulder     Pulmonary embolism 2019    Renal disorder     Renal insufficiency 2019    Restless leg syndrome     Rotator cuff syndrome     Scoliosis Yes    Sleep apnea with use of continuous positive airway pressure (CPAP)     Spinal stenosis of lumbar region without neurogenic claudication 02/19/2016    Spinal stenosis, lumbar region without neurogenic claudication 08/12/2021    Status post total hip replacement, 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    Unspecified atrial fibrillation     Vitamin D deficiency 02/19/2016    Volume overload 03/19/2019    Weakness 08/12/2021    Weight gain 02/19/2016    Weight loss 02/19/2016     Past Surgical History:   Procedure Laterality Date    ADENOIDECTOMY  2000    BUNIONECTOMY Bilateral     CARDIAC CATHETERIZATION N/A 08/07/2023    Procedure: Left Heart Cath;  Surgeon: Mireya Park MD;  Location:  [facility];  Service: Cardiology;  Laterality: N/A;    CARDIAC CATHETERIZATION N/A 08/07/2023    Procedure: Coronary angiography;  Surgeon: Mireya Park MD;  Location:  [facility];  Service: Cardiology;  Laterality: N/A;    CARDIAC CATHETERIZATION N/A 08/07/2023    Procedure: Left ventriculography;  Surgeon: Mireya Park MD;  Location:  [facility];  Service: Cardiology;  Laterality: N/A;    CATARACT EXTRACTION      CHOLECYSTECTOMY      COLONOSCOPY      ENDOSCOPY N/A 06/24/2016    Procedure:  ESOPHAGOGASTRODUODENOSCOPY  with biopsies;  Surgeon: Von Hernández MD;  Location: [facility] OR;  Service:     JOINT REPLACEMENT  Yes    LUNG LOBECTOMY Left     lower lobe    LYTIC THROMBIN THERAPY Left 03/26/2021    Procedure: left leg clot treiver possible venous stent *supine*;  Surgeon: Rogerio Vega MD;  Location: [facility] OR 18/19;  Service: Vascular;  Laterality: Left;    REPLACEMENT TOTAL KNEE Left     THYROID BIOPSY      TONSILLECTOMY  Yes    TOTAL HIP ARTHROPLASTY Right 06/01/2019    Procedure: BIPOLAR HIP CEMENTED POSTERIOR;  Surgeon: Ashley Crenshaw MD;  Location: [facility] MAIN OR;  Service: Orthopedics    TRIGGER POINT INJECTION      TUBAL ABDOMINAL LIGATION  50 yrs ago      General Information       Row Name 04/26/25 1508          Physical Therapy Time and Intention    Document Type evaluation  -     Mode of Treatment individual therapy;physical therapy  -       Row Name 04/26/25 1508          General Information    Patient Profile Reviewed yes  -     Prior Level of Function independent:;ADL's  -     Existing Precautions/Restrictions fall  -     Barriers to Rehab none identified  -       Row Name 04/26/25 1508          Living Environment    Current Living Arrangements home  -     People in Home alone  -       Row Name 04/26/25 1508          Home Main Entrance    Number of Stairs, Main Entrance none  -     Stair Railings, Main Entrance none  -       Row Name 04/26/25 1508          Stairs Within Home, Primary    Stairs, Within Home, Primary Stairs but does not use  -       Row Name 04/26/25 1508          Cognition    Orientation Status (Cognition) oriented x 3  -       Row Name 04/26/25 1508          Safety Issues/Impairments Affecting Functional Mobility    Impairments Affecting Function (Mobility) balance;endurance/activity tolerance;shortness of breath;strength;pain  -     Comment, Safety Issues/Impairments (Mobility) Gait belt and non skid socks  -                User Key  (r) = Recorded By, (t) = Taken By, (c) = Cosigned By      Initials Name Provider Type     Rogerio Arellano, PT Physical Therapist                   Mobility       Row Name 04/26/25 1509          Bed Mobility    Bed Mobility scooting/bridging;supine-sit;sit-supine  -     Scooting/Bridging [place] (Bed Mobility) contact guard;1 person assist  -     Supine-Sit [place] (Bed Mobility) contact guard;1 person assist  -     Sit-Supine [place] (Bed Mobility) not tested  -     Assistive Device (Bed Mobility) bed rails;head of bed elevated  -     Comment, (Bed Mobility) [facility] at conclusion  -       Row Name 04/26/25 1509          Sit-Stand Transfer    Sit-Stand [place] (Transfers) contact guard;1 person assist  -     Assistive Device (Sit-Stand Transfers) walker, front-wheeled  -       Row Name 04/26/25 1509          Gait/Stairs (Locomotion)    [place] Level (Gait) 1 person assist;contact guard  -     Assistive Device (Gait) walker, front-wheeled  -     Patient was able to Ambulate yes  -     Distance in Feet (Gait) 15  -     Deviations/Abnormal Patterns (Gait) gait speed decreased;stride length decreased;robbie decreased;weight shifting decreased  -     Bilateral Gait Deviations forward flexed posture;heel strike decreased;weight shift ability decreased  -               User Key  (r) = Recorded By, (t) = Taken By, (c) = Cosigned By      Initials Name Provider Type     Rogerio Arellano, PT Physical Therapist                   Obj/Interventions       Row Name 04/26/25 1509          Range of Motion Comprehensive    General Range of Motion bilateral lower extremity ROM WFL;bilateral upper extremity ROM WFL  -       Row Name 04/26/25 1509          Strength Comprehensive (MMT)    Comment, General Manual Muscle Testing (MMT) Assessment Grossly 4-/5 BLE  -       Row Name 04/26/25 1509          Balance    Balance Assessment sitting static balance;sitting  dynamic balance;standing static balance;standing dynamic balance  -     Static Sitting Balance standby assist  -     Dynamic Sitting Balance standby assist  -     Position, Sitting Balance sitting edge of bed  -     Static Standing Balance contact guard  -     Dynamic Standing Balance contact guard  -     Position/Device Used, Standing Balance walker, front-wheeled  -     Balance Interventions sitting;standing;sit to stand;dynamic;minimal challenge;static  -       Row Name 04/26/25 1500          Sensory Assessment (Somatosensory)    Sensory Assessment (Somatosensory) sensation intact  -               User Key  (r) = Recorded By, (t) = Taken By, (c) = Cosigned By      Initials Name Provider Type     Rogerio Arellano, PT Physical Therapist                   Goals/Plan       Row Name 04/26/25 1510          Bed Mobility Goal 1 (PT)    Activity/Assistive Device (Bed Mobility Goal 1, PT) bed mobility activities, all  -MH     [place] Level/Cues Needed (Bed Mobility Goal 1, PT) independent  -     Time Frame (Bed Mobility Goal 1, PT) short term goal (STG);1 week  -MH     Progress/Outcomes (Bed Mobility Goal 1, PT) new goal  -       Row Name 04/26/25 1510          Transfer Goal 1 (PT)    Activity/Assistive Device (Transfer Goal 1, PT) transfers, all  -MH     [place] Level/Cues Needed (Transfer Goal 1, PT) independent  -     Time Frame (Transfer Goal 1, PT) 1 week;short term goal (STG)  -MH     Progress/Outcome (Transfer Goal 1, PT) new goal  -       Row Name 04/26/25 1510          Gait Training Goal 1 (PT)    Activity/Assistive Device (Gait Training Goal 1, PT) gait (walking locomotion)  -     [place] Level (Gait Training Goal 1, PT) contact guard required  -     Distance (Gait Training Goal 1, PT) 75  -MH     Time Frame (Gait Training Goal 1, PT) short term goal (STG);1 week  -MH     Progress/Outcome (Gait Training Goal 1, PT) new goal  -       Row Name 04/26/25 8730           Therapy Assessment/Plan (PT)    Planned Therapy Interventions (PT) balance training;bed mobility training;gait training;home exercise program;patient/family education;neuromuscular re-education;strengthening;stretching;transfer training;postural re-education;ROM (range of motion);stair training  -               User Key  (r) = Recorded By, (t) = Taken By, (c) = Cosigned By      Initials Name Provider Type     Rogerio Arellano, PT Physical Therapist                   Clinical Impression       Row Name 04/26/25 1510          Pain    Pretreatment Pain Rating 5/10  -     Posttreatment Pain Rating 5/10  -     Pain Location back  -     Pain Side/Orientation generalized;lower  -       Row Name 04/26/25 1510          Plan of Care Review    Plan of Care Reviewed With patient  -     Progress improving  -     Outcome Evaluation Pt is a 84 y.o. female admitted to [facility] with c/o shortness of breath on 4/25/2025. Work up reveals pneumonia of both lungs. PMHx includes tumor of the lung, HTN, HLD, anemia, R HEATHER. Prior to hospital admission, pt reports residing in house alone and no EMELY. Pt reports she has a ramp that she uses to get in/out of her house. Prior to hospital stay, pt was  I with ADLs and utilized walker AD at baseline. Pt required CGA for bed mobility, CGA for STS transfers, and CGA for ambulation with FWW for 15 ft. Pt is able to walk around the bed and to the bedside chair without any increase in symptoms or overt Lob and demonstrates good overall balance and mobility. Pt presents today with below baseline strength, dec endurance, and decreased functional mobility. Pt will benefit from skilled PT to address the previous deficits and improve overall safety with functional mobility. Anticipate pt will D/C to home with  pending progress.  -       Row Name 04/26/25 1519          Therapy Assessment/Plan (PT)    Patient/Family Therapy Goals Statement (PT) return home  -     Rehab Potential (PT) good  -      Criteria for Skilled Interventions Met (PT) yes  -     Therapy Frequency (PT) 5 times/wk  -       Row Name 04/26/25 1510          Vital Signs    O2 Delivery Pre Treatment room air  -     O2 Delivery Intra Treatment room air  -     O2 Delivery Post Treatment room air  -     Pre Patient Position Supine  -     Intra Patient Position Standing  -     Post Patient Position Supine  -       Row Name 04/26/25 1510          Positioning and Restraints    Pre-Treatment Position in bed  -     In Chair notified nsg;exit alarm on;encouraged to call for assist;call light within reach;reclined  -               User Key  (r) = Recorded By, (t) = Taken By, (c) = Cosigned By      Initials Name Provider Type    Rogerio Vale, TAMMI Physical Therapist                   Outcome Measures       Row Name 04/26/25 1511 04/26/25 0845       How much help from another person do you currently need...    Turning from your back to your side while in flat bed without using bedrails? 4  - 3  -CC    Moving from lying on back to sitting on the side of a flat bed without bedrails? 3  - 3  -CC    Moving to and from a bed to a chair (including a wheelchair)? 3  - 3  -CC    Standing up from a chair using your arms (e.g., wheelchair, bedside chair)? 3  - 3  -CC    Climbing 3-5 steps with a railing? 2  - 2  -CC    To walk in hospital room? 3  - 3  -CC    AM-PAC 6 Clicks Score (PT) 18  - 17  -CC    Highest Level of Mobility Goal 6 --> Walk 10 steps or more  - 5 --> Static standing  -      Row Name 04/26/25 1511 04/26/25 1338       Functional Assessment    Outcome Measure Options AM-PAC 6 Clicks Basic Mobility (PT)  - AM-PAC 6 Clicks Daily Activity (OT)  -RD              User Key  (r) = Recorded By, (t) = Taken By, (c) = Cosigned By      Initials Name Provider Type    Jihan Hernandez, OT Occupational Therapist    CC Jarod Strickland, RN Registered Nurse    Rogerio Vale, PT Physical Therapist                                  Physical Therapy Education       Title: PT OT SLP Therapies (In Progress)       Topic: Physical Therapy (In Progress)       Point: Mobility training (Done)       Learning Progress Summary            Patient Acceptance, E, VU,NR by  at 4/26/2025 1511                      Point: Home exercise program (Not Started)       Learner Progress:  Not documented in this visit.              Point: Body mechanics (Done)       Learning Progress Summary            Patient Acceptance, E, VU,NR by  at 4/26/2025 1511                      Point: Precautions (Done)       Learning Progress Summary            Patient Acceptance, E, VU,NR by  at 4/26/2025 1511                                      User Key       Initials Effective Dates Name Provider Type Discipline     09/11/24 -  Rogerio Arellano, PT Physical Therapist PT                  PT Recommendation and Plan  Planned Therapy Interventions (PT): balance training, bed mobility training, gait training, home exercise program, patient/family education, neuromuscular re-education, strengthening, stretching, transfer training, postural re-education, ROM (range of motion), stair training  Progress: improving  Outcome Evaluation: Pt is a 84 y.o. female admitted to [facility] with c/o shortness of breath on 4/25/2025. Work up reveals pneumonia of both lungs. PMHx includes tumor of the lung, HTN, HLD, anemia, R HEATHER. Prior to hospital admission, pt reports residing in house alone and no EMELY. Pt reports she has a ramp that she uses to get in/out of her house. Prior to hospital stay, pt was  I with ADLs and utilized walker AD at baseline. Pt required CGA for bed mobility, CGA for STS transfers, and CGA for ambulation with FWW for 15 ft. Pt is able to walk around the bed and to the bedside chair without any increase in symptoms or overt Lob and demonstrates good overall balance and mobility. Pt presents today with below baseline strength, dec endurance, and decreased functional  mobility. Pt will benefit from skilled PT to address the previous deficits and improve overall safety with functional mobility. Anticipate pt will D/C to home with HH pending progress.     Time Calculation:         PT Charges       Row Name 04/26/25 1508             Time Calculation    Start Time 1038  -      Stop Time 1054  -      Time Calculation (min) 16 min  -MH      PT Received On 04/26/25  -      PT - Next Appointment 04/28/25  -      PT Goal Re-Cert Due Date 05/03/25  -         Time Calculation- PT    Total Timed Code Minutes- PT 8 minute(s)  -         Timed Charges    51473 - PT Therapeutic Activity Minutes 8  -MH         Untimed Charges    PT Eval/Re-eval Minutes 8  -MH         Total Minutes    Timed Charges Total Minutes 8  -MH      Untimed Charges Total Minutes 8  -MH       Total Minutes 16  -MH                User Key  (r) = Recorded By, (t) = Taken By, (c) = Cosigned By      Initials Name Provider Type     Rogerio Arellano, PT Physical Therapist                  Therapy Charges for Today       Code Description Service Date Service Provider Modifiers Qty    80251151823 HC PT THERAPEUTIC ACT EA 15 MIN 4/26/2025 Rogerio Arellano, PT GP 1    [id number]  PT EVAL MOD COMPLEXITY 2 4/26/2025 Rogerio Arellano, PT GP 1            PT G-Codes  Outcome Measure Options: AM-PAC 6 Clicks Basic Mobility (PT)  AM-PAC 6 Clicks Score (PT): 18  AM-PAC 6 Clicks Score (OT): 18  PT Discharge Summary  Anticipated Discharge Disposition (PT): home with home health, home    Rogerio Arellano, TAMMI  4/26/2025

## 2025-04-26 NOTE — PLAN OF CARE
Goal Outcome Evaluation:  Plan of Care Reviewed With: patient        Progress: improving  Outcome Evaluation: Pt is an 84 year old female admitted d/t SOA. 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 Pt lives alone and reports indep w/ ADLs at baseline. Pt presents to OT eval today w/ decreased activity tolerance, generalized weakness, decreased balance, all impacting indep and safety w/ ADLs and functional mobility. Pt able to complete STS transfer w/ CGA and pt performs functional ambulation from chair <> sink using RW w/ CGA. Pt stands at sink using RW for approx. 8-10 minutes while completing grooming tasks w/ CGA for standing balance. Pt very fatigued once back in chair and reported dizziness whicih quickly resolved w/ rest. Pt may benefit from skilled OT services to address deficits and maximize indep and safety w/ ADLs and functional mobility. Currently anticipate pt will be able to return home at discharge w/ HH pending her progress while inpatient.    Anticipated Discharge Disposition (OT): home, home with home health

## 2025-04-27 ENCOUNTER — READMISSION MANAGEMENT (OUTPATIENT)
Dept: CALL CENTER | Facility: HOSPITAL | Age: 85
End: 2025-04-27
Payer: MEDICARE

## 2025-04-27 VITALS
WEIGHT: 227.29 LBS | HEIGHT: 65 IN | DIASTOLIC BLOOD PRESSURE: 71 MMHG | RESPIRATION RATE: 18 BRPM | SYSTOLIC BLOOD PRESSURE: 153 MMHG | BODY MASS INDEX: 37.87 KG/M2 | OXYGEN SATURATION: 97 % | TEMPERATURE: 97.9 F | HEART RATE: 70 BPM

## 2025-04-27 PROBLEM — R06.02 SHORTNESS OF BREATH: Status: RESOLVED | Noted: 2024-05-13 | Resolved: 2025-04-27

## 2025-04-27 LAB
ANION GAP SERPL CALCULATED.3IONS-SCNC: 10 MMOL/L (ref 5–15)
BUN SERPL-MCNC: 42 MG/DL (ref 8–23)
BUN/CREAT SERPL: 29.2 (ref 7–25)
CALCIUM SPEC-SCNC: 8.9 MG/DL (ref 8.6–10.5)
CHLORIDE SERPL-SCNC: 100 MMOL/L (ref 98–107)
CO2 SERPL-SCNC: 28 MMOL/L (ref 22–29)
CREAT SERPL-MCNC: 1.44 MG/DL (ref 0.57–1)
DEPRECATED RDW RBC AUTO: 45.7 FL (ref 37–54)
EGFRCR SERPLBLD CKD-EPI 2021: 35.7 ML/MIN/1.73
ERYTHROCYTE [DISTWIDTH] IN BLOOD BY AUTOMATED COUNT: 13.6 % (ref 12.3–15.4)
GLUCOSE BLDC GLUCOMTR-MCNC: 114 MG/DL (ref 70–130)
GLUCOSE BLDC GLUCOMTR-MCNC: 138 MG/DL (ref 70–130)
GLUCOSE SERPL-MCNC: 113 MG/DL (ref 65–99)
HCT VFR BLD AUTO: 33.8 % (ref 34–46.6)
HGB BLD-MCNC: 10.7 G/DL (ref 12–15.9)
MCH RBC QN AUTO: 28.8 PG (ref 26.6–33)
MCHC RBC AUTO-ENTMCNC: 31.7 G/DL (ref 31.5–35.7)
MCV RBC AUTO: 91.1 FL (ref 79–97)
PLATELET # BLD AUTO: 218 10*3/MM3 (ref 140–450)
PMV BLD AUTO: 10.3 FL (ref 6–12)
POTASSIUM SERPL-SCNC: 5 MMOL/L (ref 3.5–5.2)
RBC # BLD AUTO: 3.71 10*6/MM3 (ref 3.77–5.28)
SODIUM SERPL-SCNC: 138 MMOL/L (ref 136–145)
WBC NRBC COR # BLD AUTO: 10.4 10*3/MM3 (ref 3.4–10.8)

## 2025-04-27 PROCEDURE — 82948 REAGENT STRIP/BLOOD GLUCOSE: CPT

## 2025-04-27 PROCEDURE — 80048 BASIC METABOLIC PNL TOTAL CA: CPT | Performed by: STUDENT IN AN ORGANIZED HEALTH CARE EDUCATION/TRAINING PROGRAM

## 2025-04-27 PROCEDURE — 94664 DEMO&/EVAL PT USE INHALER: CPT

## 2025-04-27 PROCEDURE — 94799 UNLISTED PULMONARY SVC/PX: CPT

## 2025-04-27 PROCEDURE — 25010000002 METHYLPREDNISOLONE PER 40 MG: Performed by: HOSPITALIST

## 2025-04-27 PROCEDURE — 94761 N-INVAS EAR/PLS OXIMETRY MLT: CPT

## 2025-04-27 PROCEDURE — 85027 COMPLETE CBC AUTOMATED: CPT | Performed by: STUDENT IN AN ORGANIZED HEALTH CARE EDUCATION/TRAINING PROGRAM

## 2025-04-27 RX ORDER — PREDNISONE 20 MG/1
40 TABLET ORAL
Status: DISCONTINUED | OUTPATIENT
Start: 2025-04-28 | End: 2025-04-27 | Stop reason: HOSPADM

## 2025-04-27 RX ORDER — FLUTICASONE FUROATE, UMECLIDINIUM BROMIDE AND VILANTEROL TRIFENATATE 100; 62.5; 25 UG/1; UG/1; UG/1
1 POWDER RESPIRATORY (INHALATION)
Qty: 60 EACH | Refills: 0 | Status: SHIPPED | OUTPATIENT
Start: 2025-04-27 | End: 2025-05-01 | Stop reason: SDUPTHER

## 2025-04-27 RX ORDER — AZITHROMYCIN 250 MG/1
250 TABLET, FILM COATED ORAL DAILY
Qty: 2 TABLET | Refills: 0 | Status: SHIPPED | OUTPATIENT
Start: 2025-04-28 | End: 2025-05-01

## 2025-04-27 RX ORDER — PREDNISONE 20 MG/1
40 TABLET ORAL
Qty: 10 TABLET | Refills: 0 | Status: SHIPPED | OUTPATIENT
Start: 2025-04-28 | End: 2025-05-03

## 2025-04-27 RX ORDER — GUAIFENESIN 600 MG/1
600 TABLET, EXTENDED RELEASE ORAL EVERY 12 HOURS SCHEDULED
Qty: 14 TABLET | Refills: 0 | Status: SHIPPED | OUTPATIENT
Start: 2025-04-27 | End: 2025-05-04

## 2025-04-27 RX ADMIN — PANTOPRAZOLE SODIUM 40 MG: 40 TABLET, DELAYED RELEASE ORAL at 06:48

## 2025-04-27 RX ADMIN — FERROUS SULFATE TAB 325 MG (65 MG ELEMENTAL FE) 325 MG: 325 (65 FE) TAB at 09:05

## 2025-04-27 RX ADMIN — GUAIFENESIN 600 MG: 600 TABLET, EXTENDED RELEASE ORAL at 09:04

## 2025-04-27 RX ADMIN — SERTRALINE HYDROCHLORIDE 25 MG: 25 TABLET, FILM COATED ORAL at 09:04

## 2025-04-27 RX ADMIN — GABAPENTIN 100 MG: 100 CAPSULE ORAL at 09:05

## 2025-04-27 RX ADMIN — VALSARTAN 320 MG: 320 TABLET, FILM COATED ORAL at 09:05

## 2025-04-27 RX ADMIN — IPRATROPIUM BROMIDE AND ALBUTEROL SULFATE 3 ML: .5; 3 SOLUTION RESPIRATORY (INHALATION) at 08:15

## 2025-04-27 RX ADMIN — SENNOSIDES AND DOCUSATE SODIUM 2 TABLET: 50; 8.6 TABLET ORAL at 09:04

## 2025-04-27 RX ADMIN — AZITHROMYCIN 250 MG: 250 TABLET, FILM COATED ORAL at 09:04

## 2025-04-27 RX ADMIN — BUDESONIDE AND FORMOTEROL FUMARATE DIHYDRATE 2 PUFF: 160; 4.5 AEROSOL RESPIRATORY (INHALATION) at 08:20

## 2025-04-27 RX ADMIN — CARVEDILOL 12.5 MG: 12.5 TABLET, FILM COATED ORAL at 09:05

## 2025-04-27 RX ADMIN — LACTULOSE 10 G: 10 SOLUTION ORAL at 09:05

## 2025-04-27 RX ADMIN — HYDRALAZINE HYDROCHLORIDE 25 MG: 25 TABLET ORAL at 09:05

## 2025-04-27 RX ADMIN — Medication 1000 MCG: at 09:04

## 2025-04-27 RX ADMIN — APIXABAN 5 MG: 5 TABLET, FILM COATED ORAL at 09:05

## 2025-04-27 RX ADMIN — MAGNESIUM OXIDE TAB 400 MG (240 MG ELEMENTAL MG) 400 MG: 400 (240 MG) TAB at 09:04

## 2025-04-27 RX ADMIN — LEVOTHYROXINE SODIUM 100 MCG: 100 TABLET ORAL at 06:48

## 2025-04-27 RX ADMIN — METHYLPREDNISOLONE SODIUM SUCCINATE 40 MG: 40 INJECTION, POWDER, FOR SOLUTION INTRAMUSCULAR; INTRAVENOUS at 06:48

## 2025-04-27 RX ADMIN — BUMETANIDE 1 MG: 1 TABLET ORAL at 09:05

## 2025-04-27 RX ADMIN — SPIRONOLACTONE 50 MG: 25 TABLET ORAL at 09:04

## 2025-04-27 RX ADMIN — FOLIC ACID 1 MG: 1 TABLET ORAL at 09:05

## 2025-04-27 RX ADMIN — POLYETHYLENE GLYCOL 3350 17 G: 17 POWDER, FOR SOLUTION ORAL at 09:11

## 2025-04-27 RX ADMIN — IPRATROPIUM BROMIDE AND ALBUTEROL SULFATE 3 ML: .5; 3 SOLUTION RESPIRATORY (INHALATION) at 11:29

## 2025-04-27 RX ADMIN — Medication 10 ML: at 09:05

## 2025-04-27 RX ADMIN — ACETAMINOPHEN 650 MG: 325 TABLET, FILM COATED ORAL at 09:11

## 2025-04-27 RX ADMIN — POTASSIUM CHLORIDE 10 MEQ: 750 TABLET, EXTENDED RELEASE ORAL at 09:05

## 2025-04-27 NOTE — PLAN OF CARE
Problem: Adult Inpatient Plan of Care  Goal: Plan of Care Review  Flowsheets (Taken 4/27/2025 0249)  Progress: improving  Outcome Evaluation: No changes. VSS. AOx4. Up w/ walker and assist x1. Makes needs known. No c/o pain or discomfort. Call light within reach.  Plan of Care Reviewed With: patient  Goal: Absence of Hospital-Acquired Illness or Injury  Intervention: Identify and Manage Fall Risk  Flowsheets  Taken 4/27/2025 0206  Safety Promotion/Fall Prevention:   activity supervised   assistive device/personal items within reach   clutter free environment maintained   fall prevention program maintained   lighting adjusted   nonskid shoes/slippers when out of bed   room organization consistent   safety round/check completed  Taken 4/27/2025 0045  Safety Promotion/Fall Prevention:   activity supervised   assistive device/personal items within reach   clutter free environment maintained   fall prevention program maintained   lighting adjusted   nonskid shoes/slippers when out of bed   room organization consistent   safety round/check completed  Taken 4/26/2025 2253  Safety Promotion/Fall Prevention:   activity supervised   assistive device/personal items within reach   clutter free environment maintained   fall prevention program maintained   lighting adjusted   nonskid shoes/slippers when out of bed   room organization consistent   safety round/check completed  Taken 4/26/2025 2040  Safety Promotion/Fall Prevention:   activity supervised   assistive device/personal items within reach   clutter free environment maintained   fall prevention program maintained   lighting adjusted   nonskid shoes/slippers when out of bed   room organization consistent   safety round/check completed  Intervention: Prevent Skin Injury  Flowsheets  Taken 4/27/2025 0206  Body Position: position changed independently  Taken 4/27/2025 0045  Body Position: position changed independently  Taken 4/26/2025 2253  Body Position: position changed  independently  Taken 4/26/2025 2040  Body Position: position changed independently  Intervention: Prevent Infection  Flowsheets (Taken 4/26/2025 2300 by Caitlin Green, PCT)  Infection Prevention:   environmental surveillance performed   hand hygiene promoted   rest/sleep promoted   single patient room provided  Goal: Optimal Comfort and Wellbeing  Intervention: Monitor Pain and Promote Comfort  Flowsheets (Taken 4/26/2025 0845 by Jarod Strickland, RN)  Pain Management Interventions: pain medication given  Intervention: Provide Person-Centered Care  Flowsheets (Taken 4/26/2025 2040)  Trust Relationship/Rapport:   care explained   choices provided   emotional support provided   empathic listening provided   questions answered   questions encouraged   reassurance provided   thoughts/feelings acknowledged  Goal: Readiness for Transition of Care  Intervention: Mutually Develop Transition Plan  Flowsheets  Taken 4/25/2025 1713 by Lizet Cornejo RN  Transportation Anticipated: family or friend will provide  Patient/Family Anticipated Services at Transition:      home health care  Patient/Family Anticipates Transition to: home  Taken 4/25/2025 1710 by Lizet Cornejo, RN  Equipment Currently Used at Home:   walker, standard   oxygen   nebulizer   respiratory supplies   shower chair   grab bar   ramp   noninvasive ventilator     Problem: Comorbidity Management  Goal: Maintenance of Asthma Control  Intervention: Maintain Asthma Symptom Control  Flowsheets (Taken 4/27/2025 0249)  Medication Review/Management:   medications reviewed   high-risk medications identified  Goal: Maintenance of Behavioral Health Symptom Control  Intervention: Maintain Behavioral Health Symptom Control  Flowsheets (Taken 4/27/2025 0249)  Medication Review/Management:   medications reviewed   high-risk medications identified  Goal: Maintenance of COPD Symptom Control  Intervention: Maintain COPD (Chronic Obstructive Pulmonary  Disease) Symptom Control  Flowsheets (Taken 4/27/2025 0249)  Medication Review/Management:   medications reviewed   high-risk medications identified  Goal: Blood Glucose Level Within Target Range  Intervention: Monitor and Manage Glycemia  Flowsheets (Taken 4/27/2025 0249)  Medication Review/Management:   medications reviewed   high-risk medications identified  Goal: Maintenance of Heart Failure Symptom Control  Intervention: Maintain Heart Failure Management  Flowsheets (Taken 4/27/2025 0249)  Medication Review/Management:   medications reviewed   high-risk medications identified  Goal: Blood Pressure in Desired Range  Intervention: Maintain Blood Pressure Management  Flowsheets (Taken 4/27/2025 0249)  Medication Review/Management:   medications reviewed   high-risk medications identified  Goal: Maintenance of Osteoarthritis Symptom Control  Intervention: Maintain Osteoarthritis Symptom Control  Flowsheets  Taken 4/27/2025 0249  Medication Review/Management:   medications reviewed   high-risk medications identified  Taken 4/27/2025 0206  Activity Management: activity encouraged  Taken 4/27/2025 0045  Activity Management: activity encouraged  Taken 4/26/2025 2253  Activity Management: activity encouraged  Taken 4/26/2025 2040  Activity Management: activity encouraged  Goal: Bariatric Home Regimen Maintained  Intervention: Maintain and Manage Postbariatric Surgery Care  Flowsheets (Taken 4/27/2025 0249)  Medication Review/Management:   medications reviewed   high-risk medications identified  Goal: Maintenance of Seizure Control  Intervention: Maintain Seizure Symptom Control  Flowsheets  Taken 4/27/2025 0249  Medication Review/Management:   medications reviewed   high-risk medications identified  Taken 4/26/2025 2040  Sensory Stimulation Regulation:   care clustered   lighting decreased   Goal Outcome Evaluation:  Plan of Care Reviewed With: patient        Progress: improving  Outcome Evaluation: No changes. VSS.  AOx4. Up w/ walker and assist x1. Makes needs known. No c/o pain or discomfort. Call light within reach.

## 2025-04-27 NOTE — PLAN OF CARE
Goal Outcome Evaluation:            Pt to d/c home via friend. Portable O2 to be brought for car ride by friend. Pt A&Ox4 on 2LNC. D/c education completed at bedside.

## 2025-04-27 NOTE — DISCHARGE SUMMARY
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 Olsen is a 84 y.o. 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  Please see the admitting history and physical for further details.  She was found to have an acute exacerbation of COPD and was admitted to the hospital for further evaluation and treatment.      She was started on IV steroids, azithromycin, mucolytics, scheduled and as needed nebulizers with improvement in her symptoms.  Her wheezing is completely stopped at this point, and so I will discharge her home to complete a 5-day course of azithromycin and several more days of oral steroids.  I will try to advance her inhaler therapy  to include a long-acting muscarinic agonist depending on insurance coverage.  I have asked her to make an appointment with her pulmonologist in for next week.    Day of Discharge     Subjective:  No events overnight. She feels better and wants to go home. No wheezing on exam.    Physical Exam:  Temp:  [97.7 °F (36.5 °C)-98.3 °F (36.8 °C)] 97.9 °F (36.6 °C)  Heart Rate:  [55-75] 67  Resp:  [16-18] 18  BP: (135-153)/(60-71) 153/71  Body mass index is 37.82 kg/m².  Physical Exam  Constitutional:       General: She is not in acute distress.     Appearance: She is not toxic-appearing.   Cardiovascular:      Rate and Rhythm: Normal rate and regular rhythm.      Heart sounds: Normal heart sounds.   Pulmonary:      Effort: Pulmonary effort is normal. No respiratory distress.      Breath sounds: Normal breath sounds. No wheezing, rhonchi or rales.   Abdominal:      General: Bowel sounds are normal.      Palpations: Abdomen is soft.   Musculoskeletal:         General: No tenderness.      Right lower leg: No edema.      Left lower leg: No edema.   Neurological:      Mental Status: She is alert.   Psychiatric:         Mood and Affect: Mood normal.         Behavior: Behavior normal.         Consultants     Consult Orders (all) (From admission, onward)       Start     Ordered    04/25/25 1351  [facility] (on-call MD unless specified) Details  Once        Specialty:  Hospitalist  Provider:  (Not yet assigned)    04/25/25 1350                  Procedures     Imaging Results (All)       Procedure Component Value Units Date/Time    XR Hip With or Without Pelvis 2 - 3 View Right [[id number]] Collected: 04/26/25 1822     Updated: 04/26/25 1835    Narrative:      AP PELVIS AND AP/FROG LATERAL RIGHT HIP     HISTORY: Fall at home. Right hip replacement.     COMPARISON: X-rays of the pelvis and right hip 03/07/2021.     FINDINGS: Right total hip arthroplasty. No evidence for periprosthetic  fracture.     There is heterotopic ossification extending  above the right greater  trochanter and lateral to the right femoral head and acetabulum. Suspect  calcified uterine fibroid. Left iliac stent.       Impression:      Right total hip arthroplasty with heterotopic ossification.  No evidence for acute osseous abnormality.        This report was finalized on 4/26/2025 6:32 PM by Tom Morales M.D  on Workstation: [id number]       XR Chest 1 View [[id number]] Collected: 04/25/25 1151     Updated: 04/25/25 1200    Narrative:      XR CHEST 1 VW-     HISTORY: Female who is 84 years-old, short of breath     TECHNIQUE: Frontal view of the chest     COMPARISON: 10/7/2024     FINDINGS: The heart size is borderline. Aorta is calcified. Pulmonary  vasculature is unremarkable. Small patchy density at the right mid to  upper lung and left base could be areas of developing infiltrate,  follow-up advised. No pleural effusion, or pneumothorax. No acute  osseous process.       Impression:      As described.     This report was finalized on 4/25/2025 11:57 AM by Dr. Gregory Baptiste M.D on Workstation: [id number]               Pertinent Labs     Results from last 7 days   Lab Units 04/27/25  0555 04/25/25  1053 04/24/25  1157   WBC 10*3/mm3 10.40 10.82* 12.3*   HEMOGLOBIN g/dL 10.7* 10.4* 10.5*   PLATELETS 10*3/mm3 218 198 217     Results from last 7 days   Lab Units 04/27/25  0555 04/26/25  0725 04/25/25  1053 04/24/25  1157   SODIUM mmol/L 138 141 144 144   POTASSIUM mmol/L 5.0 4.2 4.4 4.4   CHLORIDE mmol/L 100 103 106 104   CO2 mmol/L 28.0 27.5 26.0 22   BUN mg/dL 42* 34* 37* 31*   CREATININE mg/dL 1.44* 1.26* 1.44* 1.53*   GLUCOSE mg/dL 113* 107* 181* 104*   Estimated Creatinine Clearance: 34 mL/min (A) (by C-G formula based on SCr of 1.44 mg/dL (H)).  Results from last 7 days   Lab Units 04/25/25  1053 04/24/25  1157   ALBUMIN g/dL 3.9 4.0   BILIRUBIN mg/dL 0.3 0.3   ALK PHOS U/L 99 96   AST (SGOT) U/L 8 14   ALT (SGPT) U/L 7 9     Results from last 7 days   Lab Units  "04/27/25  0555 04/26/25  0725 04/25/25  1053 04/24/25  1157   CALCIUM mg/dL 8.9 8.9 8.9 9.5   ALBUMIN g/dL  --   --  3.9 4.0   MAGNESIUM mg/dL  --  2.3  --   --        Results from last 7 days   Lab Units 04/25/25  1209 04/25/25  1053   HSTROP T ng/L 18* 18*   PROBNP pg/mL  --  1,674.0           Invalid input(s): \"LDLCALC\"  Results from last 7 days   Lab Units 04/25/25  1434   BLOODCX  No growth at 24 hours  No growth at 24 hours       Test Results Pending at Discharge     Pending Labs       Order Current Status    Respiratory Culture - Sputum, Cough In process    Blood Culture - Blood, Arm, Left Preliminary result    Blood Culture - Blood, Arm, Right Preliminary result            Discharge Details        Discharge Medications        New Medications        Instructions Start Date   azithromycin 250 MG tablet  Commonly known as: ZITHROMAX   250 mg, Oral, Daily   Start Date: April 28, 2025     guaiFENesin 600 MG 12 hr tablet  Commonly known as: MUCINEX   600 mg, Oral, Every 12 Hours Scheduled      Trelegy Ellipta 100-62.5-25 MCG/ACT inhaler  Generic drug: Fluticasone-Umeclidin-Vilant   1 puff, Inhalation, Daily - RT             Changes to Medications        Instructions Start Date   predniSONE 20 MG tablet  Commonly known as: DELTASONE  What changed: See the new instructions.   40 mg, Oral, Daily With Breakfast   Start Date: April 28, 2025            Continue These Medications        Instructions Start Date   acetaminophen 325 MG tablet  Commonly known as: TYLENOL   650 mg, Oral, Every 4 Hours PRN      albuterol sulfate  (90 Base) MCG/ACT inhaler  Commonly known as: PROVENTIL HFA;VENTOLIN HFA;PROAIR HFA   2 puffs, Every 4 Hours PRN      apixaban 5 MG tablet tablet  Commonly known as: Eliquis   5 mg, Oral, Every 12 Hours Scheduled      bumetanide 1 MG tablet  Commonly known as: BUMEX   1 mg, 2 Times Daily      carvedilol 12.5 MG tablet  Commonly known as: COREG   12.5 mg, Oral, 2 Times Daily With Meals    "   coenzyme Q10 100 MG capsule   100 mg, Daily      cyclobenzaprine 10 MG tablet  Commonly known as: FLEXERIL   10 mg, Oral, 2 Times Daily PRN      esomeprazole 40 MG capsule  Commonly known as: nexIUM   40 mg, Every Morning Before Breakfast      ferrous sulfate 325 (65 FE) MG tablet   325 mg, Daily With Breakfast      fluticasone 50 MCG/ACT nasal spray  Commonly known as: FLONASE   USE 1 SPRAY INTO EACH NOSTRIL TWICE DAILY      folic acid 1 MG tablet  Commonly known as: FOLVITE   1 mg, Oral, Daily      gabapentin 100 MG capsule  Commonly known as: NEURONTIN   100 mg, Oral, 3 Times Daily      hydrALAZINE 25 MG tablet  Commonly known as: APRESOLINE   25 mg, Oral, 2 Times Daily      ipratropium 0.06 % nasal spray  Commonly known as: ATROVENT   2 sprays, Nasal, 4 Times Daily PRN      ipratropium-albuterol 0.5-2.5 mg/3 ml nebulizer  Commonly known as: DUO-NEB   3 mL, Nebulization, 4 Times Daily PRN      lactulose 10 GM/15ML solution  Commonly known as: CHRONULAC   TAKE 15 ML BY MOUTH THREE TIMES DAILY      levothyroxine 100 MCG tablet  Commonly known as: SYNTHROID, LEVOTHROID   100 mcg, Oral, Daily      lidocaine 5 % ointment  Commonly known as: XYLOCAINE   1 Application, Topical, 3 Times Daily      Magnesium 500 MG tablet   50 mg, Daily      ondansetron 4 MG tablet  Commonly known as: ZOFRAN   Take 1 tablet by mouth Every 8 (Eight) Hours As Needed.      potassium chloride 10 MEQ CR tablet   10 mEq, Daily      pramipexole 1.5 MG tablet  Commonly known as: MIRAPEX   1.5 mg, Oral, Every Night at Bedtime      Senna Plus 8.6-50 MG per tablet  Generic drug: sennosides-docusate   2 tablets, Oral, Every 12 Hours Scheduled      sertraline 25 MG tablet  Commonly known as: ZOLOFT   25 mg, Oral, Daily      spironolactone 50 MG tablet  Commonly known as: Aldactone   50 mg, Oral, Daily      triamcinolone 0.1 % cream  Commonly known as: KENALOG   1 application , Topical, 2 Times Daily      valsartan 320 MG tablet  Commonly known as:  DIOVAN   320 mg, Oral, Daily      vitamin B-12 1000 MCG tablet  Commonly known as: CYANOCOBALAMIN   1,000 mcg, Daily      Zinc 50 MG tablet   1 tablet, Oral, Daily             Stop These Medications      cefdinir 300 MG capsule  Commonly known as: OMNICEF     Symbicort 160-4.5 MCG/ACT inhaler  Generic drug: budesonide-formoterol              Allergies   Allergen Reactions    Doxycycline Anaphylaxis     Facial swelling, shortness of air, dizzines         Discharge Disposition:  Home-Health Care Svc    Discharge Diet:  Diet Order   Procedures    Diet: Regular/House, Cardiac, Diabetic; Healthy Heart (2-3 Na+); Consistent Carbohydrate; Fluid Consistency: Thin (IDDSI 0)       Discharge Activity:   Activity Instructions       Activity as Tolerated              CODE STATUS:    Code Status and Medical Interventions: CPR (Attempt to Resuscitate); Full Support   Ordered at: 04/25/25 1604     Code Status (Patient has no pulse and is not breathing):    CPR (Attempt to Resuscitate)     Medical Interventions (Patient has pulse or is breathing):    Full Support       Future Appointments   Date Time Provider Department Center   6/3/2025 12:40 PM Anna Marsh MD [facility]   6/6/2025  9:45 AM Kehrer, Meredith Lea, MD [facility]   7/22/2025  1:00 PM Radha Olivera APRN [facility]   9/29/2025 11:00 AM [facility] 1 BH [facility]   12/8/2025 10:00 AM Kehrer, Meredith Lea, MD [facility]     Additional Instructions for the Follow-ups that You Need to Schedule       Ambulatory Referral to Home Health   As directed      Face to Face Visit Date: 4/27/2025   Follow-up provider for Plan of Care?: I treated the patient in an acute care facility and will not continue treatment after discharge.   Follow-up provider: KEHRER, MEREDITH LEA [[id number]]   Reason/Clinical Findings: debility/hospitalization   Describe mobility limitations that make leaving home difficult: debility/hospitalization   Nursing/Therapeutic Services  Requested: Skilled Nursing Physical Therapy   Skilled nursing orders: COPD management Medication education Cardiopulmonary assessments   PT orders: Transfer training Home safety assessment Therapeutic exercise Strengthening   Frequency: 1 Week 1        Call MD With Problems / Concerns   As directed      Instructions: return to the hospital if you experience chest pain, shortness of breath, abdominal pain, nausea, vomiting, fevers, sweats, chills, or worsening of your symptoms    Order Comments: Instructions: return to the hospital if you experience chest pain, shortness of breath, abdominal pain, nausea, vomiting, fevers, sweats, chills, or worsening of your symptoms         Discharge Follow-up with Specialty: make an appointment with your pulmonologist for next week   As directed      Specialty: make an appointment with your pulmonologist for next week               Follow-up Information       Kehrer, Meredith Lea, MD .    Specialty: Family Medicine  Contact information:  [address] [place], [place] [id number] [phone]                             Additional Instructions for the Follow-ups that You Need to Schedule       Ambulatory Referral to Home Health   As directed      Face to Face Visit Date: 4/27/2025   Follow-up provider for Plan of Care?: I treated the patient in an acute care facility and will not continue treatment after discharge.   Follow-up provider: KEHRER, MEREDITH LEA [[id number]]   Reason/Clinical Findings: debility/hospitalization   Describe mobility limitations that make leaving home difficult: debility/hospitalization   Nursing/Therapeutic Services Requested: Skilled Nursing Physical Therapy   Skilled nursing orders: COPD management Medication education Cardiopulmonary assessments   PT orders: Transfer training Home safety assessment Therapeutic exercise Strengthening   Frequency: 1 Week 1        Call MD With Problems / Concerns   As directed      Instructions: return to the hospital if  you experience chest pain, shortness of breath, abdominal pain, nausea, vomiting, fevers, sweats, chills, or worsening of your symptoms    Order Comments: Instructions: return to the hospital if you experience chest pain, shortness of breath, abdominal pain, nausea, vomiting, fevers, sweats, chills, or worsening of your symptoms         Discharge Follow-up with Specialty: make an appointment with your pulmonologist for next week   As directed      Specialty: make an appointment with your pulmonologist for next week            Time Spent on Discharge:  Greater than 30 minutes      Juan Pablo Reynolds MD  [facility]  04/27/25  11:27 EDT

## 2025-04-27 NOTE — OUTREACH NOTE
Prep Survey      Flowsheet Row Responses   [facility] patient discharged from? [place]   Is LACE score < 7 ? No   Eligibility [facility]   Date of Admission 04/25/25   Date of Discharge 04/27/25   Discharge Disposition Home-Health Care Sv   Discharge diagnosis Chronic diastolic congestive heart failure   Does the patient have one of the following disease processes/diagnoses(primary or secondary)? CHF   Does the patient have Home health ordered? Yes   What is the Home health agency?  [facility]   Prep survey completed? Yes            Tere DEL CID - Registered Nurse

## 2025-04-27 NOTE — CASE MANAGEMENT/SOCIAL WORK
Continued Stay Note  [facility]     Patient Name: Latanya Olsen  MRN: [id number]  Today's Date: 4/27/2025    Admit Date: 4/25/2025    Plan: Home with HH- [facility] HH referral pending   Discharge Plan       Row Name 04/27/25 1202       Plan    Plan Home with HH- [facility] HH referral pending    Patient/Family in Agreement with Plan yes    Plan Comments CCP spoke with patient to discuss discharge planning and noted HH orders. [facility] confirmed [place] [facility] [place]. She lives alone, is IADL's and uses a walker. She is agreeable to using HH and chose [facility], as she has used in the past. CCP placed referral and attempted to call intake ([phone]). CCP to email CCP managers to follow up with patient ensuring she has HH arranged. Patient states her family is to transport her home today. Patient mentioned her walker getting older and possibly getting a new one but ultimately decided to follow up with her PCP regarding this. No further discharge needs. RN updated. Steve MADRIGAL CCP                                           Discharge Codes    No documentation.                 Expected Discharge Date and Time       Expected Discharge Date Expected Discharge Time    Apr 27, 2025               Steve Velez RN

## 2025-04-27 NOTE — CASE MANAGEMENT/SOCIAL WORK
Continued Stay Note  [facility]     Patient Name: Latanya Olsen  MRN: [id number]  Today's Date: 4/27/2025    Admit Date: 4/25/2025    Plan: Home with HH- [facility]  referral pending   Discharge Plan       Row Name 04/27/25 1209       Plan    Final Note [facility]  accepted patient in basket. Patient updated. Steve MADRIGAL [facility]        Row Name 04/27/25 1158       Plan    Final Discharge Disposition Code --    Final Note --                   Discharge Codes    No documentation.                 Expected Discharge Date and Time       Expected Discharge Date Expected Discharge Time    Apr 27, 2025               Steve Velez RN

## 2025-04-27 NOTE — DISCHARGE PLACEMENT REQUEST
"Latanya Olsen (84 y.o. Female)       Date of Birth   1940    Social Security Number       Address   [address] [place] [address]    Home Phone       MRN   [id number]       [employer]   [facility]    Marital Status                               Admission Date   4/25/2025    Admission Type   Emergency    Admitting Provider   José Herrera MD    Attending Provider   Juan Pablo Reynolds MD    Department, Room/Bed   [address], [address]629/1       Discharge Date       Discharge Disposition   Home-Health Care [facility]    Discharge Destination                                 Attending Provider: Juan Pablo Reynolds MD    Allergies: Doxycycline    Isolation: None   Infection: None   Code Status: CPR    Ht: 165.1 cm (65\")   Wt: 103 kg (227 lb 4.7 oz)    Admission Cmt: None   Principal Problem: None                  Active Insurance as of 4/25/2025       Primary Coverage       Payor Plan Insurance Group Employer/Plan Group    MEDICARE MEDICARE A & B        Payor Plan Address Payor Plan Phone Number Payor Plan Fax Number Effective Dates    [address] [phone]  9/1/2004 - None Entered    [facility] [id number]         Subscriber Name Subscriber Birth Date Member ID       LATANYA OLSEN 1940 [id number]               Secondary Coverage       Payor Plan Insurance Group Employer/Plan Group    AARP  SUP AARP HEALTH CARE OPTIONS        Payor Plan Address Payor Plan Phone Number Payor Plan Fax Number Effective Dates    [facility] [phone]  1/1/2023 - None Entered    [address]       [facility] [id number]         Subscriber Name Subscriber Birth Date Member ID       LATANYA OLSEN 1940 [id number]                     Emergency Contacts        (Rel.) Home Phone Work Phone Mobile Phone    Terri Lucero (Friend) -- -- [phone]    Alfred Le (niece) (Relative) -- -- [phone]    Phani Olsen (Son) -- -- [phone]              {Outbreak/Travel/Exposure " Documentation......;  Question Available Choices Patient Response   COVID-19 Outbreak Screen:  Do you currently have a new onset of the following symptoms?        Fever/Chills, Cough, Shortness of air, Loss of taste or smell, No, Unknown  (not recorded)   COVID-19 Outbreak Screen: In the last 14 days, have you had contact with anyone who is ill, has show any of the symptoms listed above and/or has been diagnosis with the 2019 Novel Coronavirus? This includes any immediate household members but excludes any patients with whom you have been in contact within your normal work duties wearing proper PPE, if you are a healthcare worker.  Yes, No, Unknown              (not recorded)   COVID-19 Outbreak Screen: Who was notified? Free text (not recorded)   Ebola Screening Outbreak Screen: Have you traveled to the [place] the [place] or [place] within the past 21 days?  Yes, No, Unknown (not recorded)   Ebola Screening Outbreak Screen: Do you have ANY of the following symptoms: Fever/Chills, Vomiting, Diarrhea, Fatigue, Headache, Muscle pain, Unexplained bleeding, Abdominal (stomach) pain, No, Unknown (not recorded)   Ebola Screening Outbreak Screen: Name of Person notified Free text (not recorded)   Travel Screen: Have you traveled in the last month? If so, to what country have you traveled? If [place] what state? Yes, No, Unknown  List of all countries  List of all States (not recorded)  (not recorded)  (not recorded)   Infection Risk: Do you currently have the following symptoms?  (If cough is selected, the Tuberculosis Screen is performed.) Cough, Fever, Rash, No (!) Cough (04/25/25 1040)   Tuberculosis Screen: Do you have any of the following Tuberculosis Risks?  Have you lived or spent time with anyone who had or may have TB?  Have you lived in or visited any of the following areas for more than one month: [place], [place], [place], [place] or [place], the [place] or [place]?  Do you have  HIV/AIDS?  Have you lived in or worked in a nursing home, homeless shelter, correctional facility, or substance abuse treatment facility?   No    If Yes do you have any of the following symptoms? Yes responses display to the right    If Yes, symptoms listed are:  Cough greater than or equal to 3 weeks, Loss of appetite, Unexplained weight loss, Night sweats, Bloody sputum or hemoptysis, Hoarseness, Fever, Fatigue, Chest pain, No No (04/25/25 1040)  (not recorded)   Exposure Screen: Have you been exposed to any of these contagious diseases in the last month? Measles, Chickenpox, Meningitis, Pertussis, Whooping Cough, No No (04/25/25 1040)

## 2025-04-28 ENCOUNTER — TRANSITIONAL CARE MANAGEMENT TELEPHONE ENCOUNTER (OUTPATIENT)
Dept: CALL CENTER | Facility: HOSPITAL | Age: 85
End: 2025-04-28
Payer: MEDICARE

## 2025-04-28 NOTE — OUTREACH NOTE
Call Center TCM Note      Flowsheet Row Responses   [facility] patient discharged from? [place]   Does the patient have one of the following disease processes/diagnoses(primary or secondary)? CHF   TCM attempt successful? Yes   Call start time 1323   Call end time 1328   Discharge diagnosis Chronic diastolic congestive heart failure   Person spoke with today (if not patient) and relationship JaimeEdisAlfred   Viridiana reviewed with patient/caregiver? Yes   Is the patient having any side effects they believe may be caused by any medication additions or changes? No   Does the patient have all medications ordered at discharge? Yes   Is the patient taking all medications as directed (includes completed medication regime)? Yes   Does the patient have an appointment with their PCP within 7-14 days of discharge? No   Nursing Interventions --  [Jaime will have pt call office and schedule an appt]   What is the Home health agency?  [facility]   Has home health visited the patient within 72 hours of discharge? Yes   Psychosocial issues? No   What is the patient's perception of their health status since discharge? Improving   Nursing interventions Nurse provided patient education   Is the patient/caregiver able to teach back the hierarchy of who to call/visit for symptoms/problems? PCP, Specialist, Home health nurse, Urgent Care, ED, 911 Yes   TCM call completed? Yes   Call end time 1328   Would this patient benefit from a Referral to Amb Social Work? No   Is the patient interested in additional calls from an ambulatory ? No             Makeda LAYTON - Registered Nurse    4/28/2025, 13:29 EDT    [facility] given to pt's Jaime. She reports she will give number to pt to call and schedule an appt

## 2025-04-28 NOTE — OUTREACH NOTE
Call Center TCM Note      Flowsheet Row Responses   [facility] patient discharged from? [place]   Does the patient have one of the following disease processes/diagnoses(primary or secondary)? CHF   TCM attempt successful? No   Unsuccessful attempts Attempt 1   Call Status Left message             Makeda Randhawa Registered Nurse    4/28/2025, 10:03 EDT

## 2025-04-29 ENCOUNTER — TELEPHONE (OUTPATIENT)
Dept: FAMILY MEDICINE CLINIC | Facility: CLINIC | Age: 85
End: 2025-04-29
Payer: MEDICARE

## 2025-04-29 LAB
BACTERIA SPEC RESP CULT: NORMAL
GRAM STN SPEC: NORMAL
GRAM STN SPEC: NORMAL

## 2025-04-30 ENCOUNTER — TELEPHONE (OUTPATIENT)
Dept: FAMILY MEDICINE CLINIC | Facility: CLINIC | Age: 85
End: 2025-04-30
Payer: MEDICARE

## 2025-04-30 LAB
BACTERIA SPEC AEROBE CULT: NORMAL
BACTERIA SPEC AEROBE CULT: NORMAL

## 2025-04-30 NOTE — TELEPHONE ENCOUNTER
Please do tcm call for Dr. Kehrer.  Pt was discharged from [facility]

## 2025-04-30 NOTE — TELEPHONE ENCOUNTER
Spoke to Nani and she is stating that the medication list is having medication interactions.  Trelegy, albuterol and ipratropium with the Coreg and the potassium and sprinolactone.  If there is a medication change please have them fax it to [phone] or call her with changes.

## 2025-04-30 NOTE — TELEPHONE ENCOUNTER
Ashlie [phone]    With [facility] called and saw patient today for PT Eval and would like approval to continue PT 1 time a week for 6 weeks?    Also said patient would be in the office tomorrow and she will need an order for a new rollator walker.

## 2025-04-30 NOTE — TELEPHONE ENCOUNTER
Still does not have a hospital follow-up with me.  Okay for orders but she needs to see me within 2 weeks of discharge.

## 2025-04-30 NOTE — TELEPHONE ENCOUNTER
Please have her continue on what she was discharged from the hospital until I see her.  On the Trelegy however, she should only be using plain albuterol in her nebulizer not the DuoNeb.  Does she need albuterol sent into the pharmacy?

## 2025-05-01 ENCOUNTER — OFFICE VISIT (OUTPATIENT)
Dept: FAMILY MEDICINE CLINIC | Facility: CLINIC | Age: 85
End: 2025-05-01
Payer: MEDICARE

## 2025-05-01 VITALS
HEART RATE: 83 BPM | WEIGHT: 224.4 LBS | DIASTOLIC BLOOD PRESSURE: 60 MMHG | TEMPERATURE: 96.8 F | OXYGEN SATURATION: 96 % | BODY MASS INDEX: 37.39 KG/M2 | HEIGHT: 65 IN | SYSTOLIC BLOOD PRESSURE: 124 MMHG

## 2025-05-01 DIAGNOSIS — Z09 HOSPITAL DISCHARGE FOLLOW-UP: Primary | ICD-10-CM

## 2025-05-01 DIAGNOSIS — B37.0 THRUSH: ICD-10-CM

## 2025-05-01 DIAGNOSIS — J44.1 COPD WITH EXACERBATION: ICD-10-CM

## 2025-05-01 DIAGNOSIS — D50.9 IRON DEFICIENCY ANEMIA, UNSPECIFIED IRON DEFICIENCY ANEMIA TYPE: ICD-10-CM

## 2025-05-01 DIAGNOSIS — I50.22 CHRONIC SYSTOLIC HEART FAILURE: ICD-10-CM

## 2025-05-01 DIAGNOSIS — N18.31 STAGE 3A CHRONIC KIDNEY DISEASE: ICD-10-CM

## 2025-05-01 RX ORDER — FLUTICASONE FUROATE, UMECLIDINIUM BROMIDE AND VILANTEROL TRIFENATATE 100; 62.5; 25 UG/1; UG/1; UG/1
1 POWDER RESPIRATORY (INHALATION)
Qty: 2 EACH | Refills: 0 | COMMUNITY
Start: 2025-05-01

## 2025-05-01 RX ORDER — NYSTATIN 100000 [USP'U]/ML
500000 SUSPENSION ORAL 4 TIMES DAILY
Qty: 473 ML | Refills: 0 | Status: SHIPPED | OUTPATIENT
Start: 2025-05-01 | End: 2025-05-11

## 2025-05-01 NOTE — PROGRESS NOTES
Transitional Care Follow Up Visit  Subjective     Latanya Olsen is a 84 y.o. female who presents for a transitional care management visit.    Within 48 business hours after discharge our office contacted her via telephone to coordinate her care and needs.      I reviewed and discussed the details of that call along with the discharge summary, hospital problems, inpatient lab results, inpatient diagnostic studies, and consultation reports with Latanya.     Current outpatient and discharge medications have been reconciled for the patient.  Reviewed by: Meredith Lea Kehrer, MD  H&P by José Herrera MD (04/25/2025 15:38)     Discharge Summary by Juan Pablo Reynolds MD (04/27/2025 11:27)       4/27/2025     3:16 PM   Date of TCM Phone Call   [facility]   Date of Admission 4/25/2025   Date of Discharge 4/27/2025   Discharge Disposition Home-Health Care [facility]     Risk for Readmission (LACE) Score: 11 (4/27/2025  6:00 AM)      History of Present Illness   Course During Hospital Stay:  see below  History of Present Illness  The patient is an 84-year-old female who presents for a hospital discharge follow-up. She is accompanied by a friend today.    Severe respiratory distress was experienced the morning after her last visit, prompting her niece to take her to the hospital. IV Lasix was administered, followed by diuresis, antibiotics, IV steroids, and nebulizer treatments. Oxygen saturation levels upon arrival at the emergency room are not recalled. Trelegy has been prescribed, which is found to be costly.  Albuterol is used as a rescue inhaler. Follow-up appointments are scheduled with the pulmonologist and cardiologist. Home health care is received once a week, and oxygen therapy is utilized. Fatigue and leg weakness are reported. Weight is monitored daily, and no changes in diuretic medication have been noted. A walker is used for mobility, and interest in obtaining a portable oxygen  concentrator is expressed. Moving to assisted living is not being considered at this time. Everything needed at home is reported to be currently available.    Oral thrush has been diagnosed, attributed to the use of Trelegy. Mouth rinsing after using Trelegy is reported.    A headache localized between the eyes has been experienced for the past 2 days, believed to be related to oxygen therapy. Humidified oxygen is used at home, and a Z-Nelson bleeds into it at night.    A consultation with the nephrologist, Dr. Alvarado, reported normal findings except for anemia, which is a concern.        The following portions of the patient's history were reviewed and updated as appropriate: allergies, current medications, past family history, past medical history, past social history, past surgical history, and problem list.    Review of Systems    Objective   Physical Exam  Physical Exam  General: Alert, in no acute distress  Eyes: Pupils equal  Mouth/Throat: Oral mucosa with white patches and redness  Respiratory: Lungs are clear and wide open  Cardiovascular: Heart sounds are normal  Gastrointestinal: Abdomen is soft  Extremities: Legs are soft       Results  Labs   - Creatinine: 1.53 to 1.4   - Hemoglobin: 10.7     Assessment & Plan            Assessment & Plan  1. Post-hospitalization follow-up.  - Treated for both COPD exacerbation and heart failure during hospital stay.  - Creatinine levels improved from 1.53 to 1.4 despite diuresis.  - Reports feeling tired and experiencing leg weakness, likely due to recent hospitalization.  - Will continue with home health care and current medications. Advised to use Trelegy as baseline inhaler and plain albuterol for rescue. Samples of Trelegy will be provided until pulmonologist visit. Recommended to discuss obtaining a portable oxygen concentrator with pulmonologist. Follow-up appointments with pulmonologist and cardiologist are recommended.    2. Oral thrush.  - Reports symptoms  consistent with oral thrush, likely due to inhaler use.  - Oral mucosa with white patches and redness observed.  - Nystatin swish and swallow prescribed for 10 days. Advised to rinse mouth more thoroughly after using inhaler.    3. Headache.  - Reports headache localized between eyes, ongoing for 2 days.  - Vital signs within normal limits, oxygen saturation levels satisfactory.  - Headache may be due to barometric pressure changes. Advised to take Tylenol and apply ice pack to head for relief.    4. Anemia.  - Anemic, common in patients with chronic kidney disease.  - Hemoglobin level is 10.7.  - No transfusion required at this time. Advised to monitor symptoms and report any significant changes.    Follow-up  - Follow-up appointment scheduled for 06/06/2025.      Patient or patient representative verbalized consent for the use of Ambient Listening during the visit with  Meredith Lea Kehrer, MD for chart documentation. 5/1/2025  14:13 EDT

## 2025-05-07 ENCOUNTER — TELEPHONE (OUTPATIENT)
Dept: FAMILY MEDICINE CLINIC | Facility: CLINIC | Age: 85
End: 2025-05-07
Payer: MEDICARE

## 2025-05-07 DIAGNOSIS — R53.1 GENERALIZED WEAKNESS: ICD-10-CM

## 2025-05-07 DIAGNOSIS — Z91.81 AT HIGH RISK FOR FALLS: Primary | ICD-10-CM

## 2025-05-07 NOTE — TELEPHONE ENCOUNTER
Patient is needing a new order for rolling walker.  Medicare said that an order can be sent to [facility] here in [place].    Please enter order.

## 2025-05-08 ENCOUNTER — READMISSION MANAGEMENT (OUTPATIENT)
Dept: CALL CENTER | Facility: HOSPITAL | Age: 85
End: 2025-05-08
Payer: MEDICARE

## 2025-05-08 NOTE — OUTREACH NOTE
CHF Week 2 Survey      Flowsheet Row Responses   [facility] facility patient discharged from? [place]   Does the patient have one of the following disease processes/diagnoses(primary or secondary)? CHF   Week 2 attempt successful? No   Unsuccessful attempts Attempt 1            [facility] - Registered Nurse

## 2025-05-12 ENCOUNTER — TELEPHONE (OUTPATIENT)
Dept: FAMILY MEDICINE CLINIC | Facility: CLINIC | Age: 85
End: 2025-05-12
Payer: MEDICARE

## 2025-05-12 DIAGNOSIS — K59.00 CONSTIPATION, UNSPECIFIED CONSTIPATION TYPE: ICD-10-CM

## 2025-05-12 RX ORDER — ASPIRIN 81 MG
2 TABLET, DELAYED RELEASE (ENTERIC COATED) ORAL EVERY 12 HOURS SCHEDULED
Qty: 60 TABLET | Refills: 0 | Status: SHIPPED | OUTPATIENT
Start: 2025-05-12

## 2025-05-12 NOTE — TELEPHONE ENCOUNTER
Spoke to pt the Ferrous Sulfate is 325mg qd  The Potassium Chloride is 10meq bid pt read directions and dosage from the bottles

## 2025-05-12 NOTE — TELEPHONE ENCOUNTER
Pt's therapist Renee called from [facility] and said she got her medication from the pharmacy and she didn't get 2 of the medications, and she called the pharmacy and they said she needed to call her PCP. The medications are Ferrous Sulfate 325 MG and Potassium Chloride 10MEQ CR tablet. The nurse from the [facility] originally prescribed them. Henrietta Hollis

## 2025-05-13 RX ORDER — POTASSIUM CHLORIDE 750 MG/1
10 TABLET, EXTENDED RELEASE ORAL 2 TIMES DAILY
Qty: 180 TABLET | Refills: 0 | Status: SHIPPED | OUTPATIENT
Start: 2025-05-13

## 2025-05-13 RX ORDER — FERROUS SULFATE 325(65) MG
325 TABLET ORAL
Qty: 90 TABLET | Refills: 0 | Status: SHIPPED | OUTPATIENT
Start: 2025-05-13

## 2025-05-14 ENCOUNTER — READMISSION MANAGEMENT (OUTPATIENT)
Dept: CALL CENTER | Facility: HOSPITAL | Age: 85
End: 2025-05-14
Payer: MEDICARE

## 2025-05-14 NOTE — OUTREACH NOTE
CHF Week 2 Survey      Flowsheet Row Responses   [facility] patient discharged from? [place]   Does the patient have one of the following disease processes/diagnoses(primary or secondary)? CHF   Week 2 attempt successful? No   Unsuccessful attempts Attempt 2            Gordo PÉREZ - Registered Nurse

## 2025-05-16 ENCOUNTER — TELEPHONE (OUTPATIENT)
Dept: FAMILY MEDICINE CLINIC | Facility: CLINIC | Age: 85
End: 2025-05-16
Payer: MEDICARE

## 2025-05-16 NOTE — TELEPHONE ENCOUNTER
HH called and stated miss ortega is having vertigo and headaches. Has been taking tylenol for headache.  nurse states pts vitals are good. She is on a few BP medications.       Lala jaramillo [phone] call pt directly for anything.

## 2025-05-27 ENCOUNTER — APPOINTMENT (OUTPATIENT)
Dept: GENERAL RADIOLOGY | Facility: HOSPITAL | Age: 85
End: 2025-05-27
Payer: MEDICARE

## 2025-05-27 ENCOUNTER — TELEPHONE (OUTPATIENT)
Dept: FAMILY MEDICINE CLINIC | Facility: CLINIC | Age: 85
End: 2025-05-27
Payer: MEDICARE

## 2025-05-27 ENCOUNTER — HOSPITAL ENCOUNTER (EMERGENCY)
Facility: HOSPITAL | Age: 85
Discharge: HOME | End: 2025-05-27
Attending: EMERGENCY MEDICINE | Admitting: EMERGENCY MEDICINE
Payer: MEDICARE

## 2025-05-27 VITALS
SYSTOLIC BLOOD PRESSURE: 139 MMHG | BODY MASS INDEX: 37.47 KG/M2 | OXYGEN SATURATION: 99 % | TEMPERATURE: 98 F | WEIGHT: 224.87 LBS | HEIGHT: 65 IN | RESPIRATION RATE: 18 BRPM | DIASTOLIC BLOOD PRESSURE: 70 MMHG | HEART RATE: 59 BPM

## 2025-05-27 DIAGNOSIS — M17.11 OSTEOARTHRITIS OF RIGHT KNEE, UNSPECIFIED OSTEOARTHRITIS TYPE: ICD-10-CM

## 2025-05-27 DIAGNOSIS — M25.561 ACUTE PAIN OF RIGHT KNEE: Primary | ICD-10-CM

## 2025-05-27 PROCEDURE — 99283 EMERGENCY DEPT VISIT LOW MDM: CPT

## 2025-05-27 PROCEDURE — 73560 X-RAY EXAM OF KNEE 1 OR 2: CPT

## 2025-05-27 NOTE — DISCHARGE INSTRUCTIONS
Tylenol 1000mg every 8 hours as needed for pain  Call your orthopedist daily for cancellations    If your weight continues to go up, call your cardiologist for further direction about your diuretic

## 2025-05-27 NOTE — ED PROVIDER NOTES
EMERGENCY DEPARTMENT ENCOUNTER  Room Number:  S03/03  PCP: Kehrer, Meredith Lea, MD  Independent Historians: Patient      HPI:  Chief Complaint: had concerns including Knee Pain.     A complete HPI/ROS/PMH/PSH/SH/FH are unobtainable due to: None    Chronic or social conditions impacting patient care (Social Determinants of Health): None      Context: The patient is a 84 y.o. female with a medical history of chronic respiratory failure on 2 L of oxygen all the time, hypokalemia, hypothyroidism, hypertension, hyperlipidemia, history of lung cancer, CAD, CKD who presents to the ED c/o acute right knee pain.  States she has arthritis in this knee, states she sees orthopedics regularly, had fluid drawn off of it about a month ago. States yesterday when she got out of bed her knee was hurting more than usual and is has continued throughout the day today.  She tried ice and Tylenol yesterday with some relief, applied heat to it today which seemed to help more.  She walks with a walker at baseline and has still been able to do that today.  She also has a history of CHF and lymphedema and states her weight is up by 4 pounds today.  She denies any chest pain or shortness of breath.      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    Diverticulosis of intestine 02/19/2016    Obstructive sleep apnea syndrome 02/19/2016    Weight loss 02/19/2016    Vitamin D deficiency 02/19/2016    Overweight (BMI 25.0-29.9) 02/19/2016    Spinal stenosis of lumbar region without neurogenic claudication 02/19/2016    Osteoarthritis of knee 02/19/2016    Obesity (BMI 30-39.9) 02/19/2016    Chronic lower back pain 02/19/2016    Knee pain 02/19/2016    Insomnia 02/19/2016     Hypothyroidism 02/19/2016    Hypokalemia 02/19/2016    Primary hypertension 02/19/2016    Mixed hyperlipidemia 02/19/2016    Generalized osteoarthritis 02/19/2016    Gastroparesis 02/19/2016    Foot pain 02/19/2016    Chronic obstructive pulmonary disease 02/19/2016    Chronic constipation 02/19/2016    Anemia 02/19/2016    Weight gain 02/19/2016    Pain of left breast 02/22/2016    Elevated serum alkaline phosphatase level 02/22/2016    Neck pain 11/28/2017    Volume overload 03/19/2019    Status post total hip replacement, 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    Generalized weakness 03/06/2021    Constipation 03/10/2021    Paroxysmal atrial fibrillation 04/20/2021    Idiopathic peripheral neuropathy 06/24/2022    Lymphedema 08/01/2022    Lumbar degenerative disc disease 01/31/2023    GERD (gastroesophageal reflux disease) 02/16/2023    Lung cancer 02/16/2023    Non-STEMI (non-ST elevated myocardial infarction) 08/06/2023    Nonischemic nontraumatic myocardial injury 08/10/2023    CKD (chronic kidney disease) stage 3, GFR 30-59 ml/min 11/30/2023    Adjustment disorder with depressed mood 11/30/2023    Peripheral polyneuropathy 11/30/2023    Chronic diastolic congestive heart failure 03/05/2024    MCKEON (dyspnea on exertion) 03/05/2024    Edema of left lower extremity 06/11/2024    Venous (peripheral) insufficiency 06/12/2024    Venous stasis 06/12/2024    Hematoma 06/12/2024    Iliac vein thrombosis, 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    Presence of inferior vena cava filter 08/14/2024    Leg hematoma, left, sequela 08/14/2024     Resolved Ambulatory Problems     Diagnosis Date Noted    Finger injury 02/19/2016    Upper respiratory tract infection 02/19/2016    Contusion of upper arm 02/19/2016    Tingling of skin 02/19/2016    Rash 02/19/2016    Candidiasis of mouth 02/19/2016    Neutropenia 02/19/2016    Spasm 02/19/2016    Low back pain 02/19/2016    Heartburn 02/19/2016    Diarrhea 02/19/2016    Bloating symptom 02/19/2016    Gastroesophageal reflux  disease with esophagitis 02/19/2016    Drug-induced photosensitivity 02/19/2016    Cramp of limb 02/19/2016    Contact dermatitis 02/19/2016    Chronic kidney disease, stage II (mild) 02/19/2016    Ankle joint pain 02/19/2016    Atopic rhinitis 02/19/2016    Allergic reaction to drug 02/19/2016    Acute sinusitis 02/19/2016    Generalized abdominal pain 02/19/2016    Closed fracture of neck of right femur 05/31/2019    ARF (acute renal failure) 03/07/2021    Acute deep vein thrombosis (DVT) of iliac vein of left lower extremity 03/24/2021    Acute deep vein thrombosis (DVT) of tibial vein of left lower extremity 04/20/2021    Hyperhomocysteinemia 05/14/2021    Acute exacerbation of CHF (congestive heart failure) 08/27/2023    Acute systolic heart failure 08/28/2023    Acute on chronic HFrEF (heart failure with reduced ejection fraction) 08/28/2023    Chronic systolic heart failure 08/28/2023    Shortness of breath 05/13/2024     Past Medical History:   Diagnosis Date    Abnormal ECG Aug 2023    Acute embolism and thrombosis of left iliac vein     Anemia, unspecified 08/12/2021    Anxiety 4-2-23    Arthritis     Arthritis of back     Arthritis of neck     Asthma 1/1/2006    Atrial fibrillation     Cataract 1-1/2015    Cervical disc disorder     Chronic deep vein thrombosis (DVT) of left femoral vein 08/12/2021    Chronic deep vein thrombosis (DVT) of left popliteal vein 08/12/2021    Chronic obstructive pulmonary disease, unspecified     CKD (chronic kidney disease)     Compression of vein 05/07/2021    Congenital heart disease Aug 2023    COPD (chronic obstructive pulmonary disease)     DDD (degenerative disc disease), lumbar     Deep vein thrombosis 2019    Dependence on other enabling machines and devices     Depression     Diverticulosis     Essential (primary) hypertension     Fracture of hip     Fracture, femur     Hip arthrosis     History of heart attack     Hx of degenerative disc disease     Hyperlipidemia      Hyperlipidemia 02/19/2016    Hyperlipidemia, unspecified     Hypertension     Hypertension 02/19/2016    Hypothyroidism, unspecified     Knee swelling     Low back strain     Myocardial infarction 8-4-23    Neutropenia, unspecified     Obesity 218    Obstructive sleep apnea (adult) (pediatric)     Osteopenia Yes    Periarthritis of shoulder     Pulmonary embolism 2019    Renal disorder     Renal insufficiency 2019    Restless leg syndrome     Rotator cuff syndrome     Scoliosis Yes    Sleep apnea with use of continuous positive airway pressure (CPAP)     Spinal stenosis, lumbar region without neurogenic claudication 08/12/2021    Unspecified atrial fibrillation     Weakness 08/12/2021         PAST SURGICAL HISTORY  Past Surgical History:   Procedure Laterality Date    ADENOIDECTOMY  2000    BUNIONECTOMY Bilateral     CARDIAC CATHETERIZATION N/A 08/07/2023    Procedure: Left Heart Cath;  Surgeon: Mireya Park MD;  Location: [facility] CATH INVASIVE LOCATION;  Service: Cardiology;  Laterality: N/A;    CARDIAC CATHETERIZATION N/A 08/07/2023    Procedure: Coronary angiography;  Surgeon: Mireya Park MD;  Location: [facility] CATH INVASIVE LOCATION;  Service: Cardiology;  Laterality: N/A;    CARDIAC CATHETERIZATION N/A 08/07/2023    Procedure: Left ventriculography;  Surgeon: Mireya Park MD;  Location: [facility] CATH INVASIVE LOCATION;  Service: Cardiology;  Laterality: N/A;    CATARACT EXTRACTION      CHOLECYSTECTOMY      COLONOSCOPY      ENDOSCOPY N/A 06/24/2016    Procedure: ESOPHAGOGASTRODUODENOSCOPY  with biopsies;  Surgeon: Von Hernández MD;  Location: [facility] OR;  Service:     JOINT REPLACEMENT  Yes    LUNG LOBECTOMY Left     lower lobe    LYTIC THROMBIN THERAPY Left 03/26/2021    Procedure: left leg clot treiver possible venous stent *supine*;  Surgeon: Rogerio Vega MD;  Location: [facility] OR 18/19;  Service: Vascular;  Laterality: Left;    REPLACEMENT TOTAL KNEE Left     THYROID BIOPSY       TONSILLECTOMY  Yes    TOTAL HIP ARTHROPLASTY Right 06/01/2019    Procedure: BIPOLAR HIP CEMENTED POSTERIOR;  Surgeon: Ashley Crenshaw MD;  Location: [facility];  Service: Orthopedics    TRIGGER POINT INJECTION      TUBAL ABDOMINAL LIGATION  50 yrs ago         FAMILY HISTORY  Family History   Problem Relation Age of Onset    Heart attack Mother     Coronary artery disease Mother     Hypertension Mother     Kidney disease Mother     Arthritis Mother     Heart disease Mother     Heart disease Father     Anesthesia problems Father     Heart attack Sister     Aortic aneurysm Brother         Abdominal    Cancer Brother         no details    Cancer Brother     Colon cancer Neg Hx     Colon polyps Neg Hx     Esophageal cancer Neg Hx     Breast cancer Neg Hx          SOCIAL HISTORY  Social History     Socioeconomic History    Marital status:    Tobacco Use    Smoking status: Never     Passive exposure: Never    Smokeless tobacco: Never    Tobacco comments:     Never   Vaping Use    Vaping status: Never Used   Substance and Sexual Activity    Alcohol use: Never     Comment: Patient is non drinker.    Drug use: Never     Comment: Drug Abuse: none    Sexual activity: Not Currently     Birth control/protection: Coitus interruptus, Nexplanon, Tubal ligation         ALLERGIES  Doxycycline      REVIEW OF SYSTEMS  Review of Systems  Included in HPI  All systems reviewed and negative except for those discussed in HPI.      PHYSICAL EXAM    I have reviewed the triage vital signs and nursing notes.    ED Triage Vitals   Temp Heart Rate Resp BP SpO2   05/27/25 1643 05/27/25 1643 05/27/25 1643 05/27/25 1645 05/27/25 1643   98 °F (36.7 °C) 74 20 177/78 96 %      Temp src Heart Rate Source Patient Position BP Location FiO2 (%)   05/27/25 1643 -- -- -- --   Oral           Physical Exam  GENERAL: alert, no acute distress  SKIN: Warm, dry  HENT: Normocephalic, atraumatic  EYES: no scleral icterus  CV: regular rhythm,  regular rate  RESPIRATORY: normal effort, lungs clear  ABDOMEN: nondistended soft nontender  MUSCULOSKELETAL: no deformity.  There is bilateral lower extremity edema.  Legs are soft.  There is no erythema.  No calf tenderness.  Inspection of the right knee there is no erythema or palpable joint effusion.  She has tenderness along the medial joint line.  She is able to flex and extend.  No joint laxity appreciated.  NEURO: alert, moves all extremities, follows commands            LAB RESULTS  No results found for this or any previous visit (from the past 24 hours).      RADIOLOGY  XR Knee 1 or 2 View Bilateral  Result Date: 5/27/2025  XR KNEE 1 OR 2 VW BILATERAL-  INDICATIONS: Pain.  TECHNIQUE: 2 views of each knee  COMPARISON: 4/22/2025  FINDINGS:  No acute fracture, erosion, or dislocation is identified.  On the right, moderate to prominent medial tibiofemoral joint space narrowing. Moderate degenerative spurring. Minimal knee effusion on the right.  On the left, knee arthroplasty hardware appears intact. Trace knee effusion on the left.  Follow-up/further evaluation can be obtained as indications persist.       As described.    This report was finalized on 5/27/2025 6:10 PM by Dr. Gregory Baptiste M.D on Workstation: [id number]          MEDICATIONS GIVEN IN ER  Medications - No data to display      ORDERS PLACED DURING THIS VISIT:  Orders Placed This Encounter   Procedures    XR Knee 1 or 2 View Bilateral         OUTPATIENT MEDICATION MANAGEMENT:  No current Epic-ordered facility-administered medications on file.     Current Outpatient Medications Ordered in Epic   Medication Sig Dispense Refill    acetaminophen (TYLENOL) 325 MG tablet Take 2 tablets by mouth Every 4 (Four) Hours As Needed for Mild Pain .      albuterol sulfate  (90 Base) MCG/ACT inhaler Inhale 2 puffs Every 4 (Four) Hours As Needed for Wheezing.      apixaban (Eliquis) 5 MG tablet tablet Take 1 tablet by mouth Every 12 (Twelve) Hours. 180  tablet 3    bumetanide (BUMEX) 1 MG tablet Take 1 tablet by mouth 2 (Two) Times a Day.      carvedilol (COREG) 12.5 MG tablet Take 1 tablet by mouth 2 (Two) Times a Day With Meals. 180 tablet 3    coenzyme Q10 100 MG capsule Take 1 capsule by mouth Daily.      cyclobenzaprine (FLEXERIL) 10 MG tablet Take 1 tablet by mouth 2 (Two) Times a Day As Needed for Muscle Spasms. 14 tablet 0    Diclofenac Sodium (Voltaren) 1 % gel gel Apply 4 g topically to the appropriate area as directed 3 (Three) Times a Day As Needed (pain). 100 g 0    esomeprazole (NexIUM) 40 MG capsule Take 1 capsule by mouth Every Morning Before Breakfast.      ferrous sulfate 325 (65 FE) MG tablet Take 1 tablet by mouth Daily With Breakfast. 90 tablet 0    fluticasone (FLONASE) 50 MCG/ACT nasal spray       fluticasone (FLONASE) 50 MCG/ACT nasal spray Administer 2 sprays into the nostril(s) as directed by provider Daily. 16 g 0    Fluticasone-Umeclidin-Vilant (Trelegy Ellipta) 100-62.5-25 MCG/ACT inhaler Inhale 1 puff Daily. 2 each 0    folic acid (FOLVITE) 1 MG tablet Take 1 tablet by mouth Daily. 30 tablet 5    gabapentin (NEURONTIN) 100 MG capsule TAKE ONE CAPSULE BY MOUTH THREE TIMES DAILY 90 capsule 0    hydrALAZINE (APRESOLINE) 25 MG tablet Take 1 tablet by mouth 2 (Two) Times a Day. 180 tablet 3    ipratropium (ATROVENT) 0.06 % nasal spray 2 sprays into the nostril(s) as directed by provider 4 (Four) Times a Day As Needed for Rhinitis (congestion). 15 mL 0    ipratropium-albuterol (DUO-NEB) 0.5-2.5 mg/3 ml nebulizer Take 3 mL by nebulization 4 (Four) Times a Day As Needed for Wheezing. 60 mL 11    lactulose (CHRONULAC) 10 GM/15ML solution TAKE 15 ML BY MOUTH THREE TIMES DAILY 1350 mL 2    levothyroxine (SYNTHROID, LEVOTHROID) 100 MCG tablet TAKE ONE TABLET BY MOUTH EVERY DAY 90 tablet 0    lidocaine (XYLOCAINE) 5 % ointment Apply 1 Application topically to the appropriate area as directed 3 (Three) Times a Day. (Patient taking differently: Apply  1 Application topically to the appropriate area as directed 3 (Three) Times a Day. To back) 50 g 3    Magnesium 500 MG tablet Take 50 mg by mouth Daily.      Misc. Devices (Roller Walker) misc Use 1 each Daily. 1 each 0    ondansetron (ZOFRAN) 4 MG tablet Take 1 tablet by mouth Every 8 (Eight) Hours As Needed.      potassium chloride 10 MEQ CR tablet Take 1 tablet by mouth 2 (Two) Times a Day. 180 tablet 0    pramipexole (MIRAPEX) 1.5 MG tablet TAKE ONE TABLET BY MOUTH EVERY NIGHT AT BEDTIME 90 tablet 0    Senna Plus 8.6-50 MG per tablet TAKE TWO TABLETS BY MOUTH TWICE DAILY 60 tablet 0    sertraline (ZOLOFT) 25 MG tablet Take 1 tablet by mouth Daily. 90 tablet 3    spironolactone (Aldactone) 50 MG tablet Take 1 tablet by mouth Daily for 5 days. 5 tablet 0    triamcinolone (KENALOG) 0.1 % cream Apply 1 application topically to the appropriate area as directed 2 (Two) Times a Day. 15 g 2    valsartan (DIOVAN) 320 MG tablet TAKE ONE TABLET BY MOUTH DAILY 90 tablet 0    vitamin B-12 (CYANOCOBALAMIN) 1000 MCG tablet Take 1 tablet by mouth Daily.      Zinc 50 MG tablet Take 1 tablet by mouth Daily.           PROCEDURES  Procedures            PROGRESS, DATA ANALYSIS, CONSULTS, AND MEDICAL DECISION MAKING  All labs have been independently interpreted by me.  All radiology studies have been reviewed by me. All EKG's have been independently viewed and interpreted by me.  Discussion below represents my analysis of pertinent findings related to patient's condition, differential diagnosis, treatment plan and final disposition.    DIFFERENTIAL    My differential diagnosis includes but is not limited to sprain, meniscus injury, osteoarthritis, fracture    Clinical Scores:                  ED Course as of 05/27/25 1913   Tue May 27, 2025   3937 XR Knee 1 or 2 View Bilateral  My independent interpretation of the imaging study is osteoarthritis, no acute fracture [AB]      ED Course User Index  [AB] Abdiaziz Muller MD     Patient,  counseled her on all of her imaging results.  She does have some osteoarthritis of the bilateral knees, no acute findings.  We discussed utilization of a knee immobilizer however it is not required and patient thinks it will create more difficulty instead of left difficulty and has declined.  Ultimately I recommend she follow-up with her orthopedist, she can call daily to see if they have a sooner appointment as they could not get her in for couple weeks.  Additionally she does report that her weight is up today by about 4 pounds, she is not having any symptoms.  I recommend that if her weight is up tomorrow as well that she call her cardiologist to discuss medication titration.  If she develops any shortness of breath or significant increase in her lower extremity edema she can always return to the ER for additional evaluation.  She is agreeable with this plan.        BP - 177/78  HR - 74  TEMP - 98 °F (36.7 °C) (Oral)  O2 SATS - 96%    COMPLEXITY OF CARE  Admission was considered but after careful review of the patient's presentation, physical examination, diagnostic results, and response to treatment the patient may be safely discharged with outpatient follow-up.      DIAGNOSIS  Final diagnoses:   Acute pain of right knee   Osteoarthritis of right knee, unspecified osteoarthritis type         DISPOSITION  ED Disposition       ED Disposition   Discharge    Condition   Stable    Comment   --                  FOLLOW UP  Radha Olivera, APRN  [address] [address] [phone]          [facility] EMERGENCY DEPARTMENT  [address] [facility] [address] [phone]    If symptoms worsen or any concerns        Prescribed Medications     Medication List        New Prescriptions      Diclofenac Sodium 1 % gel gel  Commonly known as: Voltaren  Apply 4 g topically to the appropriate area as directed 3 (Three) Times a Day As Needed (pain).            Changed       lidocaine 5 % ointment  Commonly known as: XYLOCAINE  Apply 1 Application topically to the appropriate area as directed 3 (Three) Times a Day.  What changed: additional instructions               Where to Get Your Medications        These medications were sent to [facility] - [place], [place] - [address] - [phone]  - [phone] FX  [address], [facility] [address]      Phone: [phone]   Diclofenac Sodium 1 % gel gel                   Please note that portions of this document were completed with a voice recognition program.    Note Disclaimer: At [facility], we believe that sharing information builds trust and better relationships. You are receiving this note because you recently visited [facility]. It is possible you will see health information before a provider has talked with you about it. This kind of information can be easy to misunderstand. To help you fully understand what it means for your health, we urge you to discuss this note with your provider.         Mehreen Schumacher PA-C  05/27/25 1913

## 2025-05-27 NOTE — ED PROVIDER NOTES
MD ATTESTATION NOTE  I supervised care provided by the APC. We have discussed this patient's history, physical exam, and treatment plan. I have reviewed the APC's note and I agree with the APC's findings and plan of care.   SHARED VISIT: This visit was performed by BOTH a physician and an APC. The substantive portion of the medical decision making was performed by this attesting physician who made or approved the management plan and takes responsibility for patient management. All studies in the APC note (if performed) were independently interpreted by me.   I have personally had a face to face encounter with the patient.     PCP: Kehrer, Meredith Lea, MD  Patient Care Team:  Kehrer, Meredith Lea, MD as PCP - General (Family Medicine)  Chris Bear MD as Referring Physician (Hospitalist)  Delfino Scruggs MD as Consulting Physician (Hematology and Oncology)  Truong Hernandez MD as Consulting Physician (Nephrology)  Anna Marsh MD as Consulting Physician (Cardiology)  Radha Olivera APRN as Nurse Practitioner (Orthopedic Surgery)  Nicolette Cervantes RN as Ambulatory  ([facility])     Latanya Olsen is a 84 y.o. female who presents to the ED c/o right knee pain.  Patient has had 2 days of right knee pain.  She denies any falls or injuries.  She denies leg swelling.  She has lymphedema states she has had 4 pounds of weight gain since yesterday but denies any shortness of breath.  She is on 2 L at all times and is denied any recent change.  She denies shortness of breath or chest pain.    On exam:  General: NAD.  Head: NCAT.  ENT: nares patent, no scleral icterus  Neck: Supple, trachea midline.  Cardiac: regular rate and rhythm.  Lungs: normal effort, clear to auscultation bilaterally  Abdomen: Soft, nondistended, NTTP, no rebound tenderness, no guarding or rigidity.   Extremities: Right knee has swelling on both sides as well as superiorly without noted effusion or deformity,  distal pulses intact   neuro: alert, MAEW, follows commands  Psych: calm, cooperative  Skin: Warm, dry.    Medical Decision Making:  After the initial H&P, I discussed pertinent information from history and physical exam with patient/family.  Discussed differential diagnosis.  Discussed plan for ED evaluation/work-up/treatment.  All questions answered.  Patient/family is agreeable with plan.    ED Course as of 05/27/25 1900   Tue May 27, 2025   1757 XR Knee 1 or 2 View Bilateral  My independent interpretation of the imaging study is osteoarthritis, no acute fracture [AB]      ED Course User Index  [AB] Abdiaziz Muller MD       Diagnosis  Final diagnoses:   Acute pain of right knee   Osteoarthritis of right knee, unspecified osteoarthritis type        Abdiaziz Muller MD  05/27/25 1900

## 2025-05-27 NOTE — CASE MANAGEMENT/SOCIAL WORK
Discharge Planning Assessment  [facility]     Patient Name: Latanya Olsen  MRN: [id number]  Today's Date: 5/27/2025    Admit Date: 5/27/2025        Discharge Needs Assessment    No documentation.                  Discharge Plan       Row Name 05/27/25 1803       Plan    Plan Comments Patient is current w/[facility]                  Continued Care and Services - Admitted Since 5/27/2025       Home Medical Care       Service Provider Request Status Services Address Phone Fax Patient Preferred    [facility] Pending - Request Sent -- [address], [address], [facility] [address] [phone] --                  Selected Continued Care - Episodes Includes continued care and service providers with selected services from the active episodes listed below          Selected Continued Care - Prior Encounters Includes continued care and service providers with selected services from prior encounters from 2/26/2025 to 5/27/2025      Discharged on 4/27/2025 Admission date: 4/25/2025 - Discharge disposition: [place] [facility]      [place]       Service Provider Services Address Phone Fax Patient Preferred    [facility] [address], [address], [facility] [address] [phone] --                             Demographic Summary    No documentation.                  Functional Status    No documentation.                  Psychosocial    No documentation.                  Abuse/Neglect    No documentation.                  Legal    No documentation.                  Substance Abuse    No documentation.                  Patient Forms    No documentation.                     Ashlie Lr RN

## 2025-05-28 ENCOUNTER — TELEPHONE (OUTPATIENT)
Dept: ORTHOPEDIC SURGERY | Facility: CLINIC | Age: 85
End: 2025-05-28

## 2025-05-28 NOTE — TELEPHONE ENCOUNTER
Caller: Latanya Olsen    Relationship to patient: Self    Best call back number: [phone]    Chief complaint: RIGHT KNEE     Type of visit: FOLLOW UP     Requested date: ASAP      If rescheduling, when is the original appointment: 8-5-25 - RESCHEDULED TO 6-11-25     Additional notes:PATIENT WAS SEEN AT [facility] ER 5-27-25 DUE TO PAIN LEVEL. SHE WOULD LIKE TO SEE TUAN ASAP AND STATES THAT TUAN TOLD HER SHE WOULD GET HER IN ANYTIME SHE NEEDED TO SEE. OKAY TO LEAVE A VOICEMAIL. PLEASE ADVISE.

## 2025-05-29 ENCOUNTER — READMISSION MANAGEMENT (OUTPATIENT)
Dept: CALL CENTER | Facility: HOSPITAL | Age: 85
End: 2025-05-29
Payer: MEDICARE

## 2025-05-29 NOTE — OUTREACH NOTE
CHF Week 3 Survey      Flowsheet Row Responses   [facility] facility patient discharged from? [place]   Does the patient have one of the following disease processes/diagnoses(primary or secondary)? CHF   Week 3 attempt successful? No   Unsuccessful attempts Attempt 1            Marbella Randhawa Registered Nurse

## 2025-05-30 ENCOUNTER — OFFICE VISIT (OUTPATIENT)
Dept: ORTHOPEDIC SURGERY | Facility: CLINIC | Age: 85
End: 2025-05-30
Payer: MEDICARE

## 2025-05-30 VITALS — HEIGHT: 65 IN | WEIGHT: 224 LBS | BODY MASS INDEX: 37.32 KG/M2

## 2025-05-30 DIAGNOSIS — M70.52 PES ANSERINUS BURSITIS OF LEFT KNEE: ICD-10-CM

## 2025-05-30 DIAGNOSIS — R59.1 LYMPHADENOPATHY: ICD-10-CM

## 2025-05-30 DIAGNOSIS — M17.11 PRIMARY OSTEOARTHRITIS OF RIGHT KNEE: Primary | ICD-10-CM

## 2025-05-30 NOTE — PROGRESS NOTES
Subjective:     Patient ID: Latanya Olsen is a 84 y.o. female.    Chief Complaint:  Follow-up DJD right knee  Corticosteroid injection right knee 4/22/2025  History of Present Illness  History of Present Illness  The patient is an 84-year-old female who returns to the clinic today for follow-up on bilateral knee pain. She received a corticosteroid injection for treatment of pes anserine bursitis in the left knee, which seems to be doing quite well. However, she continues to experience pain and swelling in the right knee with minimal symptom improvement. She presents to the clinic today for further evaluation. She reports a sensation of instability in the knee, describing it as feeling like it might give way. She rates her pain as 7 to 8 out of 10, characterizing it as achy, throbbing, sharp, and shooting in nature, particularly during weight-bearing activities. She does not report any known injury to the knee or any other concerns.         Social History     Occupational History     Employer: RETIRED   Tobacco Use    Smoking status: Passive Smoke Exposure - Never Smoker     Passive exposure: Never    Smokeless tobacco: Never    Tobacco comments:     Never   Vaping Use    Vaping status: Never Used   Substance and Sexual Activity    Alcohol use: Never     Comment: Patient is non drinker.    Drug use: Never     Comment: Drug Abuse: none    Sexual activity: Not Currently     Birth control/protection: Coitus interruptus, Nexplanon, Tubal ligation      Past Medical History:   Diagnosis Date    Abnormal ECG Aug 2023    Acute deep vein thrombosis (DVT) of iliac vein of left lower extremity 05/07/2021    Acute deep vein thrombosis (DVT) of iliac vein of left lower extremity 03/24/2021    Acute embolism and thrombosis of left iliac vein     Acute exacerbation of CHF (congestive heart failure) 08/27/2023    Anemia     Anemia 02/19/2016    Anemia, unspecified 08/12/2021    Anxiety 4-2-23    Arthritis     Arthritis of back      Arthritis of neck     Asthma 1/1/2006    Atrial fibrillation     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    Cataract 1-1/2015    Cervical disc disorder     Chronic constipation 02/19/2016    Chronic deep vein thrombosis (DVT) of left femoral vein 08/12/2021    Chronic deep vein thrombosis (DVT) of left popliteal vein 08/12/2021    Chronic lower back pain 02/19/2016    Chronic obstructive pulmonary disease 02/19/2016    Chronic obstructive pulmonary disease, unspecified     CKD (chronic kidney disease)     Stage 3    Compression of vein 05/07/2021    Congenital heart disease Aug 2023    Constipation     Constipation 03/10/2021    COPD (chronic obstructive pulmonary disease)     DDD (degenerative disc disease), lumbar     Deep vein thrombosis 2019    Dependence on other enabling machines and devices     CPAP    Depression     Diverticulosis     Diverticulosis of intestine 02/19/2016    Elevated serum alkaline phosphatase level 02/22/2016    Essential (primary) hypertension     Foot pain 02/19/2016    Fracture of hip     Fracture, femur     Gastroparesis 02/19/2016    Generalized osteoarthritis 02/19/2016    Generalized weakness 03/06/2021    GERD (gastroesophageal reflux disease)     Hip arthrosis     History of heart attack     7/2023    Hx of degenerative disc disease     Hyperlipidemia     Hyperlipidemia 02/19/2016    Hyperlipidemia, unspecified     Hypertension     Hypertension 02/19/2016    Hypokalemia     Hypokalemia 02/19/2016    Hypothyroidism     Hypothyroidism 02/19/2016    Hypothyroidism, unspecified     Insomnia 02/19/2016    Knee pain 02/19/2016    Knee swelling     Low back pain Yes    Low back strain     Lung cancer     history    Myocardial infarction 8-4-23    Neck pain 11/28/2017    Neutropenia 02/19/2016    Neutropenia, unspecified     Obesity 218    Obesity (BMI 30-39.9) 02/19/2016    Obstructive sleep apnea (adult) (pediatric)     Obstructive sleep apnea syndrome 02/19/2016    Osteoarthritis of knee  02/19/2016    Osteopenia Yes    Overweight (BMI 25.0-29.9) 02/19/2016    Pain of left breast 02/22/2016    Periarthritis of shoulder     Pulmonary embolism 2019    Renal disorder     Renal insufficiency 2019    Restless leg syndrome     Rotator cuff syndrome     Scoliosis Yes    Sleep apnea with use of continuous positive airway pressure (CPAP)     Spinal stenosis of lumbar region without neurogenic claudication 02/19/2016    Spinal stenosis, lumbar region without neurogenic claudication 08/12/2021    Status post total hip replacement, 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    Unspecified atrial fibrillation     Vitamin D deficiency 02/19/2016    Volume overload 03/19/2019    Weakness 08/12/2021    Weight gain 02/19/2016    Weight loss 02/19/2016     Past Surgical History:   Procedure Laterality Date    ADENOIDECTOMY  2000    BUNIONECTOMY Bilateral     CARDIAC CATHETERIZATION N/A 08/07/2023    Procedure: Left Heart Cath;  Surgeon: Mireya Park MD;  Location: [facility] CATH INVASIVE LOCATION;  Service: Cardiology;  Laterality: N/A;    CARDIAC CATHETERIZATION N/A 08/07/2023    Procedure: Coronary angiography;  Surgeon: Mireya Park MD;  Location: [facility] CATH INVASIVE LOCATION;  Service: Cardiology;  Laterality: N/A;    CARDIAC CATHETERIZATION N/A 08/07/2023    Procedure: Left ventriculography;  Surgeon: Mireya Park MD;  Location: [facility] CATH INVASIVE LOCATION;  Service: Cardiology;  Laterality: N/A;    CATARACT EXTRACTION      CHOLECYSTECTOMY      COLONOSCOPY      ENDOSCOPY N/A 06/24/2016    Procedure: ESOPHAGOGASTRODUODENOSCOPY  with biopsies;  Surgeon: Von Hernández MD;  Location: [facility] OR;  Service:     JOINT REPLACEMENT  Yes    LUNG LOBECTOMY Left     lower lobe    LYTIC THROMBIN THERAPY Left 03/26/2021    Procedure: left leg clot treiver possible venous stent *supine*;  Surgeon: Rogerio Vgea MD;  Location: [facility] OR 18/19;  Service: Vascular;  Laterality: Left;    REPLACEMENT TOTAL KNEE Left      "THYROID BIOPSY      TONSILLECTOMY  Yes    TOTAL HIP ARTHROPLASTY Right 06/01/2019    Procedure: BIPOLAR HIP CEMENTED POSTERIOR;  Surgeon: Ashley Crenshaw MD;  Location: [facility];  Service: Orthopedics    TRIGGER POINT INJECTION      TUBAL ABDOMINAL LIGATION  50 yrs ago       Family History   Problem Relation Age of Onset    Heart attack Mother     Coronary artery disease Mother     Hypertension Mother     Kidney disease Mother     Arthritis Mother     Heart disease Mother     Heart disease Father     Anesthesia problems Father     Heart attack Sister     Aortic aneurysm Brother         Abdominal    Cancer Brother         no details    Cancer Brother     Colon cancer Neg Hx     Colon polyps Neg Hx     Esophageal cancer Neg Hx     Breast cancer Neg Hx                Objective:  Physical Exam    General: No acute distress.  Eyes: conjunctiva clear; pupils equally round and reactive  ENT: external ears and nose atraumatic; oropharynx clear  CV: no peripheral edema  Resp: normal respiratory effort  Skin: no rashes or wounds; normal turgor  Psych: mood and affect appropriate; recent and remote memory intact    Vitals:    05/30/25 1417   Weight: 102 kg (224 lb)   Height: 165.1 cm (65\")         05/30/25  1417   Weight: 102 kg (224 lb)     Body mass index is 37.28 kg/m².      Ortho Exam     Physical Exam  Severe swelling noted in the right knee. Knee range of motion is passively 5 degrees to 90 degrees. The knee is stable to varus and valgus stress at 5 degrees and 30 degrees. A minimal palpable effusion is present. Positive crepitus throughout arc of motion. Maximal tenderness is along the medial compartment.    Imaging:  XR Knee 1 or 2 View Bilateral  Result Date: 5/27/2025   As described.    This report was finalized on 5/27/2025 6:10 PM by Dr. Gregory Baptiste M.D on Workstation: [employer]      Independently reviewed x-ray imaging 2 view right knee degenerative changes along the knee most severe along the " patellofemoral medial compartment no evidence of acute fracture dislocation or other acute osseous abnormality, imaging available for comparison previously completed ankle  Assessment:        1. Lymphadenopathy    2. Primary osteoarthritis of right knee    3. Pes anserinus bursitis of left knee         Assessment & Plan  1. Right knee pain.  She reports severe pain and swelling in the right knee, rating her pain at 7-8 out of 10. The pain is described as achy, throbbing, sharp, and shooting with all weightbearing activities. Physical examination reveals severe swelling, limited range of motion, palpable effusion, and maximal tenderness along the medial compartment. X-ray images were completed. Discussed with her that the compression stockings are cutting into her. A referral to the lymphadenopathy clinic will be made. Aspiration and viscosupplementation injection will be performed on the right knee. A Drawtex hinged brace will be provided for support and stability. She is encouraged to call with any questions or concerns.    Follow-up  The patient will follow up if symptoms persist.    PROCEDURE  The patient received a corticosteroid injection for treatment of pes anserine bursitis at the left knee.    Plan:  Large Joint Arthrocentesis: R knee  Date/Time: 5/30/2025 3:00 PM  Consent given by: patient  Site marked: site marked  Timeout: Immediately prior to procedure a time out was called to verify the correct patient, procedure, equipment, support staff and site/side marked as required   Supporting Documentation  Indications: pain   Procedure Details  Location: knee - R knee  Preparation: Patient was prepped and draped in the usual sterile fashion  Needle size: 18 G  Approach: superior  Medications administered: 88 mg Hyaluronan 88 MG/4ML  Aspirate amount: 5 mL  Aspirate: serous and bloody  Patient tolerance: patient tolerated the procedure well with no immediate complications        Orders:  Orders Placed This  Encounter   Procedures    Large Joint Arthrocentesis: R knee    Ambulatory Referral to Lymphedema Clinic     No orders of the defined types were placed in this encounter.        Melvinon dictation utilized          Patient or patient representative verbalized consent for the use of Ambient Listening during the visit with  DEVORA Goff for chart documentation. 5/30/2025  17:13 EDT

## 2025-06-02 NOTE — PROGRESS NOTES
RM:________     PCP: Kehrer, Meredith Lea, MD    : 1940  AGE: 84 y.o.  EST PATIENT           Wt Readings from Last 3 Encounters:   25 102 kg (224 lb)   25 102 kg (224 lb 13.9 oz)   25 102 kg (224 lb 13.9 oz)           CP______  SOA_______ DIZZINESS _____ FATIGUE ______  PALPS ______    WT: ____________ BP: __________L __________R HR______    ALLERGIES:Doxycycline      Social History     Tobacco Use    Smoking status: Passive Smoke Exposure - Never Smoker     Passive exposure: Never    Smokeless tobacco: Never    Tobacco comments:     Never   Vaping Use    Vaping status: Never Used   Substance Use Topics    Alcohol use: Never     Comment: Patient is non drinker.    Drug use: Never     Comment: Drug Abuse: none

## 2025-06-03 ENCOUNTER — OFFICE VISIT (OUTPATIENT)
Age: 85
End: 2025-06-03
Payer: MEDICARE

## 2025-06-03 VITALS
SYSTOLIC BLOOD PRESSURE: 122 MMHG | WEIGHT: 225 LBS | HEIGHT: 65 IN | BODY MASS INDEX: 37.49 KG/M2 | DIASTOLIC BLOOD PRESSURE: 60 MMHG | HEART RATE: 65 BPM

## 2025-06-03 DIAGNOSIS — G47.33 OBSTRUCTIVE SLEEP APNEA SYNDROME: ICD-10-CM

## 2025-06-03 DIAGNOSIS — R26.81 GAIT INSTABILITY: ICD-10-CM

## 2025-06-03 DIAGNOSIS — I10 PRIMARY HYPERTENSION: ICD-10-CM

## 2025-06-03 DIAGNOSIS — E78.2 MIXED HYPERLIPIDEMIA: ICD-10-CM

## 2025-06-03 DIAGNOSIS — M17.11 PRIMARY OSTEOARTHRITIS OF RIGHT KNEE: Primary | ICD-10-CM

## 2025-06-03 DIAGNOSIS — I48.0 PAROXYSMAL ATRIAL FIBRILLATION: ICD-10-CM

## 2025-06-03 DIAGNOSIS — N18.2 CKD (CHRONIC KIDNEY DISEASE) STAGE 2, GFR 60-89 ML/MIN: Primary | ICD-10-CM

## 2025-06-03 DIAGNOSIS — I50.32 CHRONIC DIASTOLIC CONGESTIVE HEART FAILURE: ICD-10-CM

## 2025-06-03 DIAGNOSIS — I50.23 ACUTE ON CHRONIC HFREF (HEART FAILURE WITH REDUCED EJECTION FRACTION): ICD-10-CM

## 2025-06-03 RX ORDER — BUMETANIDE 1 MG/1
1 TABLET ORAL TAKE AS DIRECTED
Qty: 350 TABLET | Refills: 3 | Status: SHIPPED | OUTPATIENT
Start: 2025-06-03

## 2025-06-03 NOTE — PROGRESS NOTES
CARDIOLOGY    Anna Marsh MD    ENCOUNTER DATE:  06/03/2025    Latanya Olsen / 84 y.o. / female        CHIEF COMPLAINT / REASON FOR OFFICE VISIT     Congestive Heart Failure      HISTORY OF PRESENT ILLNESS       Congestive Heart Failure        Latanya Olsen is a 84 y.o. female     This is a patient who previously followed with Dr. Park but has switched so she can see me in the [place] office.  She has paroxysmal atrial fibrillation, chronic kidney disease, COPD status post lung cancer with left lower lobectomy in 2006, hypertension, hypothyroid, and obstructive sleep apnea on CPAP.  She had a non-ST elevation myocardial infarction in 08/2023 with sudden onset of chest tightness and pain radiating to her back with shortness of breath.  In the ED, her EKG was unremarkable.  Her troponin was elevated at 792 with hemoglobin of 11.6 and a normal pro-BNP.  Creatinine was 1.42, which was at baseline.  She had a heart catheterization which showed normal coronary arteries and wall motion abnormalities consistent with Takotsubo cardiomyopathy.  Echocardiogram 08/07/2023 put her ejection fraction at 40-45% with severe hypokinesis of the distal anterior wall, distal septum, and distal apical segments.       She was readmitted to the hospital in 08/2023 with some fluid overload and was diuresed and put back on all diuretics.       Repeat echo in January 2024 showed normal LV function with grade 1 diastolic dysfunction and no valve disease.     Later in January 2024 she called the office complaining of shortness of breath, chest tightness and weight gain.  She was seen by DEVORA Solomon in the office.  She had increased her Lasix for 3 days prior to seeing Amanda and was feeling better.  Amanda obtain blood work and her BNP was normal and down from where it had been in August 2023.  Renal function was at baseline at that time.     She had a trip to the emergency room in 05/2024 with shortness of  "breath and left leg swelling after she hit her leg with her car door.  She had a negative left lower extremity Doppler for DVT.  Her pro-BNP was normal.  Since that time, she said Dr. Hernandez changed her Lasix to Bumex and her edema improved.  She was started on home oxygen.     She had a recent trip to the ER due to lower extremity edema and shortness of breath.  Her BNP was a little bit higher than it had been but was still technically in the normal range.  She has been very uncomfortable swelling and shortness of breath    VITAL SIGNS     Visit Vitals  /60 (BP Location: Left arm)   Pulse 65   Ht 165.1 cm (65\")   Wt 102 kg (225 lb)   BMI 37.44 kg/m²         Wt Readings from Last 3 Encounters:   06/03/25 102 kg (225 lb)   05/30/25 102 kg (224 lb)   05/27/25 102 kg (224 lb 13.9 oz)     Body mass index is 37.44 kg/m².      PHYSICAL EXAMINATION     Physical Exam      REVIEWED DATA       ECG 12 Lead    Date/Time: 6/3/2025 1:29 PM  Performed by: Anna Marsh MD    Authorized by: Anna Marsh MD  Comparison: compared with previous ECG from 4/25/2025  Similar to previous ECG  Rhythm: sinus rhythm  BPM: 65  Conduction: conduction normal  ST Segments: ST segments normal  T Waves: T waves normal    Clinical impression: normal ECG            Lipid Panel          12/6/2024    10:44   Lipid Panel   Total Cholesterol 178    Triglycerides 102    HDL Cholesterol 51    VLDL Cholesterol 19    LDL Cholesterol  108        Lab Results   Component Value Date    GLUCOSE 113 (H) 04/27/2025    BUN 42 (H) 04/27/2025    CREATININE 1.44 (H) 04/27/2025     04/27/2025    K 5.0 04/27/2025     04/27/2025    CALCIUM 8.9 04/27/2025    PROTEINTOT 6.4 04/25/2025    ALBUMIN 3.9 04/25/2025    ALT 7 04/25/2025    AST 8 04/25/2025    ALKPHOS 99 04/25/2025    BILITOT 0.3 04/25/2025    GLOB 2.5 04/25/2025    AGRATIO 1.6 04/25/2025    BCR 29.2 (H) 04/27/2025    ANIONGAP 10.0 04/27/2025    EGFR 35.7 (L) 04/27/2025       ASSESSMENT " & PLAN      Diagnosis Plan   1. CKD (chronic kidney disease) stage 2, GFR 60-89 ml/min  Basic Metabolic Panel    Basic Metabolic Panel            Chronic heart failure with preserved ejection fraction.  She did have a stress induced cardiomyopathy with echocardiogram in 01/2024 showing her LV function had normalized.  I think her edema is multifactorial.  She is uncomfortable at this time with lower extremity edema and shortness of breath.  I am going to have her increase Bumex to 2 mg twice daily for 3 days and then take 2 mg in the morning and 1 mg in the afternoon.  She is to get a BMP in 1 week.  History of stress induced cardiomyopathy in 08/2023.  Hypertension.  Valsartan 320 mg a day, carvedilol 12.5 mg twice daily, hydralazine 25 mg twice daily.  Her blood pressure is well-controlled.  Note diuretic adjustment as above.    Hyperlipidemia.  Not currently on therapy for cholesterol.  Chronic kidney disease.  Followed by Dr. Hernandez.  History of paroxysmal atrial fibrillation, in sinus rhythm today.  Anticoagulated with Eliquis.  Not having any bleeding problems  Chronic lymphedema.       BMP in 1 week and follow-up with me in 4 weeks.    Orders Placed This Encounter   Procedures    Basic Metabolic Panel     Standing Status:   Future     Expected Date:   6/8/2025     Expiration Date:   9/3/2026     Release to patient:   Routine Release [[id number]]    Basic Metabolic Panel     Standing Status:   Future     Expected Date:   6/8/2025     Expiration Date:   9/3/2026     Scheduling Instructions:      Geovanni Gurrola     Release to patient:   Routine Release [[id number]]    ECG 12 Lead     This order was created via procedure documentation     Release to patient:   Routine Release [[id number]]           MEDICATIONS         Discharge Medications            Accurate as of Bindu 3, 2025  1:34 PM. If you have any questions, ask your nurse or doctor.                Changes to Medications        Instructions Start Date    bumetanide 1 MG tablet  Commonly known as: BUMEX  What changed:   when to take this  additional instructions  Changed by: Anna Lash   1 mg, Oral, Take As Directed, 2mg PO twice a day for 3 days then 2mg in the AM and 1mg in the afternoon      lidocaine 5 % ointment  Commonly known as: XYLOCAINE  What changed: additional instructions   1 Application, Topical, 3 Times Daily             Continue These Medications        Instructions Start Date   acetaminophen 325 MG tablet  Commonly known as: TYLENOL   650 mg, Oral, Every 4 Hours PRN      albuterol sulfate  (90 Base) MCG/ACT inhaler  Commonly known as: PROVENTIL HFA;VENTOLIN HFA;PROAIR HFA   2 puffs, Every 4 Hours PRN      apixaban 5 MG tablet tablet  Commonly known as: Eliquis   5 mg, Oral, Every 12 Hours Scheduled      carvedilol 12.5 MG tablet  Commonly known as: COREG   12.5 mg, Oral, 2 Times Daily With Meals      coenzyme Q10 100 MG capsule   100 mg, Daily      cyclobenzaprine 10 MG tablet  Commonly known as: FLEXERIL   10 mg, Oral, 2 Times Daily PRN      Diclofenac Sodium 1 % gel gel  Commonly known as: Voltaren   4 g, Topical, 3 Times Daily PRN      esomeprazole 40 MG capsule  Commonly known as: nexIUM   40 mg, Every Morning Before Breakfast      ferrous sulfate 325 (65 FE) MG tablet   325 mg, Oral, Daily With Breakfast      fluticasone 50 MCG/ACT nasal spray  Commonly known as: FLONASE       fluticasone 50 MCG/ACT nasal spray  Commonly known as: FLONASE   2 sprays, Nasal, Daily      folic acid 1 MG tablet  Commonly known as: FOLVITE   1 mg, Oral, Daily      gabapentin 100 MG capsule  Commonly known as: NEURONTIN   100 mg, Oral, 3 Times Daily      hydrALAZINE 25 MG tablet  Commonly known as: APRESOLINE   25 mg, Oral, 2 Times Daily      ipratropium 0.06 % nasal spray  Commonly known as: ATROVENT   2 sprays, Nasal, 4 Times Daily PRN      ipratropium-albuterol 0.5-2.5 mg/3 ml nebulizer  Commonly known as: DUO-NEB   3 mL, Nebulization, 4 Times Daily  PRN      lactulose 10 GM/15ML solution  Commonly known as: CHRONULAC   TAKE 15 ML BY MOUTH THREE TIMES DAILY      levothyroxine 100 MCG tablet  Commonly known as: SYNTHROID, LEVOTHROID   100 mcg, Oral, Daily      Magnesium 500 MG tablet   50 mg, Daily      ondansetron 4 MG tablet  Commonly known as: ZOFRAN   Take 1 tablet by mouth Every 8 (Eight) Hours As Needed.      potassium chloride 10 MEQ CR tablet   10 mEq, Oral, 2 Times Daily      pramipexole 1.5 MG tablet  Commonly known as: MIRAPEX   1.5 mg, Oral, Every Night at Bedtime      Roller Walker misc   1 each, Not Applicable, Daily      Senna Plus 8.6-50 MG per tablet  Generic drug: sennosides-docusate   2 tablets, Oral, Every 12 Hours Scheduled      sertraline 25 MG tablet  Commonly known as: ZOLOFT   25 mg, Oral, Daily      Trelegy Ellipta 100-62.5-25 MCG/ACT inhaler  Generic drug: Fluticasone-Umeclidin-Vilant   1 puff, Inhalation, Daily - RT      triamcinolone 0.1 % cream  Commonly known as: KENALOG   1 application , Topical, 2 Times Daily      valsartan 320 MG tablet  Commonly known as: DIOVAN   320 mg, Oral, Daily      vitamin B-12 1000 MCG tablet  Commonly known as: CYANOCOBALAMIN   1,000 mcg, Daily      Zinc 50 MG tablet   1 tablet, Daily                 Anan Marsh MD  06/03/25  13:34 EDT    Part of this note may be an electronic transcription/translation of spoken language to printed text using the Dragon dictation system.

## 2025-06-03 NOTE — TELEPHONE ENCOUNTER
Rx Refill Note  Requested Prescriptions     Pending Prescriptions Disp Refills    levothyroxine (SYNTHROID, LEVOTHROID) 100 MCG tablet [Pharmacy Med Name: levothyroxine 100 mcg tablet] 90 tablet 0     Sig: TAKE ONE TABLET BY MOUTH EVERY DAY      Last office visit with prescribing clinician: 5/1/2025   Last telemedicine visit with prescribing clinician: Visit date not found   Next office visit with prescribing clinician: 6/6/2025                         Would you like a call back once the refill request has been completed: [] Yes [] No    If the office needs to give you a call back, can they leave a voicemail: [] Yes [] No    Tere Johnson MA  06/03/25, 16:30 EDT

## 2025-06-04 DIAGNOSIS — I10 ESSENTIAL HYPERTENSION: ICD-10-CM

## 2025-06-04 RX ORDER — VALSARTAN 320 MG/1
320 TABLET ORAL DAILY
Qty: 90 TABLET | Refills: 0 | Status: SHIPPED | OUTPATIENT
Start: 2025-06-04

## 2025-06-04 RX ORDER — LEVOTHYROXINE SODIUM 100 UG/1
100 TABLET ORAL DAILY
Qty: 90 TABLET | Refills: 1 | Status: SHIPPED | OUTPATIENT
Start: 2025-06-04

## 2025-06-09 ENCOUNTER — TELEPHONE (OUTPATIENT)
Dept: CARDIOLOGY | Age: 85
End: 2025-06-09
Payer: MEDICARE

## 2025-06-09 NOTE — TELEPHONE ENCOUNTER
Called and left VM. Will continue to try to reach patient. HUB transfer call to triage.     Jackie Romero RN  Triage [facility] [place]

## 2025-06-09 NOTE — TELEPHONE ENCOUNTER
Can you call her and see how she feels? I increased her diuretic but her Cr went up. Is her swelling better or the same?    Subjective   Patient ID: Roger is a 56 year old male.    Chief Complaint   Patient presents with   • Follow-up     Pt states he feel backwards on ice back in Jan and has been in some pain, pt was doing better after C3-C7 Fusion 06/08/21   • Refill Request     Gabapentin     Patient follows up today had a slip on the ice landed on his back in January with pain going down the right leg and right arm.  Patient reports symptoms have not improved.  Patient has had fusions of the cervical and lumbar spine is still symptomatic.    Roger has a past medical history of Back pain, CAD (coronary artery disease), Cervical spondylosis, Essential hypertension, Family history of adverse reaction to anesthesia, Lumbar postlaminectomy syndrome, Mixed hyperlipidemia, PONV (postoperative nausea and vomiting), Sleep apnea, and Spinal stenosis.  Roger has CAD (coronary artery disease); Essential hypertension; Mixed hyperlipidemia; Sacroiliitis, not elsewhere classified (CMS/HCC); Lumbar radiculopathy, chronic; Cervical spondylosis; Sacroiliitis (CMS/HCC); and Neuropathy on their problem list.  Roger has a past surgical history that includes Laminectomy; Colonoscopy; EGD; Angioplasty (2016); Spinal cord stimulator implant (03/13/2020); spinal cord stimulator trial w/ laminotomy; and Lumbar fusion.  His family history includes Diabetes in his mother; Other in his brother, brother, brother, and father.  Roger reports that he has never smoked. He has never used smokeless tobacco. He reports current alcohol use of about 3.0 standard drinks of alcohol per week. He reports that he does not use drugs.  Roger has a current medication list which includes the following prescription(s): celecoxib, gabapentin, amitriptyline, losartan, ezetimibe, rosuvastatin, aspirin, and methylprednisolone.  Roger   Current Outpatient Medications   Medication Sig Dispense Refill   • celecoxib (CeleBREX) 200 MG capsule      • gabapentin (NEURONTIN) 600  MG tablet Take 1 tablet by mouth 3 times daily. 270 tablet 3   • amitriptyline (ELAVIL) 25 MG tablet TAKE 1 TABLET BY MOUTH EVERY DAY AT NIGHT 30 tablet 0   • losartan (COZAAR) 100 MG tablet Take 1 tablet by mouth daily. 90 tablet 3   • ezetimibe (ZETIA) 10 MG tablet Take 1 tablet by mouth daily. 90 tablet 1   • rosuvastatin (CRESTOR) 40 MG tablet Take 1 tablet by mouth every evening. 90 tablet 1   • aspirin (ECOTRIN) 81 MG EC tablet Take 81 mg by mouth daily.     • methylPREDNISolone (MEDROL DOSEPAK) 4 MG tablet follow package directions 21 tablet 0     No current facility-administered medications for this visit.     Roger is allergic to amlodipine and hydrochlorothiazide.    Review of Systems   Musculoskeletal: Positive for back pain.   All other systems reviewed and are negative.    Objective   Physical Exam  Vitals reviewed.   HENT:      Head: Normocephalic.      Nose: Nose normal.      Mouth/Throat:      Pharynx: Oropharynx is clear.      Neck: Neck supple.   Eyes:      Extraocular Movements: Extraocular movements intact.   Cardiovascular:      Rate and Rhythm: Normal rate.      Pulses: Normal pulses.   Pulmonary:      Effort: Pulmonary effort is normal.   Abdominal:      Palpations: Abdomen is soft.   Musculoskeletal:         General: Tenderness present.   Skin:     General: Skin is warm and dry.   Neurological:      General: No focal deficit present.      Mental Status: He is alert and oriented to person, place, and time.      Comments: Positive SLR   Psychiatric:         Mood and Affect: Mood normal.         Behavior: Behavior normal.       Assessment   Problem List Items Addressed This Visit        Neuro    Lumbar radiculopathy, chronic - Primary    Relevant Medications    gabapentin (NEURONTIN) 600 MG tablet    Other Relevant Orders    MRI LUMBAR SPINE WO CONTRAST (Completed)    Cervical spondylosis    Relevant Medications    gabapentin (NEURONTIN) 600 MG tablet    methylPREDNISolone (MEDROL DOSEPAK) 4  MG tablet    Other Relevant Orders    MRI CERVICAL SPINE WO CONTRAST (Completed)         1.  We will go ahead and send in a Medrol Dosepak for current exacerbation of symptoms.  2.  Since patient is still symptomatic after 2-1/2 months we will go ahead and get an MRI of the cervical and lumbar spine to further evaluate current clinical symptoms.  3.  Follow-up after MRI to go over the results.  This can be done over the phone since patient will be doing it at the hospital.    Total time spent with patient was greater than 20 minutes.   Greater than 50% of the time was spent in the direct care of the patient.

## 2025-06-09 NOTE — TELEPHONE ENCOUNTER
"Latanya OMEGA Pretty returned call.    Patient reports she is feeling \"pretty good\".  She states that her swelling is about the same as it was and has not noticed any improvement in the swelling.  She reports she is currently taking bumetanide 1 mg, 2 tablets in AM and 1 tablet in PM.    Please let me know if there is anything else you would like me to do for this patient.    Thank you,  Priscilla CALLAWAY RN  Triage Nurse [facility]  06/09/25   14:44 EDT   "

## 2025-06-10 ENCOUNTER — PATIENT OUTREACH (OUTPATIENT)
Dept: CASE MANAGEMENT | Facility: OTHER | Age: 85
End: 2025-06-10
Payer: MEDICARE

## 2025-06-10 NOTE — OUTREACH NOTE
AMBULATORY CASE MANAGEMENT NOTE    Names and Relationships of Patient/Support Persons:  -     Patient Outreach    Pt has been unable to reach x3 following recent admission by this ACM. Pt has seen her PCP.         Nicolette SANDERSON  Ambulatory Case Management    6/10/2025, 12:01 EDT

## 2025-06-11 ENCOUNTER — HOSPITAL ENCOUNTER (EMERGENCY)
Facility: HOSPITAL | Age: 85
Discharge: HOME | End: 2025-06-11
Attending: EMERGENCY MEDICINE | Admitting: EMERGENCY MEDICINE
Payer: MEDICARE

## 2025-06-11 VITALS
RESPIRATION RATE: 16 BRPM | HEART RATE: 68 BPM | BODY MASS INDEX: 37.49 KG/M2 | DIASTOLIC BLOOD PRESSURE: 63 MMHG | SYSTOLIC BLOOD PRESSURE: 155 MMHG | WEIGHT: 225 LBS | HEIGHT: 65 IN | TEMPERATURE: 98.5 F | OXYGEN SATURATION: 94 %

## 2025-06-11 DIAGNOSIS — N28.9 ACUTE ON CHRONIC RENAL INSUFFICIENCY: Primary | ICD-10-CM

## 2025-06-11 DIAGNOSIS — N18.9 ACUTE ON CHRONIC RENAL INSUFFICIENCY: Primary | ICD-10-CM

## 2025-06-11 DIAGNOSIS — R60.0 PEDAL EDEMA: ICD-10-CM

## 2025-06-11 LAB
ALBUMIN SERPL-MCNC: 3.8 G/DL (ref 3.5–5.2)
ALBUMIN/GLOB SERPL: 1.6 G/DL
ALP SERPL-CCNC: 90 U/L (ref 39–117)
ALT SERPL W P-5'-P-CCNC: 12 U/L (ref 1–33)
ANION GAP SERPL CALCULATED.3IONS-SCNC: 10 MMOL/L (ref 5–15)
AST SERPL-CCNC: 13 U/L (ref 1–32)
BACTERIA UR QL AUTO: ABNORMAL /HPF
BASOPHILS # BLD AUTO: 0.02 10*3/MM3 (ref 0–0.2)
BASOPHILS NFR BLD AUTO: 0.3 % (ref 0–1.5)
BILIRUB SERPL-MCNC: 0.3 MG/DL (ref 0–1.2)
BILIRUB UR QL STRIP: NEGATIVE
BUN SERPL-MCNC: 38 MG/DL (ref 8–23)
BUN/CREAT SERPL: 21.3 (ref 7–25)
CALCIUM SPEC-SCNC: 9.2 MG/DL (ref 8.6–10.5)
CHLORIDE SERPL-SCNC: 103 MMOL/L (ref 98–107)
CLARITY UR: CLEAR
CO2 SERPL-SCNC: 26 MMOL/L (ref 22–29)
COLOR UR: YELLOW
CREAT SERPL-MCNC: 1.78 MG/DL (ref 0.57–1)
DEPRECATED RDW RBC AUTO: 48.3 FL (ref 37–54)
EGFRCR SERPLBLD CKD-EPI 2021: 27.7 ML/MIN/1.73
EOSINOPHIL # BLD AUTO: 0.09 10*3/MM3 (ref 0–0.4)
EOSINOPHIL NFR BLD AUTO: 1.3 % (ref 0.3–6.2)
ERYTHROCYTE [DISTWIDTH] IN BLOOD BY AUTOMATED COUNT: 14.5 % (ref 12.3–15.4)
GLOBULIN UR ELPH-MCNC: 2.4 GM/DL
GLUCOSE SERPL-MCNC: 99 MG/DL (ref 65–99)
GLUCOSE UR STRIP-MCNC: NEGATIVE MG/DL
HCT VFR BLD AUTO: 31.9 % (ref 34–46.6)
HGB BLD-MCNC: 10.3 G/DL (ref 12–15.9)
HGB UR QL STRIP.AUTO: NEGATIVE
HOLD SPECIMEN: NORMAL
HOLD SPECIMEN: NORMAL
HYALINE CASTS UR QL AUTO: ABNORMAL /LPF
IMM GRANULOCYTES # BLD AUTO: 0.06 10*3/MM3 (ref 0–0.05)
IMM GRANULOCYTES NFR BLD AUTO: 0.9 % (ref 0–0.5)
KETONES UR QL STRIP: NEGATIVE
LEUKOCYTE ESTERASE UR QL STRIP.AUTO: ABNORMAL
LYMPHOCYTES # BLD AUTO: 2.33 10*3/MM3 (ref 0.7–3.1)
LYMPHOCYTES NFR BLD AUTO: 33.9 % (ref 19.6–45.3)
MCH RBC QN AUTO: 29.6 PG (ref 26.6–33)
MCHC RBC AUTO-ENTMCNC: 32.3 G/DL (ref 31.5–35.7)
MCV RBC AUTO: 91.7 FL (ref 79–97)
MONOCYTES # BLD AUTO: 0.71 10*3/MM3 (ref 0.1–0.9)
MONOCYTES NFR BLD AUTO: 10.3 % (ref 5–12)
NEUTROPHILS NFR BLD AUTO: 3.67 10*3/MM3 (ref 1.7–7)
NEUTROPHILS NFR BLD AUTO: 53.3 % (ref 42.7–76)
NITRITE UR QL STRIP: NEGATIVE
NRBC BLD AUTO-RTO: 0 /100 WBC (ref 0–0.2)
NT-PROBNP SERPL-MCNC: 370 PG/ML (ref 0–1800)
PH UR STRIP.AUTO: 7 [PH] (ref 5–8)
PLATELET # BLD AUTO: 171 10*3/MM3 (ref 140–450)
PMV BLD AUTO: 9.6 FL (ref 6–12)
POTASSIUM SERPL-SCNC: 4.5 MMOL/L (ref 3.5–5.2)
PROT SERPL-MCNC: 6.2 G/DL (ref 6–8.5)
PROT UR QL STRIP: NEGATIVE
RBC # BLD AUTO: 3.48 10*6/MM3 (ref 3.77–5.28)
RBC # UR STRIP: ABNORMAL /HPF
REF LAB TEST METHOD: ABNORMAL
SODIUM SERPL-SCNC: 139 MMOL/L (ref 136–145)
SP GR UR STRIP: 1.01 (ref 1–1.03)
SQUAMOUS #/AREA URNS HPF: ABNORMAL /HPF
UROBILINOGEN UR QL STRIP: ABNORMAL
WBC # UR STRIP: ABNORMAL /HPF
WBC NRBC COR # BLD AUTO: 6.88 10*3/MM3 (ref 3.4–10.8)
WHOLE BLOOD HOLD COAG: NORMAL
WHOLE BLOOD HOLD SPECIMEN: NORMAL

## 2025-06-11 PROCEDURE — 83880 ASSAY OF NATRIURETIC PEPTIDE: CPT

## 2025-06-11 PROCEDURE — 99283 EMERGENCY DEPT VISIT LOW MDM: CPT

## 2025-06-11 PROCEDURE — 81001 URINALYSIS AUTO W/SCOPE: CPT | Performed by: EMERGENCY MEDICINE

## 2025-06-11 PROCEDURE — 85025 COMPLETE CBC W/AUTO DIFF WBC: CPT

## 2025-06-11 PROCEDURE — 96374 THER/PROPH/DIAG INJ IV PUSH: CPT

## 2025-06-11 PROCEDURE — 25010000002 BUMETANIDE PER 0.5 MG

## 2025-06-11 PROCEDURE — 36415 COLL VENOUS BLD VENIPUNCTURE: CPT

## 2025-06-11 PROCEDURE — 80053 COMPREHEN METABOLIC PANEL: CPT

## 2025-06-11 RX ORDER — BUMETANIDE 0.25 MG/ML
2 INJECTION, SOLUTION INTRAMUSCULAR; INTRAVENOUS ONCE
Status: COMPLETED | OUTPATIENT
Start: 2025-06-11 | End: 2025-06-11

## 2025-06-11 RX ADMIN — BUMETANIDE 2 MG: 0.25 INJECTION INTRAMUSCULAR; INTRAVENOUS at 18:26

## 2025-06-11 NOTE — TELEPHONE ENCOUNTER
I reviewed her most recent blood work.  Does she have a nephrologist?  If so she needs to reach out to that MD about further adjustments to her diuretics given that her creatinine is abnormal at 2.23.  If she does not have a nephrologist, I can make a referral.

## 2025-06-11 NOTE — TELEPHONE ENCOUNTER
She probably needs to come to the emergency room and get checked out.  It sounds like she is having a lot of issues and she is already on a lot of medications and her renal function is not normal.

## 2025-06-11 NOTE — DISCHARGE PLACEMENT REQUEST
"Latanya Troncoso (84 y.o. Female)       Date of Birth   1940    Social Security Number       Address   [address] [place] [address]    Home Phone       MRN   [id number]       [employer]   [facility]    Marital Status                               Admission Date   6/11/2025    Admission Type   Emergency    Admitting Provider       Attending Provider       Department, Room/Bed   [facility] EMERGENCY DEPARTMENT, 37/37       Discharge Date       Discharge Disposition       Discharge Destination                                 Attending Provider: (none)   Allergies: Doxycycline    Isolation: None   Infection: None   Code Status: Prior    Ht: 165.1 cm (65\")   Wt: 102 kg (225 lb)    Admission Cmt: None   Principal Problem: None                  Active Insurance as of 6/11/2025       Primary Coverage       Payor Plan Insurance Group Employer/Plan Group    MEDICARE MEDICARE A & B        Payor Plan Address Payor Plan Phone Number Payor Plan Fax Number Effective Dates    [address] [phone]  9/1/2004 - None Entered    [facility] [address]         Subscriber Name Subscriber Birth Date Member ID       LATANYA TRONCOSO 1940 [id number]               Secondary Coverage       Payor Plan Insurance Group Employer/Plan Group    AARP MC SUP [employer] HEALTH CARE OPTIONS        Payor Plan Address Payor Plan Phone Number Payor Plan Fax Number Effective Dates    [facility] [phone]  1/1/2023 - None Entered    [address]       [facility] [address]         Subscriber Name Subscriber Birth Date Member ID       LATANYA TRONCOSO 1940 [id number]                     Emergency Contacts        (Rel.) Home Phone Work Phone Mobile Phone    Terri Lucero (Friend) -- -- [phone]    Alfred Le (niece) (Relative) -- -- [phone]    PrettyPhani (Son) -- -- [phone]                "

## 2025-06-11 NOTE — TELEPHONE ENCOUNTER
Pt called and spoke to Dr. Duggan's office and then called us back. Pt said Dr. Duggan is on jury duty and won't be able to see her for at least a week. They don't have any other providers at other locations where she is willing to go. She said her legs are very swollen and she doesn't know what to do. Also, she is having positional dizzy spells (when changing positions). Any further recommendations?    Thank you,    Glenys Han, RN  Triage [facility]  06/11/25 09:49 EDT

## 2025-06-11 NOTE — ED PROVIDER NOTES
EMERGENCY DEPARTMENT ENCOUNTER    Room Number:  37/37  Date of encounter:  6/11/2025  PCP: Kehrer, Meredith Lea, MD  Historian: Patient  Full history not obtainable due to: None    HPI:  Chief Complaint: Abnormal lab    Context: Latanya Olsen is a 84 y.o. female with a PMH significant for carcinoid tumor of lung, diverticulosis of intestine, SIERRA, obesity, hypokalemia, hypothyroidism, generalized osteoarthritis, gastroparesis, foot pain, COPD, NSTEMI, CKD stage III who presents to the ED c/o abnormal lab.  Patient had an elevated creatinine, was told by her cardiologist to go and see a nephrologist.  Her nephrologist is Dr. Jose wesley so she is unable to make an appointment to see him in a timely manner.  Patient called by her cardiologist who told her she should come to the emergency department.  Patient says that she recently has had her Bumex dose changed, notes that she is still having leg swelling that is not decreasing.  Patient notes bilateral leg swelling, denies any chest pain, does endorse chronic shortness of breath that is not any worse at this point.       MEDICAL RECORD REVIEW:    Outside medical record was a phone call from Dr. Marsh today 6/11/2025 at 9:20 AM..  Dr. Marsh discussed that patient should see her nephrologist as patient was having a new creatinine of 2.23    PAST MEDICAL HISTORY    Active Ambulatory Problems     Diagnosis Date Noted    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    Diverticulosis of intestine 02/19/2016    Obstructive sleep apnea syndrome 02/19/2016    Weight loss 02/19/2016    Vitamin D deficiency 02/19/2016    Overweight (BMI 25.0-29.9) 02/19/2016    Spinal stenosis of lumbar region without neurogenic claudication 02/19/2016    Osteoarthritis of knee 02/19/2016    Obesity (BMI 30-39.9) 02/19/2016    Chronic lower back pain 02/19/2016    Knee pain 02/19/2016    Insomnia 02/19/2016    Hypothyroidism 02/19/2016    Hypokalemia 02/19/2016    Primary hypertension 02/19/2016     Mixed hyperlipidemia 02/19/2016    Generalized osteoarthritis 02/19/2016    Gastroparesis 02/19/2016    Foot pain 02/19/2016    Chronic obstructive pulmonary disease 02/19/2016    Chronic constipation 02/19/2016    Anemia 02/19/2016    Weight gain 02/19/2016    Pain of left breast 02/22/2016    Elevated serum alkaline phosphatase level 02/22/2016    Neck pain 11/28/2017    Volume overload 03/19/2019    Status post total hip replacement, 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    Generalized weakness 03/06/2021    Constipation 03/10/2021    Paroxysmal atrial fibrillation 04/20/2021    Idiopathic peripheral neuropathy 06/24/2022    Lymphedema 08/01/2022    Lumbar degenerative disc disease 01/31/2023    GERD (gastroesophageal reflux disease) 02/16/2023    Lung cancer 02/16/2023    Non-STEMI (non-ST elevated myocardial infarction) 08/06/2023    Nonischemic nontraumatic myocardial injury 08/10/2023    CKD (chronic kidney disease) stage 3, GFR 30-59 ml/min 11/30/2023    Adjustment disorder with depressed mood 11/30/2023    Peripheral polyneuropathy 11/30/2023    Chronic diastolic congestive heart failure 03/05/2024    MCKEON (dyspnea on exertion) 03/05/2024    Edema of left lower extremity 06/11/2024    Venous (peripheral) insufficiency 06/12/2024    Venous stasis 06/12/2024    Hematoma 06/12/2024    Iliac vein thrombosis, 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    Presence of inferior vena cava filter 08/14/2024    Leg hematoma, left, sequela 08/14/2024    Lymphadenopathy 05/30/2025     Resolved Ambulatory Problems     Diagnosis Date Noted    Finger injury 02/19/2016    Upper respiratory tract infection 02/19/2016    Contusion of upper arm 02/19/2016    Tingling of skin 02/19/2016    Rash 02/19/2016    Candidiasis of mouth 02/19/2016    Neutropenia 02/19/2016    Spasm 02/19/2016    Low back pain 02/19/2016    Heartburn 02/19/2016    Diarrhea 02/19/2016    Bloating symptom 02/19/2016    Gastroesophageal reflux disease with esophagitis 02/19/2016    Drug-induced  photosensitivity 02/19/2016    Cramp of limb 02/19/2016    Contact dermatitis 02/19/2016    Chronic kidney disease, stage II (mild) 02/19/2016    Ankle joint pain 02/19/2016    Atopic rhinitis 02/19/2016    Allergic reaction to drug 02/19/2016    Acute sinusitis 02/19/2016    Generalized abdominal pain 02/19/2016    Closed fracture of neck of right femur 05/31/2019    ARF (acute renal failure) 03/07/2021    Acute deep vein thrombosis (DVT) of iliac vein of left lower extremity 03/24/2021    Acute deep vein thrombosis (DVT) of tibial vein of left lower extremity 04/20/2021    Hyperhomocysteinemia 05/14/2021    Acute exacerbation of CHF (congestive heart failure) 08/27/2023    Acute systolic heart failure 08/28/2023    Acute on chronic HFrEF (heart failure with reduced ejection fraction) 08/28/2023    Chronic systolic heart failure 08/28/2023    Shortness of breath 05/13/2024     Past Medical History:   Diagnosis Date    Abnormal ECG Aug 2023    Acute embolism and thrombosis of left iliac vein     Anemia, unspecified 08/12/2021    Anxiety 4-2-23    Arthritis     Arthritis of back     Arthritis of neck     Asthma 1/1/2006    Atrial fibrillation     Cataract 1-1/2015    Cervical disc disorder     Chronic deep vein thrombosis (DVT) of left femoral vein 08/12/2021    Chronic deep vein thrombosis (DVT) of left popliteal vein 08/12/2021    Chronic obstructive pulmonary disease, unspecified     CKD (chronic kidney disease)     Compression of vein 05/07/2021    Congenital heart disease Aug 2023    COPD (chronic obstructive pulmonary disease)     DDD (degenerative disc disease), lumbar     Deep vein thrombosis 2019    Dependence on other enabling machines and devices     Depression     Diverticulosis     Essential (primary) hypertension     Fracture of hip     Fracture, femur     Hip arthrosis     History of heart attack     Hx of degenerative disc disease     Hyperlipidemia     Hyperlipidemia 02/19/2016    Hyperlipidemia,  unspecified     Hypertension     Hypertension 02/19/2016    Hypothyroidism, unspecified     Knee swelling     Low back strain     Myocardial infarction 8-4-23    Neutropenia, unspecified     Obesity 218    Obstructive sleep apnea (adult) (pediatric)     Osteopenia Yes    Periarthritis of shoulder     Pulmonary embolism 2019    Renal disorder     Renal insufficiency 2019    Restless leg syndrome     Rotator cuff syndrome     Scoliosis Yes    Sleep apnea with use of continuous positive airway pressure (CPAP)     Spinal stenosis, lumbar region without neurogenic claudication 08/12/2021    Unspecified atrial fibrillation     Weakness 08/12/2021         PAST SURGICAL HISTORY  Past Surgical History:   Procedure Laterality Date    ADENOIDECTOMY  2000    BUNIONECTOMY Bilateral     CARDIAC CATHETERIZATION N/A 08/07/2023    Procedure: Left Heart Cath;  Surgeon: Mireya Park MD;  Location: [facility] CATH INVASIVE LOCATION;  Service: Cardiology;  Laterality: N/A;    CARDIAC CATHETERIZATION N/A 08/07/2023    Procedure: Coronary angiography;  Surgeon: Mireya Park MD;  Location: [facility] CATH INVASIVE LOCATION;  Service: Cardiology;  Laterality: N/A;    CARDIAC CATHETERIZATION N/A 08/07/2023    Procedure: Left ventriculography;  Surgeon: Mireya Park MD;  Location: [facility] CATH INVASIVE LOCATION;  Service: Cardiology;  Laterality: N/A;    CATARACT EXTRACTION      CHOLECYSTECTOMY      COLONOSCOPY      ENDOSCOPY N/A 06/24/2016    Procedure: ESOPHAGOGASTRODUODENOSCOPY  with biopsies;  Surgeon: Von Hernández MD;  Location: [facility] OR;  Service:     JOINT REPLACEMENT  Yes    LUNG LOBECTOMY Left     lower lobe    LYTIC THROMBIN THERAPY Left 03/26/2021    Procedure: left leg clot treiver possible venous stent *supine*;  Surgeon: Rogerio Vega MD;  Location: [facility] OR 18/19;  Service: Vascular;  Laterality: Left;    REPLACEMENT TOTAL KNEE Left     THYROID BIOPSY      TONSILLECTOMY  Yes    TOTAL HIP  ARTHROPLASTY Right 06/01/2019    Procedure: BIPOLAR HIP CEMENTED POSTERIOR;  Surgeon: Ashley Crenshaw MD;  Location: [facility] OR;  Service: Orthopedics    TRIGGER POINT INJECTION      TUBAL ABDOMINAL LIGATION  50 yrs ago         FAMILY HISTORY  Family History   Problem Relation Age of Onset    Heart attack Mother     Coronary artery disease Mother     Hypertension Mother     Kidney disease Mother     Arthritis Mother     Heart disease Mother     Heart disease Father     Anesthesia problems Father     Heart attack Sister     Aortic aneurysm Brother         Abdominal    Cancer Brother         no details    Cancer Brother     Colon cancer Neg Hx     Colon polyps Neg Hx     Esophageal cancer Neg Hx     Breast cancer Neg Hx          SOCIAL HISTORY  Social History     Socioeconomic History    Marital status:    Tobacco Use    Smoking status: Passive Smoke Exposure - Never Smoker     Passive exposure: Yes    Smokeless tobacco: Never    Tobacco comments:     Never   Vaping Use    Vaping status: Never Used   Substance and Sexual Activity    Alcohol use: Never     Comment: Patient is non drinker.    Drug use: Never     Comment: Drug Abuse: none    Sexual activity: Not Currently     Birth control/protection: Coitus interruptus, Nexplanon, Tubal ligation         ALLERGIES  Doxycycline        REVIEW OF SYSTEMS    All systems reviewed and marked as negative except as listed in HPI     PHYSICAL EXAM    I have reviewed the triage vital signs and nursing notes.    ED Triage Vitals [06/11/25 1536]   Temp Heart Rate Resp BP SpO2   98.2 °F (36.8 °C) 71 18 150/72 92 %      Temp src Heart Rate Source Patient Position BP Location FiO2 (%)   -- -- -- -- --       Physical Exam  Constitutional:       General: She is not in acute distress.     Appearance: Normal appearance. She is not ill-appearing.   HENT:      Head: Normocephalic and atraumatic.      Nose: Nose normal.   Eyes:      Extraocular Movements: Extraocular  movements intact.      Pupils: Pupils are equal, round, and reactive to light.   Cardiovascular:      Rate and Rhythm: Normal rate and regular rhythm.      Pulses: Normal pulses.      Heart sounds: Normal heart sounds.   Pulmonary:      Effort: Pulmonary effort is normal. No respiratory distress.      Breath sounds: Normal breath sounds.   Abdominal:      General: Abdomen is flat.      Palpations: Abdomen is soft.   Musculoskeletal:      Right lower leg: Edema present.      Left lower leg: Edema present.      Comments: 2+ edema bilaterally   Skin:     General: Skin is warm and dry.      Coloration: Skin is not jaundiced.   Neurological:      Mental Status: She is alert and oriented to person, place, and time.   Psychiatric:         Mood and Affect: Mood normal.         Behavior: Behavior normal.         Thought Content: Thought content normal.         Vital signs and nursing notes reviewed.      LAB RESULTS  Recent Results (from the past 24 hours)   Comprehensive Metabolic Panel    Collection Time: 06/11/25  3:45 PM    Specimen: Arm, Left; Blood   Result Value Ref Range    Glucose 99 65 - 99 mg/dL    BUN 38.0 (H) 8.0 - 23.0 mg/dL    Creatinine 1.78 (H) 0.57 - 1.00 mg/dL    Sodium 139 136 - 145 mmol/L    Potassium 4.5 3.5 - 5.2 mmol/L    Chloride 103 98 - 107 mmol/L    CO2 26.0 22.0 - 29.0 mmol/L    Calcium 9.2 8.6 - 10.5 mg/dL    Total Protein 6.2 6.0 - 8.5 g/dL    Albumin 3.8 3.5 - 5.2 g/dL    ALT (SGPT) 12 1 - 33 U/L    AST (SGOT) 13 1 - 32 U/L    Alkaline Phosphatase 90 39 - 117 U/L    Total Bilirubin 0.3 0.0 - 1.2 mg/dL    Globulin 2.4 gm/dL    A/G Ratio 1.6 g/dL    BUN/Creatinine Ratio 21.3 7.0 - 25.0    Anion Gap 10.0 5.0 - 15.0 mmol/L    eGFR 27.7 (L) >60.0 mL/min/1.73   Green Top (Gel)    Collection Time: 06/11/25  3:45 PM   Result Value Ref Range    Extra Tube Hold for add-ons.    Lavender Top    Collection Time: 06/11/25  3:45 PM   Result Value Ref Range    Extra Tube hold for add-on    Gold Top - SST     Collection Time: 06/11/25  3:45 PM   Result Value Ref Range    Extra Tube Hold for add-ons.    Light Blue Top    Collection Time: 06/11/25  3:45 PM   Result Value Ref Range    Extra Tube Hold for add-ons.    CBC Auto Differential    Collection Time: 06/11/25  3:45 PM    Specimen: Arm, Left; Blood   Result Value Ref Range    WBC 6.88 3.40 - 10.80 10*3/mm3    RBC 3.48 (L) 3.77 - 5.28 10*6/mm3    Hemoglobin 10.3 (L) 12.0 - 15.9 g/dL    Hematocrit 31.9 (L) 34.0 - 46.6 %    MCV 91.7 79.0 - 97.0 fL    MCH 29.6 26.6 - 33.0 pg    MCHC 32.3 31.5 - 35.7 g/dL    RDW 14.5 12.3 - 15.4 %    RDW-SD 48.3 37.0 - 54.0 fl    MPV 9.6 6.0 - 12.0 fL    Platelets 171 140 - 450 10*3/mm3    Neutrophil % 53.3 42.7 - 76.0 %    Lymphocyte % 33.9 19.6 - 45.3 %    Monocyte % 10.3 5.0 - 12.0 %    Eosinophil % 1.3 0.3 - 6.2 %    Basophil % 0.3 0.0 - 1.5 %    Immature Grans % 0.9 (H) 0.0 - 0.5 %    Neutrophils, Absolute 3.67 1.70 - 7.00 10*3/mm3    Lymphocytes, Absolute 2.33 0.70 - 3.10 10*3/mm3    Monocytes, Absolute 0.71 0.10 - 0.90 10*3/mm3    Eosinophils, Absolute 0.09 0.00 - 0.40 10*3/mm3    Basophils, Absolute 0.02 0.00 - 0.20 10*3/mm3    Immature Grans, Absolute 0.06 (H) 0.00 - 0.05 10*3/mm3    nRBC 0.0 0.0 - 0.2 /100 WBC   BNP    Collection Time: 06/11/25  3:45 PM    Specimen: Arm, Left; Blood   Result Value Ref Range    proBNP 370.0 0.0 - 1,800.0 pg/mL   Urinalysis With Microscopic If Indicated (No Culture) - Urine, Clean Catch    Collection Time: 06/11/25  7:25 PM    Specimen: Urine, Clean Catch   Result Value Ref Range    Color, UA Yellow Yellow, Straw    Appearance, UA Clear Clear    pH, UA 7.0 5.0 - 8.0    Specific Gravity, UA 1.009 1.005 - 1.030    Glucose, UA Negative Negative    Ketones, UA Negative Negative    Bilirubin, UA Negative Negative    Blood, UA Negative Negative    Protein, UA Negative Negative    Leuk Esterase, UA Trace (A) Negative    Nitrite, UA Negative Negative    Urobilinogen, UA 0.2 E.U./dL 0.2 - 1.0 E.U./dL    Urinalysis, Microscopic Only - Urine, Clean Catch    Collection Time: 06/11/25  7:25 PM    Specimen: Urine, Clean Catch   Result Value Ref Range    RBC, UA 3-5 (A) None Seen, 0-2 /HPF    WBC, UA 0-2 None Seen, 0-2 /HPF    Bacteria, UA None Seen None Seen /HPF    Squamous Epithelial Cells, UA 3-6 (A) None Seen, 0-2 /HPF    Hyaline Casts, UA 0-2 None Seen /LPF    Methodology Manual Light Microscopy        Ordered the above labs and independently reviewed the results.        RADIOLOGY  No Radiology Exams Resulted Within Past 24 Hours    I ordered the above noted radiological studies. Independently reviewed by me and discussed with radiologist.  See dictation above for official radiology interpretation.      MEDICATIONS GIVEN IN ER    Medications   bumetanide (BUMEX) injection 2 mg (2 mg Intravenous Given 6/11/25 1826)         PROGRESS, DATA ANALYSIS, CONSULTS, AND MEDICAL DECISION MAKING    All labs have been independently interpreted by me.  All radiology studies have been interpreted by me.  Discussion below represents my analysis of pertinent findings related to patient's condition, differential diagnosis, treatment plan and final disposition.    Patient was initially sent in for creatinine of 2.23, in the emergency department today creatinine was 1.76.  Patient given IV Bumex 2 mg.  Patient's lab work looks overall normal for the patient, no significant electrolyte derangement, , patient not having increased work of breathing or dyspnea.  At this time patient is safe for discharge and follow-up with her PCP, encouraged to see neurology and have repeat labs within the next week.    - Chronic or social conditions impacting care: Chronic kidney disease, COPD      DIFFERENTIAL DIAGNOSIS INCLUDE BUT NOT LIMITED TO: CHF exacerbation, lymphedema, BERLIN, medication changes causing kidney injury      Orders placed during this visit:  Orders Placed This Encounter   Procedures    [place] Draw    Comprehensive Metabolic  Panel    CBC Auto Differential    Urinalysis With Microscopic If Indicated (No Culture) - Urine, Clean Catch    BNP    Urinalysis, Microscopic Only - Urine, Clean Catch    CBC & Differential    Green Top (Gel)    Lavender Top    Gold Top - SST    Light Blue Top         ED Course as of 06/11/25 2022 Wed Jun 11, 2025 1820 proBNP: 370.0 [WH]   1821 WBC: 6.88 [WH]   1821 Hemoglobin(!): 10.3 [WH]   1821 Glucose: 99 [WH]   1821 BUN(!): 38.0 [WH]      ED Course User Index  [WH] Escobar Martinez MD       AS OF 20:22 EDT VITALS:    BP - 147/62  HR - 59  TEMP - 98.2 °F (36.8 °C)  02 SATS - 92%    I rechecked the patient.  I discussed the patient's labs, radiology findings (including all incidental findings), diagnosis, and plan for discharge.  A repeat exam reveals no new worrisome changes from my initial exam findings.  The patient understands that the fact that they are being discharged does not denote that nothing is abnormal, it indicates that no clinical emergency is present and that they must follow-up as directed in order to properly maintain their health.  Follow-up instructions (specifically listed below) and return to ER precautions were given at this time.  I specifically instructed the patient to follow-up with their PCP.  The patient understands and agrees with the plan, and is ready for discharge.  All questions answered.    Kehrer, Meredith Lea, MD  [address]  [place] [id number] [phone]    Go in 2 days  As needed         Medication List        Changed      lidocaine 5 % ointment  Commonly known as: XYLOCAINE  Apply 1 Application topically to the appropriate area as directed 3 (Three) Times a Day.  What changed: additional instructions              DIAGNOSIS  Final diagnoses:   Acute on chronic renal insufficiency   Pedal edema         DISPOSITION  Discharge    Pt masked in first look. I wore a surgical mask throughout my encounters with the pt. I performed hand hygiene on entry  into the pt room and upon exit.     Dictated utilizing Dragon dictation     Note Disclaimer: At [facility], we believe that sharing information builds trust and better relationships. You are receiving this note because you recently visited [facility]. It is possible you will see health information before a provider has talked with you about it. This kind of information can be easy to misunderstand. To help you fully understand what it means for your health, we urge you to discuss this note with your provider.      Franklin Restrepo PA-C  06/11/25 2023       Franklin Restrepo PA-C  06/11/25 2023

## 2025-06-11 NOTE — ED PROVIDER NOTES
MD ATTESTATION NOTE      Brief HPI: Patient presents to the ED for evaluation of abnormal creatinine.  She had labs drawn on 6/7/2025 and was found to have a BUN of 48 and creatinine of 2.23.  Several days before having labs drawn, her Bumex dose was increased from a total of 2 mg daily to 4 mg daily.  She took 4 mg daily for several days and dose was then decreased to a total of 3 mg daily.  She has chronic shortness of breath which is unchanged.  She has had increased leg swelling over the past several days.  She contacted Dr. Marsh, her cardiologist, earlier today and was advised to come to the ED for further evaluation.  She tried to contact Dr. Stanley, her nephrologist, but he was out of the office.  Denies chest pain, cough, or fever.    PHYSICAL EXAM    GENERAL: Awake, alert, and oriented x 3.  Resting comfortably in no acute distress  HENT: nares patent  EYES: no scleral icterus  CV: regular rhythm, normal rate  RESPIRATORY: normal effort, CTAB  ABDOMEN: soft  MUSCULOSKELETAL: no deformity, extremities are nontender, there is bilateral pedal edema  NEURO: moves all extremities, follows commands, speech is clear and fluent, no facial droop  PSYCH:  calm, cooperative  SKIN: warm, dry    Vital signs and nursing notes reviewed.        Plan: Patient presented to the ED with reports of abnormal renal function according to labs drawn 4 days ago.  Creatinine is better today at 1.78.  She denies worsening shortness of breath.  She does have some increased leg swelling.  She will be given a dose of IV Bumex.    ED Course as of 06/11/25 2048 Wed Jun 11, 2025 1820 proBNP: 370.0 []   1821 WBC: 6.88 []   1821 Hemoglobin(!): 10.3 []   1821 Glucose: 99 []   1821 BUN(!): 38.0 []      ED Course User Index  [] Escobar Martinez MD Holland, William D, MD  06/11/25 2048

## 2025-06-11 NOTE — TELEPHONE ENCOUNTER
Pt called back. Went over results and recommendations from Dr. Marsh. Pt verbalized understanding.    Dr. Marsh,    Pt does have a nephrologist: Dr. Duggan. She is going to call him and let him know this information.    Thank you,    Glenys Han, RN  [place] [facility] [place]  06/11/25 09:34 EDT

## 2025-06-11 NOTE — TELEPHONE ENCOUNTER
Notified pt of recommendations from Dr. Marsh. Pt verbalized understanding and will report to the ER.    Thank you,    Glenys Han, RN  [place] [facility] [place]  06/11/25 12:29 EDT

## 2025-06-11 NOTE — TELEPHONE ENCOUNTER
Left voicemail for Latanya Olsen requesting callback.    HUB: please transfer to Triage if patient returns call    Thank you,  Priscilla CALLAWAY RN  Triage Nurse [facility]  06/11/25   09:24 EDT

## 2025-06-12 ENCOUNTER — OFFICE VISIT (OUTPATIENT)
Dept: FAMILY MEDICINE CLINIC | Facility: CLINIC | Age: 85
End: 2025-06-12
Payer: MEDICARE

## 2025-06-12 VITALS
HEART RATE: 73 BPM | TEMPERATURE: 98.3 F | OXYGEN SATURATION: 97 % | DIASTOLIC BLOOD PRESSURE: 60 MMHG | BODY MASS INDEX: 36.96 KG/M2 | SYSTOLIC BLOOD PRESSURE: 112 MMHG | HEIGHT: 65 IN | WEIGHT: 221.8 LBS

## 2025-06-12 DIAGNOSIS — R60.9 CHRONIC EDEMA: ICD-10-CM

## 2025-06-12 DIAGNOSIS — I87.2 VENOUS INSUFFICIENCY OF BOTH LOWER EXTREMITIES: ICD-10-CM

## 2025-06-12 DIAGNOSIS — I50.22 CHRONIC SYSTOLIC HEART FAILURE: ICD-10-CM

## 2025-06-12 DIAGNOSIS — I10 ESSENTIAL HYPERTENSION: ICD-10-CM

## 2025-06-12 DIAGNOSIS — N17.9 ACUTE RENAL FAILURE, UNSPECIFIED ACUTE RENAL FAILURE TYPE: Primary | ICD-10-CM

## 2025-06-12 NOTE — DISCHARGE INSTRUCTIONS
Please follow-up with your PCP and nephrologist    Rest.  Increase fluid intake.  Elevate to reduce swelling.      Although you are being discharged in the ED today, I encourage you to return for worsening symptoms.  Things can, and do, change such a treatment at home with medication may not be adequate.  Specifically I recommend returning for chest pain or discomfort, difficulty breathing, persistent vomiting or difficulty holding down liquids or medications, fever > 102.0 F, or any other worsening or alarming symptoms.     Take meds as prescribed.     You have been evaluated in the emergency department for your presenting symptoms and deemed appropriate for discharge home.  Understand that your health care does not end here today.  It is important that your primary care physician be made aware of your visit.  I recommend calling your primary care provider in the next 48 hours to arrange follow-up care.  Your primary care provider needs to review your treatment and recovery to ensure that no further treatment is necessary.  Additional testing or procedures may be necessary as an outpatient at the discretion of your primary care provider.    I also recommend following up with your PCP for recheck of your blood pressure and treatment as needed.

## 2025-06-12 NOTE — PROGRESS NOTES
"Chief Complaint  Hospital Follow Up Visit (Dr allan sent her to er for high creatinine /Last night gave iv lasix )    Subjective        Latanya Olsen presents to [facility]  History of Present Illness  History of Present Illness  The patient is an 84-year-old female who presents for an emergency room follow-up.    She sought emergency care due to elevated creatinine levels and a general feeling of unwellness. Intravenous Lasix was administered, which resulted in some urination but insufficient to reduce the swelling in her legs. She reports persistent leg edema and has been unable to wear her support hose since her last visit. She has been using a lymphedema pump for 30 minutes each morning. Her weight was recorded as 224 pounds at home yesterday, and she is currently on a regimen of bumetanide, taking one dose in the morning and another in the afternoon.    She also reports increased shortness of breath and fatigue. She is not taking gabapentin regularly.       Objective   Vital Signs:  /60   Pulse 73   Temp 98.3 °F (36.8 °C)   Ht 165.1 cm (65\")   Wt 101 kg (221 lb 12.8 oz)   SpO2 97%   BMI 36.91 kg/m²   Estimated body mass index is 36.91 kg/m² as calculated from the following:    Height as of this encounter: 165.1 cm (65\").    Weight as of this encounter: 101 kg (221 lb 12.8 oz).               Physical Exam   Physical Exam  Respiratory: Clear to auscultation, no wheezing, rales or rhonchi, slightly decreased  Cardiovascular: Regular rate and rhythm, no murmurs, rubs, or gallops  Hose on lower extremities       Result Review :          Results  Labs   - Creatinine: 2.3, then went back down to 1.78   - BNP: Normal   Urinalysis, Microscopic Only - Urine, Clean Catch (06/11/2025 19:25)  Urinalysis With Microscopic If Indicated (No Culture) - Urine, Clean Catch (06/11/2025 19:25)  BNP (06/11/2025 15:45)  Comprehensive Metabolic Panel (06/11/2025 15:45)  CBC & Differential " (06/11/2025 15:45)  Discharge Placement Request by Anderson Bai RN (06/11/2025 16:34)  ED Provider Notes by Franklin Restrepo PA-C (06/11/2025 17:52)  ED Provider Notes by Escobar Martinez MD (06/11/2025 17:59)             Assessment and Plan     Diagnoses and all orders for this visit:    1. Acute renal failure, unspecified acute renal failure type (Primary)  -     Basic Metabolic Panel    2. Chronic systolic heart failure    3. Essential hypertension    4. Venous insufficiency of both lower extremities    5. Chronic edema      Assessment & Plan  1. Acute renal failure.  - Creatinine levels have fluctuated, reaching 2.3 before decreasing to 1.78.  - Received Lasix in the ER, leading to a slight reduction in weight from 224 pounds to 221 pounds.  - Basic metabolic panel will be conducted today to reassess electrolyte and creatinine levels.  - Currently taking bumetanide 1 mg in the morning and 1 mg in the afternoon; further adjustments may be necessary based on test results. If needed, a follow-up appointment with a nephrologist will be arranged sooner than her scheduled appointment in 10/2025.    2. Chronic congestive heart failure.  - No current evidence of acute heart failure; BNP levels are stable compared to a month ago when they were elevated.  - Reports increased shortness of breath, but no crackles detected in lungs.  - Advised to continue using support hose and lymphedema pump for 30 minutes each morning.  - Further evaluation will be necessary if symptoms worsen.    3. Fatigue.  - Persistent fatigue likely due to chronic medical conditions, including chronic congestive heart failure and kidney disease.  - On a low dose of gabapentin, which may contribute to tiredness.  - Advised that feeling tired is expected given her medical conditions and to monitor energy levels.            Follow Up     Return in about 6 months (around 12/12/2025).  Patient was given instructions and counseling  regarding her condition or for health maintenance advice. Please see specific information pulled into the AVS if appropriate.    Patient or patient representative verbalized consent for the use of Ambient Listening during the visit with  Meredith Lea Kehrer, MD for chart documentation. 6/12/2025  14:16 EDT

## 2025-06-13 ENCOUNTER — TELEPHONE (OUTPATIENT)
Dept: FAMILY MEDICINE CLINIC | Facility: CLINIC | Age: 85
End: 2025-06-13
Payer: MEDICARE

## 2025-06-13 LAB
BUN SERPL-MCNC: 45 MG/DL (ref 8–27)
BUN/CREAT SERPL: 24 (ref 12–28)
CALCIUM SERPL-MCNC: 9.4 MG/DL (ref 8.7–10.3)
CHLORIDE SERPL-SCNC: 103 MMOL/L (ref 96–106)
CO2 SERPL-SCNC: 23 MMOL/L (ref 20–29)
CREAT SERPL-MCNC: 1.91 MG/DL (ref 0.57–1)
EGFRCR SERPLBLD CKD-EPI 2021: 25 ML/MIN/1.73
GLUCOSE SERPL-MCNC: 102 MG/DL (ref 70–99)
POTASSIUM SERPL-SCNC: 4.3 MMOL/L (ref 3.5–5.2)
SODIUM SERPL-SCNC: 142 MMOL/L (ref 134–144)

## 2025-06-13 NOTE — TELEPHONE ENCOUNTER
Ashlie from [facility] called and they need a verbal order for another week of Physical Therapy and the Nurse, also a order for a Medical Social Worker Evaluation for Transportation.     [phone]

## 2025-06-18 RX ORDER — HYDRALAZINE HYDROCHLORIDE 25 MG/1
25 TABLET, FILM COATED ORAL 2 TIMES DAILY
Qty: 180 TABLET | Refills: 3 | Status: SHIPPED | OUTPATIENT
Start: 2025-06-18

## 2025-06-20 ENCOUNTER — TELEPHONE (OUTPATIENT)
Dept: CARDIOLOGY | Age: 85
End: 2025-06-20

## 2025-06-20 NOTE — TELEPHONE ENCOUNTER
Caller: Latanya Olsen    Relationship to patient: Self    Best call back number: [phone]     Patient is needing: PT MISSED CALL- NOTHING DOCUMENTED REGARDING ANYONE CALLING, IF SOMEONE DID PLS CALL PT AGAIN.

## 2025-06-23 ENCOUNTER — TELEPHONE (OUTPATIENT)
Dept: FAMILY MEDICINE CLINIC | Facility: CLINIC | Age: 85
End: 2025-06-23
Payer: MEDICARE

## 2025-06-23 DIAGNOSIS — I48.0 PAROXYSMAL ATRIAL FIBRILLATION: ICD-10-CM

## 2025-06-23 DIAGNOSIS — K59.00 CONSTIPATION, UNSPECIFIED CONSTIPATION TYPE: ICD-10-CM

## 2025-06-23 RX ORDER — PRAMIPEXOLE DIHYDROCHLORIDE 1.5 MG/1
1.5 TABLET ORAL
Qty: 90 TABLET | Refills: 1 | Status: SHIPPED | OUTPATIENT
Start: 2025-06-23

## 2025-06-23 RX ORDER — APIXABAN 5 MG/1
5 TABLET, FILM COATED ORAL EVERY 12 HOURS SCHEDULED
Qty: 180 TABLET | Refills: 1 | Status: SHIPPED | OUTPATIENT
Start: 2025-06-23

## 2025-06-23 RX ORDER — ASPIRIN 81 MG
2 TABLET, DELAYED RELEASE (ENTERIC COATED) ORAL EVERY 12 HOURS SCHEDULED
Qty: 360 TABLET | Refills: 1 | Status: SHIPPED | OUTPATIENT
Start: 2025-06-23

## 2025-06-23 NOTE — TELEPHONE ENCOUNTER
Rx Refill Note  Requested Prescriptions     Pending Prescriptions Disp Refills    Senna Plus 8.6-50 MG per tablet [Pharmacy Med Name: Senna Plus 8.6 mg-50 mg tablet] 360 tablet 1     Sig: TAKE TWO TABLETS BY MOUTH TWICE DAILY    pramipexole (MIRAPEX) 1.5 MG tablet [Pharmacy Med Name: pramipexole 1.5 mg tablet] 90 tablet 1     Sig: TAKE ONE TABLET BY MOUTH EVERY NIGHT AT BEDTIME    Eliquis 5 MG tablet tablet [Pharmacy Med Name: Eliquis 5 mg tablet] 180 tablet 1     Sig: TAKE ONE TABLET BY MOUTH EVERY TWELVE HOURS      Last office visit with prescribing clinician: 6/12/2025   Last telemedicine visit with prescribing clinician: Visit date not found   Next office visit with prescribing clinician: 12/8/2025                         Would you like a call back once the refill request has been completed: [] Yes [] No    If the office needs to give you a call back, can they leave a voicemail: [] Yes [] No    Tere Valadez MA  06/23/25, 08:39 EDT

## 2025-06-23 NOTE — TELEPHONE ENCOUNTER
Jenny with [facility] calling  needs recertification orders for 1 time a week for 6 weeks.   [phone]

## 2025-06-25 ENCOUNTER — TELEPHONE (OUTPATIENT)
Dept: FAMILY MEDICINE CLINIC | Facility: CLINIC | Age: 85
End: 2025-06-25
Payer: MEDICARE

## 2025-06-27 NOTE — PROGRESS NOTES
RM:________     PCP: Kehrer, Meredith Lea, MD    : 1940  AGE: 84 y.o.  EST PATIENT           Wt Readings from Last 3 Encounters:   25 101 kg (221 lb 12.8 oz)   25 102 kg (225 lb)   25 102 kg (225 lb)           CP______  SOA_______ DIZZINESS _____ FATIGUE ______  PALPS ______    WT: ____________ BP: __________L __________R HR______    ALLERGIES:Doxycycline      Social History     Tobacco Use    Smoking status: Never     Passive exposure: Yes    Smokeless tobacco: Never    Tobacco comments:     Never   Vaping Use    Vaping status: Never Used   Substance Use Topics    Alcohol use: Never     Comment: Patient is non drinker.    Drug use: Never     Comment: Drug Abuse: none

## 2025-07-01 ENCOUNTER — OFFICE VISIT (OUTPATIENT)
Age: 85
End: 2025-07-01
Payer: MEDICARE

## 2025-07-01 VITALS
SYSTOLIC BLOOD PRESSURE: 116 MMHG | HEIGHT: 65 IN | HEART RATE: 62 BPM | WEIGHT: 223 LBS | BODY MASS INDEX: 37.15 KG/M2 | DIASTOLIC BLOOD PRESSURE: 60 MMHG

## 2025-07-01 DIAGNOSIS — I48.0 PAROXYSMAL ATRIAL FIBRILLATION: ICD-10-CM

## 2025-07-01 DIAGNOSIS — I50.32 CHRONIC DIASTOLIC CONGESTIVE HEART FAILURE: ICD-10-CM

## 2025-07-01 DIAGNOSIS — I10 PRIMARY HYPERTENSION: Primary | ICD-10-CM

## 2025-07-01 DIAGNOSIS — I21.4 NON-STEMI (NON-ST ELEVATED MYOCARDIAL INFARCTION): ICD-10-CM

## 2025-07-01 DIAGNOSIS — E78.2 MIXED HYPERLIPIDEMIA: ICD-10-CM

## 2025-07-01 DIAGNOSIS — I87.2 VENOUS (PERIPHERAL) INSUFFICIENCY: ICD-10-CM

## 2025-07-01 RX ORDER — HYDRALAZINE HYDROCHLORIDE 50 MG/1
50 TABLET, FILM COATED ORAL TAKE AS DIRECTED
Status: SHIPPED
Start: 2025-07-01

## 2025-07-01 RX ORDER — HYDRALAZINE HYDROCHLORIDE 50 MG/1
50 TABLET, FILM COATED ORAL EVERY MORNING
COMMUNITY
End: 2025-07-01

## 2025-07-01 NOTE — PROGRESS NOTES
CARDIOLOGY    Anna Marsh MD    ENCOUNTER DATE:  07/01/2025    Latanya Olsen / 84 y.o. / female        CHIEF COMPLAINT / REASON FOR OFFICE VISIT     Congestive Heart Failure      HISTORY OF PRESENT ILLNESS       Congestive Heart Failure        Latanya Olsen is a 84 y.o. female     This is a patient who previously followed with Dr. Park but has switched so she can see me in the [place] office.  She has paroxysmal atrial fibrillation, chronic kidney disease, COPD status post lung cancer with left lower lobectomy in 2006, hypertension, hypothyroid, and obstructive sleep apnea on CPAP.  She had a non-ST elevation myocardial infarction in 08/2023 with sudden onset of chest tightness and pain radiating to her back with shortness of breath.  In the ED, her EKG was unremarkable.  Her troponin was elevated at 792 with hemoglobin of 11.6 and a normal pro-BNP.  Creatinine was 1.42, which was at baseline.  She had a heart catheterization which showed normal coronary arteries and wall motion abnormalities consistent with Takotsubo cardiomyopathy.  Echocardiogram 08/07/2023 put her ejection fraction at 40-45% with severe hypokinesis of the distal anterior wall, distal septum, and distal apical segments.       She was readmitted to the hospital in 08/2023 with some fluid overload and was diuresed and put back on all diuretics.       Repeat echo in January 2024 showed normal LV function with grade 1 diastolic dysfunction and no valve disease.     Later in January 2024 she called the office complaining of shortness of breath, chest tightness and weight gain.  She was seen by DEVORA Solomon in the office.  She had increased her Lasix for 3 days prior to seeing Amanda and was feeling better.  Amanda obtain blood work and her BNP was normal and down from where it had been in August 2023.  Renal function was at baseline at that time.     She had a trip to the emergency room in 05/2024 with shortness of  "breath and left leg swelling after she hit her leg with her car door.  She had a negative left lower extremity Doppler for DVT.  Her pro-BNP was normal.  Since that time, she said Dr. Hernandez changed her Lasix to Bumex and her edema improved.  She was started on home oxygen.    She tripped and see room because of fluid overload.  She was seen by Dr. Truong Stanley.  He made recommendations on her diuretics but is concerned about her renal function.  She still feels like she has lower extremity edema.  It is better when she can keep her feet up.      VITAL SIGNS     Visit Vitals  /60 (BP Location: Left arm)   Pulse 62   Ht 165.1 cm (65\")   Wt 101 kg (223 lb)   BMI 37.11 kg/m²         Wt Readings from Last 3 Encounters:   07/01/25 101 kg (223 lb)   06/12/25 101 kg (221 lb 12.8 oz)   06/11/25 102 kg (225 lb)     Body mass index is 37.11 kg/m².      PHYSICAL EXAMINATION     Constitutional:       General: Not in acute distress.  Neck:      Vascular: No carotid bruit or JVD.   Pulmonary:      Effort: Pulmonary effort is normal.      Breath sounds: Normal breath sounds.   Cardiovascular:      Normal rate. Regular rhythm.      Murmurs: There is no murmur.      Comments: She has nonpitting bilateral lower extremity edema.  Edema:     Peripheral edema present.  Psychiatric:         Mood and Affect: Mood and affect normal.           REVIEWED DATA       ECG 12 Lead    Date/Time: 7/1/2025 2:20 PM  Performed by: Anna Marsh MD    Authorized by: Anna Marsh MD  Comparison: compared with previous ECG from 6/3/2025  Similar to previous ECG  Rhythm: sinus rhythm  BPM: 62  Conduction: conduction normal  ST Segments: ST segments normal  T Waves: T waves normal    Clinical impression: normal ECG            Lipid Panel          12/6/2024    10:44   Lipid Panel   Total Cholesterol 178    Triglycerides 102    HDL Cholesterol 51    VLDL Cholesterol 19    LDL Cholesterol  108        Lab Results   Component Value Date    GLUCOSE " 102 (H) 06/12/2025    BUN 45 (H) 06/12/2025    CREATININE 1.91 (H) 06/12/2025     06/12/2025    K 4.3 06/12/2025     06/12/2025    CALCIUM 9.4 06/12/2025    PROTEINTOT 6.2 06/11/2025    ALBUMIN 3.8 06/11/2025    ALT 12 06/11/2025    AST 13 06/11/2025    ALKPHOS 90 06/11/2025    BILITOT 0.3 06/11/2025    GLOB 2.4 06/11/2025    AGRATIO 1.6 06/11/2025    BCR 24 06/12/2025    ANIONGAP 10.0 06/11/2025    EGFR 25 (L) 06/12/2025       ASSESSMENT & PLAN      Diagnosis Plan   1. Primary hypertension        2. Mixed hyperlipidemia        3. Paroxysmal atrial fibrillation        4. Non-STEMI (non-ST elevated myocardial infarction)        5. Chronic diastolic congestive heart failure        6. Venous (peripheral) insufficiency            Chronic heart failure with preserved ejection fraction.  She did have a stress induced cardiomyopathy with echocardiogram in 01/2024 showing her LV function had normalized.  I think her edema is multifactorial.  She is following with Lizet and Truong Stanley due to her chronic kidney disease.  I did tell her we could try to bring her into [facility] for IV diuretics if necessary to save her trip to the emergency room.  History of stress induced cardiomyopathy in 08/2023.  Hypertension.  Valsartan 320 mg a day, carvedilol 12.5 mg twice daily, hydralazine 50 mg in the morning and 25 mg in the afternoon.  Her blood pressure is well-controlled.  She is now following it with Dr. Stanley  Hyperlipidemia.  Not currently on therapy for cholesterol.  Chronic kidney disease.  Followed by Dr. Hernandez.  History of paroxysmal atrial fibrillation, in sinus rhythm today.  Anticoagulated with Eliquis.  Not having any bleeding problems  Chronic lymphedema.       Follow-up in 3 months.      Orders Placed This Encounter   Procedures    ECG 12 Lead     This order was created via procedure documentation     Release to patient:   Routine Release [[id number]]           MEDICATIONS         Discharge Medications             Accurate as of July 1, 2025  2:20 PM. If you have any questions, ask your nurse or doctor.                Changes to Medications        Instructions Start Date   bumetanide 1 MG tablet  Commonly known as: BUMEX  What changed: additional instructions   1 mg, Oral, Take As Directed, 2mg PO twice a day for 3 days then 2mg in the AM and 1mg in the afternoon      hydrALAZINE 50 MG tablet  Commonly known as: APRESOLINE  What changed:   when to take this  additional instructions  Another medication with the same name was removed. Continue taking this medication, and follow the directions you see here.  Changed by: Anna Lash   50 mg, Oral, Take As Directed, 50MG am AND 25MG pm      lidocaine 5 % ointment  Commonly known as: XYLOCAINE  What changed: additional instructions   1 Application, Topical, 3 Times Daily             Continue These Medications        Instructions Start Date   acetaminophen 325 MG tablet  Commonly known as: TYLENOL   650 mg, Oral, Every 4 Hours PRN      albuterol sulfate  (90 Base) MCG/ACT inhaler  Commonly known as: PROVENTIL HFA;VENTOLIN HFA;PROAIR HFA   2 puffs, Every 4 Hours PRN      carvedilol 12.5 MG tablet  Commonly known as: COREG   12.5 mg, Oral, 2 Times Daily With Meals      coenzyme Q10 100 MG capsule   100 mg, Daily      cyclobenzaprine 10 MG tablet  Commonly known as: FLEXERIL   10 mg, Oral, 2 Times Daily PRN      Diclofenac Sodium 1 % gel gel  Commonly known as: Voltaren   4 g, Topical, 3 Times Daily PRN      Eliquis 5 MG tablet tablet  Generic drug: apixaban   5 mg, Oral, Every 12 Hours Scheduled      esomeprazole 40 MG capsule  Commonly known as: nexIUM   40 mg, Every Morning Before Breakfast      ferrous sulfate 325 (65 FE) MG tablet   325 mg, Oral, Daily With Breakfast      fluticasone 50 MCG/ACT nasal spray  Commonly known as: FLONASE       folic acid 1 MG tablet  Commonly known as: FOLVITE   1 mg, Oral, Daily      gabapentin 100 MG capsule  Commonly known as:  NEURONTIN   100 mg, Oral, 3 Times Daily      ipratropium 0.06 % nasal spray  Commonly known as: ATROVENT   2 sprays, Nasal, 4 Times Daily PRN      ipratropium-albuterol 0.5-2.5 mg/3 ml nebulizer  Commonly known as: DUO-NEB   3 mL, Nebulization, 4 Times Daily PRN      lactulose 10 GM/15ML solution  Commonly known as: CHRONULAC   TAKE 15 ML BY MOUTH THREE TIMES DAILY      levothyroxine 100 MCG tablet  Commonly known as: SYNTHROID, LEVOTHROID   100 mcg, Oral, Daily      Magnesium 500 MG tablet   50 mg, Daily      ondansetron 4 MG tablet  Commonly known as: ZOFRAN   Take 1 tablet by mouth Every 8 (Eight) Hours As Needed.      potassium chloride 10 MEQ CR tablet   10 mEq, Oral, 2 Times Daily      pramipexole 1.5 MG tablet  Commonly known as: MIRAPEX   1.5 mg, Oral, Every Night at Bedtime      Roller Walker misc   1 each, Not Applicable, Daily      Senna Plus 8.6-50 MG per tablet  Generic drug: sennosides-docusate   2 tablets, Oral, Every 12 Hours Scheduled      sertraline 25 MG tablet  Commonly known as: ZOLOFT   25 mg, Oral, Daily      triamcinolone 0.1 % cream  Commonly known as: KENALOG   1 application , Topical, 2 Times Daily      valsartan 320 MG tablet  Commonly known as: DIOVAN   320 mg, Oral, Daily      vitamin B-12 1000 MCG tablet  Commonly known as: CYANOCOBALAMIN   1,000 mcg, Daily      Zinc 50 MG tablet   1 tablet, Daily                 Anna Marsh MD  07/01/25  14:20 EDT    Part of this note may be an electronic transcription/translation of spoken language to printed text using the Dragon dictation system.

## 2025-07-02 ENCOUNTER — TELEPHONE (OUTPATIENT)
Dept: FAMILY MEDICINE CLINIC | Facility: CLINIC | Age: 85
End: 2025-07-02
Payer: MEDICARE

## 2025-07-02 NOTE — TELEPHONE ENCOUNTER
SPOKE TO [facility] [place] [facility]UR[facility] AND SHE IS STATING THAT SHE BELIEVES PT HAS A CLOT IN HER LEG.  TOOK OFF HER COMPRESSION STOCKING AND IT IS RED AND SWOLLEN AND THE PT IS IN A LOT OF PAIN.  INFORMED HER TO HAVE PT TO GO TO NEAREST ER.

## 2025-07-08 ENCOUNTER — OFFICE VISIT (OUTPATIENT)
Dept: ORTHOPEDIC SURGERY | Facility: CLINIC | Age: 85
End: 2025-07-08
Payer: MEDICARE

## 2025-07-08 VITALS — WEIGHT: 223 LBS | HEIGHT: 65 IN | BODY MASS INDEX: 37.15 KG/M2

## 2025-07-08 DIAGNOSIS — M17.11 PRIMARY OSTEOARTHRITIS OF RIGHT KNEE: Primary | ICD-10-CM

## 2025-07-08 RX ADMIN — TRIAMCINOLONE ACETONIDE 80 MG: 40 INJECTION, SUSPENSION INTRA-ARTICULAR; INTRAMUSCULAR at 14:50

## 2025-07-08 RX ADMIN — LIDOCAINE HYDROCHLORIDE 8 ML: 10 INJECTION, SOLUTION EPIDURAL; INFILTRATION; INTRACAUDAL; PERINEURAL at 14:50

## 2025-07-08 NOTE — PROGRESS NOTES
Subjective:     Patient ID: Latanya Olsen is a 84 y.o. female.    Chief Complaint:  Follow-up DJD right knee  Viscosupplementation injection right knee 5/30/2025  History of Present Illness  History of Present Illness  The patient is an 84-year-old female who presents to the clinic today for reevaluation of her right knee. She received a viscosupplementation injection at her last visit. She is exhibiting swelling and pain with weightbearing activities. She is currently participating in home health PT. She is exhibiting pain with transitional activities such as standing and changing to walk even short distances at this point in time and activities of daily living. We did try the visco injection which seemed to provide her some mild symptom improvement, but she does continue to exhibit swelling of the knee. She reports no other concerns present.    The patient reports that the visco injection provided some mild symptom improvement, but she continues to experience swelling in the knee.         Social History     Occupational History     Employer: RETIRED   Tobacco Use    Smoking status: Never     Passive exposure: Yes    Smokeless tobacco: Never    Tobacco comments:     Never   Vaping Use    Vaping status: Never Used   Substance and Sexual Activity    Alcohol use: Never     Comment: Patient is non drinker.    Drug use: Never     Comment: Drug Abuse: none    Sexual activity: Not Currently     Birth control/protection: Tubal ligation      Past Medical History:   Diagnosis Date    Abnormal ECG Aug 2023    Acute deep vein thrombosis (DVT) of iliac vein of left lower extremity 05/07/2021    Acute deep vein thrombosis (DVT) of iliac vein of left lower extremity 03/24/2021    Acute embolism and thrombosis of left iliac vein     Acute exacerbation of CHF (congestive heart failure) 08/27/2023    Anemia     Anemia 02/19/2016    Anemia, unspecified 08/12/2021    Anxiety 4-2-23    Arthritis     Arthritis of back     Arthritis  of neck     Asthma 1/1/2006    Atrial fibrillation     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    Cataract 1-1/2015    Cervical disc disorder     Chronic constipation 02/19/2016    Chronic deep vein thrombosis (DVT) of left femoral vein 08/12/2021    Chronic deep vein thrombosis (DVT) of left popliteal vein 08/12/2021    Chronic lower back pain 02/19/2016    Chronic obstructive pulmonary disease 02/19/2016    Chronic obstructive pulmonary disease, unspecified     CKD (chronic kidney disease)     Stage 3    Compression of vein 05/07/2021    Congenital heart disease Aug 2023    Constipation     Constipation 03/10/2021    COPD (chronic obstructive pulmonary disease)     DDD (degenerative disc disease), lumbar     Deep vein thrombosis 2019    Dependence on other enabling machines and devices     CPAP    Depression     Diverticulosis     Diverticulosis of intestine 02/19/2016    Elevated serum alkaline phosphatase level 02/22/2016    Essential (primary) hypertension     Foot pain 02/19/2016    Fracture of hip     Fracture, femur     Gastroparesis 02/19/2016    Generalized osteoarthritis 02/19/2016    Generalized weakness 03/06/2021    GERD (gastroesophageal reflux disease)     Hip arthrosis     History of heart attack     7/2023    Hx of degenerative disc disease     Hyperlipidemia     Hyperlipidemia 02/19/2016    Hyperlipidemia, unspecified     Hypertension     Hypertension 02/19/2016    Hypokalemia     Hypokalemia 02/19/2016    Hypothyroidism     Hypothyroidism 02/19/2016    Hypothyroidism, unspecified     Insomnia 02/19/2016    Knee pain 02/19/2016    Knee swelling     Low back pain Yes    Low back strain     Lung cancer     history    Myocardial infarction 8-4-23    Neck pain 11/28/2017    Neutropenia 02/19/2016    Neutropenia, unspecified     Obesity 218    Obesity (BMI 30-39.9) 02/19/2016    Obstructive sleep apnea (adult) (pediatric)     Obstructive sleep apnea syndrome 02/19/2016    Osteoarthritis of knee 02/19/2016     Osteopenia Yes    Overweight (BMI 25.0-29.9) 02/19/2016    Pain of left breast 02/22/2016    Periarthritis of shoulder     Pulmonary embolism 2019    Renal disorder     Renal insufficiency 2019    Restless leg syndrome     Rotator cuff syndrome     Scoliosis Yes    Sleep apnea with use of continuous positive airway pressure (CPAP)     Spinal stenosis of lumbar region without neurogenic claudication 02/19/2016    Spinal stenosis, lumbar region without neurogenic claudication 08/12/2021    Status post total hip replacement, 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    Unspecified atrial fibrillation     Vitamin D deficiency 02/19/2016    Volume overload 03/19/2019    Weakness 08/12/2021    Weight gain 02/19/2016    Weight loss 02/19/2016     Past Surgical History:   Procedure Laterality Date    ADENOIDECTOMY  2000    BUNIONECTOMY Bilateral     CARDIAC CATHETERIZATION N/A 08/07/2023    Procedure: Left Heart Cath;  Surgeon: Mireya Park MD;  Location: [facility] CATH INVASIVE LOCATION;  Service: Cardiology;  Laterality: N/A;    CARDIAC CATHETERIZATION N/A 08/07/2023    Procedure: Coronary angiography;  Surgeon: Mireya Park MD;  Location: [facility] CATH INVASIVE LOCATION;  Service: Cardiology;  Laterality: N/A;    CARDIAC CATHETERIZATION N/A 08/07/2023    Procedure: Left ventriculography;  Surgeon: Mireya Park MD;  Location: [facility] CATH INVASIVE LOCATION;  Service: Cardiology;  Laterality: N/A;    CATARACT EXTRACTION      CHOLECYSTECTOMY      COLONOSCOPY      ENDOSCOPY N/A 06/24/2016    Procedure: ESOPHAGOGASTRODUODENOSCOPY  with biopsies;  Surgeon: Von Hernández MD;  Location: [facility] OR;  Service:     JOINT REPLACEMENT  Yes    LUNG LOBECTOMY Left     lower lobe    LYTIC THROMBIN THERAPY Left 03/26/2021    Procedure: left leg clot treiver possible venous stent *supine*;  Surgeon: Rogerio Vega MD;  Location: [facility] OR 18/19;  Service: Vascular;  Laterality: Left;    REPLACEMENT TOTAL KNEE Left     THYROID BIOPSY   "    TONSILLECTOMY  Yes    TOTAL HIP ARTHROPLASTY Right 06/01/2019    Procedure: BIPOLAR HIP CEMENTED POSTERIOR;  Surgeon: Ashley Crenshaw MD;  Location: [facility];  Service: Orthopedics    TRIGGER POINT INJECTION      TUBAL ABDOMINAL LIGATION  50 yrs ago       Family History   Problem Relation Age of Onset    Heart attack Mother     Coronary artery disease Mother     Hypertension Mother     Kidney disease Mother     Arthritis Mother     Heart disease Mother     Heart disease Father     Anesthesia problems Father     Heart attack Sister     Aortic aneurysm Brother         Abdominal    Cancer Brother         no details    Cancer Brother     Colon cancer Neg Hx     Colon polyps Neg Hx     Esophageal cancer Neg Hx     Breast cancer Neg Hx                Objective:  Physical Exam    General: No acute distress.  Eyes: conjunctiva clear; pupils equally round and reactive  ENT: external ears and nose atraumatic; oropharynx clear  CV: no peripheral edema  Resp: normal respiratory effort  Skin: no rashes or wounds; normal turgor  Psych: mood and affect appropriate; recent and remote memory intact    Vitals:    07/08/25 1352   Weight: 101 kg (223 lb)   Height: 165.1 cm (65\")         07/08/25  1352   Weight: 101 kg (223 lb)     Body mass index is 37.11 kg/m².      Ortho Exam     Physical Exam  Right knee examined  Severe swelling  Positive active patellar compression test  Positive crepitus throughout arc of motion  Positive tenderness patellofemoral medial compartment  Knee range of motion 5 degrees to 90 degrees stable to varus and valgus stress at 5 degrees and 30 degrees    Assessment:        1. Primary osteoarthritis of right knee         Assessment & Plan  1. Right knee pain.  A comprehensive discussion regarding the plan of care was conducted. The possibility of aspiration was considered, but it was decided to postpone this for now. The option of a repeat corticosteroid injection was also discussed, which " is believed to be beneficial at this time. All her queries were addressed.    Follow-up  The patient will follow up in 6 weeks for reevaluation.    PROCEDURE  Viscosupplementation injection was administered during the last visit.    Plan:    Large Joint Arthrocentesis: R knee  Date/Time: 7/8/2025 2:50 PM  Consent given by: patient  Site marked: site marked  Timeout: Immediately prior to procedure a time out was called to verify the correct patient, procedure, equipment, support staff and site/side marked as required   Supporting Documentation  Indications: pain   Procedure Details  Location: knee - R knee  Preparation: Patient was prepped and draped in the usual sterile fashion  Needle size: 22 G  Approach: anterolateral  Medications administered: 80 mg triamcinolone acetonide 40 MG/ML; 8 mL lidocaine PF 1% 1 %  Patient tolerance: patient tolerated the procedure well with no immediate complications          Orders:  Orders Placed This Encounter   Procedures    Large Joint Arthrocentesis: R knee     No orders of the defined types were placed in this encounter.        Dragon dictation utilized          Patient or patient representative verbalized consent for the use of Ambient Listening during the visit with  DEVORA Goff for chart documentation. 7/10/2025  20:46 EDT

## 2025-07-09 ENCOUNTER — OFFICE VISIT (OUTPATIENT)
Dept: FAMILY MEDICINE CLINIC | Facility: CLINIC | Age: 85
End: 2025-07-09
Payer: MEDICARE

## 2025-07-09 VITALS
OXYGEN SATURATION: 94 % | BODY MASS INDEX: 37.69 KG/M2 | HEIGHT: 65 IN | HEART RATE: 72 BPM | WEIGHT: 226.2 LBS | SYSTOLIC BLOOD PRESSURE: 139 MMHG | DIASTOLIC BLOOD PRESSURE: 71 MMHG

## 2025-07-09 DIAGNOSIS — R06.02 SHORTNESS OF BREATH: ICD-10-CM

## 2025-07-09 DIAGNOSIS — I10 ESSENTIAL HYPERTENSION: ICD-10-CM

## 2025-07-09 DIAGNOSIS — L02.419 CELLULITIS AND ABSCESS OF LOWER EXTREMITY: Primary | ICD-10-CM

## 2025-07-09 DIAGNOSIS — I87.2 VENOUS INSUFFICIENCY OF BOTH LOWER EXTREMITIES: ICD-10-CM

## 2025-07-09 DIAGNOSIS — L03.119 CELLULITIS AND ABSCESS OF LOWER EXTREMITY: Primary | ICD-10-CM

## 2025-07-09 NOTE — PROGRESS NOTES
Chief Complaint  Cellulitis (Was seen at [facility] ER) and Headache (Since this AM)    Subjective        Latanya Olsen presents to [facility]  History of Present Illness  History of Present Illness  The patient is an 84-year-old female who presents for a hospital follow-up.    A week ago, she sought emergency care due to severe leg swelling that impeded her mobility. Her home nurse recommended a hospital visit, and upon contacting our office, she was advised to proceed to the emergency room. The ER physician diagnosed her with cellulitis and prescribed a week's supply of Keflex, to be taken four times daily. She has been adhering to this regimen and reports an improvement in her condition, with less redness and streaking. An ultrasound was performed to rule out the presence of blood clots. She reports no fever or chills since her ER visit. She is scheduled to complete her course of Keflex tomorrow morning.    She also experienced shortness of breath, which has since stabilized, although she still experiences it during physical activity. She is currently on 2 liters of oxygen.    Her left leg remains unchanged, with similar redness as the right. She has a long-standing sore on the front of her leg, present for about 2 years, which she believes is scar tissue from a previous injury. She has been using lymphedema pumps and occasionally feels as though water is being sprayed on her leg.    She is considering moving to an assisted living facility and has been receiving assistance from friends. She plans to sell her house to fund her stay at the assisted living facility.    She has been experiencing severe headaches and took two Tylenol at 6:00 AM and two more at 12:00 PM. She has allergies.    She recently had an EKG done by Dr. Marsh, who is currently on vacation. The initial report indicated no issues, but a subsequent report on [facility] suggested a problem. She is seeking clarification on  "this matter.       Objective   Vital Signs:  /71   Pulse 72   Ht 165.1 cm (65\")   Wt 103 kg (226 lb 3.2 oz)   SpO2 94%   BMI 37.64 kg/m²   Estimated body mass index is 37.64 kg/m² as calculated from the following:    Height as of this encounter: 165.1 cm (65\").    Weight as of this encounter: 103 kg (226 lb 3.2 oz).               Physical Exam   Physical Exam  Neck: Supple  Respiratory: Clear to auscultation, no wheezing, rales or rhonchi  Cardiovascular: Regular rate and rhythm, no murmurs, rubs, or gallops  Extremities: Erythema noted on the front of the leg, but it does not appear severe. No abscess present.       Result Review :          Results  Imaging   - Ultrasound: No blood clots   EMERGENCY DEPART/[facility] - SCAN - E/D DOCUMENTATION, NANCY BOWDEN, 07/02/2025 (07/02/2025)  RADIOLOGY - SCAN - US VEINS LE DUPLEX LTD RT, NANCY BOWDEN, 07/02/2025 (07/02/2025)           Assessment and Plan     Diagnoses and all orders for this visit:    1. Cellulitis and abscess of lower extremity (Primary)  -     TSH  -     Comprehensive Metabolic Panel  -     CBC & Differential    2. Venous insufficiency of both lower extremities  -     TSH  -     Comprehensive Metabolic Panel  -     CBC & Differential    3. Essential hypertension  -     TSH  -     Comprehensive Metabolic Panel  -     CBC & Differential    4. Shortness of breath  -     TSH  -     Comprehensive Metabolic Panel  -     CBC & Differential      Assessment & Plan  1. Cellulitis.  - The erythema on the patient's leg is mild and not concerning.  - No abscess is present; the condition appears more consistent with lymphedema.  - Given venous insufficiency and chronic edema, the tissues have likely weakened, increasing infection susceptibility.  - No further antibiotics are necessary; advised to continue using support hose, lymphedema pumps, maintain a healthy diet, and limit sodium intake. Blood work will be ordered today to rule out metabolic causes for increased " swelling.    2. Shortness of breath.  - The patient's shortness of breath is stable now but worsens with activity.  - Currently using 2 liters of oxygen.  - Symptoms should be monitored closely due to heart history.  - Seek medical attention immediately if shortness of breath worsens.    3. Lymphedema.  - Condition appears more consistent with lymphedema rather than cellulitis.  - Advised to continue using support hose and lymphedema pumps.  - Recommended to maintain a healthy diet and limit sodium intake.  - Blood work will be ordered today to rule out metabolic causes for increased swelling.    4. Headaches.  - Headaches may be due to barometric pressure or allergies.  - Took two Tylenol at 6:00 AM and two at 12:00 PM.  - Grass pollen levels are currently high, which could be contributing to symptoms.  - Further evaluation may be necessary if headaches persist.    5. EKG abnormality.  - EKG reviewed and found to be normal last week.  - Abnormality noted on MyChart likely due to a computer overread.  - Reassured that there is no cause for concern based on the cardiologist's assessment.    6. Health maintenance.  - Considering moving to an assisted living facility and has been receiving assistance from friends.  - Plans to sell her house to fund her stay at the assisted living facility.  -  involved in helping choose a suitable facility.            Follow Up     No follow-ups on file.  Patient was given instructions and counseling regarding her condition or for health maintenance advice. Please see specific information pulled into the AVS if appropriate.    Patient or patient representative verbalized consent for the use of Ambient Listening during the visit with  Meredith Lea Kehrer, MD for chart documentation. 7/9/2025  14:15 EDT

## 2025-07-10 LAB
ALBUMIN SERPL-MCNC: 4.2 G/DL (ref 3.7–4.7)
ALP SERPL-CCNC: 94 IU/L (ref 44–121)
ALT SERPL-CCNC: 11 IU/L (ref 0–32)
AST SERPL-CCNC: 11 IU/L (ref 0–40)
BASOPHILS # BLD AUTO: 0 X10E3/UL (ref 0–0.2)
BASOPHILS NFR BLD AUTO: 0 %
BILIRUB SERPL-MCNC: 0.2 MG/DL (ref 0–1.2)
BUN SERPL-MCNC: 32 MG/DL (ref 8–27)
BUN/CREAT SERPL: 19 (ref 12–28)
CALCIUM SERPL-MCNC: 10.1 MG/DL (ref 8.7–10.3)
CHLORIDE SERPL-SCNC: 100 MMOL/L (ref 96–106)
CO2 SERPL-SCNC: 22 MMOL/L (ref 20–29)
CREAT SERPL-MCNC: 1.69 MG/DL (ref 0.57–1)
EGFRCR SERPLBLD CKD-EPI 2021: 29 ML/MIN/1.73
EOSINOPHIL # BLD AUTO: 0 X10E3/UL (ref 0–0.4)
EOSINOPHIL NFR BLD AUTO: 0 %
ERYTHROCYTE [DISTWIDTH] IN BLOOD BY AUTOMATED COUNT: 14.2 % (ref 11.7–15.4)
GLOBULIN SER CALC-MCNC: 1.7 G/DL (ref 1.5–4.5)
GLUCOSE SERPL-MCNC: 125 MG/DL (ref 70–99)
HCT VFR BLD AUTO: 34.1 % (ref 34–46.6)
HGB BLD-MCNC: 10.9 G/DL (ref 11.1–15.9)
IMM GRANULOCYTES # BLD AUTO: 0.1 X10E3/UL (ref 0–0.1)
IMM GRANULOCYTES NFR BLD AUTO: 1 %
LYMPHOCYTES # BLD AUTO: 1.9 X10E3/UL (ref 0.7–3.1)
LYMPHOCYTES NFR BLD AUTO: 19 %
MCH RBC QN AUTO: 29.5 PG (ref 26.6–33)
MCHC RBC AUTO-ENTMCNC: 32 G/DL (ref 31.5–35.7)
MCV RBC AUTO: 92 FL (ref 79–97)
MONOCYTES # BLD AUTO: 0.6 X10E3/UL (ref 0.1–0.9)
MONOCYTES NFR BLD AUTO: 6 %
NEUTROPHILS # BLD AUTO: 7.3 X10E3/UL (ref 1.4–7)
NEUTROPHILS NFR BLD AUTO: 74 %
PLATELET # BLD AUTO: 196 X10E3/UL (ref 150–450)
POTASSIUM SERPL-SCNC: 4.8 MMOL/L (ref 3.5–5.2)
PROT SERPL-MCNC: 5.9 G/DL (ref 6–8.5)
RBC # BLD AUTO: 3.69 X10E6/UL (ref 3.77–5.28)
SODIUM SERPL-SCNC: 140 MMOL/L (ref 134–144)
TSH SERPL DL<=0.005 MIU/L-ACNC: 0.27 UIU/ML (ref 0.45–4.5)
WBC # BLD AUTO: 10 X10E3/UL (ref 3.4–10.8)

## 2025-07-10 RX ORDER — TRIAMCINOLONE ACETONIDE 40 MG/ML
80 INJECTION, SUSPENSION INTRA-ARTICULAR; INTRAMUSCULAR
Status: COMPLETED | OUTPATIENT
Start: 2025-07-08 | End: 2025-07-08

## 2025-07-10 RX ORDER — LIDOCAINE HYDROCHLORIDE 10 MG/ML
8 INJECTION, SOLUTION EPIDURAL; INFILTRATION; INTRACAUDAL; PERINEURAL
Status: COMPLETED | OUTPATIENT
Start: 2025-07-08 | End: 2025-07-08

## 2025-07-23 ENCOUNTER — TELEPHONE (OUTPATIENT)
Dept: FAMILY MEDICINE CLINIC | Facility: CLINIC | Age: 85
End: 2025-07-23
Payer: MEDICARE

## 2025-07-23 NOTE — TELEPHONE ENCOUNTER
Has she been having any increase in shortness of breath?  I would recommend wearing compression stockings and short-term follow-up either from home health nurse or her coming in to be seen.  If any worsening symptoms patient should go to the ER for more immediate assistance.

## 2025-07-23 NOTE — TELEPHONE ENCOUNTER
HH NURSE CALLED AND STATED    LEG PAIN ONGOING STILL SINCE APPT YESTERDAY. WEIGHT  YESTERDAY. WEIGHT TODAY . TODAY ANKLE CM IS 35 YESTERDAY WAS 29 CM. TODAY SHE HAS NOT HAD COMPRESSIONS ON WHERE AS YESTERDAY SHE DID.  COMPLAINING OF BURNING AND PAIN. DOES NOT WANT TO GO TO THE HOSPITAL. PT SAID SHE WAS FEELING BETTER TODAY JUST LEGS FEELING MORE SWOLLEN. DR KEHRER OFF TODAY PLEASE ADVISE.     CALL BACK, [phone] DEEDEE OBRIEN

## 2025-08-06 ENCOUNTER — OFFICE VISIT (OUTPATIENT)
Dept: ORTHOPEDIC SURGERY | Facility: CLINIC | Age: 85
End: 2025-08-06
Payer: MEDICARE

## 2025-08-06 VITALS — BODY MASS INDEX: 37.65 KG/M2 | WEIGHT: 226 LBS | HEIGHT: 65 IN

## 2025-08-06 DIAGNOSIS — M17.11 PRIMARY OSTEOARTHRITIS OF RIGHT KNEE: ICD-10-CM

## 2025-08-06 DIAGNOSIS — M70.51 PES ANSERINUS BURSITIS OF RIGHT KNEE: Primary | ICD-10-CM

## 2025-08-06 RX ADMIN — LIDOCAINE HYDROCHLORIDE 2 ML: 10 INJECTION, SOLUTION EPIDURAL; INFILTRATION; INTRACAUDAL; PERINEURAL at 14:07

## 2025-08-06 RX ADMIN — TRIAMCINOLONE ACETONIDE 80 MG: 40 INJECTION, SUSPENSION INTRA-ARTICULAR; INTRAMUSCULAR at 14:07

## 2025-08-08 RX ORDER — TRIAMCINOLONE ACETONIDE 40 MG/ML
80 INJECTION, SUSPENSION INTRA-ARTICULAR; INTRAMUSCULAR
Status: COMPLETED | OUTPATIENT
Start: 2025-08-06 | End: 2025-08-06

## 2025-08-08 RX ORDER — LIDOCAINE HYDROCHLORIDE 10 MG/ML
2 INJECTION, SOLUTION EPIDURAL; INFILTRATION; INTRACAUDAL; PERINEURAL
Status: COMPLETED | OUTPATIENT
Start: 2025-08-06 | End: 2025-08-06

## 2025-08-11 RX ORDER — FERROUS SULFATE 325(65) MG
1 TABLET ORAL
Qty: 90 TABLET | Refills: 0 | Status: SHIPPED | OUTPATIENT
Start: 2025-08-11

## 2025-08-11 RX ORDER — POTASSIUM CHLORIDE 750 MG/1
10 TABLET, EXTENDED RELEASE ORAL 2 TIMES DAILY
Qty: 180 TABLET | Refills: 0 | Status: SHIPPED | OUTPATIENT
Start: 2025-08-11

## (undated) DEVICE — PREP IM ENCHANCED TOTAL HIP BONE                                    PREPARATION KIT: Brand: PREP-IM

## (undated) DEVICE — 3M™ IOBAN™ 2 ANTIMICROBIAL INCISE DRAPE 6650EZ: Brand: IOBAN™ 2

## (undated) DEVICE — GW EMR FIX EXCHG J STD .035 3MM 260CM

## (undated) DEVICE — ANTIBACTERIAL UNDYED BRAIDED (POLYGLACTIN 910), SYNTHETIC ABSORBABLE SUTURE: Brand: COATED VICRYL

## (undated) DEVICE — HANDPIECE SET WITH COAXIAL HIGH FLOW TIP AND SUCTION TUBE: Brand: INTERPULSE

## (undated) DEVICE — PINNACLE R/O II INTRODUCER SHEATH WITH RADIOPAQUE MARKER: Brand: PINNACLE

## (undated) DEVICE — DRAPE,U/ SHT,SPLIT,PLAS,STERIL: Brand: MEDLINE

## (undated) DEVICE — 3M™ STERI-STRIP™ REINFORCED ADHESIVE SKIN CLOSURES, R1547, 1/2 IN X 4 IN (12 MM X 100 MM), 6 STRIPS/ENVELOPE: Brand: 3M™ STERI-STRIP™

## (undated) DEVICE — THE VASC BAND HEMOSTAT IS A COMPRESSION DEVICE TO ASSIST HEMOSTASIS OF ARTERIAL, VENOUS AND HEMODIALYSIS PERCUTANEOUS ACCESS SITES.: Brand: VASC BAND™ HEMOSTAT

## (undated) DEVICE — PK ANGIO 40

## (undated) DEVICE — RADIFOCUS GLIDEWIRE: Brand: GLIDEWIRE

## (undated) DEVICE — Device: Brand: D-STAT® DRY SILVER CLEAR TOPICAL HEMOSTAT

## (undated) DEVICE — PK HIP TOTL 40

## (undated) DEVICE — SUT ETHIB 0 CT1 CR8 18IN CX21D

## (undated) DEVICE — SUT VIC 1 CT1 36IN J947H

## (undated) DEVICE — CATH VENT MIV RADL PIG ST TIP 5F 110CM

## (undated) DEVICE — GLV SURG BIOGEL LTX PF 8

## (undated) DEVICE — PK CATH CARD 40

## (undated) DEVICE — RADIFOCUS GLIDEWIRE ADVANTAGE GUIDEWIRE: Brand: GLIDEWIRE ADVANTAGE

## (undated) DEVICE — OPTIFOAM GENTLE SA, POSTOP, 4X8: Brand: MEDLINE

## (undated) DEVICE — CVR PROB 96IN LF STRL

## (undated) DEVICE — DESTINATION PERIPHERAL GUIDING SHEATH: Brand: DESTINATION

## (undated) DEVICE — SYR CONTRL LUERLOK 10CC

## (undated) DEVICE — DRAPE,HIP,W/POUCHES,STERILE: Brand: MEDLINE

## (undated) DEVICE — SOL NACL 0.9PCT 100ML SGL

## (undated) DEVICE — DRAPE,REIN 53X77,STERILE: Brand: MEDLINE

## (undated) DEVICE — CATH DIAG IMPULSE FL3.5 5F 100CM

## (undated) DEVICE — ANGIOGRAPHIC CATHETER: Brand: IMAGER™ II

## (undated) DEVICE — HEWSON SUTURE RETRIEVER: Brand: HEWSON SUTURE RETRIEVER

## (undated) DEVICE — SOL ISO/ALC RUB 70PCT 4OZ

## (undated) DEVICE — SOL NACL 0.9PCT 1000ML

## (undated) DEVICE — PK ATS CUST W CARDIOTOMY RESEVOIR

## (undated) DEVICE — INFLATION DEVICE: Brand: ENCORE™ 26

## (undated) DEVICE — SYR LL TP 10ML STRL

## (undated) DEVICE — BLOOD TRANSFUSION FILTER: Brand: HAEMONETICS

## (undated) DEVICE — CATH IMG IVUS VISIONS .035 DIG 8.2F

## (undated) DEVICE — PROXIMATE RH ROTATING HEAD SKIN STAPLERS (35 WIDE) CONTAINS 35 STAINLESS STEEL STAPLES: Brand: PROXIMATE

## (undated) DEVICE — APPL CHLORAPREP W/TINT 26ML ORNG

## (undated) DEVICE — CLOTTRIEVER CATHETER, 16 MM, 105 CM: Brand: CLOTTRIEVER CATHETER, 16 MM

## (undated) DEVICE — DRSNG WND GEL FIBR OPTICELL AG PLS W/SLV LF 4X5IN  STRL

## (undated) DEVICE — COAXIAL FEMORAL CANAL TIP

## (undated) DEVICE — CLOTTRIEVER SHEATH, 13 FR, 15 CM: Brand: CLOTTRIEVER SHEATH, 13 FR

## (undated) DEVICE — MAT FLR ABSORBENT LG 4FT 10 2.5FT

## (undated) DEVICE — GLIDESHEATH SLENDER STAINLESS STEEL KIT: Brand: GLIDESHEATH SLENDER

## (undated) DEVICE — ST INTRO PERFORMER W/GW J/TP .038IN 12F

## (undated) DEVICE — CEMENT MIXING SYSTEM WITH FEMORAL BREAKWAY NOZZLE: Brand: REVOLUTION

## (undated) DEVICE — ATLAS®  PTA BALLOON DILATATION CATHETER 16 MM X 40 MM, 75 CM CATHETER: Brand: ATLAS®

## (undated) DEVICE — CATH DIAG IMPULSE FR4 5F 100CM

## (undated) DEVICE — ENCORE® LATEX ORTHO SIZE 8, STERILE LATEX POWDER-FREE SURGICAL GLOVE: Brand: ENCORE

## (undated) DEVICE — ST ACC MICROPUNCTURE STFF .018 ECHO/PLDM/TP 4F/10CM 21G/7CM

## (undated) DEVICE — SYRINGE KIT,PACKAGED,,150FT,MK 7(ANGIO-ARTERION, 150ML SYR KIT W/QFT,MC)(60729385): Brand: MEDRAD® MARK 7 ARTERION DISPOSABLE SYRINGE 150 ML WITH QUICK FILL TUBE

## (undated) DEVICE — 1000 S-DRAPE TOWEL DRAPE 10/BX: Brand: STERI-DRAPE™

## (undated) DEVICE — SKIN PREP TRAY W/CHG: Brand: MEDLINE INDUSTRIES, INC.

## (undated) DEVICE — PTA BALLOON DILATATION CATHETER: Brand: MUSTANG™

## (undated) DEVICE — GLV SURG BIOGEL LTX PF 7 1/2

## (undated) DEVICE — GLV SURG TRIUMPH CLASSIC PF LTX 8 STRL

## (undated) DEVICE — KT MANIFLD CARDIAC